# Patient Record
Sex: MALE | Race: WHITE | NOT HISPANIC OR LATINO | Employment: FULL TIME | URBAN - METROPOLITAN AREA
[De-identification: names, ages, dates, MRNs, and addresses within clinical notes are randomized per-mention and may not be internally consistent; named-entity substitution may affect disease eponyms.]

---

## 2017-01-23 ENCOUNTER — PATIENT MESSAGE (OUTPATIENT)
Dept: INTERNAL MEDICINE | Facility: CLINIC | Age: 57
End: 2017-01-23

## 2017-01-23 DIAGNOSIS — M25.50 ARTHRALGIA, UNSPECIFIED JOINT: Primary | ICD-10-CM

## 2017-01-27 ENCOUNTER — TELEPHONE (OUTPATIENT)
Dept: INTERNAL MEDICINE | Facility: CLINIC | Age: 57
End: 2017-01-27

## 2017-01-30 ENCOUNTER — PATIENT MESSAGE (OUTPATIENT)
Dept: INTERNAL MEDICINE | Facility: CLINIC | Age: 57
End: 2017-01-30

## 2017-02-17 ENCOUNTER — PATIENT MESSAGE (OUTPATIENT)
Dept: INTERNAL MEDICINE | Facility: CLINIC | Age: 57
End: 2017-02-17

## 2017-02-17 ENCOUNTER — TELEPHONE (OUTPATIENT)
Dept: INTERNAL MEDICINE | Facility: CLINIC | Age: 57
End: 2017-02-17

## 2017-02-21 ENCOUNTER — OFFICE VISIT (OUTPATIENT)
Dept: DERMATOLOGY | Facility: CLINIC | Age: 57
End: 2017-02-21
Payer: COMMERCIAL

## 2017-02-21 DIAGNOSIS — D22.9 MULTIPLE BENIGN NEVI: ICD-10-CM

## 2017-02-21 DIAGNOSIS — L57.3 POIKILODERMA OF CIVATTE: ICD-10-CM

## 2017-02-21 DIAGNOSIS — Z85.828 PERSONAL HISTORY OF OTHER MALIGNANT NEOPLASM OF SKIN: ICD-10-CM

## 2017-02-21 DIAGNOSIS — L84 CLAVUS: ICD-10-CM

## 2017-02-21 DIAGNOSIS — L82.1 SEBORRHEIC KERATOSIS: ICD-10-CM

## 2017-02-21 DIAGNOSIS — L57.0 AK (ACTINIC KERATOSIS): Primary | ICD-10-CM

## 2017-02-21 PROCEDURE — 99999 PR PBB SHADOW E&M-EST. PATIENT-LVL II: CPT | Mod: PBBFAC,,, | Performed by: DERMATOLOGY

## 2017-02-21 PROCEDURE — 99213 OFFICE O/P EST LOW 20 MIN: CPT | Mod: 25,S$GLB,, | Performed by: DERMATOLOGY

## 2017-02-21 PROCEDURE — 1160F RVW MEDS BY RX/DR IN RCRD: CPT | Mod: S$GLB,,, | Performed by: DERMATOLOGY

## 2017-02-21 PROCEDURE — 17000 DESTRUCT PREMALG LESION: CPT | Mod: S$GLB,,, | Performed by: DERMATOLOGY

## 2017-02-21 PROCEDURE — 17003 DESTRUCT PREMALG LES 2-14: CPT | Mod: S$GLB,,, | Performed by: DERMATOLOGY

## 2017-02-21 NOTE — PATIENT INSTRUCTIONS

## 2017-02-21 NOTE — PROGRESS NOTES
Subjective:       Patient ID:  Joseph Jesus is a 56 y.o. male who presents for   Chief Complaint   Patient presents with    Dry Skin     both post ears x 6 weeks, dry otc:cetaphil moisturizer    Skin Check     UBSE     HPI Comments: History of Present Illness: The patient presents for follow up of skin check.    The patient was last seen on: 11/14/16 for cryosurgery to actinic keratoses which have resolved.   This is a high risk patient here to check for the development of new lesions.    Other skin complaints: dry skin post ears x 6 weeks + otc moisturizer - helps      Dry Skin         Review of Systems   Skin: Positive for dry skin and activity-related sunscreen use. Negative for itching, rash, daily sunscreen use (but wears hat always now) and recent sunburn.        Wears a hat daily   Hematologic/Lymphatic: Does not bruise/bleed easily.        Objective:    Physical Exam   Constitutional: He appears well-developed and well-nourished. No distress.   Neurological: He is alert and oriented to person, place, and time. He is not disoriented.   Psychiatric: He has a normal mood and affect.   Skin:   Areas Examined (abnormalities noted in diagram):   Scalp / Hair Palpated and Inspected  Head / Face Inspection Performed  Neck Inspection Performed  Chest / Axilla Inspection Performed  Back Inspection Performed  RUE Inspected  LUE Inspection Performed                       Diagram Legend     Erythematous scaling macule/papule c/w actinic keratosis       Vascular papule c/w angioma      Pigmented verrucoid papule/plaque c/w seborrheic keratosis      Yellow umbilicated papule c/w sebaceous hyperplasia      Irregularly shaped tan macule c/w lentigo     1-2 mm smooth white papules consistent with Milia      Movable subcutaneous cyst with punctum c/w epidermal inclusion cyst      Subcutaneous movable cyst c/w pilar cyst      Firm pink to brown papule c/w dermatofibroma      Pedunculated fleshy papule(s) c/w skin tag(s)       Evenly pigmented macule c/w junctional nevus     Mildly variegated pigmented, slightly irregular-bordered macule c/w mildly atypical nevus      Flesh colored to evenly pigmented papule c/w intradermal nevus       Pink pearly papule/plaque c/w basal cell carcinoma      Erythematous hyperkeratotic cursted plaque c/w SCC      Surgical scar with no sign of skin cancer recurrence      Open and closed comedones      Inflammatory papules and pustules      Verrucoid papule consistent consistent with wart     Erythematous eczematous patches and plaques     Dystrophic onycholytic nail with subungual debris c/w onychomycosis     Umbilicated papule    Erythematous-base heme-crusted tan verrucoid plaque consistent with inflamed seborrheic keratosis     Erythematous Silvery Scaling Plaque c/w Psoriasis     See annotation      Assessment / Plan:        AK (actinic keratosis)  Cryosurgery Procedure Note    Verbal consent from the patient is obtained and the patient is aware of the precancerous quality and need for treatment of these lesions. Liquid nitrogen cryosurgery is applied to the 3 actinic keratoses, as detailed in the physical exam, to produce a freeze injury. The patient is aware that blisters may form and is instructed on wound care with gentle cleansing and use of vaseline ointment to keep moist until healed. The patient is supplied a handout on cryosurgery and is instructed to call if lesions do not completely resolve.      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 Discussed pathogenic factor of pressure. Patient advised to avoid/eliminate contributory footwear.    Use corn pads.      Personal history of other malignant neoplasm of skin  Area(s) of previous NMSC evaluated with no signs of recurrence.    Upper body skin examination performed today including at least 6 points as noted in  physical examination. No lesions suspicious for malignancy noted.             Return in about 6 months (around 8/21/2017).

## 2017-03-28 ENCOUNTER — OFFICE VISIT (OUTPATIENT)
Dept: RHEUMATOLOGY | Facility: CLINIC | Age: 57
End: 2017-03-28
Payer: COMMERCIAL

## 2017-03-28 VITALS
HEART RATE: 82 BPM | SYSTOLIC BLOOD PRESSURE: 135 MMHG | WEIGHT: 233.31 LBS | BODY MASS INDEX: 30.92 KG/M2 | HEIGHT: 73 IN | DIASTOLIC BLOOD PRESSURE: 93 MMHG

## 2017-03-28 DIAGNOSIS — M15.9 PRIMARY OSTEOARTHRITIS INVOLVING MULTIPLE JOINTS: Primary | ICD-10-CM

## 2017-03-28 DIAGNOSIS — I10 HYPERTENSION, ESSENTIAL: ICD-10-CM

## 2017-03-28 DIAGNOSIS — M77.9 TENDONITIS: ICD-10-CM

## 2017-03-28 DIAGNOSIS — K76.0 NAFLD (NONALCOHOLIC FATTY LIVER DISEASE): ICD-10-CM

## 2017-03-28 PROCEDURE — 1160F RVW MEDS BY RX/DR IN RCRD: CPT | Mod: S$GLB,,, | Performed by: INTERNAL MEDICINE

## 2017-03-28 PROCEDURE — 3075F SYST BP GE 130 - 139MM HG: CPT | Mod: S$GLB,,, | Performed by: INTERNAL MEDICINE

## 2017-03-28 PROCEDURE — 3080F DIAST BP >= 90 MM HG: CPT | Mod: S$GLB,,, | Performed by: INTERNAL MEDICINE

## 2017-03-28 PROCEDURE — 99204 OFFICE O/P NEW MOD 45 MIN: CPT | Mod: S$GLB,,, | Performed by: INTERNAL MEDICINE

## 2017-03-28 PROCEDURE — 99999 PR PBB SHADOW E&M-EST. PATIENT-LVL III: CPT | Mod: PBBFAC,,, | Performed by: INTERNAL MEDICINE

## 2017-03-28 RX ORDER — TRAMADOL HYDROCHLORIDE 50 MG/1
50 TABLET ORAL EVERY 6 HOURS PRN
Qty: 30 TABLET | Refills: 3 | Status: SHIPPED | OUTPATIENT
Start: 2017-03-28 | End: 2017-11-13

## 2017-03-28 NOTE — MR AVS SNAPSHOT
[facility]  [address]  [facility] [address]  Phone: [phone]  Fax: [phone]                  Joseph CARRANZA Edin   3/28/2017 3:00 PM   Office Visit    Description:  Male : 1960   Provider:  Terry Salinas MD   Department:  [facility]           Reason for Visit     Pain                To Do List           Future Appointments        Provider Department Dept Phone    2017 8:00 AM Terry Salinas MD [facility] [phone]      Goals (5 Years of Data)     None      Follow-Up and Disposition     Return in about 4 months (around 2017).       These Medications        Disp Refills Start End    tramadol (ULTRAM) 50 mg tablet 30 tablet 3 3/28/2017 2017    Take 1 tablet (50 mg total) by mouth every 6 (six) hours as needed for Pain. - Oral    Pharmacy: [facility]- [facility] - [place], [place] - [address] Ph #: [phone]         [facility] On Call     [facility] On Call Nurse Care Line -  Assistance  Registered nurses in the [facility] provide clinical advisement, health education, appointment booking, and other advisory services.  Call for this free service at [phone].             Medications           Message regarding Medications     Verify the changes and/or additions to your medication regime listed below are the same as discussed with your clinician today.  If any of these changes or additions are incorrect, please notify your healthcare provider.        START taking these NEW medications        Refills    tramadol (ULTRAM) 50 mg tablet 3    Sig: Take 1 tablet (50 mg total) by mouth every 6 (six) hours as needed for Pain.    Class: Print    Route: Oral      STOP taking these medications     ketoconazole (NIZORAL) 2 % shampoo Wash hair with medicated shampoo at least 2x/week - let sit on scalp at least 5 minutes prior to rinsing    BOOSTRIX TDAP 2.5-8-5 Lf-mcg-Lf/0.5mL Susp TO BE ADMINISTERED BY  "PHARMACIST    promethazine-dextromethorphan (PROMETHAZINE-DM) 6.25-15 mg/5 mL Syrp TAKE 1 TO 2 TEASPOONS BY MOUTH THREE TIMES A DAY AS NEEDED    saw palmetto 80 MG capsule Take 80 mg by mouth 2 (two) times daily.           Verify that the below list of medications is an accurate representation of the medications you are currently taking.  If none reported, the list may be blank. If incorrect, please contact your healthcare provider. Carry this list with you in case of emergency.           Current Medications     cetirizine (ZYRTEC) 10 MG tablet Take 10 mg by mouth once daily.    FLUVIRIN [id number] 45 mcg (15 mcg x 3)/0.5 mL Susp TO BE ADMINISTERED BY PHARMACIST    irbesartan (AVAPRO) 150 MG tablet TAKE 1 TABLET BY MOUTH EACH EVENING    meloxicam (MOBIC) 15 MG tablet TAKE 1 TABLET BY MOUTH once DAILY    tadalafil (CIALIS) 5 MG tablet Take 1 tablet (5 mg total) by mouth daily as needed for Erectile Dysfunction.    tramadol (ULTRAM) 50 mg tablet Take 1 tablet (50 mg total) by mouth every 6 (six) hours as needed for Pain.           Clinical Reference Information           Your Vitals Were     BP Pulse Height Weight BMI    135/93 (BP Location: Left arm, Patient Position: Sitting, BP Method: Automatic) 82 6' 1" (1.854 m) 105.8 kg (233 lb 4.8 oz) 30.78 kg/m2      Blood Pressure          Most Recent Value    BP  (!)  135/93      Allergies as of 3/28/2017     Erythromycin    Sumycin [Tetracycline]      Immunizations Administered on Date of Encounter - 3/28/2017     None      Language Assistance Services     ATTENTION: Language assistance services are available, free of charge. Please call [phone].      ATENCIÓN: Si habla español, tiene a morelos disposición servicios gratuitos de asistencia lingüística. Llame al [phone].     ARIN Ý: N?u b?n nói Ti?ng Vi?t, có các d?ch v? h? tr? ngôn ng? mi?n phí dành cho b?n. G?i s? [phone].         [facility] complies with applicable Federal civil rights laws and " does not discriminate on the basis of race, color, national origin, age, disability, or sex.

## 2017-03-29 NOTE — PROGRESS NOTES
History of present illness: 57-year-old gentleman with a history of hypertension and fatty liver disease.  He has multiple areas of pain.  It started one year ago.  There is no history of antecedent trauma.  The pain began initially in the wrist.  He has also had trouble with the elbows hips and toes.  He has chronic neck pain due to her previous injury.  The pain is been constant.  It is worse with activity.  It does not wake him up at night.  It is there in the morning.  He has 15 minutes of morning stiffness.  He has had no joint swelling.  He has paresthesias on the ulnar aspect of the right hand.  The pain in the hip radiates down the lateral hip to his knee.  He has had some paresthesias in his toes.  He was previously evaluated by orthopedics.  He was diagnosed as having tendinitis.    He has been using ice with some relief.  Topical medications had help.  He had taken Celebrex in the past for his neck but not recently.  He was placed on meloxicam which gave him some relief.  He was told to stop it because of possible effect on his blood pressure.  He has had no response to Tylenol.    He has had no unexplained fevers.  He denies any headaches.  He has no rash or conjunctivitis.  He has Tylenol ulcers but no palatal ulcers.  He has dryness of his eyes but not his mouth.  He has no Raynaud's phenomena, pleurisy, urethral discharge or ulcers, chronic or bloody diarrhea.  He has no family history connective tissue disease.    Systems review:  Gen.: Weight has been stable  GI: No abdominal pain or peptic ulcer disease  : No kidney or bladder problems    Physical examination:  Musculoskeletal: Cervical spine and shoulders are unremarkable.  He has pain on range of motion of the elbows bilaterally.  He is tender over the medial and lateral epicondyles bilaterally.  He has no tenderness in the joint line.  He has no swelling.  Wrist have good range of motion without pain on range of motion.  He has bony  hypertrophy of the first CMC.  He is tender over the right second and third MCP.  He has bony hypertrophy of the PIPs and DIPs.  He has decreased range of motion of the lumbar spine.  Straight leg raising is negative.  He is tender over the greater trochanter bilaterally, worse on the right than on the left.  Knees and ankles are unremarkable.  He has bilateral pes planus deformities.    Assessment: He has underlying osteoarthritis.  In addition he has multiple areas of tendinitis and bursitis.    Plans:  1.  Because of the elevated transaminases I would recommend avoiding anti-inflammatory agents  2.  He may use heat or ice to the area  3.  I would continue to use topical medications  4.  I placed him on tramadol as needed for pain  5.  Return to see me in 4 months

## 2017-04-18 ENCOUNTER — OFFICE VISIT (OUTPATIENT)
Dept: SURGERY | Facility: CLINIC | Age: 57
End: 2017-04-18
Payer: COMMERCIAL

## 2017-04-18 VITALS
DIASTOLIC BLOOD PRESSURE: 84 MMHG | WEIGHT: 231.94 LBS | SYSTOLIC BLOOD PRESSURE: 124 MMHG | HEART RATE: 67 BPM | BODY MASS INDEX: 30.6 KG/M2

## 2017-04-18 DIAGNOSIS — K57.32 DIVERTICULITIS OF LARGE INTESTINE WITHOUT PERFORATION OR ABSCESS WITHOUT BLEEDING: Primary | ICD-10-CM

## 2017-04-18 PROCEDURE — 3079F DIAST BP 80-89 MM HG: CPT | Mod: S$GLB,,, | Performed by: NURSE PRACTITIONER

## 2017-04-18 PROCEDURE — 1160F RVW MEDS BY RX/DR IN RCRD: CPT | Mod: S$GLB,,, | Performed by: NURSE PRACTITIONER

## 2017-04-18 PROCEDURE — 3074F SYST BP LT 130 MM HG: CPT | Mod: S$GLB,,, | Performed by: NURSE PRACTITIONER

## 2017-04-18 PROCEDURE — 99999 PR PBB SHADOW E&M-EST. PATIENT-LVL III: CPT | Mod: PBBFAC,,, | Performed by: NURSE PRACTITIONER

## 2017-04-18 PROCEDURE — 99213 OFFICE O/P EST LOW 20 MIN: CPT | Mod: S$GLB,,, | Performed by: NURSE PRACTITIONER

## 2017-04-18 RX ORDER — CIPROFLOXACIN 500 MG/1
500 TABLET ORAL 2 TIMES DAILY
Qty: 28 TABLET | Refills: 0 | Status: SHIPPED | OUTPATIENT
Start: 2017-04-18 | End: 2017-05-02

## 2017-04-18 RX ORDER — METRONIDAZOLE 500 MG/1
500 TABLET ORAL 3 TIMES DAILY
Qty: 42 TABLET | Refills: 0 | Status: SHIPPED | OUTPATIENT
Start: 2017-04-18 | End: 2017-05-02

## 2017-04-18 NOTE — PATIENT INSTRUCTIONS
Diverticulitis    Some people get pouches along the wall of the colon as they get older. The pouches, called diverticuli, usually cause no symptoms. If the pouches become blocked, you can get an infection. This infection is called diverticulitis. It causes pain in your lower abdomen and fever. If not treated, it can become a serious condition, causing an abscess to form inside the pouch. The abscess may block the intestinal tract even or rupture, spreading infection throughout the abdomen.  When treatment is started early, oral antibiotics alone may be enough to cure diverticulitis. This method is tried first. But, if you don't improve or if your condition gets worse while using oral antibiotics, you may need to be admitted to the hospital for IV antibiotics. Severe cases may require surgery.  Home care  The following guidelines will help you care for yourself at home:  · During the acute illness, rest and follow your healthcare provider's instructions about diet. Sometimes you will need to follow a clear liquid diet to rest your bowel. Once your symptoms are better, you may be told to follow a low-fiber diet for some time. Include foods like:  ¨ Flake cereal, mashed potatoes, pancakes, waffles, pasta, white bread, rice, applesauce, bananas, eggs, fish, poultry, tofu, and cooked soft vegetables  · Take antibiotics exactly as instructed. Don't miss any doses or stop taking the medication, even if you feel better.  · Monitor your temperature and tell your healthcare provider if you have rising temperatures.  Preventing future attacks  Once you have an episode of diverticulitis, you are at risk for having it again. After you have recovered from this episode, you may be able to lower your risk by eating a high-fiber diet (20 gm/day to 35 gm/day of fiber). This cleans out the colon pouches that already exist and may prevent new ones from forming. Foods high in fiber include fresh fruits and edible peelings, raw or  lightly cooked vegetables, whole grain cereals and breads, dried beans and peas, and bran.  Other steps that can help prevent future attacks include:  · Take your medicines, such as antibiotics, as your healthcare provider says.  · Drink 6 to 8 glasses of water every day, unless told otherwise.  · Use a heating pad or hot water bottle to help abdominal cramping or pain.  · Begin an exercise program. Ask your healthcare provider how to get started. You can benefit from simple activities such as walking or gardening.  · Treat diarrhea with a bland diet. Start with liquids only; then slowly add fiber over time.  · Watch for changes in your bowel movements (constipation to diarrhea). Avoid constipation by eating a high fiber diet and taking a stool softener if needed.  · Get plenty of rest and sleep.  Follow-up care  Follow up with your healthcare provider as advised or sooner if you are not getting better in the next 2 days.  When to seek medical advice  Call your healthcare provider right away if any of these occur:  · Fever of 100.4°F (38°C) or higher, or as directed by your healthcare provider  · Repeated vomiting or swelling of the abdomen  · Weakness, dizziness, light-headedness  · Pain in your abdomen that gets worse, severe, or spreads to your back  · Pain that moves to the right lower abdomen  · Rectal bleeding (stools that are red, black or maroon color)  · Unexpected vaginal bleeding  Date Last Reviewed: 9/1/2016  © 9545-6414 [employer]. [address] [place] [address]. All rights reserved. This information is not intended as a substitute for professional medical care. Always follow your healthcare professional's instructions.        Understanding Diverticulosis and Diverticulitis     Pouches or diverticula usually occur in the lower part of the colon called the sigmoid.     The colon (large intestine) is the last part of the digestive tract. It absorbs water from stool and changes it  from a liquid to a solid. In certain cases, small pouches called diverticula can form in the colon wall. This condition is called diverticulosis. The pouches can become infected. If this happens, it becomes a more serious problem called diverticulitis. These problems can be painful. But they can be managed.  Managing your condition  Diet changes or medicines may be prescribed.   If you have diverticulosis  Recommendations include:  · Diet changes are often enough to control symptoms. The main changes are adding fiber (roughage) and drinking more water. Fiber absorbs water as it travels through your colon. This helps your stool stay soft and move smoothly. Water helps this process.  · If needed, you may be told to take over-the-counter stool softeners.  · To help relieve pain, antispasmodic medicines may be prescribed.  · Watch for changes in your bowel movements. Tell the healthcare provider if you notice any changes.  · Begin an exercise program. Ask your healthcare provider how to get started.  · Get plenty of rest and sleep.   If you have diverticulitis  Treatment depends on how bad your symptoms are.  · For mild symptoms. You may be put on a liquid diet for a short time. Antibiotics are usually prescribed. If these two steps relieve your symptoms, you may then be prescribed a high-fiber diet. If you still have symptoms, your healthcare provider will discuss more treatment choices with you.  · For severe symptoms. You may need to be admitted to the hospital. There, you can be given IV antibiotics and fluids. You will also be put on a low-fiber or liquid diet. Although not common, surgery is needed in some people with severe symptoms.  [place] to colon health     Diverticulitis occurs when the pouches become infected or inflamed.     Help keep your colon healthy with a diet that includes plenty of high-fiber fruits, vegetables, and whole grains. Drink plenty of liquids like water and juice. Maintain a healthy  lifestyle including regular exercise, stress management, and adequate rest and sleep.   Date Last Reviewed: 7/1/2016  © 1780-4087 [employer]. [address] [place] [address], [place] [address]. All rights reserved. This information is not intended as a substitute for professional medical care. Always follow your healthcare professional's instructions.

## 2017-04-18 NOTE — PROGRESS NOTES
Patient ID:  Joseph Jesus is a 57 y.o. male     Chief Complaint:   Chief Complaint   Patient presents with    Diverticulitis        HPI:  Pt presents to cr[facility] with complaints of llq abd pain and intermittent diarrhea that began about 2 weeks ago but over the weekend pain increased, denies fever, no bright red blood per rectum, last bm was this am, no n/v  Seem 3/18/16 with CT. Proven diverticulitis, treated with cipro,flagyl, fu colonoscopy 5/19/16 diverticular dx in sigmoid colon  Has not had any abd pain since 2/16 except for present complaint    ROS:        Constitutional: No fever, chills, activity or appetite change.      HENT: No hearing loss, facial swelling, neck pain or stiffness.       Eyes: No discharge, itching and visual disturbance.      Respiratory: No apnea, cough, choking or shortness of breath.       Cardiovascular: No leg swelling or chest pain      Gastrointestinal: No abdominal distention or change in bowel habbits     Genitourinary: No dysuria, frequency or flank pain.      Musculoskeletal: No arthralgias or gait problem.      Neurological: No dizziness, seizures or weakness.      Hematological: No adenopathy.      Psychiatric/Behavioral: No hallucinations or behavioral problems.       PE:    APPEARANCE: Well nourished, well developed, in no acute distress.   CHEST: Lungs clear. Normal respiratory effort.  CARDIOVASCULAR: Normal rhythm. No edema.  ABDOMEN: Soft. tedner to palpation in LLQ.      Musculoskeletal: Symmetric, normal range of motion and strength.   Neurological: Alert and oriented to person, place, and time. Normal reflexes.   Skin: Skin is warm and dry.   Psychiatric: Normal mood and affect. Behavior is normal and appropriate.     Impression:    Encounter Diagnosis   Name Primary?    Diverticulitis of large intestine without perforation or abscess without bleeding Yes       PLAN:  Cbc, bmp today,   CT in am  Cipro, flagyl  Knows to call office for any increase in abd pain, fever or  n/v  Will call with CT results

## 2017-04-19 ENCOUNTER — HOSPITAL ENCOUNTER (OUTPATIENT)
Dept: RADIOLOGY | Facility: HOSPITAL | Age: 57
Discharge: HOME | End: 2017-04-19
Attending: NURSE PRACTITIONER
Payer: COMMERCIAL

## 2017-04-19 ENCOUNTER — TELEPHONE (OUTPATIENT)
Dept: SURGERY | Facility: HOSPITAL | Age: 57
End: 2017-04-19

## 2017-04-19 DIAGNOSIS — K57.32 DIVERTICULITIS OF LARGE INTESTINE WITHOUT PERFORATION OR ABSCESS WITHOUT BLEEDING: ICD-10-CM

## 2017-04-19 PROCEDURE — 25500020 PHARM REV CODE 255: Performed by: NURSE PRACTITIONER

## 2017-04-19 PROCEDURE — 74177 CT ABD & PELVIS W/CONTRAST: CPT | Mod: 26,,, | Performed by: RADIOLOGY

## 2017-04-19 PROCEDURE — 74177 CT ABD & PELVIS W/CONTRAST: CPT | Mod: TC

## 2017-04-19 RX ADMIN — IOHEXOL 30 ML: 350 INJECTION, SOLUTION INTRAVENOUS at 08:04

## 2017-04-19 RX ADMIN — IOHEXOL 100 ML: 350 INJECTION, SOLUTION INTRAVENOUS at 10:04

## 2017-05-02 ENCOUNTER — OFFICE VISIT (OUTPATIENT)
Dept: SURGERY | Facility: CLINIC | Age: 57
End: 2017-05-02
Payer: COMMERCIAL

## 2017-05-02 VITALS
HEART RATE: 72 BPM | BODY MASS INDEX: 30.82 KG/M2 | HEIGHT: 73 IN | SYSTOLIC BLOOD PRESSURE: 132 MMHG | DIASTOLIC BLOOD PRESSURE: 80 MMHG | WEIGHT: 232.56 LBS

## 2017-05-02 DIAGNOSIS — K57.32 DIVERTICULITIS OF LARGE INTESTINE WITHOUT PERFORATION OR ABSCESS WITHOUT BLEEDING: Primary | ICD-10-CM

## 2017-05-02 PROCEDURE — 99212 OFFICE O/P EST SF 10 MIN: CPT | Mod: S$GLB,,, | Performed by: NURSE PRACTITIONER

## 2017-05-02 PROCEDURE — 3075F SYST BP GE 130 - 139MM HG: CPT | Mod: S$GLB,,, | Performed by: NURSE PRACTITIONER

## 2017-05-02 PROCEDURE — 1160F RVW MEDS BY RX/DR IN RCRD: CPT | Mod: S$GLB,,, | Performed by: NURSE PRACTITIONER

## 2017-05-02 PROCEDURE — 99999 PR PBB SHADOW E&M-EST. PATIENT-LVL III: CPT | Mod: PBBFAC,,, | Performed by: NURSE PRACTITIONER

## 2017-05-02 PROCEDURE — 3079F DIAST BP 80-89 MM HG: CPT | Mod: S$GLB,,, | Performed by: NURSE PRACTITIONER

## 2017-05-02 NOTE — PROGRESS NOTES
Patient ID:  Joseph Jesus is a 57 y.o. male     Chief Complaint:   Chief Complaint   Patient presents with    Follow-up        HPI:HPI: Pt presents to crs as fu after having been diagnosed with diverticulits on 4/18,  CT 4/19/17:  Innumerable colonic diverticula are present.  There is an area of small surrounding inflammatory fat change about the distal descending colon consistent with diverticulitis.  There is no evidence of perforation or abscess.  Today his pain is totally resolved, has finished the antibiotics and has been eating a low fiber diet, no n/v, soft stools that he says are dark in color.     Seem 3/18/16 with CT. Proven diverticulitis, treated with cipro,flagyl, fu colonoscopy 5/19/16 diverticular dx in sigmoid colon         ROS:        Constitutional: No fever, chills, activity or appetite change.      HENT: No hearing loss, facial swelling, neck pain or stiffness.       Eyes: No discharge, itching and visual disturbance.      Respiratory: No apnea, cough, choking or shortness of breath.       Cardiovascular: No leg swelling or chest pain      Gastrointestinal: No abdominal distention or change in bowel habbits     Genitourinary: No dysuria, frequency or flank pain.      Musculoskeletal: No arthralgias or gait problem.      Neurological: No dizziness, seizures or weakness.      Hematological: No adenopathy.      Psychiatric/Behavioral: No hallucinations or behavioral problems.       PE:    APPEARANCE: Well nourished, well developed, in no acute distress.   CHEST: Lungs clear. Normal respiratory effort.  CARDIOVASCULAR: Normal rhythm. No edema.  ABDOMEN: Soft. No tenderness or masses.  Musculoskeletal: Symmetric, normal range of motion and strength.   Neurological: Alert and oriented to person, place, and time. Normal reflexes.   Skin: Skin is warm and dry.   Psychiatric: Normal mood and affect. Behavior is normal and appropriate.     Impression:    2 episodes of diverticulitis in a little over a  year    PLAN:  Slowly add fiber to diet  Call if stools remain dark in color in one month  Knows to call office for LLQ abd pain  Questions answered about surgical intervention

## 2017-05-12 RX ORDER — MELOXICAM 15 MG/1
TABLET ORAL
Qty: 30 TABLET | Refills: 1 | Status: SHIPPED | OUTPATIENT
Start: 2017-05-12 | End: 2017-09-12 | Stop reason: SDUPTHER

## 2017-05-22 ENCOUNTER — OFFICE VISIT (OUTPATIENT)
Dept: INTERNAL MEDICINE | Facility: CLINIC | Age: 57
End: 2017-05-22
Payer: COMMERCIAL

## 2017-05-22 ENCOUNTER — PATIENT MESSAGE (OUTPATIENT)
Dept: DERMATOLOGY | Facility: CLINIC | Age: 57
End: 2017-05-22

## 2017-05-22 ENCOUNTER — PATIENT MESSAGE (OUTPATIENT)
Dept: INTERNAL MEDICINE | Facility: CLINIC | Age: 57
End: 2017-05-22

## 2017-05-22 VITALS
BODY MASS INDEX: 30.57 KG/M2 | SYSTOLIC BLOOD PRESSURE: 143 MMHG | OXYGEN SATURATION: 97 % | DIASTOLIC BLOOD PRESSURE: 90 MMHG | TEMPERATURE: 98 F | HEIGHT: 73 IN | HEART RATE: 89 BPM | WEIGHT: 230.63 LBS

## 2017-05-22 DIAGNOSIS — B88.0 CHIGGER BITES: Primary | ICD-10-CM

## 2017-05-22 PROCEDURE — 3077F SYST BP >= 140 MM HG: CPT | Mod: S$GLB,,, | Performed by: NURSE PRACTITIONER

## 2017-05-22 PROCEDURE — 99999 PR PBB SHADOW E&M-EST. PATIENT-LVL IV: CPT | Mod: PBBFAC,,, | Performed by: NURSE PRACTITIONER

## 2017-05-22 PROCEDURE — 1160F RVW MEDS BY RX/DR IN RCRD: CPT | Mod: S$GLB,,, | Performed by: NURSE PRACTITIONER

## 2017-05-22 PROCEDURE — 99213 OFFICE O/P EST LOW 20 MIN: CPT | Mod: S$GLB,,, | Performed by: NURSE PRACTITIONER

## 2017-05-22 PROCEDURE — 3080F DIAST BP >= 90 MM HG: CPT | Mod: S$GLB,,, | Performed by: NURSE PRACTITIONER

## 2017-05-22 RX ORDER — HYDROXYZINE PAMOATE 50 MG/1
50 CAPSULE ORAL EVERY 8 HOURS PRN
Qty: 30 CAPSULE | Refills: 0 | Status: SHIPPED | OUTPATIENT
Start: 2017-05-22 | End: 2018-09-11

## 2017-05-22 RX ORDER — PREDNISONE 20 MG/1
TABLET ORAL
Qty: 18 TABLET | Refills: 0 | Status: SHIPPED | OUTPATIENT
Start: 2017-05-22 | End: 2017-06-01

## 2017-05-22 RX ORDER — TRIAMCINOLONE ACETONIDE 1 MG/G
CREAM TOPICAL 2 TIMES DAILY
Qty: 45 G | Refills: 0 | Status: SHIPPED | OUTPATIENT
Start: 2017-05-22 | End: 2018-10-25

## 2017-05-22 NOTE — TELEPHONE ENCOUNTER
[facility] appt has been scheduled for pt. Pt has been notified and verbalized understanding of appt details.

## 2017-05-23 ENCOUNTER — PATIENT MESSAGE (OUTPATIENT)
Dept: UROLOGY | Facility: CLINIC | Age: 57
End: 2017-05-23

## 2017-05-23 NOTE — PROGRESS NOTES
"Subjective:       Patient ID: Joseph Jesus is a 57 y.o. male.    Chief Complaint: Insect Bite    Mr Jesus went camping a week and a half ago.  When he returned he had "bites" on his body that were intensely pruritic.  The appearance of the lesions has been changing.  The start as red bumps, then form blisters, then burst, then a new blister forms again. Several people who camping with him has similar lesions, but only 1 or 2.  He has at least 20 on his arms, legs, back, buttocks, and neck.     Insect Bite   This is a new problem. The current episode started 1 to 4 weeks ago. The problem occurs constantly. The problem has been waxing and waning. Pertinent negatives include no abdominal pain, anorexia, arthralgias, change in bowel habit, chest pain, chills, congestion, coughing, fatigue, fever, headaches, joint swelling, myalgias, nausea, visual change, vomiting or weakness. Treatments tried: clamine lotion, [place] bath, Benadryl. The treatment provided mild relief.     Review of Systems   Constitutional: Negative for activity change, chills, fatigue, fever and unexpected weight change.   HENT: Negative for congestion, hearing loss, rhinorrhea and trouble swallowing.    Eyes: Negative for discharge and visual disturbance.   Respiratory: Negative for cough, chest tightness and wheezing.    Cardiovascular: Negative for chest pain and palpitations.   Gastrointestinal: Negative for abdominal pain, anorexia, blood in stool, change in bowel habit, constipation, diarrhea, nausea and vomiting.   Endocrine: Negative for polydipsia and polyuria.   Genitourinary: Negative for difficulty urinating, hematuria and urgency.   Musculoskeletal: Negative for arthralgias, joint swelling and myalgias.   Skin: Positive for wound.   Neurological: Negative for weakness and headaches.   Psychiatric/Behavioral: Negative for confusion and dysphoric mood.       Objective:      Physical Exam   Constitutional: He is oriented to person, place, and " time. He appears well-developed and well-nourished. No distress.   HENT:   Mouth/Throat: No oropharyngeal exudate.   Eyes: No scleral icterus.   Neck: Neck supple.   Cardiovascular: Normal rate, regular rhythm and normal heart sounds.    Pulmonary/Chest: Effort normal and breath sounds normal. No respiratory distress. He has no wheezes. He has no rales.   Musculoskeletal: Normal range of motion. He exhibits no edema.   Neurological: He is oriented to person, place, and time.   Skin: He is not diaphoretic.   Large bullae on legs, arms, right shoulder, buttocks and trunk   Psychiatric: He has a normal mood and affect. His behavior is normal.   Nursing note and vitals reviewed.      Assessment:       1. Chigger bites        Plan:   1. Chigger bites  - triamcinolone acetonide 0.1% (KENALOG) 0.1 % cream; Apply topically 2 (two) times daily.  Dispense: 45 g; Refill: 0  - predniSONE (DELTASONE) 20 MG tablet; Take 3 tablets daily for 3 days, then 2 tablets daily for 3 days, then 1 tablet daily for 3 days  Dispense: 18 tablet; Refill: 0  - hydrOXYzine pamoate (VISTARIL) 50 MG Cap; Take 1 capsule (50 mg total) by mouth every 8 (eight) hours as needed.  Dispense: 30 capsule; Refill: 0      Pt has been given instructions populated from [employer] database and has verbalized understanding of the after visit summary and information contained wherein.    Follow up with a primary care provider. May go to ER for acute shortness of breath, lightheadedness, fever, or any other emergent complaints or changes in condition.

## 2017-05-25 ENCOUNTER — OFFICE VISIT (OUTPATIENT)
Dept: UROLOGY | Facility: CLINIC | Age: 57
End: 2017-05-25
Payer: COMMERCIAL

## 2017-05-25 VITALS
HEIGHT: 73 IN | BODY MASS INDEX: 30.48 KG/M2 | WEIGHT: 230 LBS | DIASTOLIC BLOOD PRESSURE: 89 MMHG | SYSTOLIC BLOOD PRESSURE: 143 MMHG

## 2017-05-25 DIAGNOSIS — N50.819 ORCHALGIA: ICD-10-CM

## 2017-05-25 DIAGNOSIS — I10 ESSENTIAL HYPERTENSION: ICD-10-CM

## 2017-05-25 DIAGNOSIS — N40.1 BENIGN NON-NODULAR PROSTATIC HYPERPLASIA WITH LOWER URINARY TRACT SYMPTOMS: Primary | ICD-10-CM

## 2017-05-25 PROCEDURE — 51798 US URINE CAPACITY MEASURE: CPT | Mod: S$GLB,,, | Performed by: UROLOGY

## 2017-05-25 PROCEDURE — 99999 PR PBB SHADOW E&M-EST. PATIENT-LVL III: CPT | Mod: PBBFAC,,, | Performed by: UROLOGY

## 2017-05-25 PROCEDURE — 99214 OFFICE O/P EST MOD 30 MIN: CPT | Mod: S$GLB,,, | Performed by: UROLOGY

## 2017-05-25 NOTE — PROGRESS NOTES
Subjective:       Patient ID: Joseph Jesus is a 57 y.o. male.    Chief Complaint: Benign Prostatic Hypertrophy and Testicle Pain    HPI   Patient is a 57 y.o. male who is established to our clinic and here for evaluation of chronic prostatitis and bph.     Prostatitis is described with symptoms of right groin pain and chronic testicular discomfort.  He gets occasional groin pain, which doesn't bother him right now.  He has had some recurrence of his right testicular pain.  Described as a soreness.  Located in the rear of his testicle.  Radiates to the rectal area.     He is voiding well.  Drinks a lot of water.  He feels he empties his bladder reasonably well.  Nocturia infrequently.     Has been increasing exercise, working on the treadmill.     He is not taking any prostate medications.    Father had prostate cancer.      Lab Results   Component Value Date    PSA 0.41 06/27/2016    PSA 0.38 09/27/2012    PSA 0.28 01/27/2011    PSA 0.38 07/31/2009    PSA 0.3 04/05/2006    PSADIAG 0.37 09/08/2014         Review of Systems   Genitourinary: Positive for testicular pain.       Objective:      Physical Exam   Nursing note and vitals reviewed.  Constitutional: He is oriented to person, place, and time. Vital signs are normal. He appears well-developed and well-nourished. He is cooperative.   HENT:   Head: Normocephalic.   Neck: No tracheal deviation present.   Cardiovascular: Normal rate and intact distal pulses.    Pulmonary/Chest: Effort normal. No accessory muscle usage. No tachypnea. No respiratory distress.   Abdominal: Soft. Normal appearance. He exhibits no distension, no fluid wave, no ascites and no mass. There is no tenderness. There is no rebound and no CVA tenderness. No hernia. Hernia confirmed negative in the right inguinal area and confirmed negative in the left inguinal area.       Liver/spleen non-palpable.   Genitourinary: Prostate normal and penis normal. Rectal exam shows no external hemorrhoid, no  mass, no tenderness and anal tone normal. Prostate is not tender. Right testis shows tenderness (mild). Right testis shows no mass. Right testis is descended. Left testis shows no mass and no tenderness. Left testis is descended. Circumcised.   Genitourinary Comments: Scrotum showed no rashes or lesions.  Anus/perineum without lesion.  Epididymis showed no masses or tenderness. No meatal stenosis, meatus in normal location. No penile discharge/plaques. Prostate smooth, no nodules, 30 grams.  Seminal vesicles non-palpable.  Normal sphincter tone.        Musculoskeletal: Normal range of motion.   Lymphadenopathy: No inguinal adenopathy noted on the right or left side.        Right: No inguinal adenopathy present.        Left: No inguinal adenopathy present.   Neurological: He is alert and oriented to person, place, and time. He has normal strength.   Skin: No bruising and no rash noted.     Psychiatric: He has a normal mood and affect. His speech is normal and behavior is normal. Thought content normal.       Assessment:       1. Benign non-nodular prostatic hyperplasia with lower urinary tract symptoms    2. Orchalgia    3. Essential hypertension        Plan:       1. BPH:  -A post void residual bladder scan was performed immediately after voiding. The patient's PVR was noted to be 0mL.  -discussed flomax--he is not interested due to ejaculatory side effects  -mild symptoms.      2. Screening psa:  -psa reviewed and wnl from 6/2016.  Will be due for repeat in 6/2018.     3. Right inguinal/testicular pain:  -no recurrent hernia detected.   -likely related to prior hernia repair.  Also has epididymal cysts.  Pain is not severe.  Will monitor.  -discussed gabapentin as an option  -also offered referral to anesthesia/pain management for cord block versus nerve ablation potentially.  The patient is not interested in this at this time.      4. HTN:  --BP reviewed  -stable, continue meds and f/u with PCP

## 2017-05-30 ENCOUNTER — PATIENT MESSAGE (OUTPATIENT)
Dept: INTERNAL MEDICINE | Facility: CLINIC | Age: 57
End: 2017-05-30

## 2017-05-30 NOTE — TELEPHONE ENCOUNTER
Preferably, he would contact the provider who did the procedure to fill out any paperwork related to that.    Otherwise, he would need an appointment for any forms from me.    Thank you

## 2017-05-31 ENCOUNTER — PATIENT MESSAGE (OUTPATIENT)
Dept: DERMATOLOGY | Facility: CLINIC | Age: 57
End: 2017-05-31

## 2017-05-31 ENCOUNTER — PATIENT MESSAGE (OUTPATIENT)
Dept: SURGERY | Facility: CLINIC | Age: 57
End: 2017-05-31

## 2017-06-01 ENCOUNTER — OFFICE VISIT (OUTPATIENT)
Dept: INTERNAL MEDICINE | Facility: CLINIC | Age: 57
End: 2017-06-01
Payer: COMMERCIAL

## 2017-06-01 ENCOUNTER — PATIENT MESSAGE (OUTPATIENT)
Dept: INTERNAL MEDICINE | Facility: CLINIC | Age: 57
End: 2017-06-01

## 2017-06-01 ENCOUNTER — TELEPHONE (OUTPATIENT)
Dept: INTERNAL MEDICINE | Facility: CLINIC | Age: 57
End: 2017-06-01

## 2017-06-01 VITALS
BODY MASS INDEX: 30.13 KG/M2 | HEIGHT: 73 IN | DIASTOLIC BLOOD PRESSURE: 92 MMHG | HEART RATE: 68 BPM | WEIGHT: 227.31 LBS | SYSTOLIC BLOOD PRESSURE: 142 MMHG

## 2017-06-01 DIAGNOSIS — J01.00 ACUTE NON-RECURRENT MAXILLARY SINUSITIS: Primary | ICD-10-CM

## 2017-06-01 PROCEDURE — 99213 OFFICE O/P EST LOW 20 MIN: CPT | Mod: S$GLB,,, | Performed by: INTERNAL MEDICINE

## 2017-06-01 PROCEDURE — 99999 PR PBB SHADOW E&M-EST. PATIENT-LVL III: CPT | Mod: PBBFAC,,, | Performed by: INTERNAL MEDICINE

## 2017-06-01 RX ORDER — GUAIFENESIN 600 MG/1
1200 TABLET, EXTENDED RELEASE ORAL 2 TIMES DAILY
Refills: 0 | COMMUNITY
Start: 2017-06-01 | End: 2017-06-11

## 2017-06-01 RX ORDER — AMOXICILLIN AND CLAVULANATE POTASSIUM 875; 125 MG/1; MG/1
1 TABLET, FILM COATED ORAL EVERY 12 HOURS
Qty: 20 TABLET | Refills: 0 | Status: SHIPPED | OUTPATIENT
Start: 2017-06-01 | End: 2017-06-11

## 2017-06-01 NOTE — PROGRESS NOTES
"Clinic Note  6/1/2017      Subjective:       Patient ID:  Joseph is a 57 y.o. male being seen for an urgent care visit.    Chief Complaint: Headache    Sinusitis   This is a new problem. The current episode started in the past 7 days. There has been no fever. The pain is moderate. Associated symptoms include congestion.       Review of Systems   HENT: Positive for congestion.        Medication List with Changes/Refills   New Medications    AMOXICILLIN-CLAVULANATE 875-125MG (AUGMENTIN) 875-125 MG PER TABLET    Take 1 tablet by mouth every 12 (twelve) hours.    GUAIFENESIN (MUCINEX) 600 MG 12 HR TABLET    Take 2 tablets (1,200 mg total) by mouth 2 (two) times daily.   Current Medications    CETIRIZINE (ZYRTEC) 10 MG TABLET    Take 10 mg by mouth once daily.    HYDROXYZINE PAMOATE (VISTARIL) 50 MG CAP    Take 1 capsule (50 mg total) by mouth every 8 (eight) hours as needed.    IRBESARTAN (AVAPRO) 150 MG TABLET    TAKE 1 TABLET BY MOUTH EACH EVENING    MELOXICAM (MOBIC) 15 MG TABLET    TAKE 1 TABLET BY MOUTH once DAILY    TRAMADOL (ULTRAM) 50 MG TABLET    Take 1 tablet (50 mg total) by mouth every 6 (six) hours as needed for Pain.    TRIAMCINOLONE ACETONIDE 0.1% (KENALOG) 0.1 % CREAM    Apply topically 2 (two) times daily.   Discontinued Medications    PREDNISONE (DELTASONE) 20 MG TABLET    Take 3 tablets daily for 3 days, then 2 tablets daily for 3 days, then 1 tablet daily for 3 days       Patient Active Problem List   Diagnosis    Wart    Colon polyps    BPH with urinary obstruction    Hypertension, essential    Hyperlipidemia    NAFLD (nonalcoholic fatty liver disease)    Lateral epicondylitis of right elbow    Diverticulitis of large intestine without perforation or abscess without bleeding           Objective:      BP (!) 142/92   Pulse 68   Ht 6' 1" (1.854 m)   Wt 103.1 kg (227 lb 4.7 oz)   BMI 29.99 kg/m²   Estimated body mass index is 29.99 kg/m² as calculated from the following:    Height as of this " "encounter: 6' 1" (1.854 m).    Weight as of this encounter: 103.1 kg (227 lb 4.7 oz).  Physical Exam   Constitutional: He is well-developed, well-nourished, and in no distress.   HENT:   Right Ear: Tympanic membrane normal.   Left Ear: Tympanic membrane normal.   Nose: Right sinus exhibits maxillary sinus tenderness. Right sinus exhibits no frontal sinus tenderness. Left sinus exhibits no maxillary sinus tenderness and no frontal sinus tenderness.   Mouth/Throat: No oropharyngeal exudate, posterior oropharyngeal edema, posterior oropharyngeal erythema or tonsillar abscesses.   Cardiovascular: Normal rate and normal heart sounds.    Pulmonary/Chest: Breath sounds normal.   Lymphadenopathy:     He has no cervical adenopathy.         Assessment and Plan:         Problem List Items Addressed This Visit     None      Visit Diagnoses     Acute non-recurrent maxillary sinusitis    -  Primary - acute R side.  Rx for augmentin x 10d + guaifenesin OTC.           Follow Up:   Return if symptoms worsen or fail to improve.        Bart Villanueva      "

## 2017-09-14 ENCOUNTER — TELEPHONE (OUTPATIENT)
Dept: INTERNAL MEDICINE | Facility: CLINIC | Age: 57
End: 2017-09-14

## 2017-09-14 DIAGNOSIS — Z12.5 PROSTATE CANCER SCREENING: ICD-10-CM

## 2017-09-14 DIAGNOSIS — I10 HYPERTENSION, ESSENTIAL: ICD-10-CM

## 2017-09-14 DIAGNOSIS — Z00.00 ANNUAL PHYSICAL EXAM: Primary | ICD-10-CM

## 2017-09-14 RX ORDER — IRBESARTAN 150 MG/1
TABLET ORAL
Qty: 30 TABLET | Refills: 0 | Status: SHIPPED | OUTPATIENT
Start: 2017-09-14 | End: 2017-10-12 | Stop reason: SDUPTHER

## 2017-09-14 RX ORDER — MELOXICAM 15 MG/1
TABLET ORAL
Qty: 30 TABLET | Refills: 0 | Status: SHIPPED | OUTPATIENT
Start: 2017-09-14 | End: 2018-05-03 | Stop reason: SDUPTHER

## 2017-09-14 NOTE — TELEPHONE ENCOUNTER
Patient is due for a follow up appointment - please review lab order section and draw any before appointment if applicable. Thank you.'

## 2017-10-12 RX ORDER — IRBESARTAN 150 MG/1
TABLET ORAL
Qty: 30 TABLET | Refills: 11 | Status: SHIPPED | OUTPATIENT
Start: 2017-10-12 | End: 2018-02-05 | Stop reason: SDUPTHER

## 2017-10-13 ENCOUNTER — OFFICE VISIT (OUTPATIENT)
Dept: DERMATOLOGY | Facility: CLINIC | Age: 57
End: 2017-10-13
Payer: COMMERCIAL

## 2017-10-13 DIAGNOSIS — L57.0 AK (ACTINIC KERATOSIS): Primary | ICD-10-CM

## 2017-10-13 DIAGNOSIS — L57.3 POIKILODERMA OF CIVATTE: ICD-10-CM

## 2017-10-13 DIAGNOSIS — Z85.828 HISTORY OF NONMELANOMA SKIN CANCER: ICD-10-CM

## 2017-10-13 DIAGNOSIS — L82.1 SEBORRHEIC KERATOSIS: ICD-10-CM

## 2017-10-13 DIAGNOSIS — D23.9 BLUE NEVUS: ICD-10-CM

## 2017-10-13 PROCEDURE — 99213 OFFICE O/P EST LOW 20 MIN: CPT | Mod: 25,S$GLB,, | Performed by: DERMATOLOGY

## 2017-10-13 PROCEDURE — 17003 DESTRUCT PREMALG LES 2-14: CPT | Mod: S$GLB,,, | Performed by: DERMATOLOGY

## 2017-10-13 PROCEDURE — 99999 PR PBB SHADOW E&M-EST. PATIENT-LVL II: CPT | Mod: PBBFAC,,, | Performed by: DERMATOLOGY

## 2017-10-13 PROCEDURE — 17000 DESTRUCT PREMALG LESION: CPT | Mod: S$GLB,,, | Performed by: DERMATOLOGY

## 2017-10-13 NOTE — PATIENT INSTRUCTIONS

## 2017-10-13 NOTE — PROGRESS NOTES
Subjective:       Patient ID:  Joseph Jesus is a 57 y.o. male who presents for   Chief Complaint   Patient presents with    Skin Check     UBSE     History of Present Illness: The patient presents for follow up of skin check.    The patient was last seen on: 2/21/17 for cryosurgery to actinic keratoses which have resolved.     Other skin complaints: none  This is a high risk patient here to check for the development of new lesions.            Review of Systems   Skin: Positive for activity-related sunscreen use and wears hat (50%). Negative for itching, rash, dry skin, daily sunscreen use and recent sunburn.   Hematologic/Lymphatic: Does not bruise/bleed easily.        Objective:    Physical Exam   Constitutional: He appears well-developed and well-nourished. No distress.   Neurological: He is alert and oriented to person, place, and time. He is not disoriented.   Psychiatric: He has a normal mood and affect.   Skin:   Areas Examined (abnormalities noted in diagram):   Scalp / Hair Palpated and Inspected  Head / Face Inspection Performed  Neck Inspection Performed  Chest / Axilla Inspection Performed  Back Inspection Performed  RUE Inspected  LUE Inspection Performed                   Diagram Legend     Erythematous scaling macule/papule c/w actinic keratosis       Vascular papule c/w angioma      Pigmented verrucoid papule/plaque c/w seborrheic keratosis      Yellow umbilicated papule c/w sebaceous hyperplasia      Irregularly shaped tan macule c/w lentigo     1-2 mm smooth white papules consistent with Milia      Movable subcutaneous cyst with punctum c/w epidermal inclusion cyst      Subcutaneous movable cyst c/w pilar cyst      Firm pink to brown papule c/w dermatofibroma      Pedunculated fleshy papule(s) c/w skin tag(s)      Evenly pigmented macule c/w junctional nevus     Mildly variegated pigmented, slightly irregular-bordered macule c/w mildly atypical nevus      Flesh colored to evenly pigmented papule  c/w intradermal nevus       Pink pearly papule/plaque c/w basal cell carcinoma      Erythematous hyperkeratotic cursted plaque c/w SCC      Surgical scar with no sign of skin cancer recurrence      Open and closed comedones      Inflammatory papules and pustules      Verrucoid papule consistent consistent with wart     Erythematous eczematous patches and plaques     Dystrophic onycholytic nail with subungual debris c/w onychomycosis     Umbilicated papule    Erythematous-base heme-crusted tan verrucoid plaque consistent with inflamed seborrheic keratosis     Erythematous Silvery Scaling Plaque c/w Psoriasis     See annotation      Assessment / Plan:        AK (actinic keratosis)  Cryosurgery Procedure Note    Verbal consent from the patient is obtained and the patient is aware of the precancerous quality and need for treatment of these lesions. Liquid nitrogen cryosurgery is applied to the 8 actinic keratoses, as detailed in the physical exam, to produce a freeze injury. The patient is aware that blisters may form and is instructed on wound care with gentle cleansing and use of vaseline ointment to keep moist until healed. The patient is supplied a handout on cryosurgery and is instructed to call if lesions do not completely resolve.    Wear hat always!    Seborrheic keratosis   - stable and chronic    Poikiloderma of [place]  This is a common finding on necks, chests and sometimes backs and shoulders after chronic sun exposure. Aggressive sun protection is recommended.       Blue nevus   - stable and chronic      History of nonmelanoma skin cancer  Area(s) of previous NMSC evaluated with no signs of recurrence.    Upper body skin examination performed today including at least 6 points as noted in physical examination. No lesions suspicious for malignancy noted.               Return in about 6 months (around 4/13/2018).

## 2017-11-01 ENCOUNTER — LAB VISIT (OUTPATIENT)
Dept: LAB | Facility: HOSPITAL | Age: 57
End: 2017-11-01
Attending: FAMILY MEDICINE
Payer: COMMERCIAL

## 2017-11-01 DIAGNOSIS — Z12.5 PROSTATE CANCER SCREENING: ICD-10-CM

## 2017-11-01 DIAGNOSIS — I10 HYPERTENSION, ESSENTIAL: ICD-10-CM

## 2017-11-01 DIAGNOSIS — Z00.00 ANNUAL PHYSICAL EXAM: ICD-10-CM

## 2017-11-01 LAB
ANION GAP SERPL CALC-SCNC: 8 MMOL/L
BUN SERPL-MCNC: 19 MG/DL
CALCIUM SERPL-MCNC: 9.6 MG/DL
CHLORIDE SERPL-SCNC: 108 MMOL/L
CHOLEST SERPL-MCNC: 236 MG/DL
CHOLEST/HDLC SERPL: 4.3 {RATIO}
CO2 SERPL-SCNC: 25 MMOL/L
COMPLEXED PSA SERPL-MCNC: 0.42 NG/ML
CREAT SERPL-MCNC: 1.4 MG/DL
EST. GFR  (AFRICAN AMERICAN): >60 ML/MIN/1.73 M^2
EST. GFR  (NON AFRICAN AMERICAN): 55 ML/MIN/1.73 M^2
GLUCOSE SERPL-MCNC: 94 MG/DL
HDLC SERPL-MCNC: 55 MG/DL
HDLC SERPL: 23.3 %
LDLC SERPL CALC-MCNC: 155 MG/DL
NONHDLC SERPL-MCNC: 181 MG/DL
POTASSIUM SERPL-SCNC: 4.4 MMOL/L
SODIUM SERPL-SCNC: 141 MMOL/L
TRIGL SERPL-MCNC: 130 MG/DL

## 2017-11-01 PROCEDURE — 84153 ASSAY OF PSA TOTAL: CPT

## 2017-11-01 PROCEDURE — 36415 COLL VENOUS BLD VENIPUNCTURE: CPT

## 2017-11-01 PROCEDURE — 80048 BASIC METABOLIC PNL TOTAL CA: CPT

## 2017-11-01 PROCEDURE — 80061 LIPID PANEL: CPT

## 2017-11-03 ENCOUNTER — OFFICE VISIT (OUTPATIENT)
Dept: INTERNAL MEDICINE | Facility: CLINIC | Age: 57
End: 2017-11-03
Attending: FAMILY MEDICINE
Payer: COMMERCIAL

## 2017-11-03 VITALS
BODY MASS INDEX: 32.51 KG/M2 | WEIGHT: 240 LBS | SYSTOLIC BLOOD PRESSURE: 134 MMHG | DIASTOLIC BLOOD PRESSURE: 88 MMHG | HEIGHT: 72 IN

## 2017-11-03 DIAGNOSIS — I10 HYPERTENSION, ESSENTIAL: ICD-10-CM

## 2017-11-03 DIAGNOSIS — E78.5 HYPERLIPIDEMIA, UNSPECIFIED HYPERLIPIDEMIA TYPE: ICD-10-CM

## 2017-11-03 DIAGNOSIS — Z00.00 ANNUAL PHYSICAL EXAM: Primary | ICD-10-CM

## 2017-11-03 PROCEDURE — 99396 PREV VISIT EST AGE 40-64: CPT | Mod: S$GLB,,, | Performed by: FAMILY MEDICINE

## 2017-11-03 PROCEDURE — 99999 PR PBB SHADOW E&M-EST. PATIENT-LVL III: CPT | Mod: PBBFAC,,, | Performed by: FAMILY MEDICINE

## 2017-11-03 RX ORDER — ATORVASTATIN CALCIUM 10 MG/1
10 TABLET, FILM COATED ORAL DAILY
Qty: 90 TABLET | Refills: 3 | Status: SHIPPED | OUTPATIENT
Start: 2017-11-03 | End: 2018-08-08 | Stop reason: SDUPTHER

## 2017-11-03 RX ORDER — ASPIRIN 81 MG/1
81 TABLET ORAL DAILY
Qty: 360 TABLET | Refills: 3
Start: 2017-11-03 | End: 2018-12-17

## 2017-11-03 NOTE — PROGRESS NOTES
Subjective:       Patient ID: Joseph Jesus is a 57 y.o. male.    Chief Complaint: Annual Exam    Established patient for an annual wellness check/physical exam. No specific complaints, please see ROS.        Review of Systems   Constitutional: Positive for fatigue. Negative for chills and fever.   HENT: Negative for congestion and trouble swallowing.    Eyes: Negative for redness.   Respiratory: Negative for cough, chest tightness and shortness of breath.    Cardiovascular: Negative for chest pain, palpitations and leg swelling.   Gastrointestinal: Negative for abdominal pain and blood in stool.   Genitourinary: Negative for hematuria.   Musculoskeletal: Positive for arthralgias and myalgias. Negative for back pain, gait problem, joint swelling and neck pain.   Skin: Negative for color change and rash.   Neurological: Negative for tremors, speech difficulty, weakness, numbness and headaches.   Hematological: Negative for adenopathy. Does not bruise/bleed easily.   Psychiatric/Behavioral: Negative for behavioral problems, confusion and sleep disturbance. The patient is not nervous/anxious.        Objective:      Physical Exam   Constitutional: He is oriented to person, place, and time. He appears well-developed and well-nourished.   Eyes: No scleral icterus.   Neck: Normal range of motion. Neck supple. No JVD present. Carotid bruit is not present. No tracheal deviation present. No thyromegaly present.   Cardiovascular: Normal rate, regular rhythm, normal heart sounds and intact distal pulses.  Exam reveals no gallop and no friction rub.    No murmur heard.  Pulmonary/Chest: Effort normal and breath sounds normal. No respiratory distress. He has no wheezes. He has no rales.   Abdominal: Soft. Bowel sounds are normal. He exhibits no distension and no mass. There is no tenderness. There is no rebound and no guarding.   Musculoskeletal: He exhibits no edema.   Lymphadenopathy:     He has no cervical adenopathy.    Neurological: He is alert and oriented to person, place, and time. He displays no tremor. No cranial nerve deficit. Coordination and gait normal.   Skin: Skin is warm and dry. No rash noted. He is not diaphoretic. No erythema.   Psychiatric: He has a normal mood and affect. His behavior is normal. Judgment and thought content normal.   Nursing note and vitals reviewed.      Assessment:       1. Annual physical exam    2. Hypertension, essential    3. Hyperlipidemia, unspecified hyperlipidemia type        Plan:   Joseph was seen today for annual exam.    Diagnoses and all orders for this visit:    Annual physical exam    Hypertension, essential    Hyperlipidemia, unspecified hyperlipidemia type  -     Lipid panel; Future  -     ALT (SGPT); Future  -     AST (SGOT); Future    Other orders  -     atorvastatin (LIPITOR) 10 MG tablet; Take 1 tablet (10 mg total) by mouth once daily.  -     aspirin (ECOTRIN) 81 MG EC tablet; Take 1 tablet (81 mg total) by mouth once daily.      See meds, orders, follow up, routing and instructions sections of encounter.  Annual physical examination.  Established male patient.  I went over his   laboratory with him.  It did reveal a Cohort calculation of 11.9%.  He was   amenable to beginning statin medication and side effects were discussed.    See orders.  Side effects discussed.  Cautions discussed.  Follow up with   laboratory in three months.  Diet and exercise was recommended.      ELLA/RYDER  dd: 11/03/2017 18:29:45 (CDT)  td: 11/03/2017 22:37:29 (CDT)  Doc ID   #[id number]  Job ID #[id number]    CC:

## 2017-11-13 ENCOUNTER — PATIENT MESSAGE (OUTPATIENT)
Dept: INTERNAL MEDICINE | Facility: CLINIC | Age: 57
End: 2017-11-13

## 2017-11-13 ENCOUNTER — OFFICE VISIT (OUTPATIENT)
Dept: INTERNAL MEDICINE | Facility: CLINIC | Age: 57
End: 2017-11-13
Payer: COMMERCIAL

## 2017-11-13 VITALS
TEMPERATURE: 98 F | HEART RATE: 70 BPM | WEIGHT: 240.06 LBS | BODY MASS INDEX: 32.52 KG/M2 | OXYGEN SATURATION: 98 % | HEIGHT: 72 IN | SYSTOLIC BLOOD PRESSURE: 140 MMHG | DIASTOLIC BLOOD PRESSURE: 98 MMHG

## 2017-11-13 DIAGNOSIS — M62.830 SPASM OF BACK MUSCLES: Primary | ICD-10-CM

## 2017-11-13 DIAGNOSIS — I10 HYPERTENSION, ESSENTIAL: ICD-10-CM

## 2017-11-13 PROCEDURE — 99213 OFFICE O/P EST LOW 20 MIN: CPT | Mod: S$GLB,,, | Performed by: NURSE PRACTITIONER

## 2017-11-13 PROCEDURE — 99999 PR PBB SHADOW E&M-EST. PATIENT-LVL IV: CPT | Mod: PBBFAC,,, | Performed by: NURSE PRACTITIONER

## 2017-11-13 RX ORDER — CYCLOBENZAPRINE HCL 10 MG
10 TABLET ORAL 3 TIMES DAILY PRN
Qty: 30 TABLET | Refills: 0 | Status: SHIPPED | OUTPATIENT
Start: 2017-11-13 | End: 2017-11-23

## 2017-11-13 RX ORDER — HYDROCODONE BITARTRATE AND ACETAMINOPHEN 5; 325 MG/1; MG/1
1 TABLET ORAL EVERY 12 HOURS PRN
Qty: 4 TABLET | Refills: 0 | Status: SHIPPED | OUTPATIENT
Start: 2017-11-13 | End: 2017-11-15

## 2017-11-13 NOTE — PROGRESS NOTES
Subjective:       Patient ID: Joseph Jesus is a 57 y.o. male.    Chief Complaint: Back Pain    Mr. Jesus presents for severe back pain that began [place] when he was reaching to lift something that fell onto the floor of his truck..       Back Pain   This is a new problem. The current episode started in the past 7 days. The problem occurs constantly. The problem is unchanged. The pain is present in the lumbar spine and sacro-iliac. The quality of the pain is described as shooting, stabbing, aching and cramping. The pain does not radiate. The pain is severe. The symptoms are aggravated by position, twisting, standing and sitting. Stiffness is present all day. Pertinent negatives include no abdominal pain, bladder incontinence, bowel incontinence, chest pain, dysuria, fever, headaches, leg pain, numbness, paresis, paresthesias, pelvic pain, perianal numbness, tingling, weakness or weight loss.     Review of Systems   Constitutional: Negative for fever and weight loss.   HENT: Negative for facial swelling.    Eyes: Negative for visual disturbance.   Respiratory: Negative for shortness of breath.    Cardiovascular: Negative for chest pain.   Gastrointestinal: Negative for abdominal pain and bowel incontinence.   Genitourinary: Negative for bladder incontinence, dysuria and pelvic pain.   Musculoskeletal: Positive for back pain.   Skin: Negative for rash.   Neurological: Negative for tingling, weakness, numbness, headaches and paresthesias.   Psychiatric/Behavioral: Negative for confusion.       Objective:      Physical Exam   Constitutional: He is oriented to person, place, and time. He appears well-developed and well-nourished. He appears distressed.   HENT:   Head: Normocephalic and atraumatic.   Eyes: No scleral icterus.   Cardiovascular: Normal rate, regular rhythm and normal heart sounds.    Pulmonary/Chest: Effort normal and breath sounds normal. No respiratory distress. He has no wheezes.   Musculoskeletal:         Thoracic back: He exhibits decreased range of motion, tenderness, pain and spasm.        Lumbar back: He exhibits decreased range of motion, tenderness, pain and spasm.   Neurological: He is alert and oriented to person, place, and time.   Skin: Skin is warm and dry. He is not diaphoretic.   Psychiatric: He has a normal mood and affect. His behavior is normal.   Nursing note and vitals reviewed.      Assessment:       1. Spasm of back muscles    2. Hypertension, essential        Plan:   1. Spasm of back muscles  - cyclobenzaprine (FLEXERIL) 10 MG tablet; Take 1 tablet (10 mg total) by mouth 3 (three) times daily as needed for Muscle spasms.  Dispense: 30 tablet; Refill: 0  - hydrocodone-acetaminophen 5-325mg (NORCO) 5-325 mg per tablet; Take 1 tablet by mouth every 12 (twelve) hours as needed for Pain.  Dispense: 4 tablet; Refill: 0    2. Hypertension, essential  - Elevated today, likely 2/2 pain, monitor at home and follow up with PCP if it remains elevated once pain is resolved.       Pt has been given instructions populated from [employer] database and has verbalized understanding of the after visit summary and information contained wherein.    Follow up with a primary care provider. May go to ER for acute shortness of breath, lightheadedness, fever, or any other emergent complaints or changes in condition.

## 2017-11-13 NOTE — TELEPHONE ENCOUNTER
Spoke w/ pt. Dr. Olson didn't have any availability, scheduled pt an urgent care appt with Elizabeth Gallo.   ZEE Man

## 2017-11-13 NOTE — PATIENT INSTRUCTIONS
- Apply damp wash cloth to affected area, and then place heating pad over the wash cloth on LOW and sit for 15-20 minutes, three times daily.     - Add 2 cups of Epsom salt to bathtub of hot water and soak for 20 minutes nightly.     - Take Meloxicam daily for the next 7 days.

## 2018-01-20 ENCOUNTER — PATIENT MESSAGE (OUTPATIENT)
Dept: INTERNAL MEDICINE | Facility: CLINIC | Age: 58
End: 2018-01-20

## 2018-01-22 RX ORDER — SILDENAFIL CITRATE 20 MG/1
TABLET ORAL
Qty: 30 TABLET | Refills: 11 | Status: SHIPPED | OUTPATIENT
Start: 2018-01-22 | End: 2018-12-14

## 2018-02-03 ENCOUNTER — LAB VISIT (OUTPATIENT)
Dept: LAB | Facility: HOSPITAL | Age: 58
End: 2018-02-03
Attending: FAMILY MEDICINE
Payer: COMMERCIAL

## 2018-02-03 DIAGNOSIS — E78.5 HYPERLIPIDEMIA, UNSPECIFIED HYPERLIPIDEMIA TYPE: ICD-10-CM

## 2018-02-03 LAB
ALT SERPL W/O P-5'-P-CCNC: 73 U/L
AST SERPL-CCNC: 53 U/L
CHOLEST SERPL-MCNC: 177 MG/DL
CHOLEST/HDLC SERPL: 3 {RATIO}
HDLC SERPL-MCNC: 60 MG/DL
HDLC SERPL: 33.9 %
LDLC SERPL CALC-MCNC: 95.2 MG/DL
NONHDLC SERPL-MCNC: 117 MG/DL
TRIGL SERPL-MCNC: 109 MG/DL

## 2018-02-03 PROCEDURE — 80061 LIPID PANEL: CPT

## 2018-02-03 PROCEDURE — 84460 ALANINE AMINO (ALT) (SGPT): CPT

## 2018-02-03 PROCEDURE — 36415 COLL VENOUS BLD VENIPUNCTURE: CPT

## 2018-02-03 PROCEDURE — 84450 TRANSFERASE (AST) (SGOT): CPT

## 2018-02-05 ENCOUNTER — OFFICE VISIT (OUTPATIENT)
Dept: INTERNAL MEDICINE | Facility: CLINIC | Age: 58
End: 2018-02-05
Attending: FAMILY MEDICINE
Payer: COMMERCIAL

## 2018-02-05 VITALS — DIASTOLIC BLOOD PRESSURE: 85 MMHG | SYSTOLIC BLOOD PRESSURE: 138 MMHG

## 2018-02-05 DIAGNOSIS — I10 HYPERTENSION, ESSENTIAL: ICD-10-CM

## 2018-02-05 DIAGNOSIS — E78.5 HYPERLIPIDEMIA, UNSPECIFIED HYPERLIPIDEMIA TYPE: Primary | ICD-10-CM

## 2018-02-05 DIAGNOSIS — K76.0 NAFLD (NONALCOHOLIC FATTY LIVER DISEASE): ICD-10-CM

## 2018-02-05 PROCEDURE — 3008F BODY MASS INDEX DOCD: CPT | Mod: S$GLB,,, | Performed by: FAMILY MEDICINE

## 2018-02-05 PROCEDURE — 99999 PR PBB SHADOW E&M-EST. PATIENT-LVL II: CPT | Mod: PBBFAC,,, | Performed by: FAMILY MEDICINE

## 2018-02-05 PROCEDURE — 99213 OFFICE O/P EST LOW 20 MIN: CPT | Mod: S$GLB,,, | Performed by: FAMILY MEDICINE

## 2018-02-05 RX ORDER — IRBESARTAN 150 MG/1
300 TABLET ORAL DAILY
Qty: 90 TABLET | Refills: 1 | Status: SHIPPED | OUTPATIENT
Start: 2018-02-05 | End: 2018-02-06 | Stop reason: SDUPTHER

## 2018-02-05 NOTE — PROGRESS NOTES
Subjective:       Patient ID: Joseph Jesus is a 57 y.o. male.    Chief Complaint: No chief complaint on file.    HPI  Review of Systems   Constitutional: Negative for chills, fatigue and fever.   HENT: Negative for congestion and trouble swallowing.    Eyes: Negative for redness.   Respiratory: Negative for cough, chest tightness and shortness of breath.    Cardiovascular: Negative for chest pain, palpitations and leg swelling.   Gastrointestinal: Negative for abdominal pain and blood in stool.   Genitourinary: Negative for hematuria.   Musculoskeletal: Negative for arthralgias, back pain, gait problem, joint swelling, myalgias and neck pain.   Skin: Negative for color change and rash.   Neurological: Negative for tremors, speech difficulty, weakness, numbness and headaches.   Hematological: Negative for adenopathy. Does not bruise/bleed easily.   Psychiatric/Behavioral: Negative for behavioral problems, confusion and sleep disturbance. The patient is not nervous/anxious.        Objective:      Physical Exam   Constitutional: He is oriented to person, place, and time. He appears well-developed and well-nourished. No distress.   Neck: Neck supple.   Pulmonary/Chest: Effort normal.   Musculoskeletal: He exhibits no edema.        Right lower leg: He exhibits no edema.        Left lower leg: He exhibits no edema.   Neurological: He is alert and oriented to person, place, and time.   Skin: Skin is warm and dry. No rash noted.   Psychiatric: He has a normal mood and affect. His behavior is normal. Judgment and thought content normal.   Nursing note and vitals reviewed.      Assessment:       1. Hyperlipidemia, unspecified hyperlipidemia type    2. NAFLD (nonalcoholic fatty liver disease)    3. Hypertension, essential        Plan:   Diagnoses and all orders for this visit:    Hyperlipidemia, unspecified hyperlipidemia type    NAFLD (nonalcoholic fatty liver disease)  Comments:  has been followed in hepatology clinic - his  ast/alt have remanied stable    Hypertension, essential  Comments:  increase irbesartan to 300 mg    Other orders  -     irbesartan (AVAPRO) 150 MG tablet; Take 2 tablets (300 mg total) by mouth once daily.      See meds, orders, follow up, routing and instructions sections of encounter.  Mr. Jesus is in for followup today.  He is doing well without symptoms on his   Lipitor and his laboratory looks much better.  He has chronic elevation of his   liver function tests and we looked back over the last 4-5 years and institution   of the atorvastatin did not appreciably change his laboratory.  He has seen Dr. Presley and Becca Bee in the past.  They did recommend sooner   followup; however, the patient states he feels he is stable and does not need to   go to that at this time.    RECOMMENDATIONS:  1.  Continue atorvastatin.  2.  See additional orders and followup recommendations.      JILL  dd: 02/05/2018 14:37:38 (CST)  td: 02/06/2018 10:30:32 (CST)  Doc ID   #[id number]  Job ID #[id number]    CC:

## 2018-02-06 ENCOUNTER — PATIENT MESSAGE (OUTPATIENT)
Dept: INTERNAL MEDICINE | Facility: CLINIC | Age: 58
End: 2018-02-06

## 2018-02-06 RX ORDER — IRBESARTAN 150 MG/1
300 TABLET ORAL DAILY
Qty: 90 TABLET | Refills: 1 | Status: SHIPPED | OUTPATIENT
Start: 2018-02-06 | End: 2018-05-04

## 2018-03-01 ENCOUNTER — PATIENT MESSAGE (OUTPATIENT)
Dept: INTERNAL MEDICINE | Facility: CLINIC | Age: 58
End: 2018-03-01

## 2018-03-02 ENCOUNTER — TELEPHONE (OUTPATIENT)
Dept: INTERNAL MEDICINE | Facility: CLINIC | Age: 58
End: 2018-03-02

## 2018-03-02 NOTE — TELEPHONE ENCOUNTER
Spoke with pt, pt states he feels a lot better today. The cramping has stop. Pt is wondering if the cramping from yesterday could be from supplements he has been taking. Pt states he cut down on the supplements 2 days ago. Was taking prostate supplements and CO-Q 10. Pt states he diet recently changed, he cut down food intake 50 %. Pt states he woke up feeling 10 years younger. Advised pt if the cramping returns to either call the office for UC appointment or report to ED. Pt verbalized understanding.       FELIX

## 2018-03-07 ENCOUNTER — PATIENT MESSAGE (OUTPATIENT)
Dept: SURGERY | Facility: CLINIC | Age: 58
End: 2018-03-07

## 2018-03-07 RX ORDER — CIPROFLOXACIN 500 MG/1
500 TABLET ORAL 2 TIMES DAILY
Qty: 20 TABLET | Refills: 0 | Status: SHIPPED | OUTPATIENT
Start: 2018-03-07 | End: 2018-03-17

## 2018-03-07 RX ORDER — METRONIDAZOLE 500 MG/1
500 TABLET ORAL 3 TIMES DAILY
Qty: 30 TABLET | Refills: 0 | Status: SHIPPED | OUTPATIENT
Start: 2018-03-07 | End: 2018-03-17

## 2018-03-20 ENCOUNTER — PATIENT MESSAGE (OUTPATIENT)
Dept: INTERNAL MEDICINE | Facility: CLINIC | Age: 58
End: 2018-03-20

## 2018-04-20 ENCOUNTER — PATIENT MESSAGE (OUTPATIENT)
Dept: INTERNAL MEDICINE | Facility: CLINIC | Age: 58
End: 2018-04-20

## 2018-04-21 ENCOUNTER — PATIENT MESSAGE (OUTPATIENT)
Dept: INTERNAL MEDICINE | Facility: CLINIC | Age: 58
End: 2018-04-21

## 2018-05-04 RX ORDER — IRBESARTAN 300 MG/1
TABLET ORAL
Qty: 90 TABLET | Refills: 1 | Status: SHIPPED | OUTPATIENT
Start: 2018-05-04 | End: 2018-10-30 | Stop reason: SDUPTHER

## 2018-05-04 RX ORDER — MELOXICAM 15 MG/1
TABLET ORAL
Qty: 30 TABLET | Refills: 1 | Status: SHIPPED | OUTPATIENT
Start: 2018-05-04 | End: 2019-05-14 | Stop reason: SDUPTHER

## 2018-06-18 ENCOUNTER — OFFICE VISIT (OUTPATIENT)
Dept: UROLOGY | Facility: CLINIC | Age: 58
End: 2018-06-18
Payer: COMMERCIAL

## 2018-06-18 VITALS
WEIGHT: 233.69 LBS | HEART RATE: 65 BPM | BODY MASS INDEX: 31.69 KG/M2 | DIASTOLIC BLOOD PRESSURE: 92 MMHG | SYSTOLIC BLOOD PRESSURE: 140 MMHG

## 2018-06-18 DIAGNOSIS — I10 ESSENTIAL HYPERTENSION: ICD-10-CM

## 2018-06-18 DIAGNOSIS — N52.01 ERECTILE DYSFUNCTION DUE TO ARTERIAL INSUFFICIENCY: Primary | ICD-10-CM

## 2018-06-18 DIAGNOSIS — N40.1 BENIGN NON-NODULAR PROSTATIC HYPERPLASIA WITH LOWER URINARY TRACT SYMPTOMS: ICD-10-CM

## 2018-06-18 DIAGNOSIS — N50.819 ORCHALGIA: ICD-10-CM

## 2018-06-18 DIAGNOSIS — N52.9 ERECTILE DYSFUNCTION, UNSPECIFIED ERECTILE DYSFUNCTION TYPE: Primary | ICD-10-CM

## 2018-06-18 PROCEDURE — 3077F SYST BP >= 140 MM HG: CPT | Mod: CPTII,S$GLB,, | Performed by: UROLOGY

## 2018-06-18 PROCEDURE — 3008F BODY MASS INDEX DOCD: CPT | Mod: CPTII,S$GLB,, | Performed by: UROLOGY

## 2018-06-18 PROCEDURE — 99214 OFFICE O/P EST MOD 30 MIN: CPT | Mod: S$GLB,,, | Performed by: UROLOGY

## 2018-06-18 PROCEDURE — 3080F DIAST BP >= 90 MM HG: CPT | Mod: CPTII,S$GLB,, | Performed by: UROLOGY

## 2018-06-18 PROCEDURE — 99999 PR PBB SHADOW E&M-EST. PATIENT-LVL III: CPT | Mod: PBBFAC,,, | Performed by: UROLOGY

## 2018-06-18 RX ORDER — PAPAVERINE HYDROCHLORIDE 30 MG/ML
INJECTION INTRAMUSCULAR; INTRAVENOUS
Qty: 5 ML | Refills: 0 | Status: SHIPPED | OUTPATIENT
Start: 2018-06-18 | End: 2018-09-11

## 2018-06-18 NOTE — PROGRESS NOTES
Subjective:       Patient ID: Joseph Jesus is a 58 y.o. male.    Chief Complaint: Annual Exam    Benign Prostatic Hypertrophy     Testicle Pain   The patient's primary symptoms include testicular pain. His past medical history is significant for BPH.      Patient is a 58 y.o. male who is established to our clinic and here for evaluation of chronic prostatitis and bph.     Prostatitis is described with symptoms of right groin pain and chronic testicular discomfort.  He gets occasional groin pain. He has had some recurrence of his right testicular pain.      He is voiding well.  Drinks a lot of water.  He feels he empties his bladder reasonably well.  Nocturia infrequently.     Has been increasing exercise, working on the treadmill.     He is not taking any prostate medications.    Father had prostate cancer--diagnosed around age of 60-65.      Lab Results   Component Value Date    PSA 0.42 11/01/2017    PSA 0.41 06/27/2016    PSA 0.38 09/27/2012    PSA 0.28 01/27/2011    PSA 0.38 07/31/2009    PSA 0.3 04/05/2006    PSADIAG 0.37 09/08/2014         Review of Systems   Genitourinary: Positive for testicular pain.       Objective:      Physical Exam   Nursing note and vitals reviewed.  Constitutional: He is oriented to person, place, and time. Vital signs are normal. He appears well-developed and well-nourished. He is cooperative.   HENT:   Head: Normocephalic.   Neck: No tracheal deviation present.   Cardiovascular: Normal rate and intact distal pulses.    Pulmonary/Chest: Effort normal. No accessory muscle usage. No tachypnea. No respiratory distress.   Abdominal: Soft. Normal appearance. He exhibits no distension, no fluid wave, no ascites and no mass. There is no tenderness. There is no rebound and no CVA tenderness. No hernia. Hernia confirmed negative in the right inguinal area and confirmed negative in the left inguinal area.   Liver/spleen non-palpable.   Genitourinary: Testes normal and penis normal. Right  testis shows no mass and no tenderness. Right testis is descended. Left testis shows no mass and no tenderness. Left testis is descended. Circumcised.   Genitourinary Comments: Scrotum showed no rashes or lesions.  Anus/perineum without lesion.  Epididymis showed no masses or tenderness. No meatal stenosis, meatus in normal location. No penile discharge/plaques. todd deferred (psa very low, normal in the past--will defer until 2019 per screening guidelines).       Musculoskeletal: Normal range of motion.   Lymphadenopathy: No inguinal adenopathy noted on the right or left side.        Right: No inguinal adenopathy present.        Left: No inguinal adenopathy present.   Neurological: He is alert and oriented to person, place, and time. He has normal strength.   Skin: No bruising and no rash noted.     Psychiatric: He has a normal mood and affect. His speech is normal and behavior is normal. Thought content normal.       Assessment:       1. Erectile dysfunction due to arterial insufficiency    2. Benign non-nodular prostatic hyperplasia with lower urinary tract symptoms    3. Essential hypertension    4. Orchalgia        Plan:       1. BPH:  -symptoms improved without medications.   -will monitor.    2. Screening psa:  -psa reviewed from 11/2017 and was normal.  Recommend repeat in 11/2019.    3. Right inguinal/testicular pain:  -no recurrent hernia detected.   -likely related to prior hernia repair.  Will monitor.        4. HTN:  -BP reviewed today and elevated  -continue current medications  -encouraged f/u and discussion of BP with PCP.     5. ED:  -offered referral for PEP injection therapy.  -will check am testosterone.

## 2018-06-21 ENCOUNTER — LAB VISIT (OUTPATIENT)
Dept: LAB | Facility: HOSPITAL | Age: 58
End: 2018-06-21
Attending: UROLOGY
Payer: COMMERCIAL

## 2018-06-21 DIAGNOSIS — N52.01 ERECTILE DYSFUNCTION DUE TO ARTERIAL INSUFFICIENCY: ICD-10-CM

## 2018-06-21 PROCEDURE — 82040 ASSAY OF SERUM ALBUMIN: CPT

## 2018-06-21 PROCEDURE — 36415 COLL VENOUS BLD VENIPUNCTURE: CPT

## 2018-06-24 LAB
ALBUMIN SERPL-MCNC: 4.3 G/DL (ref 3.6–5.1)
SHBG SERPL-SCNC: 25 NMOL/L (ref 22–77)
TESTOST FREE SERPL-MCNC: 45.6 PG/ML (ref 46–224)
TESTOST SERPL-MCNC: 287 NG/DL (ref 250–1100)
TESTOSTERONE.FREE+WB SERPL-MCNC: 89.8 NG/DL (ref 110–575)

## 2018-06-28 DIAGNOSIS — R79.89 LOW TESTOSTERONE IN MALE: Primary | ICD-10-CM

## 2018-07-02 ENCOUNTER — OFFICE VISIT (OUTPATIENT)
Dept: UROLOGY | Facility: CLINIC | Age: 58
End: 2018-07-02
Payer: COMMERCIAL

## 2018-07-02 VITALS
HEART RATE: 64 BPM | WEIGHT: 231.5 LBS | HEIGHT: 73 IN | DIASTOLIC BLOOD PRESSURE: 98 MMHG | SYSTOLIC BLOOD PRESSURE: 143 MMHG | BODY MASS INDEX: 30.68 KG/M2

## 2018-07-02 DIAGNOSIS — E78.5 HYPERLIPIDEMIA, UNSPECIFIED HYPERLIPIDEMIA TYPE: ICD-10-CM

## 2018-07-02 DIAGNOSIS — N40.1 BPH WITH URINARY OBSTRUCTION: ICD-10-CM

## 2018-07-02 DIAGNOSIS — N52.01 ERECTILE DYSFUNCTION DUE TO ARTERIAL INSUFFICIENCY: ICD-10-CM

## 2018-07-02 DIAGNOSIS — E29.1 MALE HYPOGONADISM: Primary | ICD-10-CM

## 2018-07-02 DIAGNOSIS — I10 HYPERTENSION, ESSENTIAL: ICD-10-CM

## 2018-07-02 DIAGNOSIS — N13.8 BPH WITH URINARY OBSTRUCTION: ICD-10-CM

## 2018-07-02 PROCEDURE — 99244 OFF/OP CNSLTJ NEW/EST MOD 40: CPT | Mod: S$GLB,,, | Performed by: UROLOGY

## 2018-07-02 PROCEDURE — 99999 PR PBB SHADOW E&M-EST. PATIENT-LVL III: CPT | Mod: PBBFAC,,, | Performed by: UROLOGY

## 2018-07-02 RX ORDER — TADALAFIL 20 MG/1
20 TABLET ORAL DAILY PRN
Qty: 10 TABLET | Refills: 11 | Status: SHIPPED | OUTPATIENT
Start: 2018-07-02 | End: 2018-12-14

## 2018-07-02 NOTE — PROGRESS NOTES
CHIEF COMPLAINT:    Mr. Jesus is a 58 y.o. male presenting for a consultation at the request of Dr. Chappell. Patient presents with a low T.    PRESENTING ILLNESS:    Joseph Jesus is a 58 y.o. male who c/o ED.  This has been present for > 1 year.  He's tried and failed sildenafil.    A T was checked on him x 1 that was low.  He's here to discuss.    He's pleased with his LUTS.  No hematuria.  No dysuria.    He has a history of chronic pelvic pain as well.  Currently no pain.    REVIEW OF SYSTEMS:    Joseph Jesus denies headache, blurred vision, fever, nausea, vomiting, chills, abdominal pain, bleeding per rectum, cough, SOB, recent loss of consciousness, recent mental status changes, seizures, dizziness, or upper or lower extremity weakness.    ANDREA  1. 1  2. 1  3. 1  4. 1  5. 1      PATIENT HISTORY:    Past Medical History:   Diagnosis Date    Allergy     seasonal    Anal fistula hx    Arthritis     Basal cell carcinoma 4/2013    left superior forehead    Callus     Diverticulosis     Epididymal cyst     Hayfever     Hydrocele, right     Hyperlipidemia     Hypertension     Neck pain     hx of muscular inury from weight lifting---resolved now    Prostatitis     Dec. '12-treated with antibx.    Visual impairment        Past Surgical History:   Procedure Laterality Date    APPENDECTOMY  1962    COLONOSCOPY  12/2012    due for repeat 12/2015    COLONOSCOPY N/A 5/19/2016    Procedure: COLONOSCOPY;  Surgeon: James Webber MD;  Location: [facility] ([address] [place] [address]);  Service: Endoscopy;  Laterality: N/A;    EUA/Fistulotomy-'08      HERNIA REPAIR      [facility] with mesh    Hydrocelectomy  2013    MASS EXCISION  2004    BCC removal (twice)    right Spermatocelectomy  2013       Family History   Problem Relation Age of Onset    Skin cancer Mother     Hypertension Mother     Skin cancer Father     Cancer Father         prostate cancer    Hypertension Father     Hypertension Maternal Grandmother      Hypertension Maternal Grandfather     Hypertension Paternal Grandmother     Anesthesia problems Paternal Grandmother     Cancer Paternal Grandfather         prostate cancer    Hypertension Paternal Grandfather     Heart disease Paternal Grandfather     Diabetes Neg Hx     Colon polyps Neg Hx     Colon cancer Neg Hx     Melanoma Neg Hx     Psoriasis Neg Hx     Lupus Neg Hx     Eczema Neg Hx     Acne Neg Hx     Cirrhosis Neg Hx     Prostate cancer Neg Hx     Kidney disease Neg Hx        Social History     Social History    Marital status:      Spouse name: Traci    Number of children: 1    Years of education: N/A     Occupational History    [employer]     Social History Main Topics    Smoking status: Former Smoker     Packs/day: 1.00     Years: 10.00     Quit date: 12/13/1987    Smokeless tobacco: Never Used    Alcohol use 0.6 oz/week     1 Glasses of wine per week      Comment: socially    Drug use: No    Sexual activity: Yes     Partners: Female     Other Topics Concern    Not on file     Social History Narrative    No narrative on file       Allergies:  Erythromycin and Sumycin [tetracycline]    Medications:    Current Outpatient Prescriptions:     aspirin (ECOTRIN) 81 MG EC tablet, Take 1 tablet (81 mg total) by mouth once daily., Disp: 360 tablet, Rfl: 3    atorvastatin (LIPITOR) 10 MG tablet, Take 1 tablet (10 mg total) by mouth once daily., Disp: 90 tablet, Rfl: 3    cetirizine (ZYRTEC) 10 MG tablet, Take 10 mg by mouth once daily., Disp: , Rfl:     hydrOXYzine pamoate (VISTARIL) 50 MG Cap, Take 1 capsule (50 mg total) by mouth every 8 (eight) hours as needed., Disp: 30 capsule, Rfl: 0    irbesartan (AVAPRO) 300 MG tablet, TAKE 1 TABLET (300 mg) BY MOUTH DAILY, Disp: 90 tablet, Rfl: 1    meloxicam (MOBIC) 15 MG tablet, TAKE 1 TABLET BY MOUTH once DAILY, Disp: 30 tablet, Rfl: 1    papaverine 30 mg/mL injection, Add: Phentolamine 10 mg Add: PGE1 100  mcg  Sig: Give a TEST DOSE; 1st dose to be given under medical supervision., Disp: 5 mL, Rfl: 0    sildenafil, antihypertensive, (REVATIO) 20 mg Tab, May take 2-5 tablets at once, daily, for ED, Disp: 30 tablet, Rfl: 11    triamcinolone acetonide 0.1% (KENALOG) 0.1 % cream, Apply topically 2 (two) times daily., Disp: 45 g, Rfl: 0    PHYSICAL EXAMINATION:    The patient generally appears in good health, is appropriately interactive, and is in no apparent distress.     Eyes: anicteric sclerae, moist conjunctivae; no lid-lag; PERRLA     HENT: Atraumatic; oropharynx clear with moist mucous membranes and no mucosal ulcerations;normal hard and soft palate.  No evidence of lymphadenopathy.    Neck: Trachea midline.  No thyromegaly.    Musculoskeletal: No abnormal gait.    Skin: No lesions.    Mental: Cooperative with normal affect.  Is oriented to time, place, and person.    Neuro: Grossly intact.    Chest: Normal inspiratory effort.   No accessory muscles.  No audible wheezes.  Respirations symmetric on inspiration and expiration.    Heart: Regular rhythm.      Abdomen:  Soft, non-tender. No masses or organomegaly. Bladder is not palpable. No evidence of flank discomfort. No evidence of inguinal hernia.    Genitourinary: The penis is circumcised with no evidence of plaques or induration. The urethral meatus is normal. The testes, epididymides, and cord structures are normal in size and contour bilaterally. The scrotum is normal in size and contour.    Normal anal sphincter tone. No rectal mass.    The prostate is 30 g. Normal landmarks. Lateral sulci. Median furrow intact.  No nodularity or induration. Seminal vesicles are normal.    Extremities: No clubbing, cyanosis, or edema      LABS:      Lab Results   Component Value Date    PSA 0.42 11/01/2017    PSA 0.41 06/27/2016    PSA 0.38 09/27/2012    PSADIAG 0.37 09/08/2014       IMPRESSION:    Encounter Diagnoses   Name Primary?    Male hypogonadism Yes    BPH with  urinary obstruction     Erectile dysfunction due to arterial insufficiency     Hypertension, essential     Hyperlipidemia, unspecified hyperlipidemia type    HTN, controlled  Hyperlipidemia, controlled      PLAN:    1. Will recheck the T in the am with a prolactin.  2. Discussed options for his ED. Will try Cialis. Side effects discussed.  A new Rx was given.  Sent to [email or web address].  3. Will observe his LUTS as they don't bother him.  4. Will base his follow up off his T.    Copy to: Ta

## 2018-07-02 NOTE — LETTER
July 2, 2018      Nick Trejo MD  [address]  [place] [address]           [facility] - Urology 4th Floor  [address]  [place] [address]  Phone: [phone]          Patient: Joseph Jesus   MR Number: [id number]   YOB: 1960   Date of Visit: 7/2/2018       Dear Dr. Nick Trejo:    Thank you for referring Joseph Jesus to me for evaluation. Attached you will find relevant portions of my assessment and plan of care.    If you have questions, please do not hesitate to call me. I look forward to following Joseph Jesus along with you.    Sincerely,    Lenny Ewing MD    Enclosure  CC:  No Recipients    If you would like to receive this communication electronically, please contact [email or web address] or [phone] to request more information on [facility] Link access.    For providers and/or their staff who would like to refer a patient to [facility], please contact us through our one-stop-shop provider referral line, [facility], at [phone].    If you feel you have received this communication in error or would no longer like to receive these types of communications, please e-mail [email or web address]

## 2018-07-03 ENCOUNTER — LAB VISIT (OUTPATIENT)
Dept: LAB | Facility: HOSPITAL | Age: 58
End: 2018-07-03
Attending: UROLOGY
Payer: COMMERCIAL

## 2018-07-03 DIAGNOSIS — E29.1 MALE HYPOGONADISM: ICD-10-CM

## 2018-07-03 LAB
PROLACTIN SERPL IA-MCNC: 10.2 NG/ML
TESTOST SERPL-MCNC: 493 NG/DL

## 2018-07-03 PROCEDURE — 84403 ASSAY OF TOTAL TESTOSTERONE: CPT

## 2018-07-03 PROCEDURE — 36415 COLL VENOUS BLD VENIPUNCTURE: CPT | Mod: PO

## 2018-07-03 PROCEDURE — 84146 ASSAY OF PROLACTIN: CPT

## 2018-07-05 ENCOUNTER — TELEPHONE (OUTPATIENT)
Dept: UROLOGY | Facility: CLINIC | Age: 58
End: 2018-07-05

## 2018-07-05 ENCOUNTER — PATIENT MESSAGE (OUTPATIENT)
Dept: INTERNAL MEDICINE | Facility: CLINIC | Age: 58
End: 2018-07-05

## 2018-07-05 DIAGNOSIS — I10 HYPERTENSION, ESSENTIAL: Primary | ICD-10-CM

## 2018-07-05 NOTE — TELEPHONE ENCOUNTER
Detailed message left on pts voicemail. Message also sent to patient portal. Pt did review results in portal.

## 2018-07-06 ENCOUNTER — PATIENT MESSAGE (OUTPATIENT)
Dept: ADMINISTRATIVE | Facility: OTHER | Age: 58
End: 2018-07-06

## 2018-08-07 ENCOUNTER — OFFICE VISIT (OUTPATIENT)
Dept: OTOLARYNGOLOGY | Facility: CLINIC | Age: 58
End: 2018-08-07
Payer: COMMERCIAL

## 2018-08-07 ENCOUNTER — CLINICAL SUPPORT (OUTPATIENT)
Dept: AUDIOLOGY | Facility: CLINIC | Age: 58
End: 2018-08-07
Payer: COMMERCIAL

## 2018-08-07 VITALS
TEMPERATURE: 98 F | SYSTOLIC BLOOD PRESSURE: 132 MMHG | BODY MASS INDEX: 31.56 KG/M2 | HEART RATE: 73 BPM | DIASTOLIC BLOOD PRESSURE: 91 MMHG | HEIGHT: 73 IN | WEIGHT: 238.13 LBS

## 2018-08-07 DIAGNOSIS — Z86.69 HISTORY OF HEARING LOSS: ICD-10-CM

## 2018-08-07 DIAGNOSIS — H91.21 SUDDEN HEARING LOSS, RIGHT: ICD-10-CM

## 2018-08-07 DIAGNOSIS — H61.21 HEARING LOSS DUE TO CERUMEN IMPACTION, RIGHT: ICD-10-CM

## 2018-08-07 DIAGNOSIS — H93.8X1 BLOCKED EAR, RIGHT: Primary | ICD-10-CM

## 2018-08-07 DIAGNOSIS — H90.41 SENSORINEURAL HEARING LOSS (SNHL) OF RIGHT EAR WITH UNRESTRICTED HEARING OF LEFT EAR: Primary | ICD-10-CM

## 2018-08-07 DIAGNOSIS — H90.5 HIGH FREQUENCY SENSORINEURAL HEARING LOSS OF RIGHT EAR: ICD-10-CM

## 2018-08-07 PROCEDURE — 3075F SYST BP GE 130 - 139MM HG: CPT | Mod: CPTII,S$GLB,, | Performed by: OTOLARYNGOLOGY

## 2018-08-07 PROCEDURE — 99203 OFFICE O/P NEW LOW 30 MIN: CPT | Mod: 25,S$GLB,, | Performed by: OTOLARYNGOLOGY

## 2018-08-07 PROCEDURE — 92557 COMPREHENSIVE HEARING TEST: CPT | Mod: S$GLB,,, | Performed by: AUDIOLOGIST

## 2018-08-07 PROCEDURE — 99999 PR PBB SHADOW E&M-EST. PATIENT-LVL III: CPT | Mod: PBBFAC,,, | Performed by: OTOLARYNGOLOGY

## 2018-08-07 PROCEDURE — 99999 PR PBB SHADOW E&M-EST. PATIENT-LVL I: CPT | Mod: PBBFAC,,,

## 2018-08-07 PROCEDURE — 69210 REMOVE IMPACTED EAR WAX UNI: CPT | Mod: S$GLB,,, | Performed by: OTOLARYNGOLOGY

## 2018-08-07 PROCEDURE — 3080F DIAST BP >= 90 MM HG: CPT | Mod: CPTII,S$GLB,, | Performed by: OTOLARYNGOLOGY

## 2018-08-07 PROCEDURE — 3008F BODY MASS INDEX DOCD: CPT | Mod: CPTII,S$GLB,, | Performed by: OTOLARYNGOLOGY

## 2018-08-07 PROCEDURE — 92567 TYMPANOMETRY: CPT | Mod: S$GLB,,, | Performed by: AUDIOLOGIST

## 2018-08-07 NOTE — PROGRESS NOTES
Subjective:       Patient ID: Joseph Jesus is a 58 y.o. male.    Chief Complaint: No chief complaint on file.    HPI: Mr. Jesus is a 58-year-old  gentleman whose hand written reason for the visit today is right ear pain .  He indicates a sudden loss of hearing in his right ear on trying to inflate of floating toy for his grandson about 1 month ago.  He denied vertigo symptoms.  He had  previously evaluated and treated by my colleague Dr. Tashi Cassidy in late February 2015 for sudden right hearing loss of 2 weeks duration.  He experienced right ear tinnitus at that time. A large cerumen impaction was extracted from the right ear canal.  Audiometry documented the patient's right ear 2 K 50 decibel sensorineural hearing loss at that time. His hearing improved over time with conservative treatment including nasal spray use .   He indicates a family history of hearing loss. His father was hard of hearing.  His otologic symptoms have included ear ringing, ear pressure, ear pain and hearing loss.  He indicates treatment as a child by Dr. Pernell De León for a mycoticinfection of his ear many  years ago.  He works out in the gym frequently.  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  He was examined by his PCP Dr. Nick meol in February this year.  He was diagnosed with hyperlipidemia, hypertension and nonalcoholic fatty liver disease    PMH:  High blood pressure, arthritis, hayfever  PSH:  Multiple basal cell carcinoma surgeries; herniorrhaphy/hernia complication 2013; appendectomy  Allergies:  Erythromycin, sumycin  Family history:  Heart disease, high blood pressure, arthritis, hayfever, hearing loss, vertigo  Habits:  Patient denies tobacco or alcohol use; 2 to 3 caffeinated drinks per day  Occupation:  .  Review of Systems   Ears: Positive for ear pain, ear pressure, ringing in ear and family history of hearing loss.    Nose:  Positive for postnasal drip and  snoring.    Cardiovascular:  Positive for history of high blood pressure.   Gastrointestinal:  Positive for acid reflux.   Other:  Positive for prostate disease. Negative for rash.     Current Outpatient Prescriptions on File Prior to Visit   Medication Sig Dispense Refill    aspirin (ECOTRIN) 81 MG EC tablet Take 1 tablet (81 mg total) by mouth once daily. 360 tablet 3    atorvastatin (LIPITOR) 10 MG tablet Take 1 tablet (10 mg total) by mouth once daily. 90 tablet 3    cetirizine (ZYRTEC) 10 MG tablet Take 10 mg by mouth once daily.      hydrOXYzine pamoate (VISTARIL) 50 MG Cap Take 1 capsule (50 mg total) by mouth every 8 (eight) hours as needed. 30 capsule 0    irbesartan (AVAPRO) 300 MG tablet TAKE 1 TABLET (300 mg) BY MOUTH DAILY 90 tablet 1    meloxicam (MOBIC) 15 MG tablet TAKE 1 TABLET BY MOUTH once DAILY 30 tablet 1    papaverine 30 mg/mL injection Add: Phentolamine 10 mg  Add: PGE1 100 mcg    Sig: Give a TEST DOSE; 1st dose to be given under medical supervision. 5 mL 0    sildenafil, antihypertensive, (REVATIO) 20 mg Tab May take 2-5 tablets at once, daily, for ED 30 tablet 11    tadalafil (CIALIS) 20 MG Tab Take 1 tablet (20 mg total) by mouth daily as needed. Take 1 hour before intercourse 10 tablet 11    triamcinolone acetonide 0.1% (KENALOG) 0.1 % cream Apply topically 2 (two) times daily. 45 g 0     No current facility-administered medications on file prior to visit.            The patient completed an audiometric study performed by the [facility] audiology service after his right ear was cleaned carefully.  The study is duplicated below and the results are reviewed with him in detail and compared to a previous study  Objective:       Blood pressure 132/91 pulse 73 temperature 97.7° height 6 ft 1 in weight 238 lb  General:  Alert and oriented bald gentleman in no acute distress  Both ears were examined under the microscope in the micro procedure room  Procedure:  A large  occlusive cerumen impactions extracted from right ear canal with an angled pick suction and micro forceps.  Physical Exam   Constitutional: He is oriented to person, place, and time. He appears well-developed and well-nourished.   HENT:   Head: Normocephalic.   Right Ear: Hearing, tympanic membrane and ear canal normal. No drainage. No foreign bodies. No mastoid tenderness. Tympanic membrane is not perforated. No decreased hearing is noted.   Left Ear: Hearing, tympanic membrane and ear canal normal. No drainage. No foreign bodies. No mastoid tenderness. Tympanic membrane is not perforated. No decreased hearing is noted.   Ears:    Nose: No nose lacerations, nasal deformity, septal deviation or nasal septal hematoma. No epistaxis. Right sinus exhibits no maxillary sinus tenderness and no frontal sinus tenderness. Left sinus exhibits no maxillary sinus tenderness and no frontal sinus tenderness.   Mouth/Throat: Uvula is midline, oropharynx is clear and moist and mucous membranes are normal. He does not have dentures. No oral lesions. No trismus in the jaw. No uvula swelling or dental caries. No oropharyngeal exudate or tonsillar abscesses.   Neck: No thyromegaly present.   Pulmonary/Chest: Effort normal. No stridor.   Lymphadenopathy:     He has no cervical adenopathy.   Neurological: He is alert and oriented to person, place, and time.   Skin: No rash noted.   Psychiatric: His behavior is normal.       Assessment:       1. Blocked ear, right ; relieved with cerumen disimpaction procedure   2. Sudden hearing loss, right ; relieved with cerumen disimpaction procedure   3. Hearing loss due to cerumen impaction, right    4. High frequency sensorineural hearing loss of right ear    5. History of hearing loss , AD 4K       Plan:     Large occlusive cerumen impaction extracted from deep AD eac  Audiometry reviewed, compare to previous studies; note increased 4K AD SNHL  Monitor hearing yearly  Monitor otologic status; call  for any significant change in status  Avoid heavy weight lifting for now

## 2018-08-07 NOTE — PATIENT INSTRUCTIONS
Large occlusive cerumen impaction extracted from deep AD eac  Audiometry reviewed, compared to previous studies; note increased 4K AD SNHL  Monitor hearing yearly  Monitor otologic status; call for any significant change in status  Avoid heavy weight lifting for now

## 2018-08-07 NOTE — PROGRESS NOTES
Mr. Joseph Jesus was seen in the clinic today for an audiological evaluation.    Audiological testing revealed normal sloping to a moderate rising to mild sensorineural hearing loss of the right ear and normal hearing sensitivity of the left ear.  A speech reception threshold was obtained at 20 dBHL for the right ear and at  dBHL for the left ear.  Speech discrimination was 100%, bilaterally.      Tympanometry testing revealed Type A tympanograms, bilaterally.     Recommendations:  1. Otologic evaluation  2. Annual hearing evaluation  3. Hearing protection when in noise   4. Hearing aid consultation for the right ear, if Mr. Jesus feels as though his hearing loss is negatively impacting his quality of life

## 2018-08-08 RX ORDER — ATORVASTATIN CALCIUM 10 MG/1
TABLET, FILM COATED ORAL
Qty: 90 TABLET | Refills: 1 | Status: SHIPPED | OUTPATIENT
Start: 2018-08-08 | End: 2019-05-06 | Stop reason: SDUPTHER

## 2018-08-20 ENCOUNTER — PATIENT MESSAGE (OUTPATIENT)
Dept: DERMATOLOGY | Facility: CLINIC | Age: 58
End: 2018-08-20

## 2018-09-11 ENCOUNTER — OFFICE VISIT (OUTPATIENT)
Dept: DERMATOLOGY | Facility: CLINIC | Age: 58
End: 2018-09-11
Payer: COMMERCIAL

## 2018-09-11 DIAGNOSIS — D23.9 BLUE NEVUS: ICD-10-CM

## 2018-09-11 DIAGNOSIS — L82.1 SEBORRHEIC KERATOSIS: ICD-10-CM

## 2018-09-11 DIAGNOSIS — L57.3 POIKILODERMA OF CIVATTE: ICD-10-CM

## 2018-09-11 DIAGNOSIS — D48.5 NEOPLASM OF UNCERTAIN BEHAVIOR OF SKIN: Primary | ICD-10-CM

## 2018-09-11 DIAGNOSIS — Z85.828 HISTORY OF NONMELANOMA SKIN CANCER: ICD-10-CM

## 2018-09-11 DIAGNOSIS — L57.0 AK (ACTINIC KERATOSIS): ICD-10-CM

## 2018-09-11 DIAGNOSIS — D22.9 MULTIPLE BENIGN NEVI: ICD-10-CM

## 2018-09-11 PROCEDURE — 17003 DESTRUCT PREMALG LES 2-14: CPT | Mod: S$GLB,,, | Performed by: DERMATOLOGY

## 2018-09-11 PROCEDURE — 99213 OFFICE O/P EST LOW 20 MIN: CPT | Mod: 25,S$GLB,, | Performed by: DERMATOLOGY

## 2018-09-11 PROCEDURE — 88305 TISSUE EXAM BY PATHOLOGIST: CPT | Performed by: PATHOLOGY

## 2018-09-11 PROCEDURE — 99999 PR PBB SHADOW E&M-EST. PATIENT-LVL II: CPT | Mod: PBBFAC,,, | Performed by: DERMATOLOGY

## 2018-09-11 PROCEDURE — 17000 DESTRUCT PREMALG LESION: CPT | Mod: S$GLB,,, | Performed by: DERMATOLOGY

## 2018-09-11 PROCEDURE — 11100 PR BIOPSY OF SKIN LESION: CPT | Mod: 59,S$GLB,, | Performed by: DERMATOLOGY

## 2018-09-11 NOTE — PATIENT INSTRUCTIONS
Shave Biopsy Wound Care    Your doctor has performed a shave biopsy today.  A band aid and vaseline ointment has been placed over the site.  This should remain in place for 24 hours.  It is recommended that you keep the area dry for the first 24 hours.  After 24 hours, you may remove the band aid and wash the area with warm soap and water and apply Vaseline jelly.  Many patients prefer to use Neosporin or Bacitracin ointment.  This is acceptable; however, know that you can develop an allergy to this medication even if you have used it safely for years.  It is important to keep the area moist.  Letting it dry out and get air slows healing time, and will worsen the scar.  Band aid is optional after first 24 hours.  CRYOSURGERY      Your doctor has used a method called cryosurgery to treat your skin condition. Cryosurgery refers to the use of very cold substances to treat a variety of skin conditions such as warts, pre-skin cancers, molluscum contagiosum, sun spots, and several benign growths. The substance we use in cryosurgery is liquid nitrogen and is so cold (-195 degrees Celsius) that is burns when administered.     Following treatment in the office, the skin may immediately burn and become red. You may find the area around the lesion is affected as well. It is sometimes necessary to treat not only the lesion, but a small area of the surrounding normal skin to achieve a good response.     A blister, and even a blood filled blister, may form after treatment.   This is a normal response. If the blister is painful, it is acceptable to sterilize a needle and with rubbing alcohol and gently pop the blister. It is important that you gently wash the area with soap and warm water as the blister fluid may contain wart virus if a wart was treated. Do no remove the roof of the blister.     The area treated can take anywhere from 1-3 weeks to heal. Healing time depends on the kind skin lesion treated, the location, and how  aggressively the lesion was treated. It is recommended that the areas treated are covered with Vaseline or bacitracin ointment and a band-aid. If a band-aid is not practical, just ointment applied several times per day will do. Keeping these areas moist will speed the healing time.    Treatment with liquid nitrogen can leave a scar. In dark skin, it may be a light or dark scar, in light skin it may be a white or pink scar. These will generally fade with time, but may never go away completely.     If you have any concerns after your treatment, please feel free to call the office.       [address] [place] [address]/ [phone] FAX/ [email or web address]        If you notice increasing redness, tenderness, pain, or yellow drainage at the biopsy site, please notify your doctor.  These are signs of an infection.    If your biopsy site is bleeding, apply firm pressure for 15 minutes straight.  Repeat for another 15 minutes, if it is still bleeding.   If the surgical site continues to bleed, then please contact your doctor.      [address] [facility] [place] [address]/ [phone] FAX/ [email or web address]

## 2018-09-11 NOTE — PROGRESS NOTES
Subjective:       Patient ID:  Joseph Jesus is a 58 y.o. male who presents for   Chief Complaint   Patient presents with    Skin Check     UBSE    Lesion     scalp     History of Present Illness: The patient presents for follow up of skin check. This is a high risk patient here to check for the development of new lesions.      The patient was last seen on 10/13/17 for cryosurgery to actinic keratoses which have resolved.     Other skin complaints: see below  Patient complains of lesion(s)  Location: scalp  Duration: 4 months  Symptoms: painful to palpation  Relieving factors/Previous treatments: none          Lesion         Review of Systems   Skin: Positive for activity-related sunscreen use and wears hat (80%). Negative for itching, rash, dry skin, daily sunscreen use and recent sunburn.   Hematologic/Lymphatic: Does not bruise/bleed easily.        Objective:    Physical Exam   Constitutional: He appears well-developed and well-nourished. No distress.   Neurological: He is alert and oriented to person, place, and time. He is not disoriented.   Psychiatric: He has a normal mood and affect.   Skin:   Areas Examined (abnormalities noted in diagram):   Scalp / Hair Palpated and Inspected  Head / Face Inspection Performed  Neck Inspection Performed  Chest / Axilla Inspection Performed  Back Inspection Performed  RUE Inspected  LUE Inspection Performed                   Diagram Legend     Erythematous scaling macule/papule c/w actinic keratosis       Vascular papule c/w angioma      Pigmented verrucoid papule/plaque c/w seborrheic keratosis      Yellow umbilicated papule c/w sebaceous hyperplasia      Irregularly shaped tan macule c/w lentigo     1-2 mm smooth white papules consistent with Milia      Movable subcutaneous cyst with punctum c/w epidermal inclusion cyst      Subcutaneous movable cyst c/w pilar cyst      Firm pink to brown papule c/w dermatofibroma      Pedunculated fleshy papule(s) c/w skin tag(s)       Evenly pigmented macule c/w junctional nevus     Mildly variegated pigmented, slightly irregular-bordered macule c/w mildly atypical nevus      Flesh colored to evenly pigmented papule c/w intradermal nevus       Pink pearly papule/plaque c/w basal cell carcinoma      Erythematous hyperkeratotic cursted plaque c/w SCC      Surgical scar with no sign of skin cancer recurrence      Open and closed comedones      Inflammatory papules and pustules      Verrucoid papule consistent consistent with wart     Erythematous eczematous patches and plaques     Dystrophic onycholytic nail with subungual debris c/w onychomycosis     Umbilicated papule    Erythematous-base heme-crusted tan verrucoid plaque consistent with inflamed seborrheic keratosis     Erythematous Silvery Scaling Plaque c/w Psoriasis     See annotation          Assessment / Plan:      Pathology Orders:     Normal Orders This Visit    Tissue Specimen To Pathology, Dermatology     Questions:    Directional Terms:  Other(comment)    Clinical information:  r/o SCC vs other    Specific Site:  anterior scalp        AK (actinic keratosis)  Cryosurgery Procedure Note    Verbal consent from the patient is obtained including, but not limited to, risk of hypopigmentation/hyperpigmentation, scar, recurrence of lesion. The patient is aware of the precancerous quality and need for treatment of these lesions. Liquid nitrogen cryosurgery is applied to the 5 actinic keratoses, as detailed in the physical exam, to produce a freeze injury. The patient is aware that blisters may form and is instructed on wound care with gentle cleansing and use of vaseline ointment to keep moist until healed. The patient is supplied a handout on cryosurgery and is instructed to call if lesions do not completely resolve.    Wear hat always!    Neoplasm of uncertain behavior of skin  -     Tissue Specimen To Pathology, Dermatology    Shave biopsy procedure note:    Shave biopsy performed after  verbal consent including risk of infection, scar, recurrence, need for additional treatment of site. Area prepped with alcohol, anesthetized with approximately 1.0cc of 1% lidocaine with epinephrine. Lesional tissue shaved with razor blade. Hemostasis achieved with application of aluminum chloride followed by hyfrecation. No complications. Dressing applied. Wound care explained.    If biopsy positive for malignancy, refer to Dr. Campos for Mohs surgery consultation.      Seborrheic keratosis  These are benign inherited growths without a malignant potential. Reassurance given to patient. No treatment is necessary.     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 No lesions suspicious for malignancy noted.  Reassurance provided.  Instructed patient to observe lesion(s) for changes and follow up in clinic if changes are noted. Discussed ABCDE's of moles and brochure provided.    History of nonmelanoma skin cancer  Area(s) of previous NMSC evaluated with no signs of recurrence.    Upper body skin examination performed today including at least 6 points as noted in physical examination. Suspicious lesions noted.         Follow-up in about 6 months (around 3/11/2019).

## 2018-09-14 ENCOUNTER — PATIENT MESSAGE (OUTPATIENT)
Dept: DERMATOLOGY | Facility: CLINIC | Age: 58
End: 2018-09-14

## 2018-10-25 ENCOUNTER — IMMUNIZATION (OUTPATIENT)
Dept: PHARMACY | Facility: CLINIC | Age: 58
End: 2018-10-25
Payer: COMMERCIAL

## 2018-10-25 ENCOUNTER — INITIAL CONSULT (OUTPATIENT)
Dept: DERMATOLOGY | Facility: CLINIC | Age: 58
End: 2018-10-25
Payer: COMMERCIAL

## 2018-10-25 ENCOUNTER — PATIENT MESSAGE (OUTPATIENT)
Dept: ADMINISTRATIVE | Facility: OTHER | Age: 58
End: 2018-10-25

## 2018-10-25 VITALS
BODY MASS INDEX: 31.83 KG/M2 | DIASTOLIC BLOOD PRESSURE: 111 MMHG | HEIGHT: 72 IN | WEIGHT: 235 LBS | HEART RATE: 72 BPM | SYSTOLIC BLOOD PRESSURE: 155 MMHG

## 2018-10-25 DIAGNOSIS — C44.42 SQUAMOUS CELL CARCINOMA OF SCALP: Primary | ICD-10-CM

## 2018-10-25 PROCEDURE — 3077F SYST BP >= 140 MM HG: CPT | Mod: CPTII,S$GLB,, | Performed by: DERMATOLOGY

## 2018-10-25 PROCEDURE — 99999 PR PBB SHADOW E&M-EST. PATIENT-LVL III: CPT | Mod: PBBFAC,,, | Performed by: DERMATOLOGY

## 2018-10-25 PROCEDURE — 3008F BODY MASS INDEX DOCD: CPT | Mod: CPTII,S$GLB,, | Performed by: DERMATOLOGY

## 2018-10-25 PROCEDURE — 3080F DIAST BP >= 90 MM HG: CPT | Mod: CPTII,S$GLB,, | Performed by: DERMATOLOGY

## 2018-10-25 PROCEDURE — 99214 OFFICE O/P EST MOD 30 MIN: CPT | Mod: S$GLB,,, | Performed by: DERMATOLOGY

## 2018-10-25 NOTE — PROGRESS NOTES
ALLERGIES:  Erythromycin and Sumycin [tetracycline]    CHIEF COMPLAINT:  This 58 y.o. male comes for evaluation for Mohs' Micrographic Surgery, Fresh Tissue Technique, for treatment of a biopsy-proven squamous cell carcinoma on the anterior scalp. Consultation requested by Diane Melgar M.D..    HISTORY OF PRESENT ILLNESS:   Location: anterior scalp  Duration: 8 months or more  Quality: persistent  Context: status post biopsy by Diane Melgar M.D.; path = squamous cell carcinoma; pathology accession #[id number], [facility]   Prior Treatment: none    See also the handwritten notes/diagrams scanned to chart for additional details.    Defibrillator: No  Pacemaker: No  Artificial heart valves: No  Artificial joints: No    REVIEW OF SYSTEMS:   General: general health good  Skin: has previous history of skin cancer(s); prior Mohs surgery on the scalp  CV: has hypertension, no artificial valves, has no chest pain  Resp: has no shortness of breath  Endo: has no diabetes  Hem/Lymph: taking prescribed anticoagulants-ASA, has no easy bruising/bleeding  Allergy/Immuno: has allergies as noted above  GI: has no history of hepatitis  MS: as noted above     PAST MEDICAL HISTORY:  Past Medical History:   Diagnosis Date    Allergy     seasonal    Anal fistula hx    Arthritis     Basal cell carcinoma 4/2013    left superior forehead    Callus     Diverticulosis     Epididymal cyst     Hayfever     Hydrocele, right     Hyperlipidemia     Hypertension     Neck pain     hx of muscular inury from weight lifting---resolved now    Prostatitis     Dec. '12-treated with antibx.    Visual impairment        PAST SURGICAL HISTORY:  Past Surgical History:   Procedure Laterality Date    APPENDECTOMY  1962    COLONOSCOPY  12/2012    due for repeat 12/2015    COLONOSCOPY N/A 5/19/2016    Procedure: COLONOSCOPY;  Surgeon: James Webber MD;  Location: [facility] ([address] [place] [address]);  Service: Endoscopy;  Laterality: N/A;     COLONOSCOPY N/A 2016    Performed by James [facility]ber MD at [facility] (4TH FLR)    COLONOSCOPY N/A 2012    Performed by James [facility]ber MD at [facility] (4TH FLR)    EUA/Fistulotomy-'08      HERNIA REPAIR      [facility] with mesh    Hydrocelectomy      HYDROCELECTOMY Right 2013    Performed by Lenny Ewing MD at [facility] OR 2ND FLR    MASS EXCISION      BCC removal (twice)    REPAIR, HERNIA, INGUINAL, BILATERAL, LAPAROSCOPIC Bilateral 2013    Performed by Oleg Alejandro MD at [facility] OR 2ND FLR    right Spermatocelectomy          SOCIAL HISTORY:  Dependencies: smoking status as noted below  Social History     Tobacco Use    Smoking status: Former Smoker     Packs/day: 1.00     Years: 10.00     Pack years: 10.00     Last attempt to quit: 1987     Years since quittin.8    Smokeless tobacco: Never Used   Substance Use Topics    Alcohol use: Yes     Alcohol/week: 0.6 oz     Types: 1 Glasses of wine per week     Comment: socially    Drug use: No       PERTINENT MEDICATIONS:  See medications list.    Current Outpatient Medications:     aspirin (ECOTRIN) 81 MG EC tablet, Take 1 tablet (81 mg total) by mouth once daily., Disp: 360 tablet, Rfl: 3    atorvastatin (LIPITOR) 10 MG tablet, Take 1 tablet by mouth once daily., Disp: 90 tablet, Rfl: 1    cetirizine (ZYRTEC) 10 MG tablet, Take 10 mg by mouth once daily., Disp: , Rfl:     irbesartan (AVAPRO) 300 MG tablet, TAKE 1 TABLET (300 mg) BY MOUTH DAILY, Disp: 90 tablet, Rfl: 1    meloxicam (MOBIC) 15 MG tablet, TAKE 1 TABLET BY MOUTH once DAILY, Disp: 30 tablet, Rfl: 1    sildenafil, antihypertensive, (REVATIO) 20 mg Tab, May take 2-5 tablets at once, daily, for ED, Disp: 30 tablet, Rfl: 11    tadalafil (CIALIS) 20 MG Tab, Take 1 tablet (20 mg total) by mouth daily as needed. Take 1 hour before intercourse, Disp: 10 tablet, Rfl: 11    ALLERGIES:  Erythromycin and Sumycin [tetracycline]    EXAM:  See also the  handwritten notes/diagrams scanned to chart for additional details.  Constitutional  General appearance: well-developed, well-nourished, well-kempt older white male    Eyes  Inspection of conjunctivae and lids reveals no abnormalities; sclerae anicteric  Neurologic/Psychiatric  Alert,  normal orientation to time, place, person  Normal mood and affect with no evidence of depression, anxiety, agitation  Skin: see photo(s)  Head: background moderate solar damage to exposed areas of skin; in addition, inspection/palpation reveals an approximately 1.1 cm pink smooth flat scar on the anterior scalp vertex; site(s) confirmed by reference to the photograph(s) in the chart taken at the time of the biopsy/biopsies by the referring physician and he confirmed this as the site of the prior biopsy; posterior to this is an approximately 7 mm eschar, which he reports is due to trauma; lateral to the biopsy site there are scars consistent with sites of previous surgery, as noted above  Neck: examination reveals moderate chronic solar damage  Right upper extremity: examination reveals moderate chronic solar damage; no ecchymosis/ecchymoses   Left upper extremity: examination reveals moderate chronic solar damage; no ecchymosis/ecchymoses         Photo from prior surgery      ASSESSMENT: biopsy-proven squamous cell carcinoma of the scalp  chronic solar damage to areas as noted above  personal history of non-melanoma skin cancer  Long term current use of aspirin    PLAN:  The diagnosis and management options, and risks and benefits of the alternatives, including observation/non-treatment, radiation treatment, excision with vertical frozen section or paraffin-embedded section margin evaluation, and Mohs' Micrographic Surgery, Fresh Tissue Technique, were discussed at length with the patient. In particular, the discussion included, but was not limited to, the following:    One alternative at this point would be to defer further treatment  and observe the lesion. With small skin cancers of this kind, it is possible that a biopsy can be sufficient to definitively treat a small skin cancer of this kind. Alternatively, some skin cancers are slow growing and do not require immediate treatment. The potential advantage of this choice would be to avoid the need for possibly unnecessary additional surgery. Among the potential disadvantages of this would be the possibility of enlargement of the lesion, more extensive spread of the lesion or recurrence at a later date, which might necessitate a larger and more complex surgery.    Radiation treatment can be an effective treatment for this type of skin cancer. The usual course of treatment is every weekday for several weeks. Local irritation will result from treatment, although no systemic side effects are expected. The potential advantage of radiation treatment is that it avoids the need for surgery. Among the disadvantages of radiation treatment are the length of treatment, the local inflammatory response, the absence of pathologic confirmation of the removal of the skin cancer, a possible increased risk of additional skin cancer in the treated area in later years, and a somewhat increased risk of recurrence at a later date.     Excisional surgery can be an effective treatment for this type of skin cancer. This would involve excision of the lesion with margin evaluation by submitting the specimen to a pathologist for either immediate marginal assessment via frozen section processing, or delayed marginal assessment by fixed-tissue processing. The potential advantage of this technique is that it offers a way of treating the lesion with some degree of histologic confirmation of tumor removal. Among the disadvantages of this treatment are the possible need for re-excision if marginal involvement is identified, a somewhat greater likelihood of recurrence as compared to Mohs' surgery because of the less comprehensive  margin evaluation inherent in the technique, and the general potential risks of surgery, including allergic reactions to the anesthetic and other materials used, infection, injury to nerves in the area with consequent loss of sensation or muscle function, and scarring or distortion of surrounding structures.    Mohs' surgery is a very effective treatment for this type of skin cancer. The potential advantage of Mohs' surgery is that this technique offers the greatest possible certainty of knowing that the skin cancer has been completely removed, with the removal of the least amount of normal tissue. The potential disadvantages of Mohs' surgery include the duration of the surgery, the possible need for a separate surgery for reconstruction following tumor removal, and scarring as a result. In addition, general potential risks of surgery as noted above also apply to treatment via Mohs' surgery.    In light of the nature of this tumor and the location on the scalp in an area of increased risk of recurrence,  Mohs' micrographic surgery was thought to be the most appropriate management choice, and this diagnosis is appropriate for treatment by Mohs' micrographic surgery.     Sufficient time was available for questions, and all questions were answered to his satisfaction. He fully understands the aims, risks, alternatives, and possible complications, and has elected to proceed with the surgery, and verbally consented to do so. The procedure will be scheduled in the near future.    Routine pre-op instructions were given to him.    The patient was instructed to discontinue ASA prior to surgery.    --------------------------------------  Note: Some or all of this note may have been generated using voice recognition software. There may be voice recognition errors including grammatical and/or spelling errors found in the text. Attempts were made to correct these errors prior to signature.

## 2018-10-25 NOTE — LETTER
October 25, 2018      Diane Melgar MD  [address] [address]           [facility] - Dermatology Surgery  [address]  [facility] [address]  Phone: [phone]  Fax: [phone]          Patient: Joseph Jesus   MR Number: [id number]   YOB: 1960   Date of Visit: 10/25/2018       Dear Dr. Diane Melgar:    Thank you for referring Joseph Jesus to me for evaluation. Attached you will find relevant portions of my assessment and plan of care.    If you have questions, please do not hesitate to call me. I look forward to following Joseph Jesus along with you.    Sincerely,    Pacheco Carpio MD    Enclosure  CC:  No Recipients    If you would like to receive this communication electronically, please contact [email or web address] or [phone] to request more information on [employer] access.    For providers and/or their staff who would like to refer a patient to [facility], please contact us through our one-stop-shop provider referral line, [facility], at [phone].    If you feel you have received this communication in error or would no longer like to receive these types of communications, please e-mail [email or web address]

## 2018-10-25 NOTE — PROGRESS NOTES
ALLERGIES:  Erythromycin and Sumycin [tetracycline]    CHIEF COMPLAINT:  This 58 y.o. male comes for evaluation for Mohs' Micrographic Surgery, Fresh Tissue Technique, for treatment of a biopsy-proven squamous cell carcinoma on the anterior scalp. Consultation requested by Diane Melgar M.D..    HISTORY OF PRESENT ILLNESS:   Location: anterior scalp  Duration: 8 months or more  Quality: persistent  Context: status post biopsy by Diane Melgar M.D.; path = squamous cell carcinoma; pathology accession #[id number], [facility]   Prior Treatment: none    See also the handwritten notes/diagrams scanned to chart for additional details.    Defibrillator: No  Pacemaker: No  Artificial heart valves: No  Artificial joints: No    REVIEW OF SYSTEMS:   General: general health good  Skin: has previous history of skin cancer(s); prior Mohs surgery on the scalp  CV: has hypertension, no artificial valves, has no chest pain  Resp: has no shortness of breath  Endo: has no diabetes  Hem/Lymph: taking prescribed anticoagulants-ASA, has no easy bruising/bleeding  Allergy/Immuno: has allergies as noted above  GI: has no history of hepatitis  MS: as noted above     PAST MEDICAL HISTORY:  Past Medical History:   Diagnosis Date    Allergy     seasonal    Anal fistula hx    Arthritis     Basal cell carcinoma 4/2013    left superior forehead    Callus     Diverticulosis     Epididymal cyst     Hayfever     Hydrocele, right     Hyperlipidemia     Hypertension     Neck pain     hx of muscular inury from weight lifting---resolved now    Prostatitis     Dec. '12-treated with antibx.    Visual impairment        PAST SURGICAL HISTORY:  Past Surgical History:   Procedure Laterality Date    APPENDECTOMY  1962    COLONOSCOPY  12/2012    due for repeat 12/2015    COLONOSCOPY N/A 5/19/2016    Procedure: COLONOSCOPY;  Surgeon: James Webber MD;  Location: [facility] ([address] [place] [address]);  Service: Endoscopy;  Laterality: N/A;     COLONOSCOPY N/A 2016    Performed by James Webber MD at [facility] (4TH FLR)    COLONOSCOPY N/A 2012    Performed by James Webber MD at [facility] (4TH FLR)    EUA/Fistulotomy-'08      HERNIA REPAIR      [facility] with mesh    Hydrocelectomy      HYDROCELECTOMY Right 2013    Performed by Lenny Ewing MD at [facility] OR 2ND FLR    MASS EXCISION      BCC removal (twice)    REPAIR, HERNIA, INGUINAL, BILATERAL, LAPAROSCOPIC Bilateral 2013    Performed by lOeg Alejandro MD at [facility] OR 2ND FLR    right Spermatocelectomy          SOCIAL HISTORY:  Dependencies: smoking status as noted below  Social History     Tobacco Use    Smoking status: Former Smoker     Packs/day: 1.00     Years: 10.00     Pack years: 10.00     Last attempt to quit: 1987     Years since quittin.8    Smokeless tobacco: Never Used   Substance Use Topics    Alcohol use: Yes     Alcohol/week: 0.6 oz     Types: 1 Glasses of wine per week     Comment: socially    Drug use: No       PERTINENT MEDICATIONS:  See medications list.    Current Outpatient Medications:     aspirin (ECOTRIN) 81 MG EC tablet, Take 1 tablet (81 mg total) by mouth once daily., Disp: 360 tablet, Rfl: 3    atorvastatin (LIPITOR) 10 MG tablet, Take 1 tablet by mouth once daily., Disp: 90 tablet, Rfl: 1    cetirizine (ZYRTEC) 10 MG tablet, Take 10 mg by mouth once daily., Disp: , Rfl:     irbesartan (AVAPRO) 300 MG tablet, TAKE 1 TABLET (300 mg) BY MOUTH DAILY, Disp: 90 tablet, Rfl: 1    meloxicam (MOBIC) 15 MG tablet, TAKE 1 TABLET BY MOUTH once DAILY, Disp: 30 tablet, Rfl: 1    sildenafil, antihypertensive, (REVATIO) 20 mg Tab, May take 2-5 tablets at once, daily, for ED, Disp: 30 tablet, Rfl: 11    tadalafil (CIALIS) 20 MG Tab, Take 1 tablet (20 mg total) by mouth daily as needed. Take 1 hour before intercourse, Disp: 10 tablet, Rfl: 11    ALLERGIES:  Erythromycin and Sumycin [tetracycline]    EXAM:  See also the  handwritten notes/diagrams scanned to chart for additional details.  Constitutional  General appearance: well-developed, well-nourished, well-kempt older white male    Eyes  Inspection of conjunctivae and lids reveals no abnormalities; sclerae anicteric  Neurologic/Psychiatric  Alert,  normal orientation to time, place, person  Normal mood and affect with no evidence of depression, anxiety, agitation  Skin: see photo(s)  Head: background moderate solar damage to exposed areas of skin; in addition, inspection/palpation reveals an approximately 1.1 cm pink smooth flat scar on the anterior scalp vertex; site(s) confirmed by reference to the photograph(s) in the chart taken at the time of the biopsy/biopsies by the referring physician and he confirmed this as the site of the prior biopsy; posterior to this is an approximately 7 mm eschar, which he reports is due to trauma; lateral to the biopsy site there ***  Neck: examination reveals moderate chronic solar damage  Right upper extremity: examination reveals moderate chronic solar damage; no ecchymosis/ecchymoses   Left upper extremity: examination reveals moderate chronic solar damage; no ecchymosis/ecchymoses     ASSESSMENT: biopsy-proven squamous cell carcinoma of the scalp  chronic solar damage to areas as noted above  personal history of non-melanoma skin cancer  Long term current use of aspirin    PLAN:  The diagnosis and management options, and risks and benefits of the alternatives, including observation/non-treatment, radiation treatment, excision with vertical frozen section or paraffin-embedded section margin evaluation, and Mohs' Micrographic Surgery, Fresh Tissue Technique, were discussed at length with the patient. In particular, the discussion included, but was not limited to, the following:    One alternative at this point would be to defer further treatment and observe the lesion. With small skin cancers of this kind, it is possible that a biopsy can be  sufficient to definitively treat a small skin cancer of this kind. Alternatively, some skin cancers are slow growing and do not require immediate treatment. The potential advantage of this choice would be to avoid the need for possibly unnecessary additional surgery. Among the potential disadvantages of this would be the possibility of enlargement of the lesion, more extensive spread of the lesion or recurrence at a later date, which might necessitate a larger and more complex surgery.    Radiation treatment can be an effective treatment for this type of skin cancer. The usual course of treatment is every weekday for several weeks. Local irritation will result from treatment, although no systemic side effects are expected. The potential advantage of radiation treatment is that it avoids the need for surgery. Among the disadvantages of radiation treatment are the length of treatment, the local inflammatory response, the absence of pathologic confirmation of the removal of the skin cancer, a possible increased risk of additional skin cancer in the treated area in later years, and a somewhat increased risk of recurrence at a later date.     Excisional surgery can be an effective treatment for this type of skin cancer. This would involve excision of the lesion with margin evaluation by submitting the specimen to a pathologist for either immediate marginal assessment via frozen section processing, or delayed marginal assessment by fixed-tissue processing. The potential advantage of this technique is that it offers a way of treating the lesion with some degree of histologic confirmation of tumor removal. Among the disadvantages of this treatment are the possible need for re-excision if marginal involvement is identified, a somewhat greater likelihood of recurrence as compared to Mohs' surgery because of the less comprehensive margin evaluation inherent in the technique, and the general potential risks of surgery,  including allergic reactions to the anesthetic and other materials used, infection, injury to nerves in the area with consequent loss of sensation or muscle function, and scarring or distortion of surrounding structures.    Mohs' surgery is a very effective treatment for this type of skin cancer. The potential advantage of Mohs' surgery is that this technique offers the greatest possible certainty of knowing that the skin cancer has been completely removed, with the removal of the least amount of normal tissue. The potential disadvantages of Mohs' surgery include the duration of the surgery, the possible need for a separate surgery for reconstruction following tumor removal, and scarring as a result. In addition, general potential risks of surgery as noted above also apply to treatment via Mohs' surgery.    In light of the nature of this tumor and the location on the scalp in an area of increased risk of recurrence,  Mohs' micrographic surgery was thought to be the most appropriate management choice, and this diagnosis is appropriate for treatment by Mohs' micrographic surgery.     Sufficient time was available for questions, and all questions were answered to his satisfaction. He fully understands the aims, risks, alternatives, and possible complications, and has elected to proceed with the surgery, and verbally consented to do so. The procedure will be scheduled in the near future.    Routine pre-op instructions were given to him.    The patient was instructed to discontinue ASA prior to surgery.    --------------------------------------  Note: Some or all of this note may have been generated using voice recognition software. There may be voice recognition errors including grammatical and/or spelling errors found in the text. Attempts were made to correct these errors prior to signature.

## 2018-10-30 RX ORDER — IRBESARTAN 300 MG/1
TABLET ORAL
Qty: 90 TABLET | Refills: 1 | Status: SHIPPED | OUTPATIENT
Start: 2018-10-30 | End: 2019-07-31 | Stop reason: SDUPTHER

## 2018-11-01 ENCOUNTER — PATIENT MESSAGE (OUTPATIENT)
Dept: INTERNAL MEDICINE | Facility: CLINIC | Age: 58
End: 2018-11-01

## 2018-11-05 ENCOUNTER — PATIENT MESSAGE (OUTPATIENT)
Dept: INTERNAL MEDICINE | Facility: CLINIC | Age: 58
End: 2018-11-05

## 2018-11-07 ENCOUNTER — PATIENT MESSAGE (OUTPATIENT)
Dept: SURGERY | Facility: CLINIC | Age: 58
End: 2018-11-07

## 2018-11-07 RX ORDER — CIPROFLOXACIN 500 MG/1
500 TABLET ORAL EVERY 12 HOURS
Qty: 14 TABLET | Refills: 0 | Status: SHIPPED | OUTPATIENT
Start: 2018-11-07 | End: 2018-11-14

## 2018-11-07 RX ORDER — METRONIDAZOLE 500 MG/1
500 TABLET ORAL EVERY 8 HOURS
Qty: 21 TABLET | Refills: 0 | Status: SHIPPED | OUTPATIENT
Start: 2018-11-07 | End: 2018-11-14

## 2018-11-08 ENCOUNTER — PATIENT OUTREACH (OUTPATIENT)
Dept: OTHER | Facility: OTHER | Age: 58
End: 2018-11-08

## 2018-11-08 NOTE — LETTER
Mayra Concepcion PharmD  [address] [place] [address]     Dear Joseph Jesus,    Welcome to the [facility]!           My name is Mayra Concepcion PharmD and I am your dedicated Digital Medicine clinician.  As an expert in medication management, I will help ensure that the medications you are taking continue to provide you with the intended benefits.        I am Lora Ortiz and I will be your health  for the duration of the program.  My  job is to help you identify lifestyle changes to improve your blood pressure control.  We will talk about nutrition, exercise, and other ways that you may be able to adjust your current habits to better your health. Together, we will work to improve your overall health and encourage you to meet your goals for a healthier lifestyle.    What we expect from YOU:    You will need to take blood pressure readings multiple times a week and no less than one reading per week.   It is important that you take your measurements at different times during the day, when possible.     What you should expect from your Digital Medicine Care Team:   We will provide you with education about high blood pressure, including lifestyle changes that could help you to control your blood pressure.   We will review your weekly readings and provide you with monthly blood pressure progress reports after you have been in the program for more than 30 days.   We will send monthly progress reports on your blood pressure control to your physician so they can follow along with your progress as well.    You will be able to reach me by phone at [phone] or through your [facility] account by clicking my name under Care Team on the right side of the home screen.    I look forward to working with you to achieve your blood pressure goals!    Sincerely,    Mayra Concepcion PharmD  Your personal clinician    Please visit  [email or web address] to learn more about high blood pressure and what you can do lower your blood pressure.                                                                                           Joseph Jesus  [address] [place] [id number]

## 2018-11-08 NOTE — PROGRESS NOTES
Last 5 Patient Entered Readings                                      Current 30 Day Average: 126/88     Recent Readings 11/8/2018 11/7/2018 11/6/2018 11/5/2018 11/4/2018    SBP (mmHg) 118 127 121 132 143    DBP (mmHg) 81 85 86 88 93    Pulse 70 70 82 76 70        11/8-Initial enrollment attempt. LVM.

## 2018-11-08 NOTE — PROGRESS NOTES
Last 5 Patient Entered Readings                                      Current 30 Day Average: 126/88     Recent Readings 11/8/2018 11/7/2018 11/6/2018 11/5/2018 11/4/2018    SBP (mmHg) 118 127 121 132 143    DBP (mmHg) 81 85 86 88 93    Pulse 70 70 82 76 70          11/8-Initial enrollment completed. Confirmed proper BP technique, timing, and body positioning. Sent welcome letter.

## 2018-11-13 ENCOUNTER — OFFICE VISIT (OUTPATIENT)
Dept: SURGERY | Facility: CLINIC | Age: 58
End: 2018-11-13
Payer: COMMERCIAL

## 2018-11-13 VITALS
HEIGHT: 72 IN | DIASTOLIC BLOOD PRESSURE: 94 MMHG | BODY MASS INDEX: 31.71 KG/M2 | SYSTOLIC BLOOD PRESSURE: 131 MMHG | HEART RATE: 71 BPM | WEIGHT: 234.13 LBS

## 2018-11-13 DIAGNOSIS — R19.7 DIARRHEA, UNSPECIFIED TYPE: Primary | ICD-10-CM

## 2018-11-13 DIAGNOSIS — K57.30 DIVERTICULOSIS OF LARGE INTESTINE WITHOUT HEMORRHAGE: ICD-10-CM

## 2018-11-13 PROCEDURE — 3008F BODY MASS INDEX DOCD: CPT | Mod: CPTII,S$GLB,, | Performed by: NURSE PRACTITIONER

## 2018-11-13 PROCEDURE — 3080F DIAST BP >= 90 MM HG: CPT | Mod: CPTII,S$GLB,, | Performed by: NURSE PRACTITIONER

## 2018-11-13 PROCEDURE — 99999 PR PBB SHADOW E&M-EST. PATIENT-LVL III: CPT | Mod: PBBFAC,,, | Performed by: NURSE PRACTITIONER

## 2018-11-13 PROCEDURE — 3075F SYST BP GE 130 - 139MM HG: CPT | Mod: CPTII,S$GLB,, | Performed by: NURSE PRACTITIONER

## 2018-11-13 PROCEDURE — 99214 OFFICE O/P EST MOD 30 MIN: CPT | Mod: S$GLB,,, | Performed by: NURSE PRACTITIONER

## 2018-11-13 NOTE — PROGRESS NOTES
Subjective:       Patient ID: Joseph Jesus is a 58 y.o. male.    Chief Complaint: Diverticulitis    HPI   58 M who presents to clinic for diverticulitis follow up. He was seen by SHANNON Robles for diverticulitis which were treated with oral antibiotics. He called the clinic last week with recurrent pain - started on cipro and flagyl. Severe pain subsided, still feeling slight discomfort. His main concern is diarrhea, loose watery yellowish stools. 2-3x/day. Denies any fevers or chills. Eating mostly liquids    Colonoscopy: 005/2016 Scattered small and large-mouthed diverticula were found in the sigmoid colon.  CT 04/2017   1. Mild acute diverticulitis of the distal descending colon.  There is no evidence of perforation or abscess.  2. Stable hiatal hernia.  3.  Stable renal and hepatic cysts    Review of Systems   Constitutional: Negative for appetite change, chills, fatigue, fever and unexpected weight change.   Respiratory: Negative for shortness of breath.    Cardiovascular: Negative for chest pain.   Gastrointestinal: Positive for abdominal pain (mild) and diarrhea. Negative for abdominal distention, anal bleeding, blood in stool, constipation, nausea, rectal pain and vomiting.   Genitourinary: Negative for difficulty urinating, penile pain and testicular pain.       Objective:      Physical Exam   Constitutional: He is oriented to person, place, and time. He appears well-developed and well-nourished.   Eyes: Conjunctivae and EOM are normal.   Pulmonary/Chest: Effort normal. No respiratory distress.   Abdominal: Soft. He exhibits no distension. There is tenderness (slight RLQ tenderness).   Genitourinary: Rectal exam shows no mass and no tenderness.   Musculoskeletal: Normal range of motion.   Neurological: He is alert and oriented to person, place, and time.   Skin: Skin is warm and dry.   Psychiatric: He has a normal mood and affect. His behavior is normal.       Assessment:       1. Diarrhea, unspecified type     2. Diverticulosis of large intestine without hemorrhage        Plan:       Labs, CBC, CRP  Stools studies  Advance diet to soft/bland foods  RTC 2 weeks for reevaluation

## 2018-11-14 ENCOUNTER — LAB VISIT (OUTPATIENT)
Dept: LAB | Facility: HOSPITAL | Age: 58
End: 2018-11-14
Attending: NURSE PRACTITIONER
Payer: COMMERCIAL

## 2018-11-14 DIAGNOSIS — R19.7 DIARRHEA, UNSPECIFIED TYPE: ICD-10-CM

## 2018-11-14 DIAGNOSIS — K57.30 DIVERTICULOSIS OF LARGE INTESTINE WITHOUT HEMORRHAGE: ICD-10-CM

## 2018-11-14 LAB — WBC #/AREA STL HPF: NORMAL /[HPF]

## 2018-11-14 PROCEDURE — 87425 ROTAVIRUS AG IA: CPT

## 2018-11-14 PROCEDURE — 87328 CRYPTOSPORIDIUM AG IA: CPT

## 2018-11-14 PROCEDURE — 89055 LEUKOCYTE ASSESSMENT FECAL: CPT

## 2018-11-14 PROCEDURE — 87209 SMEAR COMPLEX STAIN: CPT

## 2018-11-14 PROCEDURE — 87338 HPYLORI STOOL AG IA: CPT

## 2018-11-15 LAB
O+P STL TRI STN: NORMAL
RV AG STL QL IA.RAPID: NEGATIVE

## 2018-11-16 LAB
CRYPTOSP AG STL QL IA: NEGATIVE
G LAMBLIA AG STL QL IA: NEGATIVE

## 2018-11-20 LAB — H PYLORI AG STL QL IA: NOT DETECTED

## 2018-11-26 ENCOUNTER — PATIENT OUTREACH (OUTPATIENT)
Dept: OTHER | Facility: OTHER | Age: 58
End: 2018-11-26

## 2018-11-26 NOTE — PROGRESS NOTES
Last 5 Patient Entered Readings                                      Current 30 Day Average: 127/87     Recent Readings 11/8/2018 11/7/2018 11/6/2018 11/5/2018 11/4/2018    SBP (mmHg) 118 127 121 132 143    DBP (mmHg) 81 85 86 88 93    Pulse 70 70 82 76 70          Pt not available to complete HC intro. Requested that I call back later this week.

## 2018-11-27 ENCOUNTER — OFFICE VISIT (OUTPATIENT)
Dept: SURGERY | Facility: CLINIC | Age: 58
End: 2018-11-27
Payer: COMMERCIAL

## 2018-11-27 VITALS
HEIGHT: 73 IN | HEART RATE: 65 BPM | SYSTOLIC BLOOD PRESSURE: 131 MMHG | DIASTOLIC BLOOD PRESSURE: 89 MMHG | WEIGHT: 234.13 LBS | BODY MASS INDEX: 31.03 KG/M2

## 2018-11-27 DIAGNOSIS — K57.32 DIVERTICULITIS OF LARGE INTESTINE WITHOUT PERFORATION OR ABSCESS WITHOUT BLEEDING: Primary | ICD-10-CM

## 2018-11-27 PROCEDURE — 3008F BODY MASS INDEX DOCD: CPT | Mod: CPTII,S$GLB,, | Performed by: NURSE PRACTITIONER

## 2018-11-27 PROCEDURE — 99999 PR PBB SHADOW E&M-EST. PATIENT-LVL III: CPT | Mod: PBBFAC,,, | Performed by: NURSE PRACTITIONER

## 2018-11-27 PROCEDURE — 99213 OFFICE O/P EST LOW 20 MIN: CPT | Mod: S$GLB,,, | Performed by: NURSE PRACTITIONER

## 2018-11-27 PROCEDURE — 3075F SYST BP GE 130 - 139MM HG: CPT | Mod: CPTII,S$GLB,, | Performed by: NURSE PRACTITIONER

## 2018-11-27 PROCEDURE — 3079F DIAST BP 80-89 MM HG: CPT | Mod: CPTII,S$GLB,, | Performed by: NURSE PRACTITIONER

## 2018-11-27 NOTE — PROGRESS NOTES
Subjective:       Patient ID: Joseph Jesus is a 58 y.o. male.    Chief Complaint: No chief complaint on file.    HPI   58 M who presents to clinic for diverticulitis follow up. He was seen by SHANNON Robles for diverticulitis which were treated with oral antibiotics. He called the clinic last week with recurrent pain - started on cipro and flagyl. Severe pain subsided, still feeling slight discomfort. His main concern is diarrhea, loose watery yellowish stools. 2-3x/day. Denies any fevers or chills. Eating mostly liquids    Colonoscopy: 05/2016 Scattered small and large-mouthed diverticula were found in the sigmoid colon.  CT 04/2017   1. Mild acute diverticulitis of the distal descending colon.  There is no evidence of perforation or abscess.  2. Stable hiatal hernia.  3.  Stable renal and hepatic cysts    Interval History 11/27:  Returns to clinic, diarrhea improved. Daily formed non bloody BM. Continues to have intermittent abdominal pain, left upper and lower quadrants. Pain not associated with eating or activity. Pain last 20 min to 2 hours, resolves spontaneously. Denies any fevers, chills, n/v. Advanced diet. No abdominal pain currently      Review of Systems   Constitutional: Negative for appetite change, chills, fatigue, fever and unexpected weight change.   Respiratory: Negative for shortness of breath.    Cardiovascular: Negative for chest pain.   Gastrointestinal: Positive for abdominal pain (none currently). Negative for abdominal distention, anal bleeding, blood in stool, constipation, diarrhea, nausea, rectal pain and vomiting.   Genitourinary: Negative for difficulty urinating, penile pain and testicular pain.       Objective:      Physical Exam   Constitutional: He is oriented to person, place, and time. He appears well-developed and well-nourished.   Eyes: Conjunctivae and EOM are normal.   Pulmonary/Chest: Effort normal. No respiratory distress.   Abdominal: Soft. He exhibits no distension. There  is no tenderness.   Genitourinary: Rectal exam shows no mass and no tenderness.   Musculoskeletal: Normal range of motion.   Neurological: He is alert and oriented to person, place, and time.   Skin: Skin is warm and dry.   Psychiatric: He has a normal mood and affect. His behavior is normal.       Assessment:       1. Diverticulitis of large intestine without perforation or abscess without bleeding        Plan:       Advance diet, start high fiber  Abdominal exam reassuring, no pain today and no pain with deep palpation  He will touch base with me in 2 weeks. If the pain continues may need repeat imaging  ER precautions discussed

## 2018-11-28 NOTE — PROGRESS NOTES
Last 5 Patient Entered Readings                                      Current 30 Day Average: 127/87     Recent Readings 11/8/2018 11/7/2018 11/6/2018 11/5/2018 11/4/2018    SBP (mmHg) 118 127 121 132 143    DBP (mmHg) 81 85 86 88 93    Pulse 70 70 82 76 70        Pt not available to complete HC intro. Requested that I call back after 12/1.

## 2018-12-02 ENCOUNTER — HOSPITAL ENCOUNTER (EMERGENCY)
Facility: HOSPITAL | Age: 58
Discharge: HOME | End: 2018-12-03
Attending: EMERGENCY MEDICINE
Payer: COMMERCIAL

## 2018-12-02 ENCOUNTER — PATIENT MESSAGE (OUTPATIENT)
Dept: SURGERY | Facility: CLINIC | Age: 58
End: 2018-12-02

## 2018-12-02 DIAGNOSIS — K57.92 DIVERTICULITIS: Primary | ICD-10-CM

## 2018-12-02 DIAGNOSIS — R55 VASO-VAGAL REACTION: ICD-10-CM

## 2018-12-02 LAB
BASOPHILS # BLD AUTO: 0.05 K/UL
BASOPHILS NFR BLD: 0.4 %
DIFFERENTIAL METHOD: ABNORMAL
EOSINOPHIL # BLD AUTO: 0.2 K/UL
EOSINOPHIL NFR BLD: 1.5 %
ERYTHROCYTE [DISTWIDTH] IN BLOOD BY AUTOMATED COUNT: 13.5 %
HCT VFR BLD AUTO: 41.6 %
HGB BLD-MCNC: 14.2 G/DL
IMM GRANULOCYTES # BLD AUTO: 0.04 K/UL
IMM GRANULOCYTES NFR BLD AUTO: 0.3 %
LYMPHOCYTES # BLD AUTO: 1.9 K/UL
LYMPHOCYTES NFR BLD: 16.2 %
MCH RBC QN AUTO: 31.3 PG
MCHC RBC AUTO-ENTMCNC: 34.1 G/DL
MCV RBC AUTO: 92 FL
MONOCYTES # BLD AUTO: 1 K/UL
MONOCYTES NFR BLD: 8.3 %
NEUTROPHILS # BLD AUTO: 8.4 K/UL
NEUTROPHILS NFR BLD: 73.3 %
NRBC BLD-RTO: 0 /100 WBC
PLATELET # BLD AUTO: 212 K/UL
PMV BLD AUTO: 11.1 FL
RBC # BLD AUTO: 4.53 M/UL
WBC # BLD AUTO: 11.45 K/UL

## 2018-12-02 PROCEDURE — 99285 EMERGENCY DEPT VISIT HI MDM: CPT | Mod: 25

## 2018-12-02 PROCEDURE — 96374 THER/PROPH/DIAG INJ IV PUSH: CPT

## 2018-12-02 PROCEDURE — 82962 GLUCOSE BLOOD TEST: CPT

## 2018-12-02 PROCEDURE — 80053 COMPREHEN METABOLIC PANEL: CPT

## 2018-12-02 PROCEDURE — 96372 THER/PROPH/DIAG INJ SC/IM: CPT | Mod: 59

## 2018-12-02 PROCEDURE — 84484 ASSAY OF TROPONIN QUANT: CPT

## 2018-12-02 PROCEDURE — 99285 EMERGENCY DEPT VISIT HI MDM: CPT | Mod: ,,, | Performed by: PHYSICIAN ASSISTANT

## 2018-12-02 PROCEDURE — 96375 TX/PRO/DX INJ NEW DRUG ADDON: CPT

## 2018-12-02 PROCEDURE — 83605 ASSAY OF LACTIC ACID: CPT

## 2018-12-02 PROCEDURE — 63600175 PHARM REV CODE 636 W HCPCS: Performed by: PHYSICIAN ASSISTANT

## 2018-12-02 PROCEDURE — 83880 ASSAY OF NATRIURETIC PEPTIDE: CPT

## 2018-12-02 PROCEDURE — 85025 COMPLETE CBC W/AUTO DIFF WBC: CPT

## 2018-12-02 PROCEDURE — 84145 PROCALCITONIN (PCT): CPT

## 2018-12-02 RX ORDER — DICYCLOMINE HYDROCHLORIDE 10 MG/ML
20 INJECTION INTRAMUSCULAR
Status: COMPLETED | OUTPATIENT
Start: 2018-12-02 | End: 2018-12-02

## 2018-12-02 RX ORDER — ONDANSETRON 2 MG/ML
8 INJECTION INTRAMUSCULAR; INTRAVENOUS
Status: COMPLETED | OUTPATIENT
Start: 2018-12-03 | End: 2018-12-02

## 2018-12-02 RX ADMIN — DICYCLOMINE HYDROCHLORIDE 20 MG: 20 INJECTION, SOLUTION INTRAMUSCULAR at 11:12

## 2018-12-02 RX ADMIN — ONDANSETRON 8 MG: 2 INJECTION INTRAMUSCULAR; INTRAVENOUS at 11:12

## 2018-12-03 ENCOUNTER — DOCUMENTATION ONLY (OUTPATIENT)
Dept: SURGERY | Facility: CLINIC | Age: 58
End: 2018-12-03

## 2018-12-03 VITALS
WEIGHT: 234 LBS | RESPIRATION RATE: 15 BRPM | DIASTOLIC BLOOD PRESSURE: 70 MMHG | OXYGEN SATURATION: 95 % | HEIGHT: 72 IN | SYSTOLIC BLOOD PRESSURE: 112 MMHG | BODY MASS INDEX: 31.69 KG/M2 | HEART RATE: 69 BPM | TEMPERATURE: 99 F

## 2018-12-03 LAB
ALBUMIN SERPL BCP-MCNC: 4.2 G/DL
ALP SERPL-CCNC: 65 U/L
ALT SERPL W/O P-5'-P-CCNC: 36 U/L
ANION GAP SERPL CALC-SCNC: 11 MMOL/L
AST SERPL-CCNC: 34 U/L
BILIRUB SERPL-MCNC: 0.6 MG/DL
BNP SERPL-MCNC: <10 PG/ML
BUN SERPL-MCNC: 19 MG/DL
CALCIUM SERPL-MCNC: 10.1 MG/DL
CHLORIDE SERPL-SCNC: 102 MMOL/L
CO2 SERPL-SCNC: 23 MMOL/L
CREAT SERPL-MCNC: 1.3 MG/DL
EST. GFR  (AFRICAN AMERICAN): >60 ML/MIN/1.73 M^2
EST. GFR  (NON AFRICAN AMERICAN): 59.3 ML/MIN/1.73 M^2
GLUCOSE SERPL-MCNC: 102 MG/DL
GLUCOSE SERPL-MCNC: 130 MG/DL (ref 70–110)
LACTATE SERPL-SCNC: 0.8 MMOL/L
POCT GLUCOSE: 130 MG/DL (ref 70–110)
POTASSIUM SERPL-SCNC: 4.2 MMOL/L
PROCALCITONIN SERPL IA-MCNC: 0.02 NG/ML
PROT SERPL-MCNC: 7.5 G/DL
SODIUM SERPL-SCNC: 136 MMOL/L
TROPONIN I SERPL DL<=0.01 NG/ML-MCNC: 0.01 NG/ML

## 2018-12-03 PROCEDURE — 63600175 PHARM REV CODE 636 W HCPCS: Performed by: PHYSICIAN ASSISTANT

## 2018-12-03 PROCEDURE — 25000003 PHARM REV CODE 250: Performed by: EMERGENCY MEDICINE

## 2018-12-03 PROCEDURE — 25500020 PHARM REV CODE 255: Performed by: EMERGENCY MEDICINE

## 2018-12-03 PROCEDURE — 63600175 PHARM REV CODE 636 W HCPCS: Performed by: EMERGENCY MEDICINE

## 2018-12-03 RX ORDER — AMOXICILLIN AND CLAVULANATE POTASSIUM 875; 125 MG/1; MG/1
1 TABLET, FILM COATED ORAL 2 TIMES DAILY
Qty: 14 TABLET | Refills: 0 | Status: SHIPPED | OUTPATIENT
Start: 2018-12-03 | End: 2018-12-14

## 2018-12-03 RX ORDER — HYOSCYAMINE SULFATE 0.12 MG/1
0.12 TABLET SUBLINGUAL
Status: COMPLETED | OUTPATIENT
Start: 2018-12-03 | End: 2018-12-03

## 2018-12-03 RX ORDER — KETOROLAC TROMETHAMINE 30 MG/ML
10 INJECTION, SOLUTION INTRAMUSCULAR; INTRAVENOUS
Status: COMPLETED | OUTPATIENT
Start: 2018-12-03 | End: 2018-12-03

## 2018-12-03 RX ORDER — AMOXICILLIN AND CLAVULANATE POTASSIUM 875; 125 MG/1; MG/1
1 TABLET, FILM COATED ORAL
Status: COMPLETED | OUTPATIENT
Start: 2018-12-03 | End: 2018-12-03

## 2018-12-03 RX ADMIN — AMOXICILLIN AND CLAVULANATE POTASSIUM 1 TABLET: 875; 125 TABLET, FILM COATED ORAL at 02:12

## 2018-12-03 RX ADMIN — KETOROLAC TROMETHAMINE 10 MG: 30 INJECTION, SOLUTION INTRAMUSCULAR at 12:12

## 2018-12-03 RX ADMIN — IOHEXOL 100 ML: 350 INJECTION, SOLUTION INTRAVENOUS at 12:12

## 2018-12-03 RX ADMIN — HYOSCYAMINE SULFATE 0.12 MG: 0.12 TABLET ORAL; SUBLINGUAL at 02:12

## 2018-12-03 NOTE — PROGRESS NOTES
Called patient due to message sent on Sunday 12/2. He went to ER, repeat CT revealed diverticulitis. Started on Augmentin.   Would like to follow up with a surgeon, he is now interested in possible surgical intervention. I have arranged this for him.

## 2018-12-03 NOTE — ED NOTES
While inserting IV, pt began to feel dizzy, nauseous, diaphoretic. Pt stated he felt like he was going to pass out. Pt adjusted in chair and nurse at bedside while pt has a syncopal episode and episode of emesis. Airway protected throughout. MD called into room to assess pt and pt moved to ED bed  .

## 2018-12-03 NOTE — ED TRIAGE NOTES
Pt reports he LLQ cramping radiating up throughout the abd. Pt reports he has hx of diverticulosis and was told to come in when he feels severe abd pain. Pt reports some nausea, denies vomiting or diarrhea.

## 2018-12-03 NOTE — ED NOTES
Two patient identifiers checked and confirmed.    APPEARANCE: Resting comfortably in no acute distress. Patient has clean hair, skin and nails. Clothing is appropriate and properly fastened.  NEURO: Awake, alert, appropriate for age, and cooperative with a calm affect; pupils equal and round. Oriented x4.  HEENT: Head symmetrical. Bilateral eyes without redness or drainage.  CARDIAC: Regular rate and rhythm. S1, S2 noted.  RESPIRATORY: Airway is open and patent. Respirations are spontaneous on room air, even and unlabored. Normal respiratory effort and rate noted.   GI/: Abdomen soft and non-distended. Patient is reported to void and stool appropriately for age. Pt reports LLQ abd cramping radiating up through out the abd. Denies any diarrhea or emesis. Pt reports some nausea.   NEUROVASCULAR: All extremities are warm and pink with +2 pulses and capillary refill less than 3 seconds. No peripheral edema noted.  MUSCULOSKELETAL: Moves all extremities well; no obvious deformities noted. Pt ambulated independently with steady gait.   SKIN: Warm, dry, and intact. Mucus membranes moist and pink.   PSYCH: Pt has calm affect, appropriate speech and thought process.

## 2018-12-03 NOTE — ED PROVIDER NOTES
"Encounter Date: 12/2/2018       History     Chief Complaint   Patient presents with    Abdominal Pain     pt has abdominal cramping x 6 hours ago. pt denies n/v/d. pt has hx of diverticultis.      58-year-old male with hypertension and history of repeat diverticulitis presents for worsening left lower quadrant abdominal pain x1 day.  Patient has had multiple episodes of diverticulitis this year, most recently approximately 6 weeks ago.  Treated with Cipro and Flagyl.  Patient states that he had recurrent diarrhea after this and lower abdominal cramping.  He states that this cramping has become much more severe today with associated decreased appetite and mild nausea.  Reports "irregular" small bowel movements without diarrhea.  Patient unsure if he has had bloody stool, he states his collarbone.  Reports 1 incidence of dark black stool.  Denies vomiting, fevers or urinary symptoms. Denies international travel, sick contacts or eating odd  foods.          Review of patient's allergies indicates:   Allergen Reactions    Erythromycin Rash    Sumycin [tetracycline] Rash    Tetracyclines Rash     Past Medical History:   Diagnosis Date    Allergy     seasonal    Anal fistula hx    Arthritis     Basal cell carcinoma 4/2013    left superior forehead    Callus     Diverticulosis     Epididymal cyst     Hayfever     Hydrocele, right     Hyperlipidemia     Hypertension     Neck pain     hx of muscular inury from weight lifting---resolved now    Prostatitis     Dec. '12-treated with antibx.    Visual impairment      Past Surgical History:   Procedure Laterality Date    APPENDECTOMY  1962    COLONOSCOPY  12/2012    due for repeat 12/2015    COLONOSCOPY N/A 5/19/2016    Procedure: COLONOSCOPY;  Surgeon: James Webber MD;  Location: [facility] ([address] [place] [address]);  Service: Endoscopy;  Laterality: N/A;    COLONOSCOPY N/A 5/19/2016    Performed by James Webber MD at [facility] ([address] [place] [address])    COLONOSCOPY N/A " 2012    Performed by [facility]ber MD at [facility] ENDO (4TH FLR)    EUA/Fistulotomy-'08      HERNIA REPAIR      [facility] with mesh    Hydrocelectomy      HYDROCELECTOMY Right 2013    Performed by Lenny Ewing MD at [facility] OR 2ND FLR    MASS EXCISION  2004    BCC removal (twice)    REPAIR, HERNIA, INGUINAL, BILATERAL, LAPAROSCOPIC Bilateral 2013    Performed by Oleg Alejandro MD at [facility] OR 2ND FLR    right Spermatocelectomy       Family History   Problem Relation Age of Onset    Skin cancer Mother     Hypertension Mother     Skin cancer Father     Cancer Father         prostate cancer    Hypertension Father     Hypertension Maternal Grandmother     Hypertension Maternal Grandfather     Hypertension Paternal Grandmother     Anesthesia problems Paternal Grandmother     Cancer Paternal Grandfather         prostate cancer    Hypertension Paternal Grandfather     Heart disease Paternal Grandfather     Diabetes Neg Hx     Colon polyps Neg Hx     Colon cancer Neg Hx     Melanoma Neg Hx     Psoriasis Neg Hx     Lupus Neg Hx     Eczema Neg Hx     Acne Neg Hx     Cirrhosis Neg Hx     Prostate cancer Neg Hx     Kidney disease Neg Hx      Social History     Tobacco Use    Smoking status: Former Smoker     Packs/day: 1.00     Years: 10.00     Pack years: 10.00     Last attempt to quit: 1987     Years since quittin.9    Smokeless tobacco: Never Used   Substance Use Topics    Alcohol use: Yes     Alcohol/week: 0.6 oz     Types: 1 Glasses of wine per week     Comment: socially    Drug use: No     Review of Systems   Constitutional: Positive for appetite change. Negative for chills, diaphoresis, fatigue and fever.   Respiratory: Negative for cough and shortness of breath.    Cardiovascular: Negative for chest pain and palpitations.   Gastrointestinal: Positive for abdominal pain, constipation, diarrhea and nausea. Negative for abdominal distention, anal  bleeding, blood in stool, rectal pain and vomiting.   Endocrine: Negative for polyuria.   Genitourinary: Negative for difficulty urinating, dysuria, flank pain, frequency and hematuria.   Musculoskeletal: Negative for back pain and myalgias.   Skin: Negative for color change and wound.   Allergic/Immunologic: Negative for immunocompromised state.   Neurological: Negative for light-headedness and headaches.       Physical Exam     Initial Vitals [12/02/18 2258]   BP Pulse Resp Temp SpO2   (!) 153/96 83 20 98.6 °F (37 °C) 99 %      MAP       --         Vitals:    12/02/18 2350   BP: (!) 146/87   Pulse: 85   Resp:    Temp:        Physical Exam    Nursing note and vitals reviewed.  Constitutional: He appears well-developed and well-nourished.  Non-toxic appearance. He does not appear ill. No distress.   Wife at bedside   HENT:   Head: Normocephalic and atraumatic.   Mouth/Throat: Oropharynx is clear and moist. No oropharyngeal exudate.   Eyes: EOM are normal. Pupils are equal, round, and reactive to light. No scleral icterus.   Cardiovascular: Normal rate, regular rhythm, normal heart sounds and intact distal pulses. Exam reveals no gallop and no friction rub.    No murmur heard.  Pulmonary/Chest: Effort normal and breath sounds normal. No stridor. No respiratory distress. He has no wheezes. He has no rhonchi. He has no rales. He exhibits no tenderness.   Abdominal: Soft. Bowel sounds are normal. He exhibits no shifting dullness, no distension, no pulsatile liver, no fluid wave, no abdominal bruit, no ascites, no pulsatile midline mass and no mass. There is no hepatosplenomegaly. There is tenderness in the left lower quadrant. There is no rigidity, no rebound, no guarding, no CVA tenderness, no tenderness at McBurney's point and negative Ruiz's sign.   Neurological: He is alert and oriented to person, place, and time.   Skin: Skin is warm and dry.   Psychiatric: He has a normal mood and affect.         ED Course    Procedures  Labs Reviewed   CBC W/ AUTO DIFFERENTIAL   COMPREHENSIVE METABOLIC PANEL   LACTIC ACID, PLASMA   PROCALCITONIN          Imaging Results    None          Medical Decision Making:   History:   Old Medical Records: I decided to obtain old medical records.  Clinical Tests:   Lab Tests: Ordered  Radiological Study: Ordered  Medical Tests: Ordered  Other:   I have discussed this case with another health care provider.       APC / Resident Notes:   58-year-old male presenting with worsening left lower quadrant abdominal cramping in the setting of recent diverticulitis.  Stable vitals, abdomen is soft with normoactive bowel sounds, tender to palpation in the left lower quadrant.  DDx includes diverticulitis, fistula, intra-abdominal abscess, gas cramps.  Will check labs, give Bentyl for cramping, do CT abdomen pelvis and reassess.    While patient was having his IV placed he started feel warm, became diaphoretic and brief syncopal episode.  Patient vomited thereafter.  He is reporting feeling lightheaded and weak, denies chest pain, palpitations or shortness of breath. Vitals rechecked and stable, EKG obtained. Suspect vasovagal syncope. Given syncopal episode, will transfer patient to main ED bed.  Plan for diverticulitis workup with cardiac monitoring and IV fluids s/p syncope.  I discussed this patient with my supervising physician and I am signing out to Pablo Allan MD.    12:03 AM  Debbi Jackman PA-C                   Clinical Impression:                  Debbi Jackman PA-C  12/03/18 0048

## 2018-12-03 NOTE — PROGRESS NOTES
AOC:    I have assumed care of this patient from LEE Jackman while pending w/u, reassessment, and dispo.    57 yo male with hx of diverticulitis presenting with acute LLQ pain. Patient sent over to B pod due to syncopal episode with diaphoresis during blood draw. No chest pain or sob. See previous note for full details.      Vitals:    12/02/18 2350 12/03/18 0016 12/03/18 0031 12/03/18 0046   BP: (!) 146/87 111/66 116/70 123/75   BP Location:       Patient Position:       Pulse: 85 60 (!) 55 (!) 59   Resp:   16    Temp:       TempSrc:       SpO2: 97% 95% 98% 100%   Weight:       Height:           Alert, oriented, non diaphoretic, NAD  RRR no mrg  CTAB  Abd soft, ND, TTP in LLQ with voluntary guarding    Mild leukocytosis. Accu check wnl. Procal wnl. Trop and BNP wnl. CT a/p pending. Pain controlled at this time.     Pablo Allan, PGY-3  1:54 AM      CT notable for diverticulitis with no abscess or perforation. No other abd pathology. Augmentin given. Tolerating PO. Non toxic appearing and pain improved. Will discharge with augmentin and f/u with colorectal surgeon.     Pablo Allan, PGY-3  2:52 AM

## 2018-12-05 NOTE — PROGRESS NOTES
Last 5 Patient Entered Readings                                      Current 30 Day Average: 125/85     Recent Readings 11/8/2018 11/7/2018 11/6/2018 11/5/2018 11/4/2018    SBP (mmHg) 118 127 121 132 143    DBP (mmHg) 81 85 86 88 93    Pulse 70 70 82 76 70          Pt not available to complete HC intro call. Requested that I call back tomorrow.

## 2018-12-06 NOTE — PROGRESS NOTES
Last 5 Patient Entered Readings                                      Current 30 Day Average: 122/84     Recent Readings 11/8/2018 11/7/2018 11/6/2018 11/5/2018 11/4/2018    SBP (mmHg) 118 127 121 132 143    DBP (mmHg) 81 85 86 88 93    Pulse 70 70 82 76 70        Pt states that he has not taken a reading since 11/8 because he has trouble remembering to take it when he gets home in the afternoon and he was told to wait until after he takes his BP meds to check his BP. Explained reason for that and pt states that in all the years that he has checked his BP he has never seen much of a difference in the readings. Instructed pt to try to wait 1 or more hours after taking BP meds, but if he is unable to take a reading within that time and this will cause him to check his BP <1 time per week, to take an AM reading and make a note of it.    Digital Medicine: Health  Introduction    Introduced Mr. Joseph Jesus to Digital Medicine. Discussed health  role and recommended lifestyle modifications.    Lifestyle Assessment:  Current Dietary Habits(i.e. low sodium, food labels, dining out): Pt reports having diverticulosis and is just now getting over a bout of diverticulitis. Pt states that with his other health issues, he would like to have until the middle of January before he starts trying to make any lifestyle adjustments for his BP.     Exercise: Pt states that he is a member of a gym, but has not been as active because he has not been feeling well.    Alcohol/Tobacco: Deferred.    Medication Adherence: has been compliant with the medicaiton regimen     Other goals: Deferred.    Reviewed AHA/AACE recommendations:  Limit sodium intake to <2000mg/day  Recommended CHO intake, 45-65% of daily caloric intake  Perform 150 minutes of physical activity per week    Reviewed the importance of self-monitoring, medication adherence, and that the health  can be used as a resource for lifestyle modifications to help reduce  or maintain a healthy lifestyle.  Reviewed that the Digital Medicine team is not available for emergencies and instructed the patient to call 911 or [facility] ([phone] or [phone]) if one arises.

## 2018-12-14 ENCOUNTER — OFFICE VISIT (OUTPATIENT)
Dept: SURGERY | Facility: CLINIC | Age: 58
End: 2018-12-14
Payer: COMMERCIAL

## 2018-12-14 VITALS
SYSTOLIC BLOOD PRESSURE: 140 MMHG | HEIGHT: 73 IN | HEART RATE: 67 BPM | WEIGHT: 233.69 LBS | BODY MASS INDEX: 30.97 KG/M2 | DIASTOLIC BLOOD PRESSURE: 88 MMHG

## 2018-12-14 DIAGNOSIS — K57.92 DIVERTICULITIS: Primary | ICD-10-CM

## 2018-12-14 PROCEDURE — 99999 PR PBB SHADOW E&M-EST. PATIENT-LVL III: CPT | Mod: PBBFAC,,, | Performed by: COLON & RECTAL SURGERY

## 2018-12-14 PROCEDURE — 99214 OFFICE O/P EST MOD 30 MIN: CPT | Mod: S$GLB,,, | Performed by: COLON & RECTAL SURGERY

## 2018-12-14 PROCEDURE — 3077F SYST BP >= 140 MM HG: CPT | Mod: CPTII,S$GLB,, | Performed by: COLON & RECTAL SURGERY

## 2018-12-14 PROCEDURE — 3008F BODY MASS INDEX DOCD: CPT | Mod: CPTII,S$GLB,, | Performed by: COLON & RECTAL SURGERY

## 2018-12-14 PROCEDURE — 3079F DIAST BP 80-89 MM HG: CPT | Mod: CPTII,S$GLB,, | Performed by: COLON & RECTAL SURGERY

## 2018-12-14 NOTE — PROGRESS NOTES
CRS Office Visit History and Physical    Referring Md:   No referring provider defined for this encounter.    SUBJECTIVE:     Chief Complaint: diverticulitis    History of Present Illness:  The patient is established patient to this practice.     Course is as follows:  Patient is a 58 y.o. male presents with diverticulitis (3rd or 4th episode).     He has been seen previously in 4/2017 and 11/2018 for diverticulitis.  Both episodes were treated with oral antibiotics with improvement.    Additional prior episode 3/2016, and one possible episode in 2013.  He has never required hospitalization.  He is otherwise healthy.  No functional limitations.  His past abdominal surgeries include a right lower quadrant incision for perforated appendicitis.  He has also had a laparoscopic inguinal hernia repair as well as varicocele repair.  He has regular bowel movements and has 1 bowel movement per day.  No episodes of fecal incontinence.     The most recent episode lasted 3-4 weeks, and resolved 2 weeks ago.    Colonoscopy:   05/2016 Scattered small and large-mouthed diverticula were found in the sigmoid colon.   - 2012: polyp x 3     No issues with incontinence    Review of patient's allergies indicates:   Allergen Reactions    Erythromycin Rash    Sumycin [tetracycline] Rash    Tetracyclines Rash       Past Medical History:   Diagnosis Date    Allergy     seasonal    Anal fistula hx    Arthritis     Basal cell carcinoma 4/2013    left superior forehead    Callus     Diverticulosis     Epididymal cyst     Hayfever     Hydrocele, right     Hyperlipidemia     Hypertension     Neck pain     hx of muscular inury from weight lifting---resolved now    Prostatitis     Dec. '12-treated with antibx.    Visual impairment      Past Surgical History:   Procedure Laterality Date    APPENDECTOMY  1962    COLONOSCOPY  12/2012    due for repeat 12/2015    COLONOSCOPY N/A 5/19/2016    Procedure: COLONOSCOPY;  Surgeon:  James Webber MD;  Location: [facility] ENDO (4TH FLR);  Service: Endoscopy;  Laterality: N/A;    COLONOSCOPY N/A 2016    Performed by James Webber MD at [facility] ENDO (4TH FLR)    COLONOSCOPY N/A 2012    Performed by James Webber MD at [facility] ENDO (4TH FLR)    EUA/Fistulotomy-'08      HERNIA REPAIR      [facility] with mesh    Hydrocelectomy      HYDROCELECTOMY Right 2013    Performed by Lenny Ewing MD at [facility] OR 2ND FLR    MASS EXCISION  2004    BCC removal (twice)    REPAIR, HERNIA, INGUINAL, BILATERAL, LAPAROSCOPIC Bilateral 2013    Performed by Oleg [facility]jandro MD at [facility] OR 2ND FLR    right Spermatocelectomy       Family History   Problem Relation Age of Onset    Skin cancer Mother     Hypertension Mother     Skin cancer Father     Cancer Father         prostate cancer    Hypertension Father     Hypertension Maternal Grandmother     Hypertension Maternal Grandfather     Hypertension Paternal Grandmother     Anesthesia problems Paternal Grandmother     Cancer Paternal Grandfather         prostate cancer    Hypertension Paternal Grandfather     Heart disease Paternal Grandfather     Diabetes Neg Hx     Colon polyps Neg Hx     Colon cancer Neg Hx     Melanoma Neg Hx     Psoriasis Neg Hx     Lupus Neg Hx     Eczema Neg Hx     Acne Neg Hx     Cirrhosis Neg Hx     Prostate cancer Neg Hx     Kidney disease Neg Hx      Social History     Tobacco Use    Smoking status: Former Smoker     Packs/day: 1.00     Years: 10.00     Pack years: 10.00     Last attempt to quit: 1987     Years since quittin.0    Smokeless tobacco: Never Used   Substance Use Topics    Alcohol use: Yes     Alcohol/week: 0.6 oz     Types: 1 Glasses of wine per week     Comment: socially    Drug use: No        Review of Systems:  Review of Systems   Constitutional: Negative for chills, diaphoresis, fever, malaise/fatigue and weight loss.   HENT: Negative for  "congestion.    Respiratory: Negative for shortness of breath.    Cardiovascular: Negative for chest pain and leg swelling.   Gastrointestinal: Negative for abdominal pain, blood in stool, constipation, nausea and vomiting.   Genitourinary: Negative for dysuria.   Musculoskeletal: Negative for back pain and myalgias.   Skin: Negative for rash.   Neurological: Negative for dizziness and weakness.   Endo/Heme/Allergies: Does not bruise/bleed easily.   Psychiatric/Behavioral: Negative for depression.       OBJECTIVE:     Vital Signs (Most Recent)  BP (!) 140/88 (BP Location: Right arm, Patient Position: Sitting, BP Method: Large (Automatic))   Pulse 67   Ht 6' 1" (1.854 m)   Wt 106 kg (233 lb 11 oz)   BMI 30.83 kg/m²     Physical Exam:  General: White male in no distress   Neuro: alert and oriented x 4.  Moves all extremities.     HEENT: no icterus.  Trachea midline  Respiratory: respirations are even and unlabored  Cardiac: regular rate  Abdomen: Soft, nondistended, nontender to palpation.  Transverse scar in right mid abdomen (open appy).  Umbilical and lower midline laparoscopic scars from inguinal hernia repair x 3   Extremities: Warm dry and intact  Skin: no rashes  Anorectal: Deferred    Labs: H/H = 14/42.  Alb = 4.2    Imaging:   CT abd/ pelvis:  12/2018: Findings suggesting acute uncomplicated diverticulitis at the descending colon.   4/2017: Mild acute diverticulitis of the distal descending colon  3/2016: Acute, uncomplicated diverticulitis of the descending and sigmoid colon junction.      ASSESSMENT/PLAN:     Diagnoses and all orders for this visit:    Diverticulitis      58-year-old gentleman with his 4th episode of uncomplicated diverticulitis who is now resolved following medical therapy.  We discussed recurrence of diverticulitis with one episode having a 25-30% recurrence rate, 2 episodes having 50-60% recurrence rate, and 3 episodes having 90% recurrence rate.  We discussed that the first episode " of diverticulitis is usually the most complicated and that subsequent episodes are similar to this.    We discussed the role of elective colectomy to reduce the rate to subsequent diverticulitis to 3-5%.  The colon is removed from the most proximal area of diverticulitis as seen on the CT scans to the top of the rectum.  The risks of surgery were covered, including bleeding, anastomotic leak (3-5%), infection, abscess, hernia formation, and the need for potential stoma if a leak did occur.  The typical post operative course was covered with a 3-5 day hospital admission.  Following colectomy, bowel habits are often more frequent but will often adapt over time.      Elective descending and sigmoid colon resection was offered to him today.  This could be performed laparoscopically with extraction through a Pfannenstiel.  Complications were discussed.  He would need likely ligation of the left colic artery as the majority of his descending colon is involved.  He would like to consider surgery further and will be in touch if he has worsening episodes are wishes to proceed with surgery.    ANN Escoto MD  Staff Surgeon  Colon & Rectal Surgery

## 2018-12-17 ENCOUNTER — PATIENT OUTREACH (OUTPATIENT)
Dept: OTHER | Facility: OTHER | Age: 58
End: 2018-12-17

## 2018-12-17 RX ORDER — EPINEPHRINE 0.22MG
200 AEROSOL WITH ADAPTER (ML) INHALATION DAILY
COMMUNITY

## 2018-12-17 NOTE — PROGRESS NOTES
HPI:  Completed pharmacist introduction with patient. Reviewed allergies and current list of medications on file. He endorses adherence to medication each morning with no complaints. Patient states that he previously tried valsartan and atenolol in the past and neither medication worked well for him. Attributes elevated blood pressure to checking prior to medications initially as well as change in diet and exercise. He hopes to increase physical activity after the holidays. He has been more conscious of sodium intake however, states that it is difficult because his daughter and her small children live with him. He would like to work on lifestyle changes prior to adjusting medications.     Questionnaires:  Depression: Not Indicated  Sleep Apnea: Indicated, denies previous diagnosis and is not interested in referral at this time.     Last 5 Patient Entered Readings                                      Current 30 Day Average: 136/91     Recent Readings 12/12/2018 12/10/2018 12/9/2018 12/8/2018 12/8/2018    SBP (mmHg) 128 134 128 147 157    DBP (mmHg) 85 95 80 85 102    Pulse 73 70 68 63 60        Patient denies s/s of hypotension (lightheadedness, dizziness, nausea, fatigue) associated with low readings. Instructed patient to inform me if this occurs, patient confirms understanding.    Patient denies s/s of hypertension (SOB, CP, severe headaches, changes in vision) associated with high readings. Instructed patient to go to the ED if BP >180/110 and accompanied by hypertensive s/s, patient confirms understanding.    Assessment:  Patient's current 30-day average is slightly above goal of <130/80 mmHg based on ACC/AHA HTN guidelines. Readings slightly improved after correcting technique as discussed with health .     Plan:  Continue current regimen.  Patient advised to reduce sodium intake as much as possible.   Patients health , Lora Ortiz, will be following up every 3-4 weeks.   I will continue  to monitor regularly and will follow-up in 4 weeks, sooner if blood pressure begins to trend upward or downward.     Current medication regimen:  Hypertension Medications             irbesartan (AVAPRO) 300 MG tablet TAKE 1 TABLET (300 mg) BY MOUTH DAILY        Patient has my contact information and knows to call with any concerns or clinical changes.

## 2019-01-06 NOTE — PROGRESS NOTES
Allergies:   Review of patient's allergies indicates:   Allergen Reactions    Erythromycin Rash    Sumycin [tetracycline] Rash    Tetracyclines Rash       Chief Complaint: here for Mohs' surgery to a squamous cell carcinoma on the scalp    HPI:   Location: anterior scalp  Quality: unchanged  Timing: I last saw him about 2 months ago  Context: prior biopsy showed squamous cell carcinoma     Review of Systems:  CV: has no pacemaker or defibrillator  Skin: has another persistent spot, posterior to the site of prior biopsy; he previously thought this was secondary to trauma, but it has failed to clear and is now a persistent ulceration    Medications: see list    Current Outpatient Medications:     atorvastatin (LIPITOR) 10 MG tablet, Take 1 tablet by mouth once daily., Disp: 90 tablet, Rfl: 1    cetirizine (ZYRTEC) 10 MG tablet, Take 10 mg by mouth once daily., Disp: , Rfl:     coenzyme Q10 100 mg capsule, Take 200 mg by mouth once daily., Disp: , Rfl:     irbesartan (AVAPRO) 300 MG tablet, TAKE 1 TABLET (300 mg) BY MOUTH DAILY, Disp: 90 tablet, Rfl: 1    meloxicam (MOBIC) 15 MG tablet, TAKE 1 TABLET BY MOUTH once DAILY, Disp: 30 tablet, Rfl: 1    Review of patient's allergies indicates:   Allergen Reactions    Erythromycin Rash    Sumycin [tetracycline] Rash    Tetracyclines Rash       Past Medical History:   Diagnosis Date    Allergy     seasonal    Anal fistula hx    Arthritis     Basal cell carcinoma 4/2013    left superior forehead    Callus     Diverticulosis     Epididymal cyst     Hayfever     Hydrocele, right     Hyperlipidemia     Hypertension     Neck pain     hx of muscular inury from weight lifting---resolved now    Prostatitis     Dec. '12-treated with antibx.    Visual impairment        Past Surgical History:   Procedure Laterality Date    APPENDECTOMY  1962    COLONOSCOPY  12/2012    due for repeat 12/2015    COLONOSCOPY N/A 5/19/2016    Performed by James Webber MD at  "[facility] ENDO (4TH FLR)    COLONOSCOPY N/A 2012    Performed by James Webber MD at [facility] (4TH FLR)    EUA/Fistulotomy-'08      HERNIA REPAIR      [facility] with mesh    Hydrocelectomy  2013    HYDROCELECTOMY Right 2013    Performed by Lenny Ewing MD at [facility] OR 2ND FLR    MASS EXCISION  2004    BCC removal (twice)    REPAIR, HERNIA, INGUINAL, BILATERAL, LAPAROSCOPIC Bilateral 2013    Performed by Oleg Alejandro MD at [facility] OR 2ND FLR    right Spermatocelectomy         Social History     Socioeconomic History    Marital status:      Spouse name: Traci    Number of children: 1    Years of education: Not on file    Highest education level: Not on file   Social Needs    Financial resource strain: Not on file    Food insecurity - worry: Not on file    Food insecurity - inability: Not on file    Transportation needs - medical: Not on file    Transportation needs - non-medical: Not on file   Occupational History    Occupation: Software Eng.     Employer: [employer]     Comment: [employer]   Tobacco Use    Smoking status: Former Smoker     Packs/day: 1.00     Years: 10.00     Pack years: 10.00     Last attempt to quit: 1987     Years since quittin.0    Smokeless tobacco: Never Used   Substance and Sexual Activity    Alcohol use: Yes     Alcohol/week: 0.6 oz     Types: 1 Glasses of wine per week     Comment: socially    Drug use: No    Sexual activity: Yes     Partners: Female   Other Topics Concern    Not on file   Social History Narrative    Not on file       Vitals:    19 0727 19 0745   BP: (!) 155/101 138/89   Pulse: 77 76   Weight: 105.2 kg (232 lb)    Height: 6' 1" (1.854 m)         EXAM:  Constitutional  - General appearance: well-developed, well-nourished, well-kempt older white male    Eyes  - Conjunctivae and lids - no abnormalities; sclerae anicteric  Neurologic/Psychiatric  - Orientation - Alert,  normal orientation to time, place, " person  - Mood and affect - Normal mood and affect with no evidence of depression, anxiety, agitation  Skin: see photo below  - Scalp: the site of previous biopsy shows an approximately 1.2 cm pink, flat scar with some slight induration  Posterior to the site of the previous biopsy there is an approximately 1.2 cm eroded plaque, as noted in the photo below        Assessment:  squamous cell carcinoma of the anterior scalp, pending treatment  Possible second squamous cell carcinoma vs basal cell carcinoma vs other, posterior to the above    Plan:  I discussed with the patient the diagnoses and management options, and risks, benefits and alternatives. This discussion included but was not limited to the following:    I reviewed with him the nature of the squamous cell carcinoma which was biopsied previously, and the clinical findings. I explained that this is not an issue which requires immediate/urgent treatment. I also discussed with him the new lesion and options. .    Under the circumstances, we will defer his Mohs surgery today and proceed with biopsy of the new lesion. See the procedure note below. If this is also confirmed to be a skin cancer, I anticipate treatment of both sites at the same time, which we will tentatively schedule for next week.    All questions were answered to his satisfaction.    PROCEDURE NOTE: SHAVE BIOPSY    The diagnosis and management options and risk, benefits and alternatives were discussed with the patient. All questions were answered. Verbal consent was obtained.    Site: mid scalp  Indication: clinically suspicious lesion; rule out malignancy  Prep: Alcohol  Anesthesia: 1% lidocaine with epi 1:100K, less than 2 mL  Shave biopsy performed  Hemostasis with electrodesiccation  Dressed with petrolatum and bandaid  Specimen placed in formalin to be submitted to pathology    Routine care instructions given  Followup: per path as noted  above  -----------------------------------------------------------  Note: Some or all of this note may have been generated using voice recognition software. There may be voice recognition errors including grammatical and/or spelling errors found in the text. Attempts were made to correct these errors prior to signature.

## 2019-01-07 ENCOUNTER — PROCEDURE VISIT (OUTPATIENT)
Dept: DERMATOLOGY | Facility: CLINIC | Age: 59
End: 2019-01-07
Payer: COMMERCIAL

## 2019-01-07 VITALS
HEIGHT: 73 IN | BODY MASS INDEX: 30.75 KG/M2 | SYSTOLIC BLOOD PRESSURE: 138 MMHG | DIASTOLIC BLOOD PRESSURE: 89 MMHG | WEIGHT: 232 LBS | HEART RATE: 76 BPM

## 2019-01-07 DIAGNOSIS — D48.5 NEOPLASM OF UNCERTAIN BEHAVIOR OF SKIN: Primary | ICD-10-CM

## 2019-01-07 PROCEDURE — 88305 TISSUE EXAM BY PATHOLOGIST: CPT | Mod: 26,,, | Performed by: PATHOLOGY

## 2019-01-07 PROCEDURE — 88305 TISSUE EXAM BY PATHOLOGIST: CPT | Performed by: PATHOLOGY

## 2019-01-07 PROCEDURE — 99499 UNLISTED E&M SERVICE: CPT | Mod: S$GLB,,, | Performed by: DERMATOLOGY

## 2019-01-07 PROCEDURE — 11102 TANGNTL BX SKIN SINGLE LES: CPT | Mod: S$GLB,,, | Performed by: DERMATOLOGY

## 2019-01-07 PROCEDURE — 88305 TISSUE SPECIMEN TO PATHOLOGY, DERMATOLOGY: ICD-10-PCS | Mod: 26,,, | Performed by: PATHOLOGY

## 2019-01-07 PROCEDURE — 11102 PR TANGENTIAL BIOPSY, SKIN, SINGLE LESION: ICD-10-PCS | Mod: S$GLB,,, | Performed by: DERMATOLOGY

## 2019-01-07 PROCEDURE — 99499 NO LOS: ICD-10-PCS | Mod: S$GLB,,, | Performed by: DERMATOLOGY

## 2019-01-14 NOTE — PROGRESS NOTES
Last 5 Patient Entered Readings                                      Current 30 Day Average: 131/85     Recent Readings 12/17/2018 12/12/2018 12/10/2018 12/9/2018 12/8/2018    SBP (mmHg) 131 128 134 128 147    DBP (mmHg) 85 85 95 80 85    Pulse 70 73 70 68 63        No readings since 12/17/19. Will defer to health  to contact patient for more frequent readings. I will continue monitoring and follow-up in 4 weeks.   Defer to HC for follow-up regarding lack of readings.

## 2019-01-16 ENCOUNTER — TELEPHONE (OUTPATIENT)
Dept: DERMATOLOGY | Facility: CLINIC | Age: 59
End: 2019-01-16

## 2019-01-17 ENCOUNTER — PATIENT OUTREACH (OUTPATIENT)
Dept: OTHER | Facility: OTHER | Age: 59
End: 2019-01-17

## 2019-01-17 NOTE — PROGRESS NOTES
Last 5 Patient Entered Readings                                      Current 30 Day Average:      Recent Readings 12/17/2018 12/12/2018 12/10/2018 12/9/2018 12/8/2018    SBP (mmHg) 131 128 134 128 147    DBP (mmHg) 85 85 95 80 85    Pulse 70 73 70 68 63          Sent [employer] message requesting more frequent monitoring. Will call to f/u in 1 week.

## 2019-01-24 NOTE — PROGRESS NOTES
Last 5 Patient Entered Readings                                      Current 30 Day Average:      Recent Readings 12/17/2018 12/12/2018 12/10/2018 12/9/2018 12/8/2018    SBP (mmHg) 131 128 134 128 147    DBP (mmHg) 85 85 95 80 85    Pulse 70 73 70 68 63          Pt not available to complete HC f/u. States that he will call back later today. WCB in 1 week, if no response from pt before then.

## 2019-01-31 NOTE — PROGRESS NOTES
Last 5 Patient Entered Readings                                      Current 30 Day Average:      Recent Readings 12/17/2018 12/12/2018 12/10/2018 12/9/2018 12/8/2018    SBP (mmHg) 131 128 134 128 147    DBP (mmHg) 85 85 95 80 85    Pulse 70 73 70 68 63        Called pt for f/u and to ask about lack of readings. Pt states that his daughter, who lives with him, just had a baby and things have been hectic. States that daughter will likely be moving out in 2-3 weeks and things should settle down after that. Will put pt on 3 week hiatus to allow time.

## 2019-02-24 NOTE — PROGRESS NOTES
Prior photo(s) of site(s) to confirm locations:       ALLERGIES:   Erythromycin; Sumycin [tetracycline]; and Tetracyclines      Current Outpatient Medications:     atorvastatin (LIPITOR) 10 MG tablet, Take 1 tablet by mouth once daily., Disp: 90 tablet, Rfl: 1    cetirizine (ZYRTEC) 10 MG tablet, Take 10 mg by mouth once daily., Disp: , Rfl:     coenzyme Q10 100 mg capsule, Take 200 mg by mouth once daily., Disp: , Rfl:     irbesartan (AVAPRO) 300 MG tablet, TAKE 1 TABLET (300 mg) BY MOUTH DAILY, Disp: 90 tablet, Rfl: 1    meloxicam (MOBIC) 15 MG tablet, TAKE 1 TABLET BY MOUTH once DAILY, Disp: 30 tablet, Rfl: 1  -------------------------------------------------------------  PROCEDURE: Mohs' Micrographic Surgery to two sites    FIRST SITE: mid scalp    INDICATION: infiltrative basal cell carcinoma in an area at increased risk of recurrence    CASE NUMBER: [id number]      ANESTHETIC: 7.5 mL 1% Lidocaine with Epinephrine 1:100,000    SURGICAL PREP: Ethanol and Hibiclens    SURGEON: Pacheco Carpio MD    ASSISTANTS: Varun Burleson CST     PREOPERATIVE DIAGNOSIS: basal cell carcinoma     POSTOPERATIVE DIAGNOSIS: basal cell carcinoma     PATHOLOGIC DIAGNOSIS: nodular/infiltrative basal cell carcinoma     STAGES OF MOHS' SURGERY PERFORMED: three    TUMOR-FREE PLANE ACHIEVED: yes    HEMOSTASIS: Hyfrecation     SPECIMENS: three (one in stage A, one in stage B, one in stage C)    INITIAL LESION SIZE: 1.5 x 1.7 cm    FINAL DEFECT SIZE: 2.0 x 2.3 cm    WOUND REPAIR/DISPOSITION: see below    ---------------------------------------    SECOND SITE: anterior scalp    INDICATION: squamous cell carcinoma in an area at increased risk of recurrence    CASE NUMBER: [id number]      ANESTHETIC: as above    SURGICAL PREP: as above    SURGEON: Pacheco Carpio MD    ASSISTANTS: as above    PREOPERATIVE DIAGNOSIS: squamous cell carcinoma     POSTOPERATIVE DIAGNOSIS: squamous cell carcinoma     PATHOLOGIC DIAGNOSIS: squamous cell  carcinoma     STAGES OF MOHS' SURGERY PERFORMED: one    TUMOR-FREE PLANE ACHIEVED: yes    HEMOSTASIS: Hyfrecation     SPECIMENS: two (one in stage A, one in stage B)    INITIAL LESION SIZE: 1.2 x 1.3 cm    FINAL DEFECT SIZE: 1.4 x 1.7 cm    WOUND REPAIR/DISPOSITION: see below    NARRATIVE:  The patient is a 58 y.o.male referred by Diane Melgar MD with a history of cancer on the anterior scalp and on the mid scalp which were biopsied - pathology accession #[id number],   [facility] (anterior site) and [id number], [facility] (mid scalp site). Findings revealed squamous cell carcinoma at the anterior scalp site and basal cell carcinoma, nodular and infiltrative, at the mid scalp site. Examination revealed pink, sclerotic plaques on the anterior and mid scalp at the sites of prior biopsies, which were confirmed by reference to the photograph(s) taken at the previous patient visit, as attached above. In light of the nature of these tumors and the locations on the scalp, Mohs' micrographic surgery was thought to be the most appropriate management choice, and these diagnoses are appropriate for treatment by Mohs' micrographic surgery.  I discussed it with the patient and he fully understands the aims, risks, alternatives, and possible complications, and elects to proceed.  There are no medical or surgical contraindications to the procedure.     A signed informed consent was obtained.    PROCEDURE:  The patient was placed in the semi-recumbent position on the operating table in the Mohs' Surgery Suite. The area in question was thoroughly prepped with ethanol and Hibiclens, with particular care to avoid application to the immediate periocular skin. A sterile surgical marker was used to outline the clinically apparent margins of the involved area, and a narrow margin of normal-appearing skin, at each site. Reference marks were made at the periphery of the outlined area at each site with the surgical marker.  "The proposed areas of excision were measured and photographed. Local anesthesia of 1% Lidocaine with 1:100,000 epinephrine was administered  The total volume of anesthetic used throughout this portion of the procedure was as documented above. The areas were prepared and draped in the standard manner. All of the grossly identifiable area of clinically abnormal tissue and an underlying/peripheral layer was taken at each site and processed by the Mohs' technique.  Hemostasis was obtained with the hyfrecator. Tissue was taken from any areas of residual marginal involvement (if present) at each site and processed by the Mohs' technique in as many stages as needed until a tumor-free plane was achieved at each site.    Colors of inks used in the reference nicks at epidermal margins (if present) and/or inking of non-epithelial edges, if applicable, is represented on the Mohs map as follows: solid lines represent red ink, dots represent blue ink, jagged lines represent black ink, curlicues represent green ink, "xxx" represents yellow ink.    FIRST SITE: mid scalp   The first Mohs' layer consisted of one section(s) with 3 slide(s) evaluated. Some residual tumor was noted at the margins of the first Mohs' layer. Histology of the specimen(s) showed changes consistent with infiltrative basal cell carcinoma, manifested as irregularly-shaped and angulated nests and strands of basaloid cells with hyperchromatic nuclei, peripheral palisading, and artifactual clefting around the nests/strands, and some lymphocytic inflammatory infiltrate in the surrounding tissues, at peripheral margin between the black and green nicks and at the deep margin of the specimen.  N.B. See also below under second Mohs layer.    The second Mohs' layer consisted of one section(s) with 3 slide(s) evaluated. Histology of the specimen(s) showed no residual basal cell carcinoma. However, on review of the slides from the first layer in conjunction with the " review of the slides of the second layer, small foci of infiltrative basal cell carcinoma, previously overlooked on examination of the first stage slides, were noted in a fibrous septum in the subcutaneous adipose tissue near the red-inked nick, as noted on the Mohs' map, necessitating a third layer to assure complete tumor removal and a clear margin in this area.     The third Mohs' layer consisted of one section(s) with 4 slide(s) evaluated. No residual tumor was noted at the margins of the third Mohs' layer. Histology of the specimen(s) showed chronic solar damage.    A total of three section(s) and 10 slide(s) were examined under the microscope via the Mohs technique.  A cancer free plane was reached after layer number three. Defect final size was as noted above.     SECOND SITE: anterior scalp  The first Mohs' layer consisted of one section(s) with 3 slide(s) evaluated. Histology of the specimen(s) showed an area of residual dermal scarring at the margin between the green and red nicks, as noted on the Mohs map, necessitating an additional layer to confirm a tumor free margin in this area.    The second Mohs' layer consisted of one section(s) with 2 slide(s) evaluated. No residual tumor was noted at the margins of the second Mohs' layer. Histology of the specimen(s) showed chronic solar damage.    A total of two section(s) and 5 slide(s) were examined under the microscope via the Mohs technique.  A cancer free plane was reached after layer number two. Defect final size was as noted above.      The wounds were covered with nonadherent dressings between stages, and the patient allowed to wait in the waiting area during these periods. The final defects were photographed at the completion of the Mohs' procedure.    See the separate procedure note which follows regarding repair of the defects following Mohs' surgery.    -----------------------------------------------    REPAIR FOLLOWING MOHS' MICROGRAPHIC  SURGERY    PREOPERATIVE DIAGNOSIS: defects following Mohs' surgery for a basal cell carcinoma and a squamous cell carcinoma on the scalp    POSTOPERATIVE DIAGNOSIS: same    PROCEDURE PERFORMED: Burow's triangle advancement flap    ANESTHETIC: 3.5 mL 1% Lidocaine with Epinephrine 1:100,000     SURGICAL PREP: Hibiclens    SURGEON: Pacheco Carpio MD     ASSISTANTS: as above    LOCATION: scalp      INDICATIONS:  Earlier in the day, the patient underwent Mohs' micrographic surgical excision of a basal cell carcinoma on the mid scalp and of a squamous cell carcinoma on the anterior scalp. Tumor free margins were achieved after layer number three at the mid scalp site and after layer number two at the anterior scalp site.  Later in the day, the management of the resulting wounds was addressed with the patient. I discussed the various wound management options with the patient and he fully understands the aims, risks, alternatives, and possible complications of the alternatives, and he elects to proceed with closure of the defects in the manner(s) noted below.  There are no medical or surgical contraindications to the procedure.    A signed informed consent was previously obtained.    PROCEDURE:  Repair via adjacent tissue transfer:  The patient was returned to the procedure room following completion of the Mohs' procedure and final slide review. Because of the size and location of the defect, primary closure could not be achieved without significant distortion of the surrounding structures and an unacceptable risk of wound dehiscence. After discussing options for management of the wound with the patient, a local Burow's triangle advancement flap was chosen to close the wound. The flap was outlined on the adjacent skin with a sterile surgical marker. Infiltration of local anesthetic to include the defect and the area of the flap was carried out. The flap was incised and undermined sufficiently to allow movement into the  defect. The surrounding skin was undermined to minimize the tension of closure. Areas of redundant skin were excised to permit closure without creation of standing cone deformities. Hemostasis was achieved by hyfrecation. The flap was sutured into place using:      multiple #3-0 buried interrupted Vicryl suture(s) and    multiple #3-0 simple interrupted Prolene suture(s) and    two #3-0 running locked Prolene suture(s) for final approximation of the wound margins.    Total area of the closure was 18 square centimeters.     The site was photographed following completion of the repair. Final dressing consisted of petrolatum, Telfa and tape.    Estimated blood loss for the total procedure was less than 10 mL.    Total operative time including tissue processing in the Mohs' laboratory and microscopic Mohs' frozen section slide review was 4 hour(s). Verbal and written wound care instructions were given to the patient, and he expressed understanding of these instructions. The patient tolerated the procedure well and left the operating room in good condition; he is to return in 14 days for suture removal.     Dr. Carpio's cell phone number was given to the patient with instructions to call prn with any problems.

## 2019-02-25 ENCOUNTER — PROCEDURE VISIT (OUTPATIENT)
Dept: DERMATOLOGY | Facility: CLINIC | Age: 59
End: 2019-02-25
Payer: COMMERCIAL

## 2019-02-25 VITALS — SYSTOLIC BLOOD PRESSURE: 132 MMHG | HEART RATE: 73 BPM | DIASTOLIC BLOOD PRESSURE: 89 MMHG

## 2019-02-25 DIAGNOSIS — C44.41 BASAL CELL CARCINOMA OF SCALP: Primary | ICD-10-CM

## 2019-02-25 DIAGNOSIS — C44.42 SQUAMOUS CELL CARCINOMA OF SCALP: ICD-10-CM

## 2019-02-25 PROCEDURE — 99499 NO LOS: ICD-10-PCS | Mod: S$GLB,,, | Performed by: DERMATOLOGY

## 2019-02-25 PROCEDURE — 14021 TIS TRNFR S/A/L 10.1-30 SQCM: CPT | Mod: 51,S$GLB,, | Performed by: DERMATOLOGY

## 2019-02-25 PROCEDURE — 17312: ICD-10-PCS | Mod: S$GLB,,, | Performed by: DERMATOLOGY

## 2019-02-25 PROCEDURE — 17312 MOHS ADDL STAGE: CPT | Mod: S$GLB,,, | Performed by: DERMATOLOGY

## 2019-02-25 PROCEDURE — 17311 MOHS 1 STAGE H/N/HF/G: CPT | Mod: S$GLB,,, | Performed by: DERMATOLOGY

## 2019-02-25 PROCEDURE — 17311: ICD-10-PCS | Mod: S$GLB,,, | Performed by: DERMATOLOGY

## 2019-02-25 PROCEDURE — 99499 UNLISTED E&M SERVICE: CPT | Mod: S$GLB,,, | Performed by: DERMATOLOGY

## 2019-02-25 PROCEDURE — 14021 PR ADJ TISS XFER SCALP,EXTREM 10.1-30 SQCM: ICD-10-PCS | Mod: 51,S$GLB,, | Performed by: DERMATOLOGY

## 2019-02-28 ENCOUNTER — PATIENT OUTREACH (OUTPATIENT)
Dept: OTHER | Facility: OTHER | Age: 59
End: 2019-02-28

## 2019-03-01 NOTE — PROGRESS NOTES
Last 5 Patient Entered Readings                                      Current 30 Day Average:      Recent Readings 12/17/2018 12/12/2018 12/10/2018 12/9/2018 12/8/2018    SBP (mmHg) 131 128 134 128 147    DBP (mmHg) 85 85 95 80 85    Pulse 70 73 70 68 63          Sent [employer] message requesting more readings.

## 2019-03-05 ENCOUNTER — PATIENT MESSAGE (OUTPATIENT)
Dept: INTERNAL MEDICINE | Facility: CLINIC | Age: 59
End: 2019-03-05

## 2019-03-06 RX ORDER — AMOXICILLIN AND CLAVULANATE POTASSIUM 875; 125 MG/1; MG/1
1 TABLET, FILM COATED ORAL 2 TIMES DAILY
Qty: 20 TABLET | Refills: 0 | Status: SHIPPED | OUTPATIENT
Start: 2019-03-06 | End: 2019-03-16

## 2019-03-08 ENCOUNTER — OFFICE VISIT (OUTPATIENT)
Dept: FAMILY MEDICINE | Facility: CLINIC | Age: 59
End: 2019-03-08
Payer: COMMERCIAL

## 2019-03-08 VITALS
RESPIRATION RATE: 18 BRPM | OXYGEN SATURATION: 97 % | WEIGHT: 232.06 LBS | DIASTOLIC BLOOD PRESSURE: 86 MMHG | TEMPERATURE: 99 F | HEIGHT: 73 IN | BODY MASS INDEX: 30.76 KG/M2 | HEART RATE: 83 BPM | SYSTOLIC BLOOD PRESSURE: 112 MMHG

## 2019-03-08 DIAGNOSIS — J20.9 ACUTE BRONCHITIS, UNSPECIFIED ORGANISM: Primary | ICD-10-CM

## 2019-03-08 PROCEDURE — 3079F PR MOST RECENT DIASTOLIC BLOOD PRESSURE 80-89 MM HG: ICD-10-PCS | Mod: CPTII,S$GLB,, | Performed by: NURSE PRACTITIONER

## 2019-03-08 PROCEDURE — 3074F SYST BP LT 130 MM HG: CPT | Mod: CPTII,S$GLB,, | Performed by: NURSE PRACTITIONER

## 2019-03-08 PROCEDURE — 99999 PR PBB SHADOW E&M-EST. PATIENT-LVL IV: ICD-10-PCS | Mod: PBBFAC,,, | Performed by: NURSE PRACTITIONER

## 2019-03-08 PROCEDURE — 3074F PR MOST RECENT SYSTOLIC BLOOD PRESSURE < 130 MM HG: ICD-10-PCS | Mod: CPTII,S$GLB,, | Performed by: NURSE PRACTITIONER

## 2019-03-08 PROCEDURE — 99213 PR OFFICE/OUTPT VISIT, EST, LEVL III, 20-29 MIN: ICD-10-PCS | Mod: S$GLB,,, | Performed by: NURSE PRACTITIONER

## 2019-03-08 PROCEDURE — 3008F PR BODY MASS INDEX (BMI) DOCUMENTED: ICD-10-PCS | Mod: CPTII,S$GLB,, | Performed by: NURSE PRACTITIONER

## 2019-03-08 PROCEDURE — 3079F DIAST BP 80-89 MM HG: CPT | Mod: CPTII,S$GLB,, | Performed by: NURSE PRACTITIONER

## 2019-03-08 PROCEDURE — 99213 OFFICE O/P EST LOW 20 MIN: CPT | Mod: S$GLB,,, | Performed by: NURSE PRACTITIONER

## 2019-03-08 PROCEDURE — 99999 PR PBB SHADOW E&M-EST. PATIENT-LVL IV: CPT | Mod: PBBFAC,,, | Performed by: NURSE PRACTITIONER

## 2019-03-08 PROCEDURE — 3008F BODY MASS INDEX DOCD: CPT | Mod: CPTII,S$GLB,, | Performed by: NURSE PRACTITIONER

## 2019-03-08 RX ORDER — PROMETHAZINE HYDROCHLORIDE AND CODEINE PHOSPHATE 6.25; 1 MG/5ML; MG/5ML
SOLUTION ORAL
Qty: 180 ML | Refills: 0 | Status: SHIPPED | OUTPATIENT
Start: 2019-03-08 | End: 2019-06-03 | Stop reason: ALTCHOICE

## 2019-03-08 RX ORDER — DEXTROMETHORPHAN HBR. AND GUAIFENESIN 10; 100 MG/5ML; MG/5ML
10 SOLUTION ORAL EVERY 4 HOURS PRN
Qty: 237 ML | Refills: 0 | Status: SHIPPED | OUTPATIENT
Start: 2019-03-08 | End: 2019-06-03 | Stop reason: ALTCHOICE

## 2019-03-08 NOTE — PATIENT INSTRUCTIONS
Bronchitis, Antibiotic Treatment (Adult)    Bronchitis is an infection of the air passages (bronchial tubes) in your lungs. It often occurs when you have a cold. This illness is contagious during the first few days and is spread through the air by coughing and sneezing, or by direct contact (touching the sick person and then touching your own eyes, nose, or mouth).  Symptoms of bronchitis include cough with mucus (phlegm) and low-grade fever. Bronchitis usually lasts 7 to 14 days. Mild cases can be treated with simple home remedies. More severe infection is treated with an antibiotic.  Home care  Follow these guidelines when caring for yourself at home:  · If your symptoms are severe, rest at home for the first 2 to 3 days. When you go back to your usual activities, don't let yourself get too tired.  · Do not smoke. Also avoid being exposed to secondhand smoke.  · You may use over-the-counter medicines to control fever or pain, unless another medicine was prescribed. (Note: If you have chronic liver or kidney disease or have ever had a stomach ulcer or gastrointestinal bleeding, talk with your healthcare provider before using these medicines. Also talk to your provider if you are taking medicine to prevent blood clots.) Aspirin should never be given to anyone younger than 18 years of age who is ill with a viral infection or fever. It may cause severe liver or brain damage.  · Your appetite may be poor, so a light diet is fine. Avoid dehydration by drinking 6 to 8 glasses of fluids per day (such as water, soft drinks, sports drinks, juices, tea, or soup). Extra fluids will help loosen secretions in the nose and lungs.  · Over-the-counter cough, cold, and sore-throat medicines will not shorten the length of the illness, but they may be helpful to reduce symptoms. (Note: Do not use decongestants if you have high blood pressure.)  · Finish all antibiotic medicine. Do this even if you are feeling better after only a  few days.  Follow-up care  Follow up with your healthcare provider, or as advised. If you had an X-ray or ECG (electrocardiogram), a specialist will review it. You will be notified of any new findings that may affect your care.  Note: If you are age 65 or older, or if you have a chronic lung disease or condition that affects your immune system, or you smoke, talk to your healthcare provider about having pneumococcal vaccinations and a yearly influenza vaccination (flu shot).  When to seek medical advice  Call your healthcare provider right away if any of these occur:  · Fever of 100.4°F (38°C) or higher  · Coughing up increased amounts of colored sputum  · Weakness, drowsiness, headache, facial pain, ear pain, or a stiff neck  Call 911, or get immediate medical care  Contact emergency services right away if any of these occur.  · Coughing up blood  · Worsening weakness, drowsiness, headache, or stiff neck  · Trouble breathing, wheezing, or pain with breathing  Date Last Reviewed: 9/13/2015  © 1626-5508 [employer]. [address] [place] [address], [place] [address]. All rights reserved. This information is not intended as a substitute for professional medical care. Always follow your healthcare professional's instructions.

## 2019-03-08 NOTE — PROGRESS NOTES
Subjective:       Patient ID: Joseph Jesus is a 59 y.o. male.    Chief Complaint: Cough; URI; Sore Throat; and Nausea    ###URGENT CARE VISIT - PATIENT OF DR. TIJERINA###          URI    This is a new problem. Episode onset: 2/26/2019 - ON DAY 11 OF SYMPTOMS. The problem has been gradually worsening. There has been no fever. Associated symptoms include congestion, coughing, headaches, nausea, rhinorrhea, sinus pain and a sore throat. Pertinent negatives include no abdominal pain, chest pain, diarrhea, dysuria, ear pain, neck pain, rash, sneezing or vomiting. Associated symptoms comments: Nasal discharge is light yellow but cough is worsening with thick green mucus out and worse at night with lying down - symptoms > 10 days and worsening.  No fever. Treatments tried: robitussin cf; expectorant  The treatment provided mild relief.       Current Outpatient Medications   Medication Sig Dispense Refill    atorvastatin (LIPITOR) 10 MG tablet Take 1 tablet by mouth once daily. 90 tablet 1    cetirizine (ZYRTEC) 10 MG tablet Take 10 mg by mouth once daily.      coenzyme Q10 100 mg capsule Take 200 mg by mouth once daily.      irbesartan (AVAPRO) 300 MG tablet TAKE 1 TABLET (300 mg) BY MOUTH DAILY 90 tablet 1    meloxicam (MOBIC) 15 MG tablet TAKE 1 TABLET BY MOUTH once DAILY 30 tablet 1    amoxicillin-clavulanate 875-125mg (AUGMENTIN) 875-125 mg per tablet Take 1 tablet by mouth 2 (two) times daily. for 10 days 20 tablet 0     No current facility-administered medications for this visit.        Past Medical History:   Diagnosis Date    Allergy     seasonal    Anal fistula hx    Arthritis     Basal cell carcinoma 4/2013    left superior forehead    Callus     Diverticulosis     Epididymal cyst     Hayfever     Hydrocele, right     Hyperlipidemia     Hypertension     Neck pain     hx of muscular inury from weight lifting---resolved now    Prostatitis     Dec. '12-treated with antibx.    Visual impairment         Past Surgical History:   Procedure Laterality Date    APPENDECTOMY  1962    COLONOSCOPY  12/2012    due for repeat 12/2015    COLONOSCOPY N/A 5/19/2016    Performed by James Webber MD at [facility] (4TH FLR)    COLONOSCOPY N/A 12/13/2012    Performed by James [facility]ber [facility] at [facility] (4TH FLR)    EUA/Fistulotomy-'08      HERNIA REPAIR      [facility] with mesh    Hydrocelectomy  2013    HYDROCELECTOMY Right 5/21/2013    Performed by Lenny Ewing MD at [facility] 2ND FLR    MASS EXCISION  2004    BCC removal (twice)    MOLE REMOVAL  02/25/2019    2 moles removed from scalp =- 1 was basal cell and 1 was squamous cell - dermatology - Dr. londono    REPAIR, HERNIA, INGUINAL, BILATERAL, LAPAROSCOPIC Bilateral 1/9/2013    Performed by Oleg [facility]jandro MD at [facility] 2[facility]    right Spermatocelectomy  2013       Family History   Problem Relation Age of Onset    Skin cancer Mother     Hypertension Mother     Skin cancer Father     Cancer Father         prostate cancer    Hypertension Father     Hypertension Maternal Grandmother     Hypertension Maternal Grandfather     Hypertension Paternal Grandmother     Anesthesia problems Paternal Grandmother     Cancer Paternal Grandfather         prostate cancer    Hypertension Paternal Grandfather     Heart disease Paternal Grandfather     Diabetes Neg Hx     Colon polyps Neg Hx     Colon cancer Neg Hx     Melanoma Neg Hx     Psoriasis Neg Hx     Lupus Neg Hx     Eczema Neg Hx     Acne Neg Hx     Cirrhosis Neg Hx     Prostate cancer Neg Hx     Kidney disease Neg Hx        Social History     Socioeconomic History    Marital status:      Spouse name: Traci    Number of children: 1    Years of education: None    Highest education level: None   Social Needs    Financial resource strain: None    Food insecurity - worry: None    Food insecurity - inability: None    Transportation needs - medical: None    Transportation  needs - non-medical: None   Occupational History    Occupation: [employer]     Employer: [employer]     Comment: [employer]   Tobacco Use    Smoking status: Former Smoker     Packs/day: 1.00     Years: 10.00     Pack years: 10.00     Last attempt to quit: 1987     Years since quittin.2    Smokeless tobacco: Never Used   Substance and Sexual Activity    Alcohol use: Yes     Alcohol/week: 0.6 oz     Types: 1 Glasses of wine per week     Comment: socially    Drug use: No    Sexual activity: Yes     Partners: Female   Other Topics Concern    None   Social History Narrative    None       Review of Systems   Constitutional: Positive for fatigue. Negative for activity change, appetite change, fever and unexpected weight change.   HENT: Positive for congestion, postnasal drip, rhinorrhea, sinus pressure, sinus pain and sore throat. Negative for ear pain, mouth sores, nosebleeds, sneezing, trouble swallowing and voice change.    Eyes: Negative.    Respiratory: Positive for cough. Negative for chest tightness and shortness of breath.    Cardiovascular: Negative for chest pain, palpitations and leg swelling.   Gastrointestinal: Positive for nausea. Negative for abdominal pain, blood in stool, constipation, diarrhea and vomiting.   Endocrine: Negative.    Genitourinary: Negative for difficulty urinating, dysuria, flank pain, hematuria and urgency.   Musculoskeletal: Negative for arthralgias, back pain, gait problem, joint swelling, myalgias and neck pain.   Skin: Negative for color change, rash and wound.   Allergic/Immunologic: Negative for immunocompromised state.   Neurological: Positive for headaches. Negative for dizziness, tremors, seizures, syncope and speech difficulty.   Hematological: Negative for adenopathy. Does not bruise/bleed easily.   Psychiatric/Behavioral: Negative for behavioral problems, dysphoric mood, sleep disturbance and suicidal ideas. The patient is not nervous/anxious.       "    Objective:     Vitals:    03/08/19 1538   BP: 112/86   BP Location: Right arm   Patient Position: Sitting   BP Method: Large (Manual)   Pulse: 83   Resp: 18   Temp: 98.7 °F (37.1 °C)   TempSrc: Oral   SpO2: 97%   Weight: 105.3 kg (232 lb 0.6 oz)   Height: 6' 1" (1.854 m)          Physical Exam   Constitutional: He is oriented to person, place, and time. He appears well-developed and well-nourished.   HENT:   Head: Normocephalic.   Right Ear: External ear normal.   Left Ear: External ear normal.   Nose: Mucosal edema and rhinorrhea present.   Mouth/Throat: Posterior oropharyngeal erythema present. No oropharyngeal exudate or posterior oropharyngeal edema.   Eyes: EOM are normal. Pupils are equal, round, and reactive to light. Right eye exhibits no discharge. Left eye exhibits no discharge. No scleral icterus.   Neck: Normal range of motion. Neck supple. No tracheal deviation present. No thyromegaly present.   Cardiovascular: Normal rate, regular rhythm and normal heart sounds.   No murmur heard.  Pulmonary/Chest: Effort normal and breath sounds normal. No tachypnea. No respiratory distress. He has no decreased breath sounds. He has no wheezes. He has no rhonchi. He has no rales.   + cough exacerbated with deep inspiration.   Abdominal: Soft. He exhibits no distension.   Musculoskeletal: Normal range of motion. He exhibits no edema.   Lymphadenopathy:     He has no cervical adenopathy.   Neurological: He is alert and oriented to person, place, and time. Coordination normal.   Skin: Skin is warm and dry. No rash noted.   Psychiatric: He has a normal mood and affect. His behavior is normal.         Assessment:         ICD-10-CM ICD-9-CM   1. Acute bronchitis, unspecified organism J20.9 466.0       Plan:       Acute bronchitis, unspecified organism  -  Symptoms over 10 days and worsening - will treat as secondary bacterial bronchitis.  Patient already has an Augmentin prescription that was prescribed for " diverticulitis flare that he did not  because the diarrhea symptoms resolves but advised to go ahead and fill medication and take for 10 days.  Take the Robitussin DM during the day and the codeine medication for at night.  See handout for other home care instructions.  -     dextromethorphan-guaifenesin (ADULT ROBITUSSIN PEAK COLD DM)  mg/5 mL Liqd; Take 10 mLs by mouth every 4 (four) hours as needed.  Dispense: 237 mL; Refill: 0  -     promethazine-codeine 6.25-10 mg/5 ml (PHENERGAN WITH CODEINE) 6.25-10 mg/5 mL syrup; Take 1 to 2 teaspoons at night as needed for cough/congestion  Dispense: 180 mL; Refill: 0      Follow-up if symptoms worsen or fail to improve.     Patient's Medications   New Prescriptions    DEXTROMETHORPHAN-GUAIFENESIN (ADULT ROBITUSSIN PEAK COLD DM)  MG/5 ML LIQD    Take 10 mLs by mouth every 4 (four) hours as needed.    PROMETHAZINE-CODEINE 6.25-10 MG/5 ML (PHENERGAN WITH CODEINE) 6.25-10 MG/5 ML SYRUP    Take 1 to 2 teaspoons at night as needed for cough/congestion   Previous Medications    AMOXICILLIN-CLAVULANATE 875-125MG (AUGMENTIN) 875-125 MG PER TABLET    Take 1 tablet by mouth 2 (two) times daily. for 10 days    ATORVASTATIN (LIPITOR) 10 MG TABLET    Take 1 tablet by mouth once daily.    CETIRIZINE (ZYRTEC) 10 MG TABLET    Take 10 mg by mouth once daily.    COENZYME Q10 100 MG CAPSULE    Take 200 mg by mouth once daily.    IRBESARTAN (AVAPRO) 300 MG TABLET    TAKE 1 TABLET (300 mg) BY MOUTH DAILY    MELOXICAM (MOBIC) 15 MG TABLET    TAKE 1 TABLET BY MOUTH once DAILY   Modified Medications    No medications on file   Discontinued Medications    No medications on file

## 2019-03-11 ENCOUNTER — OFFICE VISIT (OUTPATIENT)
Dept: DERMATOLOGY | Facility: CLINIC | Age: 59
End: 2019-03-11
Payer: COMMERCIAL

## 2019-03-11 DIAGNOSIS — Z48.02 VISIT FOR SUTURE REMOVAL: Primary | ICD-10-CM

## 2019-03-11 PROCEDURE — 99024 POSTOP FOLLOW-UP VISIT: CPT | Mod: S$GLB,,, | Performed by: DERMATOLOGY

## 2019-03-11 PROCEDURE — 99024 PR POST-OP FOLLOW-UP VISIT: ICD-10-PCS | Mod: S$GLB,,, | Performed by: DERMATOLOGY

## 2019-03-11 PROCEDURE — 99999 PR PBB SHADOW E&M-EST. PATIENT-LVL II: ICD-10-PCS | Mod: PBBFAC,,, | Performed by: DERMATOLOGY

## 2019-03-11 PROCEDURE — 99999 PR PBB SHADOW E&M-EST. PATIENT-LVL II: CPT | Mod: PBBFAC,,, | Performed by: DERMATOLOGY

## 2019-03-11 NOTE — PROGRESS NOTES
CC: 59 y.o.male patient is here for suture removal.     HPI: Patient is one week(s) s/p Mohs' micrographic surgery, fresh tissue technique of a Basal Cell Carcinoma on the scalp, with subsequent repair   Patient reports no problems.    EXAM:  Sutures intact.  Wound healing well.  Good approximation of skin edges.  No undue erythema to surrounding skin or signs or symptoms of infection.    IMPRESSION:  Healing well post Mohs' micrographic surgery and repair    PLAN:  Site cleaned with peroxide, sutures removed  Dressed with petrolatum   Reviewed further care and expected course  Followup to Dr. Melgar in 3-4 months; PRN to me

## 2019-03-14 NOTE — PROGRESS NOTES
"Last 5 Patient Entered Readings                                      Current 30 Day Average:      Recent Readings 12/17/2018 12/12/2018 12/10/2018 12/9/2018 12/8/2018    SBP (mmHg) 131 128 134 128 147    DBP (mmHg) 85 85 95 80 85    Pulse 70 73 70 68 63          Digital Medicine: Health  Follow Up    Lifestyle Modifications:    1.Dietary Modifications (Sodium intake <2,000mg/day, food labels, dining out): Pt states that he has started making some dietary changes by following the DASH diet. Praised pt for making this change and encouraged to continue.     2.Physical Activity: Pt states that he is still recovering from hand surgery, but that he plans to get back in the gym once his stiches are healed. Encouraged pt to follow through with this plan.     3.Medication Therapy: Patient has been compliant with the medication regimen.    4.Patient has the following medication side effects/concerns: Pt states that he will take a BP reading today and that he has started trying to "get healthy again".  (Frequency/Alleviating factors/Precipitating factors, etc.)     Follow up with Mr. Joseph Jesus completed. No further questions or concerns. Will continue to follow up to achieve health goals.  "

## 2019-04-17 ENCOUNTER — PATIENT OUTREACH (OUTPATIENT)
Dept: OTHER | Facility: OTHER | Age: 59
End: 2019-04-17

## 2019-04-17 NOTE — PROGRESS NOTES
Last 5 Patient Entered Readings                                      Current 30 Day Average: 125/85     Recent Readings 4/17/2019 4/16/2019 4/13/2019 4/12/2019 4/7/2019    SBP (mmHg) 120 113 114 130 148    DBP (mmHg) 83 76 79 91 97    Pulse 58 93 75 76 69          Digital Medicine: Health  Follow Up    Lifestyle Modifications:    1.Dietary Modifications (Sodium intake <2,000mg/day, food labels, dining out): Pt reports no change in diet. Encouraged to continue to aim for <2000 mg of sodium per day.     2.Physical Activity: Has been exercising regularly since last encounter and reports an 8# wt loss. Congratulated pt for this accomplishment and encouraged to continue with this routine.     3.Medication Therapy: Patient has been compliant with the medication regimen.    4.Patient has the following medication side effects/concerns: None.  (Frequency/Alleviating factors/Precipitating factors, etc.)     Follow up with Mr. Joseph Jesus completed. No further questions or concerns. Will continue to follow up to achieve health goals.

## 2019-05-01 ENCOUNTER — PATIENT MESSAGE (OUTPATIENT)
Dept: INTERNAL MEDICINE | Facility: CLINIC | Age: 59
End: 2019-05-01

## 2019-05-01 NOTE — TELEPHONE ENCOUNTER
spoke with pt and pt denied uc appt   Pt stated he will just take his chances he don't believe he has diverticulitis now he stated he believes it was just a scare   Pt stated he will call back to schedule an appt with MD

## 2019-05-06 ENCOUNTER — OFFICE VISIT (OUTPATIENT)
Dept: INTERNAL MEDICINE | Facility: CLINIC | Age: 59
End: 2019-05-06
Payer: COMMERCIAL

## 2019-05-06 ENCOUNTER — TELEPHONE (OUTPATIENT)
Dept: INTERNAL MEDICINE | Facility: CLINIC | Age: 59
End: 2019-05-06

## 2019-05-06 VITALS
OXYGEN SATURATION: 99 % | SYSTOLIC BLOOD PRESSURE: 98 MMHG | HEIGHT: 73 IN | WEIGHT: 226.63 LBS | BODY MASS INDEX: 30.04 KG/M2 | DIASTOLIC BLOOD PRESSURE: 66 MMHG | HEART RATE: 63 BPM

## 2019-05-06 DIAGNOSIS — R19.5 LOOSE STOOLS: ICD-10-CM

## 2019-05-06 DIAGNOSIS — Z12.5 PROSTATE CANCER SCREENING: ICD-10-CM

## 2019-05-06 DIAGNOSIS — J06.9 UPPER RESPIRATORY TRACT INFECTION, UNSPECIFIED TYPE: Primary | ICD-10-CM

## 2019-05-06 DIAGNOSIS — Z00.00 ANNUAL PHYSICAL EXAM: Primary | ICD-10-CM

## 2019-05-06 DIAGNOSIS — E78.5 HYPERLIPIDEMIA, UNSPECIFIED HYPERLIPIDEMIA TYPE: ICD-10-CM

## 2019-05-06 DIAGNOSIS — I10 HYPERTENSION, ESSENTIAL: ICD-10-CM

## 2019-05-06 PROCEDURE — 99213 PR OFFICE/OUTPT VISIT, EST, LEVL III, 20-29 MIN: ICD-10-PCS | Mod: S$GLB,,, | Performed by: INTERNAL MEDICINE

## 2019-05-06 PROCEDURE — 99999 PR PBB SHADOW E&M-EST. PATIENT-LVL III: CPT | Mod: PBBFAC,,, | Performed by: INTERNAL MEDICINE

## 2019-05-06 PROCEDURE — 3008F BODY MASS INDEX DOCD: CPT | Mod: CPTII,S$GLB,, | Performed by: INTERNAL MEDICINE

## 2019-05-06 PROCEDURE — 3074F SYST BP LT 130 MM HG: CPT | Mod: CPTII,S$GLB,, | Performed by: INTERNAL MEDICINE

## 2019-05-06 PROCEDURE — 99999 PR PBB SHADOW E&M-EST. PATIENT-LVL III: ICD-10-PCS | Mod: PBBFAC,,, | Performed by: INTERNAL MEDICINE

## 2019-05-06 PROCEDURE — 3078F DIAST BP <80 MM HG: CPT | Mod: CPTII,S$GLB,, | Performed by: INTERNAL MEDICINE

## 2019-05-06 PROCEDURE — 3008F PR BODY MASS INDEX (BMI) DOCUMENTED: ICD-10-PCS | Mod: CPTII,S$GLB,, | Performed by: INTERNAL MEDICINE

## 2019-05-06 PROCEDURE — 99213 OFFICE O/P EST LOW 20 MIN: CPT | Mod: S$GLB,,, | Performed by: INTERNAL MEDICINE

## 2019-05-06 PROCEDURE — 3078F PR MOST RECENT DIASTOLIC BLOOD PRESSURE < 80 MM HG: ICD-10-PCS | Mod: CPTII,S$GLB,, | Performed by: INTERNAL MEDICINE

## 2019-05-06 PROCEDURE — 3074F PR MOST RECENT SYSTOLIC BLOOD PRESSURE < 130 MM HG: ICD-10-PCS | Mod: CPTII,S$GLB,, | Performed by: INTERNAL MEDICINE

## 2019-05-06 RX ORDER — CODEINE PHOSPHATE AND GUAIFENESIN 10; 100 MG/5ML; MG/5ML
5 SOLUTION ORAL 3 TIMES DAILY PRN
Qty: 236 ML | Refills: 0 | Status: SHIPPED | OUTPATIENT
Start: 2019-05-06 | End: 2019-05-16

## 2019-05-06 RX ORDER — ATORVASTATIN CALCIUM 10 MG/1
TABLET, FILM COATED ORAL
Qty: 90 TABLET | Refills: 1 | Status: SHIPPED | OUTPATIENT
Start: 2019-05-06 | End: 2019-10-30 | Stop reason: SDUPTHER

## 2019-05-06 RX ORDER — BENZONATATE 200 MG/1
200 CAPSULE ORAL 2 TIMES DAILY PRN
Qty: 20 CAPSULE | Refills: 0 | Status: SHIPPED | OUTPATIENT
Start: 2019-05-06 | End: 2019-05-16

## 2019-05-06 NOTE — TELEPHONE ENCOUNTER
Please call patient and schedule patient for an appointment with me or Kaye today or tomorrow. Thank you.

## 2019-05-06 NOTE — Clinical Note
Abdon Pablo, he wanted me to let you know that he has been working on losing weight and has lost 10 pounds! Brayan

## 2019-05-06 NOTE — PROGRESS NOTES
"Subjective:       Patient ID: Joseph Jesus is a 59 y.o. male.    Chief Complaint: Nasal Congestion (chest congestion ) and Abdominal Pain    Here for urgent care -- chest cold symptoms and "ongoing" diverticular symptoms -- 1 week loose stools and mild cramping.    Chest symptoms -- worse at noc, taking mucinex and robitussin. Also taking codeine. Symptoms currently for 2 weeks. 3 grand kids sick as well, one recent inpt for RSV.    Review of Systems   Constitutional: Negative for activity change and fever.   HENT: Positive for congestion.    Respiratory: Positive for cough. Negative for chest tightness and shortness of breath.    Cardiovascular: Negative for chest pain, palpitations and leg swelling.   Gastrointestinal: Positive for diarrhea. Negative for anal bleeding, blood in stool, nausea and vomiting.        A few months of loose stools   Skin: Negative for rash.       Objective:      Physical Exam   Constitutional: He appears well-developed and well-nourished. No distress.   HENT:   Head: Normocephalic and atraumatic.   Mouth/Throat: No oropharyngeal exudate.   TMs clear, sinuses nontender, nasal mucosa w/o purulence.   Eyes: Pupils are equal, round, and reactive to light. EOM are normal. No scleral icterus.   Neck: Normal range of motion. Neck supple. No thyromegaly present.   Cardiovascular: Normal rate and regular rhythm.   Pulmonary/Chest: Effort normal and breath sounds normal. No respiratory distress. He has no wheezes. He has no rales.   Lymphadenopathy:     He has no cervical adenopathy.   Skin: He is not diaphoretic.   Psychiatric: He has a normal mood and affect. His behavior is normal.       Assessment:       1. Upper respiratory tract infection, unspecified type    2. Loose stools        Plan:       Joseph was seen today for nasal congestion and abdominal pain.    Diagnoses and all orders for this visit:    Upper respiratory tract infection, unspecified type  -     guaifenesin-codeine 100-10 mg/5 " ml (TUSSI-ORGANIDIN NR)  mg/5 mL syrup; Take 5 mLs by mouth 3 (three) times daily as needed for Cough.  -     benzonatate (TESSALON) 200 MG capsule; Take 1 capsule (200 mg total) by mouth 2 (two) times daily as needed.  Explained that if not better in 1-2 weeks, pt should rtc/call PCP          Loose stools  -     Stool culture; Future  -     Stool Exam-Ova,Cysts,Parasites; Future        Health Maintenance       Date Due Completion Date    Pneumococcal Vaccine (Highest Risk) (1 of 3 - PCV13) [birth year]/07/1977 ---    Shingles Vaccine (1 of 2) 03/07/2008 ---    Influenza Vaccine 08/01/2019 10/25/2018    Lipid Panel 02/03/2023 2/3/2018    Colonoscopy 05/19/2026 5/19/2016    TETANUS VACCINE 02/05/2028 2/5/2018 (Done)    Override on 2/5/2018: Done          No follow-ups on file.    Future Appointments   Date Time Provider Department Center   5/30/2019  7:00 AM SAME DAY LAB, [facility]   5/30/2019  2:30 PM Diane Melgar MD [facility] [address]   6/3/2019  3:40 PM Nick Trejo MD [facility] [address] [facility]

## 2019-05-11 ENCOUNTER — PATIENT MESSAGE (OUTPATIENT)
Dept: INTERNAL MEDICINE | Facility: CLINIC | Age: 59
End: 2019-05-11

## 2019-05-11 RX ORDER — MELOXICAM 15 MG/1
TABLET ORAL
Qty: 30 TABLET | Refills: 0 | OUTPATIENT
Start: 2019-05-11

## 2019-05-14 ENCOUNTER — PATIENT MESSAGE (OUTPATIENT)
Dept: INTERNAL MEDICINE | Facility: CLINIC | Age: 59
End: 2019-05-14

## 2019-05-19 RX ORDER — MELOXICAM 15 MG/1
15 TABLET ORAL DAILY
Qty: 30 TABLET | Refills: 0 | Status: SHIPPED | OUTPATIENT
Start: 2019-05-19 | End: 2019-06-17 | Stop reason: SDUPTHER

## 2019-05-30 ENCOUNTER — OFFICE VISIT (OUTPATIENT)
Dept: DERMATOLOGY | Facility: CLINIC | Age: 59
End: 2019-05-30
Payer: COMMERCIAL

## 2019-05-30 ENCOUNTER — LAB VISIT (OUTPATIENT)
Dept: LAB | Facility: HOSPITAL | Age: 59
End: 2019-05-30
Attending: FAMILY MEDICINE
Payer: COMMERCIAL

## 2019-05-30 DIAGNOSIS — D22.9 MULTIPLE BENIGN NEVI: ICD-10-CM

## 2019-05-30 DIAGNOSIS — L82.1 SEBORRHEIC KERATOSIS: ICD-10-CM

## 2019-05-30 DIAGNOSIS — E78.5 HYPERLIPIDEMIA, UNSPECIFIED HYPERLIPIDEMIA TYPE: ICD-10-CM

## 2019-05-30 DIAGNOSIS — Z12.5 PROSTATE CANCER SCREENING: ICD-10-CM

## 2019-05-30 DIAGNOSIS — L57.0 AK (ACTINIC KERATOSIS): Primary | ICD-10-CM

## 2019-05-30 DIAGNOSIS — I10 HYPERTENSION, ESSENTIAL: ICD-10-CM

## 2019-05-30 DIAGNOSIS — D18.00 ANGIOMA: ICD-10-CM

## 2019-05-30 DIAGNOSIS — Z85.828 HISTORY OF NONMELANOMA SKIN CANCER: ICD-10-CM

## 2019-05-30 DIAGNOSIS — D23.9 BLUE NEVUS: ICD-10-CM

## 2019-05-30 DIAGNOSIS — Z00.00 ANNUAL PHYSICAL EXAM: ICD-10-CM

## 2019-05-30 LAB
ALBUMIN SERPL BCP-MCNC: 4.1 G/DL (ref 3.5–5.2)
ALP SERPL-CCNC: 58 U/L (ref 55–135)
ALT SERPL W/O P-5'-P-CCNC: 89 U/L (ref 10–44)
ANION GAP SERPL CALC-SCNC: 7 MMOL/L (ref 8–16)
AST SERPL-CCNC: 44 U/L (ref 10–40)
BILIRUB SERPL-MCNC: 0.7 MG/DL (ref 0.1–1)
BUN SERPL-MCNC: 20 MG/DL (ref 8–23)
CALCIUM SERPL-MCNC: 9.8 MG/DL (ref 8.7–10.5)
CHLORIDE SERPL-SCNC: 109 MMOL/L (ref 95–110)
CHOLEST SERPL-MCNC: 146 MG/DL (ref 120–199)
CHOLEST/HDLC SERPL: 2.9 {RATIO} (ref 2–5)
CO2 SERPL-SCNC: 23 MMOL/L (ref 23–29)
COMPLEXED PSA SERPL-MCNC: 0.29 NG/ML (ref 0–4)
CREAT SERPL-MCNC: 1.2 MG/DL (ref 0.5–1.4)
EST. GFR  (AFRICAN AMERICAN): >60 ML/MIN/1.73 M^2
EST. GFR  (NON AFRICAN AMERICAN): >60 ML/MIN/1.73 M^2
GLUCOSE SERPL-MCNC: 79 MG/DL (ref 70–110)
HDLC SERPL-MCNC: 50 MG/DL (ref 40–75)
HDLC SERPL: 34.2 % (ref 20–50)
LDLC SERPL CALC-MCNC: 81 MG/DL (ref 63–159)
NONHDLC SERPL-MCNC: 96 MG/DL
POTASSIUM SERPL-SCNC: 3.9 MMOL/L (ref 3.5–5.1)
PROT SERPL-MCNC: 6.8 G/DL (ref 6–8.4)
SODIUM SERPL-SCNC: 139 MMOL/L (ref 136–145)
TRIGL SERPL-MCNC: 75 MG/DL (ref 30–150)

## 2019-05-30 PROCEDURE — 99213 OFFICE O/P EST LOW 20 MIN: CPT | Mod: 25,S$GLB,, | Performed by: DERMATOLOGY

## 2019-05-30 PROCEDURE — 17003 DESTRUCTION, PREMALIGNANT LESIONS; SECOND THROUGH 14 LESIONS: ICD-10-PCS | Mod: S$GLB,,, | Performed by: DERMATOLOGY

## 2019-05-30 PROCEDURE — 80053 COMPREHEN METABOLIC PANEL: CPT

## 2019-05-30 PROCEDURE — 36415 COLL VENOUS BLD VENIPUNCTURE: CPT

## 2019-05-30 PROCEDURE — 99213 PR OFFICE/OUTPT VISIT, EST, LEVL III, 20-29 MIN: ICD-10-PCS | Mod: 25,S$GLB,, | Performed by: DERMATOLOGY

## 2019-05-30 PROCEDURE — 84153 ASSAY OF PSA TOTAL: CPT

## 2019-05-30 PROCEDURE — 17000 DESTRUCT PREMALG LESION: CPT | Mod: S$GLB,,, | Performed by: DERMATOLOGY

## 2019-05-30 PROCEDURE — 99999 PR PBB SHADOW E&M-EST. PATIENT-LVL II: ICD-10-PCS | Mod: PBBFAC,,, | Performed by: DERMATOLOGY

## 2019-05-30 PROCEDURE — 99999 PR PBB SHADOW E&M-EST. PATIENT-LVL II: CPT | Mod: PBBFAC,,, | Performed by: DERMATOLOGY

## 2019-05-30 PROCEDURE — 80061 LIPID PANEL: CPT

## 2019-05-30 PROCEDURE — 17003 DESTRUCT PREMALG LES 2-14: CPT | Mod: S$GLB,,, | Performed by: DERMATOLOGY

## 2019-05-30 PROCEDURE — 17000 PR DESTRUCTION(LASER SURGERY,CRYOSURGERY,CHEMOSURGERY),PREMALIGNANT LESIONS,FIRST LESION: ICD-10-PCS | Mod: S$GLB,,, | Performed by: DERMATOLOGY

## 2019-05-30 NOTE — PROGRESS NOTES
Subjective:       Patient ID:  Joseph Jesus is a 59 y.o. male who presents for   Chief Complaint   Patient presents with    Skin Check     UBSE recheck scalp prev MOHS     History of Present Illness: The patient presents for follow up of skin check.    The patient was last seen on: 9/11/18 for shave bx scc ant scalp - excised by PWS  This is a high risk patient here to check for the development of new lesions.    Other skin complaints: lesion left thigh x months + darker. No prev treatment        Review of Systems   Skin: Positive for activity-related sunscreen use and wears hat (100%). Negative for itching, rash, dry skin, daily sunscreen use and recent sunburn.   Hematologic/Lymphatic: Does not bruise/bleed easily.        Objective:    Physical Exam   Constitutional: He appears well-developed and well-nourished. No distress.   Neurological: He is alert and oriented to person, place, and time. He is not disoriented.   Psychiatric: He has a normal mood and affect.   Skin:   Areas Examined (abnormalities noted in diagram):   Scalp / Hair Palpated and Inspected  Head / Face Inspection Performed  Neck Inspection Performed  Chest / Axilla Inspection Performed  Back Inspection Performed  RUE Inspected  LUE Inspection Performed                   Diagram Legend     Erythematous scaling macule/papule c/w actinic keratosis       Vascular papule c/w angioma      Pigmented verrucoid papule/plaque c/w seborrheic keratosis      Yellow umbilicated papule c/w sebaceous hyperplasia      Irregularly shaped tan macule c/w lentigo     1-2 mm smooth white papules consistent with Milia      Movable subcutaneous cyst with punctum c/w epidermal inclusion cyst      Subcutaneous movable cyst c/w pilar cyst      Firm pink to brown papule c/w dermatofibroma      Pedunculated fleshy papule(s) c/w skin tag(s)      Evenly pigmented macule c/w junctional nevus     Mildly variegated pigmented, slightly irregular-bordered macule c/w mildly  atypical nevus      Flesh colored to evenly pigmented papule c/w intradermal nevus       Pink pearly papule/plaque c/w basal cell carcinoma      Erythematous hyperkeratotic cursted plaque c/w SCC      Surgical scar with no sign of skin cancer recurrence      Open and closed comedones      Inflammatory papules and pustules      Verrucoid papule consistent consistent with wart     Erythematous eczematous patches and plaques     Dystrophic onycholytic nail with subungual debris c/w onychomycosis     Umbilicated papule    Erythematous-base heme-crusted tan verrucoid plaque consistent with inflamed seborrheic keratosis     Erythematous Silvery Scaling Plaque c/w Psoriasis     See annotation      Assessment / Plan:        AK (actinic keratosis)  Cryosurgery Procedure Note    Verbal consent from the patient is obtained including, but not limited to, risk of hypopigmentation/hyperpigmentation, scar, recurrence of lesion. The patient is aware of the precancerous quality and need for treatment of these lesions. Liquid nitrogen cryosurgery is applied to the 6 actinic keratoses, as detailed in the physical exam, to produce a freeze injury. The patient is aware that blisters may form and is instructed on wound care with gentle cleansing and use of vaseline ointment to keep moist until healed. The patient is supplied a handout on cryosurgery and is instructed to call if lesions do not completely resolve.    Cont wear hat always    Seborrheic keratosis  These are benign inherited growths without a malignant potential. Reassurance given to patient. No treatment is necessary.       Angioma - NP  This is a benign vascular lesion. Reassurance given. No treatment required.       Blue nevus   - stable and chronic      Multiple benign nevi   - stable and chronic    History of nonmelanoma skin cancer  Area(s) of previous NMSC evaluated with no signs of recurrence.    Upper body skin examination performed today including at least 6 points  as noted in physical examination. No lesions suspicious for malignancy noted.               Follow up in about 6 months (around 11/30/2019).

## 2019-06-03 ENCOUNTER — IMMUNIZATION (OUTPATIENT)
Dept: PHARMACY | Facility: CLINIC | Age: 59
End: 2019-06-03
Payer: COMMERCIAL

## 2019-06-03 ENCOUNTER — OFFICE VISIT (OUTPATIENT)
Dept: INTERNAL MEDICINE | Facility: CLINIC | Age: 59
End: 2019-06-03
Attending: FAMILY MEDICINE
Payer: COMMERCIAL

## 2019-06-03 VITALS
WEIGHT: 216 LBS | OXYGEN SATURATION: 99 % | BODY MASS INDEX: 28.63 KG/M2 | DIASTOLIC BLOOD PRESSURE: 84 MMHG | HEIGHT: 73 IN | TEMPERATURE: 98 F | SYSTOLIC BLOOD PRESSURE: 115 MMHG | HEART RATE: 62 BPM

## 2019-06-03 DIAGNOSIS — K76.0 NAFLD (NONALCOHOLIC FATTY LIVER DISEASE): ICD-10-CM

## 2019-06-03 DIAGNOSIS — K57.32 DIVERTICULITIS OF LARGE INTESTINE WITHOUT PERFORATION OR ABSCESS WITHOUT BLEEDING: ICD-10-CM

## 2019-06-03 DIAGNOSIS — E78.5 HYPERLIPIDEMIA, UNSPECIFIED HYPERLIPIDEMIA TYPE: ICD-10-CM

## 2019-06-03 DIAGNOSIS — Z00.00 ANNUAL PHYSICAL EXAM: Primary | ICD-10-CM

## 2019-06-03 DIAGNOSIS — N52.9 ERECTILE DYSFUNCTION, UNSPECIFIED ERECTILE DYSFUNCTION TYPE: ICD-10-CM

## 2019-06-03 DIAGNOSIS — I10 HYPERTENSION, ESSENTIAL: ICD-10-CM

## 2019-06-03 PROCEDURE — 3074F PR MOST RECENT SYSTOLIC BLOOD PRESSURE < 130 MM HG: ICD-10-PCS | Mod: CPTII,S$GLB,, | Performed by: FAMILY MEDICINE

## 2019-06-03 PROCEDURE — 3074F SYST BP LT 130 MM HG: CPT | Mod: CPTII,S$GLB,, | Performed by: FAMILY MEDICINE

## 2019-06-03 PROCEDURE — 3079F PR MOST RECENT DIASTOLIC BLOOD PRESSURE 80-89 MM HG: ICD-10-PCS | Mod: CPTII,S$GLB,, | Performed by: FAMILY MEDICINE

## 2019-06-03 PROCEDURE — 99396 PREV VISIT EST AGE 40-64: CPT | Mod: S$GLB,,, | Performed by: FAMILY MEDICINE

## 2019-06-03 PROCEDURE — 99999 PR PBB SHADOW E&M-EST. PATIENT-LVL IV: ICD-10-PCS | Mod: PBBFAC,,, | Performed by: FAMILY MEDICINE

## 2019-06-03 PROCEDURE — 3079F DIAST BP 80-89 MM HG: CPT | Mod: CPTII,S$GLB,, | Performed by: FAMILY MEDICINE

## 2019-06-03 PROCEDURE — 99396 PR PREVENTIVE VISIT,EST,40-64: ICD-10-PCS | Mod: S$GLB,,, | Performed by: FAMILY MEDICINE

## 2019-06-03 PROCEDURE — 99999 PR PBB SHADOW E&M-EST. PATIENT-LVL IV: CPT | Mod: PBBFAC,,, | Performed by: FAMILY MEDICINE

## 2019-06-03 NOTE — PROGRESS NOTES
Subjective:       Patient ID: Joseph Jesus is a 59 y.o. male.    Chief Complaint: Annual Exam    Established patient for an annual wellness check/physical exam and also chronic disease management. Specific complaints - see dictation and please see ROS.  P, S, Fm, Soc Hx's; Meds, allergies reviewed and reconciled.  Health maintenance file reviewed and addressed items due.    Review of Systems   Constitutional: Negative for chills, fatigue, fever and unexpected weight change.   HENT: Negative for congestion and trouble swallowing.    Eyes: Negative for redness and visual disturbance.   Respiratory: Negative for cough, chest tightness and shortness of breath.    Cardiovascular: Negative for chest pain, palpitations and leg swelling.   Gastrointestinal: Negative for abdominal pain and blood in stool.   Genitourinary: Negative for difficulty urinating and hematuria.   Musculoskeletal: Negative for arthralgias, back pain, gait problem, joint swelling, myalgias and neck pain.   Skin: Negative for color change and rash.   Neurological: Negative for tremors, speech difficulty, weakness, numbness and headaches.   Hematological: Negative for adenopathy. Does not bruise/bleed easily.   Psychiatric/Behavioral: Negative for behavioral problems, confusion and sleep disturbance. The patient is not nervous/anxious.        Objective:      Physical Exam   Constitutional: He appears well-developed and well-nourished.   HENT:   Head: Normocephalic.   Right Ear: Tympanic membrane, external ear and ear canal normal.   Left Ear: Tympanic membrane, external ear and ear canal normal.   Mouth/Throat: Oropharynx is clear and moist.   Eyes: Pupils are equal, round, and reactive to light.   Neck: Normal range of motion. Neck supple. Carotid bruit is not present. No thyromegaly present.   Cardiovascular: Normal rate, regular rhythm, normal heart sounds and intact distal pulses. Exam reveals no gallop and no friction rub.   No murmur  heard.  Pulmonary/Chest: Effort normal and breath sounds normal.   Abdominal: Soft. He exhibits no distension and no mass. There is no tenderness. There is no rebound and no guarding.   Musculoskeletal: Normal range of motion. He exhibits no edema or tenderness.   Lymphadenopathy:     He has no cervical adenopathy.   Neurological: He is alert. He has normal strength. He displays normal reflexes. No cranial nerve deficit or sensory deficit. Coordination and gait normal.   Skin: Skin is warm and dry.   Psychiatric: He has a normal mood and affect. His behavior is normal. Judgment and thought content normal.   Nursing note and vitals reviewed.      Assessment:       1. Annual physical exam    2. Diverticulitis of large intestine without perforation or abscess without bleeding    3. Hypertension, essential    4. Hyperlipidemia, unspecified hyperlipidemia type    5. NAFLD (nonalcoholic fatty liver disease)    6. Erectile dysfunction, unspecified erectile dysfunction type        Plan:     Medication List with Changes/Refills   Current Medications    ATORVASTATIN (LIPITOR) 10 MG TABLET    TAKE 1 TABLET BY MOUTH ONCE DAILY.    CETIRIZINE (ZYRTEC) 10 MG TABLET    Take 10 mg by mouth once daily.    COENZYME Q10 100 MG CAPSULE    Take 200 mg by mouth once daily.    IRBESARTAN (AVAPRO) 300 MG TABLET    TAKE 1 TABLET (300 mg) BY MOUTH DAILY    MELOXICAM (MOBIC) 15 MG TABLET    Take 1 tablet (15 mg total) by mouth once daily.   Discontinued Medications    DEXTROMETHORPHAN-GUAIFENESIN (ADULT ROBITUSSIN PEAK COLD DM)  MG/5 ML LIQD    Take 10 mLs by mouth every 4 (four) hours as needed.    PROMETHAZINE-CODEINE 6.25-10 MG/5 ML (PHENERGAN WITH CODEINE) 6.25-10 MG/5 ML SYRUP    Take 1 to 2 teaspoons at night as needed for cough/congestion     Joseph was seen today for annual exam.    Diagnoses and all orders for this visit:    Annual physical exam    Diverticulitis of large intestine without perforation or abscess without  bleeding    Hypertension, essential    Hyperlipidemia, unspecified hyperlipidemia type    NAFLD (nonalcoholic fatty liver disease)  -     Ambulatory Referral to Hepatology    Erectile dysfunction, unspecified erectile dysfunction type  -     Ambulatory referral to Urology      See meds, orders, follow up, routing and instructions sections of encounter.  A 59-year-old patient.  He is here for an annual physical examination and   followup.  He has a 20-pound intentional weight loss.  He states he is feeling   better with respect to his blood pressure, energy level, etc.  He does have ED   that has been refractory to oral medications.    The patient does take meloxicam.  He is on an ARB.  He uses meloxicam   approximately no greater than five times a month.  He is avoiding other   nonsteroidals at this time.    The patient had an episode of diarrhea following a trip approximately a month   ago.  He was triaged to Urgent Care.  His gastroenterologic symptoms have   resolved.  He has no further bleeding, abdominal pain, distention or diarrhea.    I did state that should they recur, would suggest a GI consult.    Labs reviewed.  See recommendations.  Goal IBW approximately 200.      ELLA/RYDER  dd: 06/03/2019 17:00:16 (CDT)  td: 06/04/2019 06:47:43 (CDT)  Doc ID   #[id number]  Job ID #[id number]    CC:

## 2019-06-03 NOTE — PATIENT INSTRUCTIONS
Shingrix vaccine today.    Information about cholesterol, high blood pressure and healthy diet and activity recommendations can be found at the following links on the Internet:    http://www.nhlbi.nih.gov/health/health-topics/topics/hbc  http://www.nhlbi.nih.gov/health/educational/lose_wt/index.htm  Http://www.nhlbi.nih.gov/files/docs/public/heart/hbp_low.pdf  http://www.heart.org/HEARTORG/  http://diabetes.org/  https://www.cdc.gov/  Https://healthfinder.gov/

## 2019-06-17 ENCOUNTER — PATIENT MESSAGE (OUTPATIENT)
Dept: DERMATOLOGY | Facility: CLINIC | Age: 59
End: 2019-06-17

## 2019-06-19 RX ORDER — MELOXICAM 15 MG/1
TABLET ORAL
Qty: 30 TABLET | Refills: 0 | Status: SHIPPED | OUTPATIENT
Start: 2019-06-19 | End: 2019-12-03 | Stop reason: SDUPTHER

## 2019-06-20 ENCOUNTER — OFFICE VISIT (OUTPATIENT)
Dept: UROLOGY | Facility: CLINIC | Age: 59
End: 2019-06-20
Attending: FAMILY MEDICINE
Payer: COMMERCIAL

## 2019-06-20 VITALS
SYSTOLIC BLOOD PRESSURE: 120 MMHG | DIASTOLIC BLOOD PRESSURE: 81 MMHG | HEART RATE: 50 BPM | WEIGHT: 218.25 LBS | HEIGHT: 73 IN | BODY MASS INDEX: 28.93 KG/M2

## 2019-06-20 DIAGNOSIS — E78.5 HYPERLIPIDEMIA, UNSPECIFIED HYPERLIPIDEMIA TYPE: ICD-10-CM

## 2019-06-20 DIAGNOSIS — I10 HYPERTENSION, ESSENTIAL: ICD-10-CM

## 2019-06-20 DIAGNOSIS — N13.8 BPH WITH URINARY OBSTRUCTION: ICD-10-CM

## 2019-06-20 DIAGNOSIS — N52.01 ERECTILE DYSFUNCTION DUE TO ARTERIAL INSUFFICIENCY: Primary | ICD-10-CM

## 2019-06-20 DIAGNOSIS — N40.1 BPH WITH URINARY OBSTRUCTION: ICD-10-CM

## 2019-06-20 PROCEDURE — 3079F PR MOST RECENT DIASTOLIC BLOOD PRESSURE 80-89 MM HG: ICD-10-PCS | Mod: CPTII,S$GLB,, | Performed by: UROLOGY

## 2019-06-20 PROCEDURE — 3008F PR BODY MASS INDEX (BMI) DOCUMENTED: ICD-10-PCS | Mod: CPTII,S$GLB,, | Performed by: UROLOGY

## 2019-06-20 PROCEDURE — 3079F DIAST BP 80-89 MM HG: CPT | Mod: CPTII,S$GLB,, | Performed by: UROLOGY

## 2019-06-20 PROCEDURE — 3074F SYST BP LT 130 MM HG: CPT | Mod: CPTII,S$GLB,, | Performed by: UROLOGY

## 2019-06-20 PROCEDURE — 3074F PR MOST RECENT SYSTOLIC BLOOD PRESSURE < 130 MM HG: ICD-10-PCS | Mod: CPTII,S$GLB,, | Performed by: UROLOGY

## 2019-06-20 PROCEDURE — 3008F BODY MASS INDEX DOCD: CPT | Mod: CPTII,S$GLB,, | Performed by: UROLOGY

## 2019-06-20 PROCEDURE — 99214 OFFICE O/P EST MOD 30 MIN: CPT | Mod: S$GLB,,, | Performed by: UROLOGY

## 2019-06-20 PROCEDURE — 99214 PR OFFICE/OUTPT VISIT, EST, LEVL IV, 30-39 MIN: ICD-10-PCS | Mod: S$GLB,,, | Performed by: UROLOGY

## 2019-06-20 PROCEDURE — 99999 PR PBB SHADOW E&M-EST. PATIENT-LVL III: CPT | Mod: PBBFAC,,, | Performed by: UROLOGY

## 2019-06-20 PROCEDURE — 99999 PR PBB SHADOW E&M-EST. PATIENT-LVL III: ICD-10-PCS | Mod: PBBFAC,,, | Performed by: UROLOGY

## 2019-06-20 RX ORDER — PAPAVERINE HYDROCHLORIDE 30 MG/ML
INJECTION INTRAMUSCULAR; INTRAVENOUS
Qty: 5 ML | Refills: 0 | Status: SHIPPED | OUTPATIENT
Start: 2019-06-20 | End: 2020-11-02

## 2019-06-20 NOTE — PATIENT INSTRUCTIONS
Penile Self-Injection Procedure  Self-injection is a good option for many men with erectile dysfunction (ED). A tiny needle is used to inject medication into the penis. This helps your penis get hard and stay that way long enough for sex. And sex and orgasm will feel as good as always. You may be nervous about doing self-injection at first. But with practice, it will get easier. Your healthcare provider will show you how to do self-injection the first time. The simple steps are outlined on this sheet.  Preparing for Injection  · Wash your hands well with soap and water.  · Prepare the medication (if needed).  · Sit or  a comfortable position in a warm, well-lit room. If you need to, sit or  front of a mirror.  · Find an injection site on one side of your penis, in a place with no visible veins. (Dont inject into the top, bottom, or head of the penis.)  · Clean the injection site with an alcohol swab. Grasp the head of your penis firmly with your thumb and forefinger (dont just pinch the skin). Stretch the penis so the skin on the shaft is taut.  Injecting the Medication  · Rest your penis against your inner thigh and pull it gently toward your knee. Dont twist or rotate it. This way youll be sure to inject the medication into the spot you chose and cleaned before.  · Hold the syringe between your thumb and fingers, like youre holding a pen. Rest your forearm on your thigh for support.  · Insert the needle at a 90-degree angle (perpendicular) to the shaft. Do this quickly to reduce discomfort. (The needle should go in easily. If it doesnt, stop right away.)  · Move your thumb to the plunger. Press down to inject the medication, counting to 5.  · Remove the needle and dispose of it safely.     The injection site can be in any part of the shaded area.     Insert the needle straight into the penis.    Gaining an Erection  · Apply pressure to the injection site for a few minutes. This prevents  swelling and bruising and helps spread the medication.   · Stand up. This may help your erection develop. Foreplay often helps, too.  · Your penis should become firm within 10-20 minutes. The erection will last long enough for sex, and maybe longer.  Call Your Doctor If You Have:   · An erection that lasts longer than 3-4  hours  · Bleeding or bruising  · Severe pain  · Scarring or curvature of the penis       © 5195-8406 [facility], [address] [place] [address], [place] [address]. All rights reserved. This information is not intended as a substitute for professional medical care. Always follow your healthcare professional's instructions.

## 2019-06-20 NOTE — PROGRESS NOTES
CHIEF COMPLAINT:    Mr. Jesus is a 59 y.o. male presenting with ED.    PRESENTING ILLNESS:    Joseph Jesus is a 59 y.o. male who c/o ED.  This has been present for > 1 year.  He's tried and failed viagra and cialis.  His T is normal.  He went to [employer].    He's pleased with his LUTS.  No hematuria.  No dysuria.    He has a history of chronic pelvic pain as well.  Currently no pain.    REVIEW OF SYSTEMS:    Joseph Jesus denies headache, blurred vision, fever, nausea, vomiting, chills, abdominal pain, bleeding per rectum, cough, SOB, recent loss of consciousness, recent mental status changes, seizures, dizziness, or upper or lower extremity weakness.    ANDREA  1. 1  2. 1  3. 1  4. 1  5. 1      PATIENT HISTORY:    Past Medical History:   Diagnosis Date    Allergy     seasonal    Anal fistula hx    Arthritis     Basal cell carcinoma 4/2013    left superior forehead    Callus     Diverticulosis     Epididymal cyst     Hayfever     Hydrocele, right     Hyperlipidemia     Hypertension     Neck pain     hx of muscular inury from weight lifting---resolved now    Prostatitis     Dec. '12-treated with antibx.    Squamous cell carcinoma 01/07/2019    anterior scalp    Visual impairment        Past Surgical History:   Procedure Laterality Date    APPENDECTOMY  1962    COLONOSCOPY  12/2012    due for repeat 12/2015    COLONOSCOPY N/A 5/19/2016    [facility] (4TH [facility])    COLONOSCOPY N/A 12/13/2012    Per[facility] at [facility] (4TH FLR)    EUA/Fistulotomy-'08      HERNIA REPAIR      [facility] with mesh    Hydrocelectomy  2013    HYDROCELECTOMY Right 5/21/2013    Performed by Lenny Ewing [facility] at [facility] OR 2ND FLR    MASS EXCISION  2004    BCC removal (twice)    MOLE REMOVAL  02/25/2019    2 moles removed from scalp =- 1 was basal cell and 1 was squamous cell - dermatology - Dr. londono    REPAIR, HERNIA, INGUINAL, BILATERAL,  LAPAROSCOPIC Bilateral 2013    Per[facility]leg [facility] at [facility] OR [facility]    right Spermatocelectomy         Family History   Problem Relation Age of Onset    Skin cancer Mother     Hypertension Mother     Skin cancer Father     Cancer Father         prostate cancer    Hypertension Father     Hypertension Maternal Grandmother     Hypertension Maternal Grandfather     Hypertension Paternal Grandmother     Anesthesia problems Paternal Grandmother     Cancer Paternal Grandfather         prostate cancer    Hypertension Paternal Grandfather     Heart disease Paternal Grandfather     Diabetes Neg Hx     Colon polyps Neg Hx     Colon cancer Neg Hx     Melanoma Neg Hx     Psoriasis Neg Hx     Lupus Neg Hx     Eczema Neg Hx     Acne Neg Hx     Cirrhosis Neg Hx     Prostate cancer Neg Hx     Kidney disease Neg Hx        Social History     Socioeconomic History    Marital status:      Spouse name: Traci    Number of children: 1    Years of education: Not on file    Highest education level: Not on file   Occupational History    Occupation: Software Eng.     Employer: [employer]     Comment: [employer]   Social Needs    Financial resource strain: Not on file    Food insecurity:     Worry: Not on file     Inability: Not on file    Transportation needs:     Medical: Not on file     Non-medical: Not on file   Tobacco Use    Smoking status: Former Smoker     Packs/day: 1.00     Years: 10.00     Pack years: 10.00     Last attempt to quit: 1987     Years since quittin.5    Smokeless tobacco: Never Used   Substance and Sexual Activity    Alcohol use: Yes     Alcohol/week: 0.6 oz     Types: 1 Glasses of wine per week     Comment: socially    Drug use: No    Sexual activity: Yes     Partners: Female   Lifestyle    Physical activity:     Days per week: Not on file     Minutes per session: Not on file    Stress: Not on file   Relationships    Social connections:      Talks on phone: Not on file     Gets together: Not on file     Attends [employer] service: Not on file     Active member of club or organization: Not on file     Attends meetings of clubs or organizations: Not on file     Relationship status: Not on file   Other Topics Concern    Not on file   Social History Narrative    Not on file       Allergies:  Codeine; Erythromycin; Sumycin [tetracycline]; and Tetracyclines    Medications:    Current Outpatient Medications:     atorvastatin (LIPITOR) 10 MG tablet, TAKE 1 TABLET BY MOUTH ONCE DAILY., Disp: 90 tablet, Rfl: 1    cetirizine (ZYRTEC) 10 MG tablet, Take 10 mg by mouth once daily., Disp: , Rfl:     coenzyme Q10 100 mg capsule, Take 200 mg by mouth once daily., Disp: , Rfl:     irbesartan (AVAPRO) 300 MG tablet, TAKE 1 TABLET (300 mg) BY MOUTH DAILY, Disp: 90 tablet, Rfl: 1    meloxicam (MOBIC) 15 MG tablet, TAKE 1 TABLET BY MOUTH EVERY DAY, Disp: 30 tablet, Rfl: 0    varicella-zoster gE-AS01B, PF, (SHINGRIX, PF,) 50 mcg/0.5 mL injection, Inject into the muscle., Disp: 0.5 mL, Rfl: 0    PHYSICAL EXAMINATION:    The patient generally appears in good health, is appropriately interactive, and is in no apparent distress.     Eyes: anicteric sclerae, moist conjunctivae; no lid-lag; PERRLA     HENT: Atraumatic; oropharynx clear with moist mucous membranes and no mucosal ulcerations;normal hard and soft palate.  No evidence of lymphadenopathy.    Neck: Trachea midline.  No thyromegaly.    Musculoskeletal: No abnormal gait.    Skin: No lesions.    Mental: Cooperative with normal affect.  Is oriented to time, place, and person.    Neuro: Grossly intact.    Chest: Normal inspiratory effort.   No accessory muscles.  No audible wheezes.  Respirations symmetric on inspiration and expiration.    Heart: Regular rhythm.      Abdomen:  Soft, non-tender. No masses or organomegaly. Bladder is not palpable. No evidence of flank discomfort. No evidence of inguinal  hernia.    Genitourinary: The penis is circumcised with no evidence of plaques or induration. The urethral meatus is normal. The testes, epididymides, and cord structures are normal in size and contour bilaterally. The scrotum is normal in size and contour.    Normal anal sphincter tone. No rectal mass.    The prostate is 30 g. Normal landmarks. Lateral sulci. Median furrow intact.  No nodularity or induration. Seminal vesicles are normal.    Extremities: No clubbing, cyanosis, or edema      LABS:      Lab Results   Component Value Date    PSA 0.29 05/30/2019    PSA 0.42 11/01/2017    PSA 0.41 06/27/2016    PSADIAG 0.37 09/08/2014       IMPRESSION:    Encounter Diagnoses   Name Primary?    Erectile dysfunction due to arterial insufficiency Yes    BPH with urinary obstruction     Hyperlipidemia, unspecified hyperlipidemia type     Hypertension, essential    HTN, controlled  Hyperlipidemia, controlled      PLAN:    1. Discussed options for his ED. He'd like [facility].  He has BCBS, so an IPP isn't covered  2. Will observe his LUTS as they don't bother him.  3. Will set up for teaching.  4. RTC 1 year.    Copy to:

## 2019-06-20 NOTE — LETTER
June 20, 2019      Nick Trejo MD  [address]  [place] [address]           [facility] 4th Floor  [address]  [place] [address]  Phone: [phone]          Patient: Joseph Jesus   MR Number: [id number]   YOB: 1960   Date of Visit: 6/20/2019       Dear Dr. Nick Trejo:    Thank you for referring Joseph Jesus to me for evaluation. Attached you will find relevant portions of my assessment and plan of care.    If you have questions, please do not hesitate to call me. I look forward to following Joseph Jesus along with you.    Sincerely,    Lenny Ewing MD    Enclosure  CC:  No Recipients    If you would like to receive this communication electronically, please contact [email or web address] or [phone] to request more information on [facility] access.    For providers and/or their staff who would like to refer a patient to [facility], please contact us through our one-stop-shop provider referral line, [facility], at [phone].    If you feel you have received this communication in error or would no longer like to receive these types of communications, please e-mail [email or web address]

## 2019-06-26 ENCOUNTER — PATIENT OUTREACH (OUTPATIENT)
Dept: OTHER | Facility: OTHER | Age: 59
End: 2019-06-26

## 2019-06-26 NOTE — PROGRESS NOTES
Last 5 Patient Entered Readings                                      Current 30 Day Average: 126/87     Recent Readings 6/14/2019 6/11/2019 5/28/2019 5/27/2019 5/19/2019    SBP (mmHg) 121 121 132 130 121    DBP (mmHg) 87 81 91 87 77    Pulse 68 70 71 78 65            Digital Medicine: Health  Follow Up    Left voicemail to follow up with Mr. Joseph Jesus.  Current BP average 126/87 mmHg is not at goal, <130/80 mmHg.  WCB in 3 weeks.

## 2019-06-26 NOTE — LETTER
September 25, 2019     Joseph Jesus  [address] [place] [id number]       Dear Joseph,    Thank you for enrolling in [facility]s Digital Medicine Program. To participate, we ask that you submit information at least once weekly through your [facility] account and maintain regular contact with your Care Team. We have not received any data or heard from you in some time.     The Digital Medicine Care Team has attempted to reach you on multiple occasions to determine if you would like to continue participating in the program. While we encourage you to continue participating fully, we understand that circumstances may change.     To continue participating in the program, please contact me at [phone]. If we do not hear back, you will be un-enrolled, and your physician will be notified of your decision.    If you have submitted data and believe you are receiving this letter in error, please call the Digital Medicine Patient Support Line at [phone] for troubleshooting.      We look forward to hearing from you soon.    Sincerely,     Lora Ortiz  [facility]

## 2019-07-10 ENCOUNTER — PATIENT MESSAGE (OUTPATIENT)
Dept: UROLOGY | Facility: CLINIC | Age: 59
End: 2019-07-10

## 2019-07-17 NOTE — PROGRESS NOTES
Last 5 Patient Entered Readings                                      Current 30 Day Average:      Recent Readings 6/14/2019 6/11/2019 5/28/2019 5/27/2019 5/19/2019    SBP (mmHg) 121 121 132 130 121    DBP (mmHg) 87 81 91 87 77    Pulse 68 70 71 78 65            Digital Medicine: Health  Follow Up    Left voicemail to follow up with Mr. Joseph Jesus.  Current BP average Not Enough Data/Not Enough Data mmHg unable to determine if at goal, <130/80 mmHg.  WCB in 3 weeks.

## 2019-07-26 NOTE — PROGRESS NOTES
CHIEF COMPLAINT:    Mr. Jesus is a 59 y.o. male presenting for ED, and [facility]CI education.    PRESENTING ILLNESS:    Joseph Jesus is a 59 y.o. male new patient to me (records of past medical, family and social history personally reviewed by me), w/ h/o HLD, HTN, CPP, BPH w/o LUTS.    Pt last seen in clinic 6/20/19 w/ Dr. Ewing for ED.    Today pt returns to clinic for ED, and [facility] education. He has his Trimix (papaverine 30mg/mL, phentolamine 1mg/mL, PGE1 10mcg/mL) w/ him. He reports ED x1 yr. Attempted and failed PDE5Is (cialis and viagra). Reports strong libido, and has AM/nocturnal/spontaneous erections once/wk.    ANDREA  1. 1  2. 1  3. 1  4. 1  5. 1    REVIEW OF SYSTEMS:    Joseph Jesus denies headache, blurred vision, fever, nausea, vomiting, chills, abdominal pain, pelvic pain, flank pain, bleeding per rectum, cough, SOB, recent loss of consciousness, recent mental status changes, seizures, dizziness, or upper or lower extremity weakness.    PATIENT HISTORY:    Past Medical History:   Diagnosis Date    Allergy     seasonal    Anal fistula hx    Arthritis     Basal cell carcinoma 4/2013    left superior forehead    Callus     Diverticulosis     Epididymal cyst     Hayfever     Hydrocele, right     Hyperlipidemia     Hypertension     Neck pain     hx of muscular inury from weight lifting---resolved now    Prostatitis     Dec. '12-treated with antibx.    Squamous cell carcinoma 01/07/2019    anterior scalp    Visual impairment        Past Surgical History:   Procedure Laterality Date    APPENDECTOMY  1962    COLONOSCOPY  12/2012    due for repeat 12/2015    COLONOSCOPY N/A 5/19/2016    Performed by [facility] at [facility] (4TH FLR)    COLONOSCOPY N/A 12/13/2012    Per[facility] by [facility] at [facility] (4TH FLR)    EUA/Fistulotomy-'08      HERNIA REPAIR      [facility] with mesh    Hydrocelectomy  2013    HYDROCELECTOMY Right 5/21/2013    Performed by Lenny Ewing MD at [facility] OR  2ND FLR    MASS EXCISION      BCC removal (twice)    MOLE REMOVAL  2019    2 moles removed from scalp =- 1 was basal cell and 1 was squamous cell - dermatology - Dr. londono    REPAIR, HERNIA, INGUINAL, BILATERAL, LAPAROSCOPIC Bilateral 2013    [facility] 2ND FLR    right Spermatocelectomy         Family History   Problem Relation Age of Onset    Skin cancer Mother     Hypertension Mother     Skin cancer Father     Cancer Father         prostate cancer    Hypertension Father     Hypertension Maternal Grandmother     Hypertension Maternal Grandfather     Hypertension Paternal Grandmother     Anesthesia problems Paternal Grandmother     Cancer Paternal Grandfather         prostate cancer    Hypertension Paternal Grandfather     Heart disease Paternal Grandfather     Diabetes Neg Hx     Colon polyps Neg Hx     Colon cancer Neg Hx     Melanoma Neg Hx     Psoriasis Neg Hx     Lupus Neg Hx     Eczema Neg Hx     Acne Neg Hx     Cirrhosis Neg Hx     Prostate cancer Neg Hx     Kidney disease Neg Hx        Social History     Socioeconomic History    Marital status:      Spouse name: Traci    Number of children: 1    Years of education: Not on file    Highest education level: Not on file   Occupational History    Occupation: Software Eng.     Employer: [employer]     Comment: [employer]   Social Needs    Financial resource strain: Not on file    Food insecurity:     Worry: Not on file     Inability: Not on file    Transportation needs:     Medical: Not on file     Non-medical: Not on file   Tobacco Use    Smoking status: Former Smoker     Packs/day: 1.00     Years: 10.00     Pack years: 10.00     Last attempt to quit: 1987     Years since quittin.6    Smokeless tobacco: Never Used   Substance and Sexual Activity    Alcohol use: Yes     Alcohol/week: 0.6 oz     Types: 1 Glasses of wine per week     Comment: socially     Drug use: No    Sexual activity: Yes     Partners: Female   Lifestyle    Physical activity:     Days per week: Not on file     Minutes per session: Not on file    Stress: Not on file   Relationships    Social connections:     Talks on phone: Not on file     Gets together: Not on file     Attends Mosque service: Not on file     Active member of club or organization: Not on file     Attends meetings of clubs or organizations: Not on file     Relationship status: Not on file   Other Topics Concern    Not on file   Social History Narrative    Not on file       Allergies:  Codeine; Erythromycin; Sumycin [tetracycline]; and Tetracyclines    Medications:    Current Outpatient Medications:     atorvastatin (LIPITOR) 10 MG tablet, TAKE 1 TABLET BY MOUTH ONCE DAILY., Disp: 90 tablet, Rfl: 1    cetirizine (ZYRTEC) 10 MG tablet, Take 10 mg by mouth once daily., Disp: , Rfl:     coenzyme Q10 100 mg capsule, Take 200 mg by mouth once daily., Disp: , Rfl:     irbesartan (AVAPRO) 300 MG tablet, TAKE 1 TABLET (300 mg) BY MOUTH DAILY, Disp: 90 tablet, Rfl: 1    meloxicam (MOBIC) 15 MG tablet, TAKE 1 TABLET BY MOUTH EVERY DAY, Disp: 30 tablet, Rfl: 0    papaverine 30 mg/mL injection, Add Phentolamine 1 mg/cc Add PGE1 10 mcg/cc  SIG: Bring to office for test dose in physician office, Disp: 5 mL, Rfl: 0    varicella-zoster gE-AS01B, PF, (SHINGRIX, PF,) 50 mcg/0.5 mL injection, Inject into the muscle., Disp: 0.5 mL, Rfl: 0    PHYSICAL EXAMINATION:    The patient generally appears in good health, is appropriately interactive, and is in no apparent distress.     Eyes: anicteric sclerae, moist conjunctivae; no lid-lag; PERRLA     HENT: Atraumatic; oropharynx clear with moist mucous membranes and no mucosal ulcerations;normal hard and soft palate. No evidence of lymphadenopathy.    Neck: Trachea midline.  No thyromegaly.    Musculoskeletal: No abnormal gait.    Skin: No lesions.    Mental: Cooperative with normal affect.  Is  oriented to time, place, and person.    Neuro: Grossly intact.    Chest: Normal inspiratory effort.   No accessory muscles.  No audible wheezes.  Respirations symmetric on inspiration and expiration.    Heart: Regular rhythm.      Abdomen:  Soft, non-tender. No masses or organomegaly. Bladder is not palpable. No evidence of flank discomfort. No evidence of inguinal hernia.    Penis is circumcised. No penile discharge. Meatus w/o stenosis or lesions. Phallus unremarkable. No scrotal enlargement/edema/swelling/lesions. Epididymis unremarkable bilaterally. Testicles normal w/o nodules or tenderness. No inguinal hernias or lymphadenopathy.    Extremities: No clubbing, cyanosis, or edema.    LABS:    Lab Results   Component Value Date    CREATININE 1.2 05/30/2019    CREATININE 1.3 12/02/2018    CREATININE 1.4 11/01/2017       Lab Results   Component Value Date    PSA 0.29 05/30/2019    PSA 0.42 11/01/2017    PSA 0.41 06/27/2016    PSA 0.38 09/27/2012    PSA 0.28 01/27/2011    PSA 0.38 07/31/2009    PSA 0.3 04/05/2006    PSADIAG 0.37 09/08/2014       Hemoglobin A1C   Date Value Ref Range Status   09/27/2012 6.1 4.0 - 6.2 % Final   11/09/2010 5.9 4.0 - 6.2 % Final   07/31/2009 6.0 4.0 - 6.2 % Final       Lab Results   Component Value Date    TOTALTESTOST 493 07/03/2018        IMPRESSION:    Erectile dysfunction due to arterial insufficiency    BPH with urinary obstruction      PLAN:    I spent 50 minutes with the patient of which more than half was spent in direct consultation with the patient in regards to our treatment and plan.      Discussed and reviewed intracavernosal injections using Trimix (PGE1/papaverine/phentolamine). Discussed and reviewed proper use and potential adverse effects (bleeding, pain, formation of scar tissue/nodules, development of penile curvature). Discussed Trimix is a compound medication and is only mixed at certain pharmacies known as a compound pharmacies. The medication has to be stored in  the refrigerator. Improper storage decreases the effectiveness of the medication. Discussed and reviewed consent for intracavernosal injections - verbal consent obtained. Discussed proper technique of drawing up Trimix (patient return demonstrated). Discussed the importance of drawing up the proper amount, cleaning injection site w/ alcohol, avoiding air bubbles, and checking the needle prior to injection. Discussed, and pt return demonstrated where not to inject including the dorsal and ventral aspects and glans, and avoiding the veins. Witnessed patient drawing up Trimix; 10 units of Trimix was injected into the R corpora. Patient tolerated procedure well. After 15-20 minutes patient had an approximately 30% erection. Discussed may increase/decrease 5 units/24 hrs to desired rigidity (%) initially (within first week), once reached appropriate dose/desired erectile rigidity (enough for intercourse/masturbation), use Trimix 3-4 days/week. Discussed and reviewed the importance of avoiding overdosing due to risk of priapism. Do not exceed maximum of 50 units. Discussed and advised to report to local emergency dept for erections lasting for more than 4 hours.     Education sheets provided.    Follow up in about 2 months (around 9/29/2019) for ED.    Pt expressed understanding and agree w/ plan.

## 2019-07-29 ENCOUNTER — OFFICE VISIT (OUTPATIENT)
Dept: UROLOGY | Facility: CLINIC | Age: 59
End: 2019-07-29
Payer: COMMERCIAL

## 2019-07-29 VITALS
DIASTOLIC BLOOD PRESSURE: 72 MMHG | HEART RATE: 59 BPM | SYSTOLIC BLOOD PRESSURE: 112 MMHG | BODY MASS INDEX: 27.92 KG/M2 | WEIGHT: 211.63 LBS

## 2019-07-29 DIAGNOSIS — N13.8 BPH WITH URINARY OBSTRUCTION: ICD-10-CM

## 2019-07-29 DIAGNOSIS — N40.1 BPH WITH URINARY OBSTRUCTION: ICD-10-CM

## 2019-07-29 DIAGNOSIS — N52.01 ERECTILE DYSFUNCTION DUE TO ARTERIAL INSUFFICIENCY: Primary | ICD-10-CM

## 2019-07-29 PROCEDURE — 3008F BODY MASS INDEX DOCD: CPT | Mod: CPTII,S$GLB,, | Performed by: NURSE PRACTITIONER

## 2019-07-29 PROCEDURE — 3078F DIAST BP <80 MM HG: CPT | Mod: CPTII,S$GLB,, | Performed by: NURSE PRACTITIONER

## 2019-07-29 PROCEDURE — 99215 PR OFFICE/OUTPT VISIT, EST, LEVL V, 40-54 MIN: ICD-10-PCS | Mod: S$GLB,,, | Performed by: NURSE PRACTITIONER

## 2019-07-29 PROCEDURE — 99999 PR PBB SHADOW E&M-EST. PATIENT-LVL III: ICD-10-PCS | Mod: PBBFAC,,, | Performed by: NURSE PRACTITIONER

## 2019-07-29 PROCEDURE — 99215 OFFICE O/P EST HI 40 MIN: CPT | Mod: S$GLB,,, | Performed by: NURSE PRACTITIONER

## 2019-07-29 PROCEDURE — 3078F PR MOST RECENT DIASTOLIC BLOOD PRESSURE < 80 MM HG: ICD-10-PCS | Mod: CPTII,S$GLB,, | Performed by: NURSE PRACTITIONER

## 2019-07-29 PROCEDURE — 3008F PR BODY MASS INDEX (BMI) DOCUMENTED: ICD-10-PCS | Mod: CPTII,S$GLB,, | Performed by: NURSE PRACTITIONER

## 2019-07-29 PROCEDURE — 3074F SYST BP LT 130 MM HG: CPT | Mod: CPTII,S$GLB,, | Performed by: NURSE PRACTITIONER

## 2019-07-29 PROCEDURE — 99999 PR PBB SHADOW E&M-EST. PATIENT-LVL III: CPT | Mod: PBBFAC,,, | Performed by: NURSE PRACTITIONER

## 2019-07-29 PROCEDURE — 3074F PR MOST RECENT SYSTOLIC BLOOD PRESSURE < 130 MM HG: ICD-10-PCS | Mod: CPTII,S$GLB,, | Performed by: NURSE PRACTITIONER

## 2019-07-29 NOTE — PATIENT INSTRUCTIONS
Penile Self-Injection Procedure  Self-injection is a good option if you have erectile dysfunction (ED). You insert a tiny needle into your penis and inject a medicine. This helps your penis get hard and stay that way long enough for sex. And sex and orgasm will feel as good as always. You may be nervous about doing self-injection at first. But with practice, it will get easier. Your healthcare provider will show you how to do self-injection the first time.  Talk to your doctor about any medicines you take and any medical problems you have.      Preparing for injection  · Wash your hands well with soap and water.  · Prepare the medicine (if needed).  · Sit or  a comfortable position in a warm, well-lit room. If you need to, sit or  front of a mirror.  · Find an injection site on one side of your penis, in a place with no visible veins. (Dont inject into the top, bottom, or head of the penis.)  · Clean the injection site with an alcohol swab. Grasp the head of your penis firmly with your thumb and forefinger (dont just pinch the skin). Stretch the penis so the skin on the shaft is taut.  Injecting the medicine  · Rest your penis against your inner thigh and pull it gently toward your knee. Dont twist or rotate it. This way youll be sure to inject the medicine into the spot you chose and cleaned before.  · Hold the syringe between your thumb and fingers, like youre holding a pen. Rest your forearm on your thigh for support.  · Insert the needle at a 90° angle (perpendicular) to the shaft. Do this quickly to reduce discomfort. (The needle should go in easily. If it doesnt, stop right away.)  · Move your thumb to the plunger. Press down to inject the medicine, counting to 5.  · Remove the needle and dispose of it safely.  Gaining an erection  · Apply pressure to the injection site for a few minutes. This prevents swelling and bruising and helps spread the medicine.   · Stand up. This may help your  erection develop. Foreplay often helps, too.  · Your penis should become firm within 10 to 20 minutes. The erection will last long enough for sex, and maybe longer.  When to seek medical care  An erection that lasts longer than 3 to 4  hours  · Bleeding or bruising  · Severe pain  · Scarring or curvature of the penis   Date Last Reviewed: 1/7/2017  © 8739-8270 [employer]. [address] [place] [address], [place] [address]. All rights reserved. This information is not intended as a substitute for professional medical care. Always follow your healthcare professional's instructions.        Penile Self-Injection: Notes and Precautions     Man and healthcare provider sitting across from one another, talking.   Penile self-injection is a simple technique that may improve your sex life. Some men even find that self-injection leads to an increase in natural erections. If you have questions or concerns about self-injection or erectile dysfunction (ED), talk with your healthcare provider. The information on this sheet will help you get the best results.  Notes about penile self-injection  · You may feel a mild burning during injection. This is OK. But if you feel pressure or severe pain, stop the injection. There may be a problem with the injection site.  · Only inject the medicine on the side of your penis. It may not work if injected elsewhere.  · To prevent scarring, inject in a different spot each time.  · Dont use this treatment if you have a bleeding disorder or any risk of infection.  · Get medical help right away if your erection lasts longer than 3 to 4 hours.  Work with your healthcare provider  Ask how often you can safely repeat injections, as well as any other questions you have. You and your healthcare provider will talk about follow-up exams and how to get supplies. If the medicine doesnt work or stops working over time, tell your healthcare provider.     When to call your healthcare provider  Call your  healthcare provider right away if any of these occur:  · An erection that lasts longer than 3 to 4  hours  · Bleeding or bruising  · Severe pain  · Scarring or curvature of the penis   Date Last Reviewed: 1/1/2017 © 2000-2017 [employer]. [address] [place] [address]. All rights reserved. This information is not intended as a substitute for professional medical care. Always follow your healthcare professional's instructions.        Understanding Erectile Dysfunction    Erectile dysfunction (ED) is a problem getting an erection firm enough or keeping it long enough for intercourse. The problem can happen to any man at any age. But health problems that can lead to ED become more common as a man ages. Up to half of men over age 40 experience ED at some point.  Causes of ED  ED can have many causes. Most are physical. Some are emotional issues. Often, a combination of causes is involved. Causes of ED may include:  · Medical conditions such as diabetes or depression  · Smoking tobacco or marijuana  · Drinking too much alcohol  · Side effects of medications  · Injury to nerves or blood vessels  · Emotional issues such as stress or relationship problems  ED can be treated  Prescription medications for ED are available. They help many men who try them. Depending upon the cause of the ED, though, medications may not be enough. In these cases, other treatment options are available. These include erectile aids and surgery. Your health care provider can tell you more about the treatment that is right for you. And new treatments for ED are being studied. No matter what the treatment you decide on, stay in touch with your doctor. If your symptoms persist, he or she may be able to adjust your current treatment or try something new.  Date Last Reviewed: 1/1/2017 © 2000-2017 [employer]. [address] [place] [address]. All rights reserved. This information is not intended as a  substitute for professional medical care. Always follow your healthcare professional's instructions.

## 2019-07-31 RX ORDER — IRBESARTAN 300 MG/1
TABLET ORAL
Qty: 90 TABLET | Refills: 0 | Status: SHIPPED | OUTPATIENT
Start: 2019-07-31 | End: 2019-11-10 | Stop reason: SDUPTHER

## 2019-08-07 NOTE — PROGRESS NOTES
Last 5 Patient Entered Readings                                      Current 30 Day Average:      Recent Readings 6/14/2019 6/11/2019 5/28/2019 5/27/2019 5/19/2019    SBP (mmHg) 121 121 132 130 121    DBP (mmHg) 87 81 91 87 77    Pulse 68 70 71 78 65        Sent Jusp message requesting more readings.

## 2019-08-12 ENCOUNTER — PATIENT MESSAGE (OUTPATIENT)
Dept: INTERNAL MEDICINE | Facility: CLINIC | Age: 59
End: 2019-08-12

## 2019-08-12 ENCOUNTER — OFFICE VISIT (OUTPATIENT)
Dept: FAMILY MEDICINE | Facility: CLINIC | Age: 59
End: 2019-08-12
Attending: FAMILY MEDICINE
Payer: COMMERCIAL

## 2019-08-12 VITALS
HEIGHT: 73 IN | WEIGHT: 207.69 LBS | HEART RATE: 79 BPM | OXYGEN SATURATION: 97 % | DIASTOLIC BLOOD PRESSURE: 82 MMHG | BODY MASS INDEX: 27.53 KG/M2 | SYSTOLIC BLOOD PRESSURE: 128 MMHG

## 2019-08-12 DIAGNOSIS — J01.01 ACUTE RECURRENT MAXILLARY SINUSITIS: Primary | ICD-10-CM

## 2019-08-12 PROCEDURE — 99214 PR OFFICE/OUTPT VISIT, EST, LEVL IV, 30-39 MIN: ICD-10-PCS | Mod: S$GLB,,, | Performed by: FAMILY MEDICINE

## 2019-08-12 PROCEDURE — 99214 OFFICE O/P EST MOD 30 MIN: CPT | Mod: S$GLB,,, | Performed by: FAMILY MEDICINE

## 2019-08-12 PROCEDURE — 99999 PR PBB SHADOW E&M-EST. PATIENT-LVL III: ICD-10-PCS | Mod: PBBFAC,,, | Performed by: FAMILY MEDICINE

## 2019-08-12 PROCEDURE — 3074F SYST BP LT 130 MM HG: CPT | Mod: CPTII,S$GLB,, | Performed by: FAMILY MEDICINE

## 2019-08-12 PROCEDURE — 3008F PR BODY MASS INDEX (BMI) DOCUMENTED: ICD-10-PCS | Mod: CPTII,S$GLB,, | Performed by: FAMILY MEDICINE

## 2019-08-12 PROCEDURE — 99999 PR PBB SHADOW E&M-EST. PATIENT-LVL III: CPT | Mod: PBBFAC,,, | Performed by: FAMILY MEDICINE

## 2019-08-12 PROCEDURE — 3008F BODY MASS INDEX DOCD: CPT | Mod: CPTII,S$GLB,, | Performed by: FAMILY MEDICINE

## 2019-08-12 PROCEDURE — 3079F DIAST BP 80-89 MM HG: CPT | Mod: CPTII,S$GLB,, | Performed by: FAMILY MEDICINE

## 2019-08-12 PROCEDURE — 3074F PR MOST RECENT SYSTOLIC BLOOD PRESSURE < 130 MM HG: ICD-10-PCS | Mod: CPTII,S$GLB,, | Performed by: FAMILY MEDICINE

## 2019-08-12 PROCEDURE — 3079F PR MOST RECENT DIASTOLIC BLOOD PRESSURE 80-89 MM HG: ICD-10-PCS | Mod: CPTII,S$GLB,, | Performed by: FAMILY MEDICINE

## 2019-08-12 RX ORDER — FLUTICASONE PROPIONATE 50 MCG
1 SPRAY, SUSPENSION (ML) NASAL DAILY
Qty: 16 G | Refills: 2 | Status: SHIPPED | OUTPATIENT
Start: 2019-08-12 | End: 2021-04-07 | Stop reason: SDUPTHER

## 2019-08-12 RX ORDER — METHYLPREDNISOLONE 4 MG/1
TABLET ORAL
Qty: 1 PACKAGE | Refills: 0 | Status: SHIPPED | OUTPATIENT
Start: 2019-08-12 | End: 2019-09-02

## 2019-08-12 RX ORDER — AMOXICILLIN AND CLAVULANATE POTASSIUM 875; 125 MG/1; MG/1
1 TABLET, FILM COATED ORAL 2 TIMES DAILY
Qty: 20 TABLET | Refills: 0 | Status: SHIPPED | OUTPATIENT
Start: 2019-08-12 | End: 2019-08-22

## 2019-08-12 RX ORDER — PROMETHAZINE HYDROCHLORIDE AND DEXTROMETHORPHAN HYDROBROMIDE 6.25; 15 MG/5ML; MG/5ML
5-10 SYRUP ORAL 3 TIMES DAILY PRN
Qty: 118 ML | Refills: 0 | Status: SHIPPED | OUTPATIENT
Start: 2019-08-12 | End: 2019-08-22

## 2019-08-12 NOTE — PROGRESS NOTES
Subjective:       Patient ID: Joseph Jesus is a 59 y.o. male.    Chief Complaint: Sinus Problem (x1 week) and Chest Congestion    59 yr old pleasant white male with HTN, HLD, NAFLD, presents today for urgent care c/o sinus congestion, nasal voice, fever x 2-3 weeks and worse since past 1 week. He also has cough dry and sore throat. Tried all OTC meds ano none help. He has sick contacts in family. Non smoker. No recent travel. Details as follows -      History as below - reviewed     Sinusitis   This is a recurrent problem. The current episode started 1 to 4 weeks ago. The problem has been gradually worsening since onset. The maximum temperature recorded prior to his arrival was 100.4 - 100.9 F. The fever has been present for 1 to 2 days. His pain is at a severity of 8/10. The pain is severe. Associated symptoms include congestion, coughing, headaches, sinus pressure and a sore throat. Pertinent negatives include no diaphoresis or ear pain. Past treatments include lying down, oral decongestants, saline sprays and spray decongestants. The treatment provided no relief.     Review of Systems   Constitutional: Negative.  Negative for activity change, diaphoresis and unexpected weight change.   HENT: Positive for congestion, sinus pressure and sore throat. Negative for ear pain, mouth sores, rhinorrhea and voice change.    Eyes: Negative.  Negative for pain, discharge and visual disturbance.   Respiratory: Positive for cough. Negative for apnea and wheezing.    Cardiovascular: Negative.  Negative for chest pain and palpitations.   Gastrointestinal: Negative.  Negative for abdominal distention, anal bleeding, diarrhea and vomiting.   Endocrine: Negative.  Negative for cold intolerance and polyuria.   Genitourinary: Negative.  Negative for decreased urine volume, difficulty urinating, discharge, frequency and scrotal swelling.   Musculoskeletal: Negative.  Negative for back pain, myalgias and neck stiffness.   Skin:  Negative.  Negative for color change and rash.   Allergic/Immunologic: Negative.  Negative for environmental allergies and immunocompromised state.   Neurological: Positive for headaches. Negative for dizziness, speech difficulty, weakness and light-headedness.   Hematological: Negative.    Psychiatric/Behavioral: Negative.  Negative for agitation, dysphoric mood and suicidal ideas. The patient is not nervous/anxious.        PMH/PSH/FH/SH/MED/ALLERGY reviewed    Objective:       Vitals:    08/12/19 1115   BP: 128/82   Pulse: 79       Physical Exam   Constitutional: He appears well-developed and well-nourished. No distress.   HENT:   Head: Normocephalic and atraumatic.   Right Ear: External ear normal.   Left Ear: External ear normal.   Nose: Mucosal edema and rhinorrhea present. Right sinus exhibits maxillary sinus tenderness. Right sinus exhibits no frontal sinus tenderness. Left sinus exhibits maxillary sinus tenderness. Left sinus exhibits no frontal sinus tenderness.   Mouth/Throat: Oropharynx is clear and moist.   Boggy and enlarged turbinates B/L   Eyes: Pupils are equal, round, and reactive to light. EOM are normal.   Neck: Normal range of motion. Neck supple.   Cardiovascular: Normal rate, regular rhythm, normal heart sounds and intact distal pulses. Exam reveals no gallop and no friction rub.   No murmur heard.  Pulmonary/Chest: Effort normal and breath sounds normal. No respiratory distress. He has no wheezes. He has no rales.   Musculoskeletal: Normal range of motion.   Neurological: He is alert.   Skin: Skin is warm and dry. He is not diaphoretic.   Psychiatric: He has a normal mood and affect.       Assessment:       1. Acute recurrent maxillary sinusitis        Plan:           Joseph was seen today for sinus problem and chest congestion.    Diagnoses and all orders for this visit:    Acute recurrent maxillary sinusitis  -     fluticasone propionate (FLONASE) 50 mcg/actuation nasal spray; 1 spray (50 mcg  total) by Each Nostril route once daily.  -     methylPREDNISolone (MEDROL DOSEPACK) 4 mg tablet; use as directed  -     amoxicillin-clavulanate 875-125mg (AUGMENTIN) 875-125 mg per tablet; Take 1 tablet by mouth 2 (two) times daily. for 10 days      Acute sinusitis bacterial  -Advised saline irrigation, warm humidifier, steam inhalation, vicks vaporub, claritin D for congestion  -medrol dose pack  -augmentin x 10 days  -flonase  -claritin D x 1-2 days    Spent adequate time in obtaining history and explaining differentials    40 minutes spent during this visit of which greater than 50% devoted to face-face counseling and coordination of care regarding diagnosis and management plan    Follow up if symptoms worsen or fail to improve.

## 2019-08-12 NOTE — PATIENT INSTRUCTIONS
Sinusitis (Antibiotic Treatment)    The sinuses are air-filled spaces within the bones of the face. They connect to the inside of the nose. Sinusitis is an inflammation of the tissue lining the sinus cavity. Sinus inflammation can occur during a cold. It can also be due to allergies to pollens and other particles in the air. Sinusitis can cause symptoms of sinus congestion and fullness. A sinus infection causes fever, headache and facial pain. There is often green or yellow drainage from the nose or into the back of the throat (post-nasal drip). You have been given antibiotics to treat this condition.  Home care:  · Take the full course of antibiotics as instructed. Do not stop taking them, even if you feel better.  · Drink plenty of water, hot tea, and other liquids. This may help thin mucus. It also may promote sinus drainage.  · Heat may help soothe painful areas of the face. Use a towel soaked in hot water. Or,  the shower and direct the hot spray onto your face. Using a vaporizer along with a menthol rub at night may also help.   · An expectorant containing guaifenesin may help thin the mucus and promote drainage from the sinuses.  · Over-the-counter decongestants may be used unless a similar medicine was prescribed. Nasal sprays work the fastest. Use one that contains phenylephrine or oxymetazoline. First blow the nose gently. Then use the spray. Do not use these medicines more often than directed on the label or symptoms may get worse. You may also use tablets containing pseudoephedrine. Avoid products that combine ingredients, because side effects may be increased. Read labels. You can also ask the pharmacist for help. (NOTE: Persons with high blood pressure should not use decongestants. They can raise blood pressure.)  · Over-the-counter antihistamines may help if allergies contributed to your sinusitis.    · Do not use nasal rinses or irrigation during an acute sinus infection, unless told to by  your health care provider. Rinsing may spread the infection to other sinuses.  · Use acetaminophen or ibuprofen to control pain, unless another pain medicine was prescribed. (If you have chronic liver or kidney disease or ever had a stomach ulcer, talk with your doctor before using these medicines. Aspirin should never be used in anyone under 18 years of age who is ill with a fever. It may cause severe liver damage.)  · Don't smoke. This can worsen symptoms.  Follow-up care  Follow up with your healthcare provider or our staff if you are not improving within the next week.  When to seek medical advice  Call your healthcare provider if any of these occur:  · Facial pain or headache becoming more severe  · Stiff neck  · Unusual drowsiness or confusion  · Swelling of the forehead or eyelids  · Vision problems, including blurred or double vision  · Fever of 100.4ºF (38ºC) or higher, or as directed by your healthcare provider  · Seizure  · Breathing problems  · Symptoms not resolving within 10 days  Date Last Reviewed: 4/13/2015  © 9887-4659 [employer]. [address] [place] [address], [place] [address]. All rights reserved. This information is not intended as a substitute for professional medical care. Always follow your healthcare professional's instructions.

## 2019-08-15 ENCOUNTER — TELEPHONE (OUTPATIENT)
Dept: HEPATOLOGY | Facility: CLINIC | Age: 59
End: 2019-08-15

## 2019-08-16 ENCOUNTER — PATIENT MESSAGE (OUTPATIENT)
Dept: UROLOGY | Facility: CLINIC | Age: 59
End: 2019-08-16

## 2019-08-21 NOTE — PROGRESS NOTES
Last 5 Patient Entered Readings                                      Current 30 Day Average: 151/95     Recent Readings 8/11/2019 6/14/2019 6/11/2019 5/28/2019 5/27/2019    SBP (mmHg) 151 121 121 132 130    DBP (mmHg) 95 87 81 91 87    Pulse 64 68 70 71 78            [facility]: [facility]  Follow Up    Left voicemail to follow up with Mr. Joseph Jesus.  Current BP average 151/95 mmHg is not at goal, <130/80 mmHg.  Sent CallmyName message. WCB in 2 weeks.

## 2019-09-04 ENCOUNTER — TELEPHONE (OUTPATIENT)
Dept: INTERNAL MEDICINE | Facility: CLINIC | Age: 59
End: 2019-09-04

## 2019-09-04 NOTE — TELEPHONE ENCOUNTER
----- Message from Lora Ortiz sent at 9/4/2019  1:14 PM CDT -----  Regarding: [facility] to [facility]  [facility]. Trejo,    Your patient Mr. Joseph Jesus  was enrolled in the [facility] Digital Medicine program on 11/8/18. Unfortunately, we have been unable to maintain contact with him to continue monitoring, despite our best efforts.     If you still believe this patient is a good candidate for digital medicine, please reach out to him to encourage participation. If going forward we are unable to communicate with him, we will unfortunately have to discharge him from the program.    Please let me know if you have any questions.    Sincerely,  Lora Ortiz  [phone]

## 2019-09-04 NOTE — PROGRESS NOTES
Last 5 Patient Entered Readings                                      Current 30 Day Average: 151/95     Recent Readings 8/11/2019 6/14/2019 6/11/2019 5/28/2019 5/27/2019    SBP (mmHg) 151 121 121 132 130    DBP (mmHg) 95 87 81 91 87    Pulse 64 68 70 71 78            Digital Medicine: Health  Follow Up    Left voicemail to follow up with Mr. Joseph Jesus.  Current BP average 151/95 mmHg is not at goal, <130/80 mmHg.  4th failed attempt. No readings in ~ 1 month. Sent message to enrolling provider to encourage participation. WCB in 1 week.

## 2019-10-09 NOTE — PROGRESS NOTES
Digital Medicine: Health  Follow-Up  Pt reported some visual disturbances in the AM. Encouraged pt to discuss this with his PCP.        Follow Up  Follow-up reason(s): reading review and routine education      Readings are missing.   patient reminder needed.  Routine Education Topics: eating patterns and physical activity  Pt states that he has been very busy with work and family, which he attributes to a lack of BP readings. However, he has been able to maintain regular activity and healthy eating habits.           Diet:   Patient reports eating or drinking the following: fruit, water and fresh vegetablesHe has the following dietary restrictions: none    Barriers to a Healthy Diet: no barriers to healthy eating    Pt continues to work on following a low sodium diet and feels he is doing well with this.    Physical Activity:   When asked if exercising, patient responded: yes    He exercises for 30 minutes per day 5 day(s) a week.  His level of intensity when exercising is moderate.    Patient participates in the following activities: walking    He identified the following barriers to physical activity: no barriers to being active      SDOH    INTERVENTION(S)  encouragement/support    PLAN  additional monitoring needed    Asked pt to check BP 2-3 times per week and recommended that he set an alarm to help him remember with his busy schedule.       There are no preventive care reminders to display for this patient.    Last 5 Patient Entered Readings                                      Current 30 Day Average:      Recent Readings 8/11/2019 6/14/2019 6/11/2019 5/28/2019 5/27/2019    SBP (mmHg) 151 121 121 132 130    DBP (mmHg) 95 87 81 91 87    Pulse 64 68 70 71 78

## 2019-10-16 ENCOUNTER — PATIENT OUTREACH (OUTPATIENT)
Dept: ADMINISTRATIVE | Facility: OTHER | Age: 59
End: 2019-10-16

## 2019-10-30 RX ORDER — ATORVASTATIN CALCIUM 10 MG/1
TABLET, FILM COATED ORAL
Qty: 90 TABLET | Refills: 1 | Status: SHIPPED | OUTPATIENT
Start: 2019-10-30 | End: 2020-05-21 | Stop reason: SDUPTHER

## 2019-11-07 ENCOUNTER — PATIENT MESSAGE (OUTPATIENT)
Dept: INTERNAL MEDICINE | Facility: CLINIC | Age: 59
End: 2019-11-07

## 2019-11-08 RX ORDER — MELOXICAM 15 MG/1
15 TABLET ORAL DAILY
Qty: 30 TABLET | Refills: 0 | Status: CANCELLED | OUTPATIENT
Start: 2019-11-08

## 2019-11-10 RX ORDER — IRBESARTAN 300 MG/1
300 TABLET ORAL DAILY
Qty: 90 TABLET | Refills: 3 | Status: SHIPPED | OUTPATIENT
Start: 2019-11-10 | End: 2020-05-21 | Stop reason: SDUPTHER

## 2019-11-14 ENCOUNTER — PATIENT OUTREACH (OUTPATIENT)
Dept: ADMINISTRATIVE | Facility: OTHER | Age: 59
End: 2019-11-14

## 2019-11-21 ENCOUNTER — PATIENT OUTREACH (OUTPATIENT)
Dept: OTHER | Facility: OTHER | Age: 59
End: 2019-11-21

## 2019-12-03 ENCOUNTER — PATIENT MESSAGE (OUTPATIENT)
Dept: INTERNAL MEDICINE | Facility: CLINIC | Age: 59
End: 2019-12-03

## 2019-12-03 RX ORDER — MELOXICAM 15 MG/1
15 TABLET ORAL DAILY
Qty: 30 TABLET | Refills: 0 | Status: SHIPPED | OUTPATIENT
Start: 2019-12-03 | End: 2020-07-16 | Stop reason: ALTCHOICE

## 2019-12-23 ENCOUNTER — PATIENT OUTREACH (OUTPATIENT)
Dept: OTHER | Facility: OTHER | Age: 59
End: 2019-12-23

## 2019-12-23 NOTE — PROGRESS NOTES
Digital Medicine: Clinician Follow-Up    Called patient for digital medicine follow-up. He says that he is doing well overall but, he has not been checking his blood pressure regularly. Patient has been caring for his wife and daughter and says that he knows that he needs to get back on track. Currently taking irbesartan 300 mg daily and denies missed doses.     The history is provided by the patient. No  was used.     Follow Up  Follow-up reason(s): reading review      Readings are missing.   patient reminder needed.          Assessment:  Unable to assess hypertension control due to lack of data.       Plan:  Continue current regimen.  Encouraged more frequent readings.   Patients health , Lora Ortiz, will follow-up as scheduled.    I will continue to monitor regularly and will follow-up in 6-8 weeks, sooner if blood pressure begins to trend upward or downward.     Patient has my contact information and knows to call with any concerns or clinical changes.            There are no preventive care reminders to display for this patient.    Last 5 Patient Entered Readings                                      Current 30 Day Average:      Recent Readings 11/12/2019 11/12/2019 11/9/2019 11/7/2019 11/6/2019    SBP (mmHg) 135 148 121 130 142    DBP (mmHg) 97 93 93 86 92    Pulse 73 62 78 64 62             Hypertension Medications             irbesartan (AVAPRO) 300 MG tablet Take 1 tablet (300 mg total) by mouth once daily.

## 2019-12-26 ENCOUNTER — PATIENT MESSAGE (OUTPATIENT)
Dept: INTERNAL MEDICINE | Facility: CLINIC | Age: 59
End: 2019-12-26

## 2019-12-26 ENCOUNTER — PATIENT MESSAGE (OUTPATIENT)
Dept: FAMILY MEDICINE | Facility: CLINIC | Age: 59
End: 2019-12-26

## 2019-12-27 RX ORDER — ONDANSETRON 4 MG/1
4-8 TABLET, FILM COATED ORAL EVERY 8 HOURS PRN
Qty: 24 TABLET | Refills: 0 | Status: SHIPPED | OUTPATIENT
Start: 2019-12-27 | End: 2021-05-16 | Stop reason: ALTCHOICE

## 2019-12-27 NOTE — TELEPHONE ENCOUNTER
Patient of Dr. Trejo with nausea and vomiting - requesting nausea medication - sent message to me - will forward to his PCP Neil and his staff so someone can contact patient to address

## 2020-01-19 ENCOUNTER — PATIENT MESSAGE (OUTPATIENT)
Dept: DIABETES | Facility: CLINIC | Age: 60
End: 2020-01-19

## 2020-01-20 ENCOUNTER — TELEPHONE (OUTPATIENT)
Dept: INTERNAL MEDICINE | Facility: CLINIC | Age: 60
End: 2020-01-20

## 2020-01-20 NOTE — PROGRESS NOTES
"Pt sent the following [facility] message: "Hello     Please cancel me from this program as I have too many concurrent activities.     Thank you     Joseph Jesus"    Replied to pt message, resolved digital medicine episode and notified care team.  "

## 2020-02-01 ENCOUNTER — PATIENT OUTREACH (OUTPATIENT)
Dept: ADMINISTRATIVE | Facility: OTHER | Age: 60
End: 2020-02-01

## 2020-02-03 ENCOUNTER — OFFICE VISIT (OUTPATIENT)
Dept: HEPATOLOGY | Facility: CLINIC | Age: 60
End: 2020-02-03
Attending: FAMILY MEDICINE
Payer: COMMERCIAL

## 2020-02-03 ENCOUNTER — PROCEDURE VISIT (OUTPATIENT)
Dept: HEPATOLOGY | Facility: CLINIC | Age: 60
End: 2020-02-03
Payer: COMMERCIAL

## 2020-02-03 VITALS
OXYGEN SATURATION: 95 % | HEART RATE: 79 BPM | WEIGHT: 221.13 LBS | DIASTOLIC BLOOD PRESSURE: 83 MMHG | RESPIRATION RATE: 18 BRPM | SYSTOLIC BLOOD PRESSURE: 124 MMHG | HEIGHT: 73 IN | BODY MASS INDEX: 29.31 KG/M2

## 2020-02-03 DIAGNOSIS — K76.0 NAFLD (NONALCOHOLIC FATTY LIVER DISEASE): Primary | ICD-10-CM

## 2020-02-03 DIAGNOSIS — K76.0 NAFLD (NONALCOHOLIC FATTY LIVER DISEASE): ICD-10-CM

## 2020-02-03 PROCEDURE — 3008F BODY MASS INDEX DOCD: CPT | Mod: CPTII,S$GLB,, | Performed by: INTERNAL MEDICINE

## 2020-02-03 PROCEDURE — 99999 PR PBB SHADOW E&M-EST. PATIENT-LVL IV: CPT | Mod: PBBFAC,,, | Performed by: INTERNAL MEDICINE

## 2020-02-03 PROCEDURE — 91200 LIVER ELASTOGRAPHY: CPT | Mod: S$GLB,,, | Performed by: INTERNAL MEDICINE

## 2020-02-03 PROCEDURE — 3074F SYST BP LT 130 MM HG: CPT | Mod: CPTII,S$GLB,, | Performed by: INTERNAL MEDICINE

## 2020-02-03 PROCEDURE — 99999 PR PBB SHADOW E&M-EST. PATIENT-LVL IV: ICD-10-PCS | Mod: PBBFAC,,, | Performed by: INTERNAL MEDICINE

## 2020-02-03 PROCEDURE — 3079F DIAST BP 80-89 MM HG: CPT | Mod: CPTII,S$GLB,, | Performed by: INTERNAL MEDICINE

## 2020-02-03 PROCEDURE — 99204 OFFICE O/P NEW MOD 45 MIN: CPT | Mod: S$GLB,,, | Performed by: INTERNAL MEDICINE

## 2020-02-03 PROCEDURE — 3074F PR MOST RECENT SYSTOLIC BLOOD PRESSURE < 130 MM HG: ICD-10-PCS | Mod: CPTII,S$GLB,, | Performed by: INTERNAL MEDICINE

## 2020-02-03 PROCEDURE — 3079F PR MOST RECENT DIASTOLIC BLOOD PRESSURE 80-89 MM HG: ICD-10-PCS | Mod: CPTII,S$GLB,, | Performed by: INTERNAL MEDICINE

## 2020-02-03 PROCEDURE — 3008F PR BODY MASS INDEX (BMI) DOCUMENTED: ICD-10-PCS | Mod: CPTII,S$GLB,, | Performed by: INTERNAL MEDICINE

## 2020-02-03 PROCEDURE — 91200 FIBROSCAN (VIBRATION CONTROLLED TRANSIENT ELASTOGRAPHY): ICD-10-PCS | Mod: S$GLB,,, | Performed by: INTERNAL MEDICINE

## 2020-02-03 PROCEDURE — 99204 PR OFFICE/OUTPT VISIT, NEW, LEVL IV, 45-59 MIN: ICD-10-PCS | Mod: S$GLB,,, | Performed by: INTERNAL MEDICINE

## 2020-02-03 NOTE — LETTER
February 3, 2020      Nick Trejo MD  [address]  [place] [address]           [facility] - Hepatology  [address]  [place] [address]  Phone: [phone]  Fax: [phone]          Patient: Joseph Jesus   MR Number: [id number]   YOB: 1960   Date of Visit: 2/3/2020       Dear Dr. Nick Trejo:    Thank you for referring Joseph Jesus to me for evaluation. Attached you will find relevant portions of my assessment and plan of care.    If you have questions, please do not hesitate to call me. I look forward to following Joseph Jesus along with you.    Sincerely,    Haroldo Presley MD    Enclosure  CC:  No Recipients    If you would like to receive this communication electronically, please contact [email or web address] or [phone] to request more information on [facility] access.    For providers and/or their staff who would like to refer a patient to [facility], please contact us through our one-stop-shop provider referral line, [facility] [place], at [phone].    If you feel you have received this communication in error or would no longer like to receive these types of communications, please e-mail [email or web address]

## 2020-02-03 NOTE — PROGRESS NOTES
Subjective:       Patient ID: Joseph Jesus is a 59 y.o. male.    Chief Complaint: Fatty Liver    HPI  I saw this 59 y.o. man in the hepatology clinic for his fluctuating LFTs. I last saw him in Dec 2016. He has a degree of NAFLD with no fibrosis detected by non-invasive testing.    He has previously been seen by Dr Encinas in 2004 and Dr Valdes in 2006.  Dr. Encinas had suspected NAFLD as etiology of elevated liver enzymes. He had a serological workup that was negative for Wild's, alpha-1 antitrypsin deficiency, hemochromatosis, autoimmune etiology, and viral hepatitis in 2004. TOMMY was (+) at 1:160 then.     He does have risk factors for NAFLD with being overweight, HTN, and HLD. CT in 2004 and 2005 showed some liver cysts. U/s in 2012 only commented on a hernia.      Fluctuating weight    Mildly abnormal LFTs  TOMMY 1:160  IgG normal    F0 in 2015  Elastography in 4/2015- F0-1    Last liver US: 11/2016  Normal liver  Abdo CT: Dec 2018  Live cysts only    PMH:  Prostatitis  Golfer's and tennis elbow  BCC  Hypertension    No DM    SH:  Still exercises 1-3 x weekly  Alcohol- rarely      FH:  hypertension    Review of Systems   Constitutional: Negative for activity change, appetite change, chills, fatigue, fever and unexpected weight change.   HENT: Negative for hearing loss.    Eyes: Negative for discharge and visual disturbance.   Respiratory: Negative for cough, chest tightness, shortness of breath and wheezing.    Cardiovascular: Negative for chest pain, palpitations and leg swelling.   Gastrointestinal: Negative for abdominal distention, abdominal pain, constipation, diarrhea and nausea.   Genitourinary: Negative for dysuria and frequency.   Musculoskeletal: Negative for arthralgias and back pain.   Skin: Negative for pallor and rash.   Neurological: Negative for dizziness, tremors, speech difficulty and headaches.   Hematological: Negative for adenopathy.   Psychiatric/Behavioral: Negative for  agitation and confusion.            Lab Results   Component Value Date    ALT 89 (H) 05/30/2019    AST 44 (H) 05/30/2019    GGT 18 03/12/2015    ALKPHOS 58 05/30/2019    BILITOT 0.7 05/30/2019     Past Medical History:   Diagnosis Date    Allergy     seasonal    Anal fistula hx    Arthritis     Basal cell carcinoma 4/2013    left superior forehead    Callus     Diverticulosis     Epididymal cyst     Hayfever     Hydrocele, right     Hyperlipidemia     Hypertension     Neck pain     hx of muscular inury from weight lifting---resolved now    Prostatitis     Dec. '12-treated with antibx.    Squamous cell carcinoma 01/07/2019    anterior scalp    Visual impairment      Past Surgical History:   Procedure Laterality Date    APPENDECTOMY  1962    COLONOSCOPY  12/2012    due for repeat 12/2015    COLONOSCOPY N/A 5/19/2016    Procedure: COLONOSCOPY;  Surgeon: James Webber MD;  [facility] ([facility]  Service: Endoscopy;  Laterality: N/A;    EUA/Fistulotomy-'08      HERNIA REPAIR      [facility] with mesh    Hydrocelectomy  2013    MASS EXCISION  2004    BCC removal (twice)    MOLE REMOVAL  02/25/2019    2 moles removed from scalp =- 1 was basal cell and 1 was squamous cell - dermatology - Dr. londono    right Spermatocelectomy  2013     Current Outpatient Medications   Medication Sig    atorvastatin (LIPITOR) 10 MG tablet TAKE 1 TABLET BY MOUTH ONCE DAILY.    cetirizine (ZYRTEC) 10 MG tablet Take 10 mg by mouth once daily.    coenzyme Q10 100 mg capsule Take 200 mg by mouth once daily.    fluticasone propionate (FLONASE) 50 mcg/actuation nasal spray 1 spray (50 mcg total) by Each Nostril route once daily. (Patient taking differently: 1 spray by Each Nostril route as needed. )    irbesartan (AVAPRO) 300 MG tablet Take 1 tablet (300 mg total) by mouth once daily.    meloxicam (MOBIC) 15 MG tablet Take 1 tablet (15 mg total) by mouth once daily. (Patient taking differently: Take 15 mg by  mouth as needed. )    ondansetron (ZOFRAN) 4 MG tablet Take 1-2 tablets (4-8 mg total) by mouth every 8 (eight) hours as needed for Nausea.    varicella-zoster gE-AS01B, PF, (SHINGRIX, PF,) 50 mcg/0.5 mL injection Inject into the muscle.    papaverine 30 mg/mL injection Add Phentolamine 1 mg/cc  Add PGE1 10 mcg/cc    SIG:  Bring to office for test dose in physician office (Patient not taking: Reported on 2/3/2020)    pneumoc 13-krysten conj-dip cr,PF, (PREVNAR 13, PF,) 0.5 mL Syrg Inject into the muscle.     No current facility-administered medications for this visit.        Objective:      Physical Exam   Constitutional: He is oriented to person, place, and time. He appears well-nourished.   HENT:   Head: Normocephalic.   Eyes: Pupils are equal, round, and reactive to light.   Neck: No thyromegaly present.   Cardiovascular: Normal rate, regular rhythm and normal heart sounds.   Pulmonary/Chest: Effort normal and breath sounds normal. He has no wheezes.   Abdominal: Soft. He exhibits no distension and no mass. There is no tenderness.   Lymphadenopathy:     He has no cervical adenopathy.   Neurological: He is alert and oriented to person, place, and time.   Skin: Skin is warm. No rash noted. No erythema.   Psychiatric: He has a normal mood and affect. His behavior is normal.       Assessment:       1. NAFLD (nonalcoholic fatty liver disease)        Plan:   Likely NAFLD and reassuring fibrosis assessment in 2016.  Fibroscan today once again shows no fibrosis but does reveal significant steatosis.      - discussed weight loss but he likely has an excellent prognosis given his lack of fibrosis.  - CMP + abdo US   - if these tests are unremarkable, he will not need any further follow up in hepatology    I encouraged him to lose weight and mentioned to him that NAFLD is frequently associated with cardiovascular disease.

## 2020-02-04 ENCOUNTER — IMMUNIZATION (OUTPATIENT)
Dept: PHARMACY | Facility: CLINIC | Age: 60
End: 2020-02-04
Payer: COMMERCIAL

## 2020-02-06 ENCOUNTER — HOSPITAL ENCOUNTER (OUTPATIENT)
Dept: RADIOLOGY | Facility: HOSPITAL | Age: 60
Discharge: HOME | End: 2020-02-06
Attending: INTERNAL MEDICINE
Payer: COMMERCIAL

## 2020-02-06 DIAGNOSIS — K76.0 NAFLD (NONALCOHOLIC FATTY LIVER DISEASE): ICD-10-CM

## 2020-02-06 PROCEDURE — 76700 US EXAM ABDOM COMPLETE: CPT | Mod: TC

## 2020-02-06 PROCEDURE — 76700 US EXAM ABDOM COMPLETE: CPT | Mod: 26,,, | Performed by: RADIOLOGY

## 2020-02-06 PROCEDURE — 76700 US ABDOMEN COMPLETE: ICD-10-PCS | Mod: 26,,, | Performed by: RADIOLOGY

## 2020-02-11 ENCOUNTER — OFFICE VISIT (OUTPATIENT)
Dept: FAMILY MEDICINE | Facility: CLINIC | Age: 60
End: 2020-02-11
Payer: COMMERCIAL

## 2020-02-11 VITALS
HEIGHT: 73 IN | TEMPERATURE: 99 F | OXYGEN SATURATION: 98 % | WEIGHT: 224.75 LBS | SYSTOLIC BLOOD PRESSURE: 128 MMHG | BODY MASS INDEX: 29.79 KG/M2 | HEART RATE: 76 BPM | DIASTOLIC BLOOD PRESSURE: 78 MMHG

## 2020-02-11 DIAGNOSIS — R05.9 COUGH: ICD-10-CM

## 2020-02-11 DIAGNOSIS — J01.91 ACUTE RECURRENT SINUSITIS, UNSPECIFIED LOCATION: Primary | ICD-10-CM

## 2020-02-11 PROCEDURE — 3074F PR MOST RECENT SYSTOLIC BLOOD PRESSURE < 130 MM HG: ICD-10-PCS | Mod: CPTII,S$GLB,, | Performed by: NURSE PRACTITIONER

## 2020-02-11 PROCEDURE — 99999 PR PBB SHADOW E&M-EST. PATIENT-LVL V: ICD-10-PCS | Mod: PBBFAC,,, | Performed by: NURSE PRACTITIONER

## 2020-02-11 PROCEDURE — 3008F PR BODY MASS INDEX (BMI) DOCUMENTED: ICD-10-PCS | Mod: CPTII,S$GLB,, | Performed by: NURSE PRACTITIONER

## 2020-02-11 PROCEDURE — 3074F SYST BP LT 130 MM HG: CPT | Mod: CPTII,S$GLB,, | Performed by: NURSE PRACTITIONER

## 2020-02-11 PROCEDURE — 99213 OFFICE O/P EST LOW 20 MIN: CPT | Mod: S$GLB,,, | Performed by: NURSE PRACTITIONER

## 2020-02-11 PROCEDURE — 3078F DIAST BP <80 MM HG: CPT | Mod: CPTII,S$GLB,, | Performed by: NURSE PRACTITIONER

## 2020-02-11 PROCEDURE — 99999 PR PBB SHADOW E&M-EST. PATIENT-LVL V: CPT | Mod: PBBFAC,,, | Performed by: NURSE PRACTITIONER

## 2020-02-11 PROCEDURE — 3078F PR MOST RECENT DIASTOLIC BLOOD PRESSURE < 80 MM HG: ICD-10-PCS | Mod: CPTII,S$GLB,, | Performed by: NURSE PRACTITIONER

## 2020-02-11 PROCEDURE — 3008F BODY MASS INDEX DOCD: CPT | Mod: CPTII,S$GLB,, | Performed by: NURSE PRACTITIONER

## 2020-02-11 PROCEDURE — 99213 PR OFFICE/OUTPT VISIT, EST, LEVL III, 20-29 MIN: ICD-10-PCS | Mod: S$GLB,,, | Performed by: NURSE PRACTITIONER

## 2020-02-11 RX ORDER — AMOXICILLIN AND CLAVULANATE POTASSIUM 875; 125 MG/1; MG/1
1 TABLET, FILM COATED ORAL 2 TIMES DAILY
Qty: 20 TABLET | Refills: 0 | Status: SHIPPED | OUTPATIENT
Start: 2020-02-11 | End: 2020-02-21

## 2020-02-11 RX ORDER — CODEINE PHOSPHATE AND GUAIFENESIN 10; 100 MG/5ML; MG/5ML
5 SOLUTION ORAL EVERY 6 HOURS PRN
Qty: 140 ML | Refills: 0 | Status: SHIPPED | OUTPATIENT
Start: 2020-02-11 | End: 2020-03-10 | Stop reason: SDUPTHER

## 2020-02-11 RX ORDER — METHYLPREDNISOLONE 4 MG/1
TABLET ORAL
Qty: 1 PACKAGE | Refills: 0 | Status: SHIPPED | OUTPATIENT
Start: 2020-02-11 | End: 2020-03-03

## 2020-02-11 NOTE — PROGRESS NOTES
Subjective:       Patient ID: Joseph Jesus is a 59 y.o. male.    Chief Complaint: Chest Congestion    60 y/o male with hypertension and hyperlipidemia presents to clinic for urgent care visit with c/o sinus problems and cough.  Sinus Problem   This is a new problem. The current episode started more than 1 month ago. The problem has been waxing and waning since onset. There has been no fever. Associated symptoms include chills, congestion, coughing, headaches and sinus pressure. Pertinent negatives include no ear pain, shortness of breath or sore throat. Treatments tried: cetirizine, Nasonex. The treatment provided no relief.       Current Outpatient Medications   Medication Sig Dispense Refill    atorvastatin (LIPITOR) 10 MG tablet TAKE 1 TABLET BY MOUTH ONCE DAILY. 90 tablet 1    cetirizine (ZYRTEC) 10 MG tablet Take 10 mg by mouth once daily.      coenzyme Q10 100 mg capsule Take 200 mg by mouth once daily.      fluticasone propionate (FLONASE) 50 mcg/actuation nasal spray 1 spray (50 mcg total) by Each Nostril route once daily. (Patient taking differently: 1 spray by Each Nostril route as needed. ) 16 g 2    irbesartan (AVAPRO) 300 MG tablet Take 1 tablet (300 mg total) by mouth once daily. 90 tablet 3    meloxicam (MOBIC) 15 MG tablet Take 1 tablet (15 mg total) by mouth once daily. (Patient taking differently: Take 15 mg by mouth as needed. ) 30 tablet 0    pneumoc 13-krysten conj-dip cr,PF, (PREVNAR 13, PF,) 0.5 mL Syrg Inject into the muscle. 0.5 mL 0    varicella-zoster gE-AS01B, PF, (SHINGRIX, PF,) 50 mcg/0.5 mL injection Inject into the muscle. 0.5 mL 0    amoxicillin-clavulanate 875-125mg (AUGMENTIN) 875-125 mg per tablet Take 1 tablet by mouth 2 (two) times daily. for 10 days 20 tablet 0    guaifenesin-codeine 100-10 mg/5 ml (TUSSI-ORGANIDIN NR)  mg/5 mL syrup Take 5 mLs by mouth every 6 (six) hours as needed for Cough. 140 mL 0    methylPREDNISolone (MEDROL DOSEPACK) 4 mg tablet use as  directed 1 Package 0    ondansetron (ZOFRAN) 4 MG tablet Take 1-2 tablets (4-8 mg total) by mouth every 8 (eight) hours as needed for Nausea. (Patient not taking: Reported on 2/11/2020) 24 tablet 0    papaverine 30 mg/mL injection Add Phentolamine 1 mg/cc  Add PGE1 10 mcg/cc    SIG:  Bring to office for test dose in physician office (Patient not taking: Reported on 2/3/2020) 5 mL 0     No current facility-administered medications for this visit.        Past Medical History:   Diagnosis Date    Allergy     seasonal    Anal fistula hx    Arthritis     Basal cell carcinoma 4/2013    left superior forehead    Callus     Diverticulosis     Epididymal cyst     Hayfever     Hydrocele, right     Hyperlipidemia     Hypertension     Neck pain     hx of muscular inury from weight lifting---resolved now    Prostatitis     Dec. '12-treated with antibx.    Squamous cell carcinoma 01/07/2019    anterior scalp    Visual impairment        Past Surgical History:   Procedure Laterality Date    APPENDECTOMY  1962    COLONOSCOPY  12/2012    due for repeat 12/2015    COLONOSCOPY N/A 5/19/2016    Procedure: COLONOSCOPY;  Surgeon: James Webber MD;  Location: [facility] ([address] [place] [address]);  Service: Endoscopy;  Laterality: N/A;    EUA/Fistulotomy-'08      [facility]PAIR      [facility] mesh    Hydrocelectomy  2013    MASS EXCISION  2004    BCC removal (twice)    MOLE REMOVAL  02/25/2019    2 moles removed from scalp =- 1 was basal cell and 1 was squamous cell - dermatology - Dr. londono    right Spermatocelectomy  2013       Family History   Problem Relation Age of Onset    Skin cancer Mother     Hypertension Mother     Skin cancer Father     Cancer Father         prostate cancer    Hypertension Father     Hypertension Maternal Grandmother     Hypertension Maternal Grandfather     Hypertension Paternal Grandmother     Anesthesia problems Paternal Grandmother     Cancer Paternal Grandfather         prostate  cancer    Hypertension Paternal Grandfather     Heart disease Paternal Grandfather     Diabetes Neg Hx     Colon polyps Neg Hx     Colon cancer Neg Hx     Melanoma Neg Hx     Psoriasis Neg Hx     Lupus Neg Hx     Eczema Neg Hx     Acne Neg Hx     Cirrhosis Neg Hx     Prostate cancer Neg Hx     Kidney disease Neg Hx        Social History     Socioeconomic History    Marital status:      Spouse name: Traci    Number of children: 1    Years of education: Not on file    Highest education level: Not on file   Occupational History    Occupation: Software Eng.     Employer: [employer]     Comment: [employer]   Social Needs    Financial resource strain: Not on file    Food insecurity:     Worry: Not on file     Inability: Not on file    Transportation needs:     Medical: Not on file     Non-medical: Not on file   Tobacco Use    Smoking status: Former Smoker     Packs/day: 1.00     Years: 10.00     Pack years: 10.00     Last attempt to quit: 1987     Years since quittin.1    Smokeless tobacco: Never Used   Substance and Sexual Activity    Alcohol use: Yes     Alcohol/week: 1.0 standard drinks     Types: 1 Glasses of wine per week     Comment: socially    Drug use: No    Sexual activity: Yes     Partners: Female   Lifestyle    Physical activity:     Days per week: Not on file     Minutes per session: Not on file    Stress: Not on file   Relationships    Social connections:     Talks on phone: Not on file     Gets together: Not on file     Attends Buddhist service: Not on file     Active member of club or organization: Not on file     Attends meetings of clubs or organizations: Not on file     Relationship status: Not on file   Other Topics Concern    Not on file   Social History Narrative    Not on file       Review of Systems   Constitutional: Positive for chills and fatigue. Negative for fever and unexpected weight change.   HENT: Positive for congestion, postnasal drip,  "rhinorrhea and sinus pressure. Negative for ear pain, sore throat and trouble swallowing.    Eyes: Negative for visual disturbance.   Respiratory: Positive for cough. Negative for shortness of breath.    Cardiovascular: Negative for chest pain.   Musculoskeletal: Negative for myalgias.   Allergic/Immunologic: Negative for environmental allergies and immunocompromised state.   Neurological: Positive for headaches.   Hematological: Negative for adenopathy. Does not bruise/bleed easily.   Psychiatric/Behavioral: Negative for dysphoric mood.         Objective:     Vitals:    02/11/20 1636   BP: 128/78   Pulse: 76   Temp: 98.6 °F (37 °C)   TempSrc: Oral   SpO2: 98%   Weight: 101.9 kg (224 lb 12.1 oz)   Height: 6' 1" (1.854 m)          Physical Exam   Constitutional: He is oriented to person, place, and time. He appears well-developed and well-nourished. He appears ill. No distress.   HENT:   Head: Normocephalic.   Right Ear: Ear canal normal. A middle ear effusion is present.   Left Ear: Ear canal normal. A middle ear effusion is present.   Nose: Mucosal edema and rhinorrhea (clear nasal discharge) present. Right sinus exhibits frontal sinus tenderness. Left sinus exhibits frontal sinus tenderness.   Mouth/Throat: Mucous membranes are normal. Posterior oropharyngeal erythema (+PND) present. No oropharyngeal exudate. No tonsillar exudate.   Eyes: Pupils are equal, round, and reactive to light. Lids are normal. Right conjunctiva is injected. Left conjunctiva is injected.   Neck: Normal range of motion. Neck supple.   Cardiovascular: Normal rate and regular rhythm.   No murmur heard.  Pulmonary/Chest: Effort normal and breath sounds normal.   Musculoskeletal: Normal range of motion.   Lymphadenopathy:        Head (right side): No preauricular, no posterior auricular and no occipital adenopathy present.        Head (left side): No preauricular, no posterior auricular and no occipital adenopathy present.     He has no " cervical adenopathy.   Neurological: He is alert and oriented to person, place, and time.   Skin: Skin is warm and dry.   Psychiatric: He has a normal mood and affect. His behavior is normal.         Assessment:         ICD-10-CM ICD-9-CM   1. Acute recurrent sinusitis, unspecified location J01.91 461.9   2. Cough R05 786.2       Plan:       Acute recurrent sinusitis, unspecified location  -    Continue cetirizine and Nasonex as prescribed  -     amoxicillin-clavulanate 875-125mg (AUGMENTIN) 875-125 mg per tablet; Take 1 tablet by mouth 2 (two) times daily. for 10 days  Dispense: 20 tablet; Refill: 0  -     methylPREDNISolone (MEDROL DOSEPACK) 4 mg tablet; use as directed  Dispense: 1 Package; Refill: 0    Cough  -     guaifenesin-codeine 100-10 mg/5 ml (TUSSI-ORGANIDIN NR)  mg/5 mL syrup; Take 5 mLs by mouth every 6 (six) hours as needed for Cough.  Dispense: 140 mL; Refill: 0      Follow up in about 2 weeks (around 2/25/2020), or if symptoms worsen or fail to improve.     Patient's Medications   New Prescriptions    AMOXICILLIN-CLAVULANATE 875-125MG (AUGMENTIN) 875-125 MG PER TABLET    Take 1 tablet by mouth 2 (two) times daily. for 10 days    GUAIFENESIN-CODEINE 100-10 MG/5 ML (TUSSI-ORGANIDIN NR)  MG/5 ML SYRUP    Take 5 mLs by mouth every 6 (six) hours as needed for Cough.    METHYLPREDNISOLONE (MEDROL DOSEPACK) 4 MG TABLET    use as directed   Previous Medications    ATORVASTATIN (LIPITOR) 10 MG TABLET    TAKE 1 TABLET BY MOUTH ONCE DAILY.    CETIRIZINE (ZYRTEC) 10 MG TABLET    Take 10 mg by mouth once daily.    COENZYME Q10 100 MG CAPSULE    Take 200 mg by mouth once daily.    FLUTICASONE PROPIONATE (FLONASE) 50 MCG/ACTUATION NASAL SPRAY    1 spray (50 mcg total) by Each Nostril route once daily.    IRBESARTAN (AVAPRO) 300 MG TABLET    Take 1 tablet (300 mg total) by mouth once daily.    MELOXICAM (MOBIC) 15 MG TABLET    Take 1 tablet (15 mg total) by mouth once daily.    ONDANSETRON (ZOFRAN) 4 MG  TABLET    Take 1-2 tablets (4-8 mg total) by mouth every 8 (eight) hours as needed for Nausea.    PAPAVERINE 30 MG/ML INJECTION    Add Phentolamine 1 mg/cc  Add PGE1 10 mcg/cc    SIG:  Bring to office for test dose in physician office    PNEUMOC 13-DARLENE CONJ-DIP CR,PF, (PREVNAR 13, PF,) 0.5 ML SYRG    Inject into the muscle.    VARICELLA-ZOSTER GE-AS01B, PF, (SHINGRIX, PF,) 50 MCG/0.5 ML INJECTION    Inject into the muscle.   Modified Medications    No medications on file   Discontinued Medications    No medications on file

## 2020-02-11 NOTE — PATIENT INSTRUCTIONS

## 2020-03-10 ENCOUNTER — OFFICE VISIT (OUTPATIENT)
Dept: FAMILY MEDICINE | Facility: CLINIC | Age: 60
End: 2020-03-10
Payer: COMMERCIAL

## 2020-03-10 VITALS
TEMPERATURE: 99 F | WEIGHT: 222.69 LBS | SYSTOLIC BLOOD PRESSURE: 110 MMHG | HEART RATE: 66 BPM | DIASTOLIC BLOOD PRESSURE: 86 MMHG | BODY MASS INDEX: 29.51 KG/M2 | HEIGHT: 73 IN | OXYGEN SATURATION: 97 %

## 2020-03-10 DIAGNOSIS — J06.9 VIRAL URI WITH COUGH: ICD-10-CM

## 2020-03-10 PROCEDURE — 3008F BODY MASS INDEX DOCD: CPT | Mod: CPTII,S$GLB,, | Performed by: NURSE PRACTITIONER

## 2020-03-10 PROCEDURE — 99999 PR PBB SHADOW E&M-EST. PATIENT-LVL IV: CPT | Mod: PBBFAC,,, | Performed by: NURSE PRACTITIONER

## 2020-03-10 PROCEDURE — 3079F PR MOST RECENT DIASTOLIC BLOOD PRESSURE 80-89 MM HG: ICD-10-PCS | Mod: CPTII,S$GLB,, | Performed by: NURSE PRACTITIONER

## 2020-03-10 PROCEDURE — 99213 PR OFFICE/OUTPT VISIT, EST, LEVL III, 20-29 MIN: ICD-10-PCS | Mod: S$GLB,,, | Performed by: NURSE PRACTITIONER

## 2020-03-10 PROCEDURE — 3074F PR MOST RECENT SYSTOLIC BLOOD PRESSURE < 130 MM HG: ICD-10-PCS | Mod: CPTII,S$GLB,, | Performed by: NURSE PRACTITIONER

## 2020-03-10 PROCEDURE — 99213 OFFICE O/P EST LOW 20 MIN: CPT | Mod: S$GLB,,, | Performed by: NURSE PRACTITIONER

## 2020-03-10 PROCEDURE — 3074F SYST BP LT 130 MM HG: CPT | Mod: CPTII,S$GLB,, | Performed by: NURSE PRACTITIONER

## 2020-03-10 PROCEDURE — 3008F PR BODY MASS INDEX (BMI) DOCUMENTED: ICD-10-PCS | Mod: CPTII,S$GLB,, | Performed by: NURSE PRACTITIONER

## 2020-03-10 PROCEDURE — 3079F DIAST BP 80-89 MM HG: CPT | Mod: CPTII,S$GLB,, | Performed by: NURSE PRACTITIONER

## 2020-03-10 PROCEDURE — 99999 PR PBB SHADOW E&M-EST. PATIENT-LVL IV: ICD-10-PCS | Mod: PBBFAC,,, | Performed by: NURSE PRACTITIONER

## 2020-03-10 RX ORDER — CODEINE PHOSPHATE AND GUAIFENESIN 10; 100 MG/5ML; MG/5ML
5 SOLUTION ORAL EVERY 6 HOURS PRN
Qty: 140 ML | Refills: 0 | Status: SHIPPED | OUTPATIENT
Start: 2020-03-10 | End: 2020-07-16 | Stop reason: ALTCHOICE

## 2020-03-10 NOTE — PATIENT INSTRUCTIONS
Viral Upper Respiratory Illness (Adult)  You have a viral upper respiratory illness (URI), which is another term for the common cold. This illness is contagious during the first few days. It is spread through the air by coughing and sneezing. It may also be spread by direct contact (touching the sick person and then touching your own eyes, nose, or mouth). Frequent handwashing will decrease risk of spread. Most viral illnesses go away within 7 to 10 days with rest and simple home remedies. Sometimes the illness may last for several weeks. Antibiotics will not kill a virus, and they are generally not prescribed for this condition.    Home care  · If symptoms are severe, rest at home for the first 2 to 3 days. When you resume activity, don't let yourself get too tired.  · Avoid being exposed to cigarette smoke (yours or others).  · You may use acetaminophen or ibuprofen to control pain and fever, unless another medicine was prescribed. (Note: If you have chronic liver or kidney disease, have ever had a stomach ulcer or gastrointestinal bleeding, or are taking blood-thinning medicines, talk with your healthcare provider before using these medicines.) Aspirin should never be given to anyone under 18 years of age who is ill with a viral infection or fever. It may cause severe liver or brain damage.  · Your appetite may be poor, so a light diet is fine. Avoid dehydration by drinking 6 to 8 glasses of fluids per day (water, soft drinks, juices, tea, or soup). Extra fluids will help loosen secretions in the nose and lungs.  · Over-the-counter cold medicines will not shorten the length of time youre sick, but they may be helpful for the following symptoms: cough, sore throat, and nasal and sinus congestion. (Note: Do not use decongestants if you have high blood pressure.)  Follow-up care  Follow up with your healthcare provider, or as advised.  When to seek medical advice  Call your healthcare provider right away if any  of these occur:  · Cough with lots of colored sputum (mucus)  · Severe headache; face, neck, or ear pain  · Difficulty swallowing due to throat pain  · Fever of 100.4°F (38°C)  Call 911, or get immediate medical care  Call emergency services right away if any of these occur:  · Chest pain, shortness of breath, wheezing, or difficulty breathing  · Coughing up blood  · Inability to swallow due to throat pain  Date Last Reviewed: 9/13/2015  © 3922-8988 [employer]. [address] [place] [address]. All rights reserved. This information is not intended as a substitute for professional medical care. Always follow your healthcare professional's instructions.

## 2020-03-10 NOTE — PROGRESS NOTES
Subjective:       Patient ID: Joseph Jesus is a 60 y.o. male.    Chief Complaint: Cold (started back sunday)    61 y/o male with hypertension and hyperlipidemia presents to clinic with c/o cough.    URI    This is a new problem. The current episode started in the past 7 days (2 days ago). The problem has been unchanged. There has been no fever. Associated symptoms include coughing, rhinorrhea and a sore throat. Pertinent negatives include no abdominal pain, chest pain, congestion, ear pain, headaches or sinus pain. He has tried antihistamine and decongestant for the symptoms. The treatment provided mild relief.       Current Outpatient Medications   Medication Sig Dispense Refill    atorvastatin (LIPITOR) 10 MG tablet TAKE 1 TABLET BY MOUTH ONCE DAILY. 90 tablet 1    cetirizine (ZYRTEC) 10 MG tablet Take 10 mg by mouth once daily.      coenzyme Q10 100 mg capsule Take 200 mg by mouth once daily.      irbesartan (AVAPRO) 300 MG tablet Take 1 tablet (300 mg total) by mouth once daily. 90 tablet 3    fluticasone propionate (FLONASE) 50 mcg/actuation nasal spray 1 spray (50 mcg total) by Each Nostril route once daily. (Patient not taking: Reported on 3/10/2020) 16 g 2    guaifenesin-codeine 100-10 mg/5 ml (TUSSI-ORGANIDIN NR)  mg/5 mL syrup Take 5 mLs by mouth every 6 (six) hours as needed for Cough. 140 mL 0    meloxicam (MOBIC) 15 MG tablet Take 1 tablet (15 mg total) by mouth once daily. (Patient not taking: Reported on 3/10/2020) 30 tablet 0    ondansetron (ZOFRAN) 4 MG tablet Take 1-2 tablets (4-8 mg total) by mouth every 8 (eight) hours as needed for Nausea. (Patient not taking: Reported on 2/11/2020) 24 tablet 0    papaverine 30 mg/mL injection Add Phentolamine 1 mg/cc  Add PGE1 10 mcg/cc    SIG:  Bring to office for test dose in physician office (Patient not taking: Reported on 2/3/2020) 5 mL 0     No current facility-administered medications for this visit.        Past Medical History:    Diagnosis Date    Allergy     seasonal    Anal fistula hx    Arthritis     Basal cell carcinoma 4/2013    left superior forehead    Callus     Diverticulosis     Epididymal cyst     Hayfever     Hydrocele, right     Hyperlipidemia     Hypertension     Neck pain     hx of muscular inury from weight lifting---resolved now    Prostatitis     Dec. '12-treated with antibx.    Squamous cell carcinoma 01/07/2019    anterior scalp    Visual impairment        Past Surgical History:   Procedure Laterality Date    APPENDECTOMY  1962    COLONOSCOPY  12/2012    due for repeat 12/2015    COLONOSCOPY N/A 5/19/2016    Procedure: COLONOSCOPY;  Surgeon: James Webber MD;  Location: [facility] ENDO (4TH FLR);  Service: Endoscopy;  Laterality: N/A;    EUA/Fistulotomy-'08      HERNIA REPAIR      [facility] with mesh    Hydrocelectomy  2013    MASS EXCISION  2004    BCC removal (twice)    MOLE REMOVAL  02/25/2019    2 moles removed from scalp =- 1 was basal cell and 1 was squamous cell - dermatology - Dr. londono    right Spermatocelectomy  2013       Family History   Problem Relation Age of Onset    Skin cancer Mother     Hypertension Mother     Skin cancer Father     Cancer Father         prostate cancer    Hypertension Father     Hypertension Maternal Grandmother     Hypertension Maternal Grandfather     Hypertension Paternal Grandmother     Anesthesia problems Paternal Grandmother     Cancer Paternal Grandfather         prostate cancer    Hypertension Paternal Grandfather     Heart disease Paternal Grandfather     Diabetes Neg Hx     Colon polyps Neg Hx     Colon cancer Neg Hx     Melanoma Neg Hx     Psoriasis Neg Hx     Lupus Neg Hx     Eczema Neg Hx     Acne Neg Hx     Cirrhosis Neg Hx     Prostate cancer Neg Hx     Kidney disease Neg Hx        Social History     Socioeconomic History    Marital status:      Spouse name: Traci    Number of children: 1    Years of education: Not  on file    Highest education level: Not on file   Occupational History    Occupation: Software Eng.     Employer: [employer]     Comment: [employer]   Social Needs    Financial resource strain: Not on file    Food insecurity:     Worry: Not on file     Inability: Not on file    Transportation needs:     Medical: Not on file     Non-medical: Not on file   Tobacco Use    Smoking status: Former Smoker     Packs/day: 1.00     Years: 10.00     Pack years: 10.00     Last attempt to quit: 1987     Years since quittin.2    Smokeless tobacco: Never Used   Substance and Sexual Activity    Alcohol use: Yes     Alcohol/week: 1.0 standard drinks     Types: 1 Glasses of wine per week     Comment: socially    Drug use: No    Sexual activity: Yes     Partners: Female   Lifestyle    Physical activity:     Days per week: Not on file     Minutes per session: Not on file    Stress: Not on file   Relationships    Social connections:     Talks on phone: Not on file     Gets together: Not on file     Attends Taoist service: Not on file     Active member of club or organization: Not on file     Attends meetings of clubs or organizations: Not on file     Relationship status: Not on file   Other Topics Concern    Not on file   Social History Narrative    Not on file       Review of Systems   Constitutional: Negative for chills, fatigue, fever and unexpected weight change.   HENT: Positive for postnasal drip, rhinorrhea and sore throat. Negative for congestion, ear pain, sinus pressure, sinus pain and trouble swallowing.    Eyes: Negative for visual disturbance.   Respiratory: Positive for cough. Negative for shortness of breath.    Cardiovascular: Negative for chest pain.   Gastrointestinal: Negative for abdominal pain.   Musculoskeletal: Negative for myalgias.   Allergic/Immunologic: Negative for environmental allergies and immunocompromised state.   Neurological: Negative for headaches.   Hematological: Negative  "for adenopathy. Does not bruise/bleed easily.   Psychiatric/Behavioral: Negative for dysphoric mood.         Objective:     Vitals:    03/10/20 0712   BP: 110/86   BP Location: Right arm   Patient Position: Sitting   BP Method: Large (Manual)   Pulse: 66   Temp: 98.5 °F (36.9 °C)   TempSrc: Oral   SpO2: 97%   Weight: 101 kg (222 lb 10.6 oz)   Height: 6' 1" (1.854 m)          Physical Exam   Constitutional: He is oriented to person, place, and time. He appears well-developed and well-nourished. No distress.   HENT:   Head: Normocephalic.   Right Ear: Tympanic membrane and ear canal normal. No middle ear effusion.   Left Ear: Tympanic membrane and ear canal normal.  No middle ear effusion.   Nose: Mucosal edema and rhinorrhea (clear nasal discharge) present.   Mouth/Throat: Uvula is midline and mucous membranes are normal. Posterior oropharyngeal erythema (+PND) present. No oropharyngeal exudate. No tonsillar exudate.   Eyes: Pupils are equal, round, and reactive to light. Conjunctivae and lids are normal.   Neck: Normal range of motion. Neck supple.   Cardiovascular: Normal rate and regular rhythm.   No murmur heard.  Pulmonary/Chest: Effort normal and breath sounds normal.   Musculoskeletal: Normal range of motion.   Lymphadenopathy:        Head (right side): No preauricular, no posterior auricular and no occipital adenopathy present.        Head (left side): No preauricular, no posterior auricular and no occipital adenopathy present.     He has no cervical adenopathy.   Neurological: He is alert and oriented to person, place, and time.   Skin: Skin is warm and dry.   Psychiatric: He has a normal mood and affect. His behavior is normal.         Assessment:         ICD-10-CM ICD-9-CM   1. Viral URI with cough J06.9 465.9    B97.89        Plan:       Viral URI with cough  -     guaifenesin-codeine 100-10 mg/5 ml (TUSSI-ORGANIDIN NR)  mg/5 mL syrup; Take 5 mLs by mouth every 6 (six) hours as needed for Cough.  " Dispense: 140 mL; Refill: 0      Follow up in about 2 weeks (around 3/24/2020), or if symptoms worsen or fail to improve.     Patient's Medications   New Prescriptions    No medications on file   Previous Medications    ATORVASTATIN (LIPITOR) 10 MG TABLET    TAKE 1 TABLET BY MOUTH ONCE DAILY.    CETIRIZINE (ZYRTEC) 10 MG TABLET    Take 10 mg by mouth once daily.    COENZYME Q10 100 MG CAPSULE    Take 200 mg by mouth once daily.    FLUTICASONE PROPIONATE (FLONASE) 50 MCG/ACTUATION NASAL SPRAY    1 spray (50 mcg total) by Each Nostril route once daily.    IRBESARTAN (AVAPRO) 300 MG TABLET    Take 1 tablet (300 mg total) by mouth once daily.    MELOXICAM (MOBIC) 15 MG TABLET    Take 1 tablet (15 mg total) by mouth once daily.    ONDANSETRON (ZOFRAN) 4 MG TABLET    Take 1-2 tablets (4-8 mg total) by mouth every 8 (eight) hours as needed for Nausea.    PAPAVERINE 30 MG/ML INJECTION    Add Phentolamine 1 mg/cc  Add PGE1 10 mcg/cc    SIG:  Bring to office for test dose in physician office   Modified Medications    Modified Medication Previous Medication    GUAIFENESIN-CODEINE 100-10 MG/5 ML (TUSSI-ORGANIDIN NR)  MG/5 ML SYRUP guaifenesin-codeine 100-10 mg/5 ml (TUSSI-ORGANIDIN NR)  mg/5 mL syrup       Take 5 mLs by mouth every 6 (six) hours as needed for Cough.    Take 5 mLs by mouth every 6 (six) hours as needed for Cough.   Discontinued Medications    PNEUMOC 13-DARLENE CONJ-DIP CR,PF, (PREVNAR 13, PF,) 0.5 ML SYRG    Inject into the muscle.    VARICELLA-ZOSTER GE-AS01B, PF, (SHINGRIX, PF,) 50 MCG/0.5 ML INJECTION    Inject into the muscle.

## 2020-03-12 ENCOUNTER — PATIENT MESSAGE (OUTPATIENT)
Dept: DERMATOLOGY | Facility: CLINIC | Age: 60
End: 2020-03-12

## 2020-03-12 ENCOUNTER — OFFICE VISIT (OUTPATIENT)
Dept: DERMATOLOGY | Facility: CLINIC | Age: 60
End: 2020-03-12
Payer: COMMERCIAL

## 2020-03-12 DIAGNOSIS — L57.0 AK (ACTINIC KERATOSIS): ICD-10-CM

## 2020-03-12 DIAGNOSIS — D48.5 NEOPLASM OF UNCERTAIN BEHAVIOR OF SKIN: Primary | ICD-10-CM

## 2020-03-12 DIAGNOSIS — Z85.828 HISTORY OF NONMELANOMA SKIN CANCER: ICD-10-CM

## 2020-03-12 DIAGNOSIS — D23.9 BLUE NEVUS: ICD-10-CM

## 2020-03-12 PROCEDURE — 99999 PR PBB SHADOW E&M-EST. PATIENT-LVL II: CPT | Mod: PBBFAC,,, | Performed by: DERMATOLOGY

## 2020-03-12 PROCEDURE — 99213 OFFICE O/P EST LOW 20 MIN: CPT | Mod: 25,S$GLB,, | Performed by: DERMATOLOGY

## 2020-03-12 PROCEDURE — 88305 TISSUE EXAM BY PATHOLOGIST: CPT | Mod: 26,,, | Performed by: PATHOLOGY

## 2020-03-12 PROCEDURE — 11102 PR TANGENTIAL BIOPSY, SKIN, SINGLE LESION: ICD-10-PCS | Mod: S$GLB,,, | Performed by: DERMATOLOGY

## 2020-03-12 PROCEDURE — 11102 TANGNTL BX SKIN SINGLE LES: CPT | Mod: S$GLB,,, | Performed by: DERMATOLOGY

## 2020-03-12 PROCEDURE — 99213 PR OFFICE/OUTPT VISIT, EST, LEVL III, 20-29 MIN: ICD-10-PCS | Mod: 25,S$GLB,, | Performed by: DERMATOLOGY

## 2020-03-12 PROCEDURE — 88305 TISSUE EXAM BY PATHOLOGIST: ICD-10-PCS | Mod: 26,,, | Performed by: PATHOLOGY

## 2020-03-12 PROCEDURE — 88305 TISSUE EXAM BY PATHOLOGIST: CPT | Performed by: PATHOLOGY

## 2020-03-12 PROCEDURE — 99999 PR PBB SHADOW E&M-EST. PATIENT-LVL II: ICD-10-PCS | Mod: PBBFAC,,, | Performed by: DERMATOLOGY

## 2020-03-12 NOTE — PATIENT INSTRUCTIONS
" Shave Biopsy Wound Care    Your doctor has performed a shave biopsy today.  A band aid and vaseline ointment has been placed over the site.  This should remain in place for 24 hours.  It is recommended that you keep the area dry for the first 24 hours.  After 24 hours, you may remove the band aid and wash the area with warm soap and water and apply Vaseline jelly.  Many patients prefer to use Neosporin or Bacitracin ointment.  This is acceptable; however, know that you can develop an allergy to this medication even if you have used it safely for years.  It is important to keep the area moist.  Letting it dry out and get air slows healing time, and will worsen the scar.  Band aid is optional after first 24 hours.      If you notice increasing redness, tenderness, pain, or yellow drainage at the biopsy site, please notify your doctor.  These are signs of an infection.    If your biopsy site is bleeding, apply firm pressure for 15 minutes straight.  Repeat for another 15 minutes, if it is still bleeding.   If the surgical site continues to bleed, then please contact your doctor.      For [facility] users:   You will receive a [facility] notification after the pathologist has finished reviewing your biopsy specimen. Pathology results, however, will not be released online so you will see a "no content" message. Once your dermatologist reviews and clinically correlates your biopsy results, you will either receive a letter in the mail with the results of a phone call from your doctor's office if further explanation or treatment is warranted.       [address] [place] [address]/ [phone] FAX/ [email or web address]      Field Treatment for Actinic Keratoses (precancerous lesions)    5-Fluoruracil + Dovonex (calcipotriene) - This is a topical chemotherapy for your skin. This cream should never be applied without discussing with you dermatologist.    This treatment gets your immune system involved " in fighting precancers (actinic keratoses), even those we can't yet see.     HOW TO APPLY: Apply the combination cream to the affected area 2x/day x 5 days. Apply a fingertip length amount to the entire area. Wash your hands carefully after applying the creams and wipe glasses, BIPAP/CPAP machines, or anything else that comes in frequent contact with the creams.    WHAT TO EXPECT: Your skin will likely become red, crusted, sore, and tender during the treatment usually starting around day 3 and continuing for about 1 week after last application.     HOW TO HEAL FAST: To speed healing, wash with a gentle wash and apply Vaseline jelly especially to crusted open areas.    WE CAN HELP: Please contact us for any questions or problem shooting the application of these creams: [phone] or [email or web address]    GREAT NEWS: Newer studies suggest that the combination of these creams not only decrease the sun damage spots and precancers (actinic keratoses) but also decrease your risk of getting skin cancer the year following application.     IT WILL BE WORTH IT!!!

## 2020-03-12 NOTE — PROGRESS NOTES
Subjective:       Patient ID:  Joseph Jesus is a 60 y.o. male who presents for   Chief Complaint   Patient presents with    Skin Check     UBSE     History of Present Illness: The patient presents for follow up of skin check.    The patient was last seen on: 5/30/19 for cryosurgery to actinic keratoses which have resolved.   This is a high risk patient here to check for the development of new lesions.    Other skin complaints:     Patient complains of lesion(s)  Location: scalp  Duration: 2 years  Symptoms: tender  Relieving factors/Previous treatments: none          Review of Systems   Skin: Positive for activity-related sunscreen use and wears hat (100%). Negative for itching, rash, dry skin, daily sunscreen use and recent sunburn.   Hematologic/Lymphatic: Does not bruise/bleed easily.        Objective:    Physical Exam   Constitutional: He appears well-developed and well-nourished. No distress.   Neurological: He is alert and oriented to person, place, and time. He is not disoriented.   Psychiatric: He has a normal mood and affect.   Skin:   Areas Examined (abnormalities noted in diagram):   Scalp / Hair Palpated and Inspected  Head / Face Inspection Performed  Neck Inspection Performed  Chest / Axilla Inspection Performed  Back Inspection Performed  RUE Inspected  LUE Inspection Performed                   Diagram Legend     Erythematous scaling macule/papule c/w actinic keratosis       Vascular papule c/w angioma      Pigmented verrucoid papule/plaque c/w seborrheic keratosis      Yellow umbilicated papule c/w sebaceous hyperplasia      Irregularly shaped tan macule c/w lentigo     1-2 mm smooth white papules consistent with Milia      Movable subcutaneous cyst with punctum c/w epidermal inclusion cyst      Subcutaneous movable cyst c/w pilar cyst      Firm pink to brown papule c/w dermatofibroma      Pedunculated fleshy papule(s) c/w skin tag(s)      Evenly pigmented macule c/w junctional nevus     Mildly  variegated pigmented, slightly irregular-bordered macule c/w mildly atypical nevus      Flesh colored to evenly pigmented papule c/w intradermal nevus       Pink pearly papule/plaque c/w basal cell carcinoma      Erythematous hyperkeratotic cursted plaque c/w SCC      Surgical scar with no sign of skin cancer recurrence      Open and closed comedones      Inflammatory papules and pustules      Verrucoid papule consistent consistent with wart     Erythematous eczematous patches and plaques     Dystrophic onycholytic nail with subungual debris c/w onychomycosis     Umbilicated papule    Erythematous-base heme-crusted tan verrucoid plaque consistent with inflamed seborrheic keratosis     Erythematous Silvery Scaling Plaque c/w Psoriasis     See annotation          Assessment / Plan:      Pathology Orders:     Normal Orders This Visit    Specimen to Pathology, Dermatology     Questions:    Procedure Type:  Dermatology and skin neoplasms    Number of Specimens:  1    ------------------------:  -------------------------    Spec 1 Procedure:  Biopsy    Spec 1 Clinical Impression:  r/o isk vs scc    Spec 1 Source:  left post scalp        Neoplasm of uncertain behavior of skin  -     Specimen to Pathology, Dermatology  Shave biopsy procedure note:    Shave biopsy performed after verbal consent including risk of infection, scar, recurrence, need for additional treatment of site. Area prepped with alcohol, anesthetized with approximately 1.0cc of 1% lidocaine with epinephrine. Lesional tissue shaved with razor blade. Hemostasis achieved with application of aluminum chloride followed by hyfrecation. No complications. Dressing applied. Wound care explained.    If biopsy positive for malignancy, refer to Dr. Campos for Mohs surgery consultation.      AK (actinic keratosis)  [facility] Rx to [facility] for efudex/dovonex bid to AA scalp x 5 - 7 days  Cont wear hat always    Blue nevus   - stable and chronic      History of  nonmelanoma skin cancer  Area(s) of previous NMSC evaluated with no signs of recurrence.    Upper body skin examination performed today including at least 6 points as noted in physical examination. Suspicious lesions noted.               Follow up in about 3 months (around 6/12/2020).

## 2020-03-17 LAB
FINAL PATHOLOGIC DIAGNOSIS: NORMAL
GROSS: NORMAL
MICROSCOPIC EXAM: NORMAL

## 2020-03-18 ENCOUNTER — PATIENT MESSAGE (OUTPATIENT)
Dept: INTERNAL MEDICINE | Facility: CLINIC | Age: 60
End: 2020-03-18

## 2020-03-18 NOTE — TELEPHONE ENCOUNTER
Spoke with pt and  Pt said he has not run any fever. Advised pt to check his temp at night and then let us know via portal. Pt verbalized understanding

## 2020-03-18 NOTE — TELEPHONE ENCOUNTER
Patient sent message about cough and trouble breathing.  I recommend he come into the office to be assessed.      Kaye Lopes, NP

## 2020-03-19 ENCOUNTER — PATIENT MESSAGE (OUTPATIENT)
Dept: INTERNAL MEDICINE | Facility: CLINIC | Age: 60
End: 2020-03-19

## 2020-03-20 ENCOUNTER — HOSPITAL ENCOUNTER (OUTPATIENT)
Dept: RADIOLOGY | Facility: HOSPITAL | Age: 60
Discharge: HOME | End: 2020-03-20
Attending: FAMILY MEDICINE
Payer: COMMERCIAL

## 2020-03-20 ENCOUNTER — OFFICE VISIT (OUTPATIENT)
Dept: INTERNAL MEDICINE | Facility: CLINIC | Age: 60
End: 2020-03-20
Attending: FAMILY MEDICINE
Payer: COMMERCIAL

## 2020-03-20 VITALS
HEART RATE: 82 BPM | DIASTOLIC BLOOD PRESSURE: 80 MMHG | OXYGEN SATURATION: 96 % | BODY MASS INDEX: 29.69 KG/M2 | SYSTOLIC BLOOD PRESSURE: 104 MMHG | HEIGHT: 73 IN | WEIGHT: 224 LBS | TEMPERATURE: 99 F

## 2020-03-20 DIAGNOSIS — R05.9 COUGH: ICD-10-CM

## 2020-03-20 DIAGNOSIS — E78.5 HYPERLIPIDEMIA, UNSPECIFIED HYPERLIPIDEMIA TYPE: ICD-10-CM

## 2020-03-20 DIAGNOSIS — I10 HYPERTENSION, ESSENTIAL: ICD-10-CM

## 2020-03-20 DIAGNOSIS — R05.9 COUGH: Primary | ICD-10-CM

## 2020-03-20 PROCEDURE — 3008F PR BODY MASS INDEX (BMI) DOCUMENTED: ICD-10-PCS | Mod: CPTII,S$GLB,, | Performed by: FAMILY MEDICINE

## 2020-03-20 PROCEDURE — 3079F DIAST BP 80-89 MM HG: CPT | Mod: CPTII,S$GLB,, | Performed by: FAMILY MEDICINE

## 2020-03-20 PROCEDURE — 99999 PR PBB SHADOW E&M-EST. PATIENT-LVL IV: ICD-10-PCS | Mod: PBBFAC,,, | Performed by: FAMILY MEDICINE

## 2020-03-20 PROCEDURE — 3074F SYST BP LT 130 MM HG: CPT | Mod: CPTII,S$GLB,, | Performed by: FAMILY MEDICINE

## 2020-03-20 PROCEDURE — 3008F BODY MASS INDEX DOCD: CPT | Mod: CPTII,S$GLB,, | Performed by: FAMILY MEDICINE

## 2020-03-20 PROCEDURE — 3074F PR MOST RECENT SYSTOLIC BLOOD PRESSURE < 130 MM HG: ICD-10-PCS | Mod: CPTII,S$GLB,, | Performed by: FAMILY MEDICINE

## 2020-03-20 PROCEDURE — 99999 PR PBB SHADOW E&M-EST. PATIENT-LVL IV: CPT | Mod: PBBFAC,,, | Performed by: FAMILY MEDICINE

## 2020-03-20 PROCEDURE — 99214 PR OFFICE/OUTPT VISIT, EST, LEVL IV, 30-39 MIN: ICD-10-PCS | Mod: S$GLB,,, | Performed by: FAMILY MEDICINE

## 2020-03-20 PROCEDURE — 71046 XR CHEST PA AND LATERAL: ICD-10-PCS | Mod: 26,,, | Performed by: RADIOLOGY

## 2020-03-20 PROCEDURE — 3079F PR MOST RECENT DIASTOLIC BLOOD PRESSURE 80-89 MM HG: ICD-10-PCS | Mod: CPTII,S$GLB,, | Performed by: FAMILY MEDICINE

## 2020-03-20 PROCEDURE — 71046 X-RAY EXAM CHEST 2 VIEWS: CPT | Mod: 26,,, | Performed by: RADIOLOGY

## 2020-03-20 PROCEDURE — 99214 OFFICE O/P EST MOD 30 MIN: CPT | Mod: S$GLB,,, | Performed by: FAMILY MEDICINE

## 2020-03-20 PROCEDURE — 71046 X-RAY EXAM CHEST 2 VIEWS: CPT | Mod: TC

## 2020-03-20 RX ORDER — ALBUTEROL SULFATE 90 UG/1
2 AEROSOL, METERED RESPIRATORY (INHALATION) EVERY 6 HOURS PRN
Qty: 18 G | Refills: 5 | Status: SHIPPED | OUTPATIENT
Start: 2020-03-20 | End: 2021-05-18 | Stop reason: SDUPTHER

## 2020-03-20 NOTE — PROGRESS NOTES
Subjective:       Patient ID: Joseph Jesus is a 60 y.o. male.    Chief Complaint: Flu Or COVID like Symptoms    Cough   This is a chronic problem. The current episode started more than 1 month ago. The problem has been waxing and waning. The problem occurs every few hours. The cough is productive of sputum. Pertinent negatives include no chest pain, chills, eye redness, fever, headaches, hemoptysis, myalgias, nasal congestion, rash, sore throat or shortness of breath. He has tried oral steroids for the symptoms. The treatment provided no relief.     Review of Systems   Constitutional: Negative for chills, fatigue, fever and unexpected weight change.   HENT: Negative for congestion, sore throat and trouble swallowing.    Eyes: Negative for redness and visual disturbance.   Respiratory: Positive for cough. Negative for hemoptysis, chest tightness and shortness of breath.    Cardiovascular: Negative for chest pain, palpitations and leg swelling.   Gastrointestinal: Positive for nausea. Negative for abdominal pain and blood in stool.   Genitourinary: Negative for difficulty urinating and hematuria.   Musculoskeletal: Negative for arthralgias, back pain, gait problem, joint swelling, myalgias and neck pain.   Skin: Negative for color change and rash.   Neurological: Negative for tremors, speech difficulty, weakness, numbness and headaches.   Hematological: Negative for adenopathy. Does not bruise/bleed easily.   Psychiatric/Behavioral: Negative for behavioral problems, confusion and sleep disturbance. The patient is not nervous/anxious.        Objective:      Physical Exam   Constitutional: He is oriented to person, place, and time. He appears well-developed and well-nourished. No distress.   HENT:   Head: Normocephalic.   Right Ear: Tympanic membrane, external ear and ear canal normal.   Left Ear: Tympanic membrane, external ear and ear canal normal.   Nose: Nose normal.   Mouth/Throat: Uvula is midline, oropharynx is  clear and moist and mucous membranes are normal. No oropharyngeal exudate.   Eyes: Conjunctivae are normal. Right eye exhibits no discharge. Left eye exhibits no discharge. No scleral icterus.   Neck: Normal range of motion. Neck supple. No thyromegaly present.   Cardiovascular: Normal rate, regular rhythm, normal heart sounds and intact distal pulses. Exam reveals no gallop and no friction rub.   No murmur heard.  Pulmonary/Chest: Effort normal. No respiratory distress. He has no wheezes. He has no rales. He exhibits no tenderness.   Abdominal: Soft. There is no tenderness.   Musculoskeletal: He exhibits no edema.   Lymphadenopathy:     He has no cervical adenopathy.   Neurological: He is alert and oriented to person, place, and time.   Skin: Skin is warm and dry. No rash noted. He is not diaphoretic.   Nursing note and vitals reviewed.      Assessment:       1. Cough    2. Hypertension, essential    3. Hyperlipidemia, unspecified hyperlipidemia type        Plan:     Medication List with Changes/Refills   New Medications    ALBUTEROL (PROVENTIL/VENTOLIN HFA) 90 MCG/ACTUATION INHALER    Inhale 2 puffs into the lungs every 6 (six) hours as needed for Wheezing (cough).   Current Medications    ATORVASTATIN (LIPITOR) 10 MG TABLET    TAKE 1 TABLET BY MOUTH ONCE DAILY.    CETIRIZINE (ZYRTEC) 10 MG TABLET    Take 10 mg by mouth once daily.    COENZYME Q10 100 MG CAPSULE    Take 200 mg by mouth once daily.    FLUTICASONE PROPIONATE (FLONASE) 50 MCG/ACTUATION NASAL SPRAY    1 spray (50 mcg total) by Each Nostril route once daily.    GUAIFENESIN-CODEINE 100-10 MG/5 ML (TUSSI-ORGANIDIN NR)  MG/5 ML SYRUP    Take 5 mLs by mouth every 6 (six) hours as needed for Cough.    IRBESARTAN (AVAPRO) 300 MG TABLET    Take 1 tablet (300 mg total) by mouth once daily.    MELOXICAM (MOBIC) 15 MG TABLET    Take 1 tablet (15 mg total) by mouth once daily.    ONDANSETRON (ZOFRAN) 4 MG TABLET    Take 1-2 tablets (4-8 mg total) by mouth  every 8 (eight) hours as needed for Nausea.    PAPAVERINE 30 MG/ML INJECTION    Add Phentolamine 1 mg/cc  Add PGE1 10 mcg/cc    SIG:  Bring to office for test dose in physician office     Joseph was seen today for flu or covid like symptoms.    Diagnoses and all orders for this visit:    Cough  -     X-Ray Chest PA And Lateral; Future  -     albuterol (PROVENTIL/VENTOLIN HFA) 90 mcg/actuation inhaler; Inhale 2 puffs into the lungs every 6 (six) hours as needed for Wheezing (cough).    Hypertension, essential    Hyperlipidemia, unspecified hyperlipidemia type      See meds, orders, follow up, routing and instructions sections of encounter and AVS. Discussed with patient and provided on AVS.

## 2020-03-20 NOTE — PATIENT INSTRUCTIONS
"See standing or future lab orders and please draw CMP, Lipids and PSA - in 3 months, thank you.    Information about cholesterol, high blood pressure and healthy diet and activity recommendations can be found at the following links on the Internet:    http://www.nhlbi.nih.gov/health/health-topics/topics/hbc  http://www.nhlbi.nih.gov/health/educational/lose_wt/index.htm  Http://www.nhlbi.nih.gov/files/docs/public/heart/hbp_low.pdf  http://www.heart.org/HEARTORG/  http://diabetes.org/  https://www.cdc.gov/  Https://healthfinder.gov/  https://health.gov/dietaryguidelines/2015/guidelines/  https://health.gov/paguidelines/second-edition/pdf/Physical_Activity_Guidelines_2nd_edition.pdf      Using an Inhaler  Your healthcare provider may prescribe medicine that you breathe in using a metered-dose inhaler (MDI). An inhaler sends a measured amount of medicine in a fine mist.  Step 1:  · Shake the inhaler and remove the cap.  · Take a deep breath and let it out.  Step 2:  · Close your lips around the end of the inhaler mouthpiece. Or if you were told to use the "open-mouth" method, hold the inhaler 1 to 2 inches from your mouth.  Step 3:  · Breathe in slowly and deeply as you press down on the inhaler to release the medicine.  · Inhale fully.  Step 4:  · Hold your breath for a count of 10, or as long as you can comfortably.  · Then breathe out slowly through your mouth.  · Repeat these steps for each puff of medicine prescribed.             Important  · If the inhaler is being used for the first time or has not been used for a while, prime it as directed by the product maker. You can find important information about the medicine in the package insert. This is the paper that comes with the medicine.  · If you use more than one inhaler, make sure you know which one to use first.  · Your healthcare provider or pharmacist can show you how to use your inhaler the right way. Even if you think you are using it the right way, it is " still a good idea to check.   Date Last Reviewed: 10/1/2016  © 1076-4402 [employer]. [address] [place] [address]. All rights reserved. This information is not intended as a substitute for professional medical care. Always follow your healthcare professional's instructions.        Step-by-Step:  Priming Your Inhaler    Date Last Reviewed: 10/1/2016  © 3453-7120 [employer]. [address] [place] [address]. All rights reserved. This information is not intended as a substitute for professional medical care. Always follow your healthcare professional's instructions.

## 2020-03-23 ENCOUNTER — PATIENT MESSAGE (OUTPATIENT)
Dept: INTERNAL MEDICINE | Facility: CLINIC | Age: 60
End: 2020-03-23

## 2020-03-24 ENCOUNTER — TELEPHONE (OUTPATIENT)
Dept: DERMATOLOGY | Facility: CLINIC | Age: 60
End: 2020-03-24

## 2020-03-24 NOTE — TELEPHONE ENCOUNTER
Did phone consult and told patient would call him back at the end of this week or next week. Anabella

## 2020-03-24 NOTE — TELEPHONE ENCOUNTER
----- Message from Pacheco Carpio MD sent at 3/24/2020  2:26 PM CDT -----  I reviewed the phone consult and the photo in the chart.   This was a squamous cell carcinoma in situ; it is superficial, slow growing, not serious, not an urgent problem.   We can schedule him for surgery in 6-8 weeks.   Thanks.

## 2020-03-24 NOTE — TELEPHONE ENCOUNTER
----- Message from Madina Zhou MA sent at 3/18/2020 12:09 PM CDT -----  Pt prefers to have Dr. Carpio treat him. He is expecting your call. Thanks!  ----- Message -----  From: Diane Melgar MD  Sent: 3/17/2020   1:00 PM CDT  To: Beth Ybarra S Staff    Skin, left posterior scalp, shave biopsy:   -SQUAMOUS CELL CARCINOMA IN-SITU (VERRUCOUS), EXTENDING TO THE DEEP AND   PERIPHERAL BIOPSY EDGES     Recommend patient to Dr. Campos for Mohs surgery consultation. Picture in chart.

## 2020-04-05 ENCOUNTER — PATIENT MESSAGE (OUTPATIENT)
Dept: DERMATOLOGY | Facility: CLINIC | Age: 60
End: 2020-04-05

## 2020-04-06 ENCOUNTER — TELEPHONE (OUTPATIENT)
Dept: DERMATOLOGY | Facility: CLINIC | Age: 60
End: 2020-04-06

## 2020-04-06 ENCOUNTER — PATIENT OUTREACH (OUTPATIENT)
Dept: ADMINISTRATIVE | Facility: OTHER | Age: 60
End: 2020-04-06

## 2020-04-07 ENCOUNTER — OFFICE VISIT (OUTPATIENT)
Dept: DERMATOLOGY | Facility: CLINIC | Age: 60
End: 2020-04-07
Payer: COMMERCIAL

## 2020-04-07 DIAGNOSIS — D04.4 SQUAMOUS CELL CARCINOMA IN SITU (SCCIS) OF SCALP: Primary | ICD-10-CM

## 2020-04-07 PROCEDURE — 99213 PR OFFICE/OUTPT VISIT, EST, LEVL III, 20-29 MIN: ICD-10-PCS | Mod: 95,,, | Performed by: DERMATOLOGY

## 2020-04-07 PROCEDURE — 99213 OFFICE O/P EST LOW 20 MIN: CPT | Mod: 95,,, | Performed by: DERMATOLOGY

## 2020-04-07 NOTE — PROGRESS NOTES
The patient location is: [facility]  The chief complaint leading to consultation is: skin cancer on scalp  Visit type: Virtual visit with synchronous audio and video  Total time spent with patient: 15 minutes  Each patient to whom he or she provides medical services by telemedicine is:  (1) informed of the relationship between the physician and patient and the respective role of any other health care provider with respect to management of the patient; and (2) notified that he or she may decline to receive medical services by telemedicine and may withdraw from such care at any time.    Notes:     ALLERGIES:  Erythromycin; Sumycin [tetracycline]; and Tetracyclines    CHIEF COMPLAINT:  This 60 y.o. male is seen via telemedicine for evaluation for Mohs' Micrographic Surgery, Fresh Tissue Technique, for treatment of a biopsy-proven squamous cell carcinoma insitu on the left posterior scalp. Consultation requested by Diane Melgar M.D..    HISTORY OF PRESENT ILLNESS:   Location: left posterior scalp  Duration: 1 year  or more  Quality: persistent  Context: status post biopsy by Diane Melgar M.D.; path = squamous cell carcinoma insitu; pathology accession #[id number]  Pathology [facility]    Prior Treatment: none    See also the handwritten notes/diagrams scanned to chart for additional details.    Defibrillator: No  Pacemaker: No  Artificial heart valves: No  Artificial joints: No    REVIEW OF SYSTEMS:   General: general health good  Skin: previous skin cancer(s) Yes   If yes, details: Bcc and Scc and had MohsRelevant other:    Cardiovascular:   High Blood Pressure: Yes   Chest Pain:  No   Defibrillator: as above  Pacemaker: as above  Artificial heart valves: as above  Prior Endocarditis: No   Prior Heart Attack/MI: No     If yes, when:   Prior Cardiac Bypass or Stents:  No   If yes, when:   Mitral Valve Prolapse: No   Relevant other:  No   Respiratory:   Shortness of breath:  No   Relevant other:  No    Endocrine:   Diabetes:  No   Relevant other:  No   Hem/Lymph:   Taking Prescribed Blood Thinners:  No   Easy Bleeding:  No   Relevant other:  No   Allergy/Immuno: as noted above  Relevant other:  No   GI:   Prior Hepatitis:  No     If yes, details:   Relevant other:  Yes  Fatty liver  Musculoskeletal:   Artificial joints: as above  Relevant other:  No   Neurologic:   Prior Stroke:  No     If yes, details:   Relevant other:  No   Relevant other info:  No      PAST MEDICAL HISTORY:  Past Medical History:   Diagnosis Date    Allergy     seasonal    Anal fistula hx    Arthritis     Basal cell carcinoma 2013    left superior forehead    Callus     Diverticulosis     Epididymal cyst     Hayfever     Hydrocele, right     Hyperlipidemia     Hypertension     Neck pain     hx of muscular inury from weight lifting---resolved now    Prostatitis     Dec. '12-treated with antibx.    Squamous cell carcinoma 2019    anterior scalp    Visual impairment        PAST SURGICAL HISTORY:  Past Surgical History:   Procedure Laterality Date    APPENDECTOMY      COLONOSCOPY  2012    due for repeat 2015    COLONOSCOPY N/A 2016    Procedure: COLONOSCOPY;  Surgeon: James Webber MD;  Location: [facility] ([address] [place] [address]);  Service: Endoscopy;  Laterality: N/A;    EUA/Fistulotomy-      HERNIA REPAIR      [facility] with mesh    Hydrocelectomy      MASS EXCISION  2004    BCC removal (twice)    MOLE REMOVAL  2019    2 moles removed from scalp =- 1 was basal cell and 1 was squamous cell - dermatology - Dr. londono    right Spermatocelectomy          SOCIAL HISTORY:  Dependencies: smoking status as noted below  Social History     Tobacco Use    Smoking status: Former Smoker     Packs/day: 1.00     Years: 10.00     Pack years: 10.00     Last attempt to quit: 1987     Years since quittin.3    Smokeless tobacco: Never Used   Substance Use Topics    Alcohol use: Yes      Alcohol/week: 1.0 standard drinks     Types: 1 Glasses of wine per week     Comment: socially    Drug use: No       PERTINENT MEDICATIONS:  See medications list.    Current Outpatient Medications:     albuterol (PROVENTIL/VENTOLIN HFA) 90 mcg/actuation inhaler, Inhale 2 puffs into the lungs every 6 (six) hours as needed for Wheezing (cough)., Disp: 18 g, Rfl: 5    atorvastatin (LIPITOR) 10 MG tablet, TAKE 1 TABLET BY MOUTH ONCE DAILY., Disp: 90 tablet, Rfl: 1    cetirizine (ZYRTEC) 10 MG tablet, Take 10 mg by mouth once daily., Disp: , Rfl:     coenzyme Q10 100 mg capsule, Take 200 mg by mouth once daily., Disp: , Rfl:     fluticasone propionate (FLONASE) 50 mcg/actuation nasal spray, 1 spray (50 mcg total) by Each Nostril route once daily., Disp: 16 g, Rfl: 2    guaifenesin-codeine 100-10 mg/5 ml (TUSSI-ORGANIDIN NR)  mg/5 mL syrup, Take 5 mLs by mouth every 6 (six) hours as needed for Cough., Disp: 140 mL, Rfl: 0    irbesartan (AVAPRO) 300 MG tablet, Take 1 tablet (300 mg total) by mouth once daily., Disp: 90 tablet, Rfl: 3    meloxicam (MOBIC) 15 MG tablet, Take 1 tablet (15 mg total) by mouth once daily., Disp: 30 tablet, Rfl: 0    ondansetron (ZOFRAN) 4 MG tablet, Take 1-2 tablets (4-8 mg total) by mouth every 8 (eight) hours as needed for Nausea., Disp: 24 tablet, Rfl: 0    papaverine 30 mg/mL injection, Add Phentolamine 1 mg/cc Add PGE1 10 mcg/cc  SIG: Bring to office for test dose in physician office (Patient not taking: Reported on 3/20/2020), Disp: 5 mL, Rfl: 0    ALLERGIES:  Erythromycin; Sumycin [tetracycline]; and Tetracyclines    EXAM via telemedicine:  See also the handwritten notes/diagrams scanned to chart for additional details.  Constitutional  General appearance: well-developed, well-nourished, well-kempt older white male    Eyes  Inspection of conjunctivae and lids reveals no abnormalities; sclerae anicteric  Neurologic/Psychiatric  Alert,  normal orientation to time, place,  person  Normal mood and affect with no evidence of depression, anxiety, agitation  Skin: see photo(s)  Head: background moderate solar damage to exposed areas of skin  On his left posterior scalp there is an approximately 1 cm pink biopsy site; site(s) confirmed by reference to the photograph(s) attached below taken at the time of the biopsy/biopsies by the referring physician and he confirmed this as the site of the prior biopsy  Neck: examination reveals moderate chronic solar damage  Photo(s) from biopsy visit:      ASSESSMENT: 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 In particular, the discussion included, but was not limited to, the following:    One alternative at this point would be to defer further treatment and observe the lesion. With small skin cancers of this kind, it is possible that a biopsy can be sufficient to definitively treat a small skin cancer of this kind. Alternatively, some skin cancers are slow growing and do not require immediate treatment. The potential advantage of this choice would be to avoid the need for possibly unnecessary additional surgery. Among the potential disadvantages of this would be the possibility of enlargement of the lesion, more extensive spread of the lesion or recurrence at a later date, which might necessitate a larger and more complex surgery.    Radiation treatment can be an effective treatment for this type of skin cancer. The usual course of treatment is every weekday for several weeks. Local irritation will result from  treatment, although no systemic side effects are expected. The potential advantage of radiation treatment is that it avoids the need for surgery. Among the disadvantages of radiation treatment are the length of treatment, the local inflammatory response, the absence of pathologic confirmation of the removal of the skin cancer, a possible increased risk of additional skin cancer in the treated area in later years, and a somewhat increased risk of recurrence at a later date.     Excisional surgery can be an effective treatment for this type of skin cancer. This would involve excision of the lesion with margin evaluation by submitting the specimen to a pathologist for either immediate marginal assessment via frozen section processing, or delayed marginal assessment by fixed-tissue processing. The potential advantage of this technique is that it offers a way of treating the lesion with some degree of histologic confirmation of tumor removal. Among the disadvantages of this treatment are the possible need for re-excision if marginal involvement is identified, a somewhat greater likelihood of recurrence as compared to Mohs' surgery because of the less comprehensive margin evaluation inherent in the technique, and the general potential risks of surgery, including allergic reactions to the anesthetic and other materials used, infection, injury to nerves in the area with consequent loss of sensation or muscle function, and scarring or distortion of surrounding structures.    Mohs' surgery is a very effective treatment for this type of skin cancer. The potential advantage of Mohs' surgery is that this technique offers the greatest possible certainty of knowing that the skin cancer has been completely removed, with the removal of the least amount of normal tissue. The potential disadvantages of Mohs' surgery include the duration of the surgery, the possible need for a separate surgery for reconstruction following tumor  removal, and scarring as a result. In addition, general potential risks of surgery as noted above also apply to treatment via Mohs' surgery.    In light of the nature of this tumor and the location on the scalp in an area of increased risk of recurrence,  Mohs' micrographic surgery was thought to be the most appropriate management choice, and this diagnosis is appropriate for treatment by Mohs' micrographic surgery.     We also discussed options for management of the wound following completion of tumor removal via the Mohs' technique. This discussion include a review of the nature of, and risks and benefits of the alternatives, including healing via secondary intention, primary linear closure, closure via adjacent tissue transfer/skin flap(s), and closure by use of a skin graft.     Sufficient time was available for questions, and all questions were answered to his satisfaction. He fully understands the aims, risks, alternatives, and possible complications, and has elected to proceed with the surgery, and verbally consented to do so. The procedure will be scheduled in the near future.    Routine pre-op instructions were given to him.    --------------------------------------  Note: Some or all of this note may have been generated using voice recognition software. There may be voice recognition errors including grammatical and/or spelling errors found in the text. Attempts were made to correct these errors prior to signature.

## 2020-04-10 ENCOUNTER — HOSPITAL ENCOUNTER (EMERGENCY)
Facility: HOSPITAL | Age: 60
Discharge: HOME | End: 2020-04-10
Attending: EMERGENCY MEDICINE
Payer: COMMERCIAL

## 2020-04-10 VITALS
SYSTOLIC BLOOD PRESSURE: 153 MMHG | BODY MASS INDEX: 28.23 KG/M2 | OXYGEN SATURATION: 99 % | WEIGHT: 220 LBS | RESPIRATION RATE: 18 BRPM | DIASTOLIC BLOOD PRESSURE: 96 MMHG | HEART RATE: 63 BPM | TEMPERATURE: 98 F | HEIGHT: 74 IN

## 2020-04-10 DIAGNOSIS — R07.9 CHEST PAIN: ICD-10-CM

## 2020-04-10 DIAGNOSIS — J40 BRONCHITIS: Primary | ICD-10-CM

## 2020-04-10 LAB
ALBUMIN SERPL BCP-MCNC: 4.2 G/DL (ref 3.5–5.2)
ALP SERPL-CCNC: 65 U/L (ref 55–135)
ALT SERPL W/O P-5'-P-CCNC: 51 U/L (ref 10–44)
ANION GAP SERPL CALC-SCNC: 9 MMOL/L (ref 8–16)
AST SERPL-CCNC: 42 U/L (ref 10–40)
BASOPHILS # BLD AUTO: 0.03 K/UL (ref 0–0.2)
BASOPHILS NFR BLD: 0.4 % (ref 0–1.9)
BILIRUB SERPL-MCNC: 0.4 MG/DL (ref 0.1–1)
BNP SERPL-MCNC: 18 PG/ML (ref 0–99)
BUN SERPL-MCNC: 20 MG/DL (ref 8–23)
CALCIUM SERPL-MCNC: 9.6 MG/DL (ref 8.7–10.5)
CHLORIDE SERPL-SCNC: 108 MMOL/L (ref 95–110)
CO2 SERPL-SCNC: 23 MMOL/L (ref 23–29)
CREAT SERPL-MCNC: 1.3 MG/DL (ref 0.5–1.4)
DIFFERENTIAL METHOD: ABNORMAL
EOSINOPHIL # BLD AUTO: 0.3 K/UL (ref 0–0.5)
EOSINOPHIL NFR BLD: 4.5 % (ref 0–8)
ERYTHROCYTE [DISTWIDTH] IN BLOOD BY AUTOMATED COUNT: 13.9 % (ref 11.5–14.5)
EST. GFR  (AFRICAN AMERICAN): >60 ML/MIN/1.73 M^2
EST. GFR  (NON AFRICAN AMERICAN): 58 ML/MIN/1.73 M^2
GLUCOSE SERPL-MCNC: 83 MG/DL (ref 70–110)
HCT VFR BLD AUTO: 43.4 % (ref 40–54)
HGB BLD-MCNC: 13.8 G/DL (ref 14–18)
IMM GRANULOCYTES # BLD AUTO: 0.01 K/UL (ref 0–0.04)
IMM GRANULOCYTES NFR BLD AUTO: 0.1 % (ref 0–0.5)
INR PPP: 0.9 (ref 0.8–1.2)
LACTATE SERPL-SCNC: 1 MMOL/L (ref 0.5–2.2)
LYMPHOCYTES # BLD AUTO: 2.9 K/UL (ref 1–4.8)
LYMPHOCYTES NFR BLD: 43.1 % (ref 18–48)
MCH RBC QN AUTO: 29.9 PG (ref 27–31)
MCHC RBC AUTO-ENTMCNC: 31.8 G/DL (ref 32–36)
MCV RBC AUTO: 94 FL (ref 82–98)
MONOCYTES # BLD AUTO: 0.6 K/UL (ref 0.3–1)
MONOCYTES NFR BLD: 9 % (ref 4–15)
NEUTROPHILS # BLD AUTO: 2.9 K/UL (ref 1.8–7.7)
NEUTROPHILS NFR BLD: 42.9 % (ref 38–73)
NRBC BLD-RTO: 0 /100 WBC
PLATELET # BLD AUTO: 174 K/UL (ref 150–350)
PMV BLD AUTO: 10.8 FL (ref 9.2–12.9)
POTASSIUM SERPL-SCNC: 4.1 MMOL/L (ref 3.5–5.1)
PROT SERPL-MCNC: 7.3 G/DL (ref 6–8.4)
PROTHROMBIN TIME: 9.8 SEC (ref 9–12.5)
RBC # BLD AUTO: 4.61 M/UL (ref 4.6–6.2)
SODIUM SERPL-SCNC: 140 MMOL/L (ref 136–145)
TROPONIN I SERPL DL<=0.01 NG/ML-MCNC: <0.006 NG/ML (ref 0–0.03)
WBC # BLD AUTO: 6.7 K/UL (ref 3.9–12.7)

## 2020-04-10 PROCEDURE — 93005 ELECTROCARDIOGRAM TRACING: CPT

## 2020-04-10 PROCEDURE — 85025 COMPLETE CBC W/AUTO DIFF WBC: CPT

## 2020-04-10 PROCEDURE — 93010 ELECTROCARDIOGRAM REPORT: CPT | Mod: ,,, | Performed by: INTERNAL MEDICINE

## 2020-04-10 PROCEDURE — 83605 ASSAY OF LACTIC ACID: CPT

## 2020-04-10 PROCEDURE — 83880 ASSAY OF NATRIURETIC PEPTIDE: CPT

## 2020-04-10 PROCEDURE — 25500020 PHARM REV CODE 255: Performed by: EMERGENCY MEDICINE

## 2020-04-10 PROCEDURE — 80053 COMPREHEN METABOLIC PANEL: CPT

## 2020-04-10 PROCEDURE — 93010 EKG 12-LEAD: ICD-10-PCS | Mod: ,,, | Performed by: INTERNAL MEDICINE

## 2020-04-10 PROCEDURE — 84484 ASSAY OF TROPONIN QUANT: CPT

## 2020-04-10 PROCEDURE — 99285 EMERGENCY DEPT VISIT HI MDM: CPT | Mod: 25

## 2020-04-10 PROCEDURE — 85610 PROTHROMBIN TIME: CPT

## 2020-04-10 RX ORDER — LEVOFLOXACIN 500 MG/1
500 TABLET, FILM COATED ORAL DAILY
Qty: 7 TABLET | Refills: 0 | Status: SHIPPED | OUTPATIENT
Start: 2020-04-10 | End: 2020-04-17

## 2020-04-10 RX ORDER — BENZONATATE 100 MG/1
100 CAPSULE ORAL 3 TIMES DAILY PRN
Qty: 20 CAPSULE | Refills: 0 | Status: SHIPPED | OUTPATIENT
Start: 2020-04-10 | End: 2020-04-21 | Stop reason: ALTCHOICE

## 2020-04-10 RX ADMIN — IOHEXOL 100 ML: 350 INJECTION, SOLUTION INTRAVENOUS at 05:04

## 2020-04-10 NOTE — ED NOTES
"Pt presents to ED c/o SOB that woke pt this morning. Pt reports has been having similar episodes since Darron, but tonight is the first time that the pt's BP was elevated with SOB. Pt states has been seen for this same problem. PT reports SOB, congestion. Was seen by NP x2, then MD. Pt reports being d/c with ventolin inhaler, but is has not been helping with symptoms. Pt reports was taking mucinex which was helping symptoms, "but it started to make me feel wild". Pt denies having fevers. PT reports chest pain, which is present with breathing in.   "

## 2020-04-10 NOTE — ED PROVIDER NOTES
Encounter Date: 4/10/2020    SCRIBE #1 NOTE: I, Janet Khan, am scribing for, and in the presence of,  Dr. Gamble. I have scribed the entire note.       History     Chief Complaint   Patient presents with    Shortness of Breath     Pt. c/o shortness of breath that woke him up this am. Pt. c/o tenderness to the mid sternal area with no radiation. Denies N/V.     The patient is a 60 y.o. male who presents to the ED with complaint of shortness of breath. He reports onset of symptoms was just prior to arrival. The symptoms woke the patient from sleep. He woke feeling like he was gasping for breath. He also notes having chest wall discomfort that is worse with touch. He denies any associated leg swelling, palpitations, nausea, vomiting, fever or chills. The patient admits this is his 3rd time experiencing similar symptoms over the last few months. He has not had any recent ECHO or stress test.      The history is provided by the patient.     Review of patient's allergies indicates:   Allergen Reactions    Erythromycin Rash    Sumycin [tetracycline] Rash    Tetracyclines Rash     Past Medical History:   Diagnosis Date    Allergy     seasonal    Anal fistula hx    Arthritis     Basal cell carcinoma 4/2013    left superior forehead    Callus     Diverticulosis     Epididymal cyst     Hayfever     Hydrocele, right     Hyperlipidemia     Hypertension     Neck pain     hx of muscular inury from weight lifting---resolved now    Prostatitis     Dec. '12-treated with antibx.    Squamous cell carcinoma 01/07/2019    anterior scalp    Visual impairment      Past Surgical History:   Procedure Laterality Date    APPENDECTOMY  1962    COLONOSCOPY  12/2012    due for repeat 12/2015    COLONOSCOPY N/A 5/19/2016    Procedure: COLONOSCOPY;  Surgeon: James Webber MD;  Location: [facility] ([address] [place] [address]);  Service: Endoscopy;  Laterality: N/A;    EUA/Fistulotomy-'08      HERNIA REPAIR      [facility] with mesh     Hydrocelectomy  2013    MASS EXCISION  2004    BCC removal (twice)    MOLE REMOVAL  2019    2 moles removed from scalp =- 1 was basal cell and 1 was squamous cell - dermatology - Dr. londono    right Spermatocelectomy  2013     Family History   Problem Relation Age of Onset    Skin cancer Mother     Hypertension Mother     Skin cancer Father     Cancer Father         prostate cancer    Hypertension Father     Hypertension Maternal Grandmother     Hypertension Maternal Grandfather     Hypertension Paternal Grandmother     Anesthesia problems Paternal Grandmother     Cancer Paternal Grandfather         prostate cancer    Hypertension Paternal Grandfather     Heart disease Paternal Grandfather     Diabetes Neg Hx     Colon polyps Neg Hx     Colon cancer Neg Hx     Melanoma Neg Hx     Psoriasis Neg Hx     Lupus Neg Hx     Eczema Neg Hx     Acne Neg Hx     Cirrhosis Neg Hx     Prostate cancer Neg Hx     Kidney disease Neg Hx      Social History     Tobacco Use    Smoking status: Former Smoker     Packs/day: 1.00     Years: 10.00     Pack years: 10.00     Last attempt to quit: 1987     Years since quittin.3    Smokeless tobacco: Never Used   Substance Use Topics    Alcohol use: Yes     Alcohol/week: 1.0 standard drinks     Types: 1 Glasses of wine per week     Comment: socially    Drug use: No     Review of Systems   Constitutional: Negative for chills and fever.   HENT: Negative for congestion, ear pain, rhinorrhea and sore throat.    Respiratory: Positive for shortness of breath. Negative for cough and wheezing.    Cardiovascular: Negative for chest pain and palpitations.   Gastrointestinal: Negative for abdominal pain, diarrhea, nausea and vomiting.   Genitourinary: Negative for dysuria and hematuria.   Musculoskeletal: Negative for back pain, myalgias and neck pain.   Skin: Negative for rash.   Neurological: Negative for dizziness, weakness, light-headedness and  headaches.   Psychiatric/Behavioral: Negative for confusion.       Physical Exam     Initial Vitals [04/10/20 0354]   BP Pulse Resp Temp SpO2   (!) 185/112 70 18 97.6 °F (36.4 °C) 99 %      MAP       --         Physical Exam    Nursing note and vitals reviewed.  Constitutional: He appears well-developed and well-nourished. He is not diaphoretic. No distress.   HENT:   Head: Normocephalic and atraumatic.   Mouth/Throat: Oropharynx is clear and moist.   Eyes: Conjunctivae and EOM are normal.   Neck: Normal range of motion. Neck supple.   Cardiovascular: Normal rate, regular rhythm and normal heart sounds. Exam reveals no gallop and no friction rub.    No murmur heard.  Pulmonary/Chest: Breath sounds normal. He has no wheezes. He has no rhonchi. He has no rales.   Abdominal: Soft. There is no tenderness. There is no rebound and no guarding.   Musculoskeletal: Normal range of motion. He exhibits no edema or tenderness.   Lymphadenopathy:     He has no cervical adenopathy.   Neurological: He is alert and oriented to person, place, and time. He has normal strength.   Skin: Skin is warm and dry. No rash noted.         ED Course   Procedures  Labs Reviewed   CBC W/ AUTO DIFFERENTIAL - Abnormal; Notable for the following components:       Result Value    Hemoglobin 13.8 (*)     Mean Corpuscular Hemoglobin Conc 31.8 (*)     All other components within normal limits   COMPREHENSIVE METABOLIC PANEL - Abnormal; Notable for the following components:    AST 42 (*)     ALT 51 (*)     eGFR if non  58 (*)     All other components within normal limits   TROPONIN I   PROTIME-INR   LACTIC ACID, PLASMA   B-TYPE NATRIURETIC PEPTIDE   B-TYPE NATRIURETIC PEPTIDE          Imaging Results          CTA Chest Non-Coronary - PE Study (Final result)  Result time 04/10/20 06:20:22    Final result by Merle Alegria MD (04/10/20 06:20:22)                 Impression:      1. No evidence of pulmonary thromboembolism or other acute  intrathoracic abnormality.  2. Mild dependent bibasilar atelectasis and scattered calcified pulmonary granulomata.  3. Small hiatal hernia.  4. Multiple hepatic hypodensities the larger of which likely reflect cysts and appears similar to most recent comparison CT of the abdomen and pelvis.      Electronically signed by: Merle Alegria MD  Date:    04/10/2020  Time:    06:20             Narrative:    EXAMINATION:  CTA CHEST NON CORONARY    CLINICAL HISTORY:  Chest pain, normal ekg;    TECHNIQUE:  Low dose axial images, sagittal and coronal reformations were obtained from the thoracic inlet to the lung bases following the IV administration of 100 mL of Omnipaque 350.  Contrast timing was optimized to evaluate the pulmonary arteries.  MIP images were performed.    COMPARISON:  Correlation is made to CT abdomen and pelvis 12/03/2018    FINDINGS:  The thoracic aorta maintains normal caliber, contour, and course without significant atherosclerotic calcification within its course.  There is no evidence of aneurysmal dilation or dissection. The heart is not enlarged and there is no significant pericardial fluid present.  There is scattered calcific atherosclerosis of the coronary vessels.  There is no axillary or mediastinal adenopathy.  Hilar contours are within normal limits.    The trachea is midline and proximal airways are patent.  The lungs demonstrate no evidence of focal consolidation, pleural fluid or pneumothorax.  There is mild dependent bibasilar atelectasis.  There are scattered calcified pulmonary granulomata.    The pulmonary arteries demonstrate no filling defect to suggest pulmonary thromboembolism through the segmental levels..    Visualized structures of the upper abdomen demonstrate a small hiatal hernia.  There are multiple hepatic hypodensities similar to prior examinations the larger of which likely reflect cysts.  The visualized osseous structures demonstrate degenerative change without acute  abnormality.                               X-Ray Chest AP Portable (Final result)  Result time 04/10/20 05:04:46    Final result by Melre Alegria MD (04/10/20 05:04:46)                 Impression:      No acute intrathoracic abnormality identified on this single radiographic view of the chest.      Electronically signed by: Merle Alegria MD  Date:    04/10/2020  Time:    05:04             Narrative:    EXAMINATION:  XR CHEST AP PORTABLE    CLINICAL HISTORY:  Chest pain, unspecified    TECHNIQUE:  Single frontal view of the chest was performed.    COMPARISON:  03/20/2020    FINDINGS:  Cardiac monitoring leads overlie the chest.  The cardiomediastinal silhouette is within normal limits.  The visualized airway is unremarkable.  The lungs appear symmetrically aerated without evidence of lobar consolidation, significant volume of pleural fluid or pneumothorax.  Small calcified pulmonary granulomata are once again identified.  The visualized osseous structures are intact.                                 Medical Decision Making:   Clinical Tests:   Lab Tests: Ordered and Reviewed  Radiological Study: Ordered and Reviewed  Medical Tests: Ordered and Reviewed  ED Management:  6:40 AM  Patient doing well, non-toxic in appearance. CP is atypical for ACS because of its chronic, non-exertional nature. No evidence of PE or aortic pathology on CTA imaging. BP stable. Will discharge to home and recommend close follow-up with PCP. Patient voices understanding and agrees with plan.                    ED Course as of Apr 10 0640   Fri Apr 10, 2020   0412 BP(!): 185/112 [LD]   0412 Temp: 97.6 °F (36.4 °C) [LD]   0412 Pulse: 70 [LD]   0412 Resp: 18 [LD]   0412 SpO2: 99 % [LD]   0459 EKG with NSR, rate of 70 bpm.  +LAD, normal intervals, normal conduction. No STEMI    [LD]      ED Course User Index  [LD] Mehnaz Gamble MD                Clinical Impression:     1. Bronchitis    2. Chest pain                I, Dr. Nate Dowell,  personally performed the services described in this documentation. All medical record entries made by the scribe were at my direction and in my presence.  I have reviewed the chart and agree that the record reflects my personal performance and is accurate and complete               Nate Dowell MD  04/10/20 0648

## 2020-04-21 ENCOUNTER — OFFICE VISIT (OUTPATIENT)
Dept: INTERNAL MEDICINE | Facility: CLINIC | Age: 60
End: 2020-04-21
Attending: FAMILY MEDICINE
Payer: COMMERCIAL

## 2020-04-21 ENCOUNTER — PATIENT MESSAGE (OUTPATIENT)
Dept: INTERNAL MEDICINE | Facility: CLINIC | Age: 60
End: 2020-04-21

## 2020-04-21 ENCOUNTER — PATIENT MESSAGE (OUTPATIENT)
Dept: FAMILY MEDICINE | Facility: CLINIC | Age: 60
End: 2020-04-21

## 2020-04-21 DIAGNOSIS — I10 HYPERTENSION, ESSENTIAL: ICD-10-CM

## 2020-04-21 DIAGNOSIS — E78.5 HYPERLIPIDEMIA, UNSPECIFIED HYPERLIPIDEMIA TYPE: ICD-10-CM

## 2020-04-21 DIAGNOSIS — K57.32 DIVERTICULITIS OF LARGE INTESTINE WITHOUT PERFORATION OR ABSCESS WITHOUT BLEEDING: Primary | ICD-10-CM

## 2020-04-21 DIAGNOSIS — J40 BRONCHITIS: ICD-10-CM

## 2020-04-21 PROCEDURE — 99213 PR OFFICE/OUTPT VISIT, EST, LEVL III, 20-29 MIN: ICD-10-PCS | Mod: 95,,, | Performed by: FAMILY MEDICINE

## 2020-04-21 PROCEDURE — 99213 OFFICE O/P EST LOW 20 MIN: CPT | Mod: 95,,, | Performed by: FAMILY MEDICINE

## 2020-04-21 RX ORDER — AMOXICILLIN AND CLAVULANATE POTASSIUM 875; 125 MG/1; MG/1
1 TABLET, FILM COATED ORAL 2 TIMES DAILY
Qty: 14 TABLET | Refills: 0 | Status: SHIPPED | OUTPATIENT
Start: 2020-04-21 | End: 2020-04-28

## 2020-04-21 NOTE — PROGRESS NOTES
Subjective:       Patient ID: Joseph Jesus is a 60 y.o. male.    Chief Complaint: No chief complaint on file.    The patient location is:  Home  The chief complaint leading to consultation is:  Possible diverticulitis  Visit type: audiovisual  Total time spent with patient:  15 min  Each patient to whom he or she provides medical services by telemedicine is:  (1) informed of the relationship between the physician and patient and the respective role of any other health care provider with respect to management of the patient; and (2) notified that he or she may decline to receive medical services by telemedicine and may withdraw from such care at any time.    Notes:  Patient reports left lower quadrant abdominal pain for a few days.  He has had previous episodes of diverticulitis in thinks that is what it is.  He had bronchitis in was seen at emergency room on April 10th.  Chest x-ray and CTA were unremarkable.  No COVID testing done.  Placed on Levaquin, though I can not find this in the chart, completed course and states he feels much better.  No recurrent fever, shortness of breath, cough.  When asked about diarrhea he may have had a loose stool but no persistent episodes or bleeding.  No fever, no chills.  No definite dysuria or hematuria.    Review of Systems   Constitutional: Negative for appetite change, chills, diaphoresis, fatigue and fever.   HENT: Negative for congestion, postnasal drip, rhinorrhea, sore throat and trouble swallowing.    Eyes: Negative for visual disturbance.   Respiratory: Negative for cough, choking, chest tightness, shortness of breath and wheezing.    Cardiovascular: Negative for chest pain and leg swelling.   Gastrointestinal: Positive for abdominal pain. Negative for abdominal distention, diarrhea, nausea and vomiting.   Genitourinary: Negative for difficulty urinating.   Musculoskeletal: Negative for arthralgias and myalgias.   Skin: Negative for rash.   Neurological: Negative for  weakness, light-headedness and headaches.       Objective:      Physical Exam    Assessment:       1. Diverticulitis of large intestine without perforation or abscess without bleeding    2. Hypertension, essential    3. Hyperlipidemia, unspecified hyperlipidemia type    4. Bronchitis        Plan:     Medication List with Changes/Refills   New Medications    AMOXICILLIN-CLAVULANATE 875-125MG (AUGMENTIN) 875-125 MG PER TABLET    Take 1 tablet by mouth 2 (two) times daily. for 7 days   Current Medications    ALBUTEROL (PROVENTIL/VENTOLIN HFA) 90 MCG/ACTUATION INHALER    Inhale 2 puffs into the lungs every 6 (six) hours as needed for Wheezing (cough).    ATORVASTATIN (LIPITOR) 10 MG TABLET    TAKE 1 TABLET BY MOUTH ONCE DAILY.    CETIRIZINE (ZYRTEC) 10 MG TABLET    Take 10 mg by mouth once daily.    COENZYME Q10 100 MG CAPSULE    Take 200 mg by mouth once daily.    FLUTICASONE PROPIONATE (FLONASE) 50 MCG/ACTUATION NASAL SPRAY    1 spray (50 mcg total) by Each Nostril route once daily.    GUAIFENESIN-CODEINE 100-10 MG/5 ML (TUSSI-ORGANIDIN NR)  MG/5 ML SYRUP    Take 5 mLs by mouth every 6 (six) hours as needed for Cough.    IRBESARTAN (AVAPRO) 300 MG TABLET    Take 1 tablet (300 mg total) by mouth once daily.    MELOXICAM (MOBIC) 15 MG TABLET    Take 1 tablet (15 mg total) by mouth once daily.    ONDANSETRON (ZOFRAN) 4 MG TABLET    Take 1-2 tablets (4-8 mg total) by mouth every 8 (eight) hours as needed for Nausea.    PAPAVERINE 30 MG/ML INJECTION    Add Phentolamine 1 mg/cc  Add PGE1 10 mcg/cc    SIG:  Bring to office for test dose in physician office   Discontinued Medications    BENZONATATE (TESSALON) 100 MG CAPSULE    Take 1 capsule (100 mg total) by mouth 3 (three) times daily as needed.     Diagnoses and all orders for this visit:    Diverticulitis of large intestine without perforation or abscess without bleeding    Hypertension, essential    Hyperlipidemia, unspecified hyperlipidemia  type    Bronchitis    Other orders  -     amoxicillin-clavulanate 875-125mg (AUGMENTIN) 875-125 mg per tablet; Take 1 tablet by mouth 2 (two) times daily. for 7 days      See meds, orders, follow up, routing and instructions sections of encounter and AVS. Discussed with patient and provided on AVS.    Chart reviewed.  Patient saw Dr. Block in 2018.  Patient has had recurrent diverticulitis in the past episodes included 2013, 2016, 2017, 2018.  Due to the COVID crisis currently this is a telephone/video visit.  Patient symptoms are not progressing it does not sound like he has an acute abdomen at this time.  He was on antibiotics recently.  I discussed the potential of C difficile infection with him but it does not sound like he is having ongoing diarrhea at this time.  Additionally, he has responded to Augmentin in the past.  Will go ahead and try that empirically but I cautioned him that if he gets worse should had to emergency room for further evaluation.  Notify me in approximately 2 days of his progress.  May need to re-consult with colorectal surgery.  Also discuss the possibility of antibiotic related complications.

## 2020-04-27 ENCOUNTER — PATIENT MESSAGE (OUTPATIENT)
Dept: INTERNAL MEDICINE | Facility: CLINIC | Age: 60
End: 2020-04-27

## 2020-04-27 DIAGNOSIS — R10.9 ABDOMINAL PAIN, UNSPECIFIED ABDOMINAL LOCATION: ICD-10-CM

## 2020-04-27 DIAGNOSIS — K57.32 DIVERTICULITIS OF LARGE INTESTINE WITHOUT PERFORATION OR ABSCESS WITHOUT BLEEDING: Primary | ICD-10-CM

## 2020-04-27 DIAGNOSIS — R10.84 GENERALIZED ABDOMINAL PAIN: ICD-10-CM

## 2020-05-01 ENCOUNTER — TELEPHONE (OUTPATIENT)
Dept: INTERNAL MEDICINE | Facility: CLINIC | Age: 60
End: 2020-05-01

## 2020-05-01 NOTE — TELEPHONE ENCOUNTER
Please schedule patient a STAT CT of abdomen to rule out diverticulitis, also, please triage him to urgent care today. Thank you.

## 2020-05-02 ENCOUNTER — PATIENT MESSAGE (OUTPATIENT)
Dept: INTERNAL MEDICINE | Facility: CLINIC | Age: 60
End: 2020-05-02

## 2020-05-04 NOTE — TELEPHONE ENCOUNTER
Please call patient and schedule stat CT scan of abdomen ordered over the weekend for today.  Thank you

## 2020-05-07 ENCOUNTER — HOSPITAL ENCOUNTER (OUTPATIENT)
Dept: RADIOLOGY | Facility: HOSPITAL | Age: 60
Discharge: HOME | End: 2020-05-07
Attending: FAMILY MEDICINE
Payer: COMMERCIAL

## 2020-05-07 DIAGNOSIS — R10.84 GENERALIZED ABDOMINAL PAIN: ICD-10-CM

## 2020-05-07 DIAGNOSIS — K57.32 DIVERTICULITIS OF LARGE INTESTINE WITHOUT PERFORATION OR ABSCESS WITHOUT BLEEDING: ICD-10-CM

## 2020-05-07 DIAGNOSIS — R10.9 ABDOMINAL PAIN, UNSPECIFIED ABDOMINAL LOCATION: ICD-10-CM

## 2020-05-07 PROCEDURE — 74177 CT ABD & PELVIS W/CONTRAST: CPT | Mod: 26,,, | Performed by: RADIOLOGY

## 2020-05-07 PROCEDURE — 74177 CT ABD & PELVIS W/CONTRAST: CPT | Mod: TC

## 2020-05-07 PROCEDURE — 74177 CT ABDOMEN PELVIS WITH CONTRAST: ICD-10-PCS | Mod: 26,,, | Performed by: RADIOLOGY

## 2020-05-07 PROCEDURE — 25500020 PHARM REV CODE 255: Performed by: FAMILY MEDICINE

## 2020-05-07 RX ADMIN — IOHEXOL 1000 ML: 12 SOLUTION ORAL at 09:05

## 2020-05-07 RX ADMIN — IOHEXOL 100 ML: 350 INJECTION, SOLUTION INTRAVENOUS at 10:05

## 2020-05-08 ENCOUNTER — PATIENT MESSAGE (OUTPATIENT)
Dept: INTERNAL MEDICINE | Facility: CLINIC | Age: 60
End: 2020-05-08

## 2020-05-09 ENCOUNTER — PATIENT MESSAGE (OUTPATIENT)
Dept: INTERNAL MEDICINE | Facility: CLINIC | Age: 60
End: 2020-05-09

## 2020-05-09 ENCOUNTER — OFFICE VISIT (OUTPATIENT)
Dept: INTERNAL MEDICINE | Facility: CLINIC | Age: 60
End: 2020-05-09
Payer: COMMERCIAL

## 2020-05-09 DIAGNOSIS — R19.7 DIARRHEA, UNSPECIFIED TYPE: Primary | ICD-10-CM

## 2020-05-09 DIAGNOSIS — K57.32 DIVERTICULITIS OF LARGE INTESTINE WITHOUT PERFORATION OR ABSCESS WITHOUT BLEEDING: ICD-10-CM

## 2020-05-09 PROCEDURE — 99214 PR OFFICE/OUTPT VISIT, EST, LEVL IV, 30-39 MIN: ICD-10-PCS | Mod: 95,,, | Performed by: INTERNAL MEDICINE

## 2020-05-09 PROCEDURE — 99214 OFFICE O/P EST MOD 30 MIN: CPT | Mod: 95,,, | Performed by: INTERNAL MEDICINE

## 2020-05-09 RX ORDER — METRONIDAZOLE 500 MG/1
500 TABLET ORAL EVERY 8 HOURS
Qty: 30 TABLET | Refills: 0 | Status: SHIPPED | OUTPATIENT
Start: 2020-05-09 | End: 2020-07-16 | Stop reason: ALTCHOICE

## 2020-05-09 NOTE — PROGRESS NOTES
1 Subjective:       Patient ID: Joseph Jesus is a 60 y.o. male.    Chief Complaint: No chief complaint on file.  Dictation #1  MRN:[id number]  CSN:[id number]  Dict   Abdominal Pain   This is a recurrent problem. The current episode started more than 1 year ago. The onset quality is gradual. The problem occurs intermittently. The most recent episode lasted 4 days. The problem has been waxing and waning. The pain is located in the periumbilical region and suprapubic region. The pain is at a severity of 3/10. The pain is moderate. The quality of the pain is cramping, dull and a sensation of fullness. The abdominal pain does not radiate. Associated symptoms include belching, constipation and diarrhea. Pertinent negatives include no anorexia, arthralgias, dysuria, fever, flatus, frequency, headaches, hematochezia, hematuria, melena, myalgias, nausea, vomiting or weight loss. The pain is aggravated by eating. The pain is relieved by being still, belching, bowel movements and certain positions. He has tried nothing for the symptoms. The treatment provided no relief. Prior diagnostic workup includes CT scan. His past medical history is significant for abdominal surgery and GERD. There is no history of colon cancer, Crohn's disease, gallstones, irritable bowel syndrome, pancreatitis, PUD or ulcerative colitis. Patient's medical history does not include kidney stones and UTI.     Review of Systems   Constitutional: Negative for activity change, appetite change, fatigue, fever, unexpected weight change and weight loss.   HENT: Negative for dental problem, hearing loss, mouth sores, postnasal drip and sinus pressure.    Eyes: Negative for discharge and visual disturbance.   Respiratory: Negative for cough and shortness of breath.    Cardiovascular: Negative for chest pain and palpitations.   Gastrointestinal: Positive for abdominal distention, abdominal pain, constipation and diarrhea. Negative for anorexia, blood in stool, flatus,  hematochezia, melena, nausea and vomiting.   Genitourinary: Negative for dysuria, frequency, hematuria and testicular pain.   Musculoskeletal: Negative for arthralgias, back pain, joint swelling, myalgias and neck pain.   Skin: Negative for rash.   Neurological: Negative for weakness and headaches.   Psychiatric/Behavioral: Negative for agitation and sleep disturbance.     The patient location is: home  The chief complaint leading to consultation is: diarrhea and abdominal pain  Visit type: audiovisual  Total time spent with patient: 20 min  Each patient to whom he or she provides medical services by telemedicine is:  (1) informed of the relationship between the physician and patient and the respective role of any other health care provider with respect to management of the patient; and (2) notified that he or she may decline to receive medical services by telemedicine and may withdraw from such care at any time.    Notes:  60-year-old white male patient who has had abdominal problems on and off for the last 6 weeks.  He has had several bouts of diverticulitis over the last 6 years treated with various antibiotics.  Most recent treatment was Augmentin which he said he finished approximately a week ago.  Patient has had alternating constipation and diarrhea.  For part of the course of his Augmentin he was on probiotic which she said caused a lot of bloating and he discontinued it.  Patient has now had diarrhea since last evening and has had a total of about 3-4 stools which were loose to watery.  There is no blood or mucus in the stool.  The abdominal pain is intermittent generally in lower abdomen.  He has no urinary difficulty currently.    Patient has had a history of prostatitis in the past but is not have any symptoms of that.  Patient also was seen in the emergency room for what was diagnosed as bronchitis on 4/10/2020 and at that time had a CTA of the chest done which was negative.  Patient was treated with a  course of antibiotics at that time as well.    Patient is home alone with his wife who is healthy.  He has had problems with his health on off over the last few months of the above type.  Out of anxiety he was tested for COVID and a screening clinic 2 weeks ago and was negative.  A patient is following the COVID epidemic guidelines and is out of the house very infrequently.    Physical exam:  Not done since this is a virtual visit    Impression:  1.  Current abdominal symptoms may be from bacterial overgrowth from his recent course of antibiotics.  2.  Prior bouts of diverticulitis.  He had a recent CT scan the abdomen done which showed no evidence of diverticulitis.  3.  I had a discussion with the patient about the possible additional diagnosis of IBS, and the patient was of the same mind that he may be having problems with that at this point.  I did not put him on a medication for that.    Plan:  1.  I have told to get back on probiotic twice a day for the course of his some antibiotic 2.  I placed him on a course of metronidazole 500 t.i.d. for 10 days, for the possibility of him having C difficile colitis from his recent course of antibiotics.  3.  I have told him if he is not getting better I will be working tomorrow and if from he wants to pursue this further he can call his PCP next week.     Objective:          Assessment:       1. Diarrhea, unspecified type    2. Diverticulitis of large intestine without perforation or abscess without bleeding        Plan:

## 2020-05-21 ENCOUNTER — PATIENT MESSAGE (OUTPATIENT)
Dept: INTERNAL MEDICINE | Facility: CLINIC | Age: 60
End: 2020-05-21

## 2020-05-21 RX ORDER — ATORVASTATIN CALCIUM 10 MG/1
TABLET, FILM COATED ORAL
Qty: 90 TABLET | Refills: 0 | OUTPATIENT
Start: 2020-05-21

## 2020-05-21 RX ORDER — ATORVASTATIN CALCIUM 10 MG/1
10 TABLET, FILM COATED ORAL DAILY
Qty: 90 TABLET | Refills: 1 | Status: CANCELLED | OUTPATIENT
Start: 2020-05-21

## 2020-05-21 RX ORDER — IRBESARTAN 300 MG/1
300 TABLET ORAL DAILY
Qty: 90 TABLET | Refills: 0 | Status: SHIPPED | OUTPATIENT
Start: 2020-05-21 | End: 2020-07-16 | Stop reason: SDUPTHER

## 2020-05-22 NOTE — PROGRESS NOTES
Quick DC. Duplicate Request   Refill Authorization Note    is requesting a refill authorization.    Brief assessment and rationale for refill: QUICK DC: duplicate request               Medication reconciliation completed: No                          Comments:   Pended Medication(s)   Requested Prescriptions     Pending Prescriptions Disp Refills    atorvastatin (LIPITOR) 10 MG tablet [Pharmacy Med Name: ATORVASTATIN CALCIUM 10MG TABS] 90 tablet 0     Sig: TAKE ONE TABLET BY MOUTH EVERY DAY        Duplicate Pended Encounter(s)/ Last Prescribed Details:     Disp Refills Start End    atorvastatin (LIPITOR) 10 MG tablet 90 tablet 1 5/21/2020     Sig - Route: Take 1 tablet (10 mg total) by mouth once daily. - Oral    Sent to pharmacy as: atorvastatin (LIPITOR) 10 MG tablet    E-Prescribing Status: Receipt confirmed by pharmacy (5/21/2020  3:45 PM CDT)    Ordering Encounter Report     Associated Reports   View Encounter                Note composed:10:36 PM 05/21/2020

## 2020-07-16 ENCOUNTER — OFFICE VISIT (OUTPATIENT)
Dept: INTERNAL MEDICINE | Facility: CLINIC | Age: 60
End: 2020-07-16
Attending: FAMILY MEDICINE
Payer: COMMERCIAL

## 2020-07-16 VITALS
WEIGHT: 230.63 LBS | SYSTOLIC BLOOD PRESSURE: 120 MMHG | BODY MASS INDEX: 30.57 KG/M2 | HEART RATE: 78 BPM | DIASTOLIC BLOOD PRESSURE: 76 MMHG | OXYGEN SATURATION: 97 % | HEIGHT: 73 IN

## 2020-07-16 DIAGNOSIS — Z00.00 ANNUAL PHYSICAL EXAM: Primary | ICD-10-CM

## 2020-07-16 DIAGNOSIS — N40.1 BPH WITH URINARY OBSTRUCTION: ICD-10-CM

## 2020-07-16 DIAGNOSIS — K76.0 NAFLD (NONALCOHOLIC FATTY LIVER DISEASE): ICD-10-CM

## 2020-07-16 DIAGNOSIS — R09.89 DECREASED DORSALIS PEDIS PULSE: ICD-10-CM

## 2020-07-16 DIAGNOSIS — N13.8 BPH WITH URINARY OBSTRUCTION: ICD-10-CM

## 2020-07-16 DIAGNOSIS — I10 HYPERTENSION, ESSENTIAL: ICD-10-CM

## 2020-07-16 DIAGNOSIS — Z12.5 PROSTATE CANCER SCREENING: ICD-10-CM

## 2020-07-16 DIAGNOSIS — E78.5 HYPERLIPIDEMIA, UNSPECIFIED HYPERLIPIDEMIA TYPE: ICD-10-CM

## 2020-07-16 PROCEDURE — 99396 PR PREVENTIVE VISIT,EST,40-64: ICD-10-PCS | Mod: S$GLB,,, | Performed by: FAMILY MEDICINE

## 2020-07-16 PROCEDURE — 99999 PR PBB SHADOW E&M-EST. PATIENT-LVL IV: ICD-10-PCS | Mod: PBBFAC,,, | Performed by: FAMILY MEDICINE

## 2020-07-16 PROCEDURE — 3074F SYST BP LT 130 MM HG: CPT | Mod: CPTII,S$GLB,, | Performed by: FAMILY MEDICINE

## 2020-07-16 PROCEDURE — 3078F DIAST BP <80 MM HG: CPT | Mod: CPTII,S$GLB,, | Performed by: FAMILY MEDICINE

## 2020-07-16 PROCEDURE — 3008F BODY MASS INDEX DOCD: CPT | Mod: CPTII,S$GLB,, | Performed by: FAMILY MEDICINE

## 2020-07-16 PROCEDURE — 99999 PR PBB SHADOW E&M-EST. PATIENT-LVL IV: CPT | Mod: PBBFAC,,, | Performed by: FAMILY MEDICINE

## 2020-07-16 PROCEDURE — 3078F PR MOST RECENT DIASTOLIC BLOOD PRESSURE < 80 MM HG: ICD-10-PCS | Mod: CPTII,S$GLB,, | Performed by: FAMILY MEDICINE

## 2020-07-16 PROCEDURE — 99396 PREV VISIT EST AGE 40-64: CPT | Mod: S$GLB,,, | Performed by: FAMILY MEDICINE

## 2020-07-16 PROCEDURE — 3074F PR MOST RECENT SYSTOLIC BLOOD PRESSURE < 130 MM HG: ICD-10-PCS | Mod: CPTII,S$GLB,, | Performed by: FAMILY MEDICINE

## 2020-07-16 PROCEDURE — 3008F PR BODY MASS INDEX (BMI) DOCUMENTED: ICD-10-PCS | Mod: CPTII,S$GLB,, | Performed by: FAMILY MEDICINE

## 2020-07-16 RX ORDER — ATORVASTATIN CALCIUM 10 MG/1
10 TABLET, FILM COATED ORAL DAILY
Qty: 90 TABLET | Refills: 3 | Status: SHIPPED | OUTPATIENT
Start: 2020-07-16 | End: 2021-08-19

## 2020-07-16 RX ORDER — IRBESARTAN 300 MG/1
300 TABLET ORAL DAILY
Qty: 90 TABLET | Refills: 3 | Status: SHIPPED | OUTPATIENT
Start: 2020-07-16 | End: 2020-12-27 | Stop reason: SDUPTHER

## 2020-07-16 NOTE — PROGRESS NOTES
Subjective:       Patient ID: Joseph Jesus is a 60 y.o. male.    Chief Complaint: Annual Exam    Established patient for an annual wellness check/physical exam and also chronic disease management. Specific complaints - see dictation and please see ROS.  P, S, Fm, Soc Hx's; Meds, allergies reviewed and reconciled.  Health maintenance file reviewed and addressed items due.    Illness in March/April has improved.  No symptoms x2 months.  States daughter is moving to the [place] and they would like to follow.  Was in the emergency room recently.  States they had difficulty finding the pulses in his feet, he does have some numbness which is a present for about 10 years but no symptoms to suggest ischemic vascular disease or claudication.    Review of Systems   Constitutional: Negative for chills, fatigue, fever and unexpected weight change.   HENT: Negative for congestion and trouble swallowing.    Eyes: Negative for redness and visual disturbance.   Respiratory: Negative for cough, chest tightness and shortness of breath.    Cardiovascular: Negative for chest pain, palpitations and leg swelling.   Gastrointestinal: Negative for abdominal pain and blood in stool.   Genitourinary: Negative for difficulty urinating and hematuria.        ED   Musculoskeletal: Negative for arthralgias, back pain, gait problem, joint swelling, myalgias and neck pain.   Skin: Negative for color change and rash.   Neurological: Positive for numbness. Negative for tremors, speech difficulty, weakness and headaches.        Toes   Hematological: Negative for adenopathy. Does not bruise/bleed easily.   Psychiatric/Behavioral: Negative for behavioral problems, confusion and sleep disturbance. The patient is not nervous/anxious.        Objective:      Physical Exam  Vitals signs and nursing note reviewed.   Constitutional:       Appearance: He is well-developed. He is not diaphoretic.   Eyes:      General: No scleral icterus.  Neck:       Musculoskeletal: Normal range of motion and neck supple.      Thyroid: No thyromegaly.      Vascular: No carotid bruit or JVD.      Trachea: No tracheal deviation.   Cardiovascular:      Rate and Rhythm: Normal rate and regular rhythm.      Heart sounds: Normal heart sounds. No murmur. No friction rub. No gallop.    Pulmonary:      Effort: Pulmonary effort is normal. No respiratory distress.      Breath sounds: Normal breath sounds. No wheezing or rales.   Abdominal:      General: There is no distension.      Palpations: Abdomen is soft. There is no mass.      Tenderness: There is no abdominal tenderness. There is no guarding or rebound.   Lymphadenopathy:      Cervical: No cervical adenopathy.   Skin:     General: Skin is warm and dry.      Findings: No erythema or rash.   Neurological:      Mental Status: He is alert and oriented to person, place, and time.      Cranial Nerves: No cranial nerve deficit.      Motor: No tremor.      Coordination: Coordination normal.      Gait: Gait normal.   Psychiatric:         Behavior: Behavior normal.         Thought Content: Thought content normal.         Judgment: Judgment normal.         Assessment:       1. Annual physical exam    2. Hypertension, essential    3. Hyperlipidemia, unspecified hyperlipidemia type    4. Prostate cancer screening    5. NAFLD (nonalcoholic fatty liver disease)    6. BPH with urinary obstruction    7. Decreased dorsalis pedis pulse        Plan:     Medication List with Changes/Refills   Current Medications    ALBUTEROL (PROVENTIL/VENTOLIN HFA) 90 MCG/ACTUATION INHALER    Inhale 2 puffs into the lungs every 6 (six) hours as needed for Wheezing (cough).    CETIRIZINE (ZYRTEC) 10 MG TABLET    Take 10 mg by mouth once daily.    COENZYME Q10 100 MG CAPSULE    Take 200 mg by mouth once daily.    FLUTICASONE PROPIONATE (FLONASE) 50 MCG/ACTUATION NASAL SPRAY    1 spray (50 mcg total) by Each Nostril route once daily.    ONDANSETRON (ZOFRAN) 4 MG TABLET     Take 1-2 tablets (4-8 mg total) by mouth every 8 (eight) hours as needed for Nausea.    PAPAVERINE 30 MG/ML INJECTION    Add Phentolamine 1 mg/cc  Add PGE1 10 mcg/cc    SIG:  Bring to office for test dose in physician office   Changed and/or Refilled Medications    Modified Medication Previous Medication    ATORVASTATIN (LIPITOR) 10 MG TABLET atorvastatin (LIPITOR) 10 MG tablet       Take 1 tablet (10 mg total) by mouth once daily.    Take 1 tablet (10 mg total) by mouth once daily.    IRBESARTAN (AVAPRO) 300 MG TABLET irbesartan (AVAPRO) 300 MG tablet       Take 1 tablet (300 mg total) by mouth once daily.    Take 1 tablet (300 mg total) by mouth once daily.   Discontinued Medications    GUAIFENESIN-CODEINE 100-10 MG/5 ML (TUSSI-ORGANIDIN NR)  MG/5 ML SYRUP    Take 5 mLs by mouth every 6 (six) hours as needed for Cough.    MELOXICAM (MOBIC) 15 MG TABLET    Take 1 tablet (15 mg total) by mouth once daily.    METRONIDAZOLE (FLAGYL) 500 MG TABLET    Take 1 tablet (500 mg total) by mouth every 8 (eight) hours.     Joseph was seen today for annual exam.    Diagnoses and all orders for this visit:    Annual physical exam  -     Comprehensive metabolic panel; Future  -     Lipid Panel; Future  -     PSA, Screening; Future    Hypertension, essential  -     Comprehensive metabolic panel; Future  -     irbesartan (AVAPRO) 300 MG tablet; Take 1 tablet (300 mg total) by mouth once daily.    Hyperlipidemia, unspecified hyperlipidemia type  -     Lipid Panel; Future  -     atorvastatin (LIPITOR) 10 MG tablet; Take 1 tablet (10 mg total) by mouth once daily.    Prostate cancer screening  -     PSA, Screening; Future    NAFLD (nonalcoholic fatty liver disease)  Comments:  recent follow up w/ Dr. Presley    BPH with urinary obstruction    Decreased dorsalis pedis pulse  -     CV Segmental Pressures Low Ext W Stress; Future      See meds, orders, follow up, routing and instructions sections of encounter and AVS. Discussed  with patient and provided on AVS.

## 2020-07-16 NOTE — PATIENT INSTRUCTIONS
Schedule lab orders for soon.     Information about cholesterol, high blood pressure and healthy diet and activity recommendations can be found at the following links on the Internet:    http://www.nhlbi.nih.gov/health/health-topics/topics/hbc  http://www.nhlbi.nih.gov/health/educational/lose_wt/index.htm  Http://www.nhlbi.nih.gov/files/docs/public/heart/hbp_low.pdf  http://www.heart.org/HEARTORG/  http://diabetes.org/  https://www.cdc.gov/  Https://healthfinder.gov/  https://health.gov/dietaryguidelines/2015/guidelines/  https://health.gov/paguidelines/second-edition/pdf/Physical_Activity_Guidelines_2nd_edition.pdf

## 2020-07-29 ENCOUNTER — LAB VISIT (OUTPATIENT)
Dept: LAB | Facility: HOSPITAL | Age: 60
End: 2020-07-29
Attending: FAMILY MEDICINE
Payer: COMMERCIAL

## 2020-07-29 DIAGNOSIS — I10 HYPERTENSION, ESSENTIAL: ICD-10-CM

## 2020-07-29 DIAGNOSIS — E78.5 HYPERLIPIDEMIA, UNSPECIFIED HYPERLIPIDEMIA TYPE: ICD-10-CM

## 2020-07-29 DIAGNOSIS — Z12.5 PROSTATE CANCER SCREENING: ICD-10-CM

## 2020-07-29 DIAGNOSIS — Z00.00 ANNUAL PHYSICAL EXAM: ICD-10-CM

## 2020-07-29 LAB
ALBUMIN SERPL BCP-MCNC: 4.2 G/DL (ref 3.5–5.2)
ALP SERPL-CCNC: 59 U/L (ref 55–135)
ALT SERPL W/O P-5'-P-CCNC: 38 U/L (ref 10–44)
ANION GAP SERPL CALC-SCNC: 7 MMOL/L (ref 8–16)
AST SERPL-CCNC: 35 U/L (ref 10–40)
BILIRUB SERPL-MCNC: 0.5 MG/DL (ref 0.1–1)
BUN SERPL-MCNC: 18 MG/DL (ref 8–23)
CALCIUM SERPL-MCNC: 9.7 MG/DL (ref 8.7–10.5)
CHLORIDE SERPL-SCNC: 107 MMOL/L (ref 95–110)
CHOLEST SERPL-MCNC: 178 MG/DL (ref 120–199)
CHOLEST/HDLC SERPL: 2.9 {RATIO} (ref 2–5)
CO2 SERPL-SCNC: 26 MMOL/L (ref 23–29)
COMPLEXED PSA SERPL-MCNC: 0.22 NG/ML (ref 0–4)
CREAT SERPL-MCNC: 1.4 MG/DL (ref 0.5–1.4)
EST. GFR  (AFRICAN AMERICAN): >60 ML/MIN/1.73 M^2
EST. GFR  (NON AFRICAN AMERICAN): 53.5 ML/MIN/1.73 M^2
GLUCOSE SERPL-MCNC: 87 MG/DL (ref 70–110)
HDLC SERPL-MCNC: 61 MG/DL (ref 40–75)
HDLC SERPL: 34.3 % (ref 20–50)
LDLC SERPL CALC-MCNC: 89.6 MG/DL (ref 63–159)
NONHDLC SERPL-MCNC: 117 MG/DL
POTASSIUM SERPL-SCNC: 4.5 MMOL/L (ref 3.5–5.1)
PROT SERPL-MCNC: 7.3 G/DL (ref 6–8.4)
SODIUM SERPL-SCNC: 140 MMOL/L (ref 136–145)
TRIGL SERPL-MCNC: 137 MG/DL (ref 30–150)

## 2020-07-29 PROCEDURE — 36415 COLL VENOUS BLD VENIPUNCTURE: CPT

## 2020-07-29 PROCEDURE — 80061 LIPID PANEL: CPT

## 2020-07-29 PROCEDURE — 84153 ASSAY OF PSA TOTAL: CPT

## 2020-07-29 PROCEDURE — 80053 COMPREHEN METABOLIC PANEL: CPT

## 2020-09-19 ENCOUNTER — PATIENT MESSAGE (OUTPATIENT)
Dept: INTERNAL MEDICINE | Facility: CLINIC | Age: 60
End: 2020-09-19

## 2020-09-19 RX ORDER — CYCLOBENZAPRINE HCL 10 MG
10 TABLET ORAL 3 TIMES DAILY PRN
Qty: 45 TABLET | Refills: 0 | Status: SHIPPED | OUTPATIENT
Start: 2020-09-19 | End: 2021-05-16 | Stop reason: ALTCHOICE

## 2020-09-21 ENCOUNTER — PATIENT MESSAGE (OUTPATIENT)
Dept: INTERNAL MEDICINE | Facility: CLINIC | Age: 60
End: 2020-09-21

## 2020-10-01 ENCOUNTER — PATIENT MESSAGE (OUTPATIENT)
Dept: INTERNAL MEDICINE | Facility: CLINIC | Age: 60
End: 2020-10-01

## 2020-10-01 ENCOUNTER — TELEPHONE (OUTPATIENT)
Dept: INTERNAL MEDICINE | Facility: CLINIC | Age: 60
End: 2020-10-01

## 2020-10-01 NOTE — TELEPHONE ENCOUNTER
Dr Neil Nguyen's gyno, Dr Ruiz, recently performed a COVID test on Wendy, who is suffering with Bronchitis.     Test was negative, but Dr Ruiz believes that Covid tests are often not reliable on pregnant mothers- something about the placenta, way above my pay grade.     He seemed to suggest that those individuals in close contact should be tested.  [facility] urgent [facility] said they would contact you.     Do you want to order me a same-day test at [facility]?     Same for Tr[facility] at [facility]?     Thank you     Joseph Jesus

## 2020-10-25 ENCOUNTER — PATIENT MESSAGE (OUTPATIENT)
Dept: DERMATOLOGY | Facility: CLINIC | Age: 60
End: 2020-10-25

## 2020-10-26 ENCOUNTER — TELEPHONE (OUTPATIENT)
Dept: DERMATOLOGY | Facility: CLINIC | Age: 60
End: 2020-10-26

## 2020-10-26 NOTE — TELEPHONE ENCOUNTER
----- Message from Karla Brenner, CST sent at 10/26/2020  6:06 AM CDT -----  Call patient this morning abut scheduleing his mohs surgery

## 2020-10-26 NOTE — TELEPHONE ENCOUNTER
Returned patient call after speaking with Dr. Kat.  wants to see him next month, but the patient said that e would come in December. I scheduled him for 12-3-20

## 2020-11-01 ENCOUNTER — PATIENT OUTREACH (OUTPATIENT)
Dept: ADMINISTRATIVE | Facility: OTHER | Age: 60
End: 2020-11-01

## 2020-11-01 NOTE — PROGRESS NOTES
Requested updates within Care Everywhere.  Patient's chart was reviewed for overdue [facility] topics.  Immunizations reconciled.

## 2020-11-02 ENCOUNTER — OFFICE VISIT (OUTPATIENT)
Dept: DERMATOLOGY | Facility: CLINIC | Age: 60
End: 2020-11-02
Payer: COMMERCIAL

## 2020-11-02 DIAGNOSIS — L84 CLAVUS: ICD-10-CM

## 2020-11-02 DIAGNOSIS — L57.0 AK (ACTINIC KERATOSIS): Primary | ICD-10-CM

## 2020-11-02 PROCEDURE — 99213 PR OFFICE/OUTPT VISIT, EST, LEVL III, 20-29 MIN: ICD-10-PCS | Mod: S$GLB,,, | Performed by: DERMATOLOGY

## 2020-11-02 PROCEDURE — 99999 PR PBB SHADOW E&M-EST. PATIENT-LVL III: CPT | Mod: PBBFAC,,, | Performed by: DERMATOLOGY

## 2020-11-02 PROCEDURE — 99213 OFFICE O/P EST LOW 20 MIN: CPT | Mod: S$GLB,,, | Performed by: DERMATOLOGY

## 2020-11-02 PROCEDURE — 99999 PR PBB SHADOW E&M-EST. PATIENT-LVL III: ICD-10-PCS | Mod: PBBFAC,,, | Performed by: DERMATOLOGY

## 2020-11-02 RX ORDER — LEVOFLOXACIN 500 MG/1
TABLET, FILM COATED ORAL
COMMUNITY
Start: 2020-09-08 | End: 2021-04-07

## 2020-11-02 RX ORDER — ATORVASTATIN CALCIUM 10 MG/1
TABLET, FILM COATED ORAL
COMMUNITY
Start: 2020-09-06 | End: 2021-04-07 | Stop reason: SDUPTHER

## 2020-11-02 RX ORDER — LEVOFLOXACIN 500 MG/1
1 TABLET, FILM COATED ORAL
COMMUNITY
Start: 2020-09-07 | End: 2020-12-01 | Stop reason: SDUPTHER

## 2020-11-02 RX ORDER — METRONIDAZOLE 500 MG/1
1 TABLET ORAL
COMMUNITY
Start: 2020-09-07 | End: 2021-04-07 | Stop reason: ALTCHOICE

## 2020-11-02 RX ORDER — IRBESARTAN 150 MG/1
TABLET ORAL
COMMUNITY
Start: 2020-09-06 | End: 2021-04-07

## 2020-11-02 NOTE — ASSESSMENT & PLAN NOTE
Today's Plan:      Needs to completely peel to assess need for 2nd treatment    Cont wear hat always

## 2020-11-02 NOTE — PROGRESS NOTES
Subjective:       Patient ID:  Joseph Jesus is a 60 y.o. male who presents for   Chief Complaint   Patient presents with    Skin Check     f/u scalp      History of Present Illness: The patient presents for follow up of skin check.    The patient was last seen on: 3/12/20 for treatment of actinic keratoses with efudex/dovonex bid to scalp x 1 week which have resolved and bx of scc left post scalp - examined by PWS and excision deferred.  This is a high risk patient here to check for the development of new lesions.      Other skin complaints: none        Review of Systems   Skin: Positive for activity-related sunscreen use and wears hat (100%). Negative for itching, rash, dry skin, daily sunscreen use and recent sunburn.   Hematologic/Lymphatic: Does not bruise/bleed easily.        Objective:    Physical Exam   Constitutional: He appears well-developed and well-nourished. No distress.   Neurological: He is alert and oriented to person, place, and time. He is not disoriented.   Psychiatric: He has a normal mood and affect.   Skin:   Areas Examined (abnormalities noted in diagram):   Scalp / Hair Palpated and Inspected  Head / Face Inspection Performed                  Diagram Legend     Erythematous scaling macule/papule c/w actinic keratosis       Vascular papule c/w angioma      Pigmented verrucoid papule/plaque c/w seborrheic keratosis      Yellow umbilicated papule c/w sebaceous hyperplasia      Irregularly shaped tan macule c/w lentigo     1-2 mm smooth white papules consistent with Milia      Movable subcutaneous cyst with punctum c/w epidermal inclusion cyst      Subcutaneous movable cyst c/w pilar cyst      Firm pink to brown papule c/w dermatofibroma      Pedunculated fleshy papule(s) c/w skin tag(s)      Evenly pigmented macule c/w junctional nevus     Mildly variegated pigmented, slightly irregular-bordered macule c/w mildly atypical nevus      Flesh colored to evenly pigmented papule c/w intradermal  nevus       Pink pearly papule/plaque c/w basal cell carcinoma      Erythematous hyperkeratotic cursted plaque c/w SCC      Surgical scar with no sign of skin cancer recurrence      Open and closed comedones      Inflammatory papules and pustules      Verrucoid papule consistent consistent with wart     Erythematous eczematous patches and plaques     Dystrophic onycholytic nail with subungual debris c/w onychomycosis     Umbilicated papule    Erythematous-base heme-crusted tan verrucoid plaque consistent with inflamed seborrheic keratosis     Erythematous Silvery Scaling Plaque c/w Psoriasis     See annotation      Assessment / Plan:        AK (actinic keratosis)    Clavus - bilateral feet  Discussed pathogenic factor of pressure. Patient advised to avoid/eliminate contributory footwear.    Use corn pads.  Pared x 2 lesions      AK (actinic keratosis)  Today's Plan:      Needs to completely peel to assess need for 2nd treatment    Cont wear hat always      No follow-ups on file.

## 2020-11-30 ENCOUNTER — PATIENT MESSAGE (OUTPATIENT)
Dept: INTERNAL MEDICINE | Facility: CLINIC | Age: 60
End: 2020-11-30

## 2020-11-30 NOTE — TELEPHONE ENCOUNTER
Dr Trejo     My chest cold has returned... now going on 17 days.     Can you please prescribe me an extra strength cough syrup with strong cough suppressent and expectorant properties at [facility] in [place]?     Thank you  Joseph Elise

## 2020-12-01 RX ORDER — PROMETHAZINE HYDROCHLORIDE AND DEXTROMETHORPHAN HYDROBROMIDE 6.25; 15 MG/5ML; MG/5ML
5 SYRUP ORAL 3 TIMES DAILY PRN
Qty: 118 ML | Refills: 0 | Status: SHIPPED | OUTPATIENT
Start: 2020-12-01 | End: 2020-12-11

## 2020-12-01 RX ORDER — PROMETHAZINE HYDROCHLORIDE AND DEXTROMETHORPHAN HYDROBROMIDE 6.25; 15 MG/5ML; MG/5ML
5 SYRUP ORAL 3 TIMES DAILY PRN
Qty: 118 ML | Refills: 0 | Status: SHIPPED | OUTPATIENT
Start: 2020-12-01 | End: 2020-12-01 | Stop reason: SDUPTHER

## 2020-12-02 ENCOUNTER — PATIENT OUTREACH (OUTPATIENT)
Dept: ADMINISTRATIVE | Facility: OTHER | Age: 60
End: 2020-12-02

## 2020-12-03 ENCOUNTER — TELEPHONE (OUTPATIENT)
Dept: DERMATOLOGY | Facility: CLINIC | Age: 60
End: 2020-12-03

## 2020-12-03 NOTE — TELEPHONE ENCOUNTER
----- Message from Rafita Lambert sent at 12/3/2020  9:14 AM CST -----  Type:  Patient Returning Call    Who Called:  Patient  Who Left Message for Patient:  Anabella  Does the patient know what this is regarding?:  Appointment   Best Call Back Number: [phone]  Additional Information:

## 2020-12-03 NOTE — PROGRESS NOTES
Health Maintenance Due   Topic Date Due    HIV Screening  03/07/1975    Pneumococcal Vaccine (Medium Risk) (1 of 1 - PPSV23) [birth year]07/1977    Influenza Vaccine (1) 08/01/2020     Updates were requested from care everywhere.  Chart was reviewed for overdue Proactive [facility]ters ([facility]) topics (CRS, Breast Cancer Screening, Eye exam)  Health Maintenance has been updated.  [facility] immunization registry triggered.  Immunizations were reconciled.

## 2020-12-10 ENCOUNTER — IMMUNIZATION (OUTPATIENT)
Dept: PHARMACY | Facility: CLINIC | Age: 60
End: 2020-12-10

## 2020-12-10 ENCOUNTER — OFFICE VISIT (OUTPATIENT)
Dept: DERMATOLOGY | Facility: CLINIC | Age: 60
End: 2020-12-10
Payer: COMMERCIAL

## 2020-12-10 ENCOUNTER — IMMUNIZATION (OUTPATIENT)
Dept: PHARMACY | Facility: CLINIC | Age: 60
End: 2020-12-10
Payer: COMMERCIAL

## 2020-12-10 DIAGNOSIS — D04.4 SQUAMOUS CELL CARCINOMA IN SITU (SCCIS) OF SCALP: Primary | ICD-10-CM

## 2020-12-10 PROCEDURE — 1126F AMNT PAIN NOTED NONE PRSNT: CPT | Mod: S$GLB,,, | Performed by: DERMATOLOGY

## 2020-12-10 PROCEDURE — 99213 OFFICE O/P EST LOW 20 MIN: CPT | Mod: S$GLB,,, | Performed by: DERMATOLOGY

## 2020-12-10 PROCEDURE — 1126F PR PAIN SEVERITY QUANTIFIED, NO PAIN PRESENT: ICD-10-PCS | Mod: S$GLB,,, | Performed by: DERMATOLOGY

## 2020-12-10 PROCEDURE — 99999 PR PBB SHADOW E&M-EST. PATIENT-LVL III: ICD-10-PCS | Mod: PBBFAC,,, | Performed by: DERMATOLOGY

## 2020-12-10 PROCEDURE — 99213 PR OFFICE/OUTPT VISIT, EST, LEVL III, 20-29 MIN: ICD-10-PCS | Mod: S$GLB,,, | Performed by: DERMATOLOGY

## 2020-12-10 PROCEDURE — 99999 PR PBB SHADOW E&M-EST. PATIENT-LVL III: CPT | Mod: PBBFAC,,, | Performed by: DERMATOLOGY

## 2020-12-10 NOTE — PROGRESS NOTES
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  See the previous notes; at his previous visit in April via telemedecine, there was no clearcut evidence of residual tumor, and after discussing options, we decided to defer treatment and observe  he returns today for followup/re-evaluation    REVIEW OF SYSTEMS:   Reviewed ROS of 4/07; see below    Defibrillator: No  Pacemaker: No  Artificial heart valves: No  Artificial joints: No    REVIEW OF SYSTEMS:   General: general health good  Skin: previous skin cancer(s) Yes   If yes, details: Bcc and Scc and had MohsRelevant other:    Cardiovascular:   High Blood Pressure: Yes   Chest Pain:  No   Defibrillator: as above  Pacemaker: as above  Artificial heart valves: as above  Prior Endocarditis: No   Prior Heart Attack/MI: No     If yes, when:   Prior Cardiac Bypass or Stents:  No   If yes, when:   Mitral Valve Prolapse: No   Relevant other:  No   Respiratory:   Shortness of breath:  No   Relevant other:  No   Endocrine:   Diabetes:  No   Relevant other:  No   Hem/Lymph:   Taking Prescribed Blood Thinners:  No   Easy Bleeding:  No   Relevant other:  No   Allergy/Immuno: as noted above  Relevant other:  No   GI:   Prior Hepatitis:  No     If yes, details:   Relevant other:  Yes  Fatty liver  Musculoskeletal:   Artificial joints: as above  Relevant other:  No   Neurologic:   Prior Stroke:  No     If yes, details:   Relevant other:  No   Relevant other info:  No        Current Outpatient Medications:     albuterol (PROVENTIL/VENTOLIN HFA) 90 mcg/actuation inhaler, Inhale 2 puffs into the lungs every 6 (six) hours as needed for Wheezing (cough)., Disp: 18 g, Rfl: 5    atorvastatin (LIPITOR) 10 MG tablet,  Take 1 tablet (10 mg total) by mouth once daily., Disp: 90 tablet, Rfl: 3    atorvastatin (LIPITOR) 10 MG tablet, Lipitor, Disp: , Rfl:     cetirizine (ZYRTEC) 10 MG tablet, Take 10 mg by mouth once daily., Disp: , Rfl:     coenzyme Q10 100 mg capsule, Take 200 mg by mouth once daily., Disp: , Rfl:     cyclobenzaprine (FLEXERIL) 10 MG tablet, Take 1 tablet (10 mg total) by mouth 3 (three) times daily as needed for Muscle spasms., Disp: 45 tablet, Rfl: 0    fluticasone propionate (FLONASE) 50 mcg/actuation nasal spray, 1 spray (50 mcg total) by Each Nostril route once daily., Disp: 16 g, Rfl: 2    irbesartan (AVAPRO) 150 MG tablet, Avapro, Disp: , Rfl:     irbesartan (AVAPRO) 300 MG tablet, Take 1 tablet (300 mg total) by mouth once daily., Disp: 90 tablet, Rfl: 3    levoFLOXacin (LEVAQUIN) 500 MG tablet, TK 1 T PO QD FOR 10 DAYS, Disp: , Rfl:     metroNIDAZOLE (FLAGYL) 500 MG tablet, 1 tablet., Disp: , Rfl:     ondansetron (ZOFRAN) 4 MG tablet, Take 1-2 tablets (4-8 mg total) by mouth every 8 (eight) hours as needed for Nausea., Disp: 24 tablet, Rfl: 0    promethazine-dextromethorphan (PROMETHAZINE-DM) 6.25-15 mg/5 mL Syrp, Take 5 mLs by mouth 3 (three) times daily as needed., Disp: 118 mL, Rfl: 0       EXAM:  Constitutional  General appearance: well-developed, well-nourished, well-kempt older white male    Eyes  Inspection of conjunctivae and lids reveals no abnormalities; sclerae anicteric  Neurologic/Psychiatric  Alert,  normal orientation to time, place, person  Normal mood and affect with no evidence of depression, anxiety, agitation  Skin: see photo(s)  Head: background moderate solar damage to exposed areas of skin  site(s) confirmed by reference to the photograph(s) attached below taken at the time of the biopsy/biopsies by the referring physician   inspection/palpation reveals a pink scar with no evidence of residual/recurrent squamous cell carcinoma in situ on exam today  Neck: examination  reveals moderate chronic solar damage  Right upper extremity: examination reveals moderate chronic solar damage  Left upper extremity: examination reveals moderate chronic solar damage  Photo(s) today          Photo(s) from prior biopsy      ASSESSMENT:   Status post biopsy of squamous cell carcinoma on the scalp by Dr. Melgar, now clinically clear 9 months post biops  personal history of non-melanoma skin cancer    PLAN:  The diagnosis/diagnoses and management options, and risks and benefits of the alternatives, including observation/non-treatment, radiation treatment, excision with vertical frozen section or paraffin-embedded section margin evaluation, and Mohs' Micrographic Surgery, Fresh Tissue Technique, were discussed at length with the patient. In particular, the discussion included, but was not limited to, the following:    One alternative at this point would be to defer further treatment and observe the lesion(s). With small skin cancers of this kind, it is possible that a biopsy can be sufficient to definitively treat a small skin cancer of this kind. Alternatively, some skin cancers are slow growing and do not require immediate treatment. The potential advantage of this choice would be to avoid the need for possibly unnecessary additional surgery. Among the potential disadvantages of this would be the possibility of enlargement of the lesion, more extensive spread of the lesion or recurrence at a later date, which might necessitate a larger and more complex surgery.    Radiation treatment can be an effective treatment for this type of skin cancer. The usual course of treatment is every weekday for several weeks. Local irritation will result from treatment, although no systemic side effects are expected. The potential advantage of radiation treatment is that it avoids the need for surgery. Among the disadvantages of radiation treatment are the length of treatment, the local inflammatory response, the  absence of pathologic confirmation of the removal of the skin cancer, a possible increased risk of additional skin cancer in the treated area in later years, and a somewhat increased risk of recurrence at a later date.     Excisional surgery can be an effective treatment for this type of skin cancer. This would involve excision of the lesion with margin evaluation by submitting the specimen to a pathologist for either immediate marginal assessment via frozen section processing, or delayed marginal assessment by fixed-tissue processing. The potential advantage of this technique is that it offers a way of treating the lesion with some degree of histologic confirmation of tumor removal. Among the disadvantages of this treatment are the possible need for re-excision if marginal involvement is identified, a somewhat greater likelihood of recurrence as compared to Mohs' surgery because of the less comprehensive margin evaluation inherent in the technique, and the general potential risks of surgery, including allergic reactions to the anesthetic and other materials used, infection, injury to nerves in the area with consequent loss of sensation or muscle function, and scarring or distortion of surrounding structures.    Mohs' surgery is a very effective treatment for this type of skin cancer. The potential advantage of Mohs' surgery is that this technique offers the greatest possible certainty of knowing that the skin cancer has been completely removed, with the removal of the least amount of normal tissue. The potential disadvantages of Mohs' surgery include the duration of the surgery, the possible need for a separate surgery for reconstruction following tumor removal, and scarring as a result. In addition, general potential risks of surgery as noted above also apply to treatment via Mohs' surgery.    Sufficient time was available for questions, and all questions were answered to his satisfaction.     After discussing the  clinical findings and the treatment alternatives, and the risks and benefits of the alternatives at length and in detail, and given the absence of any evidence of residual tumor to the biopsy site(s) at present, he and I have decided to defer surgical treatment and to observe the site(s) for the present.     Discussion of the above issues constituted greater than 50% of the face-to-face encounter, the duration of which was  minutes.    He has an upcoming appointment with Dr. Melgar in the next several weeks.    I will send a message regarding his status to Dr. Melgar.  --------------------------------------  Note: Some or all of this note may have been generated using voice recognition software. There may be voice recognition errors including grammatical and/or spelling errors found in the text. Attempts were made to correct these errors prior to signature.       ============================================================  PREVIOUS NOTE(S):  Note of 4/07    The patient location is: [facility]  The chief complaint leading to consultation is: skin cancer on scalp  Visit type: Virtual visit with synchronous audio and video  Total time spent with patient: 15 minutes  Each patient to whom he or she provides medical services by telemedicine is:  (1) informed of the relationship between the physician and patient and the respective role of any other health care provider with respect to management of the patient; and (2) notified that he or she may decline to receive medical services by telemedicine and may withdraw from such care at any time.    Notes:     ALLERGIES:  Erythromycin; Sumycin [tetracycline]; and Tetracyclines    CHIEF COMPLAINT:  This 60 y.o. male is seen via telemedicine for evaluation for Mohs' Micrographic Surgery, Fresh Tissue Technique, for treatment of a biopsy-proven squamous cell carcinoma insitu on the left posterior scalp. Consultation requested by Diane Melgar M.D..    HISTORY OF  PRESENT ILLNESS:   Location: left posterior scalp  Duration: 1 year  or more  Quality: persistent  Context: status post biopsy by Diane Melgar M.D.; path = squamous cell carcinoma insitu; pathology accession #[id number]  Pathology [facility]    Prior Treatment: none    See also the handwritten notes/diagrams scanned to chart for additional details.    Defibrillator: No  Pacemaker: No  Artificial heart valves: No  Artificial joints: No    REVIEW OF SYSTEMS:   General: general health good  Skin: previous skin cancer(s) Yes   If yes, details: Bcc and Scc and had MohsRelevant other:    Cardiovascular:   High Blood Pressure: Yes   Chest Pain:  No   Defibrillator: as above  Pacemaker: as above  Artificial heart valves: as above  Prior Endocarditis: No   Prior Heart Attack/MI: No     If yes, when:   Prior Cardiac Bypass or Stents:  No   If yes, when:   Mitral Valve Prolapse: No   Relevant other:  No   Respiratory:   Shortness of breath:  No   Relevant other:  No   Endocrine:   Diabetes:  No   Relevant other:  No   Hem/Lymph:   Taking Prescribed Blood Thinners:  No   Easy Bleeding:  No   Relevant other:  No   Allergy/Immuno: as noted above  Relevant other:  No   GI:   Prior Hepatitis:  No     If yes, details:   Relevant other:  Yes  Fatty liver  Musculoskeletal:   Artificial joints: as above  Relevant other:  No   Neurologic:   Prior Stroke:  No     If yes, details:   Relevant other:  No   Relevant other info:  No      PAST MEDICAL HISTORY:  Past Medical History:   Diagnosis Date    Allergy     seasonal    Anal fistula hx    Arthritis     Basal cell carcinoma 4/2013    left superior forehead    Callus     Diverticulosis     Epididymal cyst     Hayfever     Hydrocele, right     Hyperlipidemia     Hypertension     Neck pain     hx of muscular inury from weight lifting---resolved now    Prostatitis     Dec. '12-treated with antibx.    Squamous cell carcinoma 01/07/2019    anterior scalp    Visual impairment         PAST SURGICAL HISTORY:  Past Surgical History:   Procedure Laterality Date    APPENDECTOMY      COLONOSCOPY  2012    due for repeat 2015    COLONOSCOPY N/A 2016    Procedure: COLONOSCOPY;  Surgeon: James Webber MD;  Location: [facility] ([address] [place] [address]);  Service: Endoscopy;  Laterality: N/A;    EUA/Fistulotomy-'08      HERNIA REPAIR      [facility] with mesh    Hydrocelectomy      MASS EXCISION      BCC removal (twice)    MOLE REMOVAL  2019    2 moles removed from scalp =- 1 was basal cell and 1 was squamous cell - dermatology - Dr. londono    right Spermatocelectomy          SOCIAL HISTORY:  Dependencies: smoking status as noted below  Social History     Tobacco Use    Smoking status: Former Smoker     Packs/day: 1.00     Years: 10.00     Pack years: 10.00     Last attempt to quit: 1987     Years since quittin.3    Smokeless tobacco: Never Used   Substance Use Topics    Alcohol use: Yes     Alcohol/week: 1.0 standard drinks     Types: 1 Glasses of wine per week     Comment: socially    Drug use: No       PERTINENT MEDICATIONS:  See medications list.    Current Outpatient Medications:     albuterol (PROVENTIL/VENTOLIN HFA) 90 mcg/actuation inhaler, Inhale 2 puffs into the lungs every 6 (six) hours as needed for Wheezing (cough)., Disp: 18 g, Rfl: 5    atorvastatin (LIPITOR) 10 MG tablet, TAKE 1 TABLET BY MOUTH ONCE DAILY., Disp: 90 tablet, Rfl: 1    cetirizine (ZYRTEC) 10 MG tablet, Take 10 mg by mouth once daily., Disp: , Rfl:     coenzyme Q10 100 mg capsule, Take 200 mg by mouth once daily., Disp: , Rfl:     fluticasone propionate (FLONASE) 50 mcg/actuation nasal spray, 1 spray (50 mcg total) by Each Nostril route once daily., Disp: 16 g, Rfl: 2    guaifenesin-codeine 100-10 mg/5 ml (TUSSI-ORGANIDIN NR)  mg/5 mL syrup, Take 5 mLs by mouth every 6 (six) hours as needed for Cough., Disp: 140 mL, Rfl: 0    irbesartan (AVAPRO) 300 MG tablet, Take 1  tablet (300 mg total) by mouth once daily., Disp: 90 tablet, Rfl: 3    meloxicam (MOBIC) 15 MG tablet, Take 1 tablet (15 mg total) by mouth once daily., Disp: 30 tablet, Rfl: 0    ondansetron (ZOFRAN) 4 MG tablet, Take 1-2 tablets (4-8 mg total) by mouth every 8 (eight) hours as needed for Nausea., Disp: 24 tablet, Rfl: 0    papaverine 30 mg/mL injection, Add Phentolamine 1 mg/cc Add PGE1 10 mcg/cc  SIG: Bring to office for test dose in physician office (Patient not taking: Reported on 3/20/2020), Disp: 5 mL, Rfl: 0    ALLERGIES:  Erythromycin; Sumycin [tetracycline]; and Tetracyclines    EXAM via telemedicine:  See also the handwritten notes/diagrams scanned to chart for additional details.  Constitutional  General appearance: well-developed, well-nourished, well-kempt older white male    Eyes  Inspection of conjunctivae and lids reveals no abnormalities; sclerae anicteric  Neurologic/Psychiatric  Alert,  normal orientation to time, place, person  Normal mood and affect with no evidence of depression, anxiety, agitation  Skin: see photo(s)  Head: background moderate solar damage to exposed areas of skin  On his left posterior scalp there is an approximately 1 cm pink biopsy site; site(s) confirmed by reference to the photograph(s) attached below taken at the time of the biopsy/biopsies by the referring physician and he confirmed this as the site of the prior biopsy  Neck: examination reveals moderate chronic solar damage  Photo(s) from biopsy visit:      ASSESSMENT: biopsy-proven squamous cell carcinoma in situ of the left scalp  chronic solar damage to areas as noted above  personal history of non-melanoma skin cancer    PLAN:  The diagnosis and management options, and risks and benefits of the alternatives, including observation/non-treatment, radiation treatment, excision with vertical frozen section or paraffin-embedded section margin evaluation, and Mohs' Micrographic Surgery, Fresh Tissue Technique, were  discussed at length with the patient. In particular, the discussion included, but was not limited to, the following:    One alternative at this point would be to defer further treatment and observe the lesion. With small skin cancers of this kind, it is possible that a biopsy can be sufficient to definitively treat a small skin cancer of this kind. Alternatively, some skin cancers are slow growing and do not require immediate treatment. The potential advantage of this choice would be to avoid the need for possibly unnecessary additional surgery. Among the potential disadvantages of this would be the possibility of enlargement of the lesion, more extensive spread of the lesion or recurrence at a later date, which might necessitate a larger and more complex surgery.    Radiation treatment can be an effective treatment for this type of skin cancer. The usual course of treatment is every weekday for several weeks. Local irritation will result from treatment, although no systemic side effects are expected. The potential advantage of radiation treatment is that it avoids the need for surgery. Among the disadvantages of radiation treatment are the length of treatment, the local inflammatory response, the absence of pathologic confirmation of the removal of the skin cancer, a possible increased risk of additional skin cancer in the treated area in later years, and a somewhat increased risk of recurrence at a later date.     Excisional surgery can be an effective treatment for this type of skin cancer. This would involve excision of the lesion with margin evaluation by submitting the specimen to a pathologist for either immediate marginal assessment via frozen section processing, or delayed marginal assessment by fixed-tissue processing. The potential advantage of this technique is that it offers a way of treating the lesion with some degree of histologic confirmation of tumor removal. Among the disadvantages of this  treatment are the possible need for re-excision if marginal involvement is identified, a somewhat greater likelihood of recurrence as compared to Mohs' surgery because of the less comprehensive margin evaluation inherent in the technique, and the general potential risks of surgery, including allergic reactions to the anesthetic and other materials used, infection, injury to nerves in the area with consequent loss of sensation or muscle function, and scarring or distortion of surrounding structures.    Mohs' surgery is a very effective treatment for this type of skin cancer. The potential advantage of Mohs' surgery is that this technique offers the greatest possible certainty of knowing that the skin cancer has been completely removed, with the removal of the least amount of normal tissue. The potential disadvantages of Mohs' surgery include the duration of the surgery, the possible need for a separate surgery for reconstruction following tumor removal, and scarring as a result. In addition, general potential risks of surgery as noted above also apply to treatment via Mohs' surgery.    In light of the nature of this tumor and the location on the scalp in an area of increased risk of recurrence,  Mohs' micrographic surgery was thought to be the most appropriate management choice, and this diagnosis is appropriate for treatment by Mohs' micrographic surgery.     We also discussed options for management of the wound following completion of tumor removal via the Mohs' technique. This discussion include a review of the nature of, and risks and benefits of the alternatives, including healing via secondary intention, primary linear closure, closure via adjacent tissue transfer/skin flap(s), and closure by use of a skin graft.     Sufficient time was available for questions, and all questions were answered to his satisfaction. He fully understands the aims, risks, alternatives, and possible complications, and has elected to  proceed with the surgery, and verbally consented to do so. The procedure will be scheduled in the near future.    Routine pre-op instructions were given to him.    --------------------------------------  Note: Some or all of this note may have been generated using voice recognition software. There may be voice recognition errors including grammatical and/or spelling errors found in the text. Attempts were made to correct these errors prior to signature.

## 2020-12-18 ENCOUNTER — OFFICE VISIT (OUTPATIENT)
Dept: DERMATOLOGY | Facility: CLINIC | Age: 60
End: 2020-12-18
Payer: COMMERCIAL

## 2020-12-18 DIAGNOSIS — L57.0 AK (ACTINIC KERATOSIS): ICD-10-CM

## 2020-12-18 PROCEDURE — 99499 NO LOS: ICD-10-PCS | Mod: S$GLB,,, | Performed by: DERMATOLOGY

## 2020-12-18 PROCEDURE — 99999 PR PBB SHADOW E&M-EST. PATIENT-LVL III: ICD-10-PCS | Mod: PBBFAC,,, | Performed by: DERMATOLOGY

## 2020-12-18 PROCEDURE — 17003 DESTRUCT PREMALG LES 2-14: CPT | Mod: S$GLB,,, | Performed by: DERMATOLOGY

## 2020-12-18 PROCEDURE — 99999 PR PBB SHADOW E&M-EST. PATIENT-LVL III: CPT | Mod: PBBFAC,,, | Performed by: DERMATOLOGY

## 2020-12-18 PROCEDURE — 17000 PR DESTRUCTION(LASER SURGERY,CRYOSURGERY,CHEMOSURGERY),PREMALIGNANT LESIONS,FIRST LESION: ICD-10-PCS | Mod: S$GLB,,, | Performed by: DERMATOLOGY

## 2020-12-18 PROCEDURE — 17000 DESTRUCT PREMALG LESION: CPT | Mod: S$GLB,,, | Performed by: DERMATOLOGY

## 2020-12-18 PROCEDURE — 1126F PR PAIN SEVERITY QUANTIFIED, NO PAIN PRESENT: ICD-10-PCS | Mod: S$GLB,,, | Performed by: DERMATOLOGY

## 2020-12-18 PROCEDURE — 17003 DESTRUCTION, PREMALIGNANT LESIONS; SECOND THROUGH 14 LESIONS: ICD-10-PCS | Mod: S$GLB,,, | Performed by: DERMATOLOGY

## 2020-12-18 PROCEDURE — 99499 UNLISTED E&M SERVICE: CPT | Mod: S$GLB,,, | Performed by: DERMATOLOGY

## 2020-12-18 PROCEDURE — 1126F AMNT PAIN NOTED NONE PRSNT: CPT | Mod: S$GLB,,, | Performed by: DERMATOLOGY

## 2020-12-18 NOTE — PATIENT INSTRUCTIONS

## 2020-12-18 NOTE — ASSESSMENT & PLAN NOTE
Today's Plan:      Cryosurgery Procedure Note    Verbal consent from the patient is obtained including, but not limited to, risk of hypopigmentation/hyperpigmentation, scar, recurrence of lesion. The patient is aware of the precancerous quality and need for treatment of these lesions. Liquid nitrogen cryosurgery is applied to the 3 actinic keratoses, as detailed in the physical exam, to produce a freeze injury. The patient is aware that blisters may form and is instructed on wound care with gentle cleansing and use of vaseline ointment to keep moist until healed. The patient is supplied a handout on cryosurgery and is instructed to call if lesions do not completely resolve.

## 2020-12-18 NOTE — PROGRESS NOTES
Subjective:       Patient ID:  Joseph Jesus is a 60 y.o. male who presents for   Chief Complaint   Patient presents with    Skin Check     follow up efudex/dovonex    Lesion     right scalp     HPI    Review of Systems     Objective:    Physical Exam   Skin:   Areas Examined (abnormalities noted in diagram):   Scalp / Hair Palpated and Inspected              Diagram Legend     Erythematous scaling macule/papule c/w actinic keratosis       Vascular papule c/w angioma      Pigmented verrucoid papule/plaque c/w seborrheic keratosis      Yellow umbilicated papule c/w sebaceous hyperplasia      Irregularly shaped tan macule c/w lentigo     1-2 mm smooth white papules consistent with Milia      Movable subcutaneous cyst with punctum c/w epidermal inclusion cyst      Subcutaneous movable cyst c/w pilar cyst      Firm pink to brown papule c/w dermatofibroma      Pedunculated fleshy papule(s) c/w skin tag(s)      Evenly pigmented macule c/w junctional nevus     Mildly variegated pigmented, slightly irregular-bordered macule c/w mildly atypical nevus      Flesh colored to evenly pigmented papule c/w intradermal nevus       Pink pearly papule/plaque c/w basal cell carcinoma      Erythematous hyperkeratotic cursted plaque c/w SCC      Surgical scar with no sign of skin cancer recurrence      Open and closed comedones      Inflammatory papules and pustules      Verrucoid papule consistent consistent with wart     Erythematous eczematous patches and plaques     Dystrophic onycholytic nail with subungual debris c/w onychomycosis     Umbilicated papule    Erythematous-base heme-crusted tan verrucoid plaque consistent with inflamed seborrheic keratosis     Erythematous Silvery Scaling Plaque c/w Psoriasis     See annotation      Assessment / Plan:        AK (actinic keratosis)      AK (actinic keratosis)  Today's Plan:      Cryosurgery Procedure Note    Verbal consent from the patient is obtained including, but not limited to,  risk of hypopigmentation/hyperpigmentation, scar, recurrence of lesion. The patient is aware of the precancerous quality and need for treatment of these lesions. Liquid nitrogen cryosurgery is applied to the 3 actinic keratoses, as detailed in the physical exam, to produce a freeze injury. The patient is aware that blisters may form and is instructed on wound care with gentle cleansing and use of vaseline ointment to keep moist until healed. The patient is supplied a handout on cryosurgery and is instructed to call if lesions do not completely resolve.    cont wearing hat always    Follow up in about 6 months (around 6/18/2021).

## 2020-12-18 NOTE — PROGRESS NOTES
Subjective:       Patient ID:  Joseph Jesus is a 60 y.o. male who presents for   Chief Complaint   Patient presents with    Skin Check     follow up efudex/dovonex    Lesion     right scalp     History of Present Illness: The patient presents for follow up of skin check.    The patient was last seen on: 11/02/2020 for treatment of actinic keratoses with efudex/dovonex bid to scalp x 1 week which have resolved and bx of scc left post scalp - examined by PWS and excision deferred.      This is a high risk patient here to check for the development of new lesions.      Other skin complaints: lesion to right scalp    Patient with new complaint of lesion(s)  Location: right scalp  Duration: 3-4 months  Symptoms: scales  Relieving factors/Previous treatments: none        Review of Systems   Skin: Positive for activity-related sunscreen use and wears hat (100%). Negative for itching, rash, dry skin, daily sunscreen use and recent sunburn.   Hematologic/Lymphatic: Does not bruise/bleed easily.        Objective:    Physical Exam       Diagram Legend     Erythematous scaling macule/papule c/w actinic keratosis       Vascular papule c/w angioma      Pigmented verrucoid papule/plaque c/w seborrheic keratosis      Yellow umbilicated papule c/w sebaceous hyperplasia      Irregularly shaped tan macule c/w lentigo     1-2 mm smooth white papules consistent with Milia      Movable subcutaneous cyst with punctum c/w epidermal inclusion cyst      Subcutaneous movable cyst c/w pilar cyst      Firm pink to brown papule c/w dermatofibroma      Pedunculated fleshy papule(s) c/w skin tag(s)      Evenly pigmented macule c/w junctional nevus     Mildly variegated pigmented, slightly irregular-bordered macule c/w mildly atypical nevus      Flesh colored to evenly pigmented papule c/w intradermal nevus       Pink pearly papule/plaque c/w basal cell carcinoma      Erythematous hyperkeratotic cursted plaque c/w SCC      Surgical scar with no  sign of skin cancer recurrence      Open and closed comedones      Inflammatory papules and pustules      Verrucoid papule consistent consistent with wart     Erythematous eczematous patches and plaques     Dystrophic onycholytic nail with subungual debris c/w onychomycosis     Umbilicated papule    Erythematous-base heme-crusted tan verrucoid plaque consistent with inflamed seborrheic keratosis     Erythematous Silvery Scaling Plaque c/w Psoriasis     See annotation      Assessment / Plan:        There are no diagnoses linked to this encounter.         No follow-ups on file.

## 2020-12-26 ENCOUNTER — PATIENT MESSAGE (OUTPATIENT)
Dept: INTERNAL MEDICINE | Facility: CLINIC | Age: 60
End: 2020-12-26

## 2020-12-26 DIAGNOSIS — I10 HYPERTENSION, ESSENTIAL: ICD-10-CM

## 2020-12-27 RX ORDER — IRBESARTAN 300 MG/1
300 TABLET ORAL DAILY
Qty: 90 TABLET | Refills: 3 | Status: SHIPPED | OUTPATIENT
Start: 2020-12-27 | End: 2021-11-11

## 2021-01-06 ENCOUNTER — PATIENT MESSAGE (OUTPATIENT)
Dept: INTERNAL MEDICINE | Facility: CLINIC | Age: 61
End: 2021-01-06

## 2021-04-07 ENCOUNTER — OFFICE VISIT (OUTPATIENT)
Dept: FAMILY MEDICINE | Facility: CLINIC | Age: 61
End: 2021-04-07
Payer: COMMERCIAL

## 2021-04-07 DIAGNOSIS — J40 BRONCHITIS WITH WHEEZING: Primary | ICD-10-CM

## 2021-04-07 PROCEDURE — 99213 OFFICE O/P EST LOW 20 MIN: CPT | Mod: 95,,, | Performed by: NURSE PRACTITIONER

## 2021-04-07 PROCEDURE — 99213 PR OFFICE/OUTPT VISIT, EST, LEVL III, 20-29 MIN: ICD-10-PCS | Mod: 95,,, | Performed by: NURSE PRACTITIONER

## 2021-04-07 RX ORDER — AMOXICILLIN AND CLAVULANATE POTASSIUM 875; 125 MG/1; MG/1
1 TABLET, FILM COATED ORAL 2 TIMES DAILY
Qty: 20 TABLET | Refills: 0 | Status: SHIPPED | OUTPATIENT
Start: 2021-04-07 | End: 2021-04-17

## 2021-04-07 RX ORDER — PROMETHAZINE HYDROCHLORIDE AND CODEINE PHOSPHATE 6.25; 1 MG/5ML; MG/5ML
5 SOLUTION ORAL EVERY 4 HOURS PRN
Qty: 180 ML | Refills: 0 | Status: SHIPPED | OUTPATIENT
Start: 2021-04-07 | End: 2021-05-18 | Stop reason: SDUPTHER

## 2021-04-07 RX ORDER — FLUTICASONE PROPIONATE 50 MCG
2 SPRAY, SUSPENSION (ML) NASAL DAILY
Qty: 16 G | Refills: 5 | Status: SHIPPED | OUTPATIENT
Start: 2021-04-07 | End: 2022-05-19

## 2021-05-10 ENCOUNTER — PATIENT MESSAGE (OUTPATIENT)
Dept: INTERNAL MEDICINE | Facility: CLINIC | Age: 61
End: 2021-05-10

## 2021-05-10 DIAGNOSIS — J40 BRONCHITIS: Primary | ICD-10-CM

## 2021-05-10 DIAGNOSIS — R05.9 COUGH: ICD-10-CM

## 2021-05-13 ENCOUNTER — PATIENT MESSAGE (OUTPATIENT)
Dept: INTERNAL MEDICINE | Facility: CLINIC | Age: 61
End: 2021-05-13

## 2021-05-13 DIAGNOSIS — R05.9 COUGH: ICD-10-CM

## 2021-05-13 DIAGNOSIS — J40 BRONCHITIS WITH WHEEZING: ICD-10-CM

## 2021-05-18 ENCOUNTER — PATIENT MESSAGE (OUTPATIENT)
Dept: INTERNAL MEDICINE | Facility: CLINIC | Age: 61
End: 2021-05-18

## 2021-05-18 RX ORDER — ALBUTEROL SULFATE 90 UG/1
2 AEROSOL, METERED RESPIRATORY (INHALATION) EVERY 6 HOURS PRN
Qty: 18 G | Refills: 5 | Status: SHIPPED | OUTPATIENT
Start: 2021-05-18 | End: 2022-01-28 | Stop reason: SDUPTHER

## 2021-05-18 RX ORDER — PROMETHAZINE HYDROCHLORIDE AND CODEINE PHOSPHATE 6.25; 1 MG/5ML; MG/5ML
5 SOLUTION ORAL EVERY 6 HOURS PRN
Qty: 120 ML | Refills: 0 | Status: SHIPPED | OUTPATIENT
Start: 2021-05-18 | End: 2021-08-23 | Stop reason: SDUPTHER

## 2021-05-25 ENCOUNTER — PATIENT OUTREACH (OUTPATIENT)
Dept: ADMINISTRATIVE | Facility: OTHER | Age: 61
End: 2021-05-25

## 2021-05-26 ENCOUNTER — OFFICE VISIT (OUTPATIENT)
Dept: PULMONOLOGY | Facility: CLINIC | Age: 61
End: 2021-05-26
Attending: FAMILY MEDICINE
Payer: COMMERCIAL

## 2021-05-26 ENCOUNTER — HOSPITAL ENCOUNTER (OUTPATIENT)
Dept: PULMONOLOGY | Facility: CLINIC | Age: 61
Discharge: HOME | End: 2021-05-26
Payer: COMMERCIAL

## 2021-05-26 VITALS
SYSTOLIC BLOOD PRESSURE: 131 MMHG | HEART RATE: 66 BPM | BODY MASS INDEX: 31.09 KG/M2 | HEIGHT: 73 IN | OXYGEN SATURATION: 98 % | DIASTOLIC BLOOD PRESSURE: 87 MMHG | WEIGHT: 234.56 LBS

## 2021-05-26 DIAGNOSIS — K44.9 HIATAL HERNIA WITH GASTROESOPHAGEAL REFLUX DISEASE WITHOUT ESOPHAGITIS: ICD-10-CM

## 2021-05-26 DIAGNOSIS — J40 BRONCHITIS: ICD-10-CM

## 2021-05-26 DIAGNOSIS — K21.9 HIATAL HERNIA WITH GASTROESOPHAGEAL REFLUX DISEASE WITHOUT ESOPHAGITIS: ICD-10-CM

## 2021-05-26 DIAGNOSIS — R05.9 COUGH: ICD-10-CM

## 2021-05-26 DIAGNOSIS — R05.8 UPPER AIRWAY COUGH SYNDROME: ICD-10-CM

## 2021-05-26 DIAGNOSIS — J40 BRONCHITIS: Primary | ICD-10-CM

## 2021-05-26 LAB
DLCO ADJ PRE: 27.1 ML/(MIN*MMHG)
DLCO SINGLE BREATH LLN: 24.02
DLCO SINGLE BREATH PRE REF: 87.6 %
DLCO SINGLE BREATH REF: 30.95
DLCOC SBVA LLN: 2.93
DLCOC SBVA PRE REF: 86 %
DLCOC SBVA REF: 4.02
DLCOC SINGLE BREATH LLN: 24.02
DLCOC SINGLE BREATH PRE REF: 87.6 %
DLCOC SINGLE BREATH REF: 30.95
DLCOCSBVAULN: 5.1
DLCOCSINGLEBREATHULN: 37.88
DLCOSINGLEBREATHULN: 37.88
DLCOVA LLN: 2.93
DLCOVA PRE REF: 86 %
DLCOVA PRE: 3.46 ML/(MIN*MMHG*L)
DLCOVA REF: 4.02
DLCOVAULN: 5.1
DLVAADJ PRE: 3.46 ML/(MIN*MMHG*L)
FEF 25 75 LLN: 1.42
FEF 25 75 PRE REF: 102.2 %
FEF 25 75 REF: 3
FET100 CHG: -8.5 %
FEV05 LLN: 1.89
FEV05 REF: 3.03
FEV1 CHG: 8.4 %
FEV1 FVC LLN: 64
FEV1 FVC PRE REF: 99.1 %
FEV1 FVC REF: 77
FEV1 LLN: 2.82
FEV1 PRE REF: 97.2 %
FEV1 REF: 3.79
FEV1 VOL CHG: 0.31
FVC CHG: 5.8 %
FVC LLN: 3.76
FVC PRE REF: 97.6 %
FVC REF: 4.97
FVC VOL CHG: 0.28
IVC PRE: 4.78 L
IVC SINGLE BREATH LLN: 3.76
IVC SINGLE BREATH PRE REF: 96.2 %
IVC SINGLE BREATH REF: 4.97
IVCSINGLEBREATHULN: 6.2
PEF LLN: 7.14
PEF PRE REF: 89.1 %
PEF REF: 9.64
PHYSICIAN COMMENT: NORMAL
POST FEF 25 75: 3.6 L/S
POST FET 100: 6.3 SEC
POST FEV1 FVC: 77.73 %
POST FEV1: 3.99 L
POST FEV5: 3.04 L
POST FVC: 5.13 L
POST PEF: 9.43 L/S
PRE DLCO: 27.1 ML/(MIN*MMHG)
PRE FEF 25 75: 3.06 L/S
PRE FET 100: 6.89 SEC
PRE FEV05 REF: 92.8 %
PRE FEV1 FVC: 75.84 %
PRE FEV1: 3.68 L
PRE FEV5: 2.81 L
PRE FVC: 4.85 L
PRE PEF: 8.59 L/S
VA PRE: 7.85 L
VA SINGLE BREATH LLN: 7.55
VA SINGLE BREATH PRE REF: 104 %
VA SINGLE BREATH REF: 7.55
VASINGLEBREATHULN: 7.55

## 2021-05-26 PROCEDURE — 99999 PR PBB SHADOW E&M-EST. PATIENT-LVL III: CPT | Mod: PBBFAC,,, | Performed by: INTERNAL MEDICINE

## 2021-05-26 PROCEDURE — 99999 PR PBB SHADOW E&M-EST. PATIENT-LVL III: ICD-10-PCS | Mod: PBBFAC,,, | Performed by: INTERNAL MEDICINE

## 2021-05-26 PROCEDURE — 99203 PR OFFICE/OUTPT VISIT, NEW, LEVL III, 30-44 MIN: ICD-10-PCS | Mod: 25,S$GLB,, | Performed by: INTERNAL MEDICINE

## 2021-05-26 PROCEDURE — 94729 DIFFUSING CAPACITY: CPT | Mod: S$GLB,,, | Performed by: INTERNAL MEDICINE

## 2021-05-26 PROCEDURE — 1126F PR PAIN SEVERITY QUANTIFIED, NO PAIN PRESENT: ICD-10-PCS | Mod: S$GLB,,, | Performed by: INTERNAL MEDICINE

## 2021-05-26 PROCEDURE — 3008F PR BODY MASS INDEX (BMI) DOCUMENTED: ICD-10-PCS | Mod: CPTII,S$GLB,, | Performed by: INTERNAL MEDICINE

## 2021-05-26 PROCEDURE — 99203 OFFICE O/P NEW LOW 30 MIN: CPT | Mod: 25,S$GLB,, | Performed by: INTERNAL MEDICINE

## 2021-05-26 PROCEDURE — 94060 EVALUATION OF WHEEZING: CPT | Mod: S$GLB,,, | Performed by: INTERNAL MEDICINE

## 2021-05-26 PROCEDURE — 94060 PR EVAL OF BRONCHOSPASM: ICD-10-PCS | Mod: S$GLB,,, | Performed by: INTERNAL MEDICINE

## 2021-05-26 PROCEDURE — 3008F BODY MASS INDEX DOCD: CPT | Mod: CPTII,S$GLB,, | Performed by: INTERNAL MEDICINE

## 2021-05-26 PROCEDURE — 1126F AMNT PAIN NOTED NONE PRSNT: CPT | Mod: S$GLB,,, | Performed by: INTERNAL MEDICINE

## 2021-05-26 PROCEDURE — 94729 PR C02/MEMBANE DIFFUSE CAPACITY: ICD-10-PCS | Mod: S$GLB,,, | Performed by: INTERNAL MEDICINE

## 2021-06-07 ENCOUNTER — PATIENT MESSAGE (OUTPATIENT)
Dept: INTERNAL MEDICINE | Facility: CLINIC | Age: 61
End: 2021-06-07

## 2021-06-07 DIAGNOSIS — K21.9 HIATAL HERNIA WITH GASTROESOPHAGEAL REFLUX DISEASE WITHOUT ESOPHAGITIS: ICD-10-CM

## 2021-06-07 DIAGNOSIS — K44.9 HIATAL HERNIA WITH GASTROESOPHAGEAL REFLUX DISEASE WITHOUT ESOPHAGITIS: ICD-10-CM

## 2021-06-07 DIAGNOSIS — K63.5 POLYP OF COLON, UNSPECIFIED PART OF COLON, UNSPECIFIED TYPE: ICD-10-CM

## 2021-06-07 DIAGNOSIS — K21.9 GASTROESOPHAGEAL REFLUX DISEASE, UNSPECIFIED WHETHER ESOPHAGITIS PRESENT: ICD-10-CM

## 2021-06-07 DIAGNOSIS — Z12.11 COLON CANCER SCREENING: Primary | ICD-10-CM

## 2021-07-07 ENCOUNTER — PATIENT OUTREACH (OUTPATIENT)
Dept: ADMINISTRATIVE | Facility: OTHER | Age: 61
End: 2021-07-07

## 2021-07-07 NOTE — PATIENT INSTRUCTIONS
CRYOSURGERY      Your doctor has used a method called cryosurgery to treat your skin condition. Cryosurgery refers to the use of very cold substances to treat a variety of skin conditions such as warts, pre-skin cancers, molluscum contagiosum, sun spots, and several benign growths. The substance we use in cryosurgery is liquid nitrogen and is so cold (-195 degrees Celsius) that is burns when administered.     Following treatment in the office, the skin may immediately burn and become red. You may find the area around the lesion is affected as well. It is sometimes necessary to treat not only the lesion, but a small area of the surrounding normal skin to achieve a good response.     A blister, and even a blood filled blister, may form after treatment.   This is a normal response. If the blister is painful, it is acceptable to sterilize a needle and with rubbing alcohol and gently pop the blister. It is important that you gently wash the area with soap and warm water as the blister fluid may contain wart virus if a wart was treated. Do no remove the roof of the blister.     The area treated can take anywhere from 1-3 weeks to heal. Healing time depends on the kind skin lesion treated, the location, and how aggressively the lesion was treated. It is recommended that the areas treated are covered with Vaseline or bacitracin ointment and a band-aid. If a band-aid is not practical, just ointment applied several times per day will do. Keeping these areas moist will speed the healing time.    Treatment with liquid nitrogen can leave a scar. In dark skin, it may be a light or dark scar, in light skin it may be a white or pink scar. These will generally fade with time, but may never go away completely.     If you have any concerns after your treatment, please feel free to call the office.       [address] [place] [address]/ [phone] FAX/ [email or web address]        Discussed good skin care and a  brochure was reviewed and provided.  Recommend once daily bath using Cetaphil gentle skin cleanser mixed with mineral oil applied directly to wet skin.   Recommend rock salt baths daily and discussed protocol -- add 2 cups of rock salt to tub of hot water then add cold water to make temperature of tub water lukewarm. Soak for 10 - 15 minutes.  Apply cerave cream or vaseline jelly after bathing and with every diaper change     Trim nails daily.  Vacuum carpets daily.            n/a

## 2021-07-08 ENCOUNTER — OFFICE VISIT (OUTPATIENT)
Dept: GASTROENTEROLOGY | Facility: CLINIC | Age: 61
End: 2021-07-08
Payer: COMMERCIAL

## 2021-07-08 VITALS — BODY MASS INDEX: 31.18 KG/M2 | HEIGHT: 73 IN | WEIGHT: 235.25 LBS

## 2021-07-08 DIAGNOSIS — K44.9 HIATAL HERNIA: ICD-10-CM

## 2021-07-08 DIAGNOSIS — R49.0 HOARSENESS OF VOICE: ICD-10-CM

## 2021-07-08 DIAGNOSIS — R12 HEARTBURN: ICD-10-CM

## 2021-07-08 DIAGNOSIS — Z12.11 COLON CANCER SCREENING: ICD-10-CM

## 2021-07-08 DIAGNOSIS — R09.A2 GLOBUS SENSATION: ICD-10-CM

## 2021-07-08 DIAGNOSIS — Z86.010 HISTORY OF COLON POLYPS: Primary | ICD-10-CM

## 2021-07-08 PROCEDURE — 3008F BODY MASS INDEX DOCD: CPT | Mod: CPTII,S$GLB,, | Performed by: NURSE PRACTITIONER

## 2021-07-08 PROCEDURE — 99203 PR OFFICE/OUTPT VISIT, NEW, LEVL III, 30-44 MIN: ICD-10-PCS | Mod: S$GLB,,, | Performed by: NURSE PRACTITIONER

## 2021-07-08 PROCEDURE — 1126F PR PAIN SEVERITY QUANTIFIED, NO PAIN PRESENT: ICD-10-PCS | Mod: S$GLB,,, | Performed by: NURSE PRACTITIONER

## 2021-07-08 PROCEDURE — 3008F PR BODY MASS INDEX (BMI) DOCUMENTED: ICD-10-PCS | Mod: CPTII,S$GLB,, | Performed by: NURSE PRACTITIONER

## 2021-07-08 PROCEDURE — 99203 OFFICE O/P NEW LOW 30 MIN: CPT | Mod: S$GLB,,, | Performed by: NURSE PRACTITIONER

## 2021-07-08 PROCEDURE — 99999 PR PBB SHADOW E&M-EST. PATIENT-LVL IV: ICD-10-PCS | Mod: PBBFAC,,, | Performed by: NURSE PRACTITIONER

## 2021-07-08 PROCEDURE — 99999 PR PBB SHADOW E&M-EST. PATIENT-LVL IV: CPT | Mod: PBBFAC,,, | Performed by: NURSE PRACTITIONER

## 2021-07-08 PROCEDURE — 1126F AMNT PAIN NOTED NONE PRSNT: CPT | Mod: S$GLB,,, | Performed by: NURSE PRACTITIONER

## 2021-07-26 ENCOUNTER — PATIENT MESSAGE (OUTPATIENT)
Dept: INTERNAL MEDICINE | Facility: CLINIC | Age: 61
End: 2021-07-26

## 2021-07-26 DIAGNOSIS — Z20.822 ENCOUNTER FOR LABORATORY TESTING FOR COVID-19 VIRUS: ICD-10-CM

## 2021-07-28 ENCOUNTER — PATIENT MESSAGE (OUTPATIENT)
Dept: INTERNAL MEDICINE | Facility: CLINIC | Age: 61
End: 2021-07-28

## 2021-08-06 ENCOUNTER — CLINICAL SUPPORT (OUTPATIENT)
Dept: FAMILY MEDICINE | Facility: CLINIC | Age: 61
End: 2021-08-06
Payer: COMMERCIAL

## 2021-08-06 ENCOUNTER — TELEPHONE (OUTPATIENT)
Dept: GASTROENTEROLOGY | Facility: CLINIC | Age: 61
End: 2021-08-06

## 2021-08-06 DIAGNOSIS — Z01.818 PRE-OP TESTING: ICD-10-CM

## 2021-08-06 PROCEDURE — U0003 INFECTIOUS AGENT DETECTION BY NUCLEIC ACID (DNA OR RNA); SEVERE ACUTE RESPIRATORY SYNDROME CORONAVIRUS 2 (SARS-COV-2) (CORONAVIRUS DISEASE [COVID-19]), AMPLIFIED PROBE TECHNIQUE, MAKING USE OF HIGH THROUGHPUT TECHNOLOGIES AS DESCRIBED BY CMS-2020-01-R: HCPCS | Performed by: INTERNAL MEDICINE

## 2021-08-06 PROCEDURE — U0005 INFEC AGEN DETEC AMPLI PROBE: HCPCS | Performed by: INTERNAL MEDICINE

## 2021-08-07 LAB
SARS-COV-2 RNA RESP QL NAA+PROBE: NOT DETECTED
SARS-COV-2- CYCLE NUMBER: -1

## 2021-08-09 ENCOUNTER — HOSPITAL ENCOUNTER (OUTPATIENT)
Facility: HOSPITAL | Age: 61
Discharge: HOME | End: 2021-08-09
Attending: INTERNAL MEDICINE | Admitting: INTERNAL MEDICINE
Payer: COMMERCIAL

## 2021-08-09 ENCOUNTER — ANESTHESIA EVENT (OUTPATIENT)
Dept: ENDOSCOPY | Facility: HOSPITAL | Age: 61
End: 2021-08-09
Payer: COMMERCIAL

## 2021-08-09 ENCOUNTER — ANESTHESIA (OUTPATIENT)
Dept: ENDOSCOPY | Facility: HOSPITAL | Age: 61
End: 2021-08-09
Payer: COMMERCIAL

## 2021-08-09 DIAGNOSIS — K21.9 GASTROESOPHAGEAL REFLUX DISEASE, UNSPECIFIED WHETHER ESOPHAGITIS PRESENT: ICD-10-CM

## 2021-08-09 PROCEDURE — 43239 EGD BIOPSY SINGLE/MULTIPLE: CPT | Mod: PO | Performed by: INTERNAL MEDICINE

## 2021-08-09 PROCEDURE — 25000003 PHARM REV CODE 250: Mod: PO | Performed by: NURSE ANESTHETIST, CERTIFIED REGISTERED

## 2021-08-09 PROCEDURE — 27201012 HC FORCEPS, HOT/COLD, DISP: Mod: PO | Performed by: INTERNAL MEDICINE

## 2021-08-09 PROCEDURE — 88305 TISSUE EXAM BY PATHOLOGIST: ICD-10-PCS | Mod: 26,,, | Performed by: PATHOLOGY

## 2021-08-09 PROCEDURE — 43239 EGD BIOPSY SINGLE/MULTIPLE: CPT | Mod: 51,,, | Performed by: INTERNAL MEDICINE

## 2021-08-09 PROCEDURE — G0105 COLORECTAL SCRN; HI RISK IND: HCPCS | Mod: ,,, | Performed by: INTERNAL MEDICINE

## 2021-08-09 PROCEDURE — 43239 PR EGD, FLEX, W/BIOPSY, SGL/MULTI: ICD-10-PCS | Mod: 51,,, | Performed by: INTERNAL MEDICINE

## 2021-08-09 PROCEDURE — D9220A PRA ANESTHESIA: Mod: 33,CRNA,, | Performed by: NURSE ANESTHETIST, CERTIFIED REGISTERED

## 2021-08-09 PROCEDURE — D9220A PRA ANESTHESIA: ICD-10-PCS | Mod: 33,ANES,, | Performed by: ANESTHESIOLOGY

## 2021-08-09 PROCEDURE — 37000008 HC ANESTHESIA 1ST 15 MINUTES: Mod: PO | Performed by: INTERNAL MEDICINE

## 2021-08-09 PROCEDURE — 63600175 PHARM REV CODE 636 W HCPCS: Mod: PO | Performed by: INTERNAL MEDICINE

## 2021-08-09 PROCEDURE — G0105 COLORECTAL SCRN; HI RISK IND: ICD-10-PCS | Mod: ,,, | Performed by: INTERNAL MEDICINE

## 2021-08-09 PROCEDURE — 88342 CHG IMMUNOCYTOCHEMISTRY: ICD-10-PCS | Mod: 26,,, | Performed by: PATHOLOGY

## 2021-08-09 PROCEDURE — 88305 TISSUE EXAM BY PATHOLOGIST: CPT | Performed by: PATHOLOGY

## 2021-08-09 PROCEDURE — 37000009 HC ANESTHESIA EA ADD 15 MINS: Mod: PO | Performed by: INTERNAL MEDICINE

## 2021-08-09 PROCEDURE — D9220A PRA ANESTHESIA: ICD-10-PCS | Mod: 33,CRNA,, | Performed by: NURSE ANESTHETIST, CERTIFIED REGISTERED

## 2021-08-09 PROCEDURE — 88342 IMHCHEM/IMCYTCHM 1ST ANTB: CPT | Performed by: PATHOLOGY

## 2021-08-09 PROCEDURE — 88305 TISSUE EXAM BY PATHOLOGIST: CPT | Mod: 26,,, | Performed by: PATHOLOGY

## 2021-08-09 PROCEDURE — 88342 IMHCHEM/IMCYTCHM 1ST ANTB: CPT | Mod: 26,,, | Performed by: PATHOLOGY

## 2021-08-09 PROCEDURE — D9220A PRA ANESTHESIA: Mod: 33,ANES,, | Performed by: ANESTHESIOLOGY

## 2021-08-09 PROCEDURE — 63600175 PHARM REV CODE 636 W HCPCS: Mod: PO | Performed by: NURSE ANESTHETIST, CERTIFIED REGISTERED

## 2021-08-09 PROCEDURE — G0105 COLORECTAL SCRN; HI RISK IND: HCPCS | Mod: PO | Performed by: INTERNAL MEDICINE

## 2021-08-09 RX ORDER — PROPOFOL 10 MG/ML
VIAL (ML) INTRAVENOUS CONTINUOUS PRN
Status: DISCONTINUED | OUTPATIENT
Start: 2021-08-09 | End: 2021-08-09

## 2021-08-09 RX ORDER — LIDOCAINE HCL/PF 100 MG/5ML
SYRINGE (ML) INTRAVENOUS
Status: DISCONTINUED | OUTPATIENT
Start: 2021-08-09 | End: 2021-08-09

## 2021-08-09 RX ORDER — PROPOFOL 10 MG/ML
VIAL (ML) INTRAVENOUS
Status: DISCONTINUED | OUTPATIENT
Start: 2021-08-09 | End: 2021-08-09

## 2021-08-09 RX ORDER — FENTANYL CITRATE 50 UG/ML
INJECTION, SOLUTION INTRAMUSCULAR; INTRAVENOUS
Status: DISCONTINUED | OUTPATIENT
Start: 2021-08-09 | End: 2021-08-09

## 2021-08-09 RX ORDER — SODIUM CHLORIDE, SODIUM LACTATE, POTASSIUM CHLORIDE, CALCIUM CHLORIDE 600; 310; 30; 20 MG/100ML; MG/100ML; MG/100ML; MG/100ML
INJECTION, SOLUTION INTRAVENOUS CONTINUOUS
Status: DISCONTINUED | OUTPATIENT
Start: 2021-08-09 | End: 2021-08-09 | Stop reason: HOSPADM

## 2021-08-09 RX ORDER — PANTOPRAZOLE SODIUM 40 MG/1
40 TABLET, DELAYED RELEASE ORAL
Qty: 30 TABLET | Refills: 11 | Status: SHIPPED | OUTPATIENT
Start: 2021-08-09 | End: 2022-03-16 | Stop reason: ALTCHOICE

## 2021-08-09 RX ORDER — SODIUM CHLORIDE 0.9 % (FLUSH) 0.9 %
10 SYRINGE (ML) INJECTION
Status: DISCONTINUED | OUTPATIENT
Start: 2021-08-09 | End: 2021-08-09 | Stop reason: HOSPADM

## 2021-08-09 RX ADMIN — LIDOCAINE HYDROCHLORIDE 100 MG: 20 INJECTION, SOLUTION INTRAVENOUS at 02:08

## 2021-08-09 RX ADMIN — GLYCOPYRROLATE 0.2 MG: 0.2 INJECTION, SOLUTION INTRAMUSCULAR; INTRAVITREAL at 02:08

## 2021-08-09 RX ADMIN — FENTANYL CITRATE 100 MCG: 50 INJECTION, SOLUTION INTRAMUSCULAR; INTRAVENOUS at 02:08

## 2021-08-09 RX ADMIN — SODIUM CHLORIDE, SODIUM LACTATE, POTASSIUM CHLORIDE, AND CALCIUM CHLORIDE: .6; .31; .03; .02 INJECTION, SOLUTION INTRAVENOUS at 01:08

## 2021-08-09 RX ADMIN — PROPOFOL 150 MCG/KG/MIN: 10 INJECTION, EMULSION INTRAVENOUS at 02:08

## 2021-08-09 RX ADMIN — PROPOFOL 50 MG: 10 INJECTION, EMULSION INTRAVENOUS at 02:08

## 2021-08-10 VITALS
SYSTOLIC BLOOD PRESSURE: 114 MMHG | BODY MASS INDEX: 29.16 KG/M2 | TEMPERATURE: 98 F | OXYGEN SATURATION: 100 % | WEIGHT: 220 LBS | RESPIRATION RATE: 16 BRPM | HEIGHT: 73 IN | DIASTOLIC BLOOD PRESSURE: 79 MMHG | HEART RATE: 59 BPM

## 2021-08-12 ENCOUNTER — PATIENT MESSAGE (OUTPATIENT)
Dept: INTERNAL MEDICINE | Facility: CLINIC | Age: 61
End: 2021-08-12

## 2021-08-12 ENCOUNTER — PATIENT MESSAGE (OUTPATIENT)
Dept: GASTROENTEROLOGY | Facility: CLINIC | Age: 61
End: 2021-08-12

## 2021-08-13 LAB
FINAL PATHOLOGIC DIAGNOSIS: NORMAL
GROSS: NORMAL
Lab: NORMAL

## 2021-08-23 ENCOUNTER — PATIENT MESSAGE (OUTPATIENT)
Dept: INTERNAL MEDICINE | Facility: CLINIC | Age: 61
End: 2021-08-23

## 2021-08-23 DIAGNOSIS — J40 BRONCHITIS WITH WHEEZING: ICD-10-CM

## 2021-08-23 RX ORDER — PROMETHAZINE HYDROCHLORIDE AND CODEINE PHOSPHATE 6.25; 1 MG/5ML; MG/5ML
5 SOLUTION ORAL EVERY 6 HOURS PRN
Qty: 120 ML | Refills: 0 | Status: SHIPPED | OUTPATIENT
Start: 2021-08-23 | End: 2022-01-28 | Stop reason: ALTCHOICE

## 2021-10-04 ENCOUNTER — OFFICE VISIT (OUTPATIENT)
Dept: DERMATOLOGY | Facility: CLINIC | Age: 61
End: 2021-10-04
Payer: COMMERCIAL

## 2021-10-04 DIAGNOSIS — L84 CLAVUS: ICD-10-CM

## 2021-10-04 DIAGNOSIS — B07.9 VERRUCA VULGARIS: ICD-10-CM

## 2021-10-04 DIAGNOSIS — Z85.828 HISTORY OF NONMELANOMA SKIN CANCER: ICD-10-CM

## 2021-10-04 DIAGNOSIS — L82.1 SK (SEBORRHEIC KERATOSIS): ICD-10-CM

## 2021-10-04 DIAGNOSIS — L57.0 AK (ACTINIC KERATOSIS): Primary | ICD-10-CM

## 2021-10-04 PROCEDURE — 1159F PR MEDICATION LIST DOCUMENTED IN MEDICAL RECORD: ICD-10-PCS | Mod: CPTII,S$GLB,, | Performed by: DERMATOLOGY

## 2021-10-04 PROCEDURE — 1160F PR REVIEW ALL MEDS BY PRESCRIBER/CLIN PHARMACIST DOCUMENTED: ICD-10-PCS | Mod: CPTII,S$GLB,, | Performed by: DERMATOLOGY

## 2021-10-04 PROCEDURE — 99999 PR PBB SHADOW E&M-EST. PATIENT-LVL III: CPT | Mod: PBBFAC,,, | Performed by: DERMATOLOGY

## 2021-10-04 PROCEDURE — 99213 PR OFFICE/OUTPT VISIT, EST, LEVL III, 20-29 MIN: ICD-10-PCS | Mod: 25,S$GLB,, | Performed by: DERMATOLOGY

## 2021-10-04 PROCEDURE — 17003 DESTRUCT PREMALG LES 2-14: CPT | Mod: S$GLB,,, | Performed by: DERMATOLOGY

## 2021-10-04 PROCEDURE — 1159F MED LIST DOCD IN RCRD: CPT | Mod: CPTII,S$GLB,, | Performed by: DERMATOLOGY

## 2021-10-04 PROCEDURE — 17000 DESTRUCT PREMALG LESION: CPT | Mod: 59,S$GLB,, | Performed by: DERMATOLOGY

## 2021-10-04 PROCEDURE — 99999 PR PBB SHADOW E&M-EST. PATIENT-LVL III: ICD-10-PCS | Mod: PBBFAC,,, | Performed by: DERMATOLOGY

## 2021-10-04 PROCEDURE — 4010F PR ACE/ARB THEARPY RXD/TAKEN: ICD-10-PCS | Mod: CPTII,S$GLB,, | Performed by: DERMATOLOGY

## 2021-10-04 PROCEDURE — 4010F ACE/ARB THERAPY RXD/TAKEN: CPT | Mod: CPTII,S$GLB,, | Performed by: DERMATOLOGY

## 2021-10-04 PROCEDURE — 11056 PR TRIM BENIGN HYPERKERATOTIC SKIN LESION,2-4: ICD-10-PCS | Mod: S$GLB,,, | Performed by: DERMATOLOGY

## 2021-10-04 PROCEDURE — 1160F RVW MEDS BY RX/DR IN RCRD: CPT | Mod: CPTII,S$GLB,, | Performed by: DERMATOLOGY

## 2021-10-04 PROCEDURE — 11056 PARNG/CUTG B9 HYPRKR LES 2-4: CPT | Mod: S$GLB,,, | Performed by: DERMATOLOGY

## 2021-10-04 PROCEDURE — 17000 PR DESTRUCTION(LASER SURGERY,CRYOSURGERY,CHEMOSURGERY),PREMALIGNANT LESIONS,FIRST LESION: ICD-10-PCS | Mod: 59,S$GLB,, | Performed by: DERMATOLOGY

## 2021-10-04 PROCEDURE — 17003 DESTRUCTION, PREMALIGNANT LESIONS; SECOND THROUGH 14 LESIONS: ICD-10-PCS | Mod: S$GLB,,, | Performed by: DERMATOLOGY

## 2021-10-04 PROCEDURE — 99213 OFFICE O/P EST LOW 20 MIN: CPT | Mod: 25,S$GLB,, | Performed by: DERMATOLOGY

## 2021-10-12 ENCOUNTER — LAB VISIT (OUTPATIENT)
Dept: LAB | Facility: HOSPITAL | Age: 61
End: 2021-10-12
Attending: FAMILY MEDICINE
Payer: COMMERCIAL

## 2021-10-12 DIAGNOSIS — E78.5 HYPERLIPIDEMIA, UNSPECIFIED HYPERLIPIDEMIA TYPE: ICD-10-CM

## 2021-10-12 DIAGNOSIS — I10 HYPERTENSION, ESSENTIAL: ICD-10-CM

## 2021-10-12 LAB
ALBUMIN SERPL BCP-MCNC: 4.2 G/DL (ref 3.5–5.2)
ALP SERPL-CCNC: 60 U/L (ref 55–135)
ALT SERPL W/O P-5'-P-CCNC: 53 U/L (ref 10–44)
ANION GAP SERPL CALC-SCNC: 9 MMOL/L (ref 8–16)
AST SERPL-CCNC: 47 U/L (ref 10–40)
BILIRUB SERPL-MCNC: 0.5 MG/DL (ref 0.1–1)
BUN SERPL-MCNC: 20 MG/DL (ref 8–23)
CALCIUM SERPL-MCNC: 9.5 MG/DL (ref 8.7–10.5)
CHLORIDE SERPL-SCNC: 108 MMOL/L (ref 95–110)
CHOLEST SERPL-MCNC: 147 MG/DL (ref 120–199)
CHOLEST/HDLC SERPL: 2.5 {RATIO} (ref 2–5)
CO2 SERPL-SCNC: 21 MMOL/L (ref 23–29)
CREAT SERPL-MCNC: 1.2 MG/DL (ref 0.5–1.4)
EST. GFR  (AFRICAN AMERICAN): >60 ML/MIN/1.73 M^2
EST. GFR  (NON AFRICAN AMERICAN): >60 ML/MIN/1.73 M^2
GLUCOSE SERPL-MCNC: 82 MG/DL (ref 70–110)
HDLC SERPL-MCNC: 60 MG/DL (ref 40–75)
HDLC SERPL: 40.8 % (ref 20–50)
LDLC SERPL CALC-MCNC: 72.4 MG/DL (ref 63–159)
NONHDLC SERPL-MCNC: 87 MG/DL
POTASSIUM SERPL-SCNC: 4.3 MMOL/L (ref 3.5–5.1)
PROT SERPL-MCNC: 7.1 G/DL (ref 6–8.4)
SODIUM SERPL-SCNC: 138 MMOL/L (ref 136–145)
TRIGL SERPL-MCNC: 73 MG/DL (ref 30–150)

## 2021-10-12 PROCEDURE — 36415 COLL VENOUS BLD VENIPUNCTURE: CPT | Mod: PO | Performed by: FAMILY MEDICINE

## 2021-10-12 PROCEDURE — 80061 LIPID PANEL: CPT | Performed by: FAMILY MEDICINE

## 2021-10-12 PROCEDURE — 80053 COMPREHEN METABOLIC PANEL: CPT | Performed by: FAMILY MEDICINE

## 2021-10-20 ENCOUNTER — PATIENT OUTREACH (OUTPATIENT)
Dept: ADMINISTRATIVE | Facility: HOSPITAL | Age: 61
End: 2021-10-20

## 2021-10-20 ENCOUNTER — TELEPHONE (OUTPATIENT)
Dept: INTERNAL MEDICINE | Facility: CLINIC | Age: 61
End: 2021-10-20

## 2021-11-01 ENCOUNTER — PATIENT OUTREACH (OUTPATIENT)
Dept: ADMINISTRATIVE | Facility: HOSPITAL | Age: 61
End: 2021-11-01
Payer: COMMERCIAL

## 2021-12-01 ENCOUNTER — TELEPHONE (OUTPATIENT)
Dept: INTERNAL MEDICINE | Facility: CLINIC | Age: 61
End: 2021-12-01
Payer: COMMERCIAL

## 2022-01-19 ENCOUNTER — PATIENT MESSAGE (OUTPATIENT)
Dept: INTERNAL MEDICINE | Facility: CLINIC | Age: 62
End: 2022-01-19
Payer: COMMERCIAL

## 2022-01-19 DIAGNOSIS — J40 BRONCHITIS WITH WHEEZING: ICD-10-CM

## 2022-01-20 ENCOUNTER — PATIENT MESSAGE (OUTPATIENT)
Dept: INTERNAL MEDICINE | Facility: CLINIC | Age: 62
End: 2022-01-20
Payer: COMMERCIAL

## 2022-01-20 RX ORDER — PROMETHAZINE HYDROCHLORIDE AND CODEINE PHOSPHATE 6.25; 1 MG/5ML; MG/5ML
5 SOLUTION ORAL EVERY 6 HOURS PRN
Qty: 120 ML | Refills: 0 | Status: CANCELLED | OUTPATIENT
Start: 2022-01-20

## 2022-01-20 RX ORDER — PROMETHAZINE HYDROCHLORIDE AND DEXTROMETHORPHAN HYDROBROMIDE 6.25; 15 MG/5ML; MG/5ML
5 SYRUP ORAL 3 TIMES DAILY PRN
Qty: 118 ML | Refills: 0 | Status: SHIPPED | OUTPATIENT
Start: 2022-01-20 | End: 2022-01-30

## 2022-01-21 ENCOUNTER — PATIENT MESSAGE (OUTPATIENT)
Dept: INTERNAL MEDICINE | Facility: CLINIC | Age: 62
End: 2022-01-21
Payer: COMMERCIAL

## 2022-01-21 NOTE — TELEPHONE ENCOUNTER
Unfortunately, we cannot refill this medication without seeing patient due to state prescribing regulations for opioids.  I will substitute Phenergan with DM in the meantime.

## 2022-01-28 ENCOUNTER — LAB VISIT (OUTPATIENT)
Dept: LAB | Facility: HOSPITAL | Age: 62
End: 2022-01-28
Attending: FAMILY MEDICINE
Payer: COMMERCIAL

## 2022-01-28 ENCOUNTER — OFFICE VISIT (OUTPATIENT)
Dept: INTERNAL MEDICINE | Facility: CLINIC | Age: 62
End: 2022-01-28
Attending: FAMILY MEDICINE
Payer: COMMERCIAL

## 2022-01-28 VITALS — HEART RATE: 70 BPM | DIASTOLIC BLOOD PRESSURE: 88 MMHG | SYSTOLIC BLOOD PRESSURE: 126 MMHG

## 2022-01-28 DIAGNOSIS — I10 HYPERTENSION, ESSENTIAL: ICD-10-CM

## 2022-01-28 DIAGNOSIS — I25.10 CORONARY ARTERY CALCIFICATION: ICD-10-CM

## 2022-01-28 DIAGNOSIS — R05.9 COUGH: ICD-10-CM

## 2022-01-28 DIAGNOSIS — Z12.5 PROSTATE CANCER SCREENING: ICD-10-CM

## 2022-01-28 DIAGNOSIS — E78.5 HYPERLIPIDEMIA, UNSPECIFIED HYPERLIPIDEMIA TYPE: ICD-10-CM

## 2022-01-28 DIAGNOSIS — I25.84 CORONARY ARTERY CALCIFICATION: ICD-10-CM

## 2022-01-28 DIAGNOSIS — Z00.00 ANNUAL PHYSICAL EXAM: Primary | ICD-10-CM

## 2022-01-28 DIAGNOSIS — K76.0 NAFLD (NONALCOHOLIC FATTY LIVER DISEASE): ICD-10-CM

## 2022-01-28 DIAGNOSIS — I70.0 AORTIC ATHEROSCLEROSIS: ICD-10-CM

## 2022-01-28 DIAGNOSIS — K21.9 GASTROESOPHAGEAL REFLUX DISEASE, UNSPECIFIED WHETHER ESOPHAGITIS PRESENT: ICD-10-CM

## 2022-01-28 LAB — COMPLEXED PSA SERPL-MCNC: 0.3 NG/ML (ref 0–4)

## 2022-01-28 PROCEDURE — 99396 PREV VISIT EST AGE 40-64: CPT | Mod: S$GLB,,, | Performed by: FAMILY MEDICINE

## 2022-01-28 PROCEDURE — 84153 ASSAY OF PSA TOTAL: CPT | Performed by: FAMILY MEDICINE

## 2022-01-28 PROCEDURE — 3079F DIAST BP 80-89 MM HG: CPT | Mod: CPTII,S$GLB,, | Performed by: FAMILY MEDICINE

## 2022-01-28 PROCEDURE — 1159F PR MEDICATION LIST DOCUMENTED IN MEDICAL RECORD: ICD-10-PCS | Mod: CPTII,S$GLB,, | Performed by: FAMILY MEDICINE

## 2022-01-28 PROCEDURE — 3079F PR MOST RECENT DIASTOLIC BLOOD PRESSURE 80-89 MM HG: ICD-10-PCS | Mod: CPTII,S$GLB,, | Performed by: FAMILY MEDICINE

## 2022-01-28 PROCEDURE — 36415 COLL VENOUS BLD VENIPUNCTURE: CPT | Performed by: FAMILY MEDICINE

## 2022-01-28 PROCEDURE — 1160F RVW MEDS BY RX/DR IN RCRD: CPT | Mod: CPTII,S$GLB,, | Performed by: FAMILY MEDICINE

## 2022-01-28 PROCEDURE — 99999 PR PBB SHADOW E&M-EST. PATIENT-LVL II: CPT | Mod: PBBFAC,,, | Performed by: FAMILY MEDICINE

## 2022-01-28 PROCEDURE — 99396 PR PREVENTIVE VISIT,EST,40-64: ICD-10-PCS | Mod: S$GLB,,, | Performed by: FAMILY MEDICINE

## 2022-01-28 PROCEDURE — 3074F PR MOST RECENT SYSTOLIC BLOOD PRESSURE < 130 MM HG: ICD-10-PCS | Mod: CPTII,S$GLB,, | Performed by: FAMILY MEDICINE

## 2022-01-28 PROCEDURE — 3074F SYST BP LT 130 MM HG: CPT | Mod: CPTII,S$GLB,, | Performed by: FAMILY MEDICINE

## 2022-01-28 PROCEDURE — 99999 PR PBB SHADOW E&M-EST. PATIENT-LVL II: ICD-10-PCS | Mod: PBBFAC,,, | Performed by: FAMILY MEDICINE

## 2022-01-28 PROCEDURE — 4010F ACE/ARB THERAPY RXD/TAKEN: CPT | Mod: CPTII,S$GLB,, | Performed by: FAMILY MEDICINE

## 2022-01-28 PROCEDURE — 4010F PR ACE/ARB THEARPY RXD/TAKEN: ICD-10-PCS | Mod: CPTII,S$GLB,, | Performed by: FAMILY MEDICINE

## 2022-01-28 PROCEDURE — 1160F PR REVIEW ALL MEDS BY PRESCRIBER/CLIN PHARMACIST DOCUMENTED: ICD-10-PCS | Mod: CPTII,S$GLB,, | Performed by: FAMILY MEDICINE

## 2022-01-28 PROCEDURE — 1159F MED LIST DOCD IN RCRD: CPT | Mod: CPTII,S$GLB,, | Performed by: FAMILY MEDICINE

## 2022-01-28 RX ORDER — ALBUTEROL SULFATE 90 UG/1
2 AEROSOL, METERED RESPIRATORY (INHALATION) EVERY 6 HOURS PRN
Qty: 18 G | Refills: 5 | Status: SHIPPED | OUTPATIENT
Start: 2022-01-28 | End: 2022-02-06 | Stop reason: SDUPTHER

## 2022-01-28 RX ORDER — IRBESARTAN 300 MG/1
300 TABLET ORAL DAILY
Qty: 90 TABLET | Refills: 3 | Status: SHIPPED | OUTPATIENT
Start: 2022-01-28 | End: 2022-02-07 | Stop reason: SINTOL

## 2022-01-28 RX ORDER — ATORVASTATIN CALCIUM 10 MG/1
10 TABLET, FILM COATED ORAL DAILY
Qty: 90 TABLET | Refills: 3 | Status: SHIPPED | OUTPATIENT
Start: 2022-01-28 | End: 2022-11-26

## 2022-01-28 NOTE — PROGRESS NOTES
"Subjective:       Patient ID: Joseph Jesus is a 61 y.o. male.    Chief Complaint: No chief complaint on file.    Established patient for an annual wellness check/physical exam and also chronic disease management. Specific complaints - see dictation and please see ROS.  P, S, Fm, Soc Hx's; Meds, allergies reviewed and reconciled.  Health maintenance file reviewed and addressed items due. Recent applicable lab, imaging and cardiovascular results reviewed.  Problem list items reviewed and modified or added entries (in the overview section) may not be transcribed into this encounter note due to note writer format.    History of chronic cough that began in 12/2019.  H/o bronchitis and when he was a child was diagnosed with pleurisy.  Works as a .  Exercises regularly, around 12 miles on a treadmill.  Denies shortness of breath.  At times, feels like he is 'not oxygenating" which has required ED visits. The episodes that cause things is a cough that is unrelenting.  Occasional wheezing.  No significant congestion.  Does not improve with albuterol.  These episodes typically occur when supine at night.  Uses zyrtec every morning, flonase nightly.  Vicks under nose occassionally.  No significant heart burn or indigestion. Saw Dr. Peña last year.       Phenergan DM helping - called in [facility].    Cough  This is a chronic problem. The current episode started more than 1 year ago. The problem has been waxing and waning (worse over past month). The problem occurs every few hours. The cough is productive of sputum. Pertinent negatives include no chest pain, chills, fever, headaches, heartburn, hemoptysis, myalgias, postnasal drip, rash, rhinorrhea, sore throat, shortness of breath or wheezing. Exacerbated by: eating. His past medical history is significant for bronchitis and environmental allergies.     Review of Systems   Constitutional: Negative for appetite change, chills, diaphoresis, fatigue and fever. "   HENT: Positive for congestion. Negative for postnasal drip, rhinorrhea, sore throat and trouble swallowing.    Eyes: Negative for visual disturbance.   Respiratory: Positive for cough. Negative for hemoptysis, choking, chest tightness, shortness of breath and wheezing.    Cardiovascular: Negative for chest pain and leg swelling.   Gastrointestinal: Negative for abdominal distention, abdominal pain, diarrhea, heartburn, nausea and vomiting.   Genitourinary: Negative for difficulty urinating and hematuria.        Ed   Musculoskeletal: Negative for arthralgias and myalgias.   Skin: Negative for rash.   Allergic/Immunologic: Positive for environmental allergies.   Neurological: Negative for weakness, light-headedness and headaches.   Psychiatric/Behavioral: Negative for confusion and dysphoric mood.       Objective:      Physical Exam  Vitals and nursing note reviewed.   Constitutional:       Appearance: He is well-developed and well-nourished. He is not diaphoretic.   Eyes:      General: No scleral icterus.  Neck:      Thyroid: No thyromegaly.      Vascular: No carotid bruit or JVD.      Trachea: No tracheal deviation.   Cardiovascular:      Rate and Rhythm: Normal rate and regular rhythm.      Pulses: Intact distal pulses.      Heart sounds: Normal heart sounds. No murmur heard.  No friction rub. No gallop.    Pulmonary:      Effort: Pulmonary effort is normal. No respiratory distress.      Breath sounds: Normal breath sounds. No wheezing or rales.   Abdominal:      General: There is no distension.      Palpations: Abdomen is soft. There is no mass.      Tenderness: There is no abdominal tenderness. There is no guarding or rebound.   Musculoskeletal:         General: No edema.      Cervical back: Normal range of motion and neck supple.   Lymphadenopathy:      Cervical: No cervical adenopathy.   Skin:     General: Skin is warm and dry.      Findings: No erythema or rash.   Neurological:      Mental Status: He is  alert and oriented to person, place, and time.      Cranial Nerves: No cranial nerve deficit.      Motor: No tremor.      Coordination: Coordination normal.      Gait: Gait normal.   Psychiatric:         Mood and Affect: Mood and affect normal.         Behavior: Behavior normal.         Thought Content: Thought content normal.         Judgment: Judgment normal.         Assessment:       1. Annual physical exam    2. Cough    3. Hypertension, essential    4. Hyperlipidemia, unspecified hyperlipidemia type    5. NAFLD (nonalcoholic fatty liver disease)    6. Gastroesophageal reflux disease, unspecified whether esophagitis present    7. Aortic atherosclerosis    8. Coronary artery calcification    9. Prostate cancer screening        Plan:     Medication List with Changes/Refills   Current Medications    CETIRIZINE (ZYRTEC) 10 MG TABLET    Take 10 mg by mouth once daily.    COENZYME Q10 100 MG CAPSULE    Take 200 mg by mouth once daily.    FLUTICASONE PROPIONATE (FLONASE) 50 MCG/ACTUATION NASAL SPRAY    2 sprays (100 mcg total) by Each Nostril route once daily.    PANTOPRAZOLE (PROTONIX) 40 MG TABLET    Take 1 tablet (40 mg total) by mouth before breakfast.    PROMETHAZINE-DEXTROMETHORPHAN (PROMETHAZINE-DM) 6.25-15 MG/5 ML SYRP    Take 5 mLs by mouth 3 (three) times daily as needed (cough).   Changed and/or Refilled Medications    Modified Medication Previous Medication    ALBUTEROL (PROVENTIL/VENTOLIN HFA) 90 MCG/ACTUATION INHALER albuterol (PROVENTIL/VENTOLIN HFA) 90 mcg/actuation inhaler       Inhale 2 puffs into the lungs every 6 (six) hours as needed for Wheezing (cough).    Inhale 2 puffs into the lungs every 6 (six) hours as needed for Wheezing (cough).    ATORVASTATIN (LIPITOR) 10 MG TABLET atorvastatin (LIPITOR) 10 MG tablet       Take 1 tablet (10 mg total) by mouth once daily.    TAKE 1 TABLET BY MOUTH EVERY DAY    IRBESARTAN (AVAPRO) 300 MG TABLET irbesartan (AVAPRO) 300 MG tablet       Take 1 tablet (300 mg  total) by mouth once daily.    TAKE 1 TABLET BY MOUTH EVERY DAY   Discontinued Medications    PROMETHAZINE-CODEINE 6.25-10 MG/5 ML (PHENERGAN WITH CODEINE) 6.25-10 MG/5 ML SYRUP    Take 5 mLs by mouth every 6 (six) hours as needed for Cough.     Diagnoses and all orders for this visit:    Annual physical exam    Cough  -     albuterol (PROVENTIL/VENTOLIN HFA) 90 mcg/actuation inhaler; Inhale 2 puffs into the lungs every 6 (six) hours as needed for Wheezing (cough).    Hypertension, essential  -     irbesartan (AVAPRO) 300 MG tablet; Take 1 tablet (300 mg total) by mouth once daily.    Hyperlipidemia, unspecified hyperlipidemia type  Comments:  LDL close to 70 - will hold on current lipitor dose  Orders:  -     atorvastatin (LIPITOR) 10 MG tablet; Take 1 tablet (10 mg total) by mouth once daily.    NAFLD (nonalcoholic fatty liver disease)    Gastroesophageal reflux disease, unspecified whether esophagitis present    Aortic atherosclerosis  Comments:  CT abd 5/7/2020    Coronary artery calcification  Comments:  CTA 4/10/2020 - scattered calcific atherosclerosis of the coronary vessels    Prostate cancer screening  -     PSA, Screening; Future      See meds, orders, follow up, routing and instructions sections of encounter and AVS. Discussed with patient and provided on AVS.    Lab Results   Component Value Date     10/12/2021    K 4.3 10/12/2021     10/12/2021    BUN 20 10/12/2021    CREATININE 1.2 10/12/2021    GLU 82 10/12/2021    HGBA1C 6.1 09/27/2012    AST 47 (H) 10/12/2021    ALT 53 (H) 10/12/2021    ALBUMIN 4.2 10/12/2021    ALBUMIN 4.3 06/21/2018    PROT 7.1 10/12/2021    BILITOT 0.5 10/12/2021    CHOL 147 10/12/2021    HDL 60 10/12/2021    LDLCALC 72.4 10/12/2021    TRIG 73 10/12/2021    WBC 6.70 04/10/2020    HGB 13.8 (L) 04/10/2020    HCT 43.4 04/10/2020     04/10/2020    PSA 0.22 07/29/2020    PSADIAG 0.37 09/08/2014    TSH 2.072 05/01/2015    BNP 18 04/10/2020         This cough episode  is 1 months and improving -     Hold irbesartan for 2 weeks and report to me - if cough goes away we may change class of meds. Watch BP for large elevations.

## 2022-01-28 NOTE — PATIENT INSTRUCTIONS
See standing or future lab orders and please draw PSA - today, thank you.    Hold irbesartan for 2 weeks and report to me - if cough goes away we may change class of meds. Watch BP for large elevations.      Information about cholesterol, high blood pressure and healthy diet and activity recommendations can be found at the following links on the Internet:    http://www.nhlbi.nih.gov/health/health-topics/topics/hbc  http://www.nhlbi.nih.gov/health/educational/lose_wt/index.htm  Http://www.nhlbi.nih.gov/files/docs/public/heart/hbp_low.pdf  http://www.heart.org/HEARTORG/  http://diabetes.org/  https://www.cdc.gov/  Https://healthfinder.gov/  https://health.gov/dietaryguidelines/2015/guidelines/  https://health.gov/paguidelines/second-edition/pdf/Physical_Activity_Guidelines_2nd_edition.pdf

## 2022-02-02 ENCOUNTER — TELEPHONE (OUTPATIENT)
Dept: INTERNAL MEDICINE | Facility: CLINIC | Age: 62
End: 2022-02-02
Payer: COMMERCIAL

## 2022-02-02 DIAGNOSIS — R05.9 COUGH: ICD-10-CM

## 2022-02-02 NOTE — TELEPHONE ENCOUNTER
Pt insurance has denied prior auth on Albuterol sulfate hfa 90mcg. Can pt be switched to another rx?

## 2022-02-06 RX ORDER — ALBUTEROL SULFATE 90 UG/1
2 AEROSOL, METERED RESPIRATORY (INHALATION) EVERY 6 HOURS PRN
Qty: 18 G | Refills: 5 | Status: SHIPPED | OUTPATIENT
Start: 2022-02-06 | End: 2023-09-06

## 2022-02-06 NOTE — TELEPHONE ENCOUNTER
Due to issues with insurance approving medication, or for recall/supply issues, I reordered patient's medication (s) from our [facility] pharmacy.    They do a nice job with prior authorizations and also with medication supply issues. Please ask patient to call [facility] to confirm the status of medication. I don't know the numbr.    Please advise patient that we filled script here. Thank you

## 2022-02-07 ENCOUNTER — PATIENT MESSAGE (OUTPATIENT)
Dept: INTERNAL MEDICINE | Facility: CLINIC | Age: 62
End: 2022-02-07
Payer: COMMERCIAL

## 2022-02-07 RX ORDER — AMLODIPINE BESYLATE 2.5 MG/1
2.5 TABLET ORAL DAILY
Qty: 30 TABLET | Refills: 1 | Status: SHIPPED | OUTPATIENT
Start: 2022-02-07 | End: 2022-02-11

## 2022-02-07 NOTE — TELEPHONE ENCOUNTER
Pt states that his Rx was denied. He can pay 46.00 or have PCP switch him to another Rx. Pt would prefer to be switched to another Rx that is cheaper. Please advise.

## 2022-02-11 ENCOUNTER — TELEPHONE (OUTPATIENT)
Dept: INTERNAL MEDICINE | Facility: CLINIC | Age: 62
End: 2022-02-11
Payer: COMMERCIAL

## 2022-02-11 RX ORDER — AMLODIPINE BESYLATE 5 MG/1
5 TABLET ORAL DAILY
Qty: 30 TABLET | Refills: 1 | Status: SHIPPED | OUTPATIENT
Start: 2022-02-11 | End: 2022-02-17 | Stop reason: SDUPTHER

## 2022-02-11 NOTE — TELEPHONE ENCOUNTER
----- Message from Dhara Juarez sent at 2/11/2022 10:47 AM CST -----  Contact: Joseph arias [phone]  Patient would like to get medical advice.  Symptoms (please be specific):    How long have you had these symptoms:   Would you like a call back, or a response through your [facility] portal?: call back  Pharmacy name and phone # :    Comments:  Pt is requesting a call back from the nurse because his bp is rather high 182/107 and is feeling nauseated and has a headache and needs advice

## 2022-02-11 NOTE — TELEPHONE ENCOUNTER
Please call patient and schedule patient for an appointment with me or JENNIFER Paz, sometime next week for blood pressure check. Thank you.

## 2022-02-11 NOTE — TELEPHONE ENCOUNTER
Pt started on Norvasc on Monday.pt states that his bp is still elevated, however he did receive a message from pcp with direction.

## 2022-02-17 ENCOUNTER — OFFICE VISIT (OUTPATIENT)
Dept: INTERNAL MEDICINE | Facility: CLINIC | Age: 62
End: 2022-02-17
Payer: COMMERCIAL

## 2022-02-17 VITALS
HEART RATE: 76 BPM | WEIGHT: 233.44 LBS | HEIGHT: 73 IN | DIASTOLIC BLOOD PRESSURE: 90 MMHG | OXYGEN SATURATION: 97 % | BODY MASS INDEX: 30.94 KG/M2 | SYSTOLIC BLOOD PRESSURE: 144 MMHG

## 2022-02-17 DIAGNOSIS — R09.89 CHEST CONGESTION: ICD-10-CM

## 2022-02-17 DIAGNOSIS — Z91.09 ENVIRONMENTAL ALLERGIES: ICD-10-CM

## 2022-02-17 DIAGNOSIS — E78.5 HYPERLIPIDEMIA, UNSPECIFIED HYPERLIPIDEMIA TYPE: ICD-10-CM

## 2022-02-17 DIAGNOSIS — I10 HYPERTENSION, ESSENTIAL: Primary | ICD-10-CM

## 2022-02-17 PROCEDURE — 1159F PR MEDICATION LIST DOCUMENTED IN MEDICAL RECORD: ICD-10-PCS | Mod: CPTII,S$GLB,, | Performed by: NURSE PRACTITIONER

## 2022-02-17 PROCEDURE — 4010F PR ACE/ARB THEARPY RXD/TAKEN: ICD-10-PCS | Mod: CPTII,S$GLB,, | Performed by: NURSE PRACTITIONER

## 2022-02-17 PROCEDURE — 4010F ACE/ARB THERAPY RXD/TAKEN: CPT | Mod: CPTII,S$GLB,, | Performed by: NURSE PRACTITIONER

## 2022-02-17 PROCEDURE — 3077F SYST BP >= 140 MM HG: CPT | Mod: CPTII,S$GLB,, | Performed by: NURSE PRACTITIONER

## 2022-02-17 PROCEDURE — 1159F MED LIST DOCD IN RCRD: CPT | Mod: CPTII,S$GLB,, | Performed by: NURSE PRACTITIONER

## 2022-02-17 PROCEDURE — 3077F PR MOST RECENT SYSTOLIC BLOOD PRESSURE >= 140 MM HG: ICD-10-PCS | Mod: CPTII,S$GLB,, | Performed by: NURSE PRACTITIONER

## 2022-02-17 PROCEDURE — 3008F PR BODY MASS INDEX (BMI) DOCUMENTED: ICD-10-PCS | Mod: CPTII,S$GLB,, | Performed by: NURSE PRACTITIONER

## 2022-02-17 PROCEDURE — 99999 PR PBB SHADOW E&M-EST. PATIENT-LVL IV: CPT | Mod: PBBFAC,,, | Performed by: NURSE PRACTITIONER

## 2022-02-17 PROCEDURE — 3080F PR MOST RECENT DIASTOLIC BLOOD PRESSURE >= 90 MM HG: ICD-10-PCS | Mod: CPTII,S$GLB,, | Performed by: NURSE PRACTITIONER

## 2022-02-17 PROCEDURE — 3080F DIAST BP >= 90 MM HG: CPT | Mod: CPTII,S$GLB,, | Performed by: NURSE PRACTITIONER

## 2022-02-17 PROCEDURE — 1160F RVW MEDS BY RX/DR IN RCRD: CPT | Mod: CPTII,S$GLB,, | Performed by: NURSE PRACTITIONER

## 2022-02-17 PROCEDURE — 3008F BODY MASS INDEX DOCD: CPT | Mod: CPTII,S$GLB,, | Performed by: NURSE PRACTITIONER

## 2022-02-17 PROCEDURE — 1160F PR REVIEW ALL MEDS BY PRESCRIBER/CLIN PHARMACIST DOCUMENTED: ICD-10-PCS | Mod: CPTII,S$GLB,, | Performed by: NURSE PRACTITIONER

## 2022-02-17 PROCEDURE — 99214 PR OFFICE/OUTPT VISIT, EST, LEVL IV, 30-39 MIN: ICD-10-PCS | Mod: S$GLB,,, | Performed by: NURSE PRACTITIONER

## 2022-02-17 PROCEDURE — 99214 OFFICE O/P EST MOD 30 MIN: CPT | Mod: S$GLB,,, | Performed by: NURSE PRACTITIONER

## 2022-02-17 PROCEDURE — 99999 PR PBB SHADOW E&M-EST. PATIENT-LVL IV: ICD-10-PCS | Mod: PBBFAC,,, | Performed by: NURSE PRACTITIONER

## 2022-02-17 RX ORDER — AMLODIPINE BESYLATE 10 MG/1
10 TABLET ORAL DAILY
Qty: 30 TABLET | Refills: 11 | Status: SHIPPED | OUTPATIENT
Start: 2022-02-17 | End: 2022-03-02 | Stop reason: SDUPTHER

## 2022-02-17 RX ORDER — PROMETHAZINE HYDROCHLORIDE AND DEXTROMETHORPHAN HYDROBROMIDE 6.25; 15 MG/5ML; MG/5ML
5 SYRUP ORAL NIGHTLY PRN
Qty: 240 ML | Refills: 0 | Status: SHIPPED | OUTPATIENT
Start: 2022-02-17 | End: 2022-05-20 | Stop reason: SDUPTHER

## 2022-02-17 NOTE — PROGRESS NOTES
Subjective:      Patient ID: Joseph Jesus is a 61 y.o. male.    Chief Complaint: Hypertension    Mr. Jesus is a 61 y.o. male patient of Nick Olson MD who comes in today for a blood pressure check.  Dr. Olson recently took him off of irbesartan bc of cough.  Noticed his blood pressures really elevating once off of the irbesartan.  Systolic in the 170s-180s over 100s.  Was put on amlodipine 2.5 mg tablets.  has been in contact with pcp about high blood pressures and his amlodipine dose was increased.  He is now taking 10 mg of amlodipine daily for the last few days.  No dizziness or chest pain.  Feeling a little better now that he has increased the dose.  No swelling in legs. Going to gym twice weekly.  Does not take nsaids.  Had cut back on caffeine.  Does add a little salt to food occasionally.      Reports chronic persistent allergic rhinitis and chest congestion.  Says cough greatly improved when he stopped the irbesartan, but still has occasional cough and chest congestion.  Takes flonase and zyrtec and robitussin cough syrup regularly.  Uses promethazine dm also as needed.  Would like to see an allergist for allergy testing.  He says he has had a full work up of symptoms and seen GI and a lung doctor        Review of Systems   Constitutional: Negative for chills, diaphoresis and fever.   HENT: Positive for sinus pressure and sneezing. Negative for congestion, ear pain and hearing loss.         Has some chronic sinus congestion   Eyes: Negative for redness and visual disturbance.   Respiratory: Negative for cough, chest tightness, shortness of breath and wheezing.         Chest congestion   Cardiovascular: Negative for chest pain and leg swelling.   Gastrointestinal: Negative for abdominal distention, abdominal pain, constipation and diarrhea.   Genitourinary: Negative for difficulty urinating and dysuria.   Musculoskeletal: Negative for gait problem and joint swelling.   Skin: Negative for rash.    Neurological: Negative for dizziness, light-headedness and headaches.   Psychiatric/Behavioral: Negative for confusion. The patient is not nervous/anxious.        Review of patient's allergies indicates:   Allergen Reactions    Catechu herb     Irbesartan Other (See Comments)     Possibly caused cough    Erythromycin Rash    Sumycin [tetracycline] Rash    Tetracyclines Rash         Current Outpatient Medications:     albuterol (PROVENTIL/VENTOLIN HFA) 90 mcg/actuation inhaler, Inhale 2 puffs into the lungs every 6 (six) hours as needed for Wheezing (cough)., Disp: 18 g, Rfl: 5    atorvastatin (LIPITOR) 10 MG tablet, Take 1 tablet (10 mg total) by mouth once daily., Disp: 90 tablet, Rfl: 3    cetirizine (ZYRTEC) 10 MG tablet, Take 10 mg by mouth once daily., Disp: , Rfl:     coenzyme Q10 100 mg capsule, Take 200 mg by mouth once daily., Disp: , Rfl:     fluticasone propionate (FLONASE) 50 mcg/actuation nasal spray, 2 sprays (100 mcg total) by Each Nostril route once daily., Disp: 16 g, Rfl: 5    amLODIPine (NORVASC) 10 MG tablet, Take 1 tablet (10 mg total) by mouth once daily., Disp: 30 tablet, Rfl: 11    pantoprazole (PROTONIX) 40 MG tablet, Take 1 tablet (40 mg total) by mouth before breakfast., Disp: 30 tablet, Rfl: 11    promethazine-dextromethorphan (PROMETHAZINE-DM) 6.25-15 mg/5 mL Syrp, Take 5 mLs by mouth nightly as needed (for cough and congestion)., Disp: 240 mL, Rfl: 0    Patient Active Problem List    Diagnosis Date Noted    Aortic atherosclerosis 01/28/2022    Coronary artery calcification 01/28/2022    Gastroesophageal reflux disease 08/09/2021    Hiatal hernia with gastroesophageal reflux disease without esophagitis 05/26/2021    Upper airway cough syndrome     AK (actinic keratosis) 11/02/2020    Erectile dysfunction due to arterial insufficiency 07/02/2018    Diverticulitis of large intestine without perforation or abscess without bleeding 04/18/2017    Hypertension,  essential 08/17/2016    Hyperlipidemia 08/17/2016    NAFLD (nonalcoholic fatty liver disease) 08/17/2016    Lateral epicondylitis of right elbow 08/17/2016    BPH with urinary obstruction 09/08/2014    Colon polyps 09/27/2012       Past Medical History:   Diagnosis Date    Allergy     seasonal    Anal fistula hx    Arthritis     Basal cell carcinoma 4/2013    left superior forehead    Callus     Diverticulosis     Epididymal cyst     GERD (gastroesophageal reflux disease)     Hayfever     Hydrocele, right     Hyperlipidemia     Hypertension     Neck pain     hx of muscular inury from weight lifting---resolved now    Prostatitis     Dec. '12-treated with antibx.    Squamous cell carcinoma 01/07/2019    anterior scalp    Squamous cell carcinoma of skin 03/2020    Left post scalp (SCC insitu-saucerization)    Visual impairment        Past Surgical History:   Procedure Laterality Date    APPENDECTOMY  1962    COLONOSCOPY  12/2012    due for repeat 12/2015    COLONOSCOPY N/A 5/19/2016    Procedure: COLONOSCOPY;  Surgeon: James Webber MD;  Location: [address]);  Service: Endoscopy;  Laterality: N/A; diverticulosis, repeat in 5-10 years for surveillance    COLONOSCOPY N/A 8/9/2021    Procedure: COLONOSCOPY;  Surgeon: Fahad Bautista Jr., MD;  Location: [facility];  Service: Endoscopy;  Laterality: N/A;    ESOPHAGOGASTRODUODENOSCOPY N/A 8/9/2021    Procedure: EGD (ESOPHAGOGASTRODUODENOSCOPY);  Surgeon: Fahad Bautista Jr., MD;  Location: [facility];  Service: Endoscopy;  Laterality: N/A;    EUA/Fistulotomy-'08      [facility]ERNIA REPAIR      [facility] with mesh    Hydrocelectomy  2013    MASS EXCISION  2004    BCC removal (twice)    MOLE REMOVAL  02/25/2019    2 moles removed from scalp =- 1 was basal cell and 1 was squamous cell - dermatology - Dr. londono    right Spermatocelectomy  2013    UPPER GASTROINTESTINAL ENDOSCOPY  prior to 2010    hiatal hernia, reflux esophagitis  "        Objective:     Vitals:    02/17/22 1306   BP: (!) 144/90   Pulse: 76   SpO2: 97%   Weight: 105.9 kg (233 lb 7.5 oz)   Height: 6' 1" (1.854 m)   PainSc: 0-No pain       Body mass index is 30.8 kg/m².    Physical Exam  Constitutional:       General: He is not in acute distress.     Appearance: He is well-developed. He is not diaphoretic.   HENT:      Head: Normocephalic and atraumatic.   Eyes:      General: No scleral icterus.     Conjunctiva/sclera: Conjunctivae normal.   Cardiovascular:      Rate and Rhythm: Normal rate and regular rhythm.      Pulses: Normal pulses.      Heart sounds: Normal heart sounds.   Pulmonary:      Effort: Pulmonary effort is normal. No respiratory distress.      Breath sounds: Normal breath sounds.   Abdominal:      General: There is no distension.   Musculoskeletal:         General: Normal range of motion.      Cervical back: Normal range of motion and neck supple.   Skin:     General: Skin is warm and dry.   Neurological:      Mental Status: He is alert and oriented to person, place, and time.   Psychiatric:         Behavior: Behavior normal.       Assessment:     1. Hypertension, essential    2. Hyperlipidemia, unspecified hyperlipidemia type    3. Chest congestion    4. Environmental allergies      Plan:     Joseph was seen today for hypertension.    Diagnoses and all orders for this visit:    Hypertension, essential  Continue amlodipine 10 mg daily.  Cont to check bp.  Follow up in 3 months or sooner if problems.  Home bp cuff appears to be pretty accurate based on check today  -     amLODIPine (NORVASC) 10 MG tablet; Take 1 tablet (10 mg total) by mouth once daily.    Hyperlipidemia, unspecified hyperlipidemia type  Stable on current medications    Chest congestion  Use only as needed  -     promethazine-dextromethorphan (PROMETHAZINE-DM) 6.25-15 mg/5 mL Syrp; Take 5 mLs by mouth nightly as needed (for cough and congestion).    Environmental allergies  -     Ambulatory " referral/consult to Allergy; Future    Patient Instructions   Continue with amlodipine 10 mg daily.  A new prescription was sent to your pharmacy    Check your blood pressure a few times per week    Promethazine DM sent to pharmacy to use as needed for chest congestion    Schedule allergy consult at check out desk    Schedule 3 month follow up at check out desk.  Let us know sooner if any problems    General healthy lifestyle modifications to reduce your risk of high blood pressure, stroke, and heart attack include:    Maintain a healthy weight: normal BMI (body mass index) is between 18.5 and 24.9    Follow a heart-healthy or DASH diet   Recommended: fruits, vegetables, and whole grains, foods rich in  fiber and protein, fat-free or low-fat dairy, fish, poultry, beans, nuts,  and vegetable oils   Not recommended: foods high in saturated fats (fatty meats,  full-fat dairy, and tropical oils), trans fats  Limit sugar-sweetened beverages and sweets   No more than 2300 milligrams of sodium per day    Get active: Aim for 150 minutes per week of moderate intensity aerobic exercise (30 minutes x 5 days/week) plus muscle strengthening exercises twice weekly (weight-lifting, resistance bands)    Limit alcohol intake: limit to 1 drink/day for women and 2 drinks/day for men    Tobacco cessation: ask your healthcare provider about smoking cessation programs/resources    Routinely follow with healthcare providers and keep age-appropriate screenings and vaccines up to date

## 2022-02-17 NOTE — PATIENT INSTRUCTIONS
Continue with amlodipine 10 mg daily.  A new prescription was sent to your pharmacy    Check your blood pressure a few times per week    Promethazine DM sent to pharmacy to use as needed for chest congestion    Schedule allergy consult at check out desk    Schedule 3 month follow up at check out desk.  Let us know sooner if any problems    General healthy lifestyle modifications to reduce your risk of high blood pressure, stroke, and heart attack include:    Maintain a healthy weight: normal BMI (body mass index) is between 18.5 and 24.9    Follow a heart-healthy or DASH diet   Recommended: fruits, vegetables, and whole grains, foods rich in  fiber and protein, fat-free or low-fat dairy, fish, poultry, beans, nuts,  and vegetable oils   Not recommended: foods high in saturated fats (fatty meats,  full-fat dairy, and tropical oils), trans fats  Limit sugar-sweetened beverages and sweets   No more than 2300 milligrams of sodium per day    Get active: Aim for 150 minutes per week of moderate intensity aerobic exercise (30 minutes x 5 days/week) plus muscle strengthening exercises twice weekly (weight-lifting, resistance bands)    Limit alcohol intake: limit to 1 drink/day for women and 2 drinks/day for men    Tobacco cessation: ask your healthcare provider about smoking cessation programs/resources    Routinely follow with healthcare providers and keep age-appropriate screenings and vaccines up to date

## 2022-02-18 NOTE — PROGRESS NOTES
I have reviewed the chart and APRN's history and physical, assessment and plan. I have personally discussed the case with APRN and agree with the findings, assessment and plan.

## 2022-02-21 ENCOUNTER — PATIENT MESSAGE (OUTPATIENT)
Dept: INTERNAL MEDICINE | Facility: CLINIC | Age: 62
End: 2022-02-21
Payer: COMMERCIAL

## 2022-02-22 ENCOUNTER — TELEPHONE (OUTPATIENT)
Dept: ALLERGY | Facility: CLINIC | Age: 62
End: 2022-02-22
Payer: COMMERCIAL

## 2022-02-22 NOTE — TELEPHONE ENCOUNTER
----- Message from Sade Fink sent at 2/22/2022  5:10 PM CST -----  Regarding: Scheduling  Please contact patient to schedule an appointment.  Thanks!!!!!

## 2022-02-23 ENCOUNTER — TELEPHONE (OUTPATIENT)
Dept: INTERNAL MEDICINE | Facility: CLINIC | Age: 62
End: 2022-02-23
Payer: COMMERCIAL

## 2022-02-23 NOTE — TELEPHONE ENCOUNTER
Spoke to pt he stated his ankles are swollen I offered pt appt with Dr. Trejo pt stated he will hold off on the appt and he will elevate and ice his ankles for the next couple of days and see what happens I advised pt to give me a call [phone] if he have any questions, concerns or decide to schedule appt

## 2022-02-23 NOTE — TELEPHONE ENCOUNTER
----- Message from Adalgisa Kennedy sent at 2/22/2022  5:33 PM CST -----  Type:  Patient Returning Call    Who Called: pt   Who Left Message for Patient: pt   Does the patient know what this is regarding?: pt had a missed call   Would the patient rather a call back or a response via [facility]?  call  Best Call Back Number:[phone]  Additional Information: call

## 2022-02-25 ENCOUNTER — PATIENT MESSAGE (OUTPATIENT)
Dept: INTERNAL MEDICINE | Facility: CLINIC | Age: 62
End: 2022-02-25
Payer: COMMERCIAL

## 2022-02-25 RX ORDER — HYDROCHLOROTHIAZIDE 12.5 MG/1
12.5 TABLET ORAL DAILY
Qty: 90 TABLET | Refills: 1 | Status: SHIPPED | OUTPATIENT
Start: 2022-02-25 | End: 2022-03-16 | Stop reason: SDUPTHER

## 2022-02-26 ENCOUNTER — TELEPHONE (OUTPATIENT)
Dept: INTERNAL MEDICINE | Facility: CLINIC | Age: 62
End: 2022-02-26
Payer: COMMERCIAL

## 2022-02-26 NOTE — TELEPHONE ENCOUNTER
Please call patient and let him know that I sent a portal message about his BP, leg swelling and medicines.    I need to know what his pressure is doing - may add a fluid pill, thank you

## 2022-02-26 NOTE — TELEPHONE ENCOUNTER
Called and spoke to pt. Pt states BPs run 145-150 and  on the bottom. Pt out of town but will follow up with a BP check when back in town.Let him know that he can  his medication from [facility]. Pt verbalized understanding.

## 2022-03-01 DIAGNOSIS — I10 HYPERTENSION, ESSENTIAL: ICD-10-CM

## 2022-03-01 NOTE — TELEPHONE ENCOUNTER
No new care gaps identified.  Powered by [employer]. Reference number: [id number].   3/01/2022 11:35:32 AM CST

## 2022-03-02 RX ORDER — AMLODIPINE BESYLATE 10 MG/1
10 TABLET ORAL DAILY
Qty: 90 TABLET | Refills: 0 | Status: SHIPPED | OUTPATIENT
Start: 2022-03-02 | End: 2022-03-07 | Stop reason: SINTOL

## 2022-03-02 RX ORDER — AMLODIPINE BESYLATE 10 MG/1
10 TABLET ORAL DAILY
Qty: 90 TABLET | Refills: 0 | OUTPATIENT
Start: 2022-03-02

## 2022-03-07 ENCOUNTER — PATIENT MESSAGE (OUTPATIENT)
Dept: INTERNAL MEDICINE | Facility: CLINIC | Age: 62
End: 2022-03-07
Payer: COMMERCIAL

## 2022-03-07 RX ORDER — NIFEDIPINE 30 MG/1
30 TABLET, FILM COATED, EXTENDED RELEASE ORAL DAILY
Qty: 30 TABLET | Refills: 1 | Status: SHIPPED | OUTPATIENT
Start: 2022-03-07 | End: 2022-03-16 | Stop reason: SINTOL

## 2022-03-10 NOTE — TELEPHONE ENCOUNTER
No new care gaps identified.  Powered by [employer]. Reference number: [id number].   3/10/2022 11:33:31 AM CST

## 2022-03-10 NOTE — TELEPHONE ENCOUNTER

## 2022-03-11 RX ORDER — AMLODIPINE BESYLATE 5 MG/1
TABLET ORAL
Qty: 30 TABLET | Refills: 1 | OUTPATIENT
Start: 2022-03-11

## 2022-03-16 ENCOUNTER — OFFICE VISIT (OUTPATIENT)
Dept: INTERNAL MEDICINE | Facility: CLINIC | Age: 62
End: 2022-03-16
Attending: FAMILY MEDICINE
Payer: COMMERCIAL

## 2022-03-16 ENCOUNTER — LAB VISIT (OUTPATIENT)
Dept: LAB | Facility: HOSPITAL | Age: 62
End: 2022-03-16
Attending: FAMILY MEDICINE
Payer: COMMERCIAL

## 2022-03-16 ENCOUNTER — PATIENT MESSAGE (OUTPATIENT)
Dept: INTERNAL MEDICINE | Facility: CLINIC | Age: 62
End: 2022-03-16

## 2022-03-16 ENCOUNTER — PATIENT MESSAGE (OUTPATIENT)
Dept: ADMINISTRATIVE | Facility: OTHER | Age: 62
End: 2022-03-16
Payer: COMMERCIAL

## 2022-03-16 VITALS
WEIGHT: 231.38 LBS | DIASTOLIC BLOOD PRESSURE: 82 MMHG | HEART RATE: 72 BPM | BODY MASS INDEX: 30.66 KG/M2 | OXYGEN SATURATION: 97 % | SYSTOLIC BLOOD PRESSURE: 138 MMHG | HEIGHT: 73 IN

## 2022-03-16 DIAGNOSIS — I10 HYPERTENSION, ESSENTIAL: ICD-10-CM

## 2022-03-16 DIAGNOSIS — K76.0 NAFLD (NONALCOHOLIC FATTY LIVER DISEASE): ICD-10-CM

## 2022-03-16 DIAGNOSIS — R60.9 EDEMA, UNSPECIFIED TYPE: ICD-10-CM

## 2022-03-16 DIAGNOSIS — M79.604 PAIN IN BOTH LOWER EXTREMITIES: ICD-10-CM

## 2022-03-16 DIAGNOSIS — M79.605 PAIN IN BOTH LOWER EXTREMITIES: ICD-10-CM

## 2022-03-16 DIAGNOSIS — I10 HYPERTENSION, ESSENTIAL: Primary | ICD-10-CM

## 2022-03-16 LAB
ALBUMIN SERPL BCP-MCNC: 4.4 G/DL (ref 3.5–5.2)
ALP SERPL-CCNC: 53 U/L (ref 55–135)
ALT SERPL W/O P-5'-P-CCNC: 73 U/L (ref 10–44)
ANION GAP SERPL CALC-SCNC: 10 MMOL/L (ref 8–16)
AST SERPL-CCNC: 52 U/L (ref 10–40)
BILIRUB SERPL-MCNC: 0.6 MG/DL (ref 0.1–1)
BNP SERPL-MCNC: 16 PG/ML (ref 0–99)
BUN SERPL-MCNC: 18 MG/DL (ref 8–23)
CALCIUM SERPL-MCNC: 10.3 MG/DL (ref 8.7–10.5)
CHLORIDE SERPL-SCNC: 102 MMOL/L (ref 95–110)
CO2 SERPL-SCNC: 28 MMOL/L (ref 23–29)
CREAT SERPL-MCNC: 1.3 MG/DL (ref 0.5–1.4)
EST. GFR  (AFRICAN AMERICAN): >60 ML/MIN/1.73 M^2
EST. GFR  (NON AFRICAN AMERICAN): 57.7 ML/MIN/1.73 M^2
GLUCOSE SERPL-MCNC: 79 MG/DL (ref 70–110)
POTASSIUM SERPL-SCNC: 4 MMOL/L (ref 3.5–5.1)
PROT SERPL-MCNC: 7.6 G/DL (ref 6–8.4)
SODIUM SERPL-SCNC: 140 MMOL/L (ref 136–145)
TSH SERPL DL<=0.005 MIU/L-ACNC: 2.27 UIU/ML (ref 0.4–4)

## 2022-03-16 PROCEDURE — 99214 PR OFFICE/OUTPT VISIT, EST, LEVL IV, 30-39 MIN: ICD-10-PCS | Mod: S$GLB,,, | Performed by: FAMILY MEDICINE

## 2022-03-16 PROCEDURE — 83880 ASSAY OF NATRIURETIC PEPTIDE: CPT | Performed by: FAMILY MEDICINE

## 2022-03-16 PROCEDURE — 1160F RVW MEDS BY RX/DR IN RCRD: CPT | Mod: CPTII,S$GLB,, | Performed by: FAMILY MEDICINE

## 2022-03-16 PROCEDURE — 3079F DIAST BP 80-89 MM HG: CPT | Mod: CPTII,S$GLB,, | Performed by: FAMILY MEDICINE

## 2022-03-16 PROCEDURE — 99999 PR PBB SHADOW E&M-EST. PATIENT-LVL V: ICD-10-PCS | Mod: PBBFAC,,, | Performed by: FAMILY MEDICINE

## 2022-03-16 PROCEDURE — 3008F PR BODY MASS INDEX (BMI) DOCUMENTED: ICD-10-PCS | Mod: CPTII,S$GLB,, | Performed by: FAMILY MEDICINE

## 2022-03-16 PROCEDURE — 99214 OFFICE O/P EST MOD 30 MIN: CPT | Mod: S$GLB,,, | Performed by: FAMILY MEDICINE

## 2022-03-16 PROCEDURE — 3075F PR MOST RECENT SYSTOLIC BLOOD PRESS GE 130-139MM HG: ICD-10-PCS | Mod: CPTII,S$GLB,, | Performed by: FAMILY MEDICINE

## 2022-03-16 PROCEDURE — 99999 PR PBB SHADOW E&M-EST. PATIENT-LVL V: CPT | Mod: PBBFAC,,, | Performed by: FAMILY MEDICINE

## 2022-03-16 PROCEDURE — 84443 ASSAY THYROID STIM HORMONE: CPT | Performed by: FAMILY MEDICINE

## 2022-03-16 PROCEDURE — 3075F SYST BP GE 130 - 139MM HG: CPT | Mod: CPTII,S$GLB,, | Performed by: FAMILY MEDICINE

## 2022-03-16 PROCEDURE — 80053 COMPREHEN METABOLIC PANEL: CPT | Performed by: FAMILY MEDICINE

## 2022-03-16 PROCEDURE — 36415 COLL VENOUS BLD VENIPUNCTURE: CPT | Performed by: FAMILY MEDICINE

## 2022-03-16 PROCEDURE — 3079F PR MOST RECENT DIASTOLIC BLOOD PRESSURE 80-89 MM HG: ICD-10-PCS | Mod: CPTII,S$GLB,, | Performed by: FAMILY MEDICINE

## 2022-03-16 PROCEDURE — 4010F ACE/ARB THERAPY RXD/TAKEN: CPT | Mod: CPTII,S$GLB,, | Performed by: FAMILY MEDICINE

## 2022-03-16 PROCEDURE — 3008F BODY MASS INDEX DOCD: CPT | Mod: CPTII,S$GLB,, | Performed by: FAMILY MEDICINE

## 2022-03-16 PROCEDURE — 1159F MED LIST DOCD IN RCRD: CPT | Mod: CPTII,S$GLB,, | Performed by: FAMILY MEDICINE

## 2022-03-16 PROCEDURE — 1159F PR MEDICATION LIST DOCUMENTED IN MEDICAL RECORD: ICD-10-PCS | Mod: CPTII,S$GLB,, | Performed by: FAMILY MEDICINE

## 2022-03-16 PROCEDURE — 1160F PR REVIEW ALL MEDS BY PRESCRIBER/CLIN PHARMACIST DOCUMENTED: ICD-10-PCS | Mod: CPTII,S$GLB,, | Performed by: FAMILY MEDICINE

## 2022-03-16 PROCEDURE — 4010F PR ACE/ARB THEARPY RXD/TAKEN: ICD-10-PCS | Mod: CPTII,S$GLB,, | Performed by: FAMILY MEDICINE

## 2022-03-16 RX ORDER — HYDROCHLOROTHIAZIDE 25 MG/1
25 TABLET ORAL DAILY
Qty: 90 TABLET | Refills: 0 | Status: SHIPPED | OUTPATIENT
Start: 2022-03-16 | End: 2022-06-25

## 2022-03-16 NOTE — PROGRESS NOTES
Subjective:       Patient ID: Joseph Jesus is a 62 y.o. male.    Chief Complaint: Follow-up and Foot Pain    Established patient follows up for management of chronic medical illnesses with complaints today. Please see dictation and ROS for interval problems, specific complaints and disease management discussion.    P, S, Fm, Soc Hx's; Meds, allergies reviewed and reconciled.  Health maintenance file reviewed and addressed items due. Recent applicable lab, imaging and cardiovascular results reviewed.  Problem list items reviewed and modified or added entries (in the overview section) may not be transcribed into this encounter note due to note writer format.    Follow-up is for blood pressure, but reporting bilateral foot pain and swelling.  Swelling was attributed to amlodipine and therefore switched to nifedipine.  Resolved somewhat but not completely.  He had also been placed on hydrochlorothiazide 12.5 mg. His blood pressure was under good control but had a chronic cough that was possibly attributed to irbesartan, which was discontinued.  Blood pressure levels increased and therefore placed on amlodipine with above side effects.  Given possible cough with ARB, we would avoid ACE as well.  We discussed other options then calcium channel blockers.  He took a beta-blocker in the past with some sort of depression.  We will label these as medication intolerance is in the allergy section of his chart.    Foot pain seems to start upon standing.  Walking does not seem to aggravate so much.  Some possible dysesthesia in the feet is noted.  No thigh pain and no calf pain reported.  His typical swelling is from mid shin distally.  We had ordered DOREEN test not performed.    Review of Systems   Constitutional: Negative for chills, fatigue, fever and unexpected weight change.   HENT: Negative for congestion and trouble swallowing.    Eyes: Negative for redness and visual disturbance.   Respiratory: Negative for cough, chest  tightness and shortness of breath.    Cardiovascular: Positive for leg swelling. Negative for chest pain and palpitations.   Gastrointestinal: Negative for abdominal pain and blood in stool.   Genitourinary: Negative for difficulty urinating and hematuria.   Musculoskeletal: Negative for arthralgias, back pain, gait problem, joint swelling, myalgias and neck pain.   Skin: Negative for color change and rash.   Neurological: Negative for tremors, speech difficulty, weakness, numbness and headaches.   Hematological: Negative for adenopathy. Does not bruise/bleed easily.   Psychiatric/Behavioral: Negative for behavioral problems, confusion and sleep disturbance. The patient is not nervous/anxious.        Objective:      Physical Exam  Vitals and nursing note reviewed.   Constitutional:       Appearance: He is well-developed. He is not diaphoretic.   HENT:      Head: Normocephalic and atraumatic.   Eyes:      General: No scleral icterus.     Conjunctiva/sclera: Conjunctivae normal.   Neck:      Vascular: No carotid bruit.   Cardiovascular:      Rate and Rhythm: Normal rate and regular rhythm.      Heart sounds: Normal heart sounds. No murmur heard.    No friction rub. No gallop.   Pulmonary:      Effort: Pulmonary effort is normal. No respiratory distress.      Breath sounds: Normal breath sounds. No wheezing or rales.   Abdominal:      General: There is no distension.      Tenderness: There is no abdominal tenderness. There is no guarding or rebound.   Musculoskeletal:         General: No deformity.      Cervical back: Normal range of motion and neck supple.      Right lower leg: Edema present.      Left lower leg: Edema present.      Comments: Mild, symmetric, ankle   Skin:     General: Skin is warm and dry.      Findings: No erythema or rash.   Neurological:      Mental Status: He is alert and oriented to person, place, and time.      Cranial Nerves: No cranial nerve deficit.      Motor: No tremor.      Coordination:  Coordination normal.      Gait: Gait normal.   Psychiatric:         Behavior: Behavior normal.         Thought Content: Thought content normal.         Judgment: Judgment normal.         Assessment:       1. Hypertension, essential    2. Edema, unspecified type    3. Pain in both lower extremities    4. NAFLD (nonalcoholic fatty liver disease)        Plan:     Medication List with Changes/Refills   Current Medications    ALBUTEROL (PROVENTIL/VENTOLIN HFA) 90 MCG/ACTUATION INHALER    Inhale 2 puffs into the lungs every 6 (six) hours as needed for Wheezing (cough).    ATORVASTATIN (LIPITOR) 10 MG TABLET    Take 1 tablet (10 mg total) by mouth once daily.    CETIRIZINE (ZYRTEC) 10 MG TABLET    Take 10 mg by mouth once daily.    COENZYME Q10 100 MG CAPSULE    Take 200 mg by mouth once daily.    FLUTICASONE PROPIONATE (FLONASE) 50 MCG/ACTUATION NASAL SPRAY    2 sprays (100 mcg total) by Each Nostril route once daily.    PROMETHAZINE-DEXTROMETHORPHAN (PROMETHAZINE-DM) 6.25-15 MG/5 ML SYRP    Take 5 mLs by mouth nightly as needed (for cough and congestion).   Changed and/or Refilled Medications    Modified Medication Previous Medication    HYDROCHLOROTHIAZIDE (HYDRODIURIL) 25 MG TABLET hydroCHLOROthiazide (HYDRODIURIL) 12.5 MG Tab       Take 1 tablet (25 mg total) by mouth once daily.    Take 1 tablet (12.5 mg total) by mouth once daily.   Discontinued Medications    NIFEDIPINE (ADALAT CC) 30 MG TBSR    Take 1 tablet (30 mg total) by mouth once daily.    PANTOPRAZOLE (PROTONIX) 40 MG TABLET    Take 1 tablet (40 mg total) by mouth before breakfast.     Joseph was seen today for follow-up and foot pain.    Diagnoses and all orders for this visit:    Hypertension, essential  -     Ambulatory referral/consult to Cardiology; Future  -     hydroCHLOROthiazide (HYDRODIURIL) 25 MG tablet; Take 1 tablet (25 mg total) by mouth once daily.  -     BNP; Future  -     Comprehensive Metabolic Panel; Future  -     TSH; Future  -      Hypertension Digital Medicine ([facility]) Enrollment Order  -     Hypertension Digital Medicine ([facility]): Assign Onboarding Questionnaires  -     Urinalysis; Future  -     Urinalysis Microscopic; Future    Edema, unspecified type  -     CV Ultrasound doppler venous legs bilat; Future  -     Segmental Pressure Lower Extremity  -     BNP; Future    Pain in both lower extremities  -     CV Ultrasound doppler venous legs bilat; Future  -     Segmental Pressure Lower Extremity    NAFLD (nonalcoholic fatty liver disease)  -     Ambulatory referral/consult to Hepatology; Future      See meds, orders, follow up, routing and instructions sections of encounter and AVS. Discussed with patient and provided on AVS.    Discussed diet and exercise and links provided on AVS for detailed information.    Begin the above workup.  Consider neurogenic claudication if no other further explanations are available.  Suggested a Cardiology consult due to difficulty with antihypertensive medication.  Currently he is controlled, will attempt to use single dose hydrochlorothiazide but increase that to 25 mg for the time being.        Lab Results   Component Value Date     10/12/2021    K 4.3 10/12/2021     10/12/2021    BUN 20 10/12/2021    CREATININE 1.2 10/12/2021    GLU 82 10/12/2021    HGBA1C 6.1 09/27/2012    AST 47 (H) 10/12/2021    ALT 53 (H) 10/12/2021    ALBUMIN 4.2 10/12/2021    ALBUMIN 4.3 06/21/2018    PROT 7.1 10/12/2021    BILITOT 0.5 10/12/2021    CHOL 147 10/12/2021    HDL 60 10/12/2021    LDLCALC 72.4 10/12/2021    TRIG 73 10/12/2021    WBC 6.70 04/10/2020    HGB 13.8 (L) 04/10/2020    HCT 43.4 04/10/2020     04/10/2020    PSA 0.30 01/28/2022    PSADIAG 0.37 09/08/2014    TSH 2.072 05/01/2015    BNP 18 04/10/2020

## 2022-03-17 ENCOUNTER — TELEPHONE (OUTPATIENT)
Dept: INTERNAL MEDICINE | Facility: CLINIC | Age: 62
End: 2022-03-17
Payer: COMMERCIAL

## 2022-03-17 DIAGNOSIS — R31.9 HEMATURIA, UNSPECIFIED TYPE: ICD-10-CM

## 2022-03-17 DIAGNOSIS — K76.0 NAFLD (NONALCOHOLIC FATTY LIVER DISEASE): Primary | ICD-10-CM

## 2022-03-17 NOTE — TELEPHONE ENCOUNTER
Spoke to pt informed him that he need to schedule the CV ultrasound appt please call scheduling line pt verbalized understanding

## 2022-03-17 NOTE — TELEPHONE ENCOUNTER
Please call patient and explain that the tests show mild elevation of the liver tests.    I would like to refer patient to the hepatology department for further evaluation and treatment.    Please see referral orders and please call patient to schedule.     Thank you.

## 2022-03-17 NOTE — TELEPHONE ENCOUNTER
Please call patient and explain that the tests show a few red blood cells in the urine.    I would like to refer patient to the urology department for further evaluation and treatment.    Please see referral orders and please call patient to schedule.     Thank you.

## 2022-03-18 ENCOUNTER — TELEPHONE (OUTPATIENT)
Dept: TRANSPLANT | Facility: CLINIC | Age: 62
End: 2022-03-18
Payer: COMMERCIAL

## 2022-03-18 ENCOUNTER — TELEPHONE (OUTPATIENT)
Dept: HEPATOLOGY | Facility: CLINIC | Age: 62
End: 2022-03-18
Payer: COMMERCIAL

## 2022-03-18 DIAGNOSIS — K76.0 NAFLD (NONALCOHOLIC FATTY LIVER DISEASE): Primary | ICD-10-CM

## 2022-03-18 NOTE — TELEPHONE ENCOUNTER
----- Message from Audi Early sent at 3/18/2022  1:39 PM CDT -----  Regarding: appt  Contact: Pt  Pt calling to schedule appt    Call # [phone]

## 2022-03-18 NOTE — TELEPHONE ENCOUNTER
Call returned to the patient. Patient ready to schedule Follow up to the [facility].  Tues 3/29/22 US Abd Limited at 8 am in [place] with Labs to Follow after completion of US.    Thurs 4/21/22 at 9 am Fibroscan with 3 hr Fasting.  Followed by Office Visit at 9:30 am with Dr Presley. Verbalized understanding.  Appt reminders placed in the mail.

## 2022-03-21 ENCOUNTER — PATIENT OUTREACH (OUTPATIENT)
Dept: ADMINISTRATIVE | Facility: OTHER | Age: 62
End: 2022-03-21
Payer: COMMERCIAL

## 2022-03-22 ENCOUNTER — HOSPITAL ENCOUNTER (OUTPATIENT)
Dept: CARDIOLOGY | Facility: HOSPITAL | Age: 62
Discharge: HOME | End: 2022-03-22
Attending: FAMILY MEDICINE
Payer: COMMERCIAL

## 2022-03-22 VITALS
DIASTOLIC BLOOD PRESSURE: 82 MMHG | HEIGHT: 73 IN | SYSTOLIC BLOOD PRESSURE: 138 MMHG | BODY MASS INDEX: 30.62 KG/M2 | WEIGHT: 231 LBS

## 2022-03-22 DIAGNOSIS — R60.9 EDEMA, UNSPECIFIED TYPE: ICD-10-CM

## 2022-03-22 DIAGNOSIS — M79.604 PAIN IN BOTH LOWER EXTREMITIES: ICD-10-CM

## 2022-03-22 DIAGNOSIS — M79.605 PAIN IN BOTH LOWER EXTREMITIES: ICD-10-CM

## 2022-03-22 PROCEDURE — 93970 CV US DOPPLER VENOUS LEGS BILATERAL (CUPID ONLY): ICD-10-PCS | Mod: 26,,, | Performed by: INTERNAL MEDICINE

## 2022-03-22 PROCEDURE — 93970 EXTREMITY STUDY: CPT | Mod: 50,PO

## 2022-03-22 PROCEDURE — 93970 EXTREMITY STUDY: CPT | Mod: 26,,, | Performed by: INTERNAL MEDICINE

## 2022-03-22 NOTE — PROGRESS NOTES
Health Maintenance Due   Topic Date Due    HIV Screening  Never done    COVID-19 Vaccine (3 - Booster for Pfizer series) 08/25/2021    Influenza Vaccine (1) 09/01/2021     Updates were requested from care everywhere.  Chart was reviewed for overdue Proactive [facility] Encounters ([facility]) topics (CRS, Breast Cancer Screening, Eye exam)  Health Maintenance has been updated.  [facility] immunization registry triggered.  Immunizations were reconciled.

## 2022-03-24 ENCOUNTER — PATIENT MESSAGE (OUTPATIENT)
Dept: INTERNAL MEDICINE | Facility: CLINIC | Age: 62
End: 2022-03-24
Payer: COMMERCIAL

## 2022-03-24 ENCOUNTER — OFFICE VISIT (OUTPATIENT)
Dept: UROLOGY | Facility: CLINIC | Age: 62
End: 2022-03-24
Attending: FAMILY MEDICINE
Payer: COMMERCIAL

## 2022-03-24 VITALS
HEART RATE: 64 BPM | BODY MASS INDEX: 30.09 KG/M2 | HEIGHT: 73 IN | SYSTOLIC BLOOD PRESSURE: 139 MMHG | DIASTOLIC BLOOD PRESSURE: 94 MMHG | WEIGHT: 227 LBS

## 2022-03-24 DIAGNOSIS — R31.29 HEMATURIA, MICROSCOPIC: Primary | ICD-10-CM

## 2022-03-24 DIAGNOSIS — N40.1 BPH WITH URINARY OBSTRUCTION: ICD-10-CM

## 2022-03-24 DIAGNOSIS — N13.8 BPH WITH URINARY OBSTRUCTION: ICD-10-CM

## 2022-03-24 DIAGNOSIS — N52.01 ERECTILE DYSFUNCTION DUE TO ARTERIAL INSUFFICIENCY: ICD-10-CM

## 2022-03-24 PROCEDURE — 1159F MED LIST DOCD IN RCRD: CPT | Mod: CPTII,S$GLB,, | Performed by: UROLOGY

## 2022-03-24 PROCEDURE — 3080F DIAST BP >= 90 MM HG: CPT | Mod: CPTII,S$GLB,, | Performed by: UROLOGY

## 2022-03-24 PROCEDURE — 1160F PR REVIEW ALL MEDS BY PRESCRIBER/CLIN PHARMACIST DOCUMENTED: ICD-10-PCS | Mod: CPTII,S$GLB,, | Performed by: UROLOGY

## 2022-03-24 PROCEDURE — 3075F PR MOST RECENT SYSTOLIC BLOOD PRESS GE 130-139MM HG: ICD-10-PCS | Mod: CPTII,S$GLB,, | Performed by: UROLOGY

## 2022-03-24 PROCEDURE — 99214 OFFICE O/P EST MOD 30 MIN: CPT | Mod: S$GLB,,, | Performed by: UROLOGY

## 2022-03-24 PROCEDURE — 99214 PR OFFICE/OUTPT VISIT, EST, LEVL IV, 30-39 MIN: ICD-10-PCS | Mod: S$GLB,,, | Performed by: UROLOGY

## 2022-03-24 PROCEDURE — 1159F PR MEDICATION LIST DOCUMENTED IN MEDICAL RECORD: ICD-10-PCS | Mod: CPTII,S$GLB,, | Performed by: UROLOGY

## 2022-03-24 PROCEDURE — 99999 PR PBB SHADOW E&M-EST. PATIENT-LVL IV: ICD-10-PCS | Mod: PBBFAC,,, | Performed by: UROLOGY

## 2022-03-24 PROCEDURE — 3080F PR MOST RECENT DIASTOLIC BLOOD PRESSURE >= 90 MM HG: ICD-10-PCS | Mod: CPTII,S$GLB,, | Performed by: UROLOGY

## 2022-03-24 PROCEDURE — 3008F BODY MASS INDEX DOCD: CPT | Mod: CPTII,S$GLB,, | Performed by: UROLOGY

## 2022-03-24 PROCEDURE — 1160F RVW MEDS BY RX/DR IN RCRD: CPT | Mod: CPTII,S$GLB,, | Performed by: UROLOGY

## 2022-03-24 PROCEDURE — 3008F PR BODY MASS INDEX (BMI) DOCUMENTED: ICD-10-PCS | Mod: CPTII,S$GLB,, | Performed by: UROLOGY

## 2022-03-24 PROCEDURE — 4010F ACE/ARB THERAPY RXD/TAKEN: CPT | Mod: CPTII,S$GLB,, | Performed by: UROLOGY

## 2022-03-24 PROCEDURE — 99999 PR PBB SHADOW E&M-EST. PATIENT-LVL IV: CPT | Mod: PBBFAC,,, | Performed by: UROLOGY

## 2022-03-24 PROCEDURE — 4010F PR ACE/ARB THEARPY RXD/TAKEN: ICD-10-PCS | Mod: CPTII,S$GLB,, | Performed by: UROLOGY

## 2022-03-24 PROCEDURE — 3075F SYST BP GE 130 - 139MM HG: CPT | Mod: CPTII,S$GLB,, | Performed by: UROLOGY

## 2022-03-24 RX ORDER — PAPAVERINE HYDROCHLORIDE 30 MG/ML
INJECTION INTRAMUSCULAR; INTRAVENOUS
Qty: 10 ML | Refills: 11 | Status: SHIPPED | OUTPATIENT
Start: 2022-03-24 | End: 2022-08-17 | Stop reason: CLARIF

## 2022-03-24 RX ORDER — LIDOCAINE HYDROCHLORIDE 20 MG/ML
JELLY TOPICAL ONCE
Status: CANCELLED | OUTPATIENT
Start: 2022-03-24 | End: 2022-03-24

## 2022-03-24 NOTE — H&P (VIEW-ONLY)
CHIEF COMPLAINT:    Mr. Jesus is a 62 y.o. male presenting with ED.    PRESENTING ILLNESS:    Joseph Jesus is a 62 y.o. male who c/o ED.  This has been present for > 1 year.  He's tried and failed viagra and cialis.  His T is normal.  He went to [employer].  He has tried and failed 25 units of ICI.    He has LUTS.  + nocturia x 5, + feeling of incomplete emptying, + urgency.     He presents today c/o microhematuria. No gross hematuria.  Is a former smoker.    REVIEW OF SYSTEMS:    Joseph Jesus denies headache, blurred vision, fever, nausea, vomiting, chills, abdominal pain, bleeding per rectum, cough, SOB, recent loss of consciousness, recent mental status changes, seizures, dizziness, or upper or lower extremity weakness.    ANDREA  1. 1  2. 1  3. 1  4. 1  5. 1      PATIENT HISTORY:    Past Medical History:   Diagnosis Date    Allergy     seasonal    Anal fistula hx    Arthritis     Basal cell carcinoma 4/2013    left superior forehead    Callus     Diverticulosis     Epididymal cyst     GERD (gastroesophageal reflux disease)     Hayfever     Hydrocele, right     Hyperlipidemia     Hypertension     Neck pain     hx of muscular inury from weight lifting---resolved now    Prostatitis     Dec. '12-treated with antibx.    Squamous cell carcinoma 01/07/2019    anterior scalp    Squamous cell carcinoma of skin 03/2020    Left post scalp (SCC insitu-saucerization)    Visual impairment        Past Surgical History:   Procedure Laterality Date    APPENDECTOMY  1962    COLONOSCOPY  12/2012    due for repeat 12/2015    COLONOSCOPY N/A 5/19/2016    Procedure: COLONOSCOPY;  Surgeon: James Webber MD;  Location: [facility] ([address] [place] [address]);  Service: Endoscopy;  Laterality: N/A; diverticulosis, repeat in 5-10 years for surveillance    COLONOSCOPY N/A 8/9/2021    Procedure: COLONOSCOPY;  Surgeon: Fahad Bautista Jr., MD;  Location: [facility];  Service: Endoscopy;  Laterality: N/A;     ESOPHAGOGASTRODUODENOSCOPY N/A 2021    Procedure: EGD (ESOPHAGOGASTRODUODENOSCOPY);  Surgeon: Fahad Bautista Jr., MD;  Location: [facility];  Service: Endoscopy;  Laterality: N/A;    EUA/Fistulotomy-'08      HERNIA REPAIR      [facility] with mesh    Hydrocelectomy      MASS EXCISION  2004    BCC removal (twice)    MOLE REMOVAL  2019    2 moles removed from scalp =- 1 was basal cell and 1 was squamous cell - dermatology - Dr. londono    right Spermatocelectomy      UPPER GASTROINTESTINAL ENDOSCOPY  prior to     hiatal hernia, reflux esophagitis       Family History   Problem Relation Age of Onset    Skin cancer Mother     Hypertension Mother     Skin cancer Father     Cancer Father         prostate cancer    Hypertension Father     Hypertension Maternal Grandmother     Hypertension Maternal Grandfather     Hypertension Paternal Grandmother     Anesthesia problems Paternal Grandmother     Cancer Paternal Grandfather         prostate cancer    Hypertension Paternal Grandfather     Heart disease Paternal Grandfather     Diabetes Neg Hx     Colon polyps Neg Hx     Colon cancer Neg Hx     Melanoma Neg Hx     Psoriasis Neg Hx     Lupus Neg Hx     Eczema Neg Hx     Acne Neg Hx     Cirrhosis Neg Hx     Prostate cancer Neg Hx     Kidney disease Neg Hx     Crohn's disease Neg Hx     Ulcerative colitis Neg Hx     Stomach cancer Neg Hx     Esophageal cancer Neg Hx        Social History     Socioeconomic History    Marital status:      Spouse name: Traci    Number of children: 1   Occupational History    Occupation: Software Eng.     Employer: [employer]     Comment: [employer]   Tobacco Use    Smoking status: Former Smoker     Packs/day: 1.00     Years: 10.00     Pack years: 10.00     Quit date: 1987     Years since quittin.3    Smokeless tobacco: Never Used   Substance and Sexual Activity    Alcohol use: Not Currently     Alcohol/week: 0.0 - 1.0 standard  drinks     Comment: socially- not currently    Drug use: No    Sexual activity: Yes     Partners: Female       Allergies:  Amlodipine, Atenolol, Catechu herb, Irbesartan, Erythromycin, Sumycin [tetracycline], and Tetracyclines    Medications:    Current Outpatient Medications:     albuterol (PROVENTIL/VENTOLIN HFA) 90 mcg/actuation inhaler, Inhale 2 puffs into the lungs every 6 (six) hours as needed for Wheezing (cough)., Disp: 18 g, Rfl: 5    atorvastatin (LIPITOR) 10 MG tablet, Take 1 tablet (10 mg total) by mouth once daily., Disp: 90 tablet, Rfl: 3    cetirizine (ZYRTEC) 10 MG tablet, Take 10 mg by mouth once daily., Disp: , Rfl:     coenzyme Q10 100 mg capsule, Take 200 mg by mouth once daily., Disp: , Rfl:     fluticasone propionate (FLONASE) 50 mcg/actuation nasal spray, 2 sprays (100 mcg total) by Each Nostril route once daily., Disp: 16 g, Rfl: 5    hydroCHLOROthiazide (HYDRODIURIL) 25 MG tablet, Take 1 tablet (25 mg total) by mouth once daily., Disp: 90 tablet, Rfl: 0    promethazine-dextromethorphan (PROMETHAZINE-DM) 6.25-15 mg/5 mL Syrp, Take 5 mLs by mouth nightly as needed (for cough and congestion)., Disp: 240 mL, Rfl: 0    PHYSICAL EXAMINATION:    The patient generally appears in good health, is appropriately interactive, and is in no apparent distress.     Eyes: anicteric sclerae, moist conjunctivae; no lid-lag; PERRLA     HENT: Atraumatic; oropharynx clear with moist mucous membranes and no mucosal ulcerations;normal hard and soft palate.  No evidence of lymphadenopathy.    Neck: Trachea midline.  No thyromegaly.    Musculoskeletal: No abnormal gait.    Skin: No lesions.    Mental: Cooperative with normal affect.  Is oriented to time, place, and person.    Neuro: Grossly intact.    Chest: Normal inspiratory effort.   No accessory muscles.  No audible wheezes.  Respirations symmetric on inspiration and expiration.    Heart: Regular rhythm.      Abdomen:  Soft, non-tender. No masses or  organomegaly. Bladder is not palpable. No evidence of flank discomfort. No evidence of inguinal hernia.    Genitourinary: The penis is circumcised with no evidence of plaques or induration. The urethral meatus is normal. The testes, epididymides, and cord structures are normal in size and contour bilaterally. The scrotum is normal in size and contour.    Normal anal sphincter tone. No rectal mass.    The prostate is 30 g. Normal landmarks. Lateral sulci. Median furrow intact.  No nodularity or induration. Seminal vesicles are normal.    Extremities: No clubbing, cyanosis, or edema      LABS:      Lab Results   Component Value Date    PSA 0.30 01/28/2022    PSA 0.22 07/29/2020    PSA 0.29 05/30/2019    PSADIAG 0.37 09/08/2014       IMPRESSION:    Encounter Diagnoses   Name Primary?    Hematuria, microscopic    HTN, stable  Hyperlipidemia, stable      PLAN:    1. Discussed options for his ED (affected by above comorbidities).  He'd like to continue with ICI.  Can titrate up 5 units at a time.  Refilled.  2. Will observe his LUTS as they don't bother him.  3. Will workup his hematuria with a CT urogram and cysto.    Copy to:

## 2022-03-24 NOTE — PROGRESS NOTES
CHIEF COMPLAINT:    Mr. Jesus is a 62 y.o. male presenting with ED.    PRESENTING ILLNESS:    Joseph Jesus is a 62 y.o. male who c/o ED.  This has been present for > 1 year.  He's tried and failed viagra and cialis.  His T is normal.  He went to [employer].  He has tried and failed 25 units of ICI.    He has LUTS.  + nocturia x 5, + feeling of incomplete emptying, + urgency.     He presents today c/o microhematuria. No gross hematuria.  Is a former smoker.    REVIEW OF SYSTEMS:    Joseph Jesus denies headache, blurred vision, fever, nausea, vomiting, chills, abdominal pain, bleeding per rectum, cough, SOB, recent loss of consciousness, recent mental status changes, seizures, dizziness, or upper or lower extremity weakness.    ANDREA  1. 1  2. 1  3. 1  4. 1  5. 1      PATIENT HISTORY:    Past Medical History:   Diagnosis Date    Allergy     seasonal    Anal fistula hx    Arthritis     Basal cell carcinoma 4/2013    left superior forehead    Callus     Diverticulosis     Epididymal cyst     GERD (gastroesophageal reflux disease)     Hayfever     Hydrocele, right     Hyperlipidemia     Hypertension     Neck pain     hx of muscular inury from weight lifting---resolved now    Prostatitis     Dec. '12-treated with antibx.    Squamous cell carcinoma 01/07/2019    anterior scalp    Squamous cell carcinoma of skin 03/2020    Left post scalp (SCC insitu-saucerization)    Visual impairment        Past Surgical History:   Procedure Laterality Date    APPENDECTOMY  1962    COLONOSCOPY  12/2012    due for repeat 12/2015    COLONOSCOPY N/A 5/19/2016    Procedure: COLONOSCOPY;  Surgeon: James Webber MD;  Location: [facility] ([address] [place] [address]);  Service: Endoscopy;  Laterality: N/A; diverticulosis, repeat in 5-10 years for surveillance    COLONOSCOPY N/A 8/9/2021    Procedure: COLONOSCOPY;  Surgeon: Fahad Bautista Jr., MD;  Location: [facility];  Service: Endoscopy;  Laterality: N/A;     ESOPHAGOGASTRODUODENOSCOPY N/A 2021    Procedure: EGD (ESOPHAGOGASTRODUODENOSCOPY);  Surgeon: Fahad Bautista Jr., MD;  [facility];  Service: Endoscopy;  Laterality: N/A;    EUA/Fistulotomy-'08      HERNIA REPAIR      [facility] with mesh    Hydrocelectomy      MASS EXCISION  2004    BCC removal (twice)    MOLE REMOVAL  2019    2 moles removed from scalp =- 1 was basal cell and 1 was squamous cell - dermatology - Dr. londono    right Spermatocelectomy      UPPER GASTROINTESTINAL ENDOSCOPY  prior to     hiatal hernia, reflux esophagitis       Family History   Problem Relation Age of Onset    Skin cancer Mother     Hypertension Mother     Skin cancer Father     Cancer Father         prostate cancer    Hypertension Father     Hypertension Maternal Grandmother     Hypertension Maternal Grandfather     Hypertension Paternal Grandmother     Anesthesia problems Paternal Grandmother     Cancer Paternal Grandfather         prostate cancer    Hypertension Paternal Grandfather     Heart disease Paternal Grandfather     Diabetes Neg Hx     Colon polyps Neg Hx     Colon cancer Neg Hx     Melanoma Neg Hx     Psoriasis Neg Hx     Lupus Neg Hx     Eczema Neg Hx     Acne Neg Hx     Cirrhosis Neg Hx     Prostate cancer Neg Hx     Kidney disease Neg Hx     Crohn's disease Neg Hx     Ulcerative colitis Neg Hx     Stomach cancer Neg Hx     Esophageal cancer Neg Hx        Social History     Socioeconomic History    Marital status:      Spouse name: Traci    Number of children: 1   Occupational History    Occupation: Software Eng.     Employer: [employer]     Comment: [employer]   Tobacco Use    Smoking status: Former Smoker     Packs/day: 1.00     Years: 10.00     Pack years: 10.00     Quit date: 1987     Years since quittin.3    Smokeless tobacco: Never Used   Substance and Sexual Activity    Alcohol use: Not Currently     Alcohol/week: 0.0 - 1.0 standard  drinks     Comment: socially- not currently    Drug use: No    Sexual activity: Yes     Partners: Female       Allergies:  Amlodipine, Atenolol, Catechu herb, Irbesartan, Erythromycin, Sumycin [tetracycline], and Tetracyclines    Medications:    Current Outpatient Medications:     albuterol (PROVENTIL/VENTOLIN HFA) 90 mcg/actuation inhaler, Inhale 2 puffs into the lungs every 6 (six) hours as needed for Wheezing (cough)., Disp: 18 g, Rfl: 5    atorvastatin (LIPITOR) 10 MG tablet, Take 1 tablet (10 mg total) by mouth once daily., Disp: 90 tablet, Rfl: 3    cetirizine (ZYRTEC) 10 MG tablet, Take 10 mg by mouth once daily., Disp: , Rfl:     coenzyme Q10 100 mg capsule, Take 200 mg by mouth once daily., Disp: , Rfl:     fluticasone propionate (FLONASE) 50 mcg/actuation nasal spray, 2 sprays (100 mcg total) by Each Nostril route once daily., Disp: 16 g, Rfl: 5    hydroCHLOROthiazide (HYDRODIURIL) 25 MG tablet, Take 1 tablet (25 mg total) by mouth once daily., Disp: 90 tablet, Rfl: 0    promethazine-dextromethorphan (PROMETHAZINE-DM) 6.25-15 mg/5 mL Syrp, Take 5 mLs by mouth nightly as needed (for cough and congestion)., Disp: 240 mL, Rfl: 0    PHYSICAL EXAMINATION:    The patient generally appears in good health, is appropriately interactive, and is in no apparent distress.     Eyes: anicteric sclerae, moist conjunctivae; no lid-lag; PERRLA     HENT: Atraumatic; oropharynx clear with moist mucous membranes and no mucosal ulcerations;normal hard and soft palate.  No evidence of lymphadenopathy.    Neck: Trachea midline.  No thyromegaly.    Musculoskeletal: No abnormal gait.    Skin: No lesions.    Mental: Cooperative with normal affect.  Is oriented to time, place, and person.    Neuro: Grossly intact.    Chest: Normal inspiratory effort.   No accessory muscles.  No audible wheezes.  Respirations symmetric on inspiration and expiration.    Heart: Regular rhythm.      Abdomen:  Soft, non-tender. No masses or  organomegaly. Bladder is not palpable. No evidence of flank discomfort. No evidence of inguinal hernia.    Genitourinary: The penis is circumcised with no evidence of plaques or induration. The urethral meatus is normal. The testes, epididymides, and cord structures are normal in size and contour bilaterally. The scrotum is normal in size and contour.    Normal anal sphincter tone. No rectal mass.    The prostate is 30 g. Normal landmarks. Lateral sulci. Median furrow intact.  No nodularity or induration. Seminal vesicles are normal.    Extremities: No clubbing, cyanosis, or edema      LABS:      Lab Results   Component Value Date    PSA 0.30 01/28/2022    PSA 0.22 07/29/2020    PSA 0.29 05/30/2019    PSADIAG 0.37 09/08/2014       IMPRESSION:    Encounter Diagnoses   Name Primary?    Hematuria, microscopic    HTN, stable  Hyperlipidemia, stable      PLAN:    1. Discussed options for his ED (affected by above comorbidities).  He'd like to continue with ICI.  Can titrate up 5 units at a time.  Refilled.  2. Will observe his LUTS as they don't bother him.  3. Will workup his hematuria with a CT urogram and cysto.    Copy to:

## 2022-03-25 DIAGNOSIS — Z76.89 ESTABLISHING CARE WITH NEW DOCTOR, ENCOUNTER FOR: Primary | ICD-10-CM

## 2022-03-28 ENCOUNTER — OFFICE VISIT (OUTPATIENT)
Dept: CARDIOLOGY | Facility: CLINIC | Age: 62
End: 2022-03-28
Payer: COMMERCIAL

## 2022-03-28 ENCOUNTER — HOSPITAL ENCOUNTER (OUTPATIENT)
Dept: CARDIOLOGY | Facility: HOSPITAL | Age: 62
Discharge: HOME | End: 2022-03-28
Payer: COMMERCIAL

## 2022-03-28 VITALS
DIASTOLIC BLOOD PRESSURE: 74 MMHG | SYSTOLIC BLOOD PRESSURE: 120 MMHG | OXYGEN SATURATION: 98 % | BODY MASS INDEX: 30.7 KG/M2 | HEIGHT: 73 IN | WEIGHT: 231.63 LBS | HEART RATE: 72 BPM

## 2022-03-28 DIAGNOSIS — R60.0 EDEMA LEG: ICD-10-CM

## 2022-03-28 DIAGNOSIS — Z76.89 ESTABLISHING CARE WITH NEW DOCTOR, ENCOUNTER FOR: ICD-10-CM

## 2022-03-28 DIAGNOSIS — I10 HYPERTENSION, ESSENTIAL: ICD-10-CM

## 2022-03-28 DIAGNOSIS — E78.5 HYPERLIPIDEMIA, UNSPECIFIED HYPERLIPIDEMIA TYPE: Primary | ICD-10-CM

## 2022-03-28 PROCEDURE — 1160F PR REVIEW ALL MEDS BY PRESCRIBER/CLIN PHARMACIST DOCUMENTED: ICD-10-PCS | Mod: CPTII,S$GLB,, | Performed by: INTERNAL MEDICINE

## 2022-03-28 PROCEDURE — 3008F PR BODY MASS INDEX (BMI) DOCUMENTED: ICD-10-PCS | Mod: CPTII,S$GLB,, | Performed by: INTERNAL MEDICINE

## 2022-03-28 PROCEDURE — 1160F RVW MEDS BY RX/DR IN RCRD: CPT | Mod: CPTII,S$GLB,, | Performed by: INTERNAL MEDICINE

## 2022-03-28 PROCEDURE — 99999 PR PBB SHADOW E&M-EST. PATIENT-LVL III: ICD-10-PCS | Mod: PBBFAC,,, | Performed by: INTERNAL MEDICINE

## 2022-03-28 PROCEDURE — 3074F PR MOST RECENT SYSTOLIC BLOOD PRESSURE < 130 MM HG: ICD-10-PCS | Mod: CPTII,S$GLB,, | Performed by: INTERNAL MEDICINE

## 2022-03-28 PROCEDURE — 99204 OFFICE O/P NEW MOD 45 MIN: CPT | Mod: S$GLB,,, | Performed by: INTERNAL MEDICINE

## 2022-03-28 PROCEDURE — 93010 ELECTROCARDIOGRAM REPORT: CPT | Mod: ,,, | Performed by: STUDENT IN AN ORGANIZED HEALTH CARE EDUCATION/TRAINING PROGRAM

## 2022-03-28 PROCEDURE — 3078F PR MOST RECENT DIASTOLIC BLOOD PRESSURE < 80 MM HG: ICD-10-PCS | Mod: CPTII,S$GLB,, | Performed by: INTERNAL MEDICINE

## 2022-03-28 PROCEDURE — 3078F DIAST BP <80 MM HG: CPT | Mod: CPTII,S$GLB,, | Performed by: INTERNAL MEDICINE

## 2022-03-28 PROCEDURE — 93010 EKG 12-LEAD: ICD-10-PCS | Mod: ,,, | Performed by: STUDENT IN AN ORGANIZED HEALTH CARE EDUCATION/TRAINING PROGRAM

## 2022-03-28 PROCEDURE — 93005 ELECTROCARDIOGRAM TRACING: CPT | Mod: PO

## 2022-03-28 PROCEDURE — 1159F PR MEDICATION LIST DOCUMENTED IN MEDICAL RECORD: ICD-10-PCS | Mod: CPTII,S$GLB,, | Performed by: INTERNAL MEDICINE

## 2022-03-28 PROCEDURE — 99204 PR OFFICE/OUTPT VISIT, NEW, LEVL IV, 45-59 MIN: ICD-10-PCS | Mod: S$GLB,,, | Performed by: INTERNAL MEDICINE

## 2022-03-28 PROCEDURE — 4010F ACE/ARB THERAPY RXD/TAKEN: CPT | Mod: CPTII,S$GLB,, | Performed by: INTERNAL MEDICINE

## 2022-03-28 PROCEDURE — 4010F PR ACE/ARB THEARPY RXD/TAKEN: ICD-10-PCS | Mod: CPTII,S$GLB,, | Performed by: INTERNAL MEDICINE

## 2022-03-28 PROCEDURE — 1159F MED LIST DOCD IN RCRD: CPT | Mod: CPTII,S$GLB,, | Performed by: INTERNAL MEDICINE

## 2022-03-28 PROCEDURE — 99999 PR PBB SHADOW E&M-EST. PATIENT-LVL III: CPT | Mod: PBBFAC,,, | Performed by: INTERNAL MEDICINE

## 2022-03-28 PROCEDURE — 3008F BODY MASS INDEX DOCD: CPT | Mod: CPTII,S$GLB,, | Performed by: INTERNAL MEDICINE

## 2022-03-28 PROCEDURE — 3074F SYST BP LT 130 MM HG: CPT | Mod: CPTII,S$GLB,, | Performed by: INTERNAL MEDICINE

## 2022-03-28 NOTE — PROGRESS NOTES
Subjective:    Patient ID:  Joseph Jesus is a 62 y.o. male who presents for evaluation of No chief complaint on file.      HPI62 yo WM with no clinical heart disease but calcification of imaging and had numbness in his feet for years and had started amlodipine with swelling of ankles as a complication. No CP or SOB. Mainly complains of issues of numbness in feet.    Review of Systems   Constitutional: Negative for decreased appetite, fever, malaise/fatigue, weight gain and weight loss.   HENT: Negative for hearing loss and nosebleeds.    Eyes: Negative for visual disturbance.   Cardiovascular: Positive for leg swelling. Negative for chest pain, claudication, cyanosis, dyspnea on exertion, irregular heartbeat, near-syncope, orthopnea, palpitations, paroxysmal nocturnal dyspnea and syncope.   Respiratory: Negative for cough, hemoptysis, shortness of breath, sleep disturbances due to breathing, snoring and wheezing.    Endocrine: Negative for cold intolerance, heat intolerance, polydipsia and polyuria.   Hematologic/Lymphatic: Negative for adenopathy and bleeding problem. Does not bruise/bleed easily.   Skin: Negative for color change, itching, poor wound healing, rash and suspicious lesions.   Musculoskeletal: Negative for arthritis, back pain, falls, joint pain, joint swelling, muscle cramps, muscle weakness and myalgias.   Gastrointestinal: Negative for bloating, abdominal pain, change in bowel habit, constipation, flatus, heartburn, hematemesis, hematochezia, hemorrhoids, jaundice, melena, nausea and vomiting.   Genitourinary: Negative for bladder incontinence, decreased libido, frequency, hematuria, hesitancy and urgency.   Neurological: Positive for numbness. Negative for brief paralysis, difficulty with concentration, excessive daytime sleepiness, dizziness, focal weakness, headaches, light-headedness, loss of balance, vertigo and weakness.   Psychiatric/Behavioral: Negative for altered mental status, depression  "and memory loss. The patient does not have insomnia and is not nervous/anxious.    Allergic/Immunologic: Negative for environmental allergies, hives and persistent infections.        Objective:    Physical Exam  Constitutional:       General: He is not in acute distress.     Appearance: He is well-developed. He is not diaphoretic.      Comments: /74 (BP Location: Right arm, Patient Position: Sitting)   Pulse 72   Ht 6' 1" (1.854 m)   Wt 105.1 kg (231 lb 9.6 oz)   SpO2 98%   BMI 30.56 kg/m²      HENT:      Head: Normocephalic and atraumatic.   Eyes:      General: Lids are normal. No scleral icterus.        Right eye: No discharge.      Conjunctiva/sclera: Conjunctivae normal.      Pupils: Pupils are equal, round, and reactive to light.   Neck:      Thyroid: No thyromegaly.      Vascular: No JVD.      Trachea: No tracheal deviation.   Cardiovascular:      Rate and Rhythm: Normal rate and regular rhythm.      Pulses: Intact distal pulses.           Carotid pulses are 2+ on the right side and 2+ on the left side.       Radial pulses are 2+ on the right side and 2+ on the left side.        Femoral pulses are 2+ on the right side and 2+ on the left side.       Popliteal pulses are 2+ on the right side and 2+ on the left side.        Dorsalis pedis pulses are 2+ on the right side and 2+ on the left side.        Posterior tibial pulses are 2+ on the right side and 2+ on the left side.      Heart sounds: Normal heart sounds, S1 normal and S2 normal. No murmur heard.    No friction rub. No gallop.   Pulmonary:      Effort: Pulmonary effort is normal. No respiratory distress.      Breath sounds: Normal breath sounds. No wheezing or rales.   Chest:      Chest wall: No tenderness.   Abdominal:      General: Bowel sounds are normal. There is no distension.      Palpations: Abdomen is soft. There is no hepatomegaly or mass.      Tenderness: There is no abdominal tenderness. There is no guarding or rebound. "   Musculoskeletal:         General: No tenderness. Normal range of motion.      Cervical back: Normal range of motion and neck supple.      Right lower leg: Edema present.      Left lower leg: Edema present.   Lymphadenopathy:      Cervical: No cervical adenopathy.   Skin:     General: Skin is warm and dry.      Coloration: Skin is not pale.      Findings: No erythema or rash.   Neurological:      Mental Status: He is alert and oriented to person, place, and time.      Cranial Nerves: No cranial nerve deficit.      Coordination: Coordination normal.      Deep Tendon Reflexes: Reflexes are normal and symmetric.   Psychiatric:         Speech: Speech normal.         Behavior: Behavior normal.         Thought Content: Thought content normal.         Judgment: Judgment normal.           Assessment:       1. Hyperlipidemia, unspecified hyperlipidemia type    2. Hypertension, essential    3. Edema leg         Plan:     His BP and lipids are controlled    Had normal BNP      Orders Placed This Encounter   Procedures    CV Ultrasound doppler arterial legs bilat    Echo     Follow up in about 6 weeks (around 5/9/2022).

## 2022-03-29 ENCOUNTER — HOSPITAL ENCOUNTER (OUTPATIENT)
Dept: RADIOLOGY | Facility: HOSPITAL | Age: 62
Discharge: HOME | End: 2022-03-29
Attending: INTERNAL MEDICINE
Payer: COMMERCIAL

## 2022-03-29 ENCOUNTER — PATIENT MESSAGE (OUTPATIENT)
Dept: UROLOGY | Facility: CLINIC | Age: 62
End: 2022-03-29
Payer: COMMERCIAL

## 2022-03-29 DIAGNOSIS — K76.0 NAFLD (NONALCOHOLIC FATTY LIVER DISEASE): ICD-10-CM

## 2022-03-29 PROCEDURE — 76705 ECHO EXAM OF ABDOMEN: CPT | Mod: TC,PO

## 2022-03-29 PROCEDURE — 76705 ECHO EXAM OF ABDOMEN: CPT | Mod: 26,,, | Performed by: RADIOLOGY

## 2022-03-29 PROCEDURE — 76705 US ABDOMEN LIMITED_LIVER: ICD-10-PCS | Mod: 26,,, | Performed by: RADIOLOGY

## 2022-03-29 NOTE — TELEPHONE ENCOUNTER
No new care gaps identified.  Powered by [employer]. Reference number: [id number].   3/29/2022 12:34:36 PM CDT

## 2022-03-30 ENCOUNTER — OFFICE VISIT (OUTPATIENT)
Dept: PODIATRY | Facility: CLINIC | Age: 62
End: 2022-03-30
Payer: COMMERCIAL

## 2022-03-30 DIAGNOSIS — B07.0 PLANTAR WART OF RIGHT FOOT: ICD-10-CM

## 2022-03-30 DIAGNOSIS — L60.0 INGROWN NAIL: ICD-10-CM

## 2022-03-30 DIAGNOSIS — M20.5X1 HALLUX LIMITUS OF RIGHT FOOT: Primary | ICD-10-CM

## 2022-03-30 DIAGNOSIS — D36.10 NEUROMA: ICD-10-CM

## 2022-03-30 PROCEDURE — 1159F PR MEDICATION LIST DOCUMENTED IN MEDICAL RECORD: ICD-10-PCS | Mod: CPTII,S$GLB,, | Performed by: STUDENT IN AN ORGANIZED HEALTH CARE EDUCATION/TRAINING PROGRAM

## 2022-03-30 PROCEDURE — 99999 PR PBB SHADOW E&M-EST. PATIENT-LVL III: CPT | Mod: PBBFAC,,, | Performed by: STUDENT IN AN ORGANIZED HEALTH CARE EDUCATION/TRAINING PROGRAM

## 2022-03-30 PROCEDURE — 64455 NEUROMA INJECTION FOOT: ICD-10-PCS | Mod: LT,S$GLB,, | Performed by: STUDENT IN AN ORGANIZED HEALTH CARE EDUCATION/TRAINING PROGRAM

## 2022-03-30 PROCEDURE — 99999 PR PBB SHADOW E&M-EST. PATIENT-LVL III: ICD-10-PCS | Mod: PBBFAC,,, | Performed by: STUDENT IN AN ORGANIZED HEALTH CARE EDUCATION/TRAINING PROGRAM

## 2022-03-30 PROCEDURE — 99204 PR OFFICE/OUTPT VISIT, NEW, LEVL IV, 45-59 MIN: ICD-10-PCS | Mod: 25,S$GLB,, | Performed by: STUDENT IN AN ORGANIZED HEALTH CARE EDUCATION/TRAINING PROGRAM

## 2022-03-30 PROCEDURE — 4010F ACE/ARB THERAPY RXD/TAKEN: CPT | Mod: CPTII,S$GLB,, | Performed by: STUDENT IN AN ORGANIZED HEALTH CARE EDUCATION/TRAINING PROGRAM

## 2022-03-30 PROCEDURE — 1159F MED LIST DOCD IN RCRD: CPT | Mod: CPTII,S$GLB,, | Performed by: STUDENT IN AN ORGANIZED HEALTH CARE EDUCATION/TRAINING PROGRAM

## 2022-03-30 PROCEDURE — 1160F RVW MEDS BY RX/DR IN RCRD: CPT | Mod: CPTII,S$GLB,, | Performed by: STUDENT IN AN ORGANIZED HEALTH CARE EDUCATION/TRAINING PROGRAM

## 2022-03-30 PROCEDURE — 1160F PR REVIEW ALL MEDS BY PRESCRIBER/CLIN PHARMACIST DOCUMENTED: ICD-10-PCS | Mod: CPTII,S$GLB,, | Performed by: STUDENT IN AN ORGANIZED HEALTH CARE EDUCATION/TRAINING PROGRAM

## 2022-03-30 PROCEDURE — 4010F PR ACE/ARB THEARPY RXD/TAKEN: ICD-10-PCS | Mod: CPTII,S$GLB,, | Performed by: STUDENT IN AN ORGANIZED HEALTH CARE EDUCATION/TRAINING PROGRAM

## 2022-03-30 PROCEDURE — 64455 NJX AA&/STRD PLTR COM DG NRV: CPT | Mod: LT,S$GLB,, | Performed by: STUDENT IN AN ORGANIZED HEALTH CARE EDUCATION/TRAINING PROGRAM

## 2022-03-30 PROCEDURE — 99204 OFFICE O/P NEW MOD 45 MIN: CPT | Mod: 25,S$GLB,, | Performed by: STUDENT IN AN ORGANIZED HEALTH CARE EDUCATION/TRAINING PROGRAM

## 2022-03-30 RX ORDER — NIFEDIPINE 30 MG/1
TABLET, FILM COATED, EXTENDED RELEASE ORAL
Qty: 30 TABLET | Refills: 1 | Status: SHIPPED | OUTPATIENT
Start: 2022-03-30 | End: 2022-04-25

## 2022-03-30 RX ORDER — TRIAMCINOLONE ACETONIDE 40 MG/ML
60 INJECTION, SUSPENSION INTRA-ARTICULAR; INTRAMUSCULAR
Status: COMPLETED | OUTPATIENT
Start: 2022-03-30 | End: 2022-03-30

## 2022-03-30 RX ADMIN — TRIAMCINOLONE ACETONIDE 60 MG: 40 INJECTION, SUSPENSION INTRA-ARTICULAR; INTRAMUSCULAR at 01:03

## 2022-03-30 NOTE — TELEPHONE ENCOUNTER
Refill Routing Note   Medication(s) are not appropriate for processing by [facility] for the following reason(s):      - Medication is a new start (<3 months)  - Allergy/Contraindication (medication intolerance to a drug in class)    [facility] action(s):  Defer          --->Care Gap information included in message below if applicable.   Medication reconciliation completed: No   Automatic Epic Generated Protocol Data:        Requested Prescriptions   Pending Prescriptions Disp Refills    NIFEdipine (ADALAT CC) 30 MG TbSR [Pharmacy Med Name: NIFEDIPINE ER 30 MG TABLET] 30 tablet 1     Sig: TAKE 1 TABLET BY MOUTH EVERY DAY       Cardiovascular:  Calcium Channel Blockers Failed - 3/29/2022 12:34 PM        Failed - Matches previous order       Previous Authorizing Provider: Nick Trejo MD (NIFEdipine (ADALAT CC) 30 MG TbSR)  Previous Pharmacy: [facility] [id number] [place] [facility] [place]  [address]            Passed - Patient is at least 18 years old        Passed - Last BP in normal range within 360 days     BP Readings from Last 3 Encounters:   03/28/22 120/74   03/24/22 (!) 139/94   03/22/22 138/82               Passed - Valid encounter within last 15 months     Recent Visits  Date Type Provider Dept   03/16/22 Office Visit Nick Trejo MD [facility] Medicine   01/28/22 Office Visit Nick Trejo MD [facility]   07/16/20 Office Visit Nick Trejo MD [facility]   04/21/20 Office Visit Nick Trejo MD [facility]   Showing recent visits within past 720 days and meeting all other requirements  Future Appointments  No visits were found meeting these conditions.  Showing future appointments within next 150 days and meeting all other requirements      Future Appointments              In 2 days PROCEDURE, UROLOGY ROOM 1 [facility] - Urology 2nd Fl, [place] Lilly    In 6 days [facility] CT1 LIMIT 500 LBS [place] [facility] [place], [place]    In 1 week  SMITH, ECHO SCHEDULE [facility] - Cardiology, [place] Card    In 1 week SMITH, ECHO SCHEDULE [facility] - Cardiology, [facility] Card    In 3 weeks [facility] HEPATOLOGY, [facility] - 1st Fl, [place] Lilly    In 3 weeks Haroldo Presley MD Hepatology Clinic - 1st Fl, [place] Lilly    In 1 month Nam Hartman, DPM [place] - Podiatry, [place]    In 1 month MD [facility] Carrasco [facility] Primary Care Bldg, Elliott Lilly PCW    In 1 month MD Elliott Horowitz - Dermatology 11th Fl, [place] Lilly    In 3 months MD [place] [facility] - Allergy, [place]                Passed - No ED/Hospital visits since last PCP visit     Last PCP Visit: 3/16/2022 Last Admission: 8/9/2021 Last ED Visit: 4/10/2020                Appointments  past 12m or future 3m with PCP    Date Provider   Last Visit   3/16/2022 Nick Trejo MD   Next Visit   5/17/2022 Nick Trejo MD   ED visits in past 90 days: 0        Note composed:3:05 PM 03/30/2022

## 2022-03-30 NOTE — PROGRESS NOTES
Subjective:      Patient ID: Joseph Jesus is a 62 y.o. male.    Chief Complaint: Foot Problem (Bilateral)    Patient presents today with several problems to both feet  1) Numbing pain to the left toes that's worsened over the last 10 years. Worse when wearing shoes. He reports that it feels like he's walking on grains of rice.  2) He also has right great toe pain with walking and standing.  3) He's also got years of issues with ingrowing nails on both feet. They're hurting him now and when he wears shoes.  4) He's also got a painful spot on the bottom of the right foot that is worse when walking.     Review of Systems   Skin: Positive for dry skin and nail changes.   Musculoskeletal:        Great toe pain b/l.   Painful spot on the R foot   Neurological: Positive for numbness, paresthesias and sensory change.   All other systems reviewed and are negative.          Objective:      Physical Exam  Cardiovascular:      Pulses:           Dorsalis pedis pulses are 2+ on the right side and 2+ on the left side.        Posterior tibial pulses are 2+ on the right side and 2+ on the left side.      Comments: Capillary refill time is brisk < 3 seconds.  No edema to the lower extremities.  Hair growth and distribution is normal.  No significant signs of atrophy appreciated.     Musculoskeletal:      Comments: Minor HAV deformity b/l with hallux limitus b/l. About 25 degrees of DF. R 1st MTPJ has pain and crepitus with ROM.     Pain to the 2nd, 3rd and 4th interspaces of the left foot that is worse with metatarsal compression    Minor + tinels overlying the PT and SP of the L leg.    Skin:     Comments: Ingrowing b/l borders of b/l great toes.    Small plantar wart R foot with loss of skin lines. + pain with axial and side to side pressure.                Assessment:       Encounter Diagnoses   Name Primary?    Hallux limitus of right foot Yes    Neuroma     Plantar wart of right foot     Ingrown nail          Plan:        Joseph was seen today for foot problem.    Diagnoses and all orders for this visit:    Hallux limitus of right foot    Neuroma    Plantar wart of right foot    Ingrown nail      I counseled the patient on his conditions, their implications and medical management.    >45 minutes was spent on face to face counseling regarding all of the patients complaints today.    1) Recommend phenol matrixectomy for both great toes. Patient will think about it.    2) CF foot plate for hallux limitus. May need surgery if no improvement.    3) Injection given for neuroma. Wear wider shoes.    4) Patient will wait for cantharidin to come back into stock before starting treatment for this.    F/u 6 weeks to evaluate the left foot after injections    Neuroma Injection Foot    Date/Time: 3/30/2022 8:20 AM  Performed by: Nam Hartman DPM  Authorized by: Nam Hartman DPM     Consent Done?:  Yes (Verbal)  Indications:  Pain  Location Left Foot:  Third Webspace  Needle size:  25 G  Approach:  Dorsal  Patient tolerance:  Patient tolerated the procedure well with no immediate complications  Neuroma Injection Foot    Date/Time: 3/30/2022 8:20 AM  Performed by: Nam Hartman DPM  Authorized by: Nam Hartman DPM     Location Left Foot:  [address]  Needle size:  25 G  Approach:  Dorsal  Patient tolerance:  Patient tolerated the procedure well with no immediate complications

## 2022-04-01 ENCOUNTER — TELEPHONE (OUTPATIENT)
Dept: UROLOGY | Facility: CLINIC | Age: 62
End: 2022-04-01
Payer: COMMERCIAL

## 2022-04-01 ENCOUNTER — PROCEDURE VISIT (OUTPATIENT)
Dept: UROLOGY | Facility: CLINIC | Age: 62
End: 2022-04-01
Payer: COMMERCIAL

## 2022-04-01 VITALS
WEIGHT: 230 LBS | HEIGHT: 73 IN | RESPIRATION RATE: 16 BRPM | DIASTOLIC BLOOD PRESSURE: 94 MMHG | HEART RATE: 72 BPM | SYSTOLIC BLOOD PRESSURE: 141 MMHG | TEMPERATURE: 98 F | BODY MASS INDEX: 30.48 KG/M2

## 2022-04-01 DIAGNOSIS — R31.29 HEMATURIA, MICROSCOPIC: ICD-10-CM

## 2022-04-01 PROCEDURE — 88112 CYTOPATH CELL ENHANCE TECH: CPT | Performed by: PATHOLOGY

## 2022-04-01 PROCEDURE — 88112 CYTOPATH CELL ENHANCE TECH: CPT | Mod: 26,,, | Performed by: PATHOLOGY

## 2022-04-01 PROCEDURE — 52000 CYSTOURETHROSCOPY: CPT | Mod: S$GLB,,, | Performed by: UROLOGY

## 2022-04-01 PROCEDURE — 88112 PR  CYTOPATH, CELL ENHANCE TECH: ICD-10-PCS | Mod: 26,,, | Performed by: PATHOLOGY

## 2022-04-01 PROCEDURE — 52000 PR CYSTOURETHROSCOPY: ICD-10-PCS | Mod: S$GLB,,, | Performed by: UROLOGY

## 2022-04-01 RX ORDER — LIDOCAINE HYDROCHLORIDE 20 MG/ML
JELLY TOPICAL ONCE
Status: COMPLETED | OUTPATIENT
Start: 2022-04-01 | End: 2022-04-01

## 2022-04-01 RX ADMIN — LIDOCAINE HYDROCHLORIDE: 20 JELLY TOPICAL at 08:04

## 2022-04-01 NOTE — TELEPHONE ENCOUNTER
Tried to contact pt for scheduling (10:46am) no answer. Left v/m with available date (4-19) along with my contact number asking pt to return the call.

## 2022-04-01 NOTE — PROCEDURES
Procedures   Date: 04/01/2022     Pre procedure diagnosis:  Encounter Diagnosis   Name Primary?    Hematuria, microscopic         Post procedure diagnosis:same    Surgeon: Kaylin    Assistant: None    Procedure performed: cystoscopy    Blood loss: None    Specimen: None    Procedure in detail: Consent was obtained.  Using standard sterile technique, the flexible cystoscope was assembled and passed into the patient's bladder.  Cystoscopic evaluation of the bladder revealed erythema and a small bladder lesion on the R lateral wall.  The estimated prostatic length was 3 cm.

## 2022-04-01 NOTE — PATIENT INSTRUCTIONS
What to Expect After a Cystoscopy  For the next 24-48 hours, you may feel a mild burning when you urinate. This burning is normal and expected. Drink plenty of water to dilute the urine to help relieve the burning sensation. You may also see a small amount of blood in your urine after the procedure.    Unless you are already taking antibiotics, you may be given an antibiotic after the test to prevent infection.    Signs and Symptoms to Report  Call the [facility] at [phone] if you develop any of the following:  Fever of 101 degrees or higher  Chills or persistent bleeding  Inability to urinate

## 2022-04-05 ENCOUNTER — HOSPITAL ENCOUNTER (OUTPATIENT)
Dept: RADIOLOGY | Facility: HOSPITAL | Age: 62
Discharge: HOME | End: 2022-04-05
Attending: UROLOGY
Payer: COMMERCIAL

## 2022-04-05 DIAGNOSIS — R31.29 HEMATURIA, MICROSCOPIC: ICD-10-CM

## 2022-04-05 PROCEDURE — 25500020 PHARM REV CODE 255: Mod: PO | Performed by: UROLOGY

## 2022-04-05 PROCEDURE — 74178 CT ABD&PLV WO CNTR FLWD CNTR: CPT | Mod: TC,PO

## 2022-04-05 PROCEDURE — 74178 CT UROGRAM ABD PELVIS W WO: ICD-10-PCS | Mod: 26,,, | Performed by: RADIOLOGY

## 2022-04-05 PROCEDURE — 74178 CT ABD&PLV WO CNTR FLWD CNTR: CPT | Mod: 26,,, | Performed by: RADIOLOGY

## 2022-04-05 RX ADMIN — IOHEXOL 125 ML: 350 INJECTION, SOLUTION INTRAVENOUS at 01:04

## 2022-04-07 ENCOUNTER — PATIENT MESSAGE (OUTPATIENT)
Dept: UROLOGY | Facility: CLINIC | Age: 62
End: 2022-04-07
Payer: COMMERCIAL

## 2022-04-07 ENCOUNTER — TELEPHONE (OUTPATIENT)
Dept: UROLOGY | Facility: CLINIC | Age: 62
End: 2022-04-07
Payer: COMMERCIAL

## 2022-04-07 DIAGNOSIS — N32.9 LESION OF BLADDER: Primary | ICD-10-CM

## 2022-04-07 LAB
FINAL PATHOLOGIC DIAGNOSIS: ABNORMAL
Lab: ABNORMAL

## 2022-04-11 ENCOUNTER — TELEPHONE (OUTPATIENT)
Dept: UROLOGY | Facility: CLINIC | Age: 62
End: 2022-04-11
Payer: COMMERCIAL

## 2022-04-12 ENCOUNTER — HOSPITAL ENCOUNTER (OUTPATIENT)
Dept: CARDIOLOGY | Facility: HOSPITAL | Age: 62
Discharge: HOME | End: 2022-04-12
Attending: INTERNAL MEDICINE
Payer: COMMERCIAL

## 2022-04-12 VITALS
WEIGHT: 230 LBS | BODY MASS INDEX: 30.48 KG/M2 | DIASTOLIC BLOOD PRESSURE: 94 MMHG | HEIGHT: 73 IN | HEIGHT: 73 IN | WEIGHT: 230 LBS | DIASTOLIC BLOOD PRESSURE: 94 MMHG | HEART RATE: 64 BPM | SYSTOLIC BLOOD PRESSURE: 141 MMHG | BODY MASS INDEX: 30.48 KG/M2 | SYSTOLIC BLOOD PRESSURE: 141 MMHG

## 2022-04-12 DIAGNOSIS — I10 HYPERTENSION, ESSENTIAL: ICD-10-CM

## 2022-04-12 DIAGNOSIS — R60.0 EDEMA LEG: ICD-10-CM

## 2022-04-12 LAB
AORTIC ROOT ANNULUS: 3.88 CM
AV INDEX (PROSTH): 0.61
AV MEAN GRADIENT: 8 MMHG
AV PEAK GRADIENT: 13 MMHG
AV VALVE AREA: 2.72 CM2
AV VELOCITY RATIO: 0.61
BSA FOR ECHO PROCEDURE: 2.32 M2
CV ECHO LV RWT: 0.64 CM
DOP CALC AO PEAK VEL: 1.82 M/S
DOP CALC AO VTI: 35.5 CM
DOP CALC LVOT AREA: 4.5 CM2
DOP CALC LVOT DIAMETER: 2.39 CM
DOP CALC LVOT PEAK VEL: 1.11 M/S
DOP CALC LVOT STROKE VOLUME: 96.41 CM3
DOP CALC RVOT PEAK VEL: 0.9 M/S
DOP CALC RVOT VTI: 19.4 CM
DOP CALCLVOT PEAK VEL VTI: 21.5 CM
E WAVE DECELERATION TIME: 291.22 MSEC
E/A RATIO: 0.83
E/E' RATIO: 5.52 M/S
ECHO EF ESTIMATED: 67 %
ECHO LV POSTERIOR WALL: 1.48 CM (ref 0.6–1.1)
EJECTION FRACTION: 60 %
FRACTIONAL SHORTENING: 37 % (ref 28–44)
INTERVENTRICULAR SEPTUM: 1.33 CM (ref 0.6–1.1)
IVRT: 99.9 MSEC
LA MAJOR: 5.61 CM
LA MINOR: 5.2 CM
LA WIDTH: 5.2 CM
LEFT ANT TIBIAL SYS PSV: 70 CM/S
LEFT ATRIUM SIZE: 3.43 CM
LEFT ATRIUM VOLUME INDEX MOD: 17.3 ML/M2
LEFT ATRIUM VOLUME INDEX: 35.9 ML/M2
LEFT ATRIUM VOLUME MOD: 39.38 CM3
LEFT ATRIUM VOLUME: 81.83 CM3
LEFT CFA PSV: 95 CM/S
LEFT INTERNAL DIMENSION IN SYSTOLE: 2.91 CM (ref 2.1–4)
LEFT PERONEAL SYS PSV: 58 CM/S
LEFT POPLITEAL PSV: 61 CM/S
LEFT POST TIBIAL SYS PSV: 74 CM/S
LEFT PROFUNDA SYS PSV: 64 CM/S
LEFT SUPER FEMORAL DIST SYS PSV: 60 CM/S
LEFT SUPER FEMORAL MID SYS PSV: 70 CM/S
LEFT SUPER FEMORAL OSTIAL SYS PSV: 105 CM/S
LEFT SUPER FEMORAL PROX SYS PSV: 82 CM/S
LEFT TIB/PER TRUNK SYS PSV: 57 CM/S
LEFT VENTRICLE DIASTOLIC VOLUME INDEX: 43.75 ML/M2
LEFT VENTRICLE DIASTOLIC VOLUME: 99.74 ML
LEFT VENTRICLE MASS INDEX: 115 G/M2
LEFT VENTRICLE SYSTOLIC VOLUME INDEX: 14.3 ML/M2
LEFT VENTRICLE SYSTOLIC VOLUME: 32.51 ML
LEFT VENTRICULAR INTERNAL DIMENSION IN DIASTOLE: 4.65 CM (ref 3.5–6)
LEFT VENTRICULAR MASS: 262.36 G
LV LATERAL E/E' RATIO: 4.83 M/S
LV SEPTAL E/E' RATIO: 6.44 M/S
LVOT MG: 3.11 MMHG
LVOT MV: 0.83 CM/S
MV PEAK A VEL: 0.7 M/S
MV PEAK E VEL: 0.58 M/S
MV STENOSIS PRESSURE HALF TIME: 84.45 MS
MV VALVE AREA P 1/2 METHOD: 2.6 CM2
OHS CV LEFT LOWER EXTREMITY ABI (NO CALC): 1
OHS CV RIGHT ABI LOWER EXTREMITY (NO CALC): 1.1
PISA TR MAX VEL: 2.42 M/S
PULM VEIN S/D RATIO: 1.3
PV MEAN GRADIENT: 2.2 MMHG
PV PEAK D VEL: 0.5 M/S
PV PEAK S VEL: 0.65 M/S
PV PEAK VELOCITY: 1.2 CM/S
RA MAJOR: 4.33 CM
RA PRESSURE: 3 MMHG
RA WIDTH: 2.64 CM
RIGHT ANT TIBIAL SYS PSV: 69 CM/S
RIGHT CFA PSV: 106 CM/S
RIGHT PERONEAL SYS PSV: 44 CM/S
RIGHT POPLITEAL PSV: 57 CM/S
RIGHT POST TIBIAL SYS PSV: 95 CM/S
RIGHT PROFUNDA SYS PSV: 74 CM/S
RIGHT SUPER FEMORAL DIST SYS PSV: 66 CM/S
RIGHT SUPER FEMORAL MID SYS PSV: 63 CM/S
RIGHT SUPER FEMORAL OSTIAL SYS PSV: 80 CM/S
RIGHT SUPER FEMORAL PROX SYS PSV: 83 CM/S
RIGHT TIB/PER TRUNK SYS PSV: 80 CM/S
RIGHT VENTRICULAR END-DIASTOLIC DIMENSION: 2.08 CM
SINUS: 3.47 CM
STJ: 3.47 CM
TDI LATERAL: 0.12 M/S
TDI SEPTAL: 0.09 M/S
TDI: 0.11 M/S
TR MAX PG: 23 MMHG
TRICUSPID ANNULAR PLANE SYSTOLIC EXCURSION: 2.3 CM
TV REST PULMONARY ARTERY PRESSURE: 26 MMHG

## 2022-04-12 PROCEDURE — 93925 LOWER EXTREMITY STUDY: CPT | Mod: 26,,, | Performed by: INTERNAL MEDICINE

## 2022-04-12 PROCEDURE — 93306 TTE W/DOPPLER COMPLETE: CPT | Mod: 26,,, | Performed by: INTERNAL MEDICINE

## 2022-04-12 PROCEDURE — 93306 TTE W/DOPPLER COMPLETE: CPT | Mod: PO

## 2022-04-12 PROCEDURE — 93925 CV US DOPPLER ARTERIAL LEGS BILATERAL (CUPID ONLY): ICD-10-PCS | Mod: 26,,, | Performed by: INTERNAL MEDICINE

## 2022-04-12 PROCEDURE — 93925 LOWER EXTREMITY STUDY: CPT | Mod: PO

## 2022-04-12 PROCEDURE — 93306 ECHO (CUPID ONLY): ICD-10-PCS | Mod: 26,,, | Performed by: INTERNAL MEDICINE

## 2022-04-13 ENCOUNTER — TELEPHONE (OUTPATIENT)
Dept: CARDIOLOGY | Facility: CLINIC | Age: 62
End: 2022-04-13
Payer: COMMERCIAL

## 2022-04-13 NOTE — TELEPHONE ENCOUNTER
"Spoke to pt over the phone, inform him of test results "Let patient know all test were normal",per Dr redd. PT VU   "

## 2022-04-18 ENCOUNTER — PATIENT MESSAGE (OUTPATIENT)
Dept: UROLOGY | Facility: CLINIC | Age: 62
End: 2022-04-18
Payer: COMMERCIAL

## 2022-04-18 ENCOUNTER — TELEPHONE (OUTPATIENT)
Dept: UROLOGY | Facility: CLINIC | Age: 62
End: 2022-04-18
Payer: COMMERCIAL

## 2022-04-18 NOTE — PRE-PROCEDURE INSTRUCTIONS
PreOp Instructions given:   - Verbal medication information (what to hold and what to take)   - NPO guidelines   - Arrival place directions given; time to be given the day before procedure by the   Surgeon's Office  [address] [facility] [place]  - Bathing with antibacterial soap   - Don't wear any jewelry or bring any valuables AM of surgery   - No makeup or moisturizer to face   - No perfume/cologne, powder, lotions or aftershave   Pt. verbalized understanding.   Pt denies any h/o Anesthesia/Sedation complications or side effects.

## 2022-04-18 NOTE — TELEPHONE ENCOUNTER
Called pt to confirm arrival time of 815am for procedure on 4-19. Gave pt NPO instructions and gave pt opportunity to ask questions. Pt verbalized understanding.     Pt was informed that only 1 person would be allowed to accompany them the morning of surgery.  Pt verbalized understanding.    Left v/m with arrival time along with my contact number ([phone]). Also advised pt that I would be sending him a selwyn message as well with the arrival time.

## 2022-04-19 ENCOUNTER — ANESTHESIA EVENT (OUTPATIENT)
Dept: SURGERY | Facility: HOSPITAL | Age: 62
End: 2022-04-19
Payer: COMMERCIAL

## 2022-04-19 ENCOUNTER — HOSPITAL ENCOUNTER (OUTPATIENT)
Facility: HOSPITAL | Age: 62
Discharge: HOME | End: 2022-04-19
Attending: UROLOGY | Admitting: UROLOGY
Payer: COMMERCIAL

## 2022-04-19 ENCOUNTER — PATIENT MESSAGE (OUTPATIENT)
Dept: SURGERY | Facility: HOSPITAL | Age: 62
End: 2022-04-19

## 2022-04-19 ENCOUNTER — ANESTHESIA (OUTPATIENT)
Dept: SURGERY | Facility: HOSPITAL | Age: 62
End: 2022-04-19
Payer: COMMERCIAL

## 2022-04-19 VITALS
DIASTOLIC BLOOD PRESSURE: 88 MMHG | TEMPERATURE: 98 F | HEART RATE: 63 BPM | WEIGHT: 227 LBS | SYSTOLIC BLOOD PRESSURE: 149 MMHG | OXYGEN SATURATION: 91 % | BODY MASS INDEX: 30.09 KG/M2 | HEIGHT: 73 IN | RESPIRATION RATE: 19 BRPM

## 2022-04-19 DIAGNOSIS — R31.29 MICROSCOPIC HEMATURIA: ICD-10-CM

## 2022-04-19 DIAGNOSIS — R31.0 GROSS HEMATURIA: Primary | ICD-10-CM

## 2022-04-19 PROBLEM — N32.9 LESION OF BLADDER: Status: ACTIVE | Noted: 2022-04-19

## 2022-04-19 PROCEDURE — 88305 TISSUE EXAM BY PATHOLOGIST: CPT | Performed by: PATHOLOGY

## 2022-04-19 PROCEDURE — 63600175 PHARM REV CODE 636 W HCPCS: Performed by: STUDENT IN AN ORGANIZED HEALTH CARE EDUCATION/TRAINING PROGRAM

## 2022-04-19 PROCEDURE — 36000707: Performed by: UROLOGY

## 2022-04-19 PROCEDURE — 25000003 PHARM REV CODE 250: Performed by: UROLOGY

## 2022-04-19 PROCEDURE — 71000044 HC DOSC ROUTINE RECOVERY FIRST HOUR: Performed by: UROLOGY

## 2022-04-19 PROCEDURE — 71000016 HC POSTOP RECOV ADDL HR: Performed by: UROLOGY

## 2022-04-19 PROCEDURE — 52204 CYSTOSCOPY W/BIOPSY(S): CPT | Mod: ,,, | Performed by: UROLOGY

## 2022-04-19 PROCEDURE — 37000009 HC ANESTHESIA EA ADD 15 MINS: Performed by: UROLOGY

## 2022-04-19 PROCEDURE — 37000008 HC ANESTHESIA 1ST 15 MINUTES: Performed by: UROLOGY

## 2022-04-19 PROCEDURE — D9220A PRA ANESTHESIA: Mod: CRNA,,, | Performed by: NURSE ANESTHETIST, CERTIFIED REGISTERED

## 2022-04-19 PROCEDURE — 63600175 PHARM REV CODE 636 W HCPCS: Performed by: NURSE ANESTHETIST, CERTIFIED REGISTERED

## 2022-04-19 PROCEDURE — D9220A PRA ANESTHESIA: Mod: ANES,,, | Performed by: ANESTHESIOLOGY

## 2022-04-19 PROCEDURE — 88305 TISSUE EXAM BY PATHOLOGIST: CPT | Mod: 26,,, | Performed by: PATHOLOGY

## 2022-04-19 PROCEDURE — D9220A PRA ANESTHESIA: ICD-10-PCS | Mod: CRNA,,, | Performed by: NURSE ANESTHETIST, CERTIFIED REGISTERED

## 2022-04-19 PROCEDURE — 71000015 HC POSTOP RECOV 1ST HR: Performed by: UROLOGY

## 2022-04-19 PROCEDURE — D9220A PRA ANESTHESIA: ICD-10-PCS | Mod: ANES,,, | Performed by: ANESTHESIOLOGY

## 2022-04-19 PROCEDURE — 52204 PR CYSTOURETHROSCOPY,BIOPSY: ICD-10-PCS | Mod: ,,, | Performed by: UROLOGY

## 2022-04-19 PROCEDURE — 36000706: Performed by: UROLOGY

## 2022-04-19 PROCEDURE — 88305 TISSUE EXAM BY PATHOLOGIST: ICD-10-PCS | Mod: 26,,, | Performed by: PATHOLOGY

## 2022-04-19 PROCEDURE — 25000003 PHARM REV CODE 250: Performed by: NURSE ANESTHETIST, CERTIFIED REGISTERED

## 2022-04-19 RX ORDER — PROPOFOL 10 MG/ML
VIAL (ML) INTRAVENOUS
Status: DISCONTINUED | OUTPATIENT
Start: 2022-04-19 | End: 2022-04-19

## 2022-04-19 RX ORDER — CEFAZOLIN SODIUM/WATER 2 G/20 ML
2 SYRINGE (ML) INTRAVENOUS
Status: COMPLETED | OUTPATIENT
Start: 2022-04-19 | End: 2022-04-19

## 2022-04-19 RX ORDER — FENTANYL CITRATE 50 UG/ML
INJECTION, SOLUTION INTRAMUSCULAR; INTRAVENOUS
Status: DISCONTINUED | OUTPATIENT
Start: 2022-04-19 | End: 2022-04-19

## 2022-04-19 RX ORDER — SODIUM CHLORIDE 0.9 % (FLUSH) 0.9 %
3 SYRINGE (ML) INJECTION
Status: DISCONTINUED | OUTPATIENT
Start: 2022-04-19 | End: 2022-04-19 | Stop reason: HOSPADM

## 2022-04-19 RX ORDER — MIDAZOLAM HYDROCHLORIDE 1 MG/ML
INJECTION, SOLUTION INTRAMUSCULAR; INTRAVENOUS
Status: DISCONTINUED | OUTPATIENT
Start: 2022-04-19 | End: 2022-04-19

## 2022-04-19 RX ORDER — FENTANYL CITRATE 50 UG/ML
25 INJECTION, SOLUTION INTRAMUSCULAR; INTRAVENOUS EVERY 5 MIN PRN
Status: DISCONTINUED | OUTPATIENT
Start: 2022-04-19 | End: 2022-04-19 | Stop reason: HOSPADM

## 2022-04-19 RX ORDER — DEXMEDETOMIDINE HYDROCHLORIDE 100 UG/ML
INJECTION, SOLUTION INTRAVENOUS
Status: DISCONTINUED | OUTPATIENT
Start: 2022-04-19 | End: 2022-04-19

## 2022-04-19 RX ORDER — LIDOCAINE HYDROCHLORIDE 20 MG/ML
JELLY TOPICAL
Status: DISCONTINUED | OUTPATIENT
Start: 2022-04-19 | End: 2022-04-19 | Stop reason: HOSPADM

## 2022-04-19 RX ORDER — HALOPERIDOL 5 MG/ML
0.5 INJECTION INTRAMUSCULAR EVERY 10 MIN PRN
Status: DISCONTINUED | OUTPATIENT
Start: 2022-04-19 | End: 2022-04-19 | Stop reason: HOSPADM

## 2022-04-19 RX ORDER — PROPOFOL 10 MG/ML
VIAL (ML) INTRAVENOUS CONTINUOUS PRN
Status: DISCONTINUED | OUTPATIENT
Start: 2022-04-19 | End: 2022-04-19

## 2022-04-19 RX ORDER — PHENAZOPYRIDINE HYDROCHLORIDE 100 MG/1
100 TABLET, FILM COATED ORAL 3 TIMES DAILY PRN
Qty: 30 TABLET | Refills: 1 | Status: SHIPPED | OUTPATIENT
Start: 2022-04-19 | End: 2022-04-29

## 2022-04-19 RX ORDER — LIDOCAINE HYDROCHLORIDE 20 MG/ML
INJECTION, SOLUTION EPIDURAL; INFILTRATION; INTRACAUDAL; PERINEURAL
Status: DISCONTINUED | OUTPATIENT
Start: 2022-04-19 | End: 2022-04-19

## 2022-04-19 RX ADMIN — DEXMEDETOMIDINE HYDROCHLORIDE 8 MCG: 100 INJECTION, SOLUTION INTRAVENOUS at 09:04

## 2022-04-19 RX ADMIN — PROPOFOL 175 MCG/KG/MIN: 10 INJECTION, EMULSION INTRAVENOUS at 09:04

## 2022-04-19 RX ADMIN — Medication 2 G: at 09:04

## 2022-04-19 RX ADMIN — SODIUM CHLORIDE: 0.9 INJECTION, SOLUTION INTRAVENOUS at 09:04

## 2022-04-19 RX ADMIN — Medication 50 MG: at 09:04

## 2022-04-19 RX ADMIN — FENTANYL CITRATE 25 MCG: 50 INJECTION INTRAMUSCULAR; INTRAVENOUS at 09:04

## 2022-04-19 RX ADMIN — LIDOCAINE HYDROCHLORIDE 75 MG: 20 INJECTION, SOLUTION EPIDURAL; INFILTRATION; INTRACAUDAL at 09:04

## 2022-04-19 RX ADMIN — FENTANYL CITRATE 50 MCG: 50 INJECTION INTRAMUSCULAR; INTRAVENOUS at 09:04

## 2022-04-19 RX ADMIN — MIDAZOLAM 2 MG: 1 INJECTION INTRAMUSCULAR; INTRAVENOUS at 09:04

## 2022-04-19 NOTE — ANESTHESIA PREPROCEDURE EVALUATION
04/19/2022  Joseph Jesus is a 62 y.o., male.      Pre-op Assessment    I have reviewed the Patient Summary Reports.     I have reviewed the Nursing Notes. I have reviewed the NPO Status.   I have reviewed the Medications.     Review of Systems  Anesthesia Hx:  No problems with previous Anesthesia  History of prior surgery of interest to airway management or planning: Denies Family Hx of Anesthesia complications.   Denies Personal Hx of Anesthesia complications.   Hematology/Oncology:  Hematology Normal   Oncology Normal     EENT/Dental:EENT/Dental Normal   Cardiovascular:   Exercise tolerance: good Hypertension CAD asymptomatic  hyperlipidemia    Pulmonary:  Pulmonary Normal    Renal/:  Renal/ Normal     Hepatic/GI:   GERD Liver Disease,    Musculoskeletal:  Musculoskeletal Normal    Neurological:  Neurology Normal    Endocrine:  Endocrine Normal  Obesity / BMI > 30  Dermatological:  Skin Normal    Psych:  Psychiatric Normal           Physical Exam  General: Well nourished, Cooperative, Alert and Oriented    Airway:  Mallampati: II   Mouth Opening: Normal  TM Distance: Normal  Tongue: Normal  Neck ROM: Normal ROM    Dental:  Intact        Anesthesia Plan  Type of Anesthesia, risks & benefits discussed:    Anesthesia Type: Gen Supraglottic Airway, Gen Natural Airway  Intra-op Monitoring Plan: Standard ASA Monitors  Post Op Pain Control Plan: multimodal analgesia and IV/PO Opioids PRN  Induction:  IV  Airway Plan: Direct, Post-Induction  Informed Consent: Informed consent signed with the Patient and all parties understand the risks and agree with anesthesia plan.  All questions answered.   ASA Score: 2  Day of Surgery Review of History & Physical: H&P Update referred to the surgeon/provider.    Ready For Surgery From Anesthesia Perspective.     .

## 2022-04-19 NOTE — PATIENT INSTRUCTIONS
Post Cystoscopy and Transurethral resection of Bladder Tumor Instructions  Do not strain to have a bowel movement  No strenuous exercise x 7 days    You can expect:  To see blood in your urine.    Go to the ER if:  You are having severe abdominal pain  Inability to void if you do not have a catheter    Call the doctor if:  Temperature is greater than 101F  Persistent vomiting and inability to keep food down

## 2022-04-19 NOTE — TRANSFER OF CARE
"Anesthesia Transfer of Care Note    Patient: Joseph Jesus    Procedure(s) Performed: Procedure(s):  BIOPSY, BLADDER  CYSTOSCOPY  FULGURATION, BLADDER    Patient location: PACU    Anesthesia Type: general    Transport from OR: Transported from OR on 6-10 L/min O2 by face mask with adequate spontaneous ventilation    Post pain: adequate analgesia    Post assessment: tolerated procedure well and no apparent anesthetic complications    Post vital signs: stable    Level of consciousness: sedated and responds to stimulation    Nausea/Vomiting: no nausea/vomiting    Complications: none    Transfer of care protocol was followed      Last vitals:   Visit Vitals  /69   Pulse 62   Temp 36.6 °C (97.9 °F) (Temporal)   Resp 16   Ht 6' 1" (1.854 m)   Wt 103 kg (227 lb)   SpO2 (!) 94%   BMI 29.95 kg/m²     "

## 2022-04-19 NOTE — INTERVAL H&P NOTE
The patient has been examined and the H&P has been reviewed:    I concur with the findings and no changes have occurred since H&P was written.    Surgery risks, benefits and alternative options discussed and understood by patient/family.  Urine dipstick - negative for all components.         Active Hospital Problems    Diagnosis  POA    *Lesion of bladder [N32.9]  Yes    Microscopic hematuria [R31.29]  Yes    Aortic atherosclerosis [I70.0]  Yes     CT abd 5/7/2020        Coronary artery calcification [I25.10, I25.84]  Yes     CTA 4/10/2020 - scattered calcific atherosclerosis of the coronary vessels        Gastroesophageal reflux disease [K21.9]  Yes     EGD 8/9/2021        Hiatal hernia with gastroesophageal reflux disease without esophagitis [K44.9, K21.9]  Yes     Likely etiology of upper airway symptoms.         Upper airway cough syndrome [R05.8]  Yes     Feel likely related to hiatal hernia with reflux and allergic rhinitis.    CT without parenchymal lung disease.  PFTs are extremely normal, neck fullness and soreness is not related to pulmonary disease.         AK (actinic keratosis) [L57.0]  Yes     10/2020 efudex/dovonex bid x 1 week scalp        Erectile dysfunction due to arterial insufficiency [N52.01]  Yes    Diverticulitis of large intestine without perforation or abscess without bleeding [K57.32]  Yes    Hypertension, essential [I10]  Yes    Hyperlipidemia [E78.5]  Yes     discussed cohort risk 7.8% - does not want meds at this time        NAFLD (nonalcoholic fatty liver disease) [K76.0]  Yes    Lateral epicondylitis of right elbow [M77.11]  Yes    BPH with urinary obstruction [N40.1, N13.8]  Yes    Colon polyps [K63.5]  Yes      Resolved Hospital Problems   No resolved problems to display.

## 2022-04-19 NOTE — DISCHARGE SUMMARY
[facility]  Discharge Note  Short Stay    Admit Date: 4/19/2022    Discharge Date and Time: 04/19/2022 10:06 AM      Attending Physician: Lenny Ewing MD     Discharge Provider: Sabino Méndez MD    Diagnoses:  Active Hospital Problems    Diagnosis  POA    *Lesion of bladder [N32.9]  Yes    Microscopic hematuria [R31.29]  Yes    Aortic atherosclerosis [I70.0]  Yes     CT abd 5/7/2020      Coronary artery calcification [I25.10, I25.84]  Yes     CTA 4/10/2020 - scattered calcific atherosclerosis of the coronary vessels      Gastroesophageal reflux disease [K21.9]  Yes     EGD 8/9/2021      Hiatal hernia with gastroesophageal reflux disease without esophagitis [K44.9, K21.9]  Yes     Likely etiology of upper airway symptoms.       Upper airway cough syndrome [R05.8]  Yes     Feel likely related to hiatal hernia with reflux and allergic rhinitis.    CT without parenchymal lung disease.  PFTs are extremely normal, neck fullness and soreness is not related to pulmonary disease.       AK (actinic keratosis) [L57.0]  Yes     10/2020 efudex/dovonex bid x 1 week scalp      Erectile dysfunction due to arterial insufficiency [N52.01]  Yes    Diverticulitis of large intestine without perforation or abscess without bleeding [K57.32]  Yes    Hypertension, essential [I10]  Yes    Hyperlipidemia [E78.5]  Yes     discussed cohort risk 7.8% - does not want meds at this time      NAFLD (nonalcoholic fatty liver disease) [K76.0]  Yes    Lateral epicondylitis of right elbow [M77.11]  Yes    BPH with urinary obstruction [N40.1, N13.8]  Yes    Colon polyps [K63.5]  Yes      Resolved Hospital Problems   No resolved problems to display.       Discharged Condition: stable    Hospital Course: Patient was admitted for cystoscopy with bladder biopsy/fulguration and tolerated the procedure well with no complications. He was discharged home in good condition on the same day.       Final Diagnoses: Same as principal  problem.    Disposition: Home or Self Care    Follow up/Patient Instructions:    Medications:  Reconciled Home Medications:   Current Discharge Medication List      START taking these medications    Details   phenazopyridine (PYRIDIUM) 100 MG tablet Take 1 tablet (100 mg total) by mouth 3 (three) times daily as needed for Pain.  Qty: 30 tablet, Refills: 1         CONTINUE these medications which have NOT CHANGED    Details   albuterol (PROVENTIL/VENTOLIN HFA) 90 mcg/actuation inhaler Inhale 2 puffs into the lungs every 6 (six) hours as needed for Wheezing (cough).  Qty: 18 g, Refills: 5    Associated Diagnoses: Cough      atorvastatin (LIPITOR) 10 MG tablet Take 1 tablet (10 mg total) by mouth once daily.  Qty: 90 tablet, Refills: 3    Comments: DX Code Needed  .  Associated Diagnoses: Hyperlipidemia, unspecified hyperlipidemia type      cetirizine (ZYRTEC) 10 MG tablet Take 10 mg by mouth once daily.      coenzyme Q10 100 mg capsule Take 200 mg by mouth once daily.      fluticasone propionate (FLONASE) 50 mcg/actuation nasal spray 2 sprays (100 mcg total) by Each Nostril route once daily.  Qty: 16 g, Refills: 5    Associated Diagnoses: Bronchitis with wheezing      hydroCHLOROthiazide (HYDRODIURIL) 25 MG tablet Take 1 tablet (25 mg total) by mouth once daily.  Qty: 90 tablet, Refills: 0    Comments: .  Associated Diagnoses: Hypertension, essential      NIFEdipine (ADALAT CC) 30 MG TbSR TAKE 1 TABLET BY MOUTH EVERY DAY  Qty: 30 tablet, Refills: 1      papaverine 30 mg/mL injection Add Phentolamine 1 mg/cc   Add PGE1 10 mcg/cc   SIG: Use as directed  Qty: 10 mL, Refills: 11      promethazine-dextromethorphan (PROMETHAZINE-DM) 6.25-15 mg/5 mL Syrp Take 5 mLs by mouth nightly as needed (for cough and congestion).  Qty: 240 mL, Refills: 0    Associated Diagnoses: Chest congestion           Discharge Procedure Orders   Diet Adult Regular     Activity as tolerated      Follow-up Information     Lenny Ewing MD Follow  up on 5/9/2022.    Specialty: Urology  Why: f/u bladder biopsy  Contact information:  Avril Mcmillan dony  [facility] [id number] [phone]

## 2022-04-19 NOTE — PLAN OF CARE
Discharge instructions reviewed with wife. Appropriate questions asked verbalized understanding. Discharge packet given to wife

## 2022-04-19 NOTE — OP NOTE
[facility]  Operative Note    Date: 04/19/2022    Pre-Op Diagnosis:   1. Bladder lesion  Patient Active Problem List    Diagnosis Date Noted    Microscopic hematuria 04/19/2022    Lesion of bladder 04/19/2022    Edema leg 03/28/2022    Aortic atherosclerosis 01/28/2022    Coronary artery calcification 01/28/2022    Gastroesophageal reflux disease 08/09/2021    Hiatal hernia with gastroesophageal reflux disease without esophagitis 05/26/2021    Upper airway cough syndrome     AK (actinic keratosis) 11/02/2020    Erectile dysfunction due to arterial insufficiency 07/02/2018    Diverticulitis of large intestine without perforation or abscess without bleeding 04/18/2017    Hypertension, essential 08/17/2016    Hyperlipidemia 08/17/2016    NAFLD (nonalcoholic fatty liver disease) 08/17/2016    Lateral epicondylitis of right elbow 08/17/2016    BPH with urinary obstruction 09/08/2014    Colon polyps 09/27/2012      Post-Op Diagnosis: same    Procedure(s) Performed:   1.  Cystoscopy with bladder biopsy and fulguration    Specimen(s): Right lateral wall bladder lesion    Staff Surgeon: Lenny Ewing MD    Assistant Surgeon: Sabino Méndez MD; Marc Almendarez MD    Anesthesia: General mask inhalational anesthesia    Indications: Joseph Jesus is a 62 y.o. male with history of microscopic hematuria and a positive cytology. He presents today for cystoscopy with bladder biopsy and fulguration.    Findings:  - No bladder mass or tumor  - Patchy erythema along right lateral wall not involving UO. Biopsied x4 and biopsy bases fulgurated    Estimated Blood Loss: min    Drains: None    Procedure in Detail:  The risks, benefits and alternatives of the procedure were explained, and all questions were answered in the leslie-operative area. The patient was transferred to the cystoscopy suite and placed in the supine position. SCDs were applied and working. Anesthesia was administered. Once sedated, the  patient was placed in the dorsal lithotomy position and prepped and draped in the usual sterile fashion. Time out was performed, leslie-procedural antibiotics were confirmed.    A rigid cystoscope in a 22 Fr sheath was introduced into the bladder per urethra. This passed easily. The entire urethra was visualized which showed no masses or strictures. The right and left ureteral orifices were identified in the normal anatomic position. Cystoscopy was performed which revealed a small erythematous lesion located along the right lateral wall proximal to and not involving the right UO. There were no trabeculations, no bladder stones and no bladder diverticula.    The lesion was then biopsied with cold cup biopsy forceps (x4) and passed off as a specimen. The bugbee electrocautery was introduced through the scope and the bleeding areas were fulgurated. Hemostasis was achieved. The bladder was drained and refilled. Hemostasis was confirmed.    The bladder was drained, and the patient was removed from lithotomy.     The patient tolerated the procedure well and was transferred to recovery in stable condition.    Disposition:  The patient will follow up with Dr. Ewing in 2 weeks for pathology review. The patient was given prescriptions for pyridium.     Sabino Méndez MD

## 2022-04-19 NOTE — ANESTHESIA POSTPROCEDURE EVALUATION
Anesthesia Post Evaluation    Patient: Joseph Jesus    Procedure(s) Performed: Procedure(s):  BIOPSY, BLADDER  CYSTOSCOPY  FULGURATION, BLADDER    Final Anesthesia Type: general      Patient location during evaluation: PACU  Patient participation: Yes- Able to Participate  Level of consciousness: awake and alert and oriented  Post-procedure vital signs: reviewed and stable  Pain management: adequate  Airway patency: patent    PONV status at discharge: No PONV  Anesthetic complications: no      Cardiovascular status: blood pressure returned to baseline  Respiratory status: unassisted, room air and spontaneous ventilation  Hydration status: euvolemic  Follow-up not needed.          Vitals Value Taken Time   /70 04/19/22 1031   Temp 36.6 °C (97.9 °F) 04/19/22 1005   Pulse 66 04/19/22 1041   Resp 23 04/19/22 1041   SpO2 95 % 04/19/22 1041   Vitals shown include unvalidated device data.      No case tracking events are documented in the log.      Pain/Neymar Score: Neymar Score: 9 (4/19/2022 10:30 AM)

## 2022-04-20 ENCOUNTER — PATIENT MESSAGE (OUTPATIENT)
Dept: INTERNAL MEDICINE | Facility: CLINIC | Age: 62
End: 2022-04-20
Payer: COMMERCIAL

## 2022-04-21 ENCOUNTER — OFFICE VISIT (OUTPATIENT)
Dept: HEPATOLOGY | Facility: CLINIC | Age: 62
End: 2022-04-21
Payer: COMMERCIAL

## 2022-04-21 ENCOUNTER — PATIENT OUTREACH (OUTPATIENT)
Dept: ADMINISTRATIVE | Facility: OTHER | Age: 62
End: 2022-04-21
Payer: COMMERCIAL

## 2022-04-21 ENCOUNTER — PROCEDURE VISIT (OUTPATIENT)
Dept: HEPATOLOGY | Facility: CLINIC | Age: 62
End: 2022-04-21
Payer: COMMERCIAL

## 2022-04-21 VITALS
WEIGHT: 231.5 LBS | OXYGEN SATURATION: 96 % | BODY MASS INDEX: 30.68 KG/M2 | RESPIRATION RATE: 18 BRPM | HEIGHT: 73 IN | TEMPERATURE: 97 F | DIASTOLIC BLOOD PRESSURE: 87 MMHG | HEART RATE: 72 BPM | SYSTOLIC BLOOD PRESSURE: 152 MMHG

## 2022-04-21 DIAGNOSIS — K76.0 NAFLD (NONALCOHOLIC FATTY LIVER DISEASE): ICD-10-CM

## 2022-04-21 PROCEDURE — 91200 FIBROSCAN (VIBRATION CONTROLLED TRANSIENT ELASTOGRAPHY): ICD-10-PCS | Mod: S$GLB,,, | Performed by: INTERNAL MEDICINE

## 2022-04-21 PROCEDURE — 4010F ACE/ARB THERAPY RXD/TAKEN: CPT | Mod: CPTII,S$GLB,, | Performed by: INTERNAL MEDICINE

## 2022-04-21 PROCEDURE — 3008F PR BODY MASS INDEX (BMI) DOCUMENTED: ICD-10-PCS | Mod: CPTII,S$GLB,, | Performed by: INTERNAL MEDICINE

## 2022-04-21 PROCEDURE — 99214 PR OFFICE/OUTPT VISIT, EST, LEVL IV, 30-39 MIN: ICD-10-PCS | Mod: S$GLB,,, | Performed by: INTERNAL MEDICINE

## 2022-04-21 PROCEDURE — 4010F PR ACE/ARB THEARPY RXD/TAKEN: ICD-10-PCS | Mod: CPTII,S$GLB,, | Performed by: INTERNAL MEDICINE

## 2022-04-21 PROCEDURE — 3008F BODY MASS INDEX DOCD: CPT | Mod: CPTII,S$GLB,, | Performed by: INTERNAL MEDICINE

## 2022-04-21 PROCEDURE — 99999 PR PBB SHADOW E&M-EST. PATIENT-LVL IV: ICD-10-PCS | Mod: PBBFAC,,, | Performed by: INTERNAL MEDICINE

## 2022-04-21 PROCEDURE — 3079F DIAST BP 80-89 MM HG: CPT | Mod: CPTII,S$GLB,, | Performed by: INTERNAL MEDICINE

## 2022-04-21 PROCEDURE — 1159F MED LIST DOCD IN RCRD: CPT | Mod: CPTII,S$GLB,, | Performed by: INTERNAL MEDICINE

## 2022-04-21 PROCEDURE — 3077F PR MOST RECENT SYSTOLIC BLOOD PRESSURE >= 140 MM HG: ICD-10-PCS | Mod: CPTII,S$GLB,, | Performed by: INTERNAL MEDICINE

## 2022-04-21 PROCEDURE — 3079F PR MOST RECENT DIASTOLIC BLOOD PRESSURE 80-89 MM HG: ICD-10-PCS | Mod: CPTII,S$GLB,, | Performed by: INTERNAL MEDICINE

## 2022-04-21 PROCEDURE — 99999 PR PBB SHADOW E&M-EST. PATIENT-LVL IV: CPT | Mod: PBBFAC,,, | Performed by: INTERNAL MEDICINE

## 2022-04-21 PROCEDURE — 99214 OFFICE O/P EST MOD 30 MIN: CPT | Mod: S$GLB,,, | Performed by: INTERNAL MEDICINE

## 2022-04-21 PROCEDURE — 1159F PR MEDICATION LIST DOCUMENTED IN MEDICAL RECORD: ICD-10-PCS | Mod: CPTII,S$GLB,, | Performed by: INTERNAL MEDICINE

## 2022-04-21 PROCEDURE — 3077F SYST BP >= 140 MM HG: CPT | Mod: CPTII,S$GLB,, | Performed by: INTERNAL MEDICINE

## 2022-04-21 PROCEDURE — 91200 LIVER ELASTOGRAPHY: CPT | Mod: S$GLB,,, | Performed by: INTERNAL MEDICINE

## 2022-04-21 NOTE — PROGRESS NOTES
Subjective:       Patient ID: Joseph Jesus is a 62 y.o. male.    Chief Complaint: No chief complaint on file.    HPI  I saw this 62 y.o. man in the hepatology clinic for his fluctuating LFTs. I last saw him in Dec 2016. He has a degree of NAFLD with no fibrosis detected by non-invasive testing.  I last saw him in Feb 2020.    He had previously been seen by Dr Encinas in 2004 and Dr Valdes in 2006.  Dr. Encinas had suspected NAFLD as etiology of elevated liver enzymes. He had a serological workup that was negative for Wild's, alpha-1 antitrypsin deficiency, hemochromatosis, autoimmune etiology, and viral hepatitis in 2004. TOMMY was (+) at 1:160 then.     He does have risk factors for NAFLD with being overweight, HTN, and HLD. CT in 2004 and 2005 showed some liver cysts.     Fluctuating weight  Body mass index is 30.54 kg/m².    Mildly abnormal LFTs  TOMMY 1:160  IgG normal    F0 in 2015  Elastography in 4/2015- F0-1    FIB 4 today= 1.98 (indeterminate)  Fibroscan today- F0/S0    Liver US: 3/29/22  1. Fatty infiltration of the liver.  2. Multiple hepatic cysts.  3. No acute findings.    PMH:  Prostatitis  Golfer's and tennis elbow  BCC  Hypertension    No DM    SH:  Still exercises 1-3 x weekly  Alcohol- rarely      FH:  hypertension    Review of Systems   Constitutional: Negative for activity change, appetite change, chills, fatigue, fever and unexpected weight change.   HENT: Negative for hearing loss.    Eyes: Negative for discharge and visual disturbance.   Respiratory: Negative for cough, chest tightness, shortness of breath and wheezing.    Cardiovascular: Negative for chest pain, palpitations and leg swelling.   Gastrointestinal: Negative for abdominal distention, abdominal pain, constipation, diarrhea and nausea.   Genitourinary: Negative for dysuria and frequency.   Musculoskeletal: Negative for arthralgias and back pain.   Skin: Negative for pallor and rash.   Neurological: Negative for  dizziness, tremors, speech difficulty and headaches.   Hematological: Negative for adenopathy.   Psychiatric/Behavioral: Negative for agitation and confusion.            Lab Results   Component Value Date    ALT 50 (H) 03/29/2022    AST 46 (H) 03/29/2022    GGT 18 03/12/2015    ALKPHOS 52 (L) 03/29/2022    BILITOT 0.5 03/29/2022     Past Medical History:   Diagnosis Date    Allergy     seasonal    Anal fistula hx    Arthritis     Basal cell carcinoma 4/2013    left superior forehead    Callus     Diverticulosis     Epididymal cyst     GERD (gastroesophageal reflux disease)     Hayfever     Hydrocele, right     Hyperlipidemia     Hypertension     Neck pain     hx of muscular inury from weight lifting---resolved now    Prostatitis     Dec. '12-treated with antibx.    Squamous cell carcinoma 01/07/2019    anterior scalp    Squamous cell carcinoma of skin 03/2020    Left post scalp (SCC insitu-saucerization)    Visual impairment      Past Surgical History:   Procedure Laterality Date    APPENDECTOMY  1962    BIOPSY OF BLADDER  4/19/2022    Procedure: BIOPSY, BLADDER;  Surgeon: Lenny Ewing MD;  Location: [facility] OR [address] [place] [address];  Service: Urology;;    BLADDER FULGURATION  4/19/2022    Procedure: FULGURATION, BLADDER;  Surgeon: Lenny Ewing MD;  Location: [facility] OR [address] [place] [address];  Service: Urology;;    COLONOSCOPY  12/2012    due for repeat 12/2015    COLONOSCOPY N/A 5/19/2016    Procedure: COLONOSCOPY;  Surgeon: James Webber MD;  [facility]: [facility] ([facility]);  Service: Endoscopy;  Laterality: N/A; diverticulosis, repeat in 5-10 years for surveillance    COLONOSCOPY N/A 8/9/2021    Procedure: COLONOSCOPY;  Surgeon: Fahad Bautista Jr., MD;  Location: [facility];  Service: Endoscopy;  Laterality: N/A;    CYSTOSCOPY  4/19/2022    Procedure: CYSTOSCOPY;  Surgeon: Lenny Ewing MD;  Location: [address];  Service: Urology;;    ESOPHAGOGASTRODUODENOSCOPY N/A 8/9/2021    Procedure: EGD  (ESOPHAGOGASTRODUODENOSCOPY);  Surgeon: Fahad Bautista Jr., MD;  Location: [facility];  Service: Endoscopy;  Laterality: N/A;    EUA/Fistulotomy-'08      HERNIA REPAIR      [facility] with mesh    Hydrocelectomy  2013    MASS EXCISION  2004    BCC removal (twice)    MOLE REMOVAL  02/25/2019    2 moles removed from scalp =- 1 was basal cell and 1 was squamous cell - dermatology - Dr. londono    right Spermatocelectomy  2013    UPPER GASTROINTESTINAL ENDOSCOPY  prior to 2010    hiatal hernia, reflux esophagitis     Current Outpatient Medications   Medication Sig    albuterol (PROVENTIL/VENTOLIN HFA) 90 mcg/actuation inhaler Inhale 2 puffs into the lungs every 6 (six) hours as needed for Wheezing (cough).    atorvastatin (LIPITOR) 10 MG tablet Take 1 tablet (10 mg total) by mouth once daily.    cetirizine (ZYRTEC) 10 MG tablet Take 10 mg by mouth once daily.    coenzyme Q10 100 mg capsule Take 200 mg by mouth once daily.    fluticasone propionate (FLONASE) 50 mcg/actuation nasal spray 2 sprays (100 mcg total) by Each Nostril route once daily.    hydroCHLOROthiazide (HYDRODIURIL) 25 MG tablet Take 1 tablet (25 mg total) by mouth once daily.    NIFEdipine (ADALAT CC) 30 MG TbSR TAKE 1 TABLET BY MOUTH EVERY DAY    papaverine 30 mg/mL injection Add Phentolamine 1 mg/cc   Add PGE1 10 mcg/cc   SIG: Use as directed    phenazopyridine (PYRIDIUM) 100 MG tablet Take 1 tablet (100 mg total) by mouth 3 (three) times daily as needed for Pain.    promethazine-dextromethorphan (PROMETHAZINE-DM) 6.25-15 mg/5 mL Syrp Take 5 mLs by mouth nightly as needed (for cough and congestion).     No current facility-administered medications for this visit.       Objective:      Physical Exam  HENT:      Head: Normocephalic.   Eyes:      Pupils: Pupils are equal, round, and reactive to light.   Neck:      Thyroid: No thyromegaly.   Cardiovascular:      Rate and Rhythm: Normal rate and regular rhythm.      Heart sounds: Normal heart  sounds.   Pulmonary:      Effort: Pulmonary effort is normal.      Breath sounds: Normal breath sounds. No wheezing.   Abdominal:      General: There is no distension.      Palpations: Abdomen is soft. There is no mass.      Tenderness: There is no abdominal tenderness.   Lymphadenopathy:      Cervical: No cervical adenopathy.   Skin:     General: Skin is warm.      Findings: No erythema or rash.   Neurological:      Mental Status: He is alert and oriented to person, place, and time.   Psychiatric:         Behavior: Behavior normal.         Assessment:       1. NAFLD (nonalcoholic fatty liver disease)        Plan:   Likely NAFLD and reassuring fibrosis assessment over the last few years.    Fibroscan today once again shows no fibrosis.    - discussed weight loss but he likely has an excellent prognosis given his lack of fibrosis.  I encouraged him to lose weight and mentioned to him that NAFLD is frequently associated with cardiovascular disease.    Clinic in 1 year.

## 2022-04-21 NOTE — PROCEDURES
FibroScan (Vibration Controlled Transient Elastography)    Date/Time: 4/21/2022 9:00 AM  Performed by: Haroldo Presley MD  Authorized by: Haroldo Presley MD     Diagnosis:  NAFLD    Probe:  M    Universal Protocol: Patient's identity, procedure and site were verified, confirmatory pause was performed.  Discussed procedure including risks and potential complications.  Questions answered.  Patient verbalizes understanding and wishes to proceed with VCTE.     Procedure: After providing explanations of the procedure, patient was placed in the supine position with right arm in maximum abduction to allow optimal exposure of right lateral abdomen.  Patient was briefly assessed, Testing was performed in the mid-axillary location, 50Hz Shear Wave pulses were applied and the resulting Shear Wave and Propagation Speed detected with a 3.5 MHz ultrasonic signal, using the FibroScan probe, Skin to liver capsule distance and liver parenchyma were accessed during the entire examination with the FibroScan probe, Patient was instructed to breathe normally and to abstain from sudden movements during the procedure, allowing for random measurements of liver stiffness. At least 10 Shear Waves were produced, Individual measurements of each Shear Wave were calculated.  Patient tolerated the procedure well with no complications.  Meets discharge criteria as was dismissed.  Rates pain 0 out of 10.  Patient will follow up with ordering provider to review results.      Findings  Median liver stiffness score:  4.8  CAP Reading: dB/m:  217    IQR/med %:  4  Interpretation  Fibrosis interpretation is based on medial liver stiffness - Kilopascal (kPa).    Fibrosis Stage:  F 0-1  Steatosis interpretation is based on controlled attenuation parameter - (dB/m).    Steatosis Grade:  <S1

## 2022-04-21 NOTE — PROGRESS NOTES
Health Maintenance Due   Topic Date Due    HIV Screening  Never done    Aspirin/Antiplatelet Therapy  Never done    COVID-19 Vaccine (3 - Booster for Pfizer series) 08/25/2021    Influenza Vaccine (1) 09/01/2021     Updates were requested from care everywhere.  Chart was reviewed for overdue Proactive [facility] Encounters ([facility]) topics (CRS, Breast Cancer Screening, Eye exam)  Health Maintenance has been updated.  LINKS immunization registry triggered.  Immunizations were reconciled.

## 2022-04-22 NOTE — TELEPHONE ENCOUNTER
Called and spoke to the pt. He informed that he did not go to an UC. He had an appt with Dr. Law and his nurse advised him to take some tylenol. I informed the pt to give us a call if he was not feeling better or gets any worse. Pt expressed understanding.

## 2022-04-24 NOTE — TELEPHONE ENCOUNTER
No new care gaps identified.  Powered by [employer]. Reference number: [id number].   4/24/2022 9:33:44 AM CDT

## 2022-04-25 ENCOUNTER — OFFICE VISIT (OUTPATIENT)
Dept: FAMILY MEDICINE | Facility: CLINIC | Age: 62
End: 2022-04-25
Payer: COMMERCIAL

## 2022-04-25 DIAGNOSIS — R59.9 SWOLLEN LYMPH NODES: Primary | ICD-10-CM

## 2022-04-25 PROCEDURE — 99213 PR OFFICE/OUTPT VISIT, EST, LEVL III, 20-29 MIN: ICD-10-PCS | Mod: 95,,, | Performed by: NURSE PRACTITIONER

## 2022-04-25 PROCEDURE — 1160F RVW MEDS BY RX/DR IN RCRD: CPT | Mod: CPTII,95,, | Performed by: NURSE PRACTITIONER

## 2022-04-25 PROCEDURE — 1160F PR REVIEW ALL MEDS BY PRESCRIBER/CLIN PHARMACIST DOCUMENTED: ICD-10-PCS | Mod: CPTII,95,, | Performed by: NURSE PRACTITIONER

## 2022-04-25 PROCEDURE — 99213 OFFICE O/P EST LOW 20 MIN: CPT | Mod: 95,,, | Performed by: NURSE PRACTITIONER

## 2022-04-25 PROCEDURE — 1159F MED LIST DOCD IN RCRD: CPT | Mod: CPTII,95,, | Performed by: NURSE PRACTITIONER

## 2022-04-25 PROCEDURE — 1159F PR MEDICATION LIST DOCUMENTED IN MEDICAL RECORD: ICD-10-PCS | Mod: CPTII,95,, | Performed by: NURSE PRACTITIONER

## 2022-04-25 PROCEDURE — 4010F ACE/ARB THERAPY RXD/TAKEN: CPT | Mod: CPTII,95,, | Performed by: NURSE PRACTITIONER

## 2022-04-25 PROCEDURE — 4010F PR ACE/ARB THEARPY RXD/TAKEN: ICD-10-PCS | Mod: CPTII,95,, | Performed by: NURSE PRACTITIONER

## 2022-04-25 RX ORDER — NIFEDIPINE 30 MG/1
TABLET, FILM COATED, EXTENDED RELEASE ORAL
Qty: 30 TABLET | Refills: 0 | Status: SHIPPED | OUTPATIENT
Start: 2022-04-25 | End: 2022-05-25

## 2022-04-25 NOTE — PROGRESS NOTES
Subjective:       Patient ID: Joseph Jesus is a 62 y.o. male.    Chief Complaint: Adenopathy (States started with lymph node swelling to neck bilaterally after a urology procedure.  Seen Hepatology last week and was told it was just lymph node swelling. Has gotten better but not resolved.)    HPI    The patient location is: [place]  The chief complaint leading to consultation is: swollen lymph nodes to bilateral anterior neck    Visit type: audiovisual    Face to Face time with patient: 15  15 minutes of total time spent on the encounter, which includes face to face time and non-face to face time preparing to see the patient (eg, review of tests), Obtaining and/or reviewing separately obtained history, Documenting clinical information in the electronic or other health record, Independently interpreting results (not separately reported) and communicating results to the patient/family/caregiver, or Care coordination (not separately reported).     Each patient to whom he or she provides medical services by telemedicine is:  (1) informed of the relationship between the physician and patient and the respective role of any other health care provider with respect to management of the patient; and (2) notified that he or she may decline to receive medical services by telemedicine and may withdraw from such care at any time.    Notes:     Patient is a 62 year old white male with complaint of swollen lymph nodes to anterior neck.  He states he a cystoscopy of bladder with biopsy on 4/19/2022 and that the swelling to lymph nodes of neck started shortly after that.  He states the swelling of lymph nodes has gone down but is .  He went to see Hepatology on 4/21/2022 for NAFLD and discussed the swollen lymph nodes with them - states the resident evaluated and told him that his lymph nodes were just inflamed but did not note any sign of infection.  States he has been taking Tylenol for it and the tenderness improved  but not resolved. Where he points to in the telemedicine video - it is around the parotid glands so I am unable to tell by video if it is parotid swelling or lymph nodes and unable to visualized any swelling to the area over video.    Review of Systems   Constitutional: Negative for activity change, chills, diaphoresis, fever and unexpected weight change.   HENT: Negative for hearing loss, rhinorrhea and trouble swallowing.    Eyes: Negative for discharge and visual disturbance.   Respiratory: Positive for wheezing. Negative for chest tightness.    Cardiovascular: Negative for chest pain and palpitations.   Gastrointestinal: Negative for blood in stool, constipation, diarrhea and vomiting.   Endocrine: Negative for polydipsia and polyuria.   Genitourinary: Positive for difficulty urinating and urgency. Negative for hematuria.        Followed by Urology   Musculoskeletal: Negative for arthralgias, joint swelling and neck pain.   Neurological: Negative for weakness and headaches.   Psychiatric/Behavioral: Negative for confusion and dysphoric mood.         Objective:     There were no vitals filed for this visit.       Physical Exam  Constitutional:       General: He is not in acute distress.     Appearance: He is well-developed. He is not diaphoretic.   HENT:      Head: Normocephalic and atraumatic.        Comments: The areas marked above are where patient points to pain and reports swelling to area - I can not visualize swelling over the video.  No ear pain, no sore throat.  Eyes:      General: No scleral icterus.        Right eye: No discharge.         Left eye: No discharge.      Conjunctiva/sclera: Conjunctivae normal.      Pupils: Pupils are equal, round, and reactive to light.   Pulmonary:      Effort: Pulmonary effort is normal. No respiratory distress.   Neurological:      Mental Status: He is alert and oriented to person, place, and time.   Psychiatric:         Behavior: Behavior normal.         Thought  Content: Thought content normal.         Judgment: Judgment normal.           Assessment:         ICD-10-CM ICD-9-CM   1. Swollen lymph nodes  R59.9 785.6       Plan:       Swollen lymph nodes  Explained to patient that over video I am unable to differentiate if the area he states is swollen is lymph nodes or parotid glands based to the area in question.  He states he was evaluated by Hepatology resident that stated lymph nodes were inflamed.  He has no ear pain, sore throat or dental pain reported.  NO fever reported.  Recommend patient take NSAID Ibuprofen 600 mg TID with meals and monitor - if not improving/resolved within next 48 hours - see his PCP or someone for an in-office visit for hands-on assessment.      Follow up if symptoms worsen or fail to improve, for See PCP within 48 hours if not improved/resolved.     Patient's Medications   New Prescriptions    No medications on file   Previous Medications    ALBUTEROL (PROVENTIL/VENTOLIN HFA) 90 MCG/ACTUATION INHALER    Inhale 2 puffs into the lungs every 6 (six) hours as needed for Wheezing (cough).    ATORVASTATIN (LIPITOR) 10 MG TABLET    Take 1 tablet (10 mg total) by mouth once daily.    CETIRIZINE (ZYRTEC) 10 MG TABLET    Take 10 mg by mouth once daily.    COENZYME Q10 100 MG CAPSULE    Take 200 mg by mouth once daily.    FLUTICASONE PROPIONATE (FLONASE) 50 MCG/ACTUATION NASAL SPRAY    2 sprays (100 mcg total) by Each Nostril route once daily.    HYDROCHLOROTHIAZIDE (HYDRODIURIL) 25 MG TABLET    Take 1 tablet (25 mg total) by mouth once daily.    NIFEDIPINE (ADALAT CC) 30 MG TBSR    TAKE 1 TABLET BY MOUTH EVERY DAY    PAPAVERINE 30 MG/ML INJECTION    Add Phentolamine 1 mg/cc   Add PGE1 10 mcg/cc   SIG: Use as directed    PHENAZOPYRIDINE (PYRIDIUM) 100 MG TABLET    Take 1 tablet (100 mg total) by mouth 3 (three) times daily as needed for Pain.    PROMETHAZINE-DEXTROMETHORPHAN (PROMETHAZINE-DM) 6.25-15 MG/5 ML SYRP    Take 5 mLs by mouth nightly as needed  (for cough and congestion).   Modified Medications    No medications on file   Discontinued Medications    No medications on file       Past Medical History:   Diagnosis Date    Allergy     seasonal    Anal fistula hx    Arthritis     Basal cell carcinoma 4/2013    left superior forehead    Callus     Diverticulosis     Epididymal cyst     GERD (gastroesophageal reflux disease)     Hayfever     Hydrocele, right     Hyperlipidemia     Hypertension     Neck pain     hx of muscular inury from weight lifting---resolved now    Prostatitis     Dec. '12-treated with antibx.    Squamous cell carcinoma 01/07/2019    anterior scalp    Squamous cell carcinoma of skin 03/2020    Left post scalp (SCC insitu-saucerization)    Visual impairment        Past Surgical History:   Procedure Laterality Date    APPENDECTOMY  1962    BIOPSY OF BLADDER  4/19/2022    Procedure: BIOPSY, BLADDER;  Surgeon: Lenny Ewing MD;  [facility];  Service: Urology;;    BLADDER FULGURATION  4/19/2022    Procedure: FULGURATION, BLADDER;  Surgeon: Lenny Ewing MD;  [facility];  Service: Urology;;    COLONOSCOPY  12/2012    due for repeat 12/2015    COLONOSCOPY N/A 5/19/2016    Procedure: COLONOSCOPY;  Surgeon: James Webber MD;  Location: [facility] ([facility]);  Service: Endoscopy;  Laterality: N/A; diverticulosis, repeat in 5-10 years for surveillance    COLONOSCOPY N/A 8/9/2021    Procedure: COLONOSCOPY;  Surgeon: Fahad Bautista Jr., MD;  Location: [facility];  Service: Endoscopy;  Laterality: N/A;    CYSTOSCOPY  4/19/2022    Procedure: CYSTOSCOPY;  Surgeon: Lenny Ewing MD;  [facility];  Service: Urology;;    ESOPHAGOGASTRODUODENOSCOPY N/A 8/9/2021    Procedure: EGD (ESOPHAGOGASTRODUODENOSCOPY);  Surgeon: Fahad Bautista Jr., MD;  Location: [facility];  Service: Endoscopy;  Laterality: N/A;    EUA/Fistulotomy-'08      HERNIA REPAIR      [facility] with mesh     Hydrocelectomy  2013    MASS EXCISION  2004    BCC removal (twice)    MOLE REMOVAL  2019    2 moles removed from scalp =- 1 was basal cell and 1 was squamous cell - dermatology - Dr. londono    right Spermatocelectomy  2013    UPPER GASTROINTESTINAL ENDOSCOPY  prior to     hiatal hernia, reflux esophagitis       Family History   Problem Relation Age of Onset    Skin cancer Mother     Hypertension Mother     Skin cancer Father     Cancer Father         prostate cancer    Hypertension Father     Hypertension Maternal Grandmother     Hypertension Maternal Grandfather     Hypertension Paternal Grandmother     Anesthesia problems Paternal Grandmother     Cancer Paternal Grandfather         prostate cancer    Hypertension Paternal Grandfather     Heart disease Paternal Grandfather     Diabetes Neg Hx     Colon polyps Neg Hx     Colon cancer Neg Hx     Melanoma Neg Hx     Psoriasis Neg Hx     Lupus Neg Hx     Eczema Neg Hx     Acne Neg Hx     Cirrhosis Neg Hx     Prostate cancer Neg Hx     Kidney disease Neg Hx     Crohn's disease Neg Hx     Ulcerative colitis Neg Hx     Stomach cancer Neg Hx     Esophageal cancer Neg Hx        Social History     Socioeconomic History    Marital status:      Spouse name: Traci    Number of children: 1   Occupational History    Occupation: Software Eng.     Employer: [employer]     Comment: [employer]   Tobacco Use    Smoking status: Former Smoker     Packs/day: 1.00     Years: 10.00     Pack years: 10.00     Quit date: 1987     Years since quittin.3    Smokeless tobacco: Never Used   Substance and Sexual Activity    Alcohol use: Not Currently     Alcohol/week: 0.0 - 1.0 standard drinks     Comment: socially- not currently    Drug use: No    Sexual activity: Yes     Partners: Female

## 2022-04-25 NOTE — TELEPHONE ENCOUNTER
Refill Routing Note   Medication(s) are not appropriate for processing by [facility] for the following reason(s):      - Required vitals are abnormal    [facility] action(s):  Defer       Medication Therapy Plan: Last PCP Visit: 3/16/2022 Last Admission: 4/19/2022  Medication reconciliation completed: No     Appointments  past 12m or future 3m with PCP    Date Provider   Last Visit   3/16/2022 Nick Trejo MD   Next Visit   5/17/2022 Nick Trejo MD   ED visits in past 90 days: 0        Note composed:3:17 PM 04/25/2022

## 2022-04-26 RX ORDER — NIFEDIPINE 30 MG/1
TABLET, FILM COATED, EXTENDED RELEASE ORAL
Qty: 90 TABLET | Refills: 1 | OUTPATIENT
Start: 2022-04-26

## 2022-04-26 NOTE — TELEPHONE ENCOUNTER
No new care gaps identified.  Powered by [employer]. Reference number: [id number].   4/26/2022 2:19:17 PM CDT

## 2022-04-27 ENCOUNTER — OFFICE VISIT (OUTPATIENT)
Dept: FAMILY MEDICINE | Facility: CLINIC | Age: 62
End: 2022-04-27
Payer: COMMERCIAL

## 2022-04-27 ENCOUNTER — LAB VISIT (OUTPATIENT)
Dept: LAB | Facility: HOSPITAL | Age: 62
End: 2022-04-27
Attending: NURSE PRACTITIONER
Payer: COMMERCIAL

## 2022-04-27 VITALS
HEIGHT: 73 IN | HEART RATE: 74 BPM | SYSTOLIC BLOOD PRESSURE: 158 MMHG | BODY MASS INDEX: 30.62 KG/M2 | DIASTOLIC BLOOD PRESSURE: 98 MMHG | TEMPERATURE: 98 F | WEIGHT: 231.06 LBS | OXYGEN SATURATION: 98 %

## 2022-04-27 DIAGNOSIS — I10 PRIMARY HYPERTENSION: ICD-10-CM

## 2022-04-27 DIAGNOSIS — R59.1 HEAD AND NECK LYMPHADENOPATHY: ICD-10-CM

## 2022-04-27 DIAGNOSIS — R59.1 HEAD AND NECK LYMPHADENOPATHY: Primary | ICD-10-CM

## 2022-04-27 LAB
ALBUMIN SERPL BCP-MCNC: 4.5 G/DL (ref 3.5–5.2)
ALP SERPL-CCNC: 65 U/L (ref 55–135)
ALT SERPL W/O P-5'-P-CCNC: 52 U/L (ref 10–44)
ANION GAP SERPL CALC-SCNC: 8 MMOL/L (ref 8–16)
AST SERPL-CCNC: 49 U/L (ref 10–40)
BASOPHILS # BLD AUTO: 0.05 K/UL (ref 0–0.2)
BASOPHILS NFR BLD: 0.7 % (ref 0–1.9)
BILIRUB SERPL-MCNC: 0.4 MG/DL (ref 0.1–1)
BUN SERPL-MCNC: 25 MG/DL (ref 8–23)
CALCIUM SERPL-MCNC: 10.7 MG/DL (ref 8.7–10.5)
CHLORIDE SERPL-SCNC: 97 MMOL/L (ref 95–110)
CO2 SERPL-SCNC: 31 MMOL/L (ref 23–29)
CREAT SERPL-MCNC: 1.5 MG/DL (ref 0.5–1.4)
DIFFERENTIAL METHOD: ABNORMAL
EOSINOPHIL # BLD AUTO: 0.2 K/UL (ref 0–0.5)
EOSINOPHIL NFR BLD: 2.8 % (ref 0–8)
ERYTHROCYTE [DISTWIDTH] IN BLOOD BY AUTOMATED COUNT: 13.7 % (ref 11.5–14.5)
EST. GFR  (AFRICAN AMERICAN): 56.1 ML/MIN/1.73 M^2
EST. GFR  (NON AFRICAN AMERICAN): 48.5 ML/MIN/1.73 M^2
GLUCOSE SERPL-MCNC: 89 MG/DL (ref 70–110)
HCT VFR BLD AUTO: 41.1 % (ref 40–54)
HGB BLD-MCNC: 13.6 G/DL (ref 14–18)
IMM GRANULOCYTES # BLD AUTO: 0.09 K/UL (ref 0–0.04)
IMM GRANULOCYTES NFR BLD AUTO: 1.2 % (ref 0–0.5)
LYMPHOCYTES # BLD AUTO: 2 K/UL (ref 1–4.8)
LYMPHOCYTES NFR BLD: 26.2 % (ref 18–48)
MCH RBC QN AUTO: 30.1 PG (ref 27–31)
MCHC RBC AUTO-ENTMCNC: 33.1 G/DL (ref 32–36)
MCV RBC AUTO: 91 FL (ref 82–98)
MONOCYTES # BLD AUTO: 0.7 K/UL (ref 0.3–1)
MONOCYTES NFR BLD: 9.1 % (ref 4–15)
NEUTROPHILS # BLD AUTO: 4.5 K/UL (ref 1.8–7.7)
NEUTROPHILS NFR BLD: 60 % (ref 38–73)
NRBC BLD-RTO: 0 /100 WBC
PLATELET # BLD AUTO: 242 K/UL (ref 150–450)
PMV BLD AUTO: 10.1 FL (ref 9.2–12.9)
POTASSIUM SERPL-SCNC: 4.7 MMOL/L (ref 3.5–5.1)
PROT SERPL-MCNC: 7.9 G/DL (ref 6–8.4)
RBC # BLD AUTO: 4.52 M/UL (ref 4.6–6.2)
SODIUM SERPL-SCNC: 136 MMOL/L (ref 136–145)
WBC # BLD AUTO: 7.55 K/UL (ref 3.9–12.7)

## 2022-04-27 PROCEDURE — 99214 PR OFFICE/OUTPT VISIT, EST, LEVL IV, 30-39 MIN: ICD-10-PCS | Mod: S$GLB,,, | Performed by: NURSE PRACTITIONER

## 2022-04-27 PROCEDURE — 3080F DIAST BP >= 90 MM HG: CPT | Mod: CPTII,S$GLB,, | Performed by: NURSE PRACTITIONER

## 2022-04-27 PROCEDURE — 80053 COMPREHEN METABOLIC PANEL: CPT | Performed by: NURSE PRACTITIONER

## 2022-04-27 PROCEDURE — 4010F ACE/ARB THERAPY RXD/TAKEN: CPT | Mod: CPTII,S$GLB,, | Performed by: NURSE PRACTITIONER

## 2022-04-27 PROCEDURE — 85025 COMPLETE CBC W/AUTO DIFF WBC: CPT | Performed by: NURSE PRACTITIONER

## 2022-04-27 PROCEDURE — 1160F RVW MEDS BY RX/DR IN RCRD: CPT | Mod: CPTII,S$GLB,, | Performed by: NURSE PRACTITIONER

## 2022-04-27 PROCEDURE — 3008F PR BODY MASS INDEX (BMI) DOCUMENTED: ICD-10-PCS | Mod: CPTII,S$GLB,, | Performed by: NURSE PRACTITIONER

## 2022-04-27 PROCEDURE — 1159F PR MEDICATION LIST DOCUMENTED IN MEDICAL RECORD: ICD-10-PCS | Mod: CPTII,S$GLB,, | Performed by: NURSE PRACTITIONER

## 2022-04-27 PROCEDURE — 86735 MUMPS ANTIBODY: CPT | Performed by: NURSE PRACTITIONER

## 2022-04-27 PROCEDURE — 3077F SYST BP >= 140 MM HG: CPT | Mod: CPTII,S$GLB,, | Performed by: NURSE PRACTITIONER

## 2022-04-27 PROCEDURE — 1160F PR REVIEW ALL MEDS BY PRESCRIBER/CLIN PHARMACIST DOCUMENTED: ICD-10-PCS | Mod: CPTII,S$GLB,, | Performed by: NURSE PRACTITIONER

## 2022-04-27 PROCEDURE — 99214 OFFICE O/P EST MOD 30 MIN: CPT | Mod: S$GLB,,, | Performed by: NURSE PRACTITIONER

## 2022-04-27 PROCEDURE — 3077F PR MOST RECENT SYSTOLIC BLOOD PRESSURE >= 140 MM HG: ICD-10-PCS | Mod: CPTII,S$GLB,, | Performed by: NURSE PRACTITIONER

## 2022-04-27 PROCEDURE — 3008F BODY MASS INDEX DOCD: CPT | Mod: CPTII,S$GLB,, | Performed by: NURSE PRACTITIONER

## 2022-04-27 PROCEDURE — 99999 PR PBB SHADOW E&M-EST. PATIENT-LVL V: CPT | Mod: PBBFAC,,, | Performed by: NURSE PRACTITIONER

## 2022-04-27 PROCEDURE — 3080F PR MOST RECENT DIASTOLIC BLOOD PRESSURE >= 90 MM HG: ICD-10-PCS | Mod: CPTII,S$GLB,, | Performed by: NURSE PRACTITIONER

## 2022-04-27 PROCEDURE — 4010F PR ACE/ARB THEARPY RXD/TAKEN: ICD-10-PCS | Mod: CPTII,S$GLB,, | Performed by: NURSE PRACTITIONER

## 2022-04-27 PROCEDURE — 1159F MED LIST DOCD IN RCRD: CPT | Mod: CPTII,S$GLB,, | Performed by: NURSE PRACTITIONER

## 2022-04-27 PROCEDURE — 99999 PR PBB SHADOW E&M-EST. PATIENT-LVL V: ICD-10-PCS | Mod: PBBFAC,,, | Performed by: NURSE PRACTITIONER

## 2022-04-27 RX ORDER — AMOXICILLIN AND CLAVULANATE POTASSIUM 875; 125 MG/1; MG/1
1 TABLET, FILM COATED ORAL EVERY 12 HOURS
Qty: 20 TABLET | Refills: 0 | Status: SHIPPED | OUTPATIENT
Start: 2022-04-27 | End: 2022-04-27

## 2022-04-27 RX ORDER — AMOXICILLIN AND CLAVULANATE POTASSIUM 875; 125 MG/1; MG/1
1 TABLET, FILM COATED ORAL EVERY 12 HOURS
Qty: 20 TABLET | Refills: 0 | Status: SHIPPED | OUTPATIENT
Start: 2022-04-27 | End: 2022-05-07

## 2022-04-27 NOTE — PROGRESS NOTES
Subjective:       Patient ID: Joseph Jesus is a 62 y.o. male.    Chief Complaint: Adenopathy    Swollen glands and neck pain after urology procedure (last Monday). No sore throat. No significant sinus drip or drainage. No headache. No fever or chills since 24 hours after the procedure. Has improved with ibuprofen, but says it has not improved significantly.   Says BP has been elevated and labile, meds have recently been changed.     Past Medical History:  No date: Allergy      Comment:  seasonal  hx: Anal fistula  No date: Arthritis  4/2013: Basal cell carcinoma      Comment:  left superior forehead  No date: Callus  No date: Diverticulosis  No date: Epididymal cyst  No date: GERD (gastroesophageal reflux disease)  No date: Hayfever  No date: Hydrocele, right  No date: Hyperlipidemia  No date: Hypertension  No date: Neck pain      Comment:  hx of muscular inury from weight lifting---resolved now  No date: Prostatitis      Comment:  Dec. '12-treated with antibx.  01/07/2019: Squamous cell carcinoma      Comment:  anterior scalp  03/2020: Squamous cell carcinoma of skin      Comment:  Left post scalp (SCC insitu-saucerization)  No date: Visual impairment    Past Surgical History:  1962: APPENDECTOMY  4/19/2022: BIOPSY OF BLADDER      Comment:  Procedure: BIOPSY, BLADDER;  Surgeon: Lenny Ewing MD;  [facility];  Service: Urology;;  4/19/2022: BLADDER FULGURATION      Comment:  Procedure: FULGURATION, BLADDER;  Surgeon: Lenny Ewing MD;  Location: [facility] OR 1ST FLR;  Service:                Urology;;  12/2012: COLONOSCOPY      Comment:  due for repeat 12/2015 5/19/2016: COLONOSCOPY; N/A      Comment:  Procedure: COLONOSCOPY;  Surgeon: James Webber MD;  Location: [facility] (4TH FLR);  Service: Endoscopy;                Laterality: N/A; diverticulosis, repeat in 5-10 years for               surveillance  8/9/2021: COLONOSCOPY; N/A      Comment:   Procedure: COLONOSCOPY;  Surgeon: Fahad Bautista Jr., MD;  Location: [facility] ENDO;  Service: Endoscopy;                 Laterality: N/A;  4/19/2022: CYSTOSCOPY      Comment:  Procedure: CYSTOSCOPY;  Surgeon: Lenny Ewing MD;                 Location: [facility] OR [address] [place] [address];  Service: Urology;;  8/9/2021: ESOPHAGOGASTRODUODENOSCOPY; N/A      Comment:  Procedure: EGD (ESOPHAGOGASTRODUODENOSCOPY);  Surgeon:                Fahad Bautista Jr., MD;  Location: [facility] ENDO;  Service:               Endoscopy;  Laterality: N/A;  No date: EUA/Fistulotomy-'08  No date: HERNIA REPAIR      Comment:  RI with mesh  2013: Hydrocelectomy  2004: MASS EXCISION      Comment:  BCC removal (twice)  02/25/2019: MOLE REMOVAL      Comment:  2 moles removed from scalp =- 1 was basal cell and 1 was               squamous cell - dermatology - Dr. londono  2013: right Spermatocelectomy  prior to 2010: UPPER GASTROINTESTINAL ENDOSCOPY      Comment:  hiatal hernia, reflux esophagitis    Review of patient's family history indicates:  Problem: Skin cancer      Relation: Mother          Age of Onset: (Not Specified)  Problem: Hypertension      Relation: Mother          Age of Onset: (Not Specified)  Problem: Skin cancer      Relation: Father          Age of Onset: (Not Specified)  Problem: Cancer      Relation: Father          Age of Onset: (Not Specified)          Comment: prostate cancer  Problem: Hypertension      Relation: Father          Age of Onset: (Not Specified)  Problem: Hypertension      Relation: Maternal Grandmother          Age of Onset: (Not Specified)  Problem: Hypertension      Relation: Maternal Grandfather          Age of Onset: (Not Specified)  Problem: Hypertension      Relation: Paternal Grandmother          Age of Onset: (Not Specified)  Problem: Anesthesia problems      Relation: Paternal Grandmother          Age of Onset: (Not Specified)  Problem: Cancer      Relation: Paternal Grandfather          Age of  Onset: (Not Specified)          Comment: prostate cancer  Problem: Hypertension      Relation: Paternal Grandfather          Age of Onset: (Not Specified)  Problem: Heart disease      Relation: Paternal Grandfather          Age of Onset: (Not Specified)  Problem: Diabetes      Relation: Neg Hx          Age of Onset: (Not Specified)  Problem: Colon polyps      Relation: Neg Hx          Age of Onset: (Not Specified)  Problem: Colon cancer      Relation: Neg Hx          Age of Onset: (Not Specified)  Problem: Melanoma      Relation: Neg Hx          Age of Onset: (Not Specified)  Problem: Psoriasis      Relation: Neg Hx          Age of Onset: (Not Specified)  Problem: Lupus      Relation: Neg Hx          Age of Onset: (Not Specified)  Problem: Eczema      Relation: Neg Hx          Age of Onset: (Not Specified)  Problem: Acne      Relation: Neg Hx          Age of Onset: (Not Specified)  Problem: Cirrhosis      Relation: Neg Hx          Age of Onset: (Not Specified)  Problem: Prostate cancer      Relation: Neg Hx          Age of Onset: (Not Specified)  Problem: Kidney disease      Relation: Neg Hx          Age of Onset: (Not Specified)  Problem: Crohn's disease      Relation: Neg Hx          Age of Onset: (Not Specified)  Problem: Ulcerative colitis      Relation: Neg Hx          Age of Onset: (Not Specified)  Problem: Stomach cancer      Relation: Neg Hx          Age of Onset: (Not Specified)  Problem: Esophageal cancer      Relation: Neg Hx          Age of Onset: (Not Specified)      Social History    Socioeconomic History      Marital status:       Spouse name: Traci      Number of children: 1    Occupational History      Occupation: Software Eng.        Employer: [employer]        Comment: [employer]    Tobacco Use      Smoking status: Former Smoker        Packs/day: 1.00        Years: 10.00        Pack years: 10        Quit date: 1987        Years since quittin.4      Smokeless tobacco: Never Used     Substance and Sexual Activity      Alcohol use: Not Currently        Alcohol/week: 0.0 - 1.0 standard drinks        Comment: socially- not currently      Drug use: No      Sexual activity: Yes        Partners: Female      Current Outpatient Medications:  albuterol (PROVENTIL/VENTOLIN HFA) 90 mcg/actuation inhaler, Inhale 2 puffs into the lungs every 6 (six) hours as needed for Wheezing (cough)., Disp: 18 g, Rfl: 5  atorvastatin (LIPITOR) 10 MG tablet, Take 1 tablet (10 mg total) by mouth once daily., Disp: 90 tablet, Rfl: 3  cetirizine (ZYRTEC) 10 MG tablet, Take 10 mg by mouth once daily., Disp: , Rfl:   coenzyme Q10 100 mg capsule, Take 200 mg by mouth once daily., Disp: , Rfl:    fluticasone propionate (FLONASE) 50 mcg/actuation nasal spray, 2 sprays (100 mcg total) by Each Nostril route once daily., Disp: 16 g, Rfl: 5  hydroCHLOROthiazide (HYDRODIURIL) 25 MG tablet, Take 1 tablet (25 mg total) by mouth once daily., Disp: 90 tablet, Rfl: 0  NIFEdipine (ADALAT CC) 30 MG TbSR, TAKE 1 TABLET BY MOUTH EVERY DAY, Disp: 30 tablet, Rfl: 0  papaverine 30 mg/mL injection, Add Phentolamine 1 mg/cc Add PGE1 10 mcg/cc SIG: Use as directed, Disp: 10 mL, Rfl: 11  promethazine-dextromethorphan (PROMETHAZINE-DM) 6.25-15 mg/5 mL Syrp, Take 5 mLs by mouth nightly as needed (for cough and congestion)., Disp: 240 mL, Rfl: 0  amoxicillin-clavulanate 875-125mg (AUGMENTIN) 875-125 mg per tablet, Take 1 tablet by mouth every 12 (twelve) hours for 10 days, Disp: 20 tablet, Rfl: 0    No current facility-administered medications for this visit.      Review of patient's allergies indicates:   -- Amlodipine     --  edema   -- Atenolol     --  Depression - circa 2000   -- Catechu herb    -- Irbesartan -- Other (See Comments)    --  Possibly caused cough   -- Erythromycin -- Rash   -- Sumycin (tetracycline) -- Rash   -- Tetracyclines -- Rash    Review of Systems   Constitutional: Negative for chills, diaphoresis and fever.   HENT: Negative.   Negative for sore throat and trouble swallowing.    Respiratory: Negative.  Negative for cough, choking and shortness of breath.    Cardiovascular: Negative.  Negative for chest pain and leg swelling.   Gastrointestinal: Negative.  Negative for abdominal pain, nausea and vomiting.   Genitourinary: Negative.    Neurological: Negative for dizziness, light-headedness and headaches.   Hematological: Positive for adenopathy.         Objective:      Physical Exam  Constitutional:       Appearance: Normal appearance.   HENT:      Head: Normocephalic and atraumatic.      Right Ear: Tympanic membrane normal.      Left Ear: Tympanic membrane normal.      Nose: No rhinorrhea.      Mouth/Throat:      Pharynx: No posterior oropharyngeal erythema.   Eyes:      Pupils: Pupils are equal, round, and reactive to light.   Cardiovascular:      Rate and Rhythm: Normal rate and regular rhythm.      Heart sounds: No murmur heard.  Pulmonary:      Effort: Pulmonary effort is normal. No respiratory distress.      Breath sounds: Normal breath sounds.   Abdominal:      General: Bowel sounds are normal.      Tenderness: There is no abdominal tenderness.   Musculoskeletal:      Cervical back: Tenderness present.   Lymphadenopathy:      Cervical: Cervical adenopathy present.   Neurological:      General: No focal deficit present.      Mental Status: He is alert and oriented to person, place, and time.   Psychiatric:         Mood and Affect: Mood normal.         Behavior: Behavior normal.         Assessment:       Problem List Items Addressed This Visit    None     Visit Diagnoses     Head and neck lymphadenopathy    -  Primary    Relevant Medications    amoxicillin-clavulanate 875-125mg (AUGMENTIN) 875-125 mg per tablet    Other Relevant Orders    CBC Auto Differential (Completed)    US Soft Tissue Head Neck Thyroid (Completed)    Comprehensive Metabolic Panel (Completed)    MUMPS VIRUS ANTIBODIES, IGG AND IGM    Primary hypertension               Plan:       1. Head and neck lymphadenopathy  See ENT if symptoms are not resolving.   - CBC Auto Differential; Future  - US Soft Tissue Head Neck Thyroid; Future  - Comprehensive Metabolic Panel; Future  - MUMPS VIRUS ANTIBODIES, IGG AND IGM; Future  - amoxicillin-clavulanate 875-125mg (AUGMENTIN) 875-125 mg per tablet; Take 1 tablet by mouth every 12 (twelve) hours for 10 days  Dispense: 20 tablet; Refill: 0     2. Primary hypertension  BP elevated today, advised he needs recheck in 1 week.

## 2022-04-28 ENCOUNTER — TELEPHONE (OUTPATIENT)
Dept: FAMILY MEDICINE | Facility: CLINIC | Age: 62
End: 2022-04-28
Payer: COMMERCIAL

## 2022-04-28 NOTE — TELEPHONE ENCOUNTER
----- Message from Cornelia Hill NP sent at 4/28/2022  8:41 AM CDT -----  Creatinine is elevated. Mild elevation of liver enzymes stable with previous, mild calcium elevation. CBC- wbc is normal, mild anemia stable with previous.  He needs to follow up with his pcp within 1-2 weeks to discuss recheck of creatinine and calcium and ongoing monitoring of anemia and liver enzyme elevation.

## 2022-05-02 ENCOUNTER — HOSPITAL ENCOUNTER (OUTPATIENT)
Dept: RADIOLOGY | Facility: HOSPITAL | Age: 62
Discharge: HOME | End: 2022-05-02
Attending: NURSE PRACTITIONER
Payer: COMMERCIAL

## 2022-05-02 DIAGNOSIS — R59.1 HEAD AND NECK LYMPHADENOPATHY: ICD-10-CM

## 2022-05-02 PROCEDURE — 76536 US EXAM OF HEAD AND NECK: CPT | Mod: 26,,, | Performed by: RADIOLOGY

## 2022-05-02 PROCEDURE — 76536 US SOFT TISSUE HEAD NECK THYROID: ICD-10-PCS | Mod: 26,,, | Performed by: RADIOLOGY

## 2022-05-02 PROCEDURE — 76536 US EXAM OF HEAD AND NECK: CPT | Mod: TC,PO

## 2022-05-03 ENCOUNTER — PATIENT MESSAGE (OUTPATIENT)
Dept: UROLOGY | Facility: CLINIC | Age: 62
End: 2022-05-03
Payer: COMMERCIAL

## 2022-05-03 LAB
FINAL PATHOLOGIC DIAGNOSIS: NORMAL
Lab: NORMAL

## 2022-05-04 ENCOUNTER — TELEPHONE (OUTPATIENT)
Dept: FAMILY MEDICINE | Facility: CLINIC | Age: 62
End: 2022-05-04
Payer: COMMERCIAL

## 2022-05-04 NOTE — TELEPHONE ENCOUNTER
----- Message from Cornelia Hill NP sent at 5/3/2022  3:41 PM CDT -----  Normal lymph nodes noted. Sub centimeter thyroid nodule, does not meet criteria for need to biopsy or to do further evaluation. See ent if throat symptoms do not resolve with treatment.

## 2022-05-05 LAB
MUV IGG SER IA-ACNC: 3.1
MUV IGG SER QL IA: POSITIVE
MUV IGM SER IA-ACNC: 0.4 (ref 0–0.79)
MUV IGM SER QL IA: NEGATIVE

## 2022-05-09 ENCOUNTER — PATIENT MESSAGE (OUTPATIENT)
Dept: SMOKING CESSATION | Facility: CLINIC | Age: 62
End: 2022-05-09
Payer: COMMERCIAL

## 2022-05-11 ENCOUNTER — OFFICE VISIT (OUTPATIENT)
Dept: UROLOGY | Facility: CLINIC | Age: 62
End: 2022-05-11
Payer: COMMERCIAL

## 2022-05-11 ENCOUNTER — OFFICE VISIT (OUTPATIENT)
Dept: PODIATRY | Facility: CLINIC | Age: 62
End: 2022-05-11
Payer: COMMERCIAL

## 2022-05-11 ENCOUNTER — TELEPHONE (OUTPATIENT)
Dept: UROLOGY | Facility: CLINIC | Age: 62
End: 2022-05-11

## 2022-05-11 VITALS
HEIGHT: 73 IN | WEIGHT: 224.88 LBS | HEART RATE: 68 BPM | DIASTOLIC BLOOD PRESSURE: 101 MMHG | BODY MASS INDEX: 29.8 KG/M2 | SYSTOLIC BLOOD PRESSURE: 142 MMHG

## 2022-05-11 VITALS — HEIGHT: 73 IN | WEIGHT: 231.06 LBS | BODY MASS INDEX: 30.62 KG/M2

## 2022-05-11 DIAGNOSIS — L60.0 INGROWN NAIL: Primary | ICD-10-CM

## 2022-05-11 DIAGNOSIS — C67.2 MALIGNANT NEOPLASM OF LATERAL WALL OF URINARY BLADDER: Primary | ICD-10-CM

## 2022-05-11 DIAGNOSIS — D36.10 NEUROMA: ICD-10-CM

## 2022-05-11 DIAGNOSIS — C67.9 MALIGNANT NEOPLASM OF URINARY BLADDER, UNSPECIFIED SITE: Primary | ICD-10-CM

## 2022-05-11 DIAGNOSIS — M20.5X1 HALLUX LIMITUS OF RIGHT FOOT: ICD-10-CM

## 2022-05-11 PROBLEM — R31.29 MICROSCOPIC HEMATURIA: Status: RESOLVED | Noted: 2022-04-19 | Resolved: 2022-05-11

## 2022-05-11 PROCEDURE — 3077F SYST BP >= 140 MM HG: CPT | Mod: CPTII,S$GLB,, | Performed by: UROLOGY

## 2022-05-11 PROCEDURE — 4010F ACE/ARB THERAPY RXD/TAKEN: CPT | Mod: CPTII,S$GLB,, | Performed by: UROLOGY

## 2022-05-11 PROCEDURE — 99999 PR PBB SHADOW E&M-EST. PATIENT-LVL III: CPT | Mod: PBBFAC,,, | Performed by: STUDENT IN AN ORGANIZED HEALTH CARE EDUCATION/TRAINING PROGRAM

## 2022-05-11 PROCEDURE — 4010F PR ACE/ARB THEARPY RXD/TAKEN: ICD-10-PCS | Mod: CPTII,S$GLB,, | Performed by: UROLOGY

## 2022-05-11 PROCEDURE — 3008F BODY MASS INDEX DOCD: CPT | Mod: CPTII,S$GLB,, | Performed by: STUDENT IN AN ORGANIZED HEALTH CARE EDUCATION/TRAINING PROGRAM

## 2022-05-11 PROCEDURE — 1160F PR REVIEW ALL MEDS BY PRESCRIBER/CLIN PHARMACIST DOCUMENTED: ICD-10-PCS | Mod: CPTII,S$GLB,, | Performed by: STUDENT IN AN ORGANIZED HEALTH CARE EDUCATION/TRAINING PROGRAM

## 2022-05-11 PROCEDURE — 99999 PR PBB SHADOW E&M-EST. PATIENT-LVL III: ICD-10-PCS | Mod: PBBFAC,,, | Performed by: STUDENT IN AN ORGANIZED HEALTH CARE EDUCATION/TRAINING PROGRAM

## 2022-05-11 PROCEDURE — 1159F MED LIST DOCD IN RCRD: CPT | Mod: CPTII,S$GLB,, | Performed by: STUDENT IN AN ORGANIZED HEALTH CARE EDUCATION/TRAINING PROGRAM

## 2022-05-11 PROCEDURE — 1160F RVW MEDS BY RX/DR IN RCRD: CPT | Mod: CPTII,S$GLB,, | Performed by: UROLOGY

## 2022-05-11 PROCEDURE — 3080F DIAST BP >= 90 MM HG: CPT | Mod: CPTII,S$GLB,, | Performed by: UROLOGY

## 2022-05-11 PROCEDURE — 99215 OFFICE O/P EST HI 40 MIN: CPT | Mod: S$GLB,,, | Performed by: UROLOGY

## 2022-05-11 PROCEDURE — 99213 OFFICE O/P EST LOW 20 MIN: CPT | Mod: 25,S$GLB,, | Performed by: STUDENT IN AN ORGANIZED HEALTH CARE EDUCATION/TRAINING PROGRAM

## 2022-05-11 PROCEDURE — 11750 EXCISION NAIL&NAIL MATRIX: CPT | Mod: T5,S$GLB,, | Performed by: STUDENT IN AN ORGANIZED HEALTH CARE EDUCATION/TRAINING PROGRAM

## 2022-05-11 PROCEDURE — 4010F ACE/ARB THERAPY RXD/TAKEN: CPT | Mod: CPTII,S$GLB,, | Performed by: STUDENT IN AN ORGANIZED HEALTH CARE EDUCATION/TRAINING PROGRAM

## 2022-05-11 PROCEDURE — 3080F PR MOST RECENT DIASTOLIC BLOOD PRESSURE >= 90 MM HG: ICD-10-PCS | Mod: CPTII,S$GLB,, | Performed by: UROLOGY

## 2022-05-11 PROCEDURE — 99999 PR PBB SHADOW E&M-EST. PATIENT-LVL IV: ICD-10-PCS | Mod: PBBFAC,,, | Performed by: UROLOGY

## 2022-05-11 PROCEDURE — 1160F RVW MEDS BY RX/DR IN RCRD: CPT | Mod: CPTII,S$GLB,, | Performed by: STUDENT IN AN ORGANIZED HEALTH CARE EDUCATION/TRAINING PROGRAM

## 2022-05-11 PROCEDURE — 99215 PR OFFICE/OUTPT VISIT, EST, LEVL V, 40-54 MIN: ICD-10-PCS | Mod: S$GLB,,, | Performed by: UROLOGY

## 2022-05-11 PROCEDURE — 4010F PR ACE/ARB THEARPY RXD/TAKEN: ICD-10-PCS | Mod: CPTII,S$GLB,, | Performed by: STUDENT IN AN ORGANIZED HEALTH CARE EDUCATION/TRAINING PROGRAM

## 2022-05-11 PROCEDURE — 3077F PR MOST RECENT SYSTOLIC BLOOD PRESSURE >= 140 MM HG: ICD-10-PCS | Mod: CPTII,S$GLB,, | Performed by: UROLOGY

## 2022-05-11 PROCEDURE — 99213 PR OFFICE/OUTPT VISIT, EST, LEVL III, 20-29 MIN: ICD-10-PCS | Mod: 25,S$GLB,, | Performed by: STUDENT IN AN ORGANIZED HEALTH CARE EDUCATION/TRAINING PROGRAM

## 2022-05-11 PROCEDURE — 99999 PR PBB SHADOW E&M-EST. PATIENT-LVL IV: CPT | Mod: PBBFAC,,, | Performed by: UROLOGY

## 2022-05-11 PROCEDURE — 11750 NAIL REMOVAL: ICD-10-PCS | Mod: T5,S$GLB,, | Performed by: STUDENT IN AN ORGANIZED HEALTH CARE EDUCATION/TRAINING PROGRAM

## 2022-05-11 PROCEDURE — 3008F PR BODY MASS INDEX (BMI) DOCUMENTED: ICD-10-PCS | Mod: CPTII,S$GLB,, | Performed by: STUDENT IN AN ORGANIZED HEALTH CARE EDUCATION/TRAINING PROGRAM

## 2022-05-11 PROCEDURE — 1159F MED LIST DOCD IN RCRD: CPT | Mod: CPTII,S$GLB,, | Performed by: UROLOGY

## 2022-05-11 PROCEDURE — 3008F BODY MASS INDEX DOCD: CPT | Mod: CPTII,S$GLB,, | Performed by: UROLOGY

## 2022-05-11 PROCEDURE — 1160F PR REVIEW ALL MEDS BY PRESCRIBER/CLIN PHARMACIST DOCUMENTED: ICD-10-PCS | Mod: CPTII,S$GLB,, | Performed by: UROLOGY

## 2022-05-11 PROCEDURE — 1159F PR MEDICATION LIST DOCUMENTED IN MEDICAL RECORD: ICD-10-PCS | Mod: CPTII,S$GLB,, | Performed by: UROLOGY

## 2022-05-11 PROCEDURE — 1159F PR MEDICATION LIST DOCUMENTED IN MEDICAL RECORD: ICD-10-PCS | Mod: CPTII,S$GLB,, | Performed by: STUDENT IN AN ORGANIZED HEALTH CARE EDUCATION/TRAINING PROGRAM

## 2022-05-11 PROCEDURE — 3008F PR BODY MASS INDEX (BMI) DOCUMENTED: ICD-10-PCS | Mod: CPTII,S$GLB,, | Performed by: UROLOGY

## 2022-05-11 NOTE — PROGRESS NOTES
CHIEF COMPLAINT:    Mr. Jesus is a 62 y.o. male presenting with ED.    PRESENTING ILLNESS:    Joseph Jesus is a 62 y.o. male who c/o ED.  This has been present for > 1 year.  He's tried and failed viagra and cialis.  His T is normal.  He went to [employer].  He has tried and failed 25 units of ICI.    He has LUTS.  + nocturia x 5, + feeling of incomplete emptying, + urgency.     He had microhematuria and was found to have an erythematous lesion with a positive cytology.  On 4/19/22 he underwent bladder biopsy showing CIS.    REVIEW OF SYSTEMS:    Joseph Jesus denies headache, blurred vision, fever, nausea, vomiting, chills, abdominal pain, bleeding per rectum, cough, SOB, recent loss of consciousness, recent mental status changes, seizures, dizziness, or upper or lower extremity weakness.    ANDREA  1. 1  2. 1  3. 1  4. 1  5. 1      PATIENT HISTORY:    Past Medical History:   Diagnosis Date    Allergy     seasonal    Anal fistula hx    Arthritis     Basal cell carcinoma 4/2013    left superior forehead    Callus     Diverticulosis     Epididymal cyst     GERD (gastroesophageal reflux disease)     Hayfever     Hydrocele, right     Hyperlipidemia     Hypertension     Neck pain     hx of muscular inury from weight lifting---resolved now    Prostatitis     Dec. '12-treated with antibx.    Squamous cell carcinoma 01/07/2019    anterior scalp    Squamous cell carcinoma of skin 03/2020    Left post scalp (SCC insitu-saucerization)    Visual impairment        Past Surgical History:   Procedure Laterality Date    APPENDECTOMY  1962    BIOPSY OF BLADDER  4/19/2022    Procedure: BIOPSY, BLADDER;  Surgeon: Lenny Ewing MD;  Location: [facility] OR [address] [place] [address];  Service: Urology;;    BLADDER FULGURATION  4/19/2022    Procedure: FULGURATION, BLADDER;  Surgeon: Lenny Ewing MD;  Location: [facility] OR [address] [place] [address];  Service: Urology;;    COLONOSCOPY  12/2012    due for repeat 12/2015    COLONOSCOPY N/A  5/19/2016    Procedure: COLONOSCOPY;  Surgeon: James Webber MD;  Location: [facility] (4TH FLR);  Service: Endoscopy;  Laterality: N/A; diverticulosis, repeat in 5-10 years for surveillance    COLONOSCOPY N/A 8/9/2021    Procedure: COLONOSCOPY;  Surgeon: Fahad Bautista Jr., MD;  Location: [facility];  Service: Endoscopy;  Laterality: N/A;    CYSTOSCOPY  4/19/2022    Procedure: CYSTOSCOPY;  Surgeon: Lenny Ewing MD;  Location: [facility] OR 1ST FLR;  Service: Urology;;    ESOPHAGOGASTRODUODENOSCOPY N/A 8/9/2021    Procedure: EGD (ESOPHAGOGASTRODUODENOSCOPY);  Surgeon: Fahad Bautista Jr., MD;  Location: [facility];  Service: Endoscopy;  Laterality: N/A;    EUA/Fistulotomy-'08      HERNIA REPAIR      RIH with mesh    Hydrocelectomy  2013    MASS EXCISION  2004    BCC removal (twice)    MOLE REMOVAL  02/25/2019    2 moles removed from scalp =- 1 was basal cell and 1 was squamous cell - dermatology - Dr. londono    right Spermatocelectomy  2013    UPPER GASTROINTESTINAL ENDOSCOPY  prior to 2010    hiatal hernia, reflux esophagitis       Family History   Problem Relation Age of Onset    Skin cancer Mother     Hypertension Mother     Skin cancer Father     Cancer Father         prostate cancer    Hypertension Father     Hypertension Maternal Grandmother     Hypertension Maternal Grandfather     Hypertension Paternal Grandmother     Anesthesia problems Paternal Grandmother     Cancer Paternal Grandfather         prostate cancer    Hypertension Paternal Grandfather     Heart disease Paternal Grandfather     Diabetes Neg Hx     Colon polyps Neg Hx     Colon cancer Neg Hx     Melanoma Neg Hx     Psoriasis Neg Hx     Lupus Neg Hx     Eczema Neg Hx     Acne Neg Hx     Cirrhosis Neg Hx     Prostate cancer Neg Hx     Kidney disease Neg Hx     Crohn's disease Neg Hx     Ulcerative colitis Neg Hx     Stomach cancer Neg Hx     Esophageal cancer Neg Hx        Social History      Socioeconomic History    Marital status:      Spouse name: Traci    Number of children: 1   Occupational History    Occupation: [employer].     Employer: [employer]     Comment: [employer]   Tobacco Use    Smoking status: Former Smoker     Packs/day: 1.00     Years: 10.00     Pack years: 10.00     Quit date: 1987     Years since quittin.4    Smokeless tobacco: Never Used   Substance and Sexual Activity    Alcohol use: Not Currently     Alcohol/week: 0.0 - 1.0 standard drinks     Comment: socially- not currently    Drug use: No    Sexual activity: Yes     Partners: Female       Allergies:  Amlodipine, Atenolol, Catechu herb, Irbesartan, Erythromycin, Sumycin [tetracycline], and Tetracyclines    Medications:    Current Outpatient Medications:     albuterol (PROVENTIL/VENTOLIN HFA) 90 mcg/actuation inhaler, Inhale 2 puffs into the lungs every 6 (six) hours as needed for Wheezing (cough)., Disp: 18 g, Rfl: 5    atorvastatin (LIPITOR) 10 MG tablet, Take 1 tablet (10 mg total) by mouth once daily., Disp: 90 tablet, Rfl: 3    cetirizine (ZYRTEC) 10 MG tablet, Take 10 mg by mouth once daily., Disp: , Rfl:     coenzyme Q10 100 mg capsule, Take 200 mg by mouth once daily., Disp: , Rfl:     fluticasone propionate (FLONASE) 50 mcg/actuation nasal spray, 2 sprays (100 mcg total) by Each Nostril route once daily., Disp: 16 g, Rfl: 5    hydroCHLOROthiazide (HYDRODIURIL) 25 MG tablet, Take 1 tablet (25 mg total) by mouth once daily., Disp: 90 tablet, Rfl: 0    NIFEdipine (ADALAT CC) 30 MG TbSR, TAKE 1 TABLET BY MOUTH EVERY DAY, Disp: 30 tablet, Rfl: 0    papaverine 30 mg/mL injection, Add Phentolamine 1 mg/cc  Add PGE1 10 mcg/cc  SIG: Use as directed, Disp: 10 mL, Rfl: 11    promethazine-dextromethorphan (PROMETHAZINE-DM) 6.25-15 mg/5 mL Syrp, Take 5 mLs by mouth nightly as needed (for cough and congestion)., Disp: 240 mL, Rfl: 0    PHYSICAL EXAMINATION:    The patient generally appears in good  health, is appropriately interactive, and is in no apparent distress.     Eyes: anicteric sclerae, moist conjunctivae; no lid-lag; PERRLA     HENT: Atraumatic; oropharynx clear with moist mucous membranes and no mucosal ulcerations;normal hard and soft palate.  No evidence of lymphadenopathy.    Neck: Trachea midline.  No thyromegaly.    Musculoskeletal: No abnormal gait.    Skin: No lesions.    Mental: Cooperative with normal affect.  Is oriented to time, place, and person.    Neuro: Grossly intact.    Chest: Normal inspiratory effort.   No accessory muscles.  No audible wheezes.  Respirations symmetric on inspiration and expiration.    Heart: Regular rhythm.      Abdomen:  Soft, non-tender. No masses or organomegaly. Bladder is not palpable. No evidence of flank discomfort. No evidence of inguinal hernia.    Genitourinary: The penis is circumcised with no evidence of plaques or induration. The urethral meatus is normal. The testes, epididymides, and cord structures are normal in size and contour bilaterally. The scrotum is normal in size and contour.    Normal anal sphincter tone. No rectal mass.    The prostate is 30 g. Normal landmarks. Lateral sulci. Median furrow intact.  No nodularity or induration. Seminal vesicles are normal.    Extremities: No clubbing, cyanosis, or edema      LABS:    UA dipped negative today  Lab Results   Component Value Date    PSA 0.30 01/28/2022    PSA 0.22 07/29/2020    PSA 0.29 05/30/2019    PSADIAG 0.37 09/08/2014       IMPRESSION:    Encounter Diagnoses   Name Primary?    Bladder tumor Yes   HTN, stable  Hyperlipidemia, stable      PLAN:    1. Discussed options for his ED (affected by above comorbidities).  He'd like to continue with ICI.  Can titrate up 5 units at a time.  Refilled.  2. Will observe his LUTS as they don't bother him.  3. Went over his pathology demonstrating cancer.  Recommend TURBT of his lesion.  4. The risks and benefits of resection of a bladder tumor were  discussed with the patient in detail.  The risks include but are not limited to burning with urination, bleeding, infection, pain, incomplete removal of bladder tumor, no guarantee of cancer cure, need for further procedures, injury to the kidney, ureter, bladder, bladder perforation and need for open surgery.  The patient was informed that they may require a ureteral stent and that stents can cause irritative voiding symptoms.  They also understand that ureteral stents are temporary and must be removed or exchanged in a timely fashion or they can calcify and make more stones and become difficult to remove. The patient understands they may also need a catheter post op.  Post operative intravesical chemotherapy may also be used. The risks of this medication were discussed with the patient to include a contact dermatitis, irritative voiding symptoms, hematuria, fatigue or a low grade fever.  Patient understands these risks and has agreed to proceed with surgery.        Copy to:

## 2022-05-11 NOTE — PROGRESS NOTES
Subjective:      Patient ID: Joseph Jesus is a 62 y.o. male.    Chief Complaint: Ingrown Toenail (Right great toe)    Patient presents today with several problems to both feet  1) Numbing pain to the left toes that's worsened over the last 10 years. Worse when wearing shoes. He reports that it feels like he's walking on grains of rice.  2) He also has right great toe pain with walking and standing.  3) He's also got years of issues with ingrowing nails on both feet. They're hurting him now and when he wears shoes.  4) He's also got a painful spot on the bottom of the right foot that is worse when walking.     5/11/22: follow up for nail procedure right great toe nail and for follow up of the left foot. Left foot is doing much better after the injections were given.     Review of Systems   Skin: Positive for dry skin and nail changes.   Musculoskeletal:        Great toe pain b/l.   Painful spot on the R foot   Neurological: Positive for numbness, paresthesias and sensory change.   All other systems reviewed and are negative.          Objective:      Physical Exam  Cardiovascular:      Pulses:           Dorsalis pedis pulses are 2+ on the right side and 2+ on the left side.        Posterior tibial pulses are 2+ on the right side and 2+ on the left side.      Comments: Capillary refill time is brisk < 3 seconds.  No edema to the lower extremities.  Hair growth and distribution is normal.  No significant signs of atrophy appreciated.     Musculoskeletal:      Comments: Minor HAV deformity b/l with hallux limitus b/l. About 25 degrees of DF. R 1st MTPJ has pain and crepitus with ROM.     Pain to the 2nd, 3rd and 4th interspaces of the left foot that is worse with metatarsal compression    Minor + tinels overlying the PT and SP of the L leg.    Skin:     Comments: Ingrowing b/l borders of b/l great toes.    Small plantar wart R foot with loss of skin lines. + pain with axial and side to side pressure.                 Assessment:       No diagnosis found.      Plan:       There are no diagnoses linked to this encounter.  I counseled the patient on his conditions, their implications and medical management.    1) Performed PNA to the medial border of the R great toe.    2) CF foot plate: Patient was unable to wear it due to how tight it was in his shoes. He tried wearing them without overlying cushion. I recommend that he try purchasing larger shoes to accommodate.    3) Patient will wait for cantharidin to come back into stock before starting treatment for this.    Because the phenol caused a chemical burn, your skill will look red and irritated and drain a fair amount. Washing the area daily will prevent the drainage from forming a painful scab. If there is any increased redness extending up the foot or extreme pain, please seek immediate medical attention.    Leave the dressing on for 24 hours. After 24 hours, remove dressing and soak the big toe with water and dial soap. Dry the toe or toes thoroughly and wipe clean the surgical site. Apply a small amount of triple antibiotic and a bandaid.       F/u 2-3 weeks if needed.    Nam Hartman DPM        Nail Removal    Date/Time: 5/11/2022 8:20 AM  Performed by: Nam Hartman DPM  Authorized by: Nam Hartman DPM     Consent Done?:  Yes (Written)  Location:     Location:  Right foot    Location detail:  Right big toe  Anesthesia:     Anesthesia:  Digital block    Local anesthetic:  Lidocaine 1% without epinephrine and bupivacaine 0.5% without epinephrine    Anesthetic total (ml):  5  Procedure Details:     Preparation:  Skin prepped with ChloraPrep    Amount removed:  Partial    Side:  Medial    Wedge excision of skin of nail fold: No      Nail bed sutured?: No      Nail matrix removed:  Partial    Removal method:  Phenol and alcohol    Removed nail replaced and anchored: No      Dressing applied:  4x4 and antibiotic ointment    Patient tolerance:  Patient tolerated the  procedure well with no immediate complications

## 2022-05-17 ENCOUNTER — OFFICE VISIT (OUTPATIENT)
Dept: INTERNAL MEDICINE | Facility: CLINIC | Age: 62
End: 2022-05-17
Attending: FAMILY MEDICINE
Payer: COMMERCIAL

## 2022-05-17 VITALS
HEART RATE: 66 BPM | SYSTOLIC BLOOD PRESSURE: 138 MMHG | DIASTOLIC BLOOD PRESSURE: 86 MMHG | WEIGHT: 227.31 LBS | BODY MASS INDEX: 30.13 KG/M2 | HEIGHT: 73 IN | OXYGEN SATURATION: 98 %

## 2022-05-17 DIAGNOSIS — C67.2 MALIGNANT NEOPLASM OF LATERAL WALL OF URINARY BLADDER: Primary | ICD-10-CM

## 2022-05-17 DIAGNOSIS — N18.31 STAGE 3A CHRONIC KIDNEY DISEASE: ICD-10-CM

## 2022-05-17 DIAGNOSIS — I10 HYPERTENSION, ESSENTIAL: ICD-10-CM

## 2022-05-17 DIAGNOSIS — E78.5 HYPERLIPIDEMIA, UNSPECIFIED HYPERLIPIDEMIA TYPE: ICD-10-CM

## 2022-05-17 DIAGNOSIS — K76.0 NAFLD (NONALCOHOLIC FATTY LIVER DISEASE): ICD-10-CM

## 2022-05-17 DIAGNOSIS — R20.0 NUMBNESS OF TOES: ICD-10-CM

## 2022-05-17 DIAGNOSIS — F43.22 ADJUSTMENT DISORDER WITH ANXIOUS MOOD: ICD-10-CM

## 2022-05-17 PROBLEM — N32.9 LESION OF BLADDER: Status: RESOLVED | Noted: 2022-04-19 | Resolved: 2022-05-17

## 2022-05-17 PROCEDURE — 3008F PR BODY MASS INDEX (BMI) DOCUMENTED: ICD-10-PCS | Mod: CPTII,S$GLB,, | Performed by: FAMILY MEDICINE

## 2022-05-17 PROCEDURE — 1159F PR MEDICATION LIST DOCUMENTED IN MEDICAL RECORD: ICD-10-PCS | Mod: CPTII,S$GLB,, | Performed by: FAMILY MEDICINE

## 2022-05-17 PROCEDURE — 99214 PR OFFICE/OUTPT VISIT, EST, LEVL IV, 30-39 MIN: ICD-10-PCS | Mod: S$GLB,,, | Performed by: FAMILY MEDICINE

## 2022-05-17 PROCEDURE — 99999 PR PBB SHADOW E&M-EST. PATIENT-LVL IV: CPT | Mod: PBBFAC,,, | Performed by: FAMILY MEDICINE

## 2022-05-17 PROCEDURE — 3008F BODY MASS INDEX DOCD: CPT | Mod: CPTII,S$GLB,, | Performed by: FAMILY MEDICINE

## 2022-05-17 PROCEDURE — 1159F MED LIST DOCD IN RCRD: CPT | Mod: CPTII,S$GLB,, | Performed by: FAMILY MEDICINE

## 2022-05-17 PROCEDURE — 3075F PR MOST RECENT SYSTOLIC BLOOD PRESS GE 130-139MM HG: ICD-10-PCS | Mod: CPTII,S$GLB,, | Performed by: FAMILY MEDICINE

## 2022-05-17 PROCEDURE — 99214 OFFICE O/P EST MOD 30 MIN: CPT | Mod: S$GLB,,, | Performed by: FAMILY MEDICINE

## 2022-05-17 PROCEDURE — 4010F PR ACE/ARB THEARPY RXD/TAKEN: ICD-10-PCS | Mod: CPTII,S$GLB,, | Performed by: FAMILY MEDICINE

## 2022-05-17 PROCEDURE — 3079F PR MOST RECENT DIASTOLIC BLOOD PRESSURE 80-89 MM HG: ICD-10-PCS | Mod: CPTII,S$GLB,, | Performed by: FAMILY MEDICINE

## 2022-05-17 PROCEDURE — 3079F DIAST BP 80-89 MM HG: CPT | Mod: CPTII,S$GLB,, | Performed by: FAMILY MEDICINE

## 2022-05-17 PROCEDURE — 99999 PR PBB SHADOW E&M-EST. PATIENT-LVL IV: ICD-10-PCS | Mod: PBBFAC,,, | Performed by: FAMILY MEDICINE

## 2022-05-17 PROCEDURE — 3075F SYST BP GE 130 - 139MM HG: CPT | Mod: CPTII,S$GLB,, | Performed by: FAMILY MEDICINE

## 2022-05-17 PROCEDURE — 1160F PR REVIEW ALL MEDS BY PRESCRIBER/CLIN PHARMACIST DOCUMENTED: ICD-10-PCS | Mod: CPTII,S$GLB,, | Performed by: FAMILY MEDICINE

## 2022-05-17 PROCEDURE — 1160F RVW MEDS BY RX/DR IN RCRD: CPT | Mod: CPTII,S$GLB,, | Performed by: FAMILY MEDICINE

## 2022-05-17 PROCEDURE — 4010F ACE/ARB THERAPY RXD/TAKEN: CPT | Mod: CPTII,S$GLB,, | Performed by: FAMILY MEDICINE

## 2022-05-17 NOTE — Clinical Note
Saw patient today - he has concerns for pain/anxiety during upcoming procedure(s). Can you consider doing under conscious sedation?? Thank you. Samy mejia

## 2022-05-17 NOTE — PROGRESS NOTES
Subjective:       Patient ID: Joseph Jesus is a 62 y.o. male.    Chief Complaint: Follow-up    Established patient follows up for management of chronic medical illnesses with complaints today. Please see dictation and ROS for interval problems, specific complaints and disease management discussion.    P, S, Fm, Soc Hx's; Meds, allergies reviewed and reconciled.  Health maintenance file reviewed and addressed items due. Recent applicable lab, imaging and cardiovascular results reviewed.  Problem list items reviewed and modified or added entries (in the overview section) may not be transcribed into this encounter note due to note writer format.    concerns for anxiety and pain with upcoming procedures for recently identified bvladdconner ca. Says last cyst painful to him.    States had swollen glands after last procedure. Went to  and given abx. Sx improved.    Chart reviewed - cardiology, urology, podiatry, hepatology.    Numb toes, chronic. No weakness, recently saw podiatry.    recent nml echo and DOREEN's.      Review of Systems   Constitutional: Positive for fatigue. Negative for appetite change, chills, diaphoresis and fever.   HENT: Negative for congestion, postnasal drip, rhinorrhea, sore throat and trouble swallowing.    Eyes: Negative for visual disturbance.   Respiratory: Negative for cough, choking, chest tightness, shortness of breath and wheezing.    Cardiovascular: Negative for chest pain and leg swelling.   Gastrointestinal: Negative for abdominal distention, abdominal pain, diarrhea, nausea and vomiting.   Genitourinary: Positive for difficulty urinating and dysuria. Negative for hematuria.   Musculoskeletal: Negative for arthralgias and myalgias.   Skin: Negative for rash.   Neurological: Positive for numbness. Negative for weakness, light-headedness and headaches.   Psychiatric/Behavioral: Positive for dysphoric mood. Negative for confusion. The patient is nervous/anxious.        Objective:      Physical  Exam  Vitals and nursing note reviewed.   Constitutional:       General: He is not in acute distress.     Appearance: He is well-developed.   HENT:      Mouth/Throat:      Pharynx: Oropharynx is clear.      Tonsils: No tonsillar exudate.   Pulmonary:      Effort: Pulmonary effort is normal.   Musculoskeletal:      Cervical back: Neck supple.      Right lower leg: No edema.      Left lower leg: No edema.   Lymphadenopathy:      Cervical: No cervical adenopathy.   Skin:     General: Skin is warm and dry.      Findings: No rash.   Neurological:      Mental Status: He is alert and oriented to person, place, and time.   Psychiatric:         Behavior: Behavior normal.         Thought Content: Thought content normal.         Judgment: Judgment normal.         Assessment:       1. Malignant neoplasm of lateral wall of urinary bladder    2. Adjustment disorder with anxious mood    3. Hypertension, essential    4. Hyperlipidemia, unspecified hyperlipidemia type    5. NAFLD (nonalcoholic fatty liver disease)    6. Numbness of toes    7. Stage 3a chronic kidney disease        Plan:     Medication List with Changes/Refills   Current Medications    ALBUTEROL (PROVENTIL/VENTOLIN HFA) 90 MCG/ACTUATION INHALER    Inhale 2 puffs into the lungs every 6 (six) hours as needed for Wheezing (cough).    ATORVASTATIN (LIPITOR) 10 MG TABLET    Take 1 tablet (10 mg total) by mouth once daily.    CETIRIZINE (ZYRTEC) 10 MG TABLET    Take 10 mg by mouth once daily.    COENZYME Q10 100 MG CAPSULE    Take 200 mg by mouth once daily.    FLUTICASONE PROPIONATE (FLONASE) 50 MCG/ACTUATION NASAL SPRAY    2 sprays (100 mcg total) by Each Nostril route once daily.    HYDROCHLOROTHIAZIDE (HYDRODIURIL) 25 MG TABLET    Take 1 tablet (25 mg total) by mouth once daily.    NIFEDIPINE (ADALAT CC) 30 MG TBSR    TAKE 1 TABLET BY MOUTH EVERY DAY    PAPAVERINE 30 MG/ML INJECTION    Add Phentolamine 1 mg/cc   Add PGE1 10 mcg/cc   SIG: Use as directed     PROMETHAZINE-DEXTROMETHORPHAN (PROMETHAZINE-DM) 6.25-15 MG/5 ML SYRP    Take 5 mLs by mouth nightly as needed (for cough and congestion).     Joseph was seen today for follow-up.    Diagnoses and all orders for this visit:    Malignant neoplasm of lateral wall of urinary bladder    Adjustment disorder with anxious mood    Hypertension, essential    Hyperlipidemia, unspecified hyperlipidemia type    NAFLD (nonalcoholic fatty liver disease)    Numbness of toes  -     Ambulatory referral/consult to Neurology; Future    Stage 3a chronic kidney disease  -     Ambulatory referral/consult to Nephrology; Future      See meds, orders, follow up, routing and instructions sections of encounter and AVS. Discussed with patient and provided on AVS.    Lab Results   Component Value Date     04/27/2022    K 4.7 04/27/2022    CL 97 04/27/2022    BUN 25 (H) 04/27/2022    CREATININE 1.5 (H) 04/27/2022    GLU 89 04/27/2022    HGBA1C 6.1 09/27/2012    AST 49 (H) 04/27/2022    ALT 52 (H) 04/27/2022    ALBUMIN 4.5 04/27/2022    ALBUMIN 4.3 06/21/2018    PROT 7.9 04/27/2022    BILITOT 0.4 04/27/2022    CHOL 147 10/12/2021    HDL 60 10/12/2021    LDLCALC 72.4 10/12/2021    TRIG 73 10/12/2021    WBC 7.55 04/27/2022    HGB 13.6 (L) 04/27/2022    HCT 41.1 04/27/2022     04/27/2022    PSA 0.30 01/28/2022    PSADIAG 0.37 09/08/2014    TSH 2.265 03/16/2022    BNP 16 03/16/2022       Today's appointment was >25 minutes including chart review (such as review of consult notes, op notes, ER notes, labs, imaging, path, cultures, etc.), medication reconciliation and adjustment, and in some cases >50% time spent in counseling.

## 2022-05-24 ENCOUNTER — OFFICE VISIT (OUTPATIENT)
Dept: DERMATOLOGY | Facility: CLINIC | Age: 62
End: 2022-05-24
Payer: COMMERCIAL

## 2022-05-24 DIAGNOSIS — D48.5 NEOPLASM OF UNCERTAIN BEHAVIOR OF SKIN: Primary | ICD-10-CM

## 2022-05-24 PROCEDURE — 4010F ACE/ARB THERAPY RXD/TAKEN: CPT | Mod: CPTII,S$GLB,, | Performed by: DERMATOLOGY

## 2022-05-24 PROCEDURE — 4010F PR ACE/ARB THEARPY RXD/TAKEN: ICD-10-PCS | Mod: CPTII,S$GLB,, | Performed by: DERMATOLOGY

## 2022-05-24 PROCEDURE — 1159F PR MEDICATION LIST DOCUMENTED IN MEDICAL RECORD: ICD-10-PCS | Mod: CPTII,S$GLB,, | Performed by: DERMATOLOGY

## 2022-05-24 PROCEDURE — 1159F MED LIST DOCD IN RCRD: CPT | Mod: CPTII,S$GLB,, | Performed by: DERMATOLOGY

## 2022-05-24 PROCEDURE — 17110 PR DESTRUCTION BENIGN LESIONS UP TO 14: ICD-10-PCS | Mod: S$GLB,,, | Performed by: DERMATOLOGY

## 2022-05-24 PROCEDURE — 1160F RVW MEDS BY RX/DR IN RCRD: CPT | Mod: CPTII,S$GLB,, | Performed by: DERMATOLOGY

## 2022-05-24 PROCEDURE — 99999 PR PBB SHADOW E&M-EST. PATIENT-LVL III: CPT | Mod: PBBFAC,,, | Performed by: DERMATOLOGY

## 2022-05-24 PROCEDURE — 99499 UNLISTED E&M SERVICE: CPT | Mod: S$GLB,,, | Performed by: DERMATOLOGY

## 2022-05-24 PROCEDURE — 99499 NO LOS: ICD-10-PCS | Mod: S$GLB,,, | Performed by: DERMATOLOGY

## 2022-05-24 PROCEDURE — 17110 DESTRUCTION B9 LES UP TO 14: CPT | Mod: S$GLB,,, | Performed by: DERMATOLOGY

## 2022-05-24 PROCEDURE — 99999 PR PBB SHADOW E&M-EST. PATIENT-LVL III: ICD-10-PCS | Mod: PBBFAC,,, | Performed by: DERMATOLOGY

## 2022-05-24 PROCEDURE — 1160F PR REVIEW ALL MEDS BY PRESCRIBER/CLIN PHARMACIST DOCUMENTED: ICD-10-PCS | Mod: CPTII,S$GLB,, | Performed by: DERMATOLOGY

## 2022-05-24 NOTE — PATIENT INSTRUCTIONS

## 2022-05-24 NOTE — PROGRESS NOTES
Subjective:       Patient ID:  Joseph Jesus is a 62 y.o. male who presents for   Chief Complaint   Patient presents with    Skin Check     UBSE    Warts     Right foot     History of Present Illness: The patient presents for follow up of skin check.    The patient was last seen on: 10/4/2021 for treatment of actinic keratoses is resolved and UBSE    Other skin complaints: wart to right foot    Patient with new complaint of lesion(s)  Location: right foot  Duration: 6mo  Symptoms: painful (prior to treatment with walking) and irritated  Relieving factors/Previous treatments: otc wart pads              Review of Systems   Skin: Positive for activity-related sunscreen use and wears hat (always). Negative for daily sunscreen use and recent sunburn.   Hematologic/Lymphatic: Bruises/bleeds easily.        Objective:    Physical Exam   Skin:   Areas Examined (abnormalities noted in diagram):   RLE Inspected             Diagram Legend     Erythematous scaling macule/papule c/w actinic keratosis       Vascular papule c/w angioma      Pigmented verrucoid papule/plaque c/w seborrheic keratosis      Yellow umbilicated papule c/w sebaceous hyperplasia      Irregularly shaped tan macule c/w lentigo     1-2 mm smooth white papules consistent with Milia      Movable subcutaneous cyst with punctum c/w epidermal inclusion cyst      Subcutaneous movable cyst c/w pilar cyst      Firm pink to brown papule c/w dermatofibroma      Pedunculated fleshy papule(s) c/w skin tag(s)      Evenly pigmented macule c/w junctional nevus     Mildly variegated pigmented, slightly irregular-bordered macule c/w mildly atypical nevus      Flesh colored to evenly pigmented papule c/w intradermal nevus       Pink pearly papule/plaque c/w basal cell carcinoma      Erythematous hyperkeratotic cursted plaque c/w SCC      Surgical scar with no sign of skin cancer recurrence      Open and closed comedones      Inflammatory papules and pustules      Verrucoid  papule consistent consistent with wart     Erythematous eczematous patches and plaques     Dystrophic onycholytic nail with subungual debris c/w onychomycosis     Umbilicated papule    Erythematous-base heme-crusted tan verrucoid plaque consistent with inflamed seborrheic keratosis     Erythematous Silvery Scaling Plaque c/w Psoriasis     See annotation      Assessment / Plan:        Neoplasm of uncertain behavior of skin - verruca plantaris vs clavus  Cryosurgery procedure note:    Verbal consent from the patient is obtained including, but not limited to, risk of hypopigmentation/hyperpigmentation, scar, recurrence of lesion. Liquid nitrogen cryosurgery is applied to 1 verruca/corn with prior paring, as detailed in the physical exam, to produce a freeze injury. 3 consecutive freeze thaw cycles are applied to each verruca. The patient is aware that blisters (possibly blood blisters) may form.    Use corn pads           Follow up in about 6 weeks (around 7/5/2022).

## 2022-05-25 RX ORDER — NIFEDIPINE 30 MG/1
TABLET, FILM COATED, EXTENDED RELEASE ORAL
Qty: 30 TABLET | Refills: 0 | Status: SHIPPED | OUTPATIENT
Start: 2022-05-25 | End: 2022-05-30 | Stop reason: SDUPTHER

## 2022-05-25 NOTE — TELEPHONE ENCOUNTER
No new care gaps identified.  [facility] Embedded Care Gaps. Reference number: [id number]. 5/25/2022   8:35:25 AM CDT

## 2022-05-25 NOTE — TELEPHONE ENCOUNTER
Refill Routing Note   Medication(s) are not appropriate for processing by [facility] for the following reason(s):      - Medication is a new start (<3 months)    [facility] action(s):  Defer          Medication reconciliation completed: No     Appointments  past 12m or future 3m with PCP    Date Provider   Last Visit   5/17/2022 Nick Trejo MD   Next Visit   Visit date not found Nick Trejo MD   ED visits in past 90 days: 0        Note composed:11:28 AM 05/25/2022

## 2022-05-30 NOTE — TELEPHONE ENCOUNTER
No new care gaps identified.  [facility] Embedded Care Gaps. Reference number: [id number]. 5/30/2022   6:48:24 PM CDT

## 2022-05-31 RX ORDER — NIFEDIPINE 30 MG/1
30 TABLET, FILM COATED, EXTENDED RELEASE ORAL DAILY
Qty: 90 TABLET | Refills: 3 | Status: SHIPPED | OUTPATIENT
Start: 2022-05-31 | End: 2023-04-13 | Stop reason: SDUPTHER

## 2022-06-14 ENCOUNTER — PATIENT MESSAGE (OUTPATIENT)
Dept: UROLOGY | Facility: CLINIC | Age: 62
End: 2022-06-14
Payer: COMMERCIAL

## 2022-06-15 DIAGNOSIS — I10 HYPERTENSION, ESSENTIAL: ICD-10-CM

## 2022-06-15 NOTE — TELEPHONE ENCOUNTER
No new care gaps identified.  [facility] Embedded Care Gaps. Reference number: [id number]. 6/15/2022   12:33:53 AM JAIDENT

## 2022-06-15 NOTE — TELEPHONE ENCOUNTER
Refill Routing Note   Medication(s) are not appropriate for processing by [facility] for the following reason(s):      - Required laboratory values are abnormal    [facility] action(s):  Route          Medication reconciliation completed: No     Appointments  past 12m or future 3m with PCP    Date Provider   Last Visit   5/17/2022 Nick Trejo MD   Next Visit   Visit date not found Nick Trejo MD   ED visits in past 90 days: 0        Note composed:1:57 PM 06/15/2022

## 2022-06-17 ENCOUNTER — TELEPHONE (OUTPATIENT)
Dept: UROLOGY | Facility: CLINIC | Age: 62
End: 2022-06-17
Payer: COMMERCIAL

## 2022-06-17 ENCOUNTER — PATIENT MESSAGE (OUTPATIENT)
Dept: UROLOGY | Facility: CLINIC | Age: 62
End: 2022-06-17
Payer: COMMERCIAL

## 2022-06-17 NOTE — TELEPHONE ENCOUNTER
Called pt to confirm arrival time of 645am for procedure on 6-21. Gave pt NPO instructions and gave pt opportunity to ask questions. Pt verbalized understanding.     Pt was informed that only 1 person would be allowed to accompany them the morning of surgery.  Pt verbalized understanding.    Left v/m(9:06am) with arrival time and also advised pt that I would be sending his arrival time to his [facility]wyn as well.  Also left v/m with my contact number.

## 2022-06-20 ENCOUNTER — ANESTHESIA EVENT (OUTPATIENT)
Dept: SURGERY | Facility: HOSPITAL | Age: 62
End: 2022-06-20
Payer: COMMERCIAL

## 2022-06-20 NOTE — PRE-PROCEDURE INSTRUCTIONS
PreOp Instructions given:   - Verbal medication information (what to hold and what to take)   - NPO guidelines   - Arrival place directions given; time to be given the day before procedure by the   Surgeon's Office 0645  - Bathing with antibacterial soap   - Don't wear any jewelry or bring any valuables AM of surgery   - No makeup or moisturizer to face   - No perfume/cologne, powder, lotions or aftershave   Pt. verbalized understanding.   Pt reports PONV w/last procedure 4/19/22   Pt denies taking Asa, Motrin, Aleve, MVI, Vit E for the past 14 days

## 2022-06-20 NOTE — ANESTHESIA PREPROCEDURE EVALUATION
06/20/2022  Joseph Jesus is a 62 y.o., male.    Pre-operative evaluation for Procedure(s) (LRB):  TURBT (TRANSURETHRAL RESECTION OF BLADDER TUMOR) (N/A)    Patient Active Problem List   Diagnosis    Colon polyps    BPH with urinary obstruction    Hypertension, essential    Hyperlipidemia    NAFLD (nonalcoholic fatty liver disease)    Lateral epicondylitis of right elbow    Diverticulitis of large intestine without perforation or abscess without bleeding    Erectile dysfunction due to arterial insufficiency    AK (actinic keratosis)    Upper airway cough syndrome    Hiatal hernia with gastroesophageal reflux disease without esophagitis    Gastroesophageal reflux disease    Aortic atherosclerosis    Coronary artery calcification    Edema leg    Malignant neoplasm of lateral wall of urinary bladder    Adjustment disorder with anxious mood    Numbness of toes    Stage 3a chronic kidney disease            Peripheral IV - Single Lumen 04/19/22 0906 20 G Anterior;Right Forearm (Active)   Number of days: 62       Medications Prior to Admission   Medication Sig Dispense Refill Last Dose    coenzyme Q10 100 mg capsule Take 200 mg by mouth once daily.   Past Week at Unknown time    hydroCHLOROthiazide (HYDRODIURIL) 25 MG tablet Take 1 tablet (25 mg total) by mouth once daily. 90 tablet 0 6/20/2022 at Unknown time    NIFEdipine (ADALAT CC) 30 MG TbSR Take 1 tablet (30 mg total) by mouth once daily. 90 tablet 3 6/20/2022 at Unknown time    albuterol (PROVENTIL/VENTOLIN HFA) 90 mcg/actuation inhaler Inhale 2 puffs into the lungs every 6 (six) hours as needed for Wheezing (cough). 18 g 5     atorvastatin (LIPITOR) 10 MG tablet Take 1 tablet (10 mg total) by mouth once daily. 90 tablet 3     cetirizine (ZYRTEC) 10 MG tablet Take 10 mg by mouth once daily.       fluticasone propionate (FLONASE) 50  mcg/actuation nasal spray 2 sprays (100 mcg total) by Each Nostril route daily as needed for Rhinitis. 16 mL 5     papaverine 30 mg/mL injection Add Phentolamine 1 mg/cc   Add PGE1 10 mcg/cc   SIG: Use as directed 10 mL 11     promethazine-dextromethorphan (PROMETHAZINE-DM) 6.25-15 mg/5 mL Syrp TAKE 5 MLS BY MOUTH NIGHTLY AS NEEDED (FOR COUGH AND CONGESTION). 240 mL 0     promethazine-dextromethorphan (PROMETHAZINE-DM) 6.25-15 mg/5 mL Syrp Take 5 mLs by mouth nightly as needed (for cough and congestion). 240 mL 0        Review of patient's allergies indicates:   Allergen Reactions    Amlodipine      edema    Atenolol      Depression - circa 2000    Catechu herb     Irbesartan Other (See Comments)     Possibly caused cough    Erythromycin Rash    Sumycin [tetracycline] Rash    Tetracyclines Rash       Past Medical History:   Diagnosis Date    Allergy     seasonal    Anal fistula hx    Arthritis     Basal cell carcinoma 4/2013    left superior forehead    Callus     Diverticulosis     Epididymal cyst     GERD (gastroesophageal reflux disease)     Hayfever     Hydrocele, right     Hyperlipidemia     Hypertension     Neck pain     hx of muscular inury from weight lifting---resolved now    Prostatitis     Dec. '12-treated with antibx.    Squamous cell carcinoma 01/07/2019    anterior scalp    Squamous cell carcinoma of skin 03/2020    Left post scalp (SCC insitu-saucerization)    Visual impairment      Past Surgical History:   Procedure Laterality Date    APPENDECTOMY  1962    BIOPSY OF BLADDER  4/19/2022    Procedure: BIOPSY, BLADDER;  Surgeon: Lenny Ewing MD;  Location: [facility] OR [address] [place] [address];  Service: Urology;;    BLADDER FULGURATION  4/19/2022    Procedure: FULGURATION, BLADDER;  Surgeon: Lenny Ewing MD;  Location: [facility] OR [address] [place] [address];  Service: Urology;;    COLONOSCOPY  12/2012    due for repeat 12/2015    COLONOSCOPY N/A 5/19/2016    Procedure: COLONOSCOPY;  Surgeon: James PHILLIPS  MD Akbar;  Location: [facility] (4TH FLR);  Service: Endoscopy;  Laterality: N/A; diverticulosis, repeat in 5-10 years for surveillance    COLONOSCOPY N/A 2021    Procedure: COLONOSCOPY;  Surgeon: Fahad Bautista Jr., MD;  Location: [facility];  Service: Endoscopy;  Laterality: N/A;    CYSTOSCOPY  2022    Procedure: CYSTOSCOPY;  Surgeon: Lenny Ewing MD;  Location: [facility] OR 1ST FLR;  Service: Urology;;    ESOPHAGOGASTRODUODENOSCOPY N/A 2021    Procedure: EGD (ESOPHAGOGASTRODUODENOSCOPY);  Surgeon: Fahad Bautista Jr., MD;  Location: [facility];  Service: Endoscopy;  Laterality: N/A;    EUA/Fistulotomy-'08      HERNIA REPAIR      RI with mesh    Hydrocelectomy      MASS EXCISION      BCC removal (twice)    MOLE REMOVAL  2019    2 moles removed from scalp =- 1 was basal cell and 1 was squamous cell - dermatology - Dr. londono    right Spermatocelectomy      UPPER GASTROINTESTINAL ENDOSCOPY  prior to     hiatal hernia, reflux esophagitis     Tobacco Use    Smoking status: Former Smoker     Packs/day: 1.00     Years: 10.00     Pack years: 10.00     Quit date: 1987     Years since quittin.5    Smokeless tobacco: Never Used   Substance and Sexual Activity    Alcohol use: Not Currently     Alcohol/week: 0.0 - 1.0 standard drinks     Comment: socially- not currently    Drug use: No    Sexual activity: Yes     Partners: Female       Objective:     Vital Signs (Most Recent):    Vital Signs (24h Range):           There is no height or weight on file to calculate BMI.        Significant Labs:  All pertinent labs from the last 24 hours have been reviewed.    CBC: No results for input(s): WBC, RBC, HGB, HCT, PLT, MCV, MCH, MCHC in the last 72 hours.    CMP: No results for input(s): NA, K, CL, CO2, BUN, CREATININE, GLU, MG, PHOS, CALCIUM, ALBUMIN, PROT, ALKPHOS, ALT, AST, BILITOT in the last 72 hours.    INR  No results for input(s): PT, INR, PROTIME, APTT in the  last 72 hours.      Pre-op Assessment    I have reviewed the Patient Summary Reports.     I have reviewed the Nursing Notes. I have reviewed the NPO Status.   I have reviewed the Medications.     Review of Systems  Anesthesia Hx:  Denies Family Hx of Anesthesia complications.  Personal Hx of Anesthesia complications, Post-Operative Nausea/Vomiting       Physical Exam  General: Well nourished    Airway:  Mallampati: II   Mouth Opening: Normal  TM Distance: Normal  Tongue: Normal  Neck ROM: Normal ROM    Dental:Any loose and/or missing teeth verified with patient   Chest/Lungs:  Clear to auscultation    Heart:  Rate: Normal  Rhythm: Regular Rhythm  Sounds: Normal    Abdomen:  Normal        Anesthesia Plan  Type of Anesthesia, risks & benefits discussed:    Anesthesia Type: Gen ETT, Gen Supraglottic Airway, Gen Natural Airway  Intra-op Monitoring Plan: Standard ASA Monitors  Post Op Pain Control Plan: multimodal analgesia and IV/PO Opioids PRN  Induction:  IV  Informed Consent: Informed consent signed with the Patient and all parties understand the risks and agree with anesthesia plan.  All questions answered.   ASA Score: 3    Ready For Surgery From Anesthesia Perspective.     .  Pt reports PONV w/last procedure on 4/19/22 - states it was the 1st/only time and began just after D/C.

## 2022-06-21 ENCOUNTER — HOSPITAL ENCOUNTER (OUTPATIENT)
Facility: HOSPITAL | Age: 62
Discharge: HOME | End: 2022-06-21
Attending: UROLOGY | Admitting: UROLOGY
Payer: COMMERCIAL

## 2022-06-21 ENCOUNTER — PATIENT MESSAGE (OUTPATIENT)
Dept: SURGERY | Facility: HOSPITAL | Age: 62
End: 2022-06-21
Payer: COMMERCIAL

## 2022-06-21 ENCOUNTER — ANESTHESIA (OUTPATIENT)
Dept: SURGERY | Facility: HOSPITAL | Age: 62
End: 2022-06-21
Payer: COMMERCIAL

## 2022-06-21 VITALS
OXYGEN SATURATION: 100 % | SYSTOLIC BLOOD PRESSURE: 158 MMHG | DIASTOLIC BLOOD PRESSURE: 85 MMHG | HEART RATE: 72 BPM | TEMPERATURE: 98 F | RESPIRATION RATE: 17 BRPM | HEIGHT: 73 IN | BODY MASS INDEX: 30.48 KG/M2 | WEIGHT: 230 LBS

## 2022-06-21 DIAGNOSIS — C67.2 MALIGNANT NEOPLASM OF LATERAL WALL OF URINARY BLADDER: ICD-10-CM

## 2022-06-21 DIAGNOSIS — Z85.51 HISTORY OF BLADDER CANCER: Primary | ICD-10-CM

## 2022-06-21 PROCEDURE — 63600175 PHARM REV CODE 636 W HCPCS: Performed by: NURSE ANESTHETIST, CERTIFIED REGISTERED

## 2022-06-21 PROCEDURE — 64450 PR NERVE BLOCK INJ, ANES/STEROID, OTHER PERIPHERAL: ICD-10-PCS | Mod: 59,RT,, | Performed by: ANESTHESIOLOGY

## 2022-06-21 PROCEDURE — 25000003 PHARM REV CODE 250: Performed by: NURSE ANESTHETIST, CERTIFIED REGISTERED

## 2022-06-21 PROCEDURE — 36000707: Performed by: UROLOGY

## 2022-06-21 PROCEDURE — 63600175 PHARM REV CODE 636 W HCPCS: Performed by: ANESTHESIOLOGY

## 2022-06-21 PROCEDURE — 64450 NJX AA&/STRD OTHER PN/BRANCH: CPT | Mod: 59,RT,, | Performed by: ANESTHESIOLOGY

## 2022-06-21 PROCEDURE — D9220A PRA ANESTHESIA: ICD-10-PCS | Mod: CRNA,,, | Performed by: NURSE ANESTHETIST, CERTIFIED REGISTERED

## 2022-06-21 PROCEDURE — 64447 NJX AA&/STRD FEMORAL NRV IMG: CPT | Performed by: STUDENT IN AN ORGANIZED HEALTH CARE EDUCATION/TRAINING PROGRAM

## 2022-06-21 PROCEDURE — 71000015 HC POSTOP RECOV 1ST HR: Performed by: UROLOGY

## 2022-06-21 PROCEDURE — 52235 CYSTOSCOPY AND TREATMENT: CPT | Mod: ,,, | Performed by: UROLOGY

## 2022-06-21 PROCEDURE — 37000008 HC ANESTHESIA 1ST 15 MINUTES: Performed by: UROLOGY

## 2022-06-21 PROCEDURE — 37000009 HC ANESTHESIA EA ADD 15 MINS: Performed by: UROLOGY

## 2022-06-21 PROCEDURE — 88305 TISSUE EXAM BY PATHOLOGIST: CPT | Performed by: STUDENT IN AN ORGANIZED HEALTH CARE EDUCATION/TRAINING PROGRAM

## 2022-06-21 PROCEDURE — 76942: ICD-10-PCS | Mod: 26,,, | Performed by: ANESTHESIOLOGY

## 2022-06-21 PROCEDURE — 52235 PR CYSTOURETHROSCOPY,FULGUR 2-5 CM LESN: ICD-10-PCS | Mod: ,,, | Performed by: UROLOGY

## 2022-06-21 PROCEDURE — D9220A PRA ANESTHESIA: Mod: ANES,,, | Performed by: STUDENT IN AN ORGANIZED HEALTH CARE EDUCATION/TRAINING PROGRAM

## 2022-06-21 PROCEDURE — 63600175 PHARM REV CODE 636 W HCPCS: Performed by: STUDENT IN AN ORGANIZED HEALTH CARE EDUCATION/TRAINING PROGRAM

## 2022-06-21 PROCEDURE — 25000003 PHARM REV CODE 250: Performed by: STUDENT IN AN ORGANIZED HEALTH CARE EDUCATION/TRAINING PROGRAM

## 2022-06-21 PROCEDURE — 88305 TISSUE EXAM BY PATHOLOGIST: CPT | Mod: 26,,, | Performed by: STUDENT IN AN ORGANIZED HEALTH CARE EDUCATION/TRAINING PROGRAM

## 2022-06-21 PROCEDURE — D9220A PRA ANESTHESIA: ICD-10-PCS | Mod: ANES,,, | Performed by: STUDENT IN AN ORGANIZED HEALTH CARE EDUCATION/TRAINING PROGRAM

## 2022-06-21 PROCEDURE — 88305 TISSUE EXAM BY PATHOLOGIST: ICD-10-PCS | Mod: 26,,, | Performed by: STUDENT IN AN ORGANIZED HEALTH CARE EDUCATION/TRAINING PROGRAM

## 2022-06-21 PROCEDURE — 27201423 OPTIME MED/SURG SUP & DEVICES STERILE SUPPLY: Performed by: UROLOGY

## 2022-06-21 PROCEDURE — D9220A PRA ANESTHESIA: Mod: CRNA,,, | Performed by: NURSE ANESTHETIST, CERTIFIED REGISTERED

## 2022-06-21 PROCEDURE — 76942 ECHO GUIDE FOR BIOPSY: CPT | Mod: 26,,, | Performed by: ANESTHESIOLOGY

## 2022-06-21 PROCEDURE — 71000044 HC DOSC ROUTINE RECOVERY FIRST HOUR: Performed by: UROLOGY

## 2022-06-21 PROCEDURE — 36000706: Performed by: UROLOGY

## 2022-06-21 RX ORDER — FAMOTIDINE 10 MG/ML
INJECTION INTRAVENOUS
Status: DISCONTINUED | OUTPATIENT
Start: 2022-06-21 | End: 2022-06-21

## 2022-06-21 RX ORDER — DEXAMETHASONE SODIUM PHOSPHATE 4 MG/ML
INJECTION, SOLUTION INTRA-ARTICULAR; INTRALESIONAL; INTRAMUSCULAR; INTRAVENOUS; SOFT TISSUE
Status: DISCONTINUED | OUTPATIENT
Start: 2022-06-21 | End: 2022-06-21

## 2022-06-21 RX ORDER — SODIUM CHLORIDE 0.9 % (FLUSH) 0.9 %
10 SYRINGE (ML) INJECTION
Status: DISCONTINUED | OUTPATIENT
Start: 2022-06-21 | End: 2022-06-21 | Stop reason: HOSPADM

## 2022-06-21 RX ORDER — ROCURONIUM BROMIDE 10 MG/ML
INJECTION, SOLUTION INTRAVENOUS
Status: DISCONTINUED | OUTPATIENT
Start: 2022-06-21 | End: 2022-06-21

## 2022-06-21 RX ORDER — ACETAMINOPHEN 325 MG/1
650 TABLET ORAL EVERY 6 HOURS PRN
Status: COMPLETED | OUTPATIENT
Start: 2022-06-21 | End: 2022-06-21

## 2022-06-21 RX ORDER — KETAMINE HCL IN 0.9 % NACL 50 MG/5 ML
SYRINGE (ML) INTRAVENOUS
Status: DISCONTINUED | OUTPATIENT
Start: 2022-06-21 | End: 2022-06-21

## 2022-06-21 RX ORDER — PROPOFOL 10 MG/ML
VIAL (ML) INTRAVENOUS
Status: DISCONTINUED | OUTPATIENT
Start: 2022-06-21 | End: 2022-06-21

## 2022-06-21 RX ORDER — PROPOFOL 10 MG/ML
VIAL (ML) INTRAVENOUS CONTINUOUS PRN
Status: DISCONTINUED | OUTPATIENT
Start: 2022-06-21 | End: 2022-06-21

## 2022-06-21 RX ORDER — LIDOCAINE HCL/PF 100 MG/5ML
SYRINGE (ML) INTRAVENOUS
Status: DISCONTINUED | OUTPATIENT
Start: 2022-06-21 | End: 2022-06-21

## 2022-06-21 RX ORDER — CEFAZOLIN SODIUM/WATER 2 G/20 ML
2 SYRINGE (ML) INTRAVENOUS
Status: COMPLETED | OUTPATIENT
Start: 2022-06-21 | End: 2022-06-21

## 2022-06-21 RX ORDER — ONDANSETRON HYDROCHLORIDE 2 MG/ML
INJECTION, SOLUTION INTRAMUSCULAR; INTRAVENOUS
Status: DISCONTINUED | OUTPATIENT
Start: 2022-06-21 | End: 2022-06-21

## 2022-06-21 RX ORDER — MIDAZOLAM HYDROCHLORIDE 1 MG/ML
.5-4 INJECTION INTRAMUSCULAR; INTRAVENOUS
Status: DISCONTINUED | OUTPATIENT
Start: 2022-06-21 | End: 2022-06-21 | Stop reason: HOSPADM

## 2022-06-21 RX ORDER — FENTANYL CITRATE 50 UG/ML
25-200 INJECTION, SOLUTION INTRAMUSCULAR; INTRAVENOUS
Status: DISCONTINUED | OUTPATIENT
Start: 2022-06-21 | End: 2022-06-21 | Stop reason: HOSPADM

## 2022-06-21 RX ORDER — SCOLOPAMINE TRANSDERMAL SYSTEM 1 MG/1
1 PATCH, EXTENDED RELEASE TRANSDERMAL
Status: DISCONTINUED | OUTPATIENT
Start: 2022-06-21 | End: 2022-06-21 | Stop reason: HOSPADM

## 2022-06-21 RX ORDER — PROCHLORPERAZINE EDISYLATE 5 MG/ML
INJECTION INTRAMUSCULAR; INTRAVENOUS
Status: DISCONTINUED | OUTPATIENT
Start: 2022-06-21 | End: 2022-06-21

## 2022-06-21 RX ORDER — EPHEDRINE SULFATE 50 MG/ML
INJECTION, SOLUTION INTRAVENOUS
Status: DISCONTINUED | OUTPATIENT
Start: 2022-06-21 | End: 2022-06-21

## 2022-06-21 RX ORDER — DIPHENHYDRAMINE HYDROCHLORIDE 50 MG/ML
INJECTION INTRAMUSCULAR; INTRAVENOUS
Status: DISCONTINUED | OUTPATIENT
Start: 2022-06-21 | End: 2022-06-21

## 2022-06-21 RX ORDER — TRAMADOL HYDROCHLORIDE 50 MG/1
50 TABLET ORAL EVERY 6 HOURS PRN
Qty: 5 TABLET | Refills: 0 | Status: SHIPPED | OUTPATIENT
Start: 2022-06-21 | End: 2022-08-17 | Stop reason: CLARIF

## 2022-06-21 RX ADMIN — ACETAMINOPHEN 650 MG: 325 TABLET ORAL at 07:06

## 2022-06-21 RX ADMIN — Medication 30 MG: at 08:06

## 2022-06-21 RX ADMIN — FAMOTIDINE 20 MG: 10 INJECTION, SOLUTION INTRAVENOUS at 08:06

## 2022-06-21 RX ADMIN — PROPOFOL 150 MG: 10 INJECTION, EMULSION INTRAVENOUS at 08:06

## 2022-06-21 RX ADMIN — LIDOCAINE HYDROCHLORIDE 60 MG: 20 INJECTION, SOLUTION INTRAVENOUS at 08:06

## 2022-06-21 RX ADMIN — DIPHENHYDRAMINE HYDROCHLORIDE 12.5 MG: 50 INJECTION INTRAMUSCULAR; INTRAVENOUS at 08:06

## 2022-06-21 RX ADMIN — ROCURONIUM BROMIDE 50 MG: 10 INJECTION, SOLUTION INTRAVENOUS at 08:06

## 2022-06-21 RX ADMIN — ONDANSETRON 4 MG: 2 INJECTION, SOLUTION INTRAMUSCULAR; INTRAVENOUS at 08:06

## 2022-06-21 RX ADMIN — DEXAMETHASONE SODIUM PHOSPHATE 8 MG: 4 INJECTION, SOLUTION INTRAMUSCULAR; INTRAVENOUS at 08:06

## 2022-06-21 RX ADMIN — SODIUM CHLORIDE: 0.9 INJECTION, SOLUTION INTRAVENOUS at 08:06

## 2022-06-21 RX ADMIN — Medication 2 G: at 08:06

## 2022-06-21 RX ADMIN — MIDAZOLAM 2 MG: 1 INJECTION INTRAMUSCULAR; INTRAVENOUS at 08:06

## 2022-06-21 RX ADMIN — PROCHLORPERAZINE EDISYLATE 2.5 MG: 5 INJECTION INTRAMUSCULAR; INTRAVENOUS at 08:06

## 2022-06-21 RX ADMIN — EPHEDRINE SULFATE 15 MG: 50 INJECTION INTRAVENOUS at 08:06

## 2022-06-21 RX ADMIN — FENTANYL CITRATE 50 MCG: 50 INJECTION INTRAMUSCULAR; INTRAVENOUS at 08:06

## 2022-06-21 RX ADMIN — Medication 10 MG: at 08:06

## 2022-06-21 RX ADMIN — SCOPALAMINE 1 PATCH: 1 PATCH, EXTENDED RELEASE TRANSDERMAL at 07:06

## 2022-06-21 RX ADMIN — MEPIVACAINE HYDROCHLORIDE 30 ML: 15 INJECTION, SOLUTION EPIDURAL; INFILTRATION at 08:06

## 2022-06-21 RX ADMIN — PROPOFOL 200 MCG/KG/MIN: 10 INJECTION, EMULSION INTRAVENOUS at 08:06

## 2022-06-21 NOTE — PROGRESS NOTES
Belongings were returned to the patient while waiting for procedure. Belongings will need to be placed in the closet when patient rolls to surgery.

## 2022-06-21 NOTE — DISCHARGE SUMMARY
[facility]  Discharge Note  Short Stay    Admit Date: 6/21/2022    Discharge Date and Time: 06/21/2022 9:05 AM      Attending Physician: Lenny Ewing MD     Discharge Provider: Gretel Mtz    Diagnoses:  Active Hospital Problems    Diagnosis  POA    *Malignant neoplasm of lateral wall of urinary bladder [C67.2]  Yes    Adjustment disorder with anxious mood [F43.22]  Yes    Stage 3a chronic kidney disease [N18.31]  Yes    Erectile dysfunction due to arterial insufficiency [N52.01]  Yes    Hypertension, essential [I10]  Yes    BPH with urinary obstruction [N40.1, N13.8]  Yes      Resolved Hospital Problems   No resolved problems to display.       Discharged Condition: good    Hospital Course: Patient was admitted for TURBT and tolerated the procedure well with no complications. The patient was discharged home in good condition on the same day.       Final Diagnoses: Same as principal problem.    Disposition: Home or Self Care    Follow up/Patient Instructions:    Medications:  Reconciled Home Medications:   Current Discharge Medication List      START taking these medications    Details   traMADoL (ULTRAM) 50 mg tablet Take 1 tablet (50 mg total) by mouth every 6 (six) hours as needed for Pain.  Qty: 5 tablet, Refills: 0    Comments: n/a          CONTINUE these medications which have NOT CHANGED    Details   albuterol (PROVENTIL/VENTOLIN HFA) 90 mcg/actuation inhaler Inhale 2 puffs into the lungs every 6 (six) hours as needed for Wheezing (cough).  Qty: 18 g, Refills: 5    Associated Diagnoses: Cough      atorvastatin (LIPITOR) 10 MG tablet Take 1 tablet (10 mg total) by mouth once daily.  Qty: 90 tablet, Refills: 3    Comments: DX Code Needed  .  Associated Diagnoses: Hyperlipidemia, unspecified hyperlipidemia type      cetirizine (ZYRTEC) 10 MG tablet Take 10 mg by mouth once daily.      coenzyme Q10 100 mg capsule Take 200 mg by mouth once daily.      fluticasone propionate (FLONASE) 50  mcg/actuation nasal spray 2 sprays (100 mcg total) by Each Nostril route daily as needed for Rhinitis.  Qty: 16 mL, Refills: 5    Associated Diagnoses: Bronchitis with wheezing      hydroCHLOROthiazide (HYDRODIURIL) 25 MG tablet Take 1 tablet (25 mg total) by mouth once daily.  Qty: 90 tablet, Refills: 0    Comments: .  Associated Diagnoses: Hypertension, essential      NIFEdipine (ADALAT CC) 30 MG TbSR Take 1 tablet (30 mg total) by mouth once daily.  Qty: 90 tablet, Refills: 3    Comments: .      !! promethazine-dextromethorphan (PROMETHAZINE-DM) 6.25-15 mg/5 mL Syrp TAKE 5 MLS BY MOUTH NIGHTLY AS NEEDED (FOR COUGH AND CONGESTION).  Qty: 240 mL, Refills: 0    Associated Diagnoses: Chest congestion      papaverine 30 mg/mL injection Add Phentolamine 1 mg/cc   Add PGE1 10 mcg/cc   SIG: Use as directed  Qty: 10 mL, Refills: 11      !! promethazine-dextromethorphan (PROMETHAZINE-DM) 6.25-15 mg/5 mL Syrp Take 5 mLs by mouth nightly as needed (for cough and congestion).  Qty: 240 mL, Refills: 0    Associated Diagnoses: Chest congestion       !! - Potential duplicate medications found. Please discuss with provider.        Discharge Procedure Orders   Diet Adult Regular     No dressing needed     Activity as tolerated      Follow-up Information     Lenny Ewing MD Follow up on 6/29/2022.    Specialty: Urology  Why: post op  [facility] information:  [facility] [address]  [phone]                         Discharge Procedure Orders (must include Diet, Follow-up, Activity):   Discharge Procedure Orders (must include Diet, Follow-up, Activity)   Diet Adult Regular     No dressing needed     Activity as tolerated

## 2022-06-21 NOTE — PATIENT INSTRUCTIONS
Post Cystoscopy Instructions  Do not strain to have a bowel movement  No strenuous exercise x 7 days  No driving while you are on narcotic pain medications    You can expect:  See blood in your urine if you have a stent in place    If you have a catheter, please return to the ER if your catheter stops draining or you are having abdominal pain.    Call the doctor if:  Temperature is greater than 101F  Persistent vomiting and inability to keep food down  Inability to void if you do not have a catheter

## 2022-06-21 NOTE — ANESTHESIA PROCEDURE NOTES
Rt Obturator SS    Patient location during procedure: pre-op   Block not for primary anesthetic.  Reason for block: at surgeon's request and post-op pain management   Post-op Pain Location: Rt medial thigh   Start time: 6/21/2022 8:06 AM  Timeout: 6/21/2022 8:05 AM   End time: 6/21/2022 8:10 AM    Staffing  Authorizing Provider: Doe Rowland MD  Performing Provider: Yamilet Toledo MD    Preanesthetic Checklist  Completed: patient identified, IV checked, site marked, risks and benefits discussed, surgical consent, monitors and equipment checked, pre-op evaluation and timeout performed  Peripheral Block  Patient position: supine  Prep: ChloraPrep and site prepped and draped  Patient monitoring: heart rate, cardiac monitor, continuous pulse ox, continuous capnometry and frequent blood pressure checks  Block type: obturator  Laterality: right  Injection technique: single shot  Needle  Needle type: Tuohy   Needle gauge: 17 G  Needle length: 3.5 in  Needle localization: anatomical landmarks and ultrasound guidance  Catheter type: spring wound  Catheter size: 19 G  Test dose: lidocaine 1.5% with Epi 1-to-200,000 and negative   -ultrasound image captured on disc.  Assessment  Injection assessment: negative aspiration, negative parasthesia and local visualized surrounding nerve  Paresthesia pain: none  Heart rate change: no  Slow fractionated injection: yes    Medications:    Medications: mepivacaine (CARBOCAINE) injection 15 mg/mL (1.5%) - Perineural   30 mL - 6/21/2022 8:10:00 AM    Additional Notes  VSS.  [facility] RN monitoring vitals throughout procedure.  Patient tolerated procedure well.

## 2022-06-21 NOTE — ANESTHESIA POSTPROCEDURE EVALUATION
Anesthesia Post Evaluation    Patient: Joseph Jesus    Procedure(s) Performed: Procedure(s) (LRB):  TURBT (TRANSURETHRAL RESECTION OF BLADDER TUMOR) (N/A)  CYSTOSCOPY    Final Anesthesia Type: general      Patient location during evaluation: PACU  Patient participation: Yes- Able to Participate  Level of consciousness: awake and alert  Post-procedure vital signs: reviewed and stable  Pain management: adequate  Airway patency: patent  HUSSAIN mitigation strategies: Extubation while patient is awake  PONV status at discharge: No PONV  Anesthetic complications: no      Cardiovascular status: stable  Respiratory status: spontaneous ventilation and face mask  Hydration status: euvolemic  Follow-up not needed.          Vitals Value Taken Time   /85 06/21/22 1000   Temp 36.6 °C (97.9 °F) 06/21/22 0855   Pulse 71 06/21/22 1000   Resp 17 06/21/22 1000   SpO2 99 % 06/21/22 1000   Vitals shown include unvalidated device data.      No case tracking events are documented in the log.      Pain/Neymar Score: Pain Rating Prior to Med Admin: 0 (6/21/2022  8:04 AM)  Pain Rating Post Med Admin: 0 (6/21/2022  8:10 AM)  Neymar Score: 10 (6/21/2022 10:00 AM)

## 2022-06-21 NOTE — TRANSFER OF CARE
"Anesthesia Transfer of Care Note    Patient: Joseph Jesus    Procedure(s) Performed: Procedure(s) (LRB):  TURBT (TRANSURETHRAL RESECTION OF BLADDER TUMOR) (N/A)  CYSTOSCOPY    Patient location: PACU    Anesthesia Type: general    Transport from OR: Transported from OR on room air with adequate spontaneous ventilation    Post pain: adequate analgesia    Post assessment: no apparent anesthetic complications and tolerated procedure well    Post vital signs: stable    Level of consciousness: awake, alert and oriented    Nausea/Vomiting: no nausea/vomiting    Complications: none    Transfer of care protocol was followed      Last vitals:   Visit Vitals  /68   Pulse 65   Temp 36.6 °C (97.9 °F) (Temporal)   Resp 13   Ht 6' 1" (1.854 m)   Wt 104.3 kg (230 lb)   SpO2 (!) 94%   BMI 30.34 kg/m²     "

## 2022-06-21 NOTE — H&P
Urology ([facility]) H&P  Staff: Lenny Bullard MD      HPI:  Joseph Jesus is a 62 y.o. male with c/o ED.  This has been present for > 1 year.  He's tried and failed viagra and cialis.  His T is normal.  He went to [employer].  He has tried and failed 25 units of ICI.     He has LUTS.  + nocturia x 5, + feeling of incomplete emptying, + urgency.      He had microhematuria and was found to have an erythematous lesion with a positive cytology.  On 4/19/22 he underwent bladder biopsy showing CIS.    Here today for TURBT      ROS:  Neg except per HPI    Past Medical History:   Diagnosis Date    Allergy     seasonal    Anal fistula hx    Arthritis     Basal cell carcinoma 04/2013    left superior forehead    Callus     Diverticulosis     Epididymal cyst     GERD (gastroesophageal reflux disease)     Hayfever     Hydrocele, right     Hyperlipidemia     Hypertension     Neck pain     hx of muscular inury from weight lifting---resolved now    PONV (postoperative nausea and vomiting)     Prostatitis     Dec. '12-treated with antibx.    Squamous cell carcinoma 01/07/2019    anterior scalp    Squamous cell carcinoma of skin 03/2020    Left post scalp (SCC insitu-saucerization)    Visual impairment        Past Surgical History:   Procedure Laterality Date    APPENDECTOMY  1962    BIOPSY OF BLADDER  4/19/2022    Procedure: BIOPSY, BLADDER;  Surgeon: Lenny Ewing MD;  [facility];  Service: Urology;;    BLADDER FULGURATION  4/19/2022    Procedure: FULGURATION, BLADDER;  Surgeon: Lenny Ewing MD;  [facility];  Service: Urology;;    COLONOSCOPY  12/2012    due for repeat 12/2015    COLONOSCOPY N/A 5/19/2016    Procedure: COLONOSCOPY;  Surgeon: James Webber MD;  Location: [facility] (4TH FLR);  Service: Endoscopy;  Laterality: N/A; diverticulosis, repeat in 5-10 years for surveillance    COLONOSCOPY N/A 8/9/2021    Procedure: COLONOSCOPY;  Surgeon:  Fahad Bautista Jr., MD;  [facility];  Service: Endoscopy;  Laterality: N/A;    CYSTOSCOPY  2022    Procedure: CYSTOSCOPY;  Surgeon: Lenny Ewing MD;  Location: [facility] OR [address] [place] [address];  Service: Urology;;    ESOPHAGOGASTRODUODENOSCOPY N/A 2021    Procedure: EGD (ESOPHAGOGASTRODUODENOSCOPY);  Surgeon: Fahad Bautista Jr., MD;  Location: [facility] ENDO;  Service: Endoscopy;  Laterality: N/A;    EUA/Fistulotomy-'08      HERNIA REPAIR      [facility] with mesh    Hydrocelectomy      MASS EXCISION      BCC removal (twice)    MOLE REMOVAL  2019    2 moles removed from scalp =- 1 was basal cell and 1 was squamous cell - dermatology - Dr. 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     Employer: [employer]     Comment: [employer]   Tobacco Use    Smoking status: Former Smoker     Packs/day: 1.00     Years: 10.00     Pack years: 10.00     Quit date: 1987     Years since quittin.5    Smokeless tobacco: Never Used   Substance and Sexual Activity    Alcohol use: Not Currently     Alcohol/week: 0.0 - 1.0 standard drinks     Comment: socially- not currently    Drug use: No    Sexual activity: Yes     Partners: Female       Family History   Problem Relation Age of Onset    Skin cancer Mother     Hypertension Mother     Skin cancer Father     Cancer Father         prostate cancer    Hypertension Father     Hypertension Maternal Grandmother     Hypertension Maternal Grandfather     Hypertension Paternal Grandmother     Anesthesia problems Paternal Grandmother     Cancer Paternal Grandfather         prostate cancer    Hypertension Paternal Grandfather     Heart disease Paternal Grandfather     Diabetes Neg Hx     Colon  polyps Neg Hx     Colon cancer Neg Hx     Melanoma Neg Hx     Psoriasis Neg Hx     Lupus Neg Hx     Eczema Neg Hx     Acne Neg Hx     Cirrhosis Neg Hx     Prostate cancer Neg Hx     Kidney disease Neg Hx     Crohn's disease Neg Hx     Ulcerative colitis Neg Hx     Stomach cancer Neg Hx     Esophageal cancer Neg Hx        Review of patient's allergies indicates:   Allergen Reactions    Amlodipine      edema    Atenolol      Depression - circa 2000    Catechu herb     Irbesartan Other (See Comments)     Possibly caused cough    Erythromycin Rash    Sumycin [tetracycline] Rash    Tetracyclines Rash       No current facility-administered medications on file prior to encounter.     Current Outpatient Medications on File Prior to Encounter   Medication Sig Dispense Refill    coenzyme Q10 100 mg capsule Take 200 mg by mouth once daily.      hydroCHLOROthiazide (HYDRODIURIL) 25 MG tablet Take 1 tablet (25 mg total) by mouth once daily. 90 tablet 0    albuterol (PROVENTIL/VENTOLIN HFA) 90 mcg/actuation inhaler Inhale 2 puffs into the lungs every 6 (six) hours as needed for Wheezing (cough). 18 g 5    atorvastatin (LIPITOR) 10 MG tablet Take 1 tablet (10 mg total) by mouth once daily. 90 tablet 3    cetirizine (ZYRTEC) 10 MG tablet Take 10 mg by mouth once daily.      papaverine 30 mg/mL injection Add Phentolamine 1 mg/cc   Add PGE1 10 mcg/cc   SIG: Use as directed 10 mL 11       Anticoagulation:  No    Physical Exam:  General: No acute distress, well developed. AAOx3  Head: Normocephalic, Atraumatic  Eyes: Extra-occular movements intact, No discharge  Neck: supple, symmetrical, trachea midline  Lungs: normal respiratory effort, no respiratory distress, no wheezes  CV: regular rate, 2+ pulses  Abdomen: soft, non-tender, non-distended, no organomegaly  MSK: no edema, no deformities, normal ROM  Skin: skin color, texture, turgor normal.  Neurologic: no focal deficits, sensation intact    Labs:    Urine  dipstick today - negative for all components    Lab Results   Component Value Date    WBC 7.55 04/27/2022    HGB 13.6 (L) 04/27/2022    HCT 41.1 04/27/2022    MCV 91 04/27/2022     04/27/2022           BMP  Lab Results   Component Value Date     04/27/2022    K 4.7 04/27/2022    CL 97 04/27/2022    CO2 31 (H) 04/27/2022    BUN 25 (H) 04/27/2022    CREATININE 1.5 (H) 04/27/2022    CALCIUM 10.7 (H) 04/27/2022    ANIONGAP 8 04/27/2022    ESTGFRAFRICA 56.1 (A) 04/27/2022    EGFRNONAA 48.5 (A) 04/27/2022       Lab Results   Component Value Date    PSA 0.30 01/28/2022    PSA 0.22 07/29/2020    PSA 0.29 05/30/2019       Assessment: Joseph Jesus is a 62 y.o. male with CIS of the bladder    Plan:     1. To OR on 6/21/22 for TURBT  2. Consents signed   3. I have explained the risk, benefits, and alternatives of the procedure in detail. The patient voices understanding and all questions have been answered. The patient agrees to proceed as planned.       Tucker Presley MD

## 2022-06-21 NOTE — PLAN OF CARE
Pt tolerated procedure well.  Discharge paperwork printed and reviewed with pt and family-copies of paperwork sent home with pt.  No complaints of pain or nausea.  Pt tolerating PO liquids well.  VSS.  IV removed.  Pt urinated 100 cc prior to discharge.  Safety maintained throughout care.

## 2022-06-21 NOTE — OP NOTE
[facility]  Operative Note    Date: 06/21/2022    Pre-Op Diagnosis:   1. CIS of the bladder  Patient Active Problem List    Diagnosis Date Noted    Adjustment disorder with anxious mood 05/17/2022    Numbness of toes 05/17/2022    Stage 3a chronic kidney disease 05/17/2022    Malignant neoplasm of lateral wall of urinary bladder 05/11/2022    Edema leg 03/28/2022    Aortic atherosclerosis 01/28/2022    Coronary artery calcification 01/28/2022    Gastroesophageal reflux disease 08/09/2021    Hiatal hernia with gastroesophageal reflux disease without esophagitis 05/26/2021    Upper airway cough syndrome     AK (actinic keratosis) 11/02/2020    Erectile dysfunction due to arterial insufficiency 07/02/2018    Diverticulitis of large intestine without perforation or abscess without bleeding 04/18/2017    Hypertension, essential 08/17/2016    Hyperlipidemia 08/17/2016    NAFLD (nonalcoholic fatty liver disease) 08/17/2016    Lateral epicondylitis of right elbow 08/17/2016    BPH with urinary obstruction 09/08/2014    Colon polyps 09/27/2012      Post-Op Diagnosis: same    Procedure(s) Performed:   1.  Cystoscopy with TURBT of medium sized lesion (3 cm)    Specimen(s): Right lateral wall bladder lesion    Staff Surgeon: Lenny Ewing MD    Assistant Surgeon: Gretel Mtz MD; Abbe Concepcion MD    Anesthesia: GETA    Indications: Joseph Jesus is a 62 y.o. male with history of microscopic hematuria and a positive cytology. Biopsy 4/19/22 showed CIS.     Findings:  - Erythematous lesion (3 cm) on right lateral wall resected     Estimated Blood Loss: min    Drains: None    Procedure in Detail:  The risks, benefits and alternatives of the procedure were explained, and all questions were answered in the leslie-operative area. The patient was transferred to the cystoscopy suite and placed in the supine position. SCDs were applied and working. Anesthesia was administered. Once sedated, the  patient was placed in the dorsal lithotomy position and prepped and draped in the usual sterile fashion. Time out was performed, leslie-procedural antibiotics were confirmed.    The resectoscope was then assembled with the visual obturator. This was placed into the bladder via the urethra and the visual obturator was exchanged for the resecting mechanism.  Cystoscopy was performed which revealed a 3 cm erythematous lesion located along the right lateral wall lateral to and not involving the right UO. There were no trabeculations, no bladder stones and no bladder diverticula.The tumor was then resected, superficially until the base was identified.  Specimens were then removed and passed off the field for pathologic analysis.       The bladder was drained and hemostasis was achieved.  The resectoscope was removed.       Post-resection bimanual exam revealed 25 g smooth prostate, bladder was soft and mobile with no hard masses.      The patient tolerated the procedure well and was transferred to recovery in stable condition.    Disposition:  The patient will follow up with Dr. Ewing for pathology review.     Gretel Mtz MD

## 2022-06-21 NOTE — ADDENDUM NOTE
Addendum  created 06/21/22 1300 by Rosemarie Hilario RN    Clinical Note Signed, Diagnosis association updated, Intraprocedure Blocks edited, SmartForm saved

## 2022-06-24 ENCOUNTER — PATIENT MESSAGE (OUTPATIENT)
Dept: UROLOGY | Facility: CLINIC | Age: 62
End: 2022-06-24
Payer: COMMERCIAL

## 2022-06-25 RX ORDER — HYDROCHLOROTHIAZIDE 25 MG/1
TABLET ORAL
Qty: 90 TABLET | Refills: 0 | Status: SHIPPED | OUTPATIENT
Start: 2022-06-25 | End: 2022-09-22

## 2022-06-30 ENCOUNTER — PATIENT MESSAGE (OUTPATIENT)
Dept: UROLOGY | Facility: CLINIC | Age: 62
End: 2022-06-30
Payer: COMMERCIAL

## 2022-06-30 LAB
FINAL PATHOLOGIC DIAGNOSIS: NORMAL
Lab: NORMAL

## 2022-07-06 ENCOUNTER — OFFICE VISIT (OUTPATIENT)
Dept: UROLOGY | Facility: CLINIC | Age: 62
End: 2022-07-06
Payer: COMMERCIAL

## 2022-07-06 ENCOUNTER — TELEPHONE (OUTPATIENT)
Dept: UROLOGY | Facility: CLINIC | Age: 62
End: 2022-07-06

## 2022-07-06 VITALS
BODY MASS INDEX: 30.45 KG/M2 | HEIGHT: 73 IN | SYSTOLIC BLOOD PRESSURE: 139 MMHG | HEART RATE: 69 BPM | DIASTOLIC BLOOD PRESSURE: 92 MMHG | WEIGHT: 229.75 LBS

## 2022-07-06 DIAGNOSIS — I10 HYPERTENSION, ESSENTIAL: ICD-10-CM

## 2022-07-06 DIAGNOSIS — N40.1 BPH WITH URINARY OBSTRUCTION: ICD-10-CM

## 2022-07-06 DIAGNOSIS — N13.8 BPH WITH URINARY OBSTRUCTION: ICD-10-CM

## 2022-07-06 DIAGNOSIS — C67.9 MALIGNANT NEOPLASM OF URINARY BLADDER, UNSPECIFIED SITE: Primary | ICD-10-CM

## 2022-07-06 DIAGNOSIS — C67.2 MALIGNANT NEOPLASM OF LATERAL WALL OF URINARY BLADDER: Primary | ICD-10-CM

## 2022-07-06 DIAGNOSIS — N52.01 ERECTILE DYSFUNCTION DUE TO ARTERIAL INSUFFICIENCY: ICD-10-CM

## 2022-07-06 PROCEDURE — 1160F PR REVIEW ALL MEDS BY PRESCRIBER/CLIN PHARMACIST DOCUMENTED: ICD-10-PCS | Mod: CPTII,S$GLB,, | Performed by: UROLOGY

## 2022-07-06 PROCEDURE — 4010F ACE/ARB THERAPY RXD/TAKEN: CPT | Mod: CPTII,S$GLB,, | Performed by: UROLOGY

## 2022-07-06 PROCEDURE — 99999 PR PBB SHADOW E&M-EST. PATIENT-LVL IV: ICD-10-PCS | Mod: PBBFAC,,, | Performed by: UROLOGY

## 2022-07-06 PROCEDURE — 3075F PR MOST RECENT SYSTOLIC BLOOD PRESS GE 130-139MM HG: ICD-10-PCS | Mod: CPTII,S$GLB,, | Performed by: UROLOGY

## 2022-07-06 PROCEDURE — 1159F PR MEDICATION LIST DOCUMENTED IN MEDICAL RECORD: ICD-10-PCS | Mod: CPTII,S$GLB,, | Performed by: UROLOGY

## 2022-07-06 PROCEDURE — 3075F SYST BP GE 130 - 139MM HG: CPT | Mod: CPTII,S$GLB,, | Performed by: UROLOGY

## 2022-07-06 PROCEDURE — 3080F PR MOST RECENT DIASTOLIC BLOOD PRESSURE >= 90 MM HG: ICD-10-PCS | Mod: CPTII,S$GLB,, | Performed by: UROLOGY

## 2022-07-06 PROCEDURE — 99215 PR OFFICE/OUTPT VISIT, EST, LEVL V, 40-54 MIN: ICD-10-PCS | Mod: S$GLB,,, | Performed by: UROLOGY

## 2022-07-06 PROCEDURE — 99215 OFFICE O/P EST HI 40 MIN: CPT | Mod: S$GLB,,, | Performed by: UROLOGY

## 2022-07-06 PROCEDURE — 3080F DIAST BP >= 90 MM HG: CPT | Mod: CPTII,S$GLB,, | Performed by: UROLOGY

## 2022-07-06 PROCEDURE — 1159F MED LIST DOCD IN RCRD: CPT | Mod: CPTII,S$GLB,, | Performed by: UROLOGY

## 2022-07-06 PROCEDURE — 1160F RVW MEDS BY RX/DR IN RCRD: CPT | Mod: CPTII,S$GLB,, | Performed by: UROLOGY

## 2022-07-06 PROCEDURE — 3008F BODY MASS INDEX DOCD: CPT | Mod: CPTII,S$GLB,, | Performed by: UROLOGY

## 2022-07-06 PROCEDURE — 99999 PR PBB SHADOW E&M-EST. PATIENT-LVL IV: CPT | Mod: PBBFAC,,, | Performed by: UROLOGY

## 2022-07-06 PROCEDURE — 4010F PR ACE/ARB THEARPY RXD/TAKEN: ICD-10-PCS | Mod: CPTII,S$GLB,, | Performed by: UROLOGY

## 2022-07-06 PROCEDURE — 3008F PR BODY MASS INDEX (BMI) DOCUMENTED: ICD-10-PCS | Mod: CPTII,S$GLB,, | Performed by: UROLOGY

## 2022-07-06 RX ORDER — PHENAZOPYRIDINE HYDROCHLORIDE 100 MG/1
100 TABLET, FILM COATED ORAL 3 TIMES DAILY PRN
COMMUNITY
Start: 2022-06-28 | End: 2022-08-17 | Stop reason: CLARIF

## 2022-07-06 NOTE — PROGRESS NOTES
CHIEF COMPLAINT:    Mr. Jesus is a 62 y.o. male presenting with ED.    PRESENTING ILLNESS:    Joseph Jesus is a 62 y.o. male who c/o ED.  This has been present for > 1 year.  He's tried and failed viagra and cialis.  His T is normal.  He went to [employer].  He has tried and failed 25 units of ICI.    He has LUTS.  + nocturia x 5, + feeling of incomplete emptying, + urgency.     He had microhematuria and was found to have an erythematous lesion with a positive cytology.  On 4/19/22 he underwent bladder biopsy showing CIS.    He then underwent a TURBT on 6/21/22 showing CIS.  Muscle was present.    REVIEW OF SYSTEMS:    Joseph Jesus denies headache, blurred vision, fever, nausea, vomiting, chills, abdominal pain, bleeding per rectum, cough, SOB, recent loss of consciousness, recent mental status changes, seizures, dizziness, or upper or lower extremity weakness.    ANDREA  1. 1  2. 1  3. 1  4. 1  5. 1      PATIENT HISTORY:    Past Medical History:   Diagnosis Date    Allergy     seasonal    Anal fistula hx    Arthritis     Basal cell carcinoma 04/2013    left superior forehead    Callus     Diverticulosis     Epididymal cyst     GERD (gastroesophageal reflux disease)     Hayfever     Hydrocele, right     Hyperlipidemia     Hypertension     Neck pain     hx of muscular inury from weight lifting---resolved now    PONV (postoperative nausea and vomiting)     Prostatitis     Dec. '12-treated with antibx.    Squamous cell carcinoma 01/07/2019    anterior scalp    Squamous cell carcinoma of skin 03/2020    Left post scalp (SCC insitu-saucerization)    Visual impairment        Past Surgical History:   Procedure Laterality Date    APPENDECTOMY  1962    BIOPSY OF BLADDER  4/19/2022    Procedure: BIOPSY, BLADDER;  Surgeon: Lenny Ewing MD;  Location: [facility] OR [address] [place] [address];  Service: Urology;;    BLADDER FULGURATION  4/19/2022    Procedure: FULGURATION, BLADDER;  Surgeon: Lenny Ewing MD;   Location: [facility] OR 1ST FLR;  Service: Urology;;    COLONOSCOPY  12/2012    due for repeat 12/2015    COLONOSCOPY N/A 5/19/2016    Procedure: COLONOSCOPY;  Surgeon: James Webber MD;  Location: [facility] (4TH FLR);  Service: Endoscopy;  Laterality: N/A; diverticulosis, repeat in 5-10 years for surveillance    COLONOSCOPY N/A 8/9/2021    Procedure: COLONOSCOPY;  Surgeon: Fahad Bautista Jr., MD;  Location: [facility];  Service: Endoscopy;  Laterality: N/A;    CYSTOSCOPY  4/19/2022    Procedure: CYSTOSCOPY;  Surgeon: Lenny Ewing MD;  Location: [facility] OR 1ST FLR;  Service: Urology;;    CYSTOSCOPY  6/21/2022    Procedure: CYSTOSCOPY;  Surgeon: Lenny Ewing MD;  Location: [facility] OR 1ST FLR;  Service: Urology;;    ESOPHAGOGASTRODUODENOSCOPY N/A 8/9/2021    Procedure: EGD (ESOPHAGOGASTRODUODENOSCOPY);  Surgeon: Fahad Bautista Jr., MD;  Location: [facility];  Service: Endoscopy;  Laterality: N/A;    EUA/Fistulotomy-'08      HERNIA REPAIR      RIH with mesh    Hydrocelectomy  2013    MASS EXCISION  2004    BCC removal (twice)    MOLE REMOVAL  02/25/2019    2 moles removed from scalp =- 1 was basal cell and 1 was squamous cell - dermatology - Dr. londono    right Spermatocelectomy  2013    UPPER GASTROINTESTINAL ENDOSCOPY  prior to 2010    hiatal hernia, reflux esophagitis       Family History   Problem Relation Age of Onset    Skin cancer Mother     Hypertension Mother     Skin cancer Father     Cancer Father         prostate cancer    Hypertension Father     Hypertension Maternal Grandmother     Hypertension Maternal Grandfather     Hypertension Paternal Grandmother     Anesthesia problems Paternal Grandmother     Cancer Paternal Grandfather         prostate cancer    Hypertension Paternal Grandfather     Heart disease Paternal Grandfather     Diabetes Neg Hx     Colon polyps Neg Hx     Colon cancer Neg Hx     Melanoma Neg Hx     Psoriasis Neg Hx     Lupus Neg Hx     Eczema Neg Hx      Acne Neg Hx     Cirrhosis Neg Hx     Prostate cancer Neg Hx     Kidney disease Neg Hx     Crohn's disease Neg Hx     Ulcerative colitis Neg Hx     Stomach cancer Neg Hx     Esophageal cancer Neg Hx        Social History     Socioeconomic History    Marital status:      Spouse name: Traci    Number of children: 1   Occupational History    Occupation: [employer]     Employer: [employer]     Comment: [employer]   Tobacco Use    Smoking status: Former Smoker     Packs/day: 1.00     Years: 10.00     Pack years: 10.00     Quit date: 1987     Years since quittin.5    Smokeless tobacco: Never Used   Substance and Sexual Activity    Alcohol use: Not Currently     Alcohol/week: 0.0 - 1.0 standard drinks     Comment: socially- not currently    Drug use: No    Sexual activity: Yes     Partners: Female       Allergies:  Amlodipine, Atenolol, Catechu herb, Irbesartan, Erythromycin, Sumycin [tetracycline], and Tetracyclines    Medications:    Current Outpatient Medications:     albuterol (PROVENTIL/VENTOLIN HFA) 90 mcg/actuation inhaler, Inhale 2 puffs into the lungs every 6 (six) hours as needed for Wheezing (cough)., Disp: 18 g, Rfl: 5    atorvastatin (LIPITOR) 10 MG tablet, Take 1 tablet (10 mg total) by mouth once daily., Disp: 90 tablet, Rfl: 3    cetirizine (ZYRTEC) 10 MG tablet, Take 10 mg by mouth once daily., Disp: , Rfl:     coenzyme Q10 100 mg capsule, Take 200 mg by mouth once daily., Disp: , Rfl:     fluticasone propionate (FLONASE) 50 mcg/actuation nasal spray, 2 sprays (100 mcg total) by Each Nostril route daily as needed for Rhinitis., Disp: 16 mL, Rfl: 5    hydroCHLOROthiazide (HYDRODIURIL) 25 MG tablet, TAKE 1 TABLET BY MOUTH EVERY DAY, Disp: 90 tablet, Rfl: 0    NIFEdipine (ADALAT CC) 30 MG TbSR, Take 1 tablet (30 mg total) by mouth once daily., Disp: 90 tablet, Rfl: 3    papaverine 30 mg/mL injection, Add Phentolamine 1 mg/cc  Add PGE1 10 mcg/cc  SIG: Use as directed,  Disp: 10 mL, Rfl: 11    phenazopyridine (PYRIDIUM) 100 MG tablet, Take 100 mg by mouth 3 (three) times daily as needed., Disp: , Rfl:     promethazine-dextromethorphan (PROMETHAZINE-DM) 6.25-15 mg/5 mL Syrp, Take 5 mLs by mouth nightly as needed (for cough and congestion)., Disp: 240 mL, Rfl: 0    traMADoL (ULTRAM) 50 mg tablet, Take 1 tablet (50 mg total) by mouth every 6 (six) hours as needed for Pain., Disp: 5 tablet, Rfl: 0    promethazine-dextromethorphan (PROMETHAZINE-DM) 6.25-15 mg/5 mL Syrp, TAKE 5 MLS BY MOUTH NIGHTLY AS NEEDED (FOR COUGH AND CONGESTION)., Disp: 240 mL, Rfl: 0    PHYSICAL EXAMINATION:    The patient generally appears in good health, is appropriately interactive, and is in no apparent distress.     Eyes: anicteric sclerae, moist conjunctivae; no lid-lag; PERRLA     HENT: Atraumatic; oropharynx clear with moist mucous membranes and no mucosal ulcerations;normal hard and soft palate.  No evidence of lymphadenopathy.    Neck: Trachea midline.  No thyromegaly.    Musculoskeletal: No abnormal gait.    Skin: No lesions.    Mental: Cooperative with normal affect.  Is oriented to time, place, and person.    Neuro: Grossly intact.    Chest: Normal inspiratory effort.   No accessory muscles.  No audible wheezes.  Respirations symmetric on inspiration and expiration.    Heart: Regular rhythm.      Abdomen:  Soft, non-tender. No masses or organomegaly. Bladder is not palpable. No evidence of flank discomfort. No evidence of inguinal hernia.    Genitourinary: The penis is circumcised with no evidence of plaques or induration. The urethral meatus is normal. The testes, epididymides, and cord structures are normal in size and contour bilaterally. The scrotum is normal in size and contour.    Normal anal sphincter tone. No rectal mass.    The prostate is 30 g. Normal landmarks. Lateral sulci. Median furrow intact.  No nodularity or induration. Seminal vesicles are normal.    Extremities: No clubbing,  cyanosis, or edema      LABS:    UA dipped negative today  Lab Results   Component Value Date    PSA 0.30 01/28/2022    PSA 0.22 07/29/2020    PSA 0.29 05/30/2019    PSADIAG 0.37 09/08/2014       IMPRESSION:    Encounter Diagnoses   Name Primary?    Malignant neoplasm of lateral wall of urinary bladder Yes    BPH with urinary obstruction     Erectile dysfunction due to arterial insufficiency     Hypertension, essential    HTN, stable  Hyperlipidemia, stable      PLAN:    1. Discussed options for his ED (affected by above comorbidities).  He'd like to continue with ICI.  Can titrate up 5 units at a time.  Refilled.  2. Will observe his LUTS as they don't bother him.  3. Went over his pathology demonstrating cancer.  Discussed that CIS is high grade.  Recommend re-resection.  4. The risks and benefits of resection of a bladder tumor were discussed with the patient in detail.  The risks include but are not limited to burning with urination, bleeding, infection, pain, incomplete removal of bladder tumor, no guarantee of cancer cure, need for further procedures, injury to the kidney, ureter, bladder, bladder perforation and need for open surgery.  The patient was informed that they may require a ureteral stent and that stents can cause irritative voiding symptoms.  They also understand that ureteral stents are temporary and must be removed or exchanged in a timely fashion or they can calcify and make more stones and become difficult to remove. The patient understands they may also need a catheter post op.  Post operative intravesical chemotherapy may also be used. The risks of this medication were discussed with the patient to include a contact dermatitis, irritative voiding symptoms, hematuria, fatigue or a low grade fever.  Patient understands these risks and has agreed to proceed with surgery.       Copy to:

## 2022-07-07 ENCOUNTER — PATIENT MESSAGE (OUTPATIENT)
Dept: UROLOGY | Facility: CLINIC | Age: 62
End: 2022-07-07
Payer: COMMERCIAL

## 2022-07-08 ENCOUNTER — PATIENT MESSAGE (OUTPATIENT)
Dept: SURGERY | Facility: HOSPITAL | Age: 62
End: 2022-07-08
Payer: COMMERCIAL

## 2022-07-13 ENCOUNTER — PATIENT MESSAGE (OUTPATIENT)
Dept: INTERNAL MEDICINE | Facility: CLINIC | Age: 62
End: 2022-07-13
Payer: COMMERCIAL

## 2022-07-13 RX ORDER — SCOLOPAMINE TRANSDERMAL SYSTEM 1 MG/1
1 PATCH, EXTENDED RELEASE TRANSDERMAL
Qty: 8 PATCH | Refills: 1 | Status: SHIPPED | OUTPATIENT
Start: 2022-07-13 | End: 2022-08-17 | Stop reason: CLARIF

## 2022-07-20 ENCOUNTER — OFFICE VISIT (OUTPATIENT)
Dept: ALLERGY | Facility: CLINIC | Age: 62
End: 2022-07-20
Payer: COMMERCIAL

## 2022-07-20 ENCOUNTER — LAB VISIT (OUTPATIENT)
Dept: LAB | Facility: HOSPITAL | Age: 62
End: 2022-07-20
Attending: FAMILY MEDICINE
Payer: COMMERCIAL

## 2022-07-20 VITALS
SYSTOLIC BLOOD PRESSURE: 142 MMHG | HEIGHT: 73 IN | WEIGHT: 228.38 LBS | HEART RATE: 67 BPM | BODY MASS INDEX: 30.27 KG/M2 | OXYGEN SATURATION: 97 % | DIASTOLIC BLOOD PRESSURE: 96 MMHG

## 2022-07-20 DIAGNOSIS — R05.3 CHRONIC COUGH: Primary | ICD-10-CM

## 2022-07-20 DIAGNOSIS — L29.9 PRURITUS: ICD-10-CM

## 2022-07-20 DIAGNOSIS — J31.0 CHRONIC RHINITIS: ICD-10-CM

## 2022-07-20 DIAGNOSIS — L30.9 DERMATITIS: ICD-10-CM

## 2022-07-20 DIAGNOSIS — R05.3 CHRONIC COUGH: ICD-10-CM

## 2022-07-20 PROCEDURE — 3077F PR MOST RECENT SYSTOLIC BLOOD PRESSURE >= 140 MM HG: ICD-10-PCS | Mod: CPTII,S$GLB,, | Performed by: ALLERGY & IMMUNOLOGY

## 2022-07-20 PROCEDURE — 1159F PR MEDICATION LIST DOCUMENTED IN MEDICAL RECORD: ICD-10-PCS | Mod: CPTII,S$GLB,, | Performed by: ALLERGY & IMMUNOLOGY

## 2022-07-20 PROCEDURE — 36415 COLL VENOUS BLD VENIPUNCTURE: CPT | Mod: PO | Performed by: ALLERGY & IMMUNOLOGY

## 2022-07-20 PROCEDURE — 86003 ALLG SPEC IGE CRUDE XTRC EA: CPT | Performed by: ALLERGY & IMMUNOLOGY

## 2022-07-20 PROCEDURE — 3008F PR BODY MASS INDEX (BMI) DOCUMENTED: ICD-10-PCS | Mod: CPTII,S$GLB,, | Performed by: ALLERGY & IMMUNOLOGY

## 2022-07-20 PROCEDURE — 99999 PR PBB SHADOW E&M-EST. PATIENT-LVL IV: ICD-10-PCS | Mod: PBBFAC,,, | Performed by: ALLERGY & IMMUNOLOGY

## 2022-07-20 PROCEDURE — 1160F PR REVIEW ALL MEDS BY PRESCRIBER/CLIN PHARMACIST DOCUMENTED: ICD-10-PCS | Mod: CPTII,S$GLB,, | Performed by: ALLERGY & IMMUNOLOGY

## 2022-07-20 PROCEDURE — 99203 OFFICE O/P NEW LOW 30 MIN: CPT | Mod: S$GLB,,, | Performed by: ALLERGY & IMMUNOLOGY

## 2022-07-20 PROCEDURE — 4010F ACE/ARB THERAPY RXD/TAKEN: CPT | Mod: CPTII,S$GLB,, | Performed by: ALLERGY & IMMUNOLOGY

## 2022-07-20 PROCEDURE — 4010F PR ACE/ARB THEARPY RXD/TAKEN: ICD-10-PCS | Mod: CPTII,S$GLB,, | Performed by: ALLERGY & IMMUNOLOGY

## 2022-07-20 PROCEDURE — 99999 PR PBB SHADOW E&M-EST. PATIENT-LVL IV: CPT | Mod: PBBFAC,,, | Performed by: ALLERGY & IMMUNOLOGY

## 2022-07-20 PROCEDURE — 3080F DIAST BP >= 90 MM HG: CPT | Mod: CPTII,S$GLB,, | Performed by: ALLERGY & IMMUNOLOGY

## 2022-07-20 PROCEDURE — 99203 PR OFFICE/OUTPT VISIT, NEW, LEVL III, 30-44 MIN: ICD-10-PCS | Mod: S$GLB,,, | Performed by: ALLERGY & IMMUNOLOGY

## 2022-07-20 PROCEDURE — 3008F BODY MASS INDEX DOCD: CPT | Mod: CPTII,S$GLB,, | Performed by: ALLERGY & IMMUNOLOGY

## 2022-07-20 PROCEDURE — 1160F RVW MEDS BY RX/DR IN RCRD: CPT | Mod: CPTII,S$GLB,, | Performed by: ALLERGY & IMMUNOLOGY

## 2022-07-20 PROCEDURE — 1159F MED LIST DOCD IN RCRD: CPT | Mod: CPTII,S$GLB,, | Performed by: ALLERGY & IMMUNOLOGY

## 2022-07-20 PROCEDURE — 3080F PR MOST RECENT DIASTOLIC BLOOD PRESSURE >= 90 MM HG: ICD-10-PCS | Mod: CPTII,S$GLB,, | Performed by: ALLERGY & IMMUNOLOGY

## 2022-07-20 PROCEDURE — 86003 ALLG SPEC IGE CRUDE XTRC EA: CPT | Mod: 59 | Performed by: ALLERGY & IMMUNOLOGY

## 2022-07-20 PROCEDURE — 3077F SYST BP >= 140 MM HG: CPT | Mod: CPTII,S$GLB,, | Performed by: ALLERGY & IMMUNOLOGY

## 2022-07-20 NOTE — PROGRESS NOTES
Subjective:       Patient ID: Joseph Jesus is a 62 y.o. male.    Chief Complaint:  Nasal Congestion and Cough      61 yo man presents for consult from NP Kaye Lopes for possible allergies. He states he has congestion, stuffy nose and PND. He will lose his voice 1-2 times a month. Has some sneeze fits. No runny nose. Has burning watery eyes. Has chest congestion. No SOB. Has mot of time but may clear then come back. Has had this for over 30 years. Has take zyrtec daily for past 20 years and helps some. If having bad day may add xyzal as well and if really bad uses Flonase at night. No season worse. Not riggers. No recurrent infections. Never tested. He also has itch and rash on back. Itches and has little bumps. Started in 2021 had itchy back, saw doctor and given clobetasol which helped. Went away but came back on 2018. Clobetasol still helps but comes right back. He has no known food allergy. No latex allergy. reports blisters at sites from deer fly, sand fly, ant bites. No asthma. No eczema.       Environmental History: see history section for home environment  Review of Systems   Constitutional: Negative for activity change, appetite change, chills, fatigue, fever and unexpected weight change.   HENT: Positive for congestion, postnasal drip, sinus pressure and sneezing. Negative for ear discharge, ear pain, facial swelling, hearing loss, mouth sores, nosebleeds, rhinorrhea, sore throat, tinnitus, trouble swallowing and voice change.    Eyes: Negative for discharge, redness, itching and visual disturbance.   Respiratory: Positive for chest tightness and shortness of breath. Negative for cough and wheezing.    Cardiovascular: Negative for chest pain, palpitations and leg swelling.   Gastrointestinal: Negative for abdominal distention, abdominal pain, constipation, diarrhea, nausea and vomiting.   Genitourinary: Negative for difficulty urinating.   Musculoskeletal: Negative for arthralgias, back pain, joint  swelling and myalgias.   Skin: Positive for color change and rash. Negative for pallor.   Neurological: Negative for dizziness, tremors, speech difficulty, weakness, light-headedness and headaches.   Hematological: Negative for adenopathy. Does not bruise/bleed easily.   Psychiatric/Behavioral: Negative for agitation, confusion, decreased concentration and sleep disturbance. The patient is not nervous/anxious.         Objective:      Physical Exam  Vitals and nursing note reviewed.   Constitutional:       General: He is not in acute distress.     Appearance: Normal appearance. He is not ill-appearing.   HENT:      Head: Normocephalic and atraumatic.      Right Ear: External ear normal.      Left Ear: External ear normal.      Nose: No rhinorrhea.   Eyes:      General:         Right eye: No discharge.         Left eye: No discharge.      Conjunctiva/sclera: Conjunctivae normal.   Cardiovascular:      Rate and Rhythm: Normal rate and regular rhythm.   Pulmonary:      Effort: Pulmonary effort is normal. No respiratory distress.   Abdominal:      General: There is no distension.   Musculoskeletal:         General: Normal range of motion.      Cervical back: Normal range of motion.   Skin:     General: Skin is warm and dry.      Findings: No erythema or rash.   Neurological:      Mental Status: He is alert and oriented to person, place, and time.   Psychiatric:         Mood and Affect: Mood normal.         Behavior: Behavior normal.         Thought Content: Thought content normal.         Judgment: Judgment normal.         Laboratory:   none performed   Assessment:       1. Chronic cough    2. Chronic rhinitis    3. Dermatitis    4. Pruritus         Plan:       1. Immunocaps to see if any allergic triggers  2. continue zyrtec daily and may need to add Flonase daily  3. Phone review results  4. SHANNON Lopse notified of completed consult via [employer]

## 2022-07-26 LAB
A ALTERNATA IGE QN: <0.1 KU/L
A FUMIGATUS IGE QN: <0.1 KU/L
ALLERGEN CHAETOMIUM GLOBOSUM IGE: <0.1 KU/L
ALLERGEN WALNUT TREE IGE: 0.13 KU/L
ALLERGEN WHITE PINE TREE IGE: 0.11 KU/L
ALMOND IGE QN: <0.1 KU/L
BAHIA GRASS IGE QN: 2.11 KU/L
BALD CYPRESS IGE QN: <0.1 KU/L
BERMUDA GRASS IGE QN: 0.97 KU/L
C HERBARUM IGE QN: <0.1 KU/L
C LUNATA IGE QN: <0.1 KU/L
CAT DANDER IGE QN: <0.1 KU/L
CHAETOMIUM GLOB. CLASS: NORMAL
COMMON RAGWEED IGE QN: 0.12 KU/L
COTTONWOOD IGE QN: <0.1 KU/L
COW MILK IGE QN: 0.12 KU/L
D FARINAE IGE QN: <0.1 KU/L
D PTERONYSS IGE QN: <0.1 KU/L
DEPRECATED A ALTERNATA IGE RAST QL: NORMAL
DEPRECATED A FUMIGATUS IGE RAST QL: NORMAL
DEPRECATED ALMOND IGE RAST QL: NORMAL
DEPRECATED BAHIA GRASS IGE RAST QL: ABNORMAL
DEPRECATED BALD CYPRESS IGE RAST QL: NORMAL
DEPRECATED BERMUDA GRASS IGE RAST QL: ABNORMAL
DEPRECATED C HERBARUM IGE RAST QL: NORMAL
DEPRECATED C LUNATA IGE RAST QL: NORMAL
DEPRECATED CAT DANDER IGE RAST QL: NORMAL
DEPRECATED COMMON RAGWEED IGE RAST QL: ABNORMAL
DEPRECATED COTTONWOOD IGE RAST QL: NORMAL
DEPRECATED COW MILK IGE RAST QL: ABNORMAL
DEPRECATED D FARINAE IGE RAST QL: NORMAL
DEPRECATED D PTERONYSS IGE RAST QL: NORMAL
DEPRECATED DOG DANDER IGE RAST QL: ABNORMAL
DEPRECATED EGG WHITE IGE RAST QL: NORMAL
DEPRECATED ELDER IGE RAST QL: ABNORMAL
DEPRECATED ENGL PLANTAIN IGE RAST QL: ABNORMAL
DEPRECATED HORSE DANDER IGE RAST QL: NORMAL
DEPRECATED JOHNSON GRASS IGE RAST QL: ABNORMAL
DEPRECATED LONDON PLANE IGE RAST QL: NORMAL
DEPRECATED MUGWORT IGE RAST QL: NORMAL
DEPRECATED OAT IGE RAST QL: ABNORMAL
DEPRECATED P NOTATUM IGE RAST QL: NORMAL
DEPRECATED PEANUT IGE RAST QL: ABNORMAL
DEPRECATED PECAN/HICK NUT IGE RAST QL: NORMAL
DEPRECATED PECAN/HICK TREE IGE RAST QL: ABNORMAL
DEPRECATED ROACH IGE RAST QL: NORMAL
DEPRECATED S ROSTRATA IGE RAST QL: NORMAL
DEPRECATED SALTWORT IGE RAST QL: NORMAL
DEPRECATED SHRIMP IGE RAST QL: NORMAL
DEPRECATED SILVER BIRCH IGE RAST QL: NORMAL
DEPRECATED SOYBEAN IGE RAST QL: NORMAL
DEPRECATED TIMOTHY IGE RAST QL: ABNORMAL
DEPRECATED WEST RAGWEED IGE RAST QL: ABNORMAL
DEPRECATED WHEAT IGE RAST QL: ABNORMAL
DEPRECATED WHITE OAK IGE RAST QL: ABNORMAL
DEPRECATED WILLOW IGE RAST QL: ABNORMAL
DOG DANDER IGE QN: 0.11 KU/L
EGG WHITE IGE QN: <0.1 KU/L
ELDER IGE QN: 0.12 KU/L
ENGL PLANTAIN IGE QN: 0.15 KU/L
HORSE DANDER IGE QN: <0.1 KU/L
JOHNSON GRASS IGE QN: 0.96 KU/L
LONDON PLANE IGE QN: 0.1 KU/L
MUGWORT IGE QN: 0.1 KU/L
OAT IGE QN: 0.11 KU/L
P NOTATUM IGE QN: <0.1 KU/L
PEANUT IGE QN: 0.13 KU/L
PECAN/HICK NUT IGE QN: <0.1 KU/L
PECAN/HICK TREE IGE QN: 0.11 KU/L
ROACH IGE QN: 0.1 KU/L
S ROSTRATA IGE QN: <0.1 KU/L
SALTWORT IGE QN: 0.1 KU/L
SHRIMP IGE QN: <0.1 KU/L
SILVER BIRCH IGE QN: <0.1 KU/L
SOYBEAN IGE QN: <0.1 KU/L
TIMOTHY IGE QN: 1.49 KU/L
WALNUT TREE CLASS: ABNORMAL
WEST RAGWEED IGE QN: 0.14 KU/L
WHEAT IGE QN: 0.15 KU/L
WHITE OAK IGE QN: 0.11 KU/L
WHITE PINE CLASS: ABNORMAL
WILLOW IGE QN: 0.11 KU/L

## 2022-07-26 NOTE — ADDENDUM NOTE
Addendum  created 07/26/22 0711 by Doe Rowland MD    Clinical Note Signed, Diagnosis association updated, Intraprocedure Blocks edited, SmartForm saved

## 2022-08-03 ENCOUNTER — PATIENT MESSAGE (OUTPATIENT)
Dept: ALLERGY | Facility: CLINIC | Age: 62
End: 2022-08-03
Payer: COMMERCIAL

## 2022-08-17 DIAGNOSIS — Z01.818 PREOP TESTING: Primary | ICD-10-CM

## 2022-08-17 NOTE — ANESTHESIA PAT ROS NOTE
08/17/2022  Joseph Jesus is a 62 y.o., male.      Pre-op Assessment          Review of Systems  Anesthesia Hx:  Hx of Anesthetic complications PONV  Denies Family Hx of Anesthesia complications.  Personal Hx of Anesthesia complications, Post-Operative Nausea/Vomiting   Hematology/Oncology:        Current/Recent Cancer. -- Cancer in past history:  Other (see Oncology comments) surgery  Oncology Comments: Skin cancer x3 surgeries to remove/ S/P-Bladder cancer Sx x2     EENT/Dental:   Farsighted-reading glasses/ Sinus allergies   Cardiovascular:   Exercise tolerance: good Hypertension CAD asymptomatic  Ankle swelling sometimes when not active/ Walking/Gym workout-Treadmill/ Stationary bike/Weight-3x/wk./Housework   Pulmonary:   Sleep Apnea Maybe per patient   Renal/:   Chronic Renal Disease CKD-Stage 3/ Malignant neoplasm of urinary bladder, unspecified   Hepatic/GI:   Hiatal Hernia, GERD Liver Disease, Fatty Liver   Musculoskeletal:   Arthritis  Shoulders & ankles   Neurological:   Neuromuscular Disease,    Endocrine:  Endocrine Normal    Dermatological:   AK(actinic keratosis)/warts   Psych:   Psychiatric History Anxious     Karla Escoto RN  8/17/22       Anesthesia Assessment: Preoperative EQUATION    Planned Procedure: Procedure(s) (LRB):  TURBT (TRANSURETHRAL RESECTION OF BLADDER TUMOR) (N/A)  Requested Anesthesia Type:General  Surgeon: Lenny Ewing MD  Service: Urology  Known or anticipated Date of Surgery:8/23/2022    Surgeon notes: reviewed and Malignant neoplasm of urinary bladder, unspecified site    Previous anesthesia records:Hx PONV and 6/21/22-TURBT(Transurethral Resection of Bladder Tumor)/ Cystoscopy-General -no apparent anesthetic complications and tolerated procedure well-no nausea/vomiting     Anesthesia Hx:  Denies Family Hx of Anesthesia complications.  Personal Hx of Anesthesia  complications, Post-Operative Nausea/Vomiting      Airway:  Mallampati: II   Mouth Opening: Normal  TM Distance: Normal  Tongue: Normal  Neck ROM: Normal ROM      Last PCP note: 3-6 months ago , within [facility] , 5/17/22-[facility]-Malignant neoplasm of lateral wall of urinary bladder +6 more Dx--F/U     Subspecialty notes: Allergy, Cardiology: General, Dermatology, Gastroenterology, Hepatology, Podiatry visit/ Pulmonology visit    Other important co-morbidities: GERD, HLD, HTN and Malignant neoplasm of urinary bladder, unspecified site    Medical History    Diagnosis Date Comment Source   Allergy  seasonal    Anal fistula hx     Arthritis      Basal cell carcinoma 04/2013 left superior forehead    Callus      Diverticulosis      Epididymal cyst      GERD (gastroesophageal reflux disease)      Hayfever      Hydrocele, right      Hyperlipidemia      Hypertension      Neck pain  hx of muscular inury from weight lifting---resolved now    PONV (postoperative nausea and vomiting)      Prostatitis  Dec. '12-treated with antibx.    Squamous cell carcinoma 01/07/2019 anterior scalp    Squamous cell carcinoma of skin 03/2020 Left post scalp (SCC insitu-saucerization)    Visual impairment          Tests already available:  Results have been reviewed. Labs-7/20/22-Environmental  Allergen Testing-Several  / CXR-4/10/20/ EKG-3/25/22      Plan:Phone pending   Testing:  BMP and Hematology Profile     Patient  has previously scheduled Medical Appointment:Appointment on  8/19/22, prior to the surgery date.    Navigation: Phone Completed                        Tests Scheduled. Labs-Hem Profile/BMP done 8/19/22 & results reviewed by Dr. Blount.           Consults scheduled.None needed at this time.             Results will be tracked by Preop Clinic.      Karla Escoto RN  8/17/22 & 8/22/22

## 2022-08-18 ENCOUNTER — TELEPHONE (OUTPATIENT)
Dept: PREADMISSION TESTING | Facility: HOSPITAL | Age: 62
End: 2022-08-18
Payer: COMMERCIAL

## 2022-08-19 ENCOUNTER — LAB VISIT (OUTPATIENT)
Dept: LAB | Facility: HOSPITAL | Age: 62
End: 2022-08-19
Attending: ANESTHESIOLOGY
Payer: COMMERCIAL

## 2022-08-19 ENCOUNTER — OFFICE VISIT (OUTPATIENT)
Dept: DERMATOLOGY | Facility: CLINIC | Age: 62
End: 2022-08-19
Payer: COMMERCIAL

## 2022-08-19 DIAGNOSIS — L84 CLAVUS: ICD-10-CM

## 2022-08-19 DIAGNOSIS — Z01.818 PREOP TESTING: ICD-10-CM

## 2022-08-19 DIAGNOSIS — Z85.828 HISTORY OF NONMELANOMA SKIN CANCER: ICD-10-CM

## 2022-08-19 DIAGNOSIS — B07.9 VERRUCA VULGARIS: ICD-10-CM

## 2022-08-19 DIAGNOSIS — L57.0 AK (ACTINIC KERATOSIS): Primary | ICD-10-CM

## 2022-08-19 LAB
ANION GAP SERPL CALC-SCNC: 10 MMOL/L (ref 8–16)
BUN SERPL-MCNC: 20 MG/DL (ref 8–23)
CALCIUM SERPL-MCNC: 9.8 MG/DL (ref 8.7–10.5)
CHLORIDE SERPL-SCNC: 101 MMOL/L (ref 95–110)
CO2 SERPL-SCNC: 29 MMOL/L (ref 23–29)
CREAT SERPL-MCNC: 1.4 MG/DL (ref 0.5–1.4)
ERYTHROCYTE [DISTWIDTH] IN BLOOD BY AUTOMATED COUNT: 13.9 % (ref 11.5–14.5)
EST. GFR  (NO RACE VARIABLE): 56.1 ML/MIN/1.73 M^2
GLUCOSE SERPL-MCNC: 84 MG/DL (ref 70–110)
HCT VFR BLD AUTO: 40.1 % (ref 40–54)
HGB BLD-MCNC: 13.3 G/DL (ref 14–18)
MCH RBC QN AUTO: 31.1 PG (ref 27–31)
MCHC RBC AUTO-ENTMCNC: 33.2 G/DL (ref 32–36)
MCV RBC AUTO: 94 FL (ref 82–98)
PLATELET # BLD AUTO: 197 K/UL (ref 150–450)
PMV BLD AUTO: 10.5 FL (ref 9.2–12.9)
POTASSIUM SERPL-SCNC: 3.4 MMOL/L (ref 3.5–5.1)
RBC # BLD AUTO: 4.28 M/UL (ref 4.6–6.2)
SODIUM SERPL-SCNC: 140 MMOL/L (ref 136–145)
WBC # BLD AUTO: 7.35 K/UL (ref 3.9–12.7)

## 2022-08-19 PROCEDURE — 4010F PR ACE/ARB THEARPY RXD/TAKEN: ICD-10-PCS | Mod: CPTII,S$GLB,, | Performed by: DERMATOLOGY

## 2022-08-19 PROCEDURE — 17000 PR DESTRUCTION(LASER SURGERY,CRYOSURGERY,CHEMOSURGERY),PREMALIGNANT LESIONS,FIRST LESION: ICD-10-PCS | Mod: 59,S$GLB,, | Performed by: DERMATOLOGY

## 2022-08-19 PROCEDURE — 99212 OFFICE O/P EST SF 10 MIN: CPT | Mod: 25,S$GLB,, | Performed by: DERMATOLOGY

## 2022-08-19 PROCEDURE — 17003 DESTRUCTION, PREMALIGNANT LESIONS; SECOND THROUGH 14 LESIONS: ICD-10-PCS | Mod: 59,S$GLB,, | Performed by: DERMATOLOGY

## 2022-08-19 PROCEDURE — 1159F MED LIST DOCD IN RCRD: CPT | Mod: CPTII,S$GLB,, | Performed by: DERMATOLOGY

## 2022-08-19 PROCEDURE — 1159F PR MEDICATION LIST DOCUMENTED IN MEDICAL RECORD: ICD-10-PCS | Mod: CPTII,S$GLB,, | Performed by: DERMATOLOGY

## 2022-08-19 PROCEDURE — 36415 COLL VENOUS BLD VENIPUNCTURE: CPT | Performed by: ANESTHESIOLOGY

## 2022-08-19 PROCEDURE — 80048 BASIC METABOLIC PNL TOTAL CA: CPT | Performed by: ANESTHESIOLOGY

## 2022-08-19 PROCEDURE — 17110 DESTRUCTION B9 LES UP TO 14: CPT | Mod: S$GLB,,, | Performed by: DERMATOLOGY

## 2022-08-19 PROCEDURE — 17110 PR DESTRUCTION BENIGN LESIONS UP TO 14: ICD-10-PCS | Mod: S$GLB,,, | Performed by: DERMATOLOGY

## 2022-08-19 PROCEDURE — 85027 COMPLETE CBC AUTOMATED: CPT | Performed by: ANESTHESIOLOGY

## 2022-08-19 PROCEDURE — 17003 DESTRUCT PREMALG LES 2-14: CPT | Mod: 59,S$GLB,, | Performed by: DERMATOLOGY

## 2022-08-19 PROCEDURE — 99212 PR OFFICE/OUTPT VISIT, EST, LEVL II, 10-19 MIN: ICD-10-PCS | Mod: 25,S$GLB,, | Performed by: DERMATOLOGY

## 2022-08-19 PROCEDURE — 99999 PR PBB SHADOW E&M-EST. PATIENT-LVL III: CPT | Mod: PBBFAC,,, | Performed by: DERMATOLOGY

## 2022-08-19 PROCEDURE — 1160F PR REVIEW ALL MEDS BY PRESCRIBER/CLIN PHARMACIST DOCUMENTED: ICD-10-PCS | Mod: CPTII,S$GLB,, | Performed by: DERMATOLOGY

## 2022-08-19 PROCEDURE — 17000 DESTRUCT PREMALG LESION: CPT | Mod: 59,S$GLB,, | Performed by: DERMATOLOGY

## 2022-08-19 PROCEDURE — 4010F ACE/ARB THERAPY RXD/TAKEN: CPT | Mod: CPTII,S$GLB,, | Performed by: DERMATOLOGY

## 2022-08-19 PROCEDURE — 1160F RVW MEDS BY RX/DR IN RCRD: CPT | Mod: CPTII,S$GLB,, | Performed by: DERMATOLOGY

## 2022-08-19 PROCEDURE — 99999 PR PBB SHADOW E&M-EST. PATIENT-LVL III: ICD-10-PCS | Mod: PBBFAC,,, | Performed by: DERMATOLOGY

## 2022-08-19 NOTE — PATIENT INSTRUCTIONS

## 2022-08-19 NOTE — ASSESSMENT & PLAN NOTE
Today's Plan:      Cryosurgery Procedure Note    Verbal consent from the patient is obtained including, but not limited to, risk of hypopigmentation/hyperpigmentation, scar, recurrence of lesion. The patient is aware of the precancerous quality and need for treatment of these lesions. Liquid nitrogen cryosurgery is applied to the 4 actinic keratoses, as detailed in the physical exam, to produce a freeze injury. The patient is aware that blisters may form and is instructed on wound care with gentle cleansing and use of vaseline ointment to keep moist until healed. The patient is supplied a handout on cryosurgery and is instructed to call if lesions do not completely resolve.    Wear hat always

## 2022-08-19 NOTE — PROGRESS NOTES
Subjective:       Patient ID:  Joseph Jesus is a 62 y.o. male who presents for   Chief Complaint   Patient presents with    Skin Check     History of Present Illness: The patient presents for follow up of skin check.    The patient was last seen on 5/24/2022 for cryosurgery to clavus on foot which has resolved.     This is a high risk patient here to check for the development of new lesions.    Patient with new complaint of lesion(s)  Location: right hand, right foot  Duration: 2 months  Symptoms: wart  Relieving factors/Previous treatments: none          Review of Systems   Skin: Positive for wears hat (most). Negative for daily sunscreen use, activity-related sunscreen use and recent sunburn.   Hematologic/Lymphatic: Does not bruise/bleed easily.        Objective:    Physical Exam   Constitutional: He appears well-developed and well-nourished. No distress.   Neurological: He is alert and oriented to person, place, and time. He is not disoriented.   Psychiatric: He has a normal mood and affect.   Skin:   Areas Examined (abnormalities noted in diagram):   Scalp / Hair Palpated and Inspected  Head / Face Inspection Performed  Neck Inspection Performed  Chest / Axilla Inspection Performed  Back Inspection Performed  RUE Inspected  LUE Inspection Performed  RLE Inspected                           Diagram Legend     Erythematous scaling macule/papule c/w actinic keratosis       Vascular papule c/w angioma      Pigmented verrucoid papule/plaque c/w seborrheic keratosis      Yellow umbilicated papule c/w sebaceous hyperplasia      Irregularly shaped tan macule c/w lentigo     1-2 mm smooth white papules consistent with Milia      Movable subcutaneous cyst with punctum c/w epidermal inclusion cyst      Subcutaneous movable cyst c/w pilar cyst      Firm pink to brown papule c/w dermatofibroma      Pedunculated fleshy papule(s) c/w skin tag(s)      Evenly pigmented macule c/w junctional nevus     Mildly variegated  pigmented, slightly irregular-bordered macule c/w mildly atypical nevus      Flesh colored to evenly pigmented papule c/w intradermal nevus       Pink pearly papule/plaque c/w basal cell carcinoma      Erythematous hyperkeratotic cursted plaque c/w SCC      Surgical scar with no sign of skin cancer recurrence      Open and closed comedones      Inflammatory papules and pustules      Verrucoid papule consistent consistent with wart     Erythematous eczematous patches and plaques     Dystrophic onycholytic nail with subungual debris c/w onychomycosis     Umbilicated papule    Erythematous-base heme-crusted tan verrucoid plaque consistent with inflamed seborrheic keratosis     Erythematous Silvery Scaling Plaque c/w Psoriasis     See annotation      Assessment / Plan:          Verruca vulgaris - hands  Cryosurgery procedure note:    Verbal consent from the patient is obtained including, but not limited to, risk of hypopigmentation/hyperpigmentation, scar, recurrence of lesion. Liquid nitrogen cryosurgery is applied to 5 lesions to produce a freeze injury. The patient is aware that blisters may form and is instructed on wound care with gentle cleansing and use of vaseline ointment to keep moist until healed. The patient is supplied a handout on cryosurgery and is instructed to call if lesions do not completely resolve.      Clavus - right foot and right index finger  Discussed pathogenic factor of pressure. Patient advised to avoid/eliminate contributory footwear.    Use corn pads.  pared    History of nonmelanoma skin cancer  Area(s) of previous NMSC evaluated with no signs of recurrence.    Upper body skin examination performed today including at least 6 points as noted in physical examination. No lesions suspicious for malignancy noted.    Recommend daily sun protection/avoidance and use of at least SPF 30, broad spectrum sunscreen (OTC drug).         AK (actinic keratosis)  Today's Plan:      Cryosurgery Procedure  Note    Verbal consent from the patient is obtained including, but not limited to, risk of hypopigmentation/hyperpigmentation, scar, recurrence of lesion. The patient is aware of the precancerous quality and need for treatment of these lesions. Liquid nitrogen cryosurgery is applied to the 4 actinic keratoses, as detailed in the physical exam, to produce a freeze injury. The patient is aware that blisters may form and is instructed on wound care with gentle cleansing and use of vaseline ointment to keep moist until healed. The patient is supplied a handout on cryosurgery and is instructed to call if lesions do not completely resolve.    Wear hat always      Follow up in about 3 months (around 11/19/2022).

## 2022-08-23 ENCOUNTER — ANESTHESIA (OUTPATIENT)
Dept: SURGERY | Facility: HOSPITAL | Age: 62
End: 2022-08-23
Payer: COMMERCIAL

## 2022-08-23 ENCOUNTER — HOSPITAL ENCOUNTER (OUTPATIENT)
Facility: HOSPITAL | Age: 62
Discharge: HOME | End: 2022-08-23
Attending: UROLOGY | Admitting: UROLOGY
Payer: COMMERCIAL

## 2022-08-23 ENCOUNTER — ANESTHESIA EVENT (OUTPATIENT)
Dept: SURGERY | Facility: HOSPITAL | Age: 62
End: 2022-08-23
Payer: COMMERCIAL

## 2022-08-23 VITALS
BODY MASS INDEX: 30.48 KG/M2 | TEMPERATURE: 98 F | SYSTOLIC BLOOD PRESSURE: 124 MMHG | OXYGEN SATURATION: 95 % | DIASTOLIC BLOOD PRESSURE: 73 MMHG | RESPIRATION RATE: 20 BRPM | WEIGHT: 230 LBS | HEIGHT: 73 IN | HEART RATE: 63 BPM

## 2022-08-23 DIAGNOSIS — C67.9 BLADDER CANCER: Primary | ICD-10-CM

## 2022-08-23 PROCEDURE — 64450 NJX AA&/STRD OTHER PN/BRANCH: CPT | Mod: 59 | Performed by: STUDENT IN AN ORGANIZED HEALTH CARE EDUCATION/TRAINING PROGRAM

## 2022-08-23 PROCEDURE — 36000707: Performed by: UROLOGY

## 2022-08-23 PROCEDURE — 64450: ICD-10-PCS | Mod: 59,RT,, | Performed by: ANESTHESIOLOGY

## 2022-08-23 PROCEDURE — 76942: ICD-10-PCS | Mod: 26,,, | Performed by: ANESTHESIOLOGY

## 2022-08-23 PROCEDURE — 52240 PR CYSTOURETHROSCOPY,FULGUR >5 CM LESN: ICD-10-PCS | Mod: ,,, | Performed by: UROLOGY

## 2022-08-23 PROCEDURE — 52240 CYSTOSCOPY AND TREATMENT: CPT | Mod: ,,, | Performed by: UROLOGY

## 2022-08-23 PROCEDURE — D9220A PRA ANESTHESIA: ICD-10-PCS | Mod: ANES,,, | Performed by: ANESTHESIOLOGY

## 2022-08-23 PROCEDURE — 64450 NJX AA&/STRD OTHER PN/BRANCH: CPT | Mod: 59,RT,, | Performed by: ANESTHESIOLOGY

## 2022-08-23 PROCEDURE — 71000015 HC POSTOP RECOV 1ST HR: Performed by: UROLOGY

## 2022-08-23 PROCEDURE — D9220A PRA ANESTHESIA: Mod: ANES,,, | Performed by: ANESTHESIOLOGY

## 2022-08-23 PROCEDURE — 36000706: Performed by: UROLOGY

## 2022-08-23 PROCEDURE — 63600175 PHARM REV CODE 636 W HCPCS: Performed by: ANESTHESIOLOGY

## 2022-08-23 PROCEDURE — D9220A PRA ANESTHESIA: ICD-10-PCS | Mod: CRNA,,, | Performed by: NURSE ANESTHETIST, CERTIFIED REGISTERED

## 2022-08-23 PROCEDURE — 71000044 HC DOSC ROUTINE RECOVERY FIRST HOUR: Performed by: UROLOGY

## 2022-08-23 PROCEDURE — 27201423 OPTIME MED/SURG SUP & DEVICES STERILE SUPPLY: Performed by: UROLOGY

## 2022-08-23 PROCEDURE — 25000003 PHARM REV CODE 250: Performed by: NURSE ANESTHETIST, CERTIFIED REGISTERED

## 2022-08-23 PROCEDURE — 37000009 HC ANESTHESIA EA ADD 15 MINS: Performed by: UROLOGY

## 2022-08-23 PROCEDURE — 76942 ECHO GUIDE FOR BIOPSY: CPT | Mod: 26,,, | Performed by: ANESTHESIOLOGY

## 2022-08-23 PROCEDURE — 88307 PR  SURG PATH,LEVEL V: ICD-10-PCS | Mod: 26,,, | Performed by: PATHOLOGY

## 2022-08-23 PROCEDURE — 63600175 PHARM REV CODE 636 W HCPCS

## 2022-08-23 PROCEDURE — 88307 TISSUE EXAM BY PATHOLOGIST: CPT | Performed by: PATHOLOGY

## 2022-08-23 PROCEDURE — 63600175 PHARM REV CODE 636 W HCPCS: Performed by: NURSE ANESTHETIST, CERTIFIED REGISTERED

## 2022-08-23 PROCEDURE — D9220A PRA ANESTHESIA: Mod: CRNA,,, | Performed by: NURSE ANESTHETIST, CERTIFIED REGISTERED

## 2022-08-23 PROCEDURE — 88307 TISSUE EXAM BY PATHOLOGIST: CPT | Mod: 26,,, | Performed by: PATHOLOGY

## 2022-08-23 PROCEDURE — 37000008 HC ANESTHESIA 1ST 15 MINUTES: Performed by: UROLOGY

## 2022-08-23 RX ORDER — FENTANYL CITRATE 50 UG/ML
25-200 INJECTION, SOLUTION INTRAMUSCULAR; INTRAVENOUS
Status: DISCONTINUED | OUTPATIENT
Start: 2022-08-23 | End: 2022-08-23 | Stop reason: HOSPADM

## 2022-08-23 RX ORDER — GLUCAGON 1 MG
1 KIT INJECTION
Status: DISCONTINUED | OUTPATIENT
Start: 2022-08-23 | End: 2022-08-23 | Stop reason: HOSPADM

## 2022-08-23 RX ORDER — LIDOCAINE HYDROCHLORIDE 20 MG/ML
INJECTION INTRAVENOUS
Status: DISCONTINUED | OUTPATIENT
Start: 2022-08-23 | End: 2022-08-23

## 2022-08-23 RX ORDER — MIDAZOLAM HYDROCHLORIDE 1 MG/ML
INJECTION INTRAMUSCULAR; INTRAVENOUS
Status: COMPLETED
Start: 2022-08-23 | End: 2022-08-23

## 2022-08-23 RX ORDER — ROCURONIUM BROMIDE 10 MG/ML
INJECTION, SOLUTION INTRAVENOUS
Status: DISCONTINUED | OUTPATIENT
Start: 2022-08-23 | End: 2022-08-23

## 2022-08-23 RX ORDER — DEXAMETHASONE SODIUM PHOSPHATE 4 MG/ML
INJECTION, SOLUTION INTRA-ARTICULAR; INTRALESIONAL; INTRAMUSCULAR; INTRAVENOUS; SOFT TISSUE
Status: DISCONTINUED | OUTPATIENT
Start: 2022-08-23 | End: 2022-08-23

## 2022-08-23 RX ORDER — PROPOFOL 10 MG/ML
VIAL (ML) INTRAVENOUS
Status: DISCONTINUED | OUTPATIENT
Start: 2022-08-23 | End: 2022-08-23

## 2022-08-23 RX ORDER — CEFAZOLIN SODIUM/WATER 2 G/20 ML
2 SYRINGE (ML) INTRAVENOUS
Status: DISCONTINUED | OUTPATIENT
Start: 2022-08-23 | End: 2022-08-23 | Stop reason: HOSPADM

## 2022-08-23 RX ORDER — PHENYLEPHRINE HYDROCHLORIDE 10 MG/ML
INJECTION INTRAVENOUS
Status: DISCONTINUED | OUTPATIENT
Start: 2022-08-23 | End: 2022-08-23

## 2022-08-23 RX ORDER — TRAMADOL HYDROCHLORIDE 50 MG/1
50 TABLET ORAL EVERY 6 HOURS
Qty: 5 TABLET | Refills: 0 | Status: SHIPPED | OUTPATIENT
Start: 2022-08-23 | End: 2022-08-24 | Stop reason: SDUPTHER

## 2022-08-23 RX ORDER — IBUPROFEN 200 MG
16 TABLET ORAL
Status: DISCONTINUED | OUTPATIENT
Start: 2022-08-23 | End: 2022-08-23 | Stop reason: HOSPADM

## 2022-08-23 RX ORDER — PHENAZOPYRIDINE HYDROCHLORIDE 100 MG/1
100 TABLET, FILM COATED ORAL 3 TIMES DAILY PRN
Qty: 30 TABLET | Refills: 1 | Status: SHIPPED | OUTPATIENT
Start: 2022-08-23 | End: 2022-09-02

## 2022-08-23 RX ORDER — ONDANSETRON 2 MG/ML
INJECTION INTRAMUSCULAR; INTRAVENOUS
Status: DISCONTINUED | OUTPATIENT
Start: 2022-08-23 | End: 2022-08-23

## 2022-08-23 RX ORDER — FENTANYL CITRATE 50 UG/ML
INJECTION, SOLUTION INTRAMUSCULAR; INTRAVENOUS
Status: COMPLETED
Start: 2022-08-23 | End: 2022-08-23

## 2022-08-23 RX ORDER — IBUPROFEN 200 MG
24 TABLET ORAL
Status: DISCONTINUED | OUTPATIENT
Start: 2022-08-23 | End: 2022-08-23 | Stop reason: HOSPADM

## 2022-08-23 RX ORDER — MIDAZOLAM HYDROCHLORIDE 1 MG/ML
.5-4 INJECTION INTRAMUSCULAR; INTRAVENOUS
Status: DISCONTINUED | OUTPATIENT
Start: 2022-08-23 | End: 2022-08-23 | Stop reason: HOSPADM

## 2022-08-23 RX ADMIN — ONDANSETRON 4 MG: 2 INJECTION INTRAMUSCULAR; INTRAVENOUS at 08:08

## 2022-08-23 RX ADMIN — DEXAMETHASONE SODIUM PHOSPHATE 4 MG: 4 INJECTION, SOLUTION INTRAMUSCULAR; INTRAVENOUS at 08:08

## 2022-08-23 RX ADMIN — MIDAZOLAM HYDROCHLORIDE 2 MG: 1 INJECTION INTRAMUSCULAR; INTRAVENOUS at 08:08

## 2022-08-23 RX ADMIN — SODIUM CHLORIDE: 0.9 INJECTION, SOLUTION INTRAVENOUS at 08:08

## 2022-08-23 RX ADMIN — PHENYLEPHRINE HYDROCHLORIDE 100 MCG: 10 INJECTION INTRAVENOUS at 08:08

## 2022-08-23 RX ADMIN — MIDAZOLAM 2 MG: 1 INJECTION INTRAMUSCULAR; INTRAVENOUS at 08:08

## 2022-08-23 RX ADMIN — FENTANYL CITRATE 100 MCG: 50 INJECTION INTRAMUSCULAR; INTRAVENOUS at 08:08

## 2022-08-23 RX ADMIN — LIDOCAINE HYDROCHLORIDE 50 MG: 20 INJECTION, SOLUTION INTRAVENOUS at 08:08

## 2022-08-23 RX ADMIN — MEPIVACAINE HYDROCHLORIDE 25 ML: 15 INJECTION, SOLUTION EPIDURAL; INFILTRATION at 08:08

## 2022-08-23 RX ADMIN — ROCURONIUM BROMIDE 50 MG: 10 INJECTION, SOLUTION INTRAVENOUS at 08:08

## 2022-08-23 RX ADMIN — FENTANYL CITRATE 100 MCG: 50 INJECTION, SOLUTION INTRAMUSCULAR; INTRAVENOUS at 08:08

## 2022-08-23 RX ADMIN — PROPOFOL 200 MG: 10 INJECTION, EMULSION INTRAVENOUS at 08:08

## 2022-08-23 NOTE — ANESTHESIA PROCEDURE NOTES
Intubation    Date/Time: 8/23/2022 8:37 AM  Performed by: Fatimah Rueda CRNA  Authorized by: Castro Britt MD     Intubation:     Induction:  Intravenous    Intubated:  Postinduction    Mask Ventilation:  Easy with oral airway    Attempts:  1    Attempted By:  Student    Method of Intubation:  Video laryngoscopy    Blade:  Mancera 3    Laryngeal View Grade: Grade I - full view of cords      Difficult Airway Encountered?: No      Complications:  None    Airway Device:  Oral endotracheal tube    Airway Device Size:  7.5    Style/Cuff Inflation:  Cuffed (inflated to minimal occlusive pressure)    Tube secured:  22    Secured at:  The lips    Placement Verified By:  Capnometry    Complicating Factors:  None    Findings Post-Intubation:  BS equal bilateral and atraumatic/condition of teeth unchanged

## 2022-08-23 NOTE — H&P
Urology ([facility]) H&P  Staff: Lenny Ewing MD      HPI:  Joseph Jesus is a 62 y.o. male who c/o ED.  This has been present for > 1 year.  He's tried and failed viagra and cialis.  His T is normal.  He went to [employer].  He has tried and failed 25 units of ICI.     He has LUTS.  + nocturia x 5, + feeling of incomplete emptying, + urgency.      He had microhematuria and was found to have an erythematous lesion with a positive cytology.  On 4/19/22 he underwent bladder biopsy showing CIS.     He then underwent a TURBT on 6/21/22 showing CIS.  Muscle was present    Here today for repeat TURBT    ROS:  Neg except per HPI    Past Medical History:   Diagnosis Date    Allergy     seasonal    Anal fistula hx    Arthritis     Basal cell carcinoma 04/2013    left superior forehead    Callus     Diverticulosis     Epididymal cyst     GERD (gastroesophageal reflux disease)     Hayfever     Hydrocele, right     Hyperlipidemia     Hypertension     Neck pain     hx of muscular inury from weight lifting---resolved now    PONV (postoperative nausea and vomiting)     Prostatitis     Dec. '12-treated with antibx.    Squamous cell carcinoma 01/07/2019    anterior scalp    Squamous cell carcinoma of skin 03/2020    Left post scalp (SCC insitu-saucerization)    Visual impairment        Past Surgical History:   Procedure Laterality Date    APPENDECTOMY  1962    BIOPSY OF BLADDER  4/19/2022    Procedure: BIOPSY, BLADDER;  Surgeon: Lenny Ewing MD;  Location: [facility] OR 1ST FLR;  Service: Urology;;    BLADDER FULGURATION  4/19/2022    Procedure: FULGURATION, BLADDER;  Surgeon: Lenny Ewing MD;  Location: [facility] OR 1ST FLR;  Service: Urology;;    COLONOSCOPY  12/2012    due for repeat 12/2015    COLONOSCOPY N/A 5/19/2016    Procedure: COLONOSCOPY;  Surgeon: James Webber MD;  Location: [facility] (4TH FLR);  Service: Endoscopy;  Laterality: N/A; diverticulosis, repeat in 5-10 years for  surveillance    COLONOSCOPY N/A 2021    Procedure: COLONOSCOPY;  Surgeon: Fahad Bautista Jr., MD;  Location: [facility] ENDO;  Service: Endoscopy;  Laterality: N/A;    CYSTOSCOPY  2022    Procedure: CYSTOSCOPY;  Surgeon: Lenny Ewing MD;  Location: [facility] OR [facility] FLR;  Service: Urology;;    CYSTOSCOPY  2022    Procedure: CYSTOSCOPY;  Surgeon: Lenny Ewing MD;  Location: [facility] OR [facility] FLR;  Service: Urology;;    ESOPHAGOGASTRODUODENOSCOPY N/A 2021    Procedure: EGD (ESOPHAGOGASTRODUODENOSCOPY);  Surgeon: Fahad Bautista Jr., MD;  Location: [facility] ENDO;  Service: Endoscopy;  Laterality: N/A;    EUA/Fistulotomy-'08      HERNIA REPAIR      RIH with mesh    Hydrocelectomy      MASS EXCISION  2004    BCC removal (twice)    MOLE REMOVAL  2019    2 moles removed from scalp =- 1 was basal cell and 1 was squamous cell - dermatology - Dr. londono    right Spermatocelectomy      UPPER GASTROINTESTINAL ENDOSCOPY  prior to     hiatal hernia, reflux esophagitis       Social History     Socioeconomic History    Marital status:      Spouse name: Traci    Number of children: 1   Occupational History    Occupation: Software Eng.     Employer: [employer]     Comment: [employer]   Tobacco Use    Smoking status: Former Smoker     Packs/day: 1.00     Years: 10.00     Pack years: 10.00     Quit date: 1987     Years since quittin.7    Smokeless tobacco: Never Used    Tobacco comment: Decreased lung compassity per patient   Substance and Sexual Activity    Alcohol use: Not Currently     Alcohol/week: 0.0 - 1.0 standard drinks     Comment: No longer drinks ETOH    Drug use: No    Sexual activity: Yes     Partners: Female       Family History   Problem Relation Age of Onset    Skin cancer Mother     Hypertension Mother     Skin cancer Father     Cancer Father         prostate cancer    Hypertension Father     Hypertension Maternal Grandmother     Hypertension Maternal  Grandfather     Hypertension Paternal Grandmother     Anesthesia problems Paternal Grandmother     Cancer Paternal Grandfather         prostate cancer    Hypertension Paternal Grandfather     Heart disease Paternal Grandfather     Diabetes Neg Hx     Colon polyps Neg Hx     Colon cancer Neg Hx     Melanoma Neg Hx     Psoriasis Neg Hx     Lupus Neg Hx     Eczema Neg Hx     Acne Neg Hx     Cirrhosis Neg Hx     Prostate cancer Neg Hx     Kidney disease Neg Hx     Crohn's disease Neg Hx     Ulcerative colitis Neg Hx     Stomach cancer Neg Hx     Esophageal cancer Neg Hx        Review of patient's allergies indicates:   Allergen Reactions    Amlodipine      edema    Atenolol      Depression - circa 2000    Catechu herb     Irbesartan Other (See Comments)     Possibly caused cough    Erythromycin Rash    Sumycin [tetracycline] Rash    Tetracyclines Rash       No current facility-administered medications on file prior to encounter.     Current Outpatient Medications on File Prior to Encounter   Medication Sig Dispense Refill    albuterol (PROVENTIL/VENTOLIN HFA) 90 mcg/actuation inhaler Inhale 2 puffs into the lungs every 6 (six) hours as needed for Wheezing (cough). 18 g 5    atorvastatin (LIPITOR) 10 MG tablet Take 1 tablet (10 mg total) by mouth once daily. 90 tablet 3    cetirizine (ZYRTEC) 10 MG tablet Take 10 mg by mouth once daily.      coenzyme Q10 100 mg capsule Take 200 mg by mouth once daily.      fluticasone propionate (FLONASE) 50 mcg/actuation nasal spray 2 sprays (100 mcg total) by Each Nostril route daily as needed for Rhinitis. 16 mL 5    hydroCHLOROthiazide (HYDRODIURIL) 25 MG tablet TAKE 1 TABLET BY MOUTH EVERY DAY 90 tablet 0    NIFEdipine (ADALAT CC) 30 MG TbSR Take 1 tablet (30 mg total) by mouth once daily. 90 tablet 3       Anticoagulation:  No    Physical Exam:  General: No acute distress, well developed. AAOx3  Head: Normocephalic, Atraumatic  Eyes: Extra-occular  movements intact, No discharge  Neck: supple, symmetrical, trachea midline  Lungs: normal respiratory effort, no respiratory distress, no wheezes  CV: regular rate, 2+ pulses  Abdomen: soft, non-tender, non-distended, no organomegaly  MSK: no edema, no deformities, normal ROM  Skin: skin color, texture, turgor normal.  Neurologic: no focal deficits, sensation intact    Labs:    Urine dipstick today - negative for all components    Lab Results   Component Value Date    WBC 7.35 08/19/2022    HGB 13.3 (L) 08/19/2022    HCT 40.1 08/19/2022    MCV 94 08/19/2022     08/19/2022           BMP  Lab Results   Component Value Date     08/19/2022    K 3.4 (L) 08/19/2022     08/19/2022    CO2 29 08/19/2022    BUN 20 08/19/2022    CREATININE 1.4 08/19/2022    CALCIUM 9.8 08/19/2022    ANIONGAP 10 08/19/2022    ESTGFRAFRICA 56.1 (A) 04/27/2022    EGFRNONAA 48.5 (A) 04/27/2022       Lab Results   Component Value Date    PSA 0.30 01/28/2022    PSA 0.22 07/29/2020    PSA 0.29 05/30/2019       Assessment: Joseph Jesus is a 62 y.o. male with history of CIS    Plan:     1. To OR on 8/23/22 for TURBT  2. Consents signed   3. I have explained the risk, benefits, and alternatives of the procedure in detail. The patient voices understanding and all questions have been answered. The patient agrees to proceed as planned.     Tucker Presley MD

## 2022-08-23 NOTE — DISCHARGE INSTRUCTIONS
Post Cystoscopy Instructions  Do not strain to have a bowel movement  No strenuous exercise x 7 days  No driving while you are on narcotic pain medications or if your mac  catheter is in place     You can expect:  To pass stone fragments if you had a stone procedure  Have pain when you void from your stent if you have a stent in place  See blood in your urine if you have a stent in place     If you have a catheter, please return to the ER if your catheter stops draining or you are having abdominal pain.     Call the doctor if:  Temperature is greater than 101F  Persistent vomiting and inability to keep food down  Inability to void if you do not have a catheter      What to Expect After a Cystoscopy  For the next 24-48 hours, you may feel a mild burning when you urinate. This burning is normal and expected. Drink plenty of water to dilute the urine to help relieve the burning sensation. You may also see a small amount of blood in your urine after the procedure. Do not strain to have a bowel movement. No strenuous exercise x 7 days. No driving while you are on narcotic pain medications or if your mac catheter is in place.     You can expect:  To pass stone fragments if you had a stone procedure  Have pain when you void from your stent if you have a stent in place  See blood in your urine if you have a stent in place     Unless you are already taking antibiotics, you may be given an antibiotic after the test to prevent infection.     Signs and Symptoms to Report  Call the [facility] at [phone] if you develop any of the following:  Fever of 101 degrees or higher  Chills or persistent bleeding  Inability to urinate        If you have a catheter, please return to the ER if your catheter stops draining or you are having abdominal pain.

## 2022-08-23 NOTE — ANESTHESIA POSTPROCEDURE EVALUATION
Anesthesia Post Evaluation    Patient: Joseph Jesus    Procedure(s) Performed: Procedure(s):  TURBT (TRANSURETHRAL RESECTION OF BLADDER TUMOR)-LARGE  CYSTOSCOPY    Final Anesthesia Type: general      Patient location during evaluation: PACU  Patient participation: Yes- Able to Participate  Level of consciousness: awake and alert  Post-procedure vital signs: reviewed and stable  Pain management: adequate  Airway patency: patent    PONV status at discharge: No PONV  Anesthetic complications: no      Cardiovascular status: blood pressure returned to baseline  Respiratory status: unassisted  Hydration status: euvolemic  Follow-up not needed.          Vitals Value Taken Time   /73 08/23/22 1015   Temp 36.4 °C (97.5 °F) 08/23/22 0908   Pulse 63 08/23/22 1015   Resp 20 08/23/22 1015   SpO2 95 % 08/23/22 1015         No case tracking events are documented in the log.      Pain/Neymar Score: Pain Rating Prior to Med Admin: 0 (8/23/2022  8:26 AM)  Pain Rating Post Med Admin: 0 (8/23/2022  8:26 AM)  Neymar Score: 10 (8/23/2022  9:40 AM)

## 2022-08-23 NOTE — ANESTHESIA PROCEDURE NOTES
Right obturator nerve block single shot    Patient location during procedure: pre-op   Block not for primary anesthetic.  Reason for block: at surgeon's request and post-op pain management   Post-op Pain Location: Right leg pain   Start time: 8/23/2022 8:12 AM  Timeout: 8/23/2022 8:11 AM   End time: 8/23/2022 8:16 AM    Staffing  Authorizing Provider: Frida Kennedy MD  Performing Provider: Troy Mares MD    Preanesthetic Checklist  Completed: patient identified, IV checked, site marked, risks and benefits discussed, surgical consent, monitors and equipment checked, pre-op evaluation and timeout performed  Peripheral Block  Patient position: supine  Prep: ChloraPrep  Patient monitoring: heart rate, cardiac monitor, continuous pulse ox, continuous capnometry and frequent blood pressure checks  Block type: obturator  Laterality: right  Injection technique: single shot  Needle  Needle type: Stimuplex   Needle gauge: 22 G  Needle length: 2 in  Needle localization: anatomical landmarks and ultrasound guidance   -ultrasound image captured on disc.  Assessment  Injection assessment: negative aspiration, negative parasthesia and local visualized surrounding nerve  Paresthesia pain: none  Heart rate change: no  Slow fractionated injection: yes  Pain Tolerance: comfortable throughout block and no complaints  Medications:    Medications: mepivacaine (CARBOCAINE) injection 15 mg/mL (1.5%) - Perineural   25 mL - 8/23/2022 8:16:00 AM    Additional Notes  VSS.  [facility] RN monitoring vitals throughout procedure.  Patient tolerated procedure well.

## 2022-08-23 NOTE — TRANSFER OF CARE
"Anesthesia Transfer of Care Note    Patient: Joseph Jesus    Procedure(s) Performed: Procedure(s):  TURBT (TRANSURETHRAL RESECTION OF BLADDER TUMOR)-LARGE  CYSTOSCOPY    Patient location: PACU    Anesthesia Type: general    Transport from OR: Transported from OR on 6-10 L/min O2 by face mask with adequate spontaneous ventilation    Post pain: adequate analgesia    Post assessment: no apparent anesthetic complications and tolerated procedure well    Post vital signs: stable    Level of consciousness: awake and alert    Nausea/Vomiting: no nausea/vomiting    Complications: none    Transfer of care protocol was followed      Last vitals: 08/23/22 0909  Visit Vitals  /78 (BP Location: Left arm, Patient Position: Lying)   Pulse 71   Temp 36.4 °C (97.5 °F) (Temporal)   Resp 20   Ht 6' 1" (1.854 m)   Wt 104.3 kg (230 lb)   SpO2 98%   BMI 30.34 kg/m²     "

## 2022-08-23 NOTE — OP NOTE
[facility]  Operative Note    Date: 08/23/2022    Pre-Op Diagnosis: bladder tumor    Post-Op Diagnosis: same    Procedure(s) Performed:   1.  TURBT of large (>5 cm) sized bladder tumor    Specimen(s): Bladder tumor    Staff Surgeon: Lenny Ewing MD    Assistant Surgeon: Sabino Méndez MD; Tucker Presley MD (Assist)    Anesthesia: General endotracheal anesthesia    Indications: Joseph Jesus is a 62 y.o. 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 He presents today for surgical re-resection.      Findings:  - Prior scar located on right lateral wall. Right UO uninvolved with scar  - No visible recurrence of tumor or lesion along scar    Estimated Blood Loss: min    Drains: None    Procedure in detail:  After the risks, benefits and possible complications of the procedure were explained, consents were obtained. The patient was taken to the operating room and placed under anesthesia. Pre-operative antibiotics were administered 30 minutes prior to expected start time. The patient was placed in the dorsal lithotomy position and prepped and draped in the normal and sterile fashion. Time out was performed.      A resectoscope in a 28 Fr sheath was introduced into the bladder per urethra. This passed easily.  The entire urethra was visualized which showed no masses or strictures.  The right and left ureteral orifices were identified in the normal anatomic position and were seen effluxing clear urine.  Formal cystoscopy was performed which revealed no masses or lesions suspicious for malignancy, no trabeculations, no bladder stones and no bladder diverticuli. There was a residual scar along the right latera wall without involvement of the right UO.    The visual obturator was exchanged for the resecting mechanism. The tumor scar was then resected, superficially until muscle fibers were identified.  Total size > 5 cm. Specimens were then removed and passed off the field for  pathologic analysis.      The bladder was drained and hemostasis was achieved.  The resectoscope was removed.    The patient tolerated the procedure well and was transferred to recovery in stable condition.    Disposition:  The patient will follow up with Dr. Ewing in 2 weeks.  Prescriptions were given for tramadol and pyridium.      Sabino Méndez MD

## 2022-08-23 NOTE — ANESTHESIA PREPROCEDURE EVALUATION
08/23/2022  Joseph Jesus is a 62 y.o., male.    Pre-operative evaluation for Procedure(s) (LRB):  TURBT (TRANSURETHRAL RESECTION OF BLADDER TUMOR) (N/A)    Joseph Jesus is a 62 y.o. male     LDA:     Prev airway:     Drips:     Patient Active Problem List   Diagnosis    Colon polyps    BPH with urinary obstruction    Hypertension, essential    Hyperlipidemia    NAFLD (nonalcoholic fatty liver disease)    Lateral epicondylitis of right elbow    Diverticulitis of large intestine without perforation or abscess without bleeding    Erectile dysfunction due to arterial insufficiency    AK (actinic keratosis)    Hiatal hernia with gastroesophageal reflux disease without esophagitis    Gastroesophageal reflux disease    Aortic atherosclerosis    Coronary artery calcification    Edema leg    Malignant neoplasm of lateral wall of urinary bladder    Adjustment disorder with anxious mood    Numbness of toes    Stage 3a chronic kidney disease       Review of patient's allergies indicates:   Allergen Reactions    Amlodipine      edema    Atenolol      Depression - circa 2000    Catechu herb     Irbesartan Other (See Comments)     Possibly caused cough    Erythromycin Rash    Sumycin [tetracycline] Rash    Tetracyclines Rash        No current facility-administered medications on file prior to encounter.     Current Outpatient Medications on File Prior to Encounter   Medication Sig Dispense Refill    albuterol (PROVENTIL/VENTOLIN HFA) 90 mcg/actuation inhaler Inhale 2 puffs into the lungs every 6 (six) hours as needed for Wheezing (cough). 18 g 5    atorvastatin (LIPITOR) 10 MG tablet Take 1 tablet (10 mg total) by mouth once daily. 90 tablet 3    cetirizine (ZYRTEC) 10 MG tablet Take 10 mg by mouth once daily.      coenzyme Q10 100 mg capsule Take 200 mg by mouth once daily.      fluticasone  propionate (FLONASE) 50 mcg/actuation nasal spray 2 sprays (100 mcg total) by Each Nostril route daily as needed for Rhinitis. 16 mL 5    hydroCHLOROthiazide (HYDRODIURIL) 25 MG tablet TAKE 1 TABLET BY MOUTH EVERY DAY 90 tablet 0    NIFEdipine (ADALAT CC) 30 MG TbSR Take 1 tablet (30 mg total) by mouth once daily. 90 tablet 3       Past Surgical History:   Procedure Laterality Date    APPENDECTOMY  1962    BIOPSY OF BLADDER  4/19/2022    Procedure: BIOPSY, BLADDER;  Surgeon: Lenny Ewing MD;  [facility];  Service: Urology;;    BLADDER FULGURATION  4/19/2022    Procedure: FULGURATION, BLADDER;  Surgeon: Lenny Ewing MD;  [facility];  Service: Urology;;    COLONOSCOPY  12/2012    due for repeat 12/2015    COLONOSCOPY N/A 5/19/2016    Procedure: COLONOSCOPY;  Surgeon: James Webber MD;  Location: [facility] (4TH FLR);  Service: Endoscopy;  Laterality: N/A; diverticulosis, repeat in 5-10 years for surveillance    COLONOSCOPY N/A 8/9/2021    Procedure: COLONOSCOPY;  Surgeon: Fahad Bautista Jr., MD;  Location: [facility];  Service: Endoscopy;  Laterality: N/A;    CYSTOSCOPY  4/19/2022    Procedure: CYSTOSCOPY;  Surgeon: Lenny Ewing MD;  [facility];  Service: Urology;;    CYSTOSCOPY  6/21/2022    Procedure: CYSTOSCOPY;  Surgeon: Lenny Ewing MD;  [facility];  Service: Urology;;    ESOPHAGOGASTRODUODENOSCOPY N/A 8/9/2021    Procedure: EGD (ESOPHAGOGASTRODUODENOSCOPY);  Surgeon: Fahad Buatista Jr., MD;  Location: [facility];  Service: Endoscopy;  Laterality: N/A;    EUA/Fistulotomy-'08      HERNIA REPAIR      RI with mesh    Hydrocelectomy  2013    MASS EXCISION  2004    BCC removal (twice)    MOLE REMOVAL  02/25/2019    2 moles removed from scalp =- 1 was basal cell and 1 was squamous cell - dermatology - Dr. londono    right Spermatocelectomy  2013    UPPER GASTROINTESTINAL ENDOSCOPY  prior to 2010    hiatal hernia,  reflux esophagitis         CMP: No results for input(s): NA, K, CL, CO2, BUN, CREATININE, GLU, MG, PHOS, CALCIUM, ALBUMIN, PROT, ALKPHOS, ALT, AST, BILITOT in the last 72 hours.    INR  No results for input(s): PT, INR, PROTIME, APTT in the last 72 hours.        Diagnostic Studies:      EKD Echo:          Pre-op Assessment          Review of Systems  Anesthesia Hx:  Hx of Anesthetic complications PONV  Denies Family Hx of Anesthesia complications.  Personal Hx of Anesthesia complications, Post-Operative Nausea/Vomiting   Hematology/Oncology:        Current/Recent Cancer. -- Cancer in past history:  Other (see Oncology comments) surgery  Oncology Comments: Skin cancer x3 surgeries to remove/ S/P-Bladder cancer Sx x2     EENT/Dental:   Farsighted-reading glasses/ Sinus allergies   Cardiovascular:   Exercise tolerance: good Hypertension CAD asymptomatic  Ankle swelling sometimes when not active/ Walking/Gym workout-Treadmill/ Stationary bike/Weight-3x/wk./Housework   Pulmonary:   Sleep Apnea Maybe per patient   Renal/:   Chronic Renal Disease CKD-Stage 3/ Malignant neoplasm of urinary bladder, unspecified   Hepatic/GI:   Hiatal Hernia, GERD Liver Disease, Fatty Liver   Musculoskeletal:   Arthritis  Shoulders & ankles   Neurological:   Neuromuscular Disease,    Endocrine:  Endocrine Normal    Dermatological:   AK(actinic keratosis)/warts   Psych:   Psychiatric History Anxious         Physical Exam  General: Well nourished    Airway:  Mallampati: II   Mouth Opening: Normal  TM Distance: Normal  Tongue: Normal  Neck ROM: Normal ROM    Dental:Any loose and/or missing teeth verified with patient       Anesthesia Plan  Type of Anesthesia, risks & benefits discussed:    Anesthesia Type: Gen ETT, Gen Supraglottic Airway, Gen Natural Airway  Intra-op Monitoring Plan: Standard ASA Monitors  Post Op Pain Control Plan: multimodal analgesia  Induction:  IV  Airway Plan: Direct  Informed Consent: Informed consent signed  with the Patient representative and all parties understand the risks and agree with anesthesia plan.  All questions answered.   ASA Score: 3    Ready For Surgery From Anesthesia Perspective.     .

## 2022-08-23 NOTE — PLAN OF CARE
Patient tolerating oral liquids without difficulty. No apparent s&s of distress noted at this time, no complaints voiced at this time. Discharge instructions reviewed with patient/family/friend with good verbal feedback received. Patient ready for discharge  
Admission

## 2022-08-23 NOTE — DISCHARGE SUMMARY
[facility]  Discharge Note  Short Stay    Admit Date: 8/23/2022    Discharge Date and Time: 08/23/2022 9:15 AM      Attending Physician: Lenny Ewing MD     Discharge Provider: Sabino Méndez MD    Diagnoses:  Active Hospital Problems    Diagnosis  POA    *Malignant neoplasm of lateral wall of urinary bladder [C67.2]  Yes    Adjustment disorder with anxious mood [F43.22]  Yes    Stage 3a chronic kidney disease [N18.31]  Yes    Aortic atherosclerosis [I70.0]  Yes     CT abd 5/7/2020      Coronary artery calcification [I25.10, I25.84]  Yes     CTA 4/10/2020 - scattered calcific atherosclerosis of the coronary vessels      Hiatal hernia with gastroesophageal reflux disease without esophagitis [K44.9, K21.9]  Yes     Likely etiology of upper airway symptoms.       AK (actinic keratosis) [L57.0]  Yes     10/2020 efudex/dovonex bid x 1 week scalp      Erectile dysfunction due to arterial insufficiency [N52.01]  Yes    Diverticulitis of large intestine without perforation or abscess without bleeding [K57.32]  Yes    Hypertension, essential [I10]  Yes    Hyperlipidemia [E78.5]  Yes     discussed cohort risk 7.8% - does not want meds at this time      NAFLD (nonalcoholic fatty liver disease) [K76.0]  Yes    Lateral epicondylitis of right elbow [M77.11]  Yes    BPH with urinary obstruction [N40.1, N13.8]  Yes    Colon polyps [K63.5]  Yes      Resolved Hospital Problems   No resolved problems to display.       Discharged Condition: stable    Hospital Course: Patient was admitted for TURBT and tolerated the procedure well with no complications. He was discharged home in good condition on the same day.       Final Diagnoses: Same as principal problem.    Disposition: Home or Self Care    Follow up/Patient Instructions:    Medications:  Reconciled Home Medications:   Current Discharge Medication List      START taking these medications    Details   phenazopyridine (PYRIDIUM) 100 MG tablet Take 1  tablet (100 mg total) by mouth 3 (three) times daily as needed for Pain.  Qty: 30 tablet, Refills: 1      traMADoL (ULTRAM) 50 mg tablet Take 1 tablet (50 mg total) by mouth every 6 (six) hours.  Qty: 5 tablet, Refills: 0         CONTINUE these medications which have NOT CHANGED    Details   albuterol (PROVENTIL/VENTOLIN HFA) 90 mcg/actuation inhaler Inhale 2 puffs into the lungs every 6 (six) hours as needed for Wheezing (cough).  Qty: 18 g, Refills: 5    Associated Diagnoses: Cough      atorvastatin (LIPITOR) 10 MG tablet Take 1 tablet (10 mg total) by mouth once daily.  Qty: 90 tablet, Refills: 3    Comments: DX Code Needed  .  Associated Diagnoses: Hyperlipidemia, unspecified hyperlipidemia type      cetirizine (ZYRTEC) 10 MG tablet Take 10 mg by mouth once daily.      coenzyme Q10 100 mg capsule Take 200 mg by mouth once daily.      fluticasone propionate (FLONASE) 50 mcg/actuation nasal spray 2 sprays (100 mcg total) by Each Nostril route daily as needed for Rhinitis.  Qty: 16 mL, Refills: 5    Associated Diagnoses: Bronchitis with wheezing      hydroCHLOROthiazide (HYDRODIURIL) 25 MG tablet TAKE 1 TABLET BY MOUTH EVERY DAY  Qty: 90 tablet, Refills: 0    Associated Diagnoses: Hypertension, essential      NIFEdipine (ADALAT CC) 30 MG TbSR Take 1 tablet (30 mg total) by mouth once daily.  Qty: 90 tablet, Refills: 3    Comments: .           Discharge Procedure Orders   Diet Adult Regular     Activity as tolerated      Follow-up Information     Lenny Ewing MD Follow up on 9/7/2022.    Specialty: Urology  Why: f/u pathology  Contact information:  [address]  [facility] [address] [phone]

## 2022-08-24 ENCOUNTER — PATIENT MESSAGE (OUTPATIENT)
Dept: INTERNAL MEDICINE | Facility: CLINIC | Age: 62
End: 2022-08-24
Payer: COMMERCIAL

## 2022-08-24 ENCOUNTER — PATIENT MESSAGE (OUTPATIENT)
Dept: UROLOGY | Facility: CLINIC | Age: 62
End: 2022-08-24
Payer: COMMERCIAL

## 2022-08-24 RX ORDER — TRAMADOL HYDROCHLORIDE 50 MG/1
50 TABLET ORAL EVERY 6 HOURS
Qty: 12 TABLET | Refills: 0 | Status: SHIPPED | OUTPATIENT
Start: 2022-08-24 | End: 2022-08-27

## 2022-09-08 ENCOUNTER — PATIENT MESSAGE (OUTPATIENT)
Dept: UROLOGY | Facility: CLINIC | Age: 62
End: 2022-09-08
Payer: COMMERCIAL

## 2022-09-08 LAB
FINAL PATHOLOGIC DIAGNOSIS: ABNORMAL
Lab: ABNORMAL

## 2022-09-22 ENCOUNTER — OFFICE VISIT (OUTPATIENT)
Dept: UROLOGY | Facility: CLINIC | Age: 62
End: 2022-09-22
Payer: COMMERCIAL

## 2022-09-22 VITALS
SYSTOLIC BLOOD PRESSURE: 168 MMHG | WEIGHT: 234.81 LBS | HEIGHT: 73 IN | DIASTOLIC BLOOD PRESSURE: 99 MMHG | HEART RATE: 63 BPM | BODY MASS INDEX: 31.12 KG/M2

## 2022-09-22 DIAGNOSIS — N40.1 BPH WITH URINARY OBSTRUCTION: ICD-10-CM

## 2022-09-22 DIAGNOSIS — N52.01 ERECTILE DYSFUNCTION DUE TO ARTERIAL INSUFFICIENCY: ICD-10-CM

## 2022-09-22 DIAGNOSIS — N13.8 BPH WITH URINARY OBSTRUCTION: ICD-10-CM

## 2022-09-22 DIAGNOSIS — C67.2 MALIGNANT NEOPLASM OF LATERAL WALL OF URINARY BLADDER: Primary | ICD-10-CM

## 2022-09-22 PROCEDURE — 3080F DIAST BP >= 90 MM HG: CPT | Mod: CPTII,S$GLB,, | Performed by: UROLOGY

## 2022-09-22 PROCEDURE — 3008F BODY MASS INDEX DOCD: CPT | Mod: CPTII,S$GLB,, | Performed by: UROLOGY

## 2022-09-22 PROCEDURE — 1159F PR MEDICATION LIST DOCUMENTED IN MEDICAL RECORD: ICD-10-PCS | Mod: CPTII,S$GLB,, | Performed by: UROLOGY

## 2022-09-22 PROCEDURE — 3080F PR MOST RECENT DIASTOLIC BLOOD PRESSURE >= 90 MM HG: ICD-10-PCS | Mod: CPTII,S$GLB,, | Performed by: UROLOGY

## 2022-09-22 PROCEDURE — 3008F PR BODY MASS INDEX (BMI) DOCUMENTED: ICD-10-PCS | Mod: CPTII,S$GLB,, | Performed by: UROLOGY

## 2022-09-22 PROCEDURE — 3077F PR MOST RECENT SYSTOLIC BLOOD PRESSURE >= 140 MM HG: ICD-10-PCS | Mod: CPTII,S$GLB,, | Performed by: UROLOGY

## 2022-09-22 PROCEDURE — 3077F SYST BP >= 140 MM HG: CPT | Mod: CPTII,S$GLB,, | Performed by: UROLOGY

## 2022-09-22 PROCEDURE — 99999 PR PBB SHADOW E&M-EST. PATIENT-LVL III: ICD-10-PCS | Mod: PBBFAC,,, | Performed by: UROLOGY

## 2022-09-22 PROCEDURE — 1159F MED LIST DOCD IN RCRD: CPT | Mod: CPTII,S$GLB,, | Performed by: UROLOGY

## 2022-09-22 PROCEDURE — 4010F ACE/ARB THERAPY RXD/TAKEN: CPT | Mod: CPTII,S$GLB,, | Performed by: UROLOGY

## 2022-09-22 PROCEDURE — 99215 OFFICE O/P EST HI 40 MIN: CPT | Mod: S$GLB,,, | Performed by: UROLOGY

## 2022-09-22 PROCEDURE — 99999 PR PBB SHADOW E&M-EST. PATIENT-LVL III: CPT | Mod: PBBFAC,,, | Performed by: UROLOGY

## 2022-09-22 PROCEDURE — 4010F PR ACE/ARB THEARPY RXD/TAKEN: ICD-10-PCS | Mod: CPTII,S$GLB,, | Performed by: UROLOGY

## 2022-09-22 PROCEDURE — 99215 PR OFFICE/OUTPT VISIT, EST, LEVL V, 40-54 MIN: ICD-10-PCS | Mod: S$GLB,,, | Performed by: UROLOGY

## 2022-09-22 NOTE — PROGRESS NOTES
CHIEF COMPLAINT:    Mr. Jesus is a 62 y.o. male presenting with ED.    PRESENTING ILLNESS:    Joseph Jesus is a 62 y.o. male who c/o ED.  This has been present for > 1 year.  He's tried and failed viagra and cialis.  His T is normal.  He went to [employer].  He has tried and failed 25 units of ICI.    He has LUTS.  + nocturia x 5, + feeling of incomplete emptying, + urgency.     He had microhematuria and was found to have an erythematous lesion with a positive cytology.  On 4/19/22 he underwent bladder biopsy showing CIS.      He then underwent a TURBT on 6/21/22 showing CIS.  Muscle was present.  On 8/23/22, he underwent re-resection showing CIS.  Muscle was present.    REVIEW OF SYSTEMS:    Joseph Jesus denies headache, blurred vision, fever, nausea, vomiting, chills, abdominal pain, bleeding per rectum, cough, SOB, recent loss of consciousness, recent mental status changes, seizures, dizziness, or upper or lower extremity weakness.    ANDREA  1. 1  2. 1  3. 1  4. 1  5. 1     PATIENT HISTORY:    Past Medical History:   Diagnosis Date    Allergy     seasonal    Anal fistula hx    Arthritis     Basal cell carcinoma 04/2013    left superior forehead    Callus     Diverticulosis     Epididymal cyst     GERD (gastroesophageal reflux disease)     Hayfever     Hydrocele, right     Hyperlipidemia     Hypertension     Neck pain     hx of muscular inury from weight lifting---resolved now    PONV (postoperative nausea and vomiting)     Prostatitis     Dec. '12-treated with antibx.    Squamous cell carcinoma 01/07/2019    anterior scalp    Squamous cell carcinoma of skin 03/2020    Left post scalp (SCC insitu-saucerization)    Visual impairment        Past Surgical History:   Procedure Laterality Date    APPENDECTOMY  1962    BIOPSY OF BLADDER  4/19/2022    Procedure: BIOPSY, BLADDER;  Surgeon: Lenny Ewing MD;  Location: [facility] OR [address] [place] [address];  Service: Urology;;    BLADDER FULGURATION  4/19/2022    Procedure:  FULGURATION, BLADDER;  Surgeon: Lenny Ewing MD;  Location: [facility] OR 1ST FLR;  Service: Urology;;    COLONOSCOPY  12/2012    due for repeat 12/2015    COLONOSCOPY N/A 5/19/2016    Procedure: COLONOSCOPY;  Surgeon: James Webber MD;  Location: [facility] (4TH FLR);  Service: Endoscopy;  Laterality: N/A; diverticulosis, repeat in 5-10 years for surveillance    COLONOSCOPY N/A 8/9/2021    Procedure: COLONOSCOPY;  Surgeon: Fahad Bautista Jr., MD;  Location: [facility];  Service: Endoscopy;  Laterality: N/A;    CYSTOSCOPY  4/19/2022    Procedure: CYSTOSCOPY;  Surgeon: Lenny Ewing MD;  Location: [facility] OR 1ST FLR;  Service: Urology;;    CYSTOSCOPY  6/21/2022    Procedure: CYSTOSCOPY;  Surgeon: Lenny Ewing MD;  Location: [facility] OR 1ST FLR;  Service: Urology;;    CYSTOSCOPY  8/23/2022    Procedure: CYSTOSCOPY;  Surgeon: Lenny Ewing MD;  Location: [facility] OR 1ST FLR;  Service: Urology;;    ESOPHAGOGASTRODUODENOSCOPY N/A 8/9/2021    Procedure: EGD (ESOPHAGOGASTRODUODENOSCOPY);  Surgeon: Fahad Bautista Jr., MD;  Location: [facility];  Service: Endoscopy;  Laterality: N/A;    EUA/Fistulotomy-'08      HERNIA REPAIR      [facility] with mesh    Hydrocelectomy  2013    MASS EXCISION  2004    BCC removal (twice)    MOLE REMOVAL  02/25/2019    2 moles removed from scalp =- 1 was basal cell and 1 was squamous cell - dermatology - Dr. londono    right Spermatocelectomy  2013    UPPER GASTROINTESTINAL ENDOSCOPY  prior to 2010    hiatal hernia, reflux esophagitis       Family History   Problem Relation Age of Onset    Skin cancer Mother     Hypertension Mother     Skin cancer Father     Cancer Father         prostate cancer    Hypertension Father     Hypertension Maternal Grandmother     Hypertension Maternal Grandfather     Hypertension Paternal Grandmother     Anesthesia problems Paternal Grandmother     Cancer Paternal Grandfather         prostate cancer    Hypertension Paternal Grandfather     Heart disease Paternal  Grandfather     Diabetes Neg Hx     Colon polyps Neg Hx     Colon cancer Neg Hx     Melanoma Neg Hx     Psoriasis Neg Hx     Lupus Neg Hx     Eczema Neg Hx     Acne Neg Hx     Cirrhosis Neg Hx     Prostate cancer Neg Hx     Kidney disease Neg Hx     Crohn's disease Neg Hx     Ulcerative colitis Neg Hx     Stomach cancer Neg Hx     Esophageal cancer Neg Hx        Social History     Socioeconomic History    Marital status:      Spouse name: Traci    Number of children: 1   Occupational History    Occupation: Software Eng.     Employer: [employer]     Comment: [employer]   Tobacco Use    Smoking status: Former     Packs/day: 1.00     Years: 10.00     Pack years: 10.00     Types: Cigarettes     Quit date: 1987     Years since quittin.8    Smokeless tobacco: Never    Tobacco comments:     Decreased lung compassity per patient   Substance and Sexual Activity    Alcohol use: Not Currently     Alcohol/week: 0.0 - 1.0 standard drinks     Comment: No longer drinks ETOH    Drug use: No    Sexual activity: Yes     Partners: Female       Allergies:  Amlodipine, Atenolol, Catechu herb, Irbesartan, Erythromycin, Sumycin [tetracycline], and Tetracyclines    Medications:    Current Outpatient Medications:     albuterol (PROVENTIL/VENTOLIN HFA) 90 mcg/actuation inhaler, Inhale 2 puffs into the lungs every 6 (six) hours as needed for Wheezing (cough)., Disp: 18 g, Rfl: 5    atorvastatin (LIPITOR) 10 MG tablet, Take 1 tablet (10 mg total) by mouth once daily., Disp: 90 tablet, Rfl: 3    cetirizine (ZYRTEC) 10 MG tablet, Take 10 mg by mouth once daily., Disp: , Rfl:     coenzyme Q10 100 mg capsule, Take 200 mg by mouth once daily., Disp: , Rfl:     fluticasone propionate (FLONASE) 50 mcg/actuation nasal spray, 2 sprays (100 mcg total) by Each Nostril route daily as needed for Rhinitis., Disp: 16 mL, Rfl: 5    hydroCHLOROthiazide (HYDRODIURIL) 25 MG tablet, TAKE 1 TABLET BY MOUTH EVERY DAY, Disp: 90 tablet, Rfl: 0     NIFEdipine (ADALAT CC) 30 MG TbSR, Take 1 tablet (30 mg total) by mouth once daily., Disp: 90 tablet, Rfl: 3    PHYSICAL EXAMINATION:    The patient generally appears in good health, is appropriately interactive, and is in no apparent distress.     Eyes: anicteric sclerae, moist conjunctivae; no lid-lag; PERRLA     HENT: Atraumatic; oropharynx clear with moist mucous membranes and no mucosal ulcerations;normal hard and soft palate.  No evidence of lymphadenopathy.    Neck: Trachea midline.  No thyromegaly.    Musculoskeletal: No abnormal gait.    Skin: No lesions.    Mental: Cooperative with normal affect.  Is oriented to time, place, and person.    Neuro: Grossly intact.    Chest: Normal inspiratory effort.   No accessory muscles.  No audible wheezes.  Respirations symmetric on inspiration and expiration.    Heart: Regular rhythm.      Abdomen:  Soft, non-tender. No masses or organomegaly. Bladder is not palpable. No evidence of flank discomfort. No evidence of inguinal hernia.    Genitourinary: The penis is circumcised with no evidence of plaques or induration. The urethral meatus is normal. The testes, epididymides, and cord structures are normal in size and contour bilaterally. The scrotum is normal in size and contour.    Normal anal sphincter tone. No rectal mass.    The prostate is 30 g. Normal landmarks. Lateral sulci. Median furrow intact.  No nodularity or induration. Seminal vesicles are normal.    Extremities: No clubbing, cyanosis, or edema      LABS:    UA dipped negative today  Lab Results   Component Value Date    PSA 0.30 01/28/2022    PSA 0.22 07/29/2020    PSA 0.29 05/30/2019    PSADIAG 0.37 09/08/2014       IMPRESSION:    Encounter Diagnoses   Name Primary?    Malignant neoplasm of lateral wall of urinary bladder Yes    BPH with urinary obstruction     Erectile dysfunction due to arterial insufficiency    HTN, stable  Hyperlipidemia, stable      PLAN:    1. Discussed options for his ED (affected  by above comorbidities).  He'd like to continue with ICI.  Can titrate up 5 units at a time.  .  2. Will observe his LUTS as they don't bother him.  3. Went over his pathology demonstrating cancer.  Discussed that CIS is high grade.  Recommend BCG.  He is high risk.  If he responds, will need maint until 9/2025.  4. Will need blue light cysto after his induction BCG.       Copy to:

## 2022-09-26 ENCOUNTER — TELEPHONE (OUTPATIENT)
Dept: NEUROLOGY | Facility: CLINIC | Age: 62
End: 2022-09-26
Payer: COMMERCIAL

## 2022-10-04 ENCOUNTER — OFFICE VISIT (OUTPATIENT)
Dept: NEUROLOGY | Facility: CLINIC | Age: 62
End: 2022-10-04
Payer: COMMERCIAL

## 2022-10-04 VITALS
HEART RATE: 80 BPM | DIASTOLIC BLOOD PRESSURE: 70 MMHG | BODY MASS INDEX: 30.97 KG/M2 | HEIGHT: 73 IN | SYSTOLIC BLOOD PRESSURE: 104 MMHG | WEIGHT: 233.69 LBS

## 2022-10-04 DIAGNOSIS — R20.0 NUMBNESS OF TOES: ICD-10-CM

## 2022-10-04 PROCEDURE — 3074F SYST BP LT 130 MM HG: CPT | Mod: CPTII,S$GLB,, | Performed by: PSYCHIATRY & NEUROLOGY

## 2022-10-04 PROCEDURE — 1160F RVW MEDS BY RX/DR IN RCRD: CPT | Mod: CPTII,S$GLB,, | Performed by: PSYCHIATRY & NEUROLOGY

## 2022-10-04 PROCEDURE — 4010F ACE/ARB THERAPY RXD/TAKEN: CPT | Mod: CPTII,S$GLB,, | Performed by: PSYCHIATRY & NEUROLOGY

## 2022-10-04 PROCEDURE — 3078F PR MOST RECENT DIASTOLIC BLOOD PRESSURE < 80 MM HG: ICD-10-PCS | Mod: CPTII,S$GLB,, | Performed by: PSYCHIATRY & NEUROLOGY

## 2022-10-04 PROCEDURE — 99999 PR PBB SHADOW E&M-EST. PATIENT-LVL IV: CPT | Mod: PBBFAC,,, | Performed by: PSYCHIATRY & NEUROLOGY

## 2022-10-04 PROCEDURE — 3074F PR MOST RECENT SYSTOLIC BLOOD PRESSURE < 130 MM HG: ICD-10-PCS | Mod: CPTII,S$GLB,, | Performed by: PSYCHIATRY & NEUROLOGY

## 2022-10-04 PROCEDURE — 4010F PR ACE/ARB THEARPY RXD/TAKEN: ICD-10-PCS | Mod: CPTII,S$GLB,, | Performed by: PSYCHIATRY & NEUROLOGY

## 2022-10-04 PROCEDURE — 1159F MED LIST DOCD IN RCRD: CPT | Mod: CPTII,S$GLB,, | Performed by: PSYCHIATRY & NEUROLOGY

## 2022-10-04 PROCEDURE — 99205 OFFICE O/P NEW HI 60 MIN: CPT | Mod: S$GLB,,, | Performed by: PSYCHIATRY & NEUROLOGY

## 2022-10-04 PROCEDURE — 3078F DIAST BP <80 MM HG: CPT | Mod: CPTII,S$GLB,, | Performed by: PSYCHIATRY & NEUROLOGY

## 2022-10-04 PROCEDURE — 3008F PR BODY MASS INDEX (BMI) DOCUMENTED: ICD-10-PCS | Mod: CPTII,S$GLB,, | Performed by: PSYCHIATRY & NEUROLOGY

## 2022-10-04 PROCEDURE — 99999 PR PBB SHADOW E&M-EST. PATIENT-LVL IV: ICD-10-PCS | Mod: PBBFAC,,, | Performed by: PSYCHIATRY & NEUROLOGY

## 2022-10-04 PROCEDURE — 1160F PR REVIEW ALL MEDS BY PRESCRIBER/CLIN PHARMACIST DOCUMENTED: ICD-10-PCS | Mod: CPTII,S$GLB,, | Performed by: PSYCHIATRY & NEUROLOGY

## 2022-10-04 PROCEDURE — 99205 PR OFFICE/OUTPT VISIT, NEW, LEVL V, 60-74 MIN: ICD-10-PCS | Mod: S$GLB,,, | Performed by: PSYCHIATRY & NEUROLOGY

## 2022-10-04 PROCEDURE — 3008F BODY MASS INDEX DOCD: CPT | Mod: CPTII,S$GLB,, | Performed by: PSYCHIATRY & NEUROLOGY

## 2022-10-04 PROCEDURE — 1159F PR MEDICATION LIST DOCUMENTED IN MEDICAL RECORD: ICD-10-PCS | Mod: CPTII,S$GLB,, | Performed by: PSYCHIATRY & NEUROLOGY

## 2022-10-04 NOTE — PROGRESS NOTES
"      Date: 10/4/2022    Patient ID: Joseph Jesus is a 62 y.o. male.    Referring Provider:  Nick Trejo MD    Chief Complaint: Numbness (feet)      History of Present Illness:  Mr. Jesus is a 62 y.o. male who presents referred by Nick Trejo MD today for evaluation of numbness in feet. He has history of bladder cancer diagnosed earlier this year.      He has had numbness in the feet since 2011. This seemed to start rather suddenly and was a cold sensation. It was below his toes and initially only in the left foot. It has been progressive and moved to the right foot and it moving up his foot to his midfoot. His feet swell and that seems to exacerbate the numbness. He wears compression socks. Now it will burn a bit. It is a flat discomfort. No usually tingly. No numbness in his fingers. No trouble walking or unsteadiness. Sometimes the skin gets red and more irritated when the leg swells. Right leg is worse than the left.     No low back pain. He does have some pain along the back of his right thigh. He tries to walk daily.     His maternal grandfather had "terrible legs" with scales and bad circulation issues. His father had rashes on his legs. Unclear if they had neuropathy. No family history of diabetes.     Allergies:  Review of patient's allergies indicates:   Allergen Reactions    Amlodipine      edema    Atenolol      Depression - circa 2000    Catechu herb     Irbesartan Other (See Comments)     Possibly caused cough    Erythromycin Rash    Sumycin [tetracycline] Rash    Tetracyclines Rash       Current Medications:  Current Outpatient Medications   Medication Sig Dispense Refill    albuterol (PROVENTIL/VENTOLIN HFA) 90 mcg/actuation inhaler Inhale 2 puffs into the lungs every 6 (six) hours as needed for Wheezing (cough). 18 g 5    atorvastatin (LIPITOR) 10 MG tablet Take 1 tablet (10 mg total) by mouth once daily. 90 tablet 3    coenzyme Q10 100 mg capsule Take 200 mg by mouth once daily.   "    fluticasone propionate (FLONASE) 50 mcg/actuation nasal spray 2 sprays (100 mcg total) by Each Nostril route daily as needed for Rhinitis. 16 mL 5    NIFEdipine (ADALAT CC) 30 MG TbSR Take 1 tablet (30 mg total) by mouth once daily. 90 tablet 3     Current Facility-Administered Medications   Medication Dose Route Frequency Provider Last Rate Last Admin    BCG live (LORENZA) 50 mg in sodium chloride 0.9% 50 mL bladder instillation  50 mg Bladder Instillation Weekly Lenny Ewing MD           Past Medical History:  Past Medical History:   Diagnosis Date    Allergy     seasonal    Anal fistula hx    Arthritis     Basal cell carcinoma 04/2013    left superior forehead    Callus     Diverticulosis     Epididymal cyst     GERD (gastroesophageal reflux disease)     Hayfever     Hydrocele, right     Hyperlipidemia     Hypertension     Neck pain     hx of muscular inury from weight lifting---resolved now    PONV (postoperative nausea and vomiting)     Prostatitis     Dec. '12-treated with antibx.    Squamous cell carcinoma 01/07/2019    anterior scalp    Squamous cell carcinoma of skin 03/2020    Left post scalp (SCC insitu-saucerization)    Visual impairment        Past Surgical History:  Past Surgical History:   Procedure Laterality Date    APPENDECTOMY  1962    BIOPSY OF BLADDER  4/19/2022    Procedure: BIOPSY, BLADDER;  Surgeon: Lenny Ewing MD;  [facility];  Service: Urology;;    BLADDER FULGURATION  4/19/2022    Procedure: FULGURATION, BLADDER;  Surgeon: Lenny Ewing MD;  Location: [facility] OR 1ST FLR;  Service: Urology;;    COLONOSCOPY  12/2012    due for repeat 12/2015    COLONOSCOPY N/A 5/19/2016    Procedure: COLONOSCOPY;  Surgeon: James Webber MD;  Location: [facility] (4TH FLR);  Service: Endoscopy;  Laterality: N/A; diverticulosis, repeat in 5-10 years for surveillance    COLONOSCOPY N/A 8/9/2021    Procedure: COLONOSCOPY;  Surgeon: Fahad Bautista Jr., MD;  Location: [facility];   "Service: Endoscopy;  Laterality: N/A;    CYSTOSCOPY  4/19/2022    Procedure: CYSTOSCOPY;  Surgeon: Lenny Ewing MD;  [facility];  Service: Urology;;    CYSTOSCOPY  6/21/2022    Procedure: CYSTOSCOPY;  Surgeon: Lenny Ewing MD;  [facility];  Service: Urology;;    CYSTOSCOPY  8/23/2022    Procedure: CYSTOSCOPY;  Surgeon: Lenny Ewing MD;  [facility];  Service: Urology;;    ESOPHAGOGASTRODUODENOSCOPY N/A 8/9/2021    Procedure: EGD (ESOPHAGOGASTRODUODENOSCOPY);  Surgeon: Fahad Bautista Jr., MD;  Location: [facility];  Service: Endoscopy;  Laterality: N/A;    EUA/Fistulotomy-'08      HERNIA REPAIR      [facility] with mesh    Hydrocelectomy  2013    MASS EXCISION  2004    BCC removal (twice)    MOLE REMOVAL  02/25/2019    2 moles removed from scalp =- 1 was basal cell and 1 was squamous cell - dermatology - Dr. londono    right Spermatocelectomy  2013    UPPER GASTROINTESTINAL ENDOSCOPY  prior to 2010    hiatal hernia, reflux esophagitis       Family History:  family history includes Anesthesia problems in his paternal grandmother; Cancer in his father and paternal grandfather; Heart disease in his paternal grandfather; Hypertension in his father, maternal grandfather, maternal grandmother, mother, paternal grandfather, and paternal grandmother; Skin cancer in his father and mother.    Social History:   reports that he quit smoking about 34 years ago. He has a 10.00 pack-year smoking history. He has never used smokeless tobacco. He reports that he does not currently use alcohol. He reports that he does not use drugs.    Physical Exam:  Vitals:    10/04/22 1303   BP: 104/70   Pulse: 80   Weight: 106 kg (233 lb 11 oz)   Height: 6' 1" (1.854 m)   PainSc: 0-No pain     Body mass index is 30.83 kg/m².    Neurological Exam:  Mental status: Awake, alert.  Speech/Language: No dysarthria or aphasia on conversation.   Cranial nerves: Pupils equal round and reactive to light, " extraocular movements intact, facial strength and sensation intact bilaterally, palate elevates symmetrically and tongue midline, hearing grossly intact bilaterally. Shoulder shrug normal bilaterally.   Motor: 5 out of 5 strength throughout the upper and lower extremities bilaterally. Normal bulk and tone.   Sensation: intact to pin and vibration in the fingertips. Diminished to vibration tot he knee on the right and to the ankle on the left. Diminished to pinprick to the midfoot on the left and intact on the right.   DTR: 1+ at the knees and biceps bilaterally.  Coordination: Finger-nose-finger testing and rapid alternating movements normal bilaterally. No tremor.   Gait: Normal gait    Data:  I have personally reviewed the referring provider's notes, labs, & imaging made available to me today.     Labs:  CBC:   Lab Results   Component Value Date    WBC 7.35 08/19/2022    HGB 13.3 (L) 08/19/2022    HCT 40.1 08/19/2022     08/19/2022    MCV 94 08/19/2022    RDW 13.9 08/19/2022     BMP:   Lab Results   Component Value Date     08/19/2022    K 3.4 (L) 08/19/2022     08/19/2022    CO2 29 08/19/2022    BUN 20 08/19/2022    CREATININE 1.4 08/19/2022    GLU 84 08/19/2022    CALCIUM 9.8 08/19/2022     LFTS;   Lab Results   Component Value Date    PROT 7.9 04/27/2022    ALBUMIN 4.5 04/27/2022    BILITOT 0.4 04/27/2022    AST 49 (H) 04/27/2022    ALKPHOS 65 04/27/2022    ALT 52 (H) 04/27/2022    GGT 18 03/12/2015     COAGS:   Lab Results   Component Value Date    INR 1.0 03/29/2022     FLP:   Lab Results   Component Value Date    CHOL 147 10/12/2021    HDL 60 10/12/2021    LDLCALC 72.4 10/12/2021    TRIG 73 10/12/2021    CHOLHDL 40.8 10/12/2021         Assessment and Plan:  Mr. Jesus is a 62 y.o. male referred to me by Nick Trejo MD for evaluation of numbness in feet. Will obtain bloodwork and EMG of the right LE to look for neuropathy causes/type and for possible also sciatica or radiculopathy.      After testing, will discuss further studies if needed like lumbar imaging, sending to PT, or medication for discomfort though gabapentin may would worsen his swelling.        Numbness of toes  -     Ambulatory referral/consult to Neurology     I spent a total of 60 minutes on the day of the visit.This includes face to face time and non-face to face time preparing to see the patient (eg, review of tests), Obtaining and/or reviewing separately obtained history, Documenting clinical information in the electronic or other health record, Independently interpreting results and communicating results to the patient/family/caregiver, or Care coordination.

## 2022-10-06 ENCOUNTER — OFFICE VISIT (OUTPATIENT)
Dept: URGENT CARE | Facility: CLINIC | Age: 62
End: 2022-10-06
Payer: COMMERCIAL

## 2022-10-06 VITALS
SYSTOLIC BLOOD PRESSURE: 160 MMHG | HEIGHT: 73 IN | OXYGEN SATURATION: 98 % | RESPIRATION RATE: 18 BRPM | HEART RATE: 80 BPM | DIASTOLIC BLOOD PRESSURE: 104 MMHG | BODY MASS INDEX: 30.88 KG/M2 | WEIGHT: 233 LBS | TEMPERATURE: 98 F

## 2022-10-06 DIAGNOSIS — B96.89 BACTERIAL CONJUNCTIVITIS OF BOTH EYES: Primary | ICD-10-CM

## 2022-10-06 DIAGNOSIS — H10.9 BACTERIAL CONJUNCTIVITIS OF BOTH EYES: Primary | ICD-10-CM

## 2022-10-06 DIAGNOSIS — R03.0 ELEVATED BLOOD PRESSURE READING: ICD-10-CM

## 2022-10-06 PROCEDURE — 99203 PR OFFICE/OUTPT VISIT, NEW, LEVL III, 30-44 MIN: ICD-10-PCS | Mod: S$GLB,,, | Performed by: NURSE PRACTITIONER

## 2022-10-06 PROCEDURE — 99203 OFFICE O/P NEW LOW 30 MIN: CPT | Mod: S$GLB,,, | Performed by: NURSE PRACTITIONER

## 2022-10-06 PROCEDURE — 4010F PR ACE/ARB THEARPY RXD/TAKEN: ICD-10-PCS | Mod: CPTII,S$GLB,, | Performed by: NURSE PRACTITIONER

## 2022-10-06 PROCEDURE — 1159F PR MEDICATION LIST DOCUMENTED IN MEDICAL RECORD: ICD-10-PCS | Mod: CPTII,S$GLB,, | Performed by: NURSE PRACTITIONER

## 2022-10-06 PROCEDURE — 3077F PR MOST RECENT SYSTOLIC BLOOD PRESSURE >= 140 MM HG: ICD-10-PCS | Mod: CPTII,S$GLB,, | Performed by: NURSE PRACTITIONER

## 2022-10-06 PROCEDURE — 3008F PR BODY MASS INDEX (BMI) DOCUMENTED: ICD-10-PCS | Mod: CPTII,S$GLB,, | Performed by: NURSE PRACTITIONER

## 2022-10-06 PROCEDURE — 3077F SYST BP >= 140 MM HG: CPT | Mod: CPTII,S$GLB,, | Performed by: NURSE PRACTITIONER

## 2022-10-06 PROCEDURE — 3080F DIAST BP >= 90 MM HG: CPT | Mod: CPTII,S$GLB,, | Performed by: NURSE PRACTITIONER

## 2022-10-06 PROCEDURE — 4010F ACE/ARB THERAPY RXD/TAKEN: CPT | Mod: CPTII,S$GLB,, | Performed by: NURSE PRACTITIONER

## 2022-10-06 PROCEDURE — 1159F MED LIST DOCD IN RCRD: CPT | Mod: CPTII,S$GLB,, | Performed by: NURSE PRACTITIONER

## 2022-10-06 PROCEDURE — 3080F PR MOST RECENT DIASTOLIC BLOOD PRESSURE >= 90 MM HG: ICD-10-PCS | Mod: CPTII,S$GLB,, | Performed by: NURSE PRACTITIONER

## 2022-10-06 PROCEDURE — 3008F BODY MASS INDEX DOCD: CPT | Mod: CPTII,S$GLB,, | Performed by: NURSE PRACTITIONER

## 2022-10-06 RX ORDER — POLYMYXIN B SULFATE AND TRIMETHOPRIM 1; 10000 MG/ML; [USP'U]/ML
1 SOLUTION OPHTHALMIC EVERY 4 HOURS
Qty: 2.8 ML | Refills: 0 | Status: SHIPPED | OUTPATIENT
Start: 2022-10-06 | End: 2022-10-13

## 2022-10-06 NOTE — PROGRESS NOTES
"Subjective:       Patient ID: Joseph Jesus is a 62 y.o. male.    Vitals:  height is 6' 1" (1.854 m) and weight is 105.7 kg (233 lb). His oral temperature is 98.4 °F (36.9 °C). His blood pressure is 160/104 (abnormal) and his pulse is 80. His respiration is 18 and oxygen saturation is 98%.     Chief Complaint: Other Misc (Right eye pain and drainage - Entered by patient) and Conjunctivitis    Right eye pain and drainage began 10/4/22. He had slight drainage dried this morning making it hard to open his eye. The left eye has the same issue, but is not as bad. Possible pink eye?    Provider note begins below:  Reports discolored discharge this am with crusting in right eye. Left eye irritation this am. Denies eye injury, vision loss or FB sensation. Wears glasses but not contacts. Denies fever, chills or cough. Afebrile.    Eye Problem   Both eyes are affected. This is a new problem. The current episode started in the past 7 days. The problem occurs constantly. The problem has been gradually worsening. There was no injury mechanism. The pain is at a severity of 6/10. The pain is moderate. There is No known exposure to pink eye. He Does not wear contacts. Associated symptoms include blurred vision, an eye discharge, eye redness and itching. Pertinent negatives include no double vision, fever, nausea, photophobia or vomiting. He has tried nothing for the symptoms.     Constitution: Negative. Negative for chills, fatigue and fever.   HENT:  Negative for ear pain, ear discharge, facial swelling and sinus pressure.    Neck: Negative for neck pain, neck stiffness and painful lymph nodes.   Cardiovascular: Negative.  Negative for chest pain and sob on exertion.   Eyes:  Positive for eye discharge, eye itching, eye redness, blurred vision and eyelid swelling. Negative for eye trauma, foreign body in eye, eye pain, photophobia, vision loss and double vision.   Respiratory: Negative.  Negative for chest tightness, cough, " shortness of breath, wheezing and asthma.    Gastrointestinal: Negative.  Negative for abdominal pain, nausea and vomiting.   Endocrine: negative. excessive thirst.   Genitourinary: Negative.  Negative for dysuria, frequency, urgency and flank pain.   Musculoskeletal: Negative.  Negative for pain, trauma, joint pain and joint swelling.   Skin: Negative.  Negative for rash, wound, lesion and hives.   Allergic/Immunologic: Negative.  Negative for eczema, asthma, hives, itching and sneezing.   Neurological: Negative.  Negative for dizziness, passing out, disorientation and altered mental status.   Hematologic/Lymphatic: Negative.  Negative for swollen lymph nodes.   Psychiatric/Behavioral: Negative.  Negative for altered mental status, disorientation and confusion.      Objective:      Physical Exam   Constitutional: He is oriented to person, place, and time. He appears well-developed. He is cooperative.  Non-toxic appearance. He does not appear ill. No distress.   HENT:   Head: Normocephalic and atraumatic.   Ears:   Right Ear: Hearing, tympanic membrane, external ear and ear canal normal. impacted cerumen  Left Ear: Hearing, tympanic membrane, external ear and ear canal normal. impacted cerumen  Nose: Nose normal. No mucosal edema, rhinorrhea or nasal deformity. No epistaxis. Right sinus exhibits no maxillary sinus tenderness and no frontal sinus tenderness. Left sinus exhibits no maxillary sinus tenderness and no frontal sinus tenderness.   Mouth/Throat: Uvula is midline, oropharynx is clear and moist and mucous membranes are normal. No trismus in the jaw. Normal dentition. No uvula swelling. No oropharyngeal exudate, posterior oropharyngeal edema or posterior oropharyngeal erythema.   Eyes: Lids are normal. Lids are everted and swept, no foreign bodies found. No visual field deficit is present. Right eye exhibits discharge. Right eye exhibits no chemosis, no exudate and no hordeolum. No foreign body present in the  right eye. Left eye exhibits discharge. Left eye exhibits no chemosis, no exudate and no hordeolum. No foreign body present in the left eye. Right conjunctiva is injected (Mild.). Right conjunctiva has no hemorrhage. Left conjunctiva is not injected. Left conjunctiva has no hemorrhage. No scleral icterus. Right eye exhibits normal extraocular motion and no nystagmus. Left eye exhibits normal extraocular motion and no nystagmus. Extraocular movement intact vision grossly intact gaze aligned appropriately   Neck: Trachea normal and phonation normal. Neck supple. No edema present. No erythema present. No neck rigidity present.   Cardiovascular: Normal rate, regular rhythm, normal heart sounds and normal pulses.   Pulmonary/Chest: Effort normal and breath sounds normal. No stridor. No respiratory distress. He has no decreased breath sounds. He has no wheezes. He has no rhonchi. He has no rales. He exhibits no tenderness.   Abdominal: Normal appearance. Soft. flat abdomen There is no abdominal tenderness. There is no left CVA tenderness and no right CVA tenderness.   Musculoskeletal: Normal range of motion.         General: No deformity. Normal range of motion.   Neurological: no focal deficit. He is alert and oriented to person, place, and time. He exhibits normal muscle tone. Coordination normal.   Skin: Skin is warm, dry, intact, not diaphoretic and not pale.   Psychiatric: His speech is normal and behavior is normal. Mood, judgment and thought content normal.   Nursing note and vitals reviewed.  Vision Screening    Right eye Left eye Both eyes   Without correction 20/40 20/30 20/30   With correction             Assessment:       1. Bacterial conjunctivitis of both eyes    2. Elevated blood pressure reading          Plan:       Discussed patient elevated BP reading this am. Reports he took his Nifedipine this am but contributes the elevated bp to [place] [address] [place].  He will  monitor.  FOLLOWUP  Follow up if symptoms worsen or fail to improve, for PLEASE CONTACT PCP OR CONTACT THE EMERGENCY ROOM..     PATIENT INSTRUCTIONS  Patient Instructions   INSTRUCTIONS:  - Rest.  - Drink plenty of fluids.  - Take Tylenol and/or Ibuprofen as directed as needed for fever/pain.  Do not take more than the recommended dose.  - follow up with your PCP within the next 1-2 weeks as needed.  - You must understand that you have received an Urgent Care treatment only and that you may be released before all of your medical problems are known or treated.   - You, the patient, will arrange for follow up care as instructed.   - If your condition worsens or fails to improve we recommend that you receive another evaluation at the ER immediately or contact your PCP to discuss your concerns.   - You can call [phone] or [phone] to help schedule an appointment with the appropriate provider.          THANK YOU FOR ALLOWING ME TO PARTICIPATE IN YOUR HEALTHCARE,     Varun Zarco, NP     Bacterial conjunctivitis of both eyes  -     polymyxin B sulf-trimethoprim (POLYTRIM) 10,000 unit- 1 mg/mL Drop; Place 1 drop into both eyes every 4 (four) hours. for 7 days  Dispense: 2.8 mL; Refill: 0    Elevated blood pressure reading

## 2022-10-06 NOTE — PATIENT INSTRUCTIONS
INSTRUCTIONS:  - Rest.  - Drink plenty of fluids.  - Take Tylenol and/or Ibuprofen as directed as needed for fever/pain.  Do not take more than the recommended dose.  - follow up with your PCP within the next 1-2 weeks as needed.  - You must understand that you have received an Urgent Care treatment only and that you may be released before all of your medical problems are known or treated.   - You, the patient, will arrange for follow up care as instructed.   - If your condition worsens or fails to improve we recommend that you receive another evaluation at the ER immediately or contact your PCP to discuss your concerns.   - You can call [phone] or [phone] to help schedule an appointment with the appropriate provider.

## 2022-10-07 ENCOUNTER — TELEPHONE (OUTPATIENT)
Dept: NEUROLOGY | Facility: CLINIC | Age: 62
End: 2022-10-07
Payer: COMMERCIAL

## 2022-10-07 ENCOUNTER — LAB VISIT (OUTPATIENT)
Dept: LAB | Facility: HOSPITAL | Age: 62
End: 2022-10-07
Attending: PSYCHIATRY & NEUROLOGY
Payer: COMMERCIAL

## 2022-10-07 DIAGNOSIS — R20.0 NUMBNESS OF TOES: ICD-10-CM

## 2022-10-07 LAB
ESTIMATED AVG GLUCOSE: 114 MG/DL (ref 68–131)
FOLATE SERPL-MCNC: 11.7 NG/ML (ref 4–24)
HBA1C MFR BLD: 5.6 % (ref 4–5.6)
TSH SERPL DL<=0.005 MIU/L-ACNC: 2.43 UIU/ML (ref 0.4–4)
VIT B12 SERPL-MCNC: 636 PG/ML (ref 210–950)

## 2022-10-07 PROCEDURE — 84165 PATHOLOGIST INTERPRETATION SPE: ICD-10-PCS | Mod: 26,,, | Performed by: PATHOLOGY

## 2022-10-07 PROCEDURE — 82746 ASSAY OF FOLIC ACID SERUM: CPT | Performed by: PSYCHIATRY & NEUROLOGY

## 2022-10-07 PROCEDURE — 84425 ASSAY OF VITAMIN B-1: CPT | Performed by: PSYCHIATRY & NEUROLOGY

## 2022-10-07 PROCEDURE — 86334 IMMUNOFIX E-PHORESIS SERUM: CPT | Mod: 26,,, | Performed by: PATHOLOGY

## 2022-10-07 PROCEDURE — 83036 HEMOGLOBIN GLYCOSYLATED A1C: CPT | Performed by: PSYCHIATRY & NEUROLOGY

## 2022-10-07 PROCEDURE — 84165 PROTEIN E-PHORESIS SERUM: CPT | Mod: 26,,, | Performed by: PATHOLOGY

## 2022-10-07 PROCEDURE — 84165 PROTEIN E-PHORESIS SERUM: CPT | Performed by: PSYCHIATRY & NEUROLOGY

## 2022-10-07 PROCEDURE — 82607 VITAMIN B-12: CPT | Performed by: PSYCHIATRY & NEUROLOGY

## 2022-10-07 PROCEDURE — 84443 ASSAY THYROID STIM HORMONE: CPT | Performed by: PSYCHIATRY & NEUROLOGY

## 2022-10-07 PROCEDURE — 86592 SYPHILIS TEST NON-TREP QUAL: CPT | Performed by: PSYCHIATRY & NEUROLOGY

## 2022-10-07 PROCEDURE — 86334 PATHOLOGIST INTERPRETATION IFE: ICD-10-PCS | Mod: 26,,, | Performed by: PATHOLOGY

## 2022-10-07 PROCEDURE — 84207 ASSAY OF VITAMIN B-6: CPT | Performed by: PSYCHIATRY & NEUROLOGY

## 2022-10-07 PROCEDURE — 84446 ASSAY OF VITAMIN E: CPT | Performed by: PSYCHIATRY & NEUROLOGY

## 2022-10-07 PROCEDURE — 86334 IMMUNOFIX E-PHORESIS SERUM: CPT | Performed by: PSYCHIATRY & NEUROLOGY

## 2022-10-07 NOTE — TELEPHONE ENCOUNTER
----- Message from Gabriel Hanson sent at 10/7/2022  8:46 AM CDT -----  Regarding: reschedule EMG  Type: Needs Medical Advice    Who Called:  Joseph Claudio Call Back Number: [phone]    Additional Information: pt needs to reschedule upcoming EMG to the following Tuesday 10/18/22. Pt was diagnosed with pinkeye today. Wants to make sure resolved before coming in.

## 2022-10-08 LAB — RPR SER QL: NORMAL

## 2022-10-10 LAB
ALBUMIN SERPL ELPH-MCNC: 4.22 G/DL (ref 3.35–5.55)
ALPHA1 GLOB SERPL ELPH-MCNC: 0.26 G/DL (ref 0.17–0.41)
ALPHA2 GLOB SERPL ELPH-MCNC: 0.67 G/DL (ref 0.43–0.99)
B-GLOBULIN SERPL ELPH-MCNC: 0.71 G/DL (ref 0.5–1.1)
GAMMA GLOB SERPL ELPH-MCNC: 0.94 G/DL (ref 0.67–1.58)
INTERPRETATION SERPL IFE-IMP: NORMAL
PROT SERPL-MCNC: 6.8 G/DL (ref 6–8.4)

## 2022-10-11 ENCOUNTER — PROCEDURE VISIT (OUTPATIENT)
Dept: NEUROLOGY | Facility: CLINIC | Age: 62
End: 2022-10-11
Payer: COMMERCIAL

## 2022-10-11 ENCOUNTER — TELEPHONE (OUTPATIENT)
Dept: NEUROLOGY | Facility: CLINIC | Age: 62
End: 2022-10-11

## 2022-10-11 DIAGNOSIS — R20.0 NUMBNESS OF TOES: ICD-10-CM

## 2022-10-11 DIAGNOSIS — M54.17 LUMBOSACRAL RADICULOPATHY AT L5: Primary | ICD-10-CM

## 2022-10-11 LAB
A-TOCOPHEROL VIT E SERPL-MCNC: 1319 UG/DL (ref 500–1800)
PATHOLOGIST INTERPRETATION IFE: NORMAL
PATHOLOGIST INTERPRETATION SPE: NORMAL
PYRIDOXAL SERPL-MCNC: 86 UG/L (ref 5–50)
VIT B1 BLD-MCNC: 91 UG/L (ref 38–122)

## 2022-10-11 PROCEDURE — 95886 MUSC TEST DONE W/N TEST COMP: CPT | Mod: S$GLB,,, | Performed by: PSYCHIATRY & NEUROLOGY

## 2022-10-11 PROCEDURE — 95908 PR NERVE CONDUCTION STUDY; 3-4 STUDIES: ICD-10-PCS | Mod: S$GLB,,, | Performed by: PSYCHIATRY & NEUROLOGY

## 2022-10-11 PROCEDURE — 95908 NRV CNDJ TST 3-4 STUDIES: CPT | Mod: S$GLB,,, | Performed by: PSYCHIATRY & NEUROLOGY

## 2022-10-11 PROCEDURE — 95886 PR EMG COMPLETE, W/ NERVE CONDUCTION STUDIES, 5+ MUSCLES: ICD-10-PCS | Mod: S$GLB,,, | Performed by: PSYCHIATRY & NEUROLOGY

## 2022-10-11 NOTE — TELEPHONE ENCOUNTER
----- Message from Shruti Herrera MD sent at 10/11/2022 12:43 PM CDT -----  The EMG did show a neuropathy but no overly concerning features about it. Probably is genetic. Also recommend stopping B6 supplements.     The EMG also shows a L4/5 pinched nerve. Will obtain xray and order PT for the back. This can open up the area around the pinched nerve and help. If the pain persists after therapy, then insurance will approve a MRI lumbar spine.

## 2022-10-11 NOTE — TELEPHONE ENCOUNTER
"Spoke with patient and informed per Dr. Herrera "The EMG did show a neuropathy but no overly concerning features about it. Probably is genetic. Also recommend stopping B6 supplements.     The EMG also shows a L4/5 pinched nerve. Will obtain xray and order PT for the back. This can open up the area around the pinched nerve and help. If the pain persists after therapy, then insurance will approve a MRI lumbar spine." Patient voiced understanding.   "

## 2022-10-11 NOTE — PROCEDURES
[facility]  [address] [facility]  [place] [address]  [phone]      Full Name: Joseph Jesus Gender: Male  Patient ID: [id number] YOB: 1960      Visit Date: 10/11/2022 11:39  Age: 62 Years  Examining Physician: Shruti Herrera MD   Referring Physician: Shruti Herrera M.D.   Technologist: KACY Roman   Height: 6 feet 1 inch  History: 63 y/o male complaining of numbness on the botton of the toes in bilateral feet.  He also complains of right lower extremity pain from the hip to the knee.        Sensory NCS      Nerve / Sites Rec. Site Onset Lat Peak Lat NP Amp Segments Distance Velocity Temp.     ms ms µV  cm m/s °C   R Sural - Ankle (Calf)      Calf Ankle NR NR NR Calf - Ankle 14 NR 30.8      Ref.   ?4.60 ?3.0 Ref.  ?40    R Superficial peroneal - Ankle      Lat leg Ankle NR NR NR Lat leg - Ankle 12 NR 30.8      Ref.   ?4.60 ?3.0 Ref.          Motor NCS      Nerve / Sites Muscle Latency Ref. Amplitude Ref. Amp % Duration Segments Distance Lat Diff Velocity Ref. Temp.     ms ms mV mV % ms  cm ms m/s m/s °C   R Peroneal - EDB      Ankle EDB 5.10 ?6.00 3.0 ?2.5 100 5.48 Ankle - EDB     30.8      Fib head EDB 14.63  2.5  82.8 8.08 Fib head - Ankle 33.5 9.52 35 ?40 30.8      Pop fossa EDB 18.00  2.5  81.7 5.00 Pop fossa - Fib head 10 3.38 30  30.8   R Tibial - AH      Ankle AH 5.50 ?6.00 4.8 ?4.0 100 3.48 Ankle - AH     30.8      Pop fossa AH 16.77  2.7  55.2  Pop fossa - Ankle 44 11.27 39 ?40 30.8       F  Wave      Nerve Fmin Ref. Absent  F    ms ms    R Peroneal - EDB 69.06 ?56.00    R Tibial - AH 59.11 ?56.00 No       EMG Summary Table     Spontaneous Recruitment Activation Duration Amplitude Polyphasia Comment   Muscle Ins Act Fib Fasc Pattern - - - - -   R. Tibialis anterior Normal 0 0 Mod Dec Normal +2 +2 Normal Normal   R. Gastrocnemius (Medial head) Normal 0 0 Sl Dec Normal N/+1 N/+1 Normal Normal   R. Biceps femoris (short head) Normal 0 0 Normal  Normal Normal Normal Normal Normal   R. Vastus medialis Normal 0 0 Sl Dec Normal +1 +1 Normal Normal   R. Gluteus medius Normal 0 0 Sl Dec Normal Normal Normal +2 Normal   R. Abductor hallucis Normal 0 0 Mod Dec Poor +2 +2 Normal Normal           Summary: Right lower extremity nerve conduction studies show absent sensory responses. The motor responses shows slowed conduction velocities. Right lower extremity needle exmaination shows neurogenic changes in the distal muscles and in proximal L4/5 innervated muscles. No fibrillation potentials were seen.     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     Thank you for referring to the [facility] in [place]. Please feel free to contact the clinic if you have any further questions regarding this study or report.    _____________________________  Shruti Herrera MD

## 2022-10-13 ENCOUNTER — HOSPITAL ENCOUNTER (OUTPATIENT)
Dept: RADIOLOGY | Facility: HOSPITAL | Age: 62
Discharge: HOME | End: 2022-10-13
Attending: PSYCHIATRY & NEUROLOGY
Payer: COMMERCIAL

## 2022-10-13 DIAGNOSIS — M54.17 LUMBOSACRAL RADICULOPATHY AT L5: ICD-10-CM

## 2022-10-13 PROCEDURE — 72110 X-RAY EXAM L-2 SPINE 4/>VWS: CPT | Mod: 26,,, | Performed by: RADIOLOGY

## 2022-10-13 PROCEDURE — 72110 XR LUMBAR SPINE COMPLETE 5 VIEW: ICD-10-PCS | Mod: 26,,, | Performed by: RADIOLOGY

## 2022-10-13 PROCEDURE — 72110 X-RAY EXAM L-2 SPINE 4/>VWS: CPT | Mod: TC,PO

## 2022-10-20 ENCOUNTER — PATIENT MESSAGE (OUTPATIENT)
Dept: FAMILY MEDICINE | Facility: CLINIC | Age: 62
End: 2022-10-20

## 2022-10-20 ENCOUNTER — OFFICE VISIT (OUTPATIENT)
Dept: FAMILY MEDICINE | Facility: CLINIC | Age: 62
End: 2022-10-20
Payer: COMMERCIAL

## 2022-10-20 DIAGNOSIS — J06.9 VIRAL URI WITH COUGH: Primary | ICD-10-CM

## 2022-10-20 PROCEDURE — 4010F PR ACE/ARB THEARPY RXD/TAKEN: ICD-10-PCS | Mod: CPTII,95,, | Performed by: NURSE PRACTITIONER

## 2022-10-20 PROCEDURE — 3044F HG A1C LEVEL LT 7.0%: CPT | Mod: CPTII,95,, | Performed by: NURSE PRACTITIONER

## 2022-10-20 PROCEDURE — 1159F MED LIST DOCD IN RCRD: CPT | Mod: CPTII,95,, | Performed by: NURSE PRACTITIONER

## 2022-10-20 PROCEDURE — 1160F RVW MEDS BY RX/DR IN RCRD: CPT | Mod: CPTII,95,, | Performed by: NURSE PRACTITIONER

## 2022-10-20 PROCEDURE — 1160F PR REVIEW ALL MEDS BY PRESCRIBER/CLIN PHARMACIST DOCUMENTED: ICD-10-PCS | Mod: CPTII,95,, | Performed by: NURSE PRACTITIONER

## 2022-10-20 PROCEDURE — 1159F PR MEDICATION LIST DOCUMENTED IN MEDICAL RECORD: ICD-10-PCS | Mod: CPTII,95,, | Performed by: NURSE PRACTITIONER

## 2022-10-20 PROCEDURE — 99213 PR OFFICE/OUTPT VISIT, EST, LEVL III, 20-29 MIN: ICD-10-PCS | Mod: 95,,, | Performed by: NURSE PRACTITIONER

## 2022-10-20 PROCEDURE — 4010F ACE/ARB THERAPY RXD/TAKEN: CPT | Mod: CPTII,95,, | Performed by: NURSE PRACTITIONER

## 2022-10-20 PROCEDURE — 3044F PR MOST RECENT HEMOGLOBIN A1C LEVEL <7.0%: ICD-10-PCS | Mod: CPTII,95,, | Performed by: NURSE PRACTITIONER

## 2022-10-20 PROCEDURE — 99213 OFFICE O/P EST LOW 20 MIN: CPT | Mod: 95,,, | Performed by: NURSE PRACTITIONER

## 2022-10-20 RX ORDER — PROMETHAZINE HYDROCHLORIDE AND DEXTROMETHORPHAN HYDROBROMIDE 6.25; 15 MG/5ML; MG/5ML
5 SYRUP ORAL EVERY 6 HOURS PRN
Qty: 120 ML | Refills: 0 | Status: SHIPPED | OUTPATIENT
Start: 2022-10-20 | End: 2022-10-30

## 2022-10-20 NOTE — PROGRESS NOTES
Subjective:       Patient ID: Joseph Jesus is a 62 y.o. male.    Chief Complaint: URI    The patient location is: [facility]  The chief complaint leading to consultation is: URI s/s    Visit type: audiovisual    Face to Face time with patient: 15  15 minutes of total time spent on the encounter, which includes face to face time and non-face to face time preparing to see the patient (eg, review of tests), Obtaining and/or reviewing separately obtained history, Documenting clinical information in the electronic or other health record, Independently interpreting results (not separately reported) and communicating results to the patient/family/caregiver, or Care coordination (not separately reported).     Each patient to whom he or she provides medical services by telemedicine is:  (1) informed of the relationship between the physician and patient and the respective role of any other health care provider with respect to management of the patient; and (2) notified that he or she may decline to receive medical services by telemedicine and may withdraw from such care at any time.    Notes:    Cough  This is a new problem. The current episode started in the past 7 days. The problem has been rapidly worsening. The problem occurs constantly. The cough is Productive of brown sputum. Associated symptoms include headaches, nasal congestion, postnasal drip, rhinorrhea, a sore throat, shortness of breath and wheezing. Pertinent negatives include no chest pain, chills, ear congestion, ear pain, fever, heartburn, hemoptysis, myalgias, rash, sweats or weight loss. Nothing aggravates the symptoms. He has tried OTC cough suppressant and a beta-agonist inhaler for the symptoms. The treatment provided mild relief. His past medical history is significant for bronchitis and environmental allergies. There is no history of asthma, bronchiectasis, COPD, emphysema or pneumonia.     Review of Systems   Constitutional:  Negative for  chills, fatigue, fever and weight loss.   HENT:  Positive for congestion, postnasal drip, rhinorrhea, sinus pressure and sore throat. Negative for ear pain.    Respiratory:  Positive for cough, shortness of breath and wheezing. Negative for hemoptysis.    Cardiovascular:  Negative for chest pain.   Gastrointestinal:  Negative for heartburn, nausea and vomiting.   Musculoskeletal:  Negative for myalgias.   Skin:  Negative for rash.   Allergic/Immunologic: Positive for environmental allergies.   Neurological:  Positive for headaches.       Objective:     There were no vitals filed for this visit.       Physical Exam  Constitutional:       General: He is not in acute distress.     Appearance: He is well-developed. He is not diaphoretic.   HENT:      Head: Normocephalic and atraumatic.   Eyes:      General: No scleral icterus.        Right eye: No discharge.         Left eye: No discharge.      Conjunctiva/sclera: Conjunctivae normal.      Pupils: Pupils are equal, round, and reactive to light.   Pulmonary:      Effort: Pulmonary effort is normal. No respiratory distress.   Neurological:      Mental Status: He is alert and oriented to person, place, and time.   Psychiatric:         Behavior: Behavior normal.         Thought Content: Thought content normal.         Judgment: Judgment normal.         Assessment:         ICD-10-CM ICD-9-CM   1. Viral URI with cough  J06.9 465.9       Plan:       Viral URI with cough  Strongly advised patient to take a COVID test at home  Treat as viral rotating tylenol and ibuprofen every 3 hourse and prometh DM for cough and discharge.  -     promethazine-dextromethorphan (PROMETHAZINE-DM) 6.25-15 mg/5 mL Syrp; Take 5 mLs by mouth every 6 (six) hours as needed (congestion and cough).  Dispense: 120 mL; Refill: 0    Follow up if symptoms worsen or fail to improve.     Patient's Medications   New Prescriptions    PROMETHAZINE-DEXTROMETHORPHAN (PROMETHAZINE-DM) 6.25-15 MG/5 ML SYRP    Take 5  mLs by mouth every 6 (six) hours as needed (congestion and cough).   Previous Medications    ALBUTEROL (PROVENTIL/VENTOLIN HFA) 90 MCG/ACTUATION INHALER    Inhale 2 puffs into the lungs every 6 (six) hours as needed for Wheezing (cough).    ATORVASTATIN (LIPITOR) 10 MG TABLET    Take 1 tablet (10 mg total) by mouth once daily.    COENZYME Q10 100 MG CAPSULE    Take 200 mg by mouth once daily.    FLUTICASONE PROPIONATE (FLONASE) 50 MCG/ACTUATION NASAL SPRAY    2 sprays (100 mcg total) by Each Nostril route daily as needed for Rhinitis.    NIFEDIPINE (ADALAT CC) 30 MG TBSR    Take 1 tablet (30 mg total) by mouth once daily.   Modified Medications    No medications on file   Discontinued Medications    No medications on file       Past Medical History:   Diagnosis Date    Allergy     seasonal    Anal fistula hx    Arthritis     Basal cell carcinoma 04/2013    left superior forehead    Callus     Diverticulosis     Epididymal cyst     GERD (gastroesophageal reflux disease)     Hayfever     Hydrocele, right     Hyperlipidemia     Hypertension     Neck pain     hx of muscular inury from weight lifting---resolved now    PONV (postoperative nausea and vomiting)     Prostatitis     Dec. '12-treated with antibx.    Squamous cell carcinoma 01/07/2019    anterior scalp    Squamous cell carcinoma of skin 03/2020    Left post scalp (SCC insitu-saucerization)    Visual impairment        Past Surgical History:   Procedure Laterality Date    APPENDECTOMY  1962    BIOPSY OF BLADDER  4/19/2022    Procedure: BIOPSY, BLADDER;  Surgeon: Lenny Ewing MD;  Location: [facility] OR 1ST FLR;  Service: Urology;;    BLADDER FULGURATION  4/19/2022    Procedure: FULGURATION, BLADDER;  Surgeon: Lenny Ewing MD;  Location: [facility] OR 1ST FLR;  Service: Urology;;    COLONOSCOPY  12/2012    due for repeat 12/2015    COLONOSCOPY N/A 5/19/2016    Procedure: COLONOSCOPY;  Surgeon: James Webber MD;  Location: [facility] (4TH FLR);  Service:  Endoscopy;  Laterality: N/A; diverticulosis, repeat in 5-10 years for surveillance    COLONOSCOPY N/A 8/9/2021    Procedure: COLONOSCOPY;  Surgeon: Fahad Bautista Jr., MD;  Location: [facility] ENDO;  Service: Endoscopy;  Laterality: N/A;    CYSTOSCOPY  4/19/2022    Procedure: CYSTOSCOPY;  Surgeon: Lenny Ewing MD;  Location: [facility] OR 1ST FLR;  Service: Urology;;    CYSTOSCOPY  6/21/2022    Procedure: CYSTOSCOPY;  Surgeon: Lenny Ewing MD;  Location: [facility] OR 1ST FLR;  Service: Urology;;    CYSTOSCOPY  8/23/2022    Procedure: CYSTOSCOPY;  Surgeon: Lenny Ewing MD;  Location: [facility] OR [facility] FLR;  Service: Urology;;    ESOPHAGOGASTRODUODENOSCOPY N/A 8/9/2021    Procedure: EGD (ESOPHAGOGASTRODUODENOSCOPY);  Surgeon: Fahad Bautista Jr., MD;  Location: [facility];  Service: Endoscopy;  Laterality: N/A;    EUA/Fistulotomy-'08      HERNIA REPAIR      RI with mesh    Hydrocelectomy  2013    MASS EXCISION  2004    BCC removal (twice)    MOLE REMOVAL  02/25/2019    2 moles removed from scalp =- 1 was basal cell and 1 was squamous cell - dermatology - Dr. londono    right Spermatocelectomy  2013    UPPER GASTROINTESTINAL ENDOSCOPY  prior to 2010    hiatal hernia, reflux esophagitis       Family History   Problem Relation Age of Onset    Skin cancer Mother     Hypertension Mother     Skin cancer Father     Cancer Father         prostate cancer    Hypertension Father     Hypertension Maternal Grandmother     Hypertension Maternal Grandfather     Hypertension Paternal Grandmother     Anesthesia problems Paternal Grandmother     Cancer Paternal Grandfather         prostate cancer    Hypertension Paternal Grandfather     Heart disease Paternal Grandfather     Diabetes Neg Hx     Colon polyps Neg Hx     Colon cancer Neg Hx     Melanoma Neg Hx     Psoriasis Neg Hx     Lupus Neg Hx     Eczema Neg Hx     Acne Neg Hx     Cirrhosis Neg Hx     Prostate cancer Neg Hx     Kidney disease Neg Hx     Crohn's disease Neg Hx      Ulcerative colitis Neg Hx     Stomach cancer Neg Hx     Esophageal cancer Neg Hx        Social History     Socioeconomic History    Marital status:      Spouse name: Traci    Number of children: 1   Occupational History    Occupation: [employer]     Employer: [employer]     Comment: [employer]   Tobacco Use    Smoking status: Former     Packs/day: 1.00     Years: 10.00     Pack years: 10.00     Types: Cigarettes     Quit date: 1987     Years since quittin.8    Smokeless tobacco: Never    Tobacco comments:     Decreased lung compassity per patient   Substance and Sexual Activity    Alcohol use: Not Currently     Alcohol/week: 0.0 - 1.0 standard drinks     Comment: No longer drinks ETOH    Drug use: No    Sexual activity: Yes     Partners: Female

## 2022-10-25 ENCOUNTER — PATIENT MESSAGE (OUTPATIENT)
Dept: UROLOGY | Facility: CLINIC | Age: 62
End: 2022-10-25
Payer: COMMERCIAL

## 2022-11-09 ENCOUNTER — OFFICE VISIT (OUTPATIENT)
Dept: UROLOGY | Facility: CLINIC | Age: 62
End: 2022-11-09
Payer: COMMERCIAL

## 2022-11-09 DIAGNOSIS — C67.2 MALIGNANT NEOPLASM OF LATERAL WALL OF URINARY BLADDER: Primary | ICD-10-CM

## 2022-11-09 LAB
BILIRUB SERPL-MCNC: NORMAL MG/DL
BLOOD URINE, POC: NORMAL
CLARITY, POC UA: CLEAR
COLOR, POC UA: YELLOW
GLUCOSE UR QL STRIP: NORMAL
KETONES UR QL STRIP: NORMAL
LEUKOCYTE ESTERASE URINE, POC: NORMAL
NITRITE, POC UA: NORMAL
PH, POC UA: 5
PROTEIN, POC: NORMAL
SPECIFIC GRAVITY, POC UA: 1.01
UROBILINOGEN, POC UA: NORMAL

## 2022-11-09 PROCEDURE — 81002 POCT URINE DIPSTICK WITHOUT MICROSCOPE: ICD-10-PCS | Mod: S$GLB,,,

## 2022-11-09 PROCEDURE — 3044F PR MOST RECENT HEMOGLOBIN A1C LEVEL <7.0%: ICD-10-PCS | Mod: CPTII,S$GLB,,

## 2022-11-09 PROCEDURE — 1159F PR MEDICATION LIST DOCUMENTED IN MEDICAL RECORD: ICD-10-PCS | Mod: CPTII,S$GLB,,

## 2022-11-09 PROCEDURE — 4010F ACE/ARB THERAPY RXD/TAKEN: CPT | Mod: CPTII,S$GLB,,

## 2022-11-09 PROCEDURE — 99499 UNLISTED E&M SERVICE: CPT | Mod: S$GLB,,,

## 2022-11-09 PROCEDURE — 81002 URINALYSIS NONAUTO W/O SCOPE: CPT | Mod: S$GLB,,,

## 2022-11-09 PROCEDURE — 1160F PR REVIEW ALL MEDS BY PRESCRIBER/CLIN PHARMACIST DOCUMENTED: ICD-10-PCS | Mod: CPTII,S$GLB,,

## 2022-11-09 PROCEDURE — 99499 NO LOS: ICD-10-PCS | Mod: S$GLB,,,

## 2022-11-09 PROCEDURE — 99999 PR PBB SHADOW E&M-EST. PATIENT-LVL II: ICD-10-PCS | Mod: PBBFAC,,,

## 2022-11-09 PROCEDURE — 4010F PR ACE/ARB THEARPY RXD/TAKEN: ICD-10-PCS | Mod: CPTII,S$GLB,,

## 2022-11-09 PROCEDURE — 99999 PR PBB SHADOW E&M-EST. PATIENT-LVL II: CPT | Mod: PBBFAC,,,

## 2022-11-09 PROCEDURE — 1159F MED LIST DOCD IN RCRD: CPT | Mod: CPTII,S$GLB,,

## 2022-11-09 PROCEDURE — 51720 TREATMENT OF BLADDER LESION: CPT | Mod: S$GLB,,,

## 2022-11-09 PROCEDURE — 1160F RVW MEDS BY RX/DR IN RCRD: CPT | Mod: CPTII,S$GLB,,

## 2022-11-09 PROCEDURE — 51720 PR INSTILL, ANTICANCER AGENT, BLADDER: ICD-10-PCS | Mod: S$GLB,,,

## 2022-11-09 PROCEDURE — 3044F HG A1C LEVEL LT 7.0%: CPT | Mod: CPTII,S$GLB,,

## 2022-11-09 NOTE — PROGRESS NOTES
CHIEF COMPLAINT:  BCG bladder instillation     HISTORY OF PRESENTING ILLINESS:  Joseph Jesus is a 62 y.o. male established pt of Dr. Ewing. Here today for induction BCG dose #1/5. He had microhematuria and was found to have an erythematous lesion with a positive cytology.  On 4/19/22 he underwent bladder biopsy showing CIS.He then underwent a TURBT on 6/21/22 showing CIS.  Muscle was present. Another TURBT was performed 8/23/22.  Dr. Ewing went over his pathology demonstrating cancer from 8/23/22. CIS is high grade and BCG recommend. He is high risk. If he responds, he will need maintenance until 9/2025. Will need blue light cysto after his induction BCG.       REVIEW OF SYSTEMS:  Review of Systems   Constitutional:  Negative for chills and fever.   HENT:  Negative for congestion and sore throat.    Respiratory:  Negative for cough and shortness of breath.    Cardiovascular:  Negative for chest pain and palpitations.   Gastrointestinal:  Negative for nausea and vomiting.   Genitourinary:  Negative for dysuria, flank pain, frequency and hematuria.   Neurological:  Negative for dizziness and headaches.       PATIENT HISTORY:    Past Medical History:   Diagnosis Date    Allergy     seasonal    Anal fistula hx    Arthritis     Basal cell carcinoma 04/2013    left superior forehead    Callus     Diverticulosis     Epididymal cyst     GERD (gastroesophageal reflux disease)     Hayfever     Hydrocele, right     Hyperlipidemia     Hypertension     Neck pain     hx of muscular inury from weight lifting---resolved now    PONV (postoperative nausea and vomiting)     Prostatitis     Dec. '12-treated with antibx.    Squamous cell carcinoma 01/07/2019    anterior scalp    Squamous cell carcinoma of skin 03/2020    Left post scalp (SCC insitu-saucerization)    Visual impairment        Past Surgical History:   Procedure Laterality Date    APPENDECTOMY  1962    BIOPSY OF BLADDER  4/19/2022    Procedure: BIOPSY, BLADDER;   Surgeon: Lenny Ewing MD;  Location: [facility] OR 1ST FLR;  Service: Urology;;    BLADDER FULGURATION  4/19/2022    Procedure: FULGURATION, BLADDER;  Surgeon: Lenny Ewing MD;  Location: [facility] OR 1ST FLR;  Service: Urology;;    COLONOSCOPY  12/2012    due for repeat 12/2015    COLONOSCOPY N/A 5/19/2016    Procedure: COLONOSCOPY;  Surgeon: James Webber MD;  Location: [facility] (4TH FLR);  Service: Endoscopy;  Laterality: N/A; diverticulosis, repeat in 5-10 years for surveillance    COLONOSCOPY N/A 8/9/2021    Procedure: COLONOSCOPY;  Surgeon: Fahad Bautista Jr., MD;  Location: [facility];  Service: Endoscopy;  Laterality: N/A;    CYSTOSCOPY  4/19/2022    Procedure: CYSTOSCOPY;  Surgeon: Lenny Ewing MD;  [facility];  Service: Urology;;    CYSTOSCOPY  6/21/2022    Procedure: CYSTOSCOPY;  Surgeon: Lenny Ewing MD;  [facility];  Service: Urology;;    CYSTOSCOPY  8/23/2022    Procedure: CYSTOSCOPY;  Surgeon: Lenny Ewing MD;  [facility];  Service: Urology;;    ESOPHAGOGASTRODUODENOSCOPY N/A 8/9/2021    Procedure: EGD (ESOPHAGOGASTRODUODENOSCOPY);  Surgeon: Fahad Bautista Jr., MD;  Location: [facility];  Service: Endoscopy;  Laterality: N/A;    EUA/Fistulotomy-'08      HERNIA REPAIR      [facility] with mesh    Hydrocelectomy  2013    MASS EXCISION  2004    BCC removal (twice)    MOLE REMOVAL  02/25/2019    2 moles removed from scalp =- 1 was basal cell and 1 was squamous cell - dermatology - Dr. londono    right Spermatocelectomy  2013    UPPER GASTROINTESTINAL ENDOSCOPY  prior to 2010    hiatal hernia, reflux esophagitis       Family History   Problem Relation Age of Onset    Skin cancer Mother     Hypertension Mother     Skin cancer Father     Cancer Father         prostate cancer    Hypertension Father     Hypertension Maternal Grandmother     Hypertension Maternal Grandfather     Hypertension Paternal Grandmother     Anesthesia problems Paternal  Grandmother     Cancer Paternal Grandfather         prostate cancer    Hypertension Paternal Grandfather     Heart disease Paternal Grandfather     Diabetes Neg Hx     Colon polyps Neg Hx     Colon cancer Neg Hx     Melanoma Neg Hx     Psoriasis Neg Hx     Lupus Neg Hx     Eczema Neg Hx     Acne Neg Hx     Cirrhosis Neg Hx     Prostate cancer Neg Hx     Kidney disease Neg Hx     Crohn's disease Neg Hx     Ulcerative colitis Neg Hx     Stomach cancer Neg Hx     Esophageal cancer Neg Hx        Social History     Socioeconomic History    Marital status:      Spouse name: Traci    Number of children: 1   Occupational History    Occupation: Software Eng.     Employer: [employer]     Comment: [employer]   Tobacco Use    Smoking status: Former     Packs/day: 1.00     Years: 10.00     Pack years: 10.00     Types: Cigarettes     Quit date: 1987     Years since quittin.9    Smokeless tobacco: Never    Tobacco comments:     Decreased lung compassity per patient   Substance and Sexual Activity    Alcohol use: Not Currently     Alcohol/week: 0.0 - 1.0 standard drinks     Comment: No longer drinks ETOH    Drug use: No    Sexual activity: Yes     Partners: Female       Allergies:  Amlodipine, Atenolol, Catechu herb, Irbesartan, Erythromycin, Sumycin [tetracycline], and Tetracyclines    Medications:    Current Outpatient Medications:     albuterol (PROVENTIL/VENTOLIN HFA) 90 mcg/actuation inhaler, Inhale 2 puffs into the lungs every 6 (six) hours as needed for Wheezing (cough)., Disp: 18 g, Rfl: 5    atorvastatin (LIPITOR) 10 MG tablet, Take 1 tablet (10 mg total) by mouth once daily., Disp: 90 tablet, Rfl: 3    coenzyme Q10 100 mg capsule, Take 200 mg by mouth once daily., Disp: , Rfl:     fluticasone propionate (FLONASE) 50 mcg/actuation nasal spray, 2 sprays (100 mcg total) by Each Nostril route daily as needed for Rhinitis., Disp: 16 mL, Rfl: 5    NIFEdipine (ADALAT CC) 30 MG TbSR, Take 1 tablet (30 mg total) by  mouth once daily., Disp: 90 tablet, Rfl: 3    Current Facility-Administered Medications:     BCG live (LORENZA) 50 mg in sodium chloride 0.9% 50 mL bladder instillation, 50 mg, Intravesical, Once, Sparkle Lopez NP    PHYSICAL EXAMINATION:  Physical Exam  Constitutional:       Appearance: Normal appearance.   HENT:      Head: Normocephalic and atraumatic.      Right Ear: External ear normal.      Left Ear: External ear normal.   Pulmonary:      Effort: Pulmonary effort is normal. No respiratory distress.   Musculoskeletal:         General: Normal range of motion.   Skin:     General: Skin is warm and dry.   Neurological:      General: No focal deficit present.      Mental Status: He is alert and oriented to person, place, and time.   Psychiatric:         Mood and Affect: Mood normal.         Behavior: Behavior normal.       LABS:  Urine clear of blood and infection      Lab Results   Component Value Date    PSA 0.30 01/28/2022    PSA 0.22 07/29/2020    PSA 0.29 05/30/2019    PSADIAG 0.37 09/08/2014           IMPRESSION:  Encounter Diagnoses   Name Primary?    Malignant neoplasm of lateral wall of urinary bladder Yes         Assessment:       1. Malignant neoplasm of lateral wall of urinary bladder          Plan:   To reduce the risk of contamination, follow these instructions for six hours after every BCG treatment:    - Dont use public toilets.  - Drink lots of fluids to dilute your pee.  - Sit down on the toilet to avoid splashing.  - After you pee, add 2 cups of undiluted bleach to the toilet, close the lid, wait 15 to 20 minutes and then flush.  - If you have urinary incontinence (loss of bladder control), immediately wash your clothes in a washing machine. Dont wash them with other clothes.  - If you wear an incontinence pad, pour bleach on the pad, allow it to soak in, then place it in a plastic bag and discard it in the trash.  - Typically, youll need to refrain from having sex for a few days after each  BCG treatment session. In addition, use a condom any time you have sex throughout the entire course of treatment. Ask your healthcare provider about specific guidelines regarding sex.     BCG treatment administered by Nurse Zarate under sterile and BCG precautions. Patient tolerated well.  Patient was instructed to hold medication for 2 hours.   Discussed limiting fluid intake prior to appointment to ensure patient is able to hold medication for designated time.   Discussed post instillation expectations including side effects.   Reviewed how to properly bleach toilet after voiding post instillation.   Recommend drinking plenty of fluids after the 2 hour sarabjit to flush bladder.   Addressed patient's questions and concerns.   Educational material provided about BCG treatment and information discussed during today's visit.  Patient encouraged to contact the office with any additional questions or concerns.     Follow up in 1 week for BCG treatment 2/5.     I spent 30 minutes with the patient of which more than half was spent in direct consultation with the patient in regards to our treatment and plan.  We addressed the office findings and recent labs.   Education and recommendations of today's plan of care including home remedies and needed follow up with PCP.   We discussed the chief complaint/LUTS and the possible contributory factors.   Recommended lifestyle modifications with proper, healthy diet, good hydration if no fluid restrictions; reducing bladder irritants.   Benefits of regular exercise approved by PCP.

## 2022-11-09 NOTE — PATIENT INSTRUCTIONS
To reduce the risk of contamination, follow these instructions for six hours after every BCG treatment:    - Dont use public toilets.  - Drink lots of fluids to dilute your pee.  - Sit down on the toilet to avoid splashing.  - After you pee, add 2 cups of undiluted bleach to the toilet, close the lid, wait 15 to 20 minutes and then flush.  - If you have urinary incontinence (loss of bladder control), immediately wash your clothes in a washing machine. Dont wash them with other clothes.  - If you wear an incontinence pad, pour bleach on the pad, allow it to soak in, then place it in a plastic bag and discard it in the trash.  - Typically, youll need to refrain from having sex for a few days after each BCG treatment session. In addition, use a condom any time you have sex throughout the entire course of treatment. Ask your healthcare provider about specific guidelines regarding sex.

## 2022-11-16 ENCOUNTER — OFFICE VISIT (OUTPATIENT)
Dept: UROLOGY | Facility: CLINIC | Age: 62
End: 2022-11-16
Payer: COMMERCIAL

## 2022-11-16 VITALS
WEIGHT: 235 LBS | HEART RATE: 72 BPM | DIASTOLIC BLOOD PRESSURE: 100 MMHG | HEIGHT: 73 IN | BODY MASS INDEX: 31.14 KG/M2 | SYSTOLIC BLOOD PRESSURE: 149 MMHG

## 2022-11-16 DIAGNOSIS — C67.2 MALIGNANT NEOPLASM OF LATERAL WALL OF URINARY BLADDER: Primary | ICD-10-CM

## 2022-11-16 PROCEDURE — 3080F PR MOST RECENT DIASTOLIC BLOOD PRESSURE >= 90 MM HG: ICD-10-PCS | Mod: CPTII,S$GLB,,

## 2022-11-16 PROCEDURE — 3077F SYST BP >= 140 MM HG: CPT | Mod: CPTII,S$GLB,,

## 2022-11-16 PROCEDURE — 1159F MED LIST DOCD IN RCRD: CPT | Mod: CPTII,S$GLB,,

## 2022-11-16 PROCEDURE — 99499 NO LOS: ICD-10-PCS | Mod: S$GLB,,,

## 2022-11-16 PROCEDURE — 3044F PR MOST RECENT HEMOGLOBIN A1C LEVEL <7.0%: ICD-10-PCS | Mod: CPTII,S$GLB,,

## 2022-11-16 PROCEDURE — 3044F HG A1C LEVEL LT 7.0%: CPT | Mod: CPTII,S$GLB,,

## 2022-11-16 PROCEDURE — 81002 URINALYSIS NONAUTO W/O SCOPE: CPT | Mod: S$GLB,,,

## 2022-11-16 PROCEDURE — 4010F ACE/ARB THERAPY RXD/TAKEN: CPT | Mod: CPTII,S$GLB,,

## 2022-11-16 PROCEDURE — 51720 PR INSTILL, ANTICANCER AGENT, BLADDER: ICD-10-PCS | Mod: S$GLB,,,

## 2022-11-16 PROCEDURE — 1160F RVW MEDS BY RX/DR IN RCRD: CPT | Mod: CPTII,S$GLB,,

## 2022-11-16 PROCEDURE — 3080F DIAST BP >= 90 MM HG: CPT | Mod: CPTII,S$GLB,,

## 2022-11-16 PROCEDURE — 3008F BODY MASS INDEX DOCD: CPT | Mod: CPTII,S$GLB,,

## 2022-11-16 PROCEDURE — 51720 TREATMENT OF BLADDER LESION: CPT | Mod: S$GLB,,,

## 2022-11-16 PROCEDURE — 3077F PR MOST RECENT SYSTOLIC BLOOD PRESSURE >= 140 MM HG: ICD-10-PCS | Mod: CPTII,S$GLB,,

## 2022-11-16 PROCEDURE — 1159F PR MEDICATION LIST DOCUMENTED IN MEDICAL RECORD: ICD-10-PCS | Mod: CPTII,S$GLB,,

## 2022-11-16 PROCEDURE — 99999 PR PBB SHADOW E&M-EST. PATIENT-LVL III: CPT | Mod: PBBFAC,,,

## 2022-11-16 PROCEDURE — 99499 UNLISTED E&M SERVICE: CPT | Mod: S$GLB,,,

## 2022-11-16 PROCEDURE — 81002 POCT URINE DIPSTICK WITHOUT MICROSCOPE: ICD-10-PCS | Mod: S$GLB,,,

## 2022-11-16 PROCEDURE — 3008F PR BODY MASS INDEX (BMI) DOCUMENTED: ICD-10-PCS | Mod: CPTII,S$GLB,,

## 2022-11-16 PROCEDURE — 1160F PR REVIEW ALL MEDS BY PRESCRIBER/CLIN PHARMACIST DOCUMENTED: ICD-10-PCS | Mod: CPTII,S$GLB,,

## 2022-11-16 PROCEDURE — 99999 PR PBB SHADOW E&M-EST. PATIENT-LVL III: ICD-10-PCS | Mod: PBBFAC,,,

## 2022-11-16 PROCEDURE — 4010F PR ACE/ARB THEARPY RXD/TAKEN: ICD-10-PCS | Mod: CPTII,S$GLB,,

## 2022-11-16 NOTE — PROGRESS NOTES
CHIEF COMPLAINT:  BCG #2/5    HISTORY OF PRESENTING ILLINESS:  Joseph Jesus is a 62 y.o. male established pt of Dr. Ewing. Here today for induction BCG dose #2/5. He had microhematuria and was found to have an erythematous lesion with a positive cytology.  On 4/19/22 he underwent bladder biopsy showing CIS.He then underwent a TURBT on 6/21/22 showing CIS.  Muscle was present. Another TURBT was performed 8/23/22.  Dr. Ewing went over his pathology demonstrating cancer from 8/23/22. CIS is high grade and BCG recommend. He is high risk. If he responds, he will need maintenance until 9/2025. Will need blue light cysto after his induction BCG.      REVIEW OF SYSTEMS:  Review of Systems   Constitutional:  Negative for chills and fever.   HENT:  Negative for congestion and sore throat.    Respiratory:  Negative for cough and shortness of breath.    Cardiovascular:  Negative for chest pain and palpitations.   Gastrointestinal:  Negative for nausea and vomiting.   Genitourinary:  Negative for dysuria, flank pain, frequency, hematuria and urgency.   Neurological:  Negative for dizziness.       PATIENT HISTORY:    Past Medical History:   Diagnosis Date    Allergy     seasonal    Anal fistula hx    Arthritis     Basal cell carcinoma 04/2013    left superior forehead    Callus     Diverticulosis     Epididymal cyst     GERD (gastroesophageal reflux disease)     Hayfever     Hydrocele, right     Hyperlipidemia     Hypertension     Neck pain     hx of muscular inury from weight lifting---resolved now    PONV (postoperative nausea and vomiting)     Prostatitis     Dec. '12-treated with antibx.    Squamous cell carcinoma 01/07/2019    anterior scalp    Squamous cell carcinoma of skin 03/2020    Left post scalp (SCC insitu-saucerization)    Visual impairment        Past Surgical History:   Procedure Laterality Date    APPENDECTOMY  1962    BIOPSY OF BLADDER  4/19/2022    Procedure: BIOPSY, BLADDER;  Surgeon: Lenny Ewing MD;   Location: [facility] OR 1ST FLR;  Service: Urology;;    BLADDER FULGURATION  4/19/2022    Procedure: FULGURATION, BLADDER;  Surgeon: Lenny Ewing MD;  Location: [facility] OR 1ST FLR;  Service: Urology;;    COLONOSCOPY  12/2012    due for repeat 12/2015    COLONOSCOPY N/A 5/19/2016    Procedure: COLONOSCOPY;  Surgeon: James Webber MD;  Location: [facility] (4TH FLR);  Service: Endoscopy;  Laterality: N/A; diverticulosis, repeat in 5-10 years for surveillance    COLONOSCOPY N/A 8/9/2021    Procedure: COLONOSCOPY;  Surgeon: Fahad Bautista Jr., MD;  Location: [facility];  Service: Endoscopy;  Laterality: N/A;    CYSTOSCOPY  4/19/2022    Procedure: CYSTOSCOPY;  Surgeon: Lenny Ewing MD;  Location: [facility] OR 1ST FLR;  Service: Urology;;    CYSTOSCOPY  6/21/2022    Procedure: CYSTOSCOPY;  Surgeon: Lenny Ewing MD;  Location: [facility] OR 1ST FLR;  Service: Urology;;    CYSTOSCOPY  8/23/2022    Procedure: CYSTOSCOPY;  Surgeon: Lenny Ewing MD;  Location: [facility] OR 1ST FLR;  Service: Urology;;    ESOPHAGOGASTRODUODENOSCOPY N/A 8/9/2021    Procedure: EGD (ESOPHAGOGASTRODUODENOSCOPY);  Surgeon: Fahad Bautista Jr., MD;  Location: [facility];  Service: Endoscopy;  Laterality: N/A;    EUA/Fistulotomy-'08      HERNIA REPAIR      [facility] with mesh    Hydrocelectomy  2013    MASS EXCISION  2004    BCC removal (twice)    MOLE REMOVAL  02/25/2019    2 moles removed from scalp =- 1 was basal cell and 1 was squamous cell - dermatology - Dr. londono    right Spermatocelectomy  2013    UPPER GASTROINTESTINAL ENDOSCOPY  prior to 2010    hiatal hernia, reflux esophagitis       Family History   Problem Relation Age of Onset    Skin cancer Mother     Hypertension Mother     Skin cancer Father     Cancer Father         prostate cancer    Hypertension Father     Hypertension Maternal Grandmother     Hypertension Maternal Grandfather     Hypertension Paternal Grandmother     Anesthesia problems Paternal Grandmother     Cancer Paternal  Grandfather         prostate cancer    Hypertension Paternal Grandfather     Heart disease Paternal Grandfather     Diabetes Neg Hx     Colon polyps Neg Hx     Colon cancer Neg Hx     Melanoma Neg Hx     Psoriasis Neg Hx     Lupus Neg Hx     Eczema Neg Hx     Acne Neg Hx     Cirrhosis Neg Hx     Prostate cancer Neg Hx     Kidney disease Neg Hx     Crohn's disease Neg Hx     Ulcerative colitis Neg Hx     Stomach cancer Neg Hx     Esophageal cancer Neg Hx        Social History     Socioeconomic History    Marital status:      Spouse name: Traci    Number of children: 1   Occupational History    Occupation: Software Eng.     Employer: [employer]     Comment: [employer]   Tobacco Use    Smoking status: Former     Packs/day: 1.00     Years: 10.00     Pack years: 10.00     Types: Cigarettes     Quit date: 1987     Years since quittin.9    Smokeless tobacco: Never    Tobacco comments:     Decreased lung compassity per patient   Substance and Sexual Activity    Alcohol use: Not Currently     Alcohol/week: 0.0 - 1.0 standard drinks     Comment: No longer drinks ETOH    Drug use: No    Sexual activity: Yes     Partners: Female       Allergies:  Amlodipine, Atenolol, Catechu herb, Irbesartan, Erythromycin, Sumycin [tetracycline], and Tetracyclines    Medications:    Current Outpatient Medications:     albuterol (PROVENTIL/VENTOLIN HFA) 90 mcg/actuation inhaler, Inhale 2 puffs into the lungs every 6 (six) hours as needed for Wheezing (cough)., Disp: 18 g, Rfl: 5    atorvastatin (LIPITOR) 10 MG tablet, Take 1 tablet (10 mg total) by mouth once daily., Disp: 90 tablet, Rfl: 3    coenzyme Q10 100 mg capsule, Take 200 mg by mouth once daily., Disp: , Rfl:     fluticasone propionate (FLONASE) 50 mcg/actuation nasal spray, 2 sprays (100 mcg total) by Each Nostril route daily as needed for Rhinitis., Disp: 16 mL, Rfl: 5    NIFEdipine (ADALAT CC) 30 MG TbSR, Take 1 tablet (30 mg total) by mouth once daily., Disp: 90  tablet, Rfl: 3  No current facility-administered medications for this visit.    PHYSICAL EXAMINATION:  Physical Exam  Constitutional:       Appearance: Normal appearance.   HENT:      Head: Normocephalic and atraumatic.   Pulmonary:      Effort: Pulmonary effort is normal. No respiratory distress.   Skin:     General: Skin is warm and dry.   Neurological:      General: No focal deficit present.      Mental Status: He is alert and oriented to person, place, and time.   Psychiatric:         Mood and Affect: Mood normal.         Behavior: Behavior normal.         LABS:  UA not indicative of infection or blood      Lab Results   Component Value Date    PSA 0.30 01/28/2022    PSA 0.22 07/29/2020    PSA 0.29 05/30/2019    PSADIAG 0.37 09/08/2014         IMPRESSION:  Encounter Diagnoses   Name Primary?    Malignant neoplasm of lateral wall of urinary bladder Yes         Assessment:       1. Malignant neoplasm of lateral wall of urinary bladder          Plan:   To reduce the risk of contamination, follow these instructions for six hours after every BCG treatment:     - Dont use public toilets.  - Drink lots of fluids to dilute your pee.  - Sit down on the toilet to avoid splashing.  - After you pee, add 2 cups of undiluted bleach to the toilet, close the lid, wait 15 to 20 minutes and then flush.  - If you have urinary incontinence (loss of bladder control), immediately wash your clothes in a washing machine. Dont wash them with other clothes.  - If you wear an incontinence pad, pour bleach on the pad, allow it to soak in, then place it in a plastic bag and discard it in the trash.  - Typically, youll need to refrain from having sex for a few days after each BCG treatment session. In addition, use a condom any time you have sex throughout the entire course of treatment. Ask your healthcare provider about specific guidelines regarding sex.      BCG treatment administered by Nurse Zarate under sterile and BCG precautions.  Patient tolerated well.  Patient was instructed to hold medication for 2 hours.   Discussed limiting fluid intake prior to appointment to ensure patient is able to hold medication for designated time.   Discussed post instillation expectations including side effects.   Reviewed how to properly bleach toilet after voiding post instillation.   Recommend drinking plenty of fluids after the 2 hour sarabjit to flush bladder.   Addressed patient's questions and concerns.   Educational material provided about BCG treatment and information discussed during today's visit.  Patient encouraged to contact the office with any additional questions or concerns.      Follow up in 1 week for BCG treatment #3/5. Explained why his treatment is only 5 doses and not 6. Pt verbalized understanding.     I spent 30 minutes with the patient of which more than half was spent in direct consultation with the patient in regards to our treatment and plan.  We addressed the office findings and recent labs.   Education and recommendations of today's plan of care including home remedies and needed follow up with PCP.   We discussed the chief complaint/LUTS and the possible contributory factors.   Recommended lifestyle modifications with proper, healthy diet, good hydration if no fluid restrictions; reducing bladder irritants.   Benefits of regular exercise approved by PCP.

## 2022-11-23 ENCOUNTER — OFFICE VISIT (OUTPATIENT)
Dept: UROLOGY | Facility: CLINIC | Age: 62
End: 2022-11-23
Payer: COMMERCIAL

## 2022-11-23 VITALS
HEIGHT: 73 IN | WEIGHT: 235 LBS | HEART RATE: 88 BPM | DIASTOLIC BLOOD PRESSURE: 79 MMHG | SYSTOLIC BLOOD PRESSURE: 128 MMHG | BODY MASS INDEX: 31.14 KG/M2

## 2022-11-23 DIAGNOSIS — C67.2 MALIGNANT NEOPLASM OF LATERAL WALL OF URINARY BLADDER: Primary | ICD-10-CM

## 2022-11-23 LAB
BILIRUB SERPL-MCNC: NORMAL MG/DL
BLOOD URINE, POC: NORMAL
CLARITY, POC UA: CLEAR
COLOR, POC UA: NORMAL
GLUCOSE UR QL STRIP: NORMAL
KETONES UR QL STRIP: NORMAL
LEUKOCYTE ESTERASE URINE, POC: NORMAL
NITRITE, POC UA: NORMAL
PH, POC UA: 5
PROTEIN, POC: NORMAL
SPECIFIC GRAVITY, POC UA: 1.02
UROBILINOGEN, POC UA: NORMAL

## 2022-11-23 PROCEDURE — 81002 POCT URINE DIPSTICK WITHOUT MICROSCOPE: ICD-10-PCS | Mod: S$GLB,,,

## 2022-11-23 PROCEDURE — 3008F BODY MASS INDEX DOCD: CPT | Mod: CPTII,S$GLB,,

## 2022-11-23 PROCEDURE — 3008F PR BODY MASS INDEX (BMI) DOCUMENTED: ICD-10-PCS | Mod: CPTII,S$GLB,,

## 2022-11-23 PROCEDURE — 1160F PR REVIEW ALL MEDS BY PRESCRIBER/CLIN PHARMACIST DOCUMENTED: ICD-10-PCS | Mod: CPTII,S$GLB,,

## 2022-11-23 PROCEDURE — 3044F PR MOST RECENT HEMOGLOBIN A1C LEVEL <7.0%: ICD-10-PCS | Mod: CPTII,S$GLB,,

## 2022-11-23 PROCEDURE — 3044F HG A1C LEVEL LT 7.0%: CPT | Mod: CPTII,S$GLB,,

## 2022-11-23 PROCEDURE — 1159F PR MEDICATION LIST DOCUMENTED IN MEDICAL RECORD: ICD-10-PCS | Mod: CPTII,S$GLB,,

## 2022-11-23 PROCEDURE — 1160F RVW MEDS BY RX/DR IN RCRD: CPT | Mod: CPTII,S$GLB,,

## 2022-11-23 PROCEDURE — 99999 PR PBB SHADOW E&M-EST. PATIENT-LVL III: ICD-10-PCS | Mod: PBBFAC,,,

## 2022-11-23 PROCEDURE — 1159F MED LIST DOCD IN RCRD: CPT | Mod: CPTII,S$GLB,,

## 2022-11-23 PROCEDURE — 51720 TREATMENT OF BLADDER LESION: CPT | Mod: S$GLB,,,

## 2022-11-23 PROCEDURE — 4010F ACE/ARB THERAPY RXD/TAKEN: CPT | Mod: CPTII,S$GLB,,

## 2022-11-23 PROCEDURE — 99999 PR PBB SHADOW E&M-EST. PATIENT-LVL III: CPT | Mod: PBBFAC,,,

## 2022-11-23 PROCEDURE — 3078F DIAST BP <80 MM HG: CPT | Mod: CPTII,S$GLB,,

## 2022-11-23 PROCEDURE — 3078F PR MOST RECENT DIASTOLIC BLOOD PRESSURE < 80 MM HG: ICD-10-PCS | Mod: CPTII,S$GLB,,

## 2022-11-23 PROCEDURE — 3074F SYST BP LT 130 MM HG: CPT | Mod: CPTII,S$GLB,,

## 2022-11-23 PROCEDURE — 4010F PR ACE/ARB THEARPY RXD/TAKEN: ICD-10-PCS | Mod: CPTII,S$GLB,,

## 2022-11-23 PROCEDURE — 3074F PR MOST RECENT SYSTOLIC BLOOD PRESSURE < 130 MM HG: ICD-10-PCS | Mod: CPTII,S$GLB,,

## 2022-11-23 PROCEDURE — 81002 URINALYSIS NONAUTO W/O SCOPE: CPT | Mod: S$GLB,,,

## 2022-11-23 PROCEDURE — 51720 PR INSTILL, ANTICANCER AGENT, BLADDER: ICD-10-PCS | Mod: S$GLB,,,

## 2022-11-23 NOTE — PROGRESS NOTES
CHIEF COMPLAINT:  BCG instillation #3/5    HISTORY OF PRESENTING ILLINESS:  Joseph Jesus is a 62 y.o. male established pt of Dr. Ewing. Here today for induction BCG dose #3/5. He had microhematuria and was found to have an erythematous lesion with a positive cytology.  On 4/19/22 he underwent bladder biopsy showing CIS.He then underwent a TURBT on 6/21/22 showing CIS.  Muscle was present. Another TURBT was performed 8/23/22.  Dr. Ewing went over his pathology demonstrating cancer from 8/23/22. CIS is high grade and BCG recommend. He is high risk. If he responds, he will need maintenance until 9/2025. Will need blue light cysto after his induction BCG.        REVIEW OF SYSTEMS:  Review of Systems   Constitutional:  Negative for chills and fever.   HENT:  Negative for congestion and sore throat.    Respiratory:  Negative for cough and shortness of breath.    Cardiovascular:  Negative for chest pain and palpitations.   Gastrointestinal:  Negative for nausea and vomiting.   Genitourinary:  Negative for dysuria, flank pain, frequency, hematuria and urgency.   Neurological:  Negative for dizziness and headaches.       PATIENT HISTORY:  Past Medical History:   Diagnosis Date    Allergy     seasonal    Anal fistula hx    Arthritis     Basal cell carcinoma 04/2013    left superior forehead    Callus     Diverticulosis     Epididymal cyst     GERD (gastroesophageal reflux disease)     Hayfever     Hydrocele, right     Hyperlipidemia     Hypertension     Neck pain     hx of muscular inury from weight lifting---resolved now    PONV (postoperative nausea and vomiting)     Prostatitis     Dec. '12-treated with antibx.    Squamous cell carcinoma 01/07/2019    anterior scalp    Squamous cell carcinoma of skin 03/2020    Left post scalp (SCC insitu-saucerization)    Visual impairment        Past Surgical History:   Procedure Laterality Date    APPENDECTOMY  1962    BIOPSY OF BLADDER  4/19/2022    Procedure: BIOPSY, BLADDER;   Surgeon: Lenny Ewing MD;  Location: [facility] OR 1ST FLR;  Service: Urology;;    BLADDER FULGURATION  4/19/2022    Procedure: FULGURATION, BLADDER;  Surgeon: Lenny Ewing MD;  Location: [facility] OR 1ST FLR;  Service: Urology;;    COLONOSCOPY  12/2012    due for repeat 12/2015    COLONOSCOPY N/A 5/19/2016    Procedure: COLONOSCOPY;  Surgeon: James Webber MD;  Location: [facility] (4TH FLR);  Service: Endoscopy;  Laterality: N/A; diverticulosis, repeat in 5-10 years for surveillance    COLONOSCOPY N/A 8/9/2021    Procedure: COLONOSCOPY;  Surgeon: Fahad Bautista Jr., MD;  Location: [facility];  Service: Endoscopy;  Laterality: N/A;    CYSTOSCOPY  4/19/2022    Procedure: CYSTOSCOPY;  Surgeon: Lenny Ewing MD;  [facility];  Service: Urology;;    CYSTOSCOPY  6/21/2022    Procedure: CYSTOSCOPY;  Surgeon: Lenny Ewing MD;  [facility];  Service: Urology;;    CYSTOSCOPY  8/23/2022    Procedure: CYSTOSCOPY;  Surgeon: Lenny Ewing MD;  [facility];  Service: Urology;;    ESOPHAGOGASTRODUODENOSCOPY N/A 8/9/2021    Procedure: EGD (ESOPHAGOGASTRODUODENOSCOPY);  Surgeon: Fahad Bautista Jr., MD;  Location: [facility];  Service: Endoscopy;  Laterality: N/A;    EUA/Fistulotomy-'08      HERNIA REPAIR      [facility] with mesh    Hydrocelectomy  2013    MASS EXCISION  2004    BCC removal (twice)    MOLE REMOVAL  02/25/2019    2 moles removed from scalp =- 1 was basal cell and 1 was squamous cell - dermatology - Dr. londono    right Spermatocelectomy  2013    UPPER GASTROINTESTINAL ENDOSCOPY  prior to 2010    hiatal hernia, reflux esophagitis       Family History   Problem Relation Age of Onset    Skin cancer Mother     Hypertension Mother     Skin cancer Father     Cancer Father         prostate cancer    Hypertension Father     Hypertension Maternal Grandmother     Hypertension Maternal Grandfather     Hypertension Paternal Grandmother     Anesthesia problems Paternal  Grandmother     Cancer Paternal Grandfather         prostate cancer    Hypertension Paternal Grandfather     Heart disease Paternal Grandfather     Diabetes Neg Hx     Colon polyps Neg Hx     Colon cancer Neg Hx     Melanoma Neg Hx     Psoriasis Neg Hx     Lupus Neg Hx     Eczema Neg Hx     Acne Neg Hx     Cirrhosis Neg Hx     Prostate cancer Neg Hx     Kidney disease Neg Hx     Crohn's disease Neg Hx     Ulcerative colitis Neg Hx     Stomach cancer Neg Hx     Esophageal cancer Neg Hx        Social History     Socioeconomic History    Marital status:      Spouse name: Traci    Number of children: 1   Occupational History    Occupation: Software Eng.     Employer: [employer]     Comment: [employer]   Tobacco Use    Smoking status: Former     Packs/day: 1.00     Years: 10.00     Pack years: 10.00     Types: Cigarettes     Quit date: 1987     Years since quittin.9    Smokeless tobacco: Never    Tobacco comments:     Decreased lung compassity per patient   Substance and Sexual Activity    Alcohol use: Not Currently     Alcohol/week: 0.0 - 1.0 standard drinks     Comment: No longer drinks ETOH    Drug use: No    Sexual activity: Yes     Partners: Female       Allergies:  Amlodipine, Atenolol, Catechu herb, Irbesartan, Erythromycin, Sumycin [tetracycline], and Tetracyclines    Medications:    Current Outpatient Medications:     albuterol (PROVENTIL/VENTOLIN HFA) 90 mcg/actuation inhaler, Inhale 2 puffs into the lungs every 6 (six) hours as needed for Wheezing (cough)., Disp: 18 g, Rfl: 5    atorvastatin (LIPITOR) 10 MG tablet, Take 1 tablet (10 mg total) by mouth once daily., Disp: 90 tablet, Rfl: 3    coenzyme Q10 100 mg capsule, Take 200 mg by mouth once daily., Disp: , Rfl:     fluticasone propionate (FLONASE) 50 mcg/actuation nasal spray, 2 sprays (100 mcg total) by Each Nostril route daily as needed for Rhinitis., Disp: 16 mL, Rfl: 5    NIFEdipine (ADALAT CC) 30 MG TbSR, Take 1 tablet (30 mg total) by  mouth once daily., Disp: 90 tablet, Rfl: 3    Current Facility-Administered Medications:     BCG live (LORENZA) 50 mg in sodium chloride 0.9% 50 mL bladder instillation, 50 mg, Intravesical, Once, Sparkle Lopez NP    PHYSICAL EXAMINATION:  Physical Exam  Constitutional:       Appearance: Normal appearance.   HENT:      Head: Normocephalic and atraumatic.      Right Ear: External ear normal.      Left Ear: External ear normal.   Pulmonary:      Effort: Pulmonary effort is normal. No respiratory distress.   Skin:     General: Skin is warm and dry.   Neurological:      General: No focal deficit present.      Mental Status: He is alert and oriented to person, place, and time.   Psychiatric:         Mood and Affect: Mood normal.         Behavior: Behavior normal.       Lab Results   Component Value Date    PSA 0.30 01/28/2022    PSA 0.22 07/29/2020    PSA 0.29 05/30/2019    PSADIAG 0.37 09/08/2014             IMPRESSION:  Encounter Diagnoses   Name Primary?    Malignant neoplasm of lateral wall of urinary bladder Yes       Assessment:       1. Malignant neoplasm of lateral wall of urinary bladder          Plan:   To reduce the risk of contamination, follow these instructions for six hours after every BCG treatment:     - Dont use public toilets.  - Drink lots of fluids to dilute your pee.  - Sit down on the toilet to avoid splashing.  - After you pee, add 2 cups of undiluted bleach to the toilet, close the lid, wait 15 to 20 minutes and then flush.  - If you have urinary incontinence (loss of bladder control), immediately wash your clothes in a washing machine. Dont wash them with other clothes.  - If you wear an incontinence pad, pour bleach on the pad, allow it to soak in, then place it in a plastic bag and discard it in the trash.  - Typically, youll need to refrain from having sex for a few days after each BCG treatment session. In addition, use a condom any time you have sex throughout the entire course of  treatment. Ask your healthcare provider about specific guidelines regarding sex.      BCG treatment administered by Nurse Zarate under sterile and BCG precautions. Patient tolerated well.  Patient was instructed to hold medication for 2 hours.   Discussed limiting fluid intake prior to appointment to ensure patient is able to hold medication for designated time.   Discussed post instillation expectations including side effects.   Reviewed how to properly bleach toilet after voiding post instillation.   Recommend drinking plenty of fluids after the 2 hour sarabjit to flush bladder.   Addressed patient's questions and concerns.   Educational material provided about BCG treatment and information discussed during today's visit.  Patient encouraged to contact the office with any additional questions or concerns.      Follow up in 1 week for BCG treatment #4/5. Explained why his treatment is only 5 doses and not 6. Pt verbalized understanding.     I spent 30 minutes with the patient of which more than half was spent in direct consultation with the patient in regards to our treatment and plan.  We addressed the office findings and recent labs.   Education and recommendations of today's plan of care including home remedies and needed follow up with PCP.   We discussed the chief complaint/LUTS and the possible contributory factors.   Recommended lifestyle modifications with proper, healthy diet, good hydration if no fluid restrictions; reducing bladder irritants.   Benefits of regular exercise approved by PCP.

## 2022-11-28 ENCOUNTER — OFFICE VISIT (OUTPATIENT)
Dept: DERMATOLOGY | Facility: CLINIC | Age: 62
End: 2022-11-28
Payer: COMMERCIAL

## 2022-11-28 DIAGNOSIS — Z85.828 HISTORY OF NONMELANOMA SKIN CANCER: ICD-10-CM

## 2022-11-28 DIAGNOSIS — L57.0 AK (ACTINIC KERATOSIS): Primary | ICD-10-CM

## 2022-11-28 DIAGNOSIS — L82.1 SK (SEBORRHEIC KERATOSIS): ICD-10-CM

## 2022-11-28 PROCEDURE — 99213 OFFICE O/P EST LOW 20 MIN: CPT | Mod: 25,S$GLB,, | Performed by: DERMATOLOGY

## 2022-11-28 PROCEDURE — 4010F ACE/ARB THERAPY RXD/TAKEN: CPT | Mod: CPTII,S$GLB,, | Performed by: DERMATOLOGY

## 2022-11-28 PROCEDURE — 99999 PR PBB SHADOW E&M-EST. PATIENT-LVL III: ICD-10-PCS | Mod: PBBFAC,,, | Performed by: DERMATOLOGY

## 2022-11-28 PROCEDURE — 99213 PR OFFICE/OUTPT VISIT, EST, LEVL III, 20-29 MIN: ICD-10-PCS | Mod: 25,S$GLB,, | Performed by: DERMATOLOGY

## 2022-11-28 PROCEDURE — 1159F MED LIST DOCD IN RCRD: CPT | Mod: CPTII,S$GLB,, | Performed by: DERMATOLOGY

## 2022-11-28 PROCEDURE — 1159F PR MEDICATION LIST DOCUMENTED IN MEDICAL RECORD: ICD-10-PCS | Mod: CPTII,S$GLB,, | Performed by: DERMATOLOGY

## 2022-11-28 PROCEDURE — 4010F PR ACE/ARB THEARPY RXD/TAKEN: ICD-10-PCS | Mod: CPTII,S$GLB,, | Performed by: DERMATOLOGY

## 2022-11-28 PROCEDURE — 17000 DESTRUCT PREMALG LESION: CPT | Mod: S$GLB,,, | Performed by: DERMATOLOGY

## 2022-11-28 PROCEDURE — 99999 PR PBB SHADOW E&M-EST. PATIENT-LVL III: CPT | Mod: PBBFAC,,, | Performed by: DERMATOLOGY

## 2022-11-28 PROCEDURE — 3044F HG A1C LEVEL LT 7.0%: CPT | Mod: CPTII,S$GLB,, | Performed by: DERMATOLOGY

## 2022-11-28 PROCEDURE — 3044F PR MOST RECENT HEMOGLOBIN A1C LEVEL <7.0%: ICD-10-PCS | Mod: CPTII,S$GLB,, | Performed by: DERMATOLOGY

## 2022-11-28 PROCEDURE — 1160F RVW MEDS BY RX/DR IN RCRD: CPT | Mod: CPTII,S$GLB,, | Performed by: DERMATOLOGY

## 2022-11-28 PROCEDURE — 1160F PR REVIEW ALL MEDS BY PRESCRIBER/CLIN PHARMACIST DOCUMENTED: ICD-10-PCS | Mod: CPTII,S$GLB,, | Performed by: DERMATOLOGY

## 2022-11-28 PROCEDURE — 17003 DESTRUCT PREMALG LES 2-14: CPT | Mod: S$GLB,,, | Performed by: DERMATOLOGY

## 2022-11-28 PROCEDURE — 17000 PR DESTRUCTION(LASER SURGERY,CRYOSURGERY,CHEMOSURGERY),PREMALIGNANT LESIONS,FIRST LESION: ICD-10-PCS | Mod: S$GLB,,, | Performed by: DERMATOLOGY

## 2022-11-28 PROCEDURE — 17003 DESTRUCTION, PREMALIGNANT LESIONS; SECOND THROUGH 14 LESIONS: ICD-10-PCS | Mod: S$GLB,,, | Performed by: DERMATOLOGY

## 2022-11-28 NOTE — PROGRESS NOTES
Subjective:       Patient ID:  Joseph Jesus is a 62 y.o. male who presents for   Chief Complaint   Patient presents with    Spot     Scalp, arms, thighs, feet    Skin Check     TBSE     History of Present Illness: The patient presents for follow up of skin check.    The patient was last seen on 08/19/2022 for cryosurgery to ak's and clavus on finger and foot which has resolved.    This is a high risk patient here to check for the development of new lesions.    Patient with new complaint of lesion(s)  Location: scalp  Duration: 3 months  Symptoms: none  Relieving factors/Previous treatments: none    Patient with new complaint of lesion(s)  Location: upper arms  Duration: 3 months  Symptoms: none  Relieving factors/Previous treatments: none       Review of Systems   Skin:  Positive for activity-related sunscreen use (sometimes) and wears hat (most). Negative for daily sunscreen use and recent sunburn.   Hematologic/Lymphatic: Does not bruise/bleed easily.      Objective:    Physical Exam   Constitutional: He appears well-developed and well-nourished. No distress.   Neurological: He is alert and oriented to person, place, and time. He is not disoriented.   Psychiatric: He has a normal mood and affect.   Skin:   Areas Examined (abnormalities noted in diagram):   Scalp / Hair Palpated and Inspected  Head / Face Inspection Performed  Neck Inspection Performed  Chest / Axilla Inspection Performed  Abdomen Inspection Performed  Back Inspection Performed  RUE Inspected  LUE Inspection Performed  RLE Inspected  LLE Inspection Performed                 Diagram Legend     Erythematous scaling macule/papule c/w actinic keratosis       Vascular papule c/w angioma      Pigmented verrucoid papule/plaque c/w seborrheic keratosis      Yellow umbilicated papule c/w sebaceous hyperplasia      Irregularly shaped tan macule c/w lentigo     1-2 mm smooth white papules consistent with Milia      Movable subcutaneous cyst with punctum c/w  epidermal inclusion cyst      Subcutaneous movable cyst c/w pilar cyst      Firm pink to brown papule c/w dermatofibroma      Pedunculated fleshy papule(s) c/w skin tag(s)      Evenly pigmented macule c/w junctional nevus     Mildly variegated pigmented, slightly irregular-bordered macule c/w mildly atypical nevus      Flesh colored to evenly pigmented papule c/w intradermal nevus       Pink pearly papule/plaque c/w basal cell carcinoma      Erythematous hyperkeratotic cursted plaque c/w SCC      Surgical scar with no sign of skin cancer recurrence      Open and closed comedones      Inflammatory papules and pustules      Verrucoid papule consistent consistent with wart     Erythematous eczematous patches and plaques     Dystrophic onycholytic nail with subungual debris c/w onychomycosis     Umbilicated papule    Erythematous-base heme-crusted tan verrucoid plaque consistent with inflamed seborrheic keratosis     Erythematous Silvery Scaling Plaque c/w Psoriasis     See annotation      Assessment / Plan:        AK (actinic keratosis)  Cryosurgery Procedure Note    Verbal consent from the patient is obtained including, but not limited to, risk of hypopigmentation/hyperpigmentation, scar, recurrence of lesion. The patient is aware of the precancerous quality and need for treatment of these lesions. Liquid nitrogen cryosurgery is applied to the 4 actinic keratoses, as detailed in the physical exam, to produce a freeze injury. The patient is aware that blisters may form and is instructed on wound care with gentle cleansing and use of vaseline ointment to keep moist until healed. The patient is supplied a handout on cryosurgery and is instructed to call if lesions do not completely resolve.    Cont wear hat always    SK (seborrheic keratosis)  These are benign inherited growths without a malignant potential. Reassurance given to patient. No treatment is necessary.       History of nonmelanoma skin cancer  Area of  previous nmsc examined. Site well healed with no signs of recurrence.    Total body skin examination performed today including at least 12 points as noted in physical examination. No lesions suspicious for malignancy noted.    Recommend daily sun protection/avoidance, use of at least SPF 30, broad spectrum sunscreen (OTC drug), skin self examinations, and routine physician surveillance to optimize early detection               Follow up in about 1 year (around 11/28/2023).

## 2022-11-28 NOTE — PATIENT INSTRUCTIONS

## 2022-11-30 ENCOUNTER — OFFICE VISIT (OUTPATIENT)
Dept: UROLOGY | Facility: CLINIC | Age: 62
End: 2022-11-30
Payer: COMMERCIAL

## 2022-11-30 VITALS
BODY MASS INDEX: 30.61 KG/M2 | DIASTOLIC BLOOD PRESSURE: 99 MMHG | HEART RATE: 66 BPM | WEIGHT: 232 LBS | SYSTOLIC BLOOD PRESSURE: 162 MMHG

## 2022-11-30 DIAGNOSIS — C67.2 MALIGNANT NEOPLASM OF LATERAL WALL OF URINARY BLADDER: Primary | ICD-10-CM

## 2022-11-30 PROCEDURE — 3008F BODY MASS INDEX DOCD: CPT | Mod: CPTII,S$GLB,,

## 2022-11-30 PROCEDURE — 3080F PR MOST RECENT DIASTOLIC BLOOD PRESSURE >= 90 MM HG: ICD-10-PCS | Mod: CPTII,S$GLB,,

## 2022-11-30 PROCEDURE — 3077F SYST BP >= 140 MM HG: CPT | Mod: CPTII,S$GLB,,

## 2022-11-30 PROCEDURE — 1160F PR REVIEW ALL MEDS BY PRESCRIBER/CLIN PHARMACIST DOCUMENTED: ICD-10-PCS | Mod: CPTII,S$GLB,,

## 2022-11-30 PROCEDURE — 51720 TREATMENT OF BLADDER LESION: CPT | Mod: S$GLB,,,

## 2022-11-30 PROCEDURE — 99499 NO LOS: ICD-10-PCS | Mod: S$GLB,,,

## 2022-11-30 PROCEDURE — 4010F PR ACE/ARB THEARPY RXD/TAKEN: ICD-10-PCS | Mod: CPTII,S$GLB,,

## 2022-11-30 PROCEDURE — 1159F MED LIST DOCD IN RCRD: CPT | Mod: CPTII,S$GLB,,

## 2022-11-30 PROCEDURE — 4010F ACE/ARB THERAPY RXD/TAKEN: CPT | Mod: CPTII,S$GLB,,

## 2022-11-30 PROCEDURE — 81002 POCT URINE DIPSTICK WITHOUT MICROSCOPE: ICD-10-PCS | Mod: S$GLB,,,

## 2022-11-30 PROCEDURE — 51720 PR INSTILL, ANTICANCER AGENT, BLADDER: ICD-10-PCS | Mod: S$GLB,,,

## 2022-11-30 PROCEDURE — 3077F PR MOST RECENT SYSTOLIC BLOOD PRESSURE >= 140 MM HG: ICD-10-PCS | Mod: CPTII,S$GLB,,

## 2022-11-30 PROCEDURE — 1159F PR MEDICATION LIST DOCUMENTED IN MEDICAL RECORD: ICD-10-PCS | Mod: CPTII,S$GLB,,

## 2022-11-30 PROCEDURE — 3044F HG A1C LEVEL LT 7.0%: CPT | Mod: CPTII,S$GLB,,

## 2022-11-30 PROCEDURE — 99999 PR PBB SHADOW E&M-EST. PATIENT-LVL III: CPT | Mod: PBBFAC,,,

## 2022-11-30 PROCEDURE — 3044F PR MOST RECENT HEMOGLOBIN A1C LEVEL <7.0%: ICD-10-PCS | Mod: CPTII,S$GLB,,

## 2022-11-30 PROCEDURE — 81002 URINALYSIS NONAUTO W/O SCOPE: CPT | Mod: S$GLB,,,

## 2022-11-30 PROCEDURE — 3080F DIAST BP >= 90 MM HG: CPT | Mod: CPTII,S$GLB,,

## 2022-11-30 PROCEDURE — 1160F RVW MEDS BY RX/DR IN RCRD: CPT | Mod: CPTII,S$GLB,,

## 2022-11-30 PROCEDURE — 3008F PR BODY MASS INDEX (BMI) DOCUMENTED: ICD-10-PCS | Mod: CPTII,S$GLB,,

## 2022-11-30 PROCEDURE — 99499 UNLISTED E&M SERVICE: CPT | Mod: S$GLB,,,

## 2022-11-30 PROCEDURE — 99999 PR PBB SHADOW E&M-EST. PATIENT-LVL III: ICD-10-PCS | Mod: PBBFAC,,,

## 2022-11-30 NOTE — PROGRESS NOTES
CHIEF COMPLAINT:  BCG instillation #4/5    HISTORY OF PRESENTING ILLINESS:  Joseph Jesus is a 62 y.o. male established pt of Dr. Ewing. Here today for induction BCG dose #4/5. He had microhematuria and was found to have an erythematous lesion with a positive cytology. On 4/19/22 he underwent bladder biopsy showing CIS.He then underwent a TURBT on 6/21/22 showing CIS.  Muscle was present. Another TURBT was performed 8/23/22.  Dr. Ewing went over his pathology demonstrating cancer from 8/23/22. CIS is high grade and BCG recommend. He is high risk. If he responds, he will need maintenance until 9/2025. Will need blue light cysto after his induction BCG. He is tolerating his BCG treatments well. Denies fever, chills, GH.        REVIEW OF SYSTEMS:  Review of Systems   Constitutional:  Negative for chills and fever.   HENT:  Negative for congestion and sore throat.    Respiratory:  Negative for cough and shortness of breath.    Cardiovascular:  Negative for chest pain and palpitations.   Gastrointestinal:  Negative for nausea and vomiting.   Genitourinary:  Negative for dysuria, flank pain, frequency, hematuria and urgency.   Neurological:  Negative for dizziness and headaches.       PATIENT HISTORY:    Past Medical History:   Diagnosis Date    Allergy     seasonal    Anal fistula hx    Arthritis     Basal cell carcinoma 04/2013    left superior forehead    Callus     Diverticulosis     Epididymal cyst     GERD (gastroesophageal reflux disease)     Hayfever     Hydrocele, right     Hyperlipidemia     Hypertension     Neck pain     hx of muscular inury from weight lifting---resolved now    PONV (postoperative nausea and vomiting)     Prostatitis     Dec. '12-treated with antibx.    Squamous cell carcinoma 01/07/2019    anterior scalp    Squamous cell carcinoma of skin 03/2020    Left post scalp (SCC insitu-saucerization)    Visual impairment        Past Surgical History:   Procedure Laterality Date    APPENDECTOMY   1962    BIOPSY OF BLADDER  4/19/2022    Procedure: BIOPSY, BLADDER;  Surgeon: Lenny Ewing MD;  Location: [facility] OR 1ST FLR;  Service: Urology;;    BLADDER FULGURATION  4/19/2022    Procedure: FULGURATION, BLADDER;  Surgeon: Lenny Ewing MD;  Location: [facility] OR 1ST FLR;  Service: Urology;;    COLONOSCOPY  12/2012    due for repeat 12/2015    COLONOSCOPY N/A 5/19/2016    Procedure: COLONOSCOPY;  Surgeon: James Webber MD;  Location: [facility] (4TH FLR);  Service: Endoscopy;  Laterality: N/A; diverticulosis, repeat in 5-10 years for surveillance    COLONOSCOPY N/A 8/9/2021    Procedure: COLONOSCOPY;  Surgeon: Fahad Bautista Jr., MD;  Location: [facility];  Service: Endoscopy;  Laterality: N/A;    CYSTOSCOPY  4/19/2022    Procedure: CYSTOSCOPY;  Surgeon: Lenny Ewing MD;  [facility];  Service: Urology;;    CYSTOSCOPY  6/21/2022    Procedure: CYSTOSCOPY;  Surgeon: Lenny Ewing MD;  [facility];  Service: Urology;;    CYSTOSCOPY  8/23/2022    Procedure: CYSTOSCOPY;  Surgeon: Lenny Ewing MD;  [facility];  Service: Urology;;    ESOPHAGOGASTRODUODENOSCOPY N/A 8/9/2021    Procedure: EGD (ESOPHAGOGASTRODUODENOSCOPY);  Surgeon: Fahad Bautista Jr., MD;  Location: [facility];  Service: Endoscopy;  Laterality: N/A;    EUA/Fistulotomy-'08      HERNIA REPAIR      [facility] with mesh    Hydrocelectomy  2013    MASS EXCISION  2004    BCC removal (twice)    MOLE REMOVAL  02/25/2019    2 moles removed from scalp =- 1 was basal cell and 1 was squamous cell - dermatology - Dr. londono    right Spermatocelectomy  2013    UPPER GASTROINTESTINAL ENDOSCOPY  prior to 2010    hiatal hernia, reflux esophagitis       Family History   Problem Relation Age of Onset    Skin cancer Mother     Hypertension Mother     Skin cancer Father     Cancer Father         prostate cancer    Hypertension Father     Hypertension Maternal Grandmother     Hypertension Maternal Grandfather      Hypertension Paternal Grandmother     Anesthesia problems Paternal Grandmother     Cancer Paternal Grandfather         prostate cancer    Hypertension Paternal Grandfather     Heart disease Paternal Grandfather     Diabetes Neg Hx     Colon polyps Neg Hx     Colon cancer Neg Hx     Melanoma Neg Hx     Psoriasis Neg Hx     Lupus Neg Hx     Eczema Neg Hx     Acne Neg Hx     Cirrhosis Neg Hx     Prostate cancer Neg Hx     Kidney disease Neg Hx     Crohn's disease Neg Hx     Ulcerative colitis Neg Hx     Stomach cancer Neg Hx     Esophageal cancer Neg Hx        Social History     Socioeconomic History    Marital status:      Spouse name: Traci    Number of children: 1   Occupational History    Occupation: Software Eng.     Employer: [employer]     Comment: [employer]   Tobacco Use    Smoking status: Former     Packs/day: 1.00     Years: 10.00     Pack years: 10.00     Types: Cigarettes     Quit date: 1987     Years since quittin.9    Smokeless tobacco: Never    Tobacco comments:     Decreased lung compassity per patient   Substance and Sexual Activity    Alcohol use: Not Currently     Alcohol/week: 0.0 - 1.0 standard drinks     Comment: No longer drinks ETOH    Drug use: No    Sexual activity: Yes     Partners: Female       Allergies:  Amlodipine, Atenolol, Catechu herb, Irbesartan, Erythromycin, Sumycin [tetracycline], and Tetracyclines    Medications:    Current Outpatient Medications:     albuterol (PROVENTIL/VENTOLIN HFA) 90 mcg/actuation inhaler, Inhale 2 puffs into the lungs every 6 (six) hours as needed for Wheezing (cough)., Disp: 18 g, Rfl: 5    atorvastatin (LIPITOR) 10 MG tablet, Take 1 tablet (10 mg total) by mouth once daily., Disp: 90 tablet, Rfl: 0    coenzyme Q10 100 mg capsule, Take 200 mg by mouth once daily., Disp: , Rfl:     fluticasone propionate (FLONASE) 50 mcg/actuation nasal spray, 2 sprays (100 mcg total) by Each Nostril route daily as needed for Rhinitis., Disp: 16 mL, Rfl: 5     NIFEdipine (ADALAT CC) 30 MG TbSR, Take 1 tablet (30 mg total) by mouth once daily., Disp: 90 tablet, Rfl: 3    Current Facility-Administered Medications:     BCG live (LORENZA) 50 mg in sodium chloride 0.9% 50 mL bladder instillation, 50 mg, Intravesical, Once, Sparkle Lopez NP    PHYSICAL EXAMINATION:  Physical Exam  Constitutional:       Appearance: Normal appearance.   HENT:      Head: Normocephalic and atraumatic.      Right Ear: External ear normal.      Left Ear: External ear normal.   Pulmonary:      Effort: Pulmonary effort is normal. No respiratory distress.   Skin:     General: Skin is warm and dry.   Neurological:      General: No focal deficit present.      Mental Status: He is alert and oriented to person, place, and time.   Psychiatric:         Mood and Affect: Mood normal.         Behavior: Behavior normal.         LABS:  UA dip in office free of blood, no sign of infection     Lab Results   Component Value Date    PSA 0.30 01/28/2022    PSA 0.22 07/29/2020    PSA 0.29 05/30/2019    PSADIAG 0.37 09/08/2014       IMPRESSION:  Encounter Diagnoses   Name Primary?    Malignant neoplasm of lateral wall of urinary bladder Yes       Assessment:       1. Malignant neoplasm of lateral wall of urinary bladder          Plan:   To reduce the risk of contamination, follow these instructions for six hours after every BCG treatment:  - Dont use public toilets.  - Drink lots of fluids to dilute your pee.  - Sit down on the toilet to avoid splashing.  - After you pee, add 2 cups of undiluted bleach to the toilet, close the lid, wait 15 to 20 minutes and then flush.  - If you have urinary incontinence (loss of bladder control), immediately wash your clothes in a washing machine. Dont wash them with other clothes.  - If you wear an incontinence pad, pour bleach on the pad, allow it to soak in, then place it in a plastic bag and discard it in the trash.  - Typically, youll need to refrain from having sex for a few  days after each BCG treatment session. In addition, use a condom any time you have sex throughout the entire course of treatment. Ask your healthcare provider about specific guidelines regarding sex.      BCG treatment administered by Nurse Elliot under sterile and BCG precautions. Patient tolerated well.  Patient was instructed to hold medication for 2 hours.   Discussed limiting fluid intake prior to appointment to ensure patient is able to hold medication for designated time.   Discussed post instillation expectations including side effects.   Reviewed how to properly bleach toilet after voiding post instillation.   Recommend drinking plenty of fluids after the 2 hour sarabjit to flush bladder.   Addressed patient's questions and concerns.   Educational material provided about BCG treatment and information discussed during today's visit.  Patient encouraged to contact the office with any additional questions or concerns.      Follow up in 1 week for BCG treatment #5/5. Explained why his treatment is only 5 doses and not 6. Pt verbalized understanding.     I spent 30 minutes with the patient of which more than half was spent in direct consultation with the patient in regards to our treatment and plan.  We addressed the office findings and recent labs.   Education and recommendations of today's plan of care including home remedies and needed follow up with PCP.   We discussed the chief complaint/LUTS and the possible contributory factors.   Recommended lifestyle modifications with proper, healthy diet, good hydration if no fluid restrictions; reducing bladder irritants.   Benefits of regular exercise approved by PCP.

## 2022-12-07 ENCOUNTER — OFFICE VISIT (OUTPATIENT)
Dept: UROLOGY | Facility: CLINIC | Age: 62
End: 2022-12-07
Payer: COMMERCIAL

## 2022-12-07 VITALS
WEIGHT: 230 LBS | DIASTOLIC BLOOD PRESSURE: 90 MMHG | HEIGHT: 73 IN | SYSTOLIC BLOOD PRESSURE: 140 MMHG | BODY MASS INDEX: 30.48 KG/M2 | HEART RATE: 67 BPM

## 2022-12-07 DIAGNOSIS — C67.2 MALIGNANT NEOPLASM OF LATERAL WALL OF URINARY BLADDER: Primary | ICD-10-CM

## 2022-12-07 LAB
BILIRUB SERPL-MCNC: NORMAL MG/DL
BLOOD URINE, POC: NORMAL
CLARITY, POC UA: CLEAR
COLOR, POC UA: YELLOW
GLUCOSE UR QL STRIP: NORMAL
KETONES UR QL STRIP: NORMAL
LEUKOCYTE ESTERASE URINE, POC: NORMAL
NITRITE, POC UA: NORMAL
PH, POC UA: 6
PROTEIN, POC: NORMAL
SPECIFIC GRAVITY, POC UA: 1.01
UROBILINOGEN, POC UA: NORMAL

## 2022-12-07 PROCEDURE — 1159F MED LIST DOCD IN RCRD: CPT | Mod: CPTII,S$GLB,,

## 2022-12-07 PROCEDURE — 3080F DIAST BP >= 90 MM HG: CPT | Mod: CPTII,S$GLB,,

## 2022-12-07 PROCEDURE — 4010F PR ACE/ARB THEARPY RXD/TAKEN: ICD-10-PCS | Mod: CPTII,S$GLB,,

## 2022-12-07 PROCEDURE — 3080F PR MOST RECENT DIASTOLIC BLOOD PRESSURE >= 90 MM HG: ICD-10-PCS | Mod: CPTII,S$GLB,,

## 2022-12-07 PROCEDURE — 4010F ACE/ARB THERAPY RXD/TAKEN: CPT | Mod: CPTII,S$GLB,,

## 2022-12-07 PROCEDURE — 99999 PR PBB SHADOW E&M-EST. PATIENT-LVL III: CPT | Mod: PBBFAC,,,

## 2022-12-07 PROCEDURE — 81002 URINALYSIS NONAUTO W/O SCOPE: CPT | Mod: S$GLB,,,

## 2022-12-07 PROCEDURE — 51720 PR INSTILL, ANTICANCER AGENT, BLADDER: ICD-10-PCS | Mod: S$GLB,,,

## 2022-12-07 PROCEDURE — 3008F BODY MASS INDEX DOCD: CPT | Mod: CPTII,S$GLB,,

## 2022-12-07 PROCEDURE — 99499 NO LOS: ICD-10-PCS | Mod: S$GLB,,,

## 2022-12-07 PROCEDURE — 3044F HG A1C LEVEL LT 7.0%: CPT | Mod: CPTII,S$GLB,,

## 2022-12-07 PROCEDURE — 81002 POCT URINE DIPSTICK WITHOUT MICROSCOPE: ICD-10-PCS | Mod: S$GLB,,,

## 2022-12-07 PROCEDURE — 3008F PR BODY MASS INDEX (BMI) DOCUMENTED: ICD-10-PCS | Mod: CPTII,S$GLB,,

## 2022-12-07 PROCEDURE — 3044F PR MOST RECENT HEMOGLOBIN A1C LEVEL <7.0%: ICD-10-PCS | Mod: CPTII,S$GLB,,

## 2022-12-07 PROCEDURE — 99999 PR PBB SHADOW E&M-EST. PATIENT-LVL III: ICD-10-PCS | Mod: PBBFAC,,,

## 2022-12-07 PROCEDURE — 51720 TREATMENT OF BLADDER LESION: CPT | Mod: S$GLB,,,

## 2022-12-07 PROCEDURE — 3077F PR MOST RECENT SYSTOLIC BLOOD PRESSURE >= 140 MM HG: ICD-10-PCS | Mod: CPTII,S$GLB,,

## 2022-12-07 PROCEDURE — 3077F SYST BP >= 140 MM HG: CPT | Mod: CPTII,S$GLB,,

## 2022-12-07 PROCEDURE — 99499 UNLISTED E&M SERVICE: CPT | Mod: S$GLB,,,

## 2022-12-07 PROCEDURE — 1159F PR MEDICATION LIST DOCUMENTED IN MEDICAL RECORD: ICD-10-PCS | Mod: CPTII,S$GLB,,

## 2022-12-07 NOTE — PROGRESS NOTES
CHIEF COMPLAINT:  BCG instillation #5/5    HISTORY OF PRESENTING ILLINESS:  Joseph Jesus is a 62 y.o. male established pt of Dr. Ewing. Here today for induction BCG dose #5/5. He had microhematuria and was found to have an erythematous lesion with a positive cytology. On 4/19/22 he underwent bladder biopsy showing CIS.He then underwent a TURBT on 6/21/22 showing CIS.  Muscle was present. Another TURBT was performed 8/23/22.  Dr. Ewing went over his pathology demonstrating cancer from 8/23/22. CIS is high grade and BCG recommend. He is high risk. If he responds, he will need maintenance until 9/2025. Will need blue light cysto scheduled.        REVIEW OF SYSTEMS:  Review of Systems   Constitutional:  Negative for chills and fever.   HENT:  Negative for congestion and sore throat.    Respiratory:  Negative for cough and shortness of breath.    Cardiovascular:  Negative for chest pain and palpitations.   Gastrointestinal:  Negative for nausea and vomiting.   Genitourinary:  Negative for dysuria, flank pain, frequency, hematuria and urgency.   Neurological:  Negative for dizziness and headaches.       PATIENT HISTORY:    Past Medical History:   Diagnosis Date    Allergy     seasonal    Anal fistula hx    Arthritis     Basal cell carcinoma 04/2013    left superior forehead    Callus     Diverticulosis     Epididymal cyst     GERD (gastroesophageal reflux disease)     Hayfever     Hydrocele, right     Hyperlipidemia     Hypertension     Neck pain     hx of muscular inury from weight lifting---resolved now    PONV (postoperative nausea and vomiting)     Prostatitis     Dec. '12-treated with antibx.    Squamous cell carcinoma 01/07/2019    anterior scalp    Squamous cell carcinoma of skin 03/2020    Left post scalp (SCC insitu-saucerization)    Visual impairment        Past Surgical History:   Procedure Laterality Date    APPENDECTOMY  1962    BIOPSY OF BLADDER  4/19/2022    Procedure: BIOPSY, BLADDER;  Surgeon: Lenny AYALA  MD Kaylin;  Location: [facility] OR 1ST FLR;  Service: Urology;;    BLADDER FULGURATION  4/19/2022    Procedure: FULGURATION, BLADDER;  Surgeon: Lenny Ewing MD;  Location: [facility] OR 1ST FLR;  Service: Urology;;    COLONOSCOPY  12/2012    due for repeat 12/2015    COLONOSCOPY N/A 5/19/2016    Procedure: COLONOSCOPY;  Surgeon: James Webber MD;  Location: [facility] (4TH FLR);  Service: Endoscopy;  Laterality: N/A; diverticulosis, repeat in 5-10 years for surveillance    COLONOSCOPY N/A 8/9/2021    Procedure: COLONOSCOPY;  Surgeon: Fahad Bautista Jr., MD;  Location: [facility];  Service: Endoscopy;  Laterality: N/A;    CYSTOSCOPY  4/19/2022    Procedure: CYSTOSCOPY;  Surgeon: Lenny Ewing MD;  Location: [facility] OR 1ST FLR;  Service: Urology;;    CYSTOSCOPY  6/21/2022    Procedure: CYSTOSCOPY;  Surgeon: Lenny Ewing MD;  Location: [facility] OR 1ST FLR;  Service: Urology;;    CYSTOSCOPY  8/23/2022    Procedure: CYSTOSCOPY;  Surgeon: Lenny Ewing MD;  Location: [facility] OR 1ST FLR;  Service: Urology;;    ESOPHAGOGASTRODUODENOSCOPY N/A 8/9/2021    Procedure: EGD (ESOPHAGOGASTRODUODENOSCOPY);  Surgeon: Fahad Bautista Jr., MD;  Location: [facility];  Service: Endoscopy;  Laterality: N/A;    EUA/Fistulotomy-'08      HERNIA REPAIR      [facility] with mesh    Hydrocelectomy  2013    MASS EXCISION  2004    BCC removal (twice)    MOLE REMOVAL  02/25/2019    2 moles removed from scalp =- 1 was basal cell and 1 was squamous cell - dermatology - Dr. londono    right Spermatocelectomy  2013    UPPER GASTROINTESTINAL ENDOSCOPY  prior to 2010    hiatal hernia, reflux esophagitis       Family History   Problem Relation Age of Onset    Skin cancer Mother     Hypertension Mother     Skin cancer Father     Cancer Father         prostate cancer    Hypertension Father     Hypertension Maternal Grandmother     Hypertension Maternal Grandfather     Hypertension Paternal Grandmother     Anesthesia problems Paternal Grandmother     Cancer  Paternal Grandfather         prostate cancer    Hypertension Paternal Grandfather     Heart disease Paternal Grandfather     Diabetes Neg Hx     Colon polyps Neg Hx     Colon cancer Neg Hx     Melanoma Neg Hx     Psoriasis Neg Hx     Lupus Neg Hx     Eczema Neg Hx     Acne Neg Hx     Cirrhosis Neg Hx     Prostate cancer Neg Hx     Kidney disease Neg Hx     Crohn's disease Neg Hx     Ulcerative colitis Neg Hx     Stomach cancer Neg Hx     Esophageal cancer Neg Hx        Social History     Socioeconomic History    Marital status:      Spouse name: Traci    Number of children: 1   Occupational History    Occupation: Software Eng.     Employer: [employer]     Comment: [employer]   Tobacco Use    Smoking status: Former     Packs/day: 1.00     Years: 10.00     Pack years: 10.00     Types: Cigarettes     Quit date: 1987     Years since quittin.0    Smokeless tobacco: Never    Tobacco comments:     Decreased lung compassity per patient   Substance and Sexual Activity    Alcohol use: Not Currently     Alcohol/week: 0.0 - 1.0 standard drinks     Comment: No longer drinks ETOH    Drug use: No    Sexual activity: Yes     Partners: Female       Allergies:  Amlodipine, Atenolol, Catechu herb, Irbesartan, Erythromycin, Sumycin [tetracycline], and Tetracyclines    Medications:    Current Outpatient Medications:     albuterol (PROVENTIL/VENTOLIN HFA) 90 mcg/actuation inhaler, Inhale 2 puffs into the lungs every 6 (six) hours as needed for Wheezing (cough)., Disp: 18 g, Rfl: 5    atorvastatin (LIPITOR) 10 MG tablet, Take 1 tablet (10 mg total) by mouth once daily., Disp: 90 tablet, Rfl: 0    coenzyme Q10 100 mg capsule, Take 200 mg by mouth once daily., Disp: , Rfl:     fluticasone propionate (FLONASE) 50 mcg/actuation nasal spray, 2 sprays (100 mcg total) by Each Nostril route daily as needed for Rhinitis., Disp: 16 mL, Rfl: 5    NIFEdipine (ADALAT CC) 30 MG TbSR, Take 1 tablet (30 mg total) by mouth once daily., Disp:  90 tablet, Rfl: 3    Current Facility-Administered Medications:     BCG live (LORENZA) 50 mg in sodium chloride 0.9% 50 mL bladder instillation, 50 mg, Intravesical, Once, Sparkle Lopez NP    PHYSICAL EXAMINATION:  Physical Exam  Constitutional:       Appearance: Normal appearance.   HENT:      Head: Normocephalic and atraumatic.      Right Ear: External ear normal.      Left Ear: External ear normal.   Pulmonary:      Effort: Pulmonary effort is normal. No respiratory distress.   Skin:     General: Skin is warm and dry.   Neurological:      General: No focal deficit present.      Mental Status: He is alert and oriented to person, place, and time.   Psychiatric:         Mood and Affect: Mood normal.         Behavior: Behavior normal.         LABS:  UA dip without blood or signs of infection    Lab Results   Component Value Date    PSA 0.30 01/28/2022    PSA 0.22 07/29/2020    PSA 0.29 05/30/2019    PSADIAG 0.37 09/08/2014         IMPRESSION:  Encounter Diagnoses   Name Primary?    Malignant neoplasm of lateral wall of urinary bladder Yes         Assessment:       1. Malignant neoplasm of lateral wall of urinary bladder          Plan:   To reduce the risk of contamination, follow these instructions for six hours after every BCG treatment:  - Dont use public toilets.  - Drink lots of fluids to dilute your pee.  - Sit down on the toilet to avoid splashing.  - After you pee, add 2 cups of undiluted bleach to the toilet, close the lid, wait 15 to 20 minutes and then flush.  - If you have urinary incontinence (loss of bladder control), immediately wash your clothes in a washing machine. Dont wash them with other clothes.  - If you wear an incontinence pad, pour bleach on the pad, allow it to soak in, then place it in a plastic bag and discard it in the trash.  - Typically, youll need to refrain from having sex for a few days after each BCG treatment session. In addition, use a condom any time you have sex throughout  the entire course of treatment. Ask your healthcare provider about specific guidelines regarding sex.      BCG treatment administered by Nurse Mehta under sterile and BCG precautions. Patient tolerated well.  Patient was instructed to hold medication for 2 hours.   Discussed limiting fluid intake prior to appointment to ensure patient is able to hold medication for designated time.   Discussed post instillation expectations including side effects.   Reviewed how to properly bleach toilet after voiding post instillation.   Recommend drinking plenty of fluids after the 2 hour sarabjit to flush bladder.   Addressed patient's questions and concerns.   Educational material provided about BCG treatment and information discussed during today's visit.  Patient encouraged to contact the office with any additional questions or concerns.      Follow up with blue light cysto - I messaged Nurse Francia GOODRICH spent 30 minutes with the patient of which more than half was spent in direct consultation with the patient in regards to our treatment and plan.  We addressed the office findings and recent labs.   Education and recommendations of today's plan of care including home remedies and needed follow up with PCP.   We discussed the chief complaint/LUTS and the possible contributory factors.   Recommended lifestyle modifications with proper, healthy diet, good hydration if no fluid restrictions; reducing bladder irritants.   Benefits of regular exercise approved by PCP.

## 2022-12-21 ENCOUNTER — TELEPHONE (OUTPATIENT)
Dept: UROLOGY | Facility: CLINIC | Age: 62
End: 2022-12-21
Payer: COMMERCIAL

## 2022-12-21 DIAGNOSIS — C67.2 MALIGNANT NEOPLASM OF LATERAL WALL OF URINARY BLADDER: Primary | ICD-10-CM

## 2023-01-30 ENCOUNTER — PATIENT MESSAGE (OUTPATIENT)
Dept: INTERNAL MEDICINE | Facility: CLINIC | Age: 63
End: 2023-01-30
Payer: COMMERCIAL

## 2023-02-27 ENCOUNTER — TELEPHONE (OUTPATIENT)
Dept: INTERNAL MEDICINE | Facility: CLINIC | Age: 63
End: 2023-02-27
Payer: COMMERCIAL

## 2023-02-27 DIAGNOSIS — Z01.818 PREOPERATIVE TESTING: Primary | ICD-10-CM

## 2023-02-27 DIAGNOSIS — K76.0 NAFLD (NONALCOHOLIC FATTY LIVER DISEASE): ICD-10-CM

## 2023-02-27 RX ORDER — HYDROCHLOROTHIAZIDE 12.5 MG/1
25 CAPSULE ORAL DAILY
COMMUNITY
End: 2023-04-13 | Stop reason: SDUPTHER

## 2023-02-27 NOTE — TELEPHONE ENCOUNTER
----- Message from Azalia Aden RN sent at 2/27/2023 10:43 AM CST -----  Good morning,    This patient is scheduled for Cystoscopy with Dr. Ewing on 3/8. It is listed under MAC anesthesia. I am requesting recommendations and/or optimization prior to procedure.    Thanks,  Azalia Aden RN  [facility]

## 2023-02-27 NOTE — ANESTHESIA PAT ROS NOTE
02/27/2023  Joseph Jesus is a 62 y.o., male.      Pre-op Assessment    I have reviewed the NPO Status.   I have reviewed the Medications.     Review of Systems  Anesthesia Hx:    H/o PONV History of prior surgery of interest to airway management or planning:          Denies Family Hx of Anesthesia complications.   Personal Hx of Anesthesia complications, Post-Operative Nausea/Vomiting, in the past, but not with recent anesthetics / prophylaxis                    Social:  Former Smoker, No Alcohol Use       Hematology/Oncology:  Hematology Normal                     Current/Recent Cancer.         surgery   Oncology Comments: Malignant neoplasm of lateral wall of urinary bladder; H/o squamous cell carcinoma     EENT/Dental:  EENT/Dental Normal           Cardiovascular:  Exercise tolerance: good   Hypertension, well controlled    Denies CAD.       Denies Angina.     hyperlipidemia  Denies HILL.   Aortic Atherosclerosis, coronary artery claudications noted in chart.   Functional Capacity does housework, yardwork, works out 1 time per week, outdoor activities  Denies Cardiovascular Symptoms.                              Pulmonary:       Denies Shortness of breath.  Denies Recent URI. Sleep Apnea                Renal/:   Denies Chronic Renal Disease.  BPH Stage 3a CKD             Hepatic/GI:    Hiatal Hernia,  Denies GERD. Denies Liver Disease.  GERD, colon polyps, NAFLD (nonalcoholic fatty liver disease) noted in chart          Musculoskeletal:  Arthritis   Arthritis in upper extremities, neck, and knees. Reports limited ROM of head and neck            Neurological:    Denies CVA.                                    Endocrine:        Obesity / BMI > 30  Dermatological:  H/o skin cancer   Psych:  Denies Psychiatric History.                     Anesthesia Assessment: Preoperative EQUATION    Planned Procedure:  Procedure(s) (LRB):  CYSTOSCOPY (N/A)  Requested Anesthesia Type:Monitor Anesthesia Care  Surgeon: Lenny Ewing MD  Service: Urology  Known or anticipated Date of Surgery:3/8/2023    Surgeon notes: reviewed and seen by Dottie Orantes NP on 12/7/22 and noted in chart.    Electronic QUestionnaire Assessment completed via nurse interview with patient.        Triage considerations:     The patient has no apparent active cardiac condition (No unstable coronary Syndrome such as severe unstable angina or recent [<1 month] myocardial infarction, decompensated CHF, severe valvular   disease or significant arrhythmia)    Previous anesthesia records:Hx PONV  08/23/2023:  Airway:  Mallampati: II   Mouth Opening: Normal  TM Distance: Normal  Tongue: Normal  Neck ROM: Normal ROM     Final Anesthesia Type: general        Patient location during evaluation: PACU  Patient participation: Yes- Able to Participate  Level of consciousness: awake and alert  Post-procedure vital signs: reviewed and stable  Pain management: adequate  Airway patency: patent   PONV status at discharge: No PONV  Anesthetic complications: no      Cardiovascular status: blood pressure returned to baseline  Respiratory status: unassisted  Hydration status: euvolemic  Follow-up not needed.  Intubation     Date/Time: 8/23/2022 8:37 AM  Performed by: Fatimah Rueda CRNA  Authorized by: Castro Britt MD      Intubation:     Induction:  Intravenous    Intubated:  Postinduction    Mask Ventilation:  Easy with oral airway    Attempts:  1    Attempted By:  Student    Method of Intubation:  Video laryngoscopy    Blade:  Mancera 3    Laryngeal View Grade: Grade I - full view of cords      Difficult Airway Encountered?: No      Complications:  None    Airway Device:  Oral endotracheal tube    Airway Device Size:  7.5    Style/Cuff Inflation:  Cuffed (inflated to minimal occlusive pressure)    Tube secured:  22    Secured at:  The lips    Placement Verified By:   Capnometry    Complicating Factors:  None    Findings Post-Intubation:  BS equal bilateral and atraumatic/condition of teeth unchanged         Electronically signed by Fatimah Rueda CRNA at 8/23/2022  8:46 AM  Last PCP note: 3-6 months ago , seen by Micki Warren NP pm 10/20/22 and noted in chart.  Subspecialty notes: Dermatology, Neurology, Urology    Other important co-morbidities: GERD, HLD, HTN, Obesity, and HUSSAIN      Tests already available:  No recent tests.             Instructions given. (See in Nurse's note)    Optimization:  Anesthesia Preop Clinic Assessment  Indicated    Medical Opinion Indicated       Sub-specialist consult indicated:   TBD       Plan:    Testing:  CMP, EKG, Hematology Profile, PT/INR, and PTT        Consultation:Patient's PCP for a statement of optimization      Patient  has previously scheduled Medical Appointment: 3/7       Clear for surgery and cc to referring physician.(Cysto under sedation).         Electronically signed by Nick Trejo MD at 3/7/2023  2:00 PM     Navigation: Tests Complete.              Consults Scheduled.             Results will be tracked by Preop Clinic.     02/27/2023 Medications reviewed and instructions given to patient over the phone. Need labs and EKG. Reaching out to PCP for optimization/recommendations. Azalia Aden RN.    03/02/2023 Labs done and reviewed by Dr. Reyes 3/1. PCP scheduled 3/7. Azalia Aden RN.

## 2023-02-27 NOTE — TELEPHONE ENCOUNTER
Please call patient and schedule patient for an appointment with me.  Needs to be sometime soon for the preop clearance, Thank you.

## 2023-03-01 ENCOUNTER — HOSPITAL ENCOUNTER (OUTPATIENT)
Dept: CARDIOLOGY | Facility: HOSPITAL | Age: 63
Discharge: HOME | End: 2023-03-01
Payer: COMMERCIAL

## 2023-03-01 DIAGNOSIS — Z01.818 PREOPERATIVE TESTING: ICD-10-CM

## 2023-03-01 PROCEDURE — 93005 ELECTROCARDIOGRAM TRACING: CPT | Mod: PO

## 2023-03-01 PROCEDURE — 93010 ELECTROCARDIOGRAM REPORT: CPT | Mod: ,,, | Performed by: INTERNAL MEDICINE

## 2023-03-01 PROCEDURE — 93010 EKG 12-LEAD: ICD-10-PCS | Mod: ,,, | Performed by: INTERNAL MEDICINE

## 2023-03-02 ENCOUNTER — TELEPHONE (OUTPATIENT)
Dept: PREADMISSION TESTING | Facility: HOSPITAL | Age: 63
End: 2023-03-02
Payer: COMMERCIAL

## 2023-03-02 NOTE — TELEPHONE ENCOUNTER
----- Message from Azalia Aden RN sent at 2/27/2023 10:37 AM CST -----  Surgery 3/8 Please schedule CMP, EKG, CBC. Thanks, HV.

## 2023-03-07 ENCOUNTER — OFFICE VISIT (OUTPATIENT)
Dept: INTERNAL MEDICINE | Facility: CLINIC | Age: 63
End: 2023-03-07
Attending: FAMILY MEDICINE
Payer: COMMERCIAL

## 2023-03-07 ENCOUNTER — LAB VISIT (OUTPATIENT)
Dept: LAB | Facility: HOSPITAL | Age: 63
End: 2023-03-07
Attending: FAMILY MEDICINE
Payer: COMMERCIAL

## 2023-03-07 ENCOUNTER — TELEPHONE (OUTPATIENT)
Dept: UROLOGY | Facility: CLINIC | Age: 63
End: 2023-03-07
Payer: COMMERCIAL

## 2023-03-07 VITALS
BODY MASS INDEX: 30.35 KG/M2 | TEMPERATURE: 98 F | HEART RATE: 67 BPM | OXYGEN SATURATION: 100 % | DIASTOLIC BLOOD PRESSURE: 89 MMHG | SYSTOLIC BLOOD PRESSURE: 134 MMHG | HEIGHT: 73 IN | WEIGHT: 229 LBS

## 2023-03-07 DIAGNOSIS — I10 HYPERTENSION, ESSENTIAL: ICD-10-CM

## 2023-03-07 DIAGNOSIS — Z01.818 PREOPERATIVE CLEARANCE: Primary | ICD-10-CM

## 2023-03-07 DIAGNOSIS — I70.0 AORTIC ATHEROSCLEROSIS: ICD-10-CM

## 2023-03-07 DIAGNOSIS — C67.2 MALIGNANT NEOPLASM OF LATERAL WALL OF URINARY BLADDER: ICD-10-CM

## 2023-03-07 DIAGNOSIS — E78.5 HYPERLIPIDEMIA, UNSPECIFIED HYPERLIPIDEMIA TYPE: ICD-10-CM

## 2023-03-07 DIAGNOSIS — N18.31 STAGE 3A CHRONIC KIDNEY DISEASE: ICD-10-CM

## 2023-03-07 DIAGNOSIS — E87.6 HYPOKALEMIA: ICD-10-CM

## 2023-03-07 LAB — POTASSIUM SERPL-SCNC: 3.6 MMOL/L (ref 3.5–5.1)

## 2023-03-07 PROCEDURE — 1101F PT FALLS ASSESS-DOCD LE1/YR: CPT | Mod: CPTII,S$GLB,, | Performed by: FAMILY MEDICINE

## 2023-03-07 PROCEDURE — 3075F PR MOST RECENT SYSTOLIC BLOOD PRESS GE 130-139MM HG: ICD-10-PCS | Mod: CPTII,S$GLB,, | Performed by: FAMILY MEDICINE

## 2023-03-07 PROCEDURE — 99213 PR OFFICE/OUTPT VISIT, EST, LEVL III, 20-29 MIN: ICD-10-PCS | Mod: S$GLB,,, | Performed by: FAMILY MEDICINE

## 2023-03-07 PROCEDURE — 1160F PR REVIEW ALL MEDS BY PRESCRIBER/CLIN PHARMACIST DOCUMENTED: ICD-10-PCS | Mod: CPTII,S$GLB,, | Performed by: FAMILY MEDICINE

## 2023-03-07 PROCEDURE — 1160F RVW MEDS BY RX/DR IN RCRD: CPT | Mod: CPTII,S$GLB,, | Performed by: FAMILY MEDICINE

## 2023-03-07 PROCEDURE — 99999 PR PBB SHADOW E&M-EST. PATIENT-LVL IV: ICD-10-PCS | Mod: PBBFAC,,, | Performed by: FAMILY MEDICINE

## 2023-03-07 PROCEDURE — 3079F PR MOST RECENT DIASTOLIC BLOOD PRESSURE 80-89 MM HG: ICD-10-PCS | Mod: CPTII,S$GLB,, | Performed by: FAMILY MEDICINE

## 2023-03-07 PROCEDURE — 1159F MED LIST DOCD IN RCRD: CPT | Mod: CPTII,S$GLB,, | Performed by: FAMILY MEDICINE

## 2023-03-07 PROCEDURE — 3288F PR FALLS RISK ASSESSMENT DOCUMENTED: ICD-10-PCS | Mod: CPTII,S$GLB,, | Performed by: FAMILY MEDICINE

## 2023-03-07 PROCEDURE — 3288F FALL RISK ASSESSMENT DOCD: CPT | Mod: CPTII,S$GLB,, | Performed by: FAMILY MEDICINE

## 2023-03-07 PROCEDURE — 3075F SYST BP GE 130 - 139MM HG: CPT | Mod: CPTII,S$GLB,, | Performed by: FAMILY MEDICINE

## 2023-03-07 PROCEDURE — 36415 COLL VENOUS BLD VENIPUNCTURE: CPT | Performed by: FAMILY MEDICINE

## 2023-03-07 PROCEDURE — 3079F DIAST BP 80-89 MM HG: CPT | Mod: CPTII,S$GLB,, | Performed by: FAMILY MEDICINE

## 2023-03-07 PROCEDURE — 84132 ASSAY OF SERUM POTASSIUM: CPT | Performed by: FAMILY MEDICINE

## 2023-03-07 PROCEDURE — 3008F PR BODY MASS INDEX (BMI) DOCUMENTED: ICD-10-PCS | Mod: CPTII,S$GLB,, | Performed by: FAMILY MEDICINE

## 2023-03-07 PROCEDURE — 1159F PR MEDICATION LIST DOCUMENTED IN MEDICAL RECORD: ICD-10-PCS | Mod: CPTII,S$GLB,, | Performed by: FAMILY MEDICINE

## 2023-03-07 PROCEDURE — 99213 OFFICE O/P EST LOW 20 MIN: CPT | Mod: S$GLB,,, | Performed by: FAMILY MEDICINE

## 2023-03-07 PROCEDURE — 99999 PR PBB SHADOW E&M-EST. PATIENT-LVL IV: CPT | Mod: PBBFAC,,, | Performed by: FAMILY MEDICINE

## 2023-03-07 PROCEDURE — 3008F BODY MASS INDEX DOCD: CPT | Mod: CPTII,S$GLB,, | Performed by: FAMILY MEDICINE

## 2023-03-07 PROCEDURE — 1101F PR PT FALLS ASSESS DOC 0-1 FALLS W/OUT INJ PAST YR: ICD-10-PCS | Mod: CPTII,S$GLB,, | Performed by: FAMILY MEDICINE

## 2023-03-07 NOTE — TELEPHONE ENCOUNTER
Called pt to confirm arrival time of 6:30 for surgery on 03-.  Left detailed message including location and surgery instructions.

## 2023-03-07 NOTE — PROGRESS NOTES
Subjective:       Patient ID: Joseph Jesus is a 63 y.o. male.    Chief Complaint: Pre-op Exam    Patient referred for a preoperative clearance. No acute complaints today.     Specific complaints - see dictation and please see ROS.    Past, Surgical, Family, Social histories; Medications, allergies - reviewed and reconciled.    Health maintenance file reviewed and addressed items due.    Problem list items reviewed and modified or added entries (in the overview section) may not be transcribed into this encounter note due to note writer format.      For sedation for upcoming cysto.    No CP or CV sx. Labs and recent EKG (no sig change noted).      Review of Systems   Constitutional:  Negative for appetite change, chills, diaphoresis, fatigue and fever.   HENT:  Negative for congestion, postnasal drip, rhinorrhea, sore throat and trouble swallowing.    Eyes:  Negative for visual disturbance.   Respiratory:  Negative for cough, choking, chest tightness, shortness of breath and wheezing.    Cardiovascular:  Negative for chest pain and leg swelling.   Gastrointestinal:  Negative for abdominal distention, abdominal pain, diarrhea, nausea and vomiting.   Genitourinary:  Negative for difficulty urinating and hematuria.   Musculoskeletal:  Negative for arthralgias and myalgias.   Skin:  Negative for rash.   Neurological:  Negative for weakness, light-headedness and headaches.   Psychiatric/Behavioral:  Negative for confusion and dysphoric mood.      Objective:      Physical Exam  Vitals and nursing note reviewed.   Constitutional:       Appearance: He is well-developed. He is not diaphoretic.   HENT:      Head: Normocephalic and atraumatic.   Eyes:      General: No scleral icterus.     Conjunctiva/sclera: Conjunctivae normal.   Neck:      Vascular: No carotid bruit.   Cardiovascular:      Rate and Rhythm: Normal rate and regular rhythm.      Heart sounds: Normal heart sounds. No murmur heard.    No friction rub. No gallop.    Pulmonary:      Effort: Pulmonary effort is normal. No respiratory distress.      Breath sounds: Normal breath sounds. No wheezing or rales.   Abdominal:      General: There is no distension.      Tenderness: There is no abdominal tenderness.   Musculoskeletal:         General: No deformity.      Cervical back: Normal range of motion and neck supple.   Skin:     General: Skin is warm and dry.      Findings: No erythema or rash.   Neurological:      Mental Status: He is alert and oriented to person, place, and time.      Cranial Nerves: No cranial nerve deficit.      Motor: No tremor.      Coordination: Coordination normal.      Gait: Gait normal.   Psychiatric:         Behavior: Behavior normal.         Thought Content: Thought content normal.         Judgment: Judgment normal.       Assessment:       1. Preoperative clearance    2. Malignant neoplasm of lateral wall of urinary bladder    3. Hypertension, essential    4. Hyperlipidemia, unspecified hyperlipidemia type    5. Hypokalemia    6. Aortic atherosclerosis    7. Stage 3a chronic kidney disease          Plan:     Medication List with Changes/Refills   Current Medications    ALBUTEROL (PROVENTIL/VENTOLIN HFA) 90 MCG/ACTUATION INHALER    Inhale 2 puffs into the lungs every 6 (six) hours as needed for Wheezing (cough).    ATORVASTATIN (LIPITOR) 10 MG TABLET    Take 1 tablet (10 mg total) by mouth once daily.    COENZYME Q10 100 MG CAPSULE    Take 200 mg by mouth once daily.    FLUTICASONE PROPIONATE (FLONASE) 50 MCG/ACTUATION NASAL SPRAY    2 sprays (100 mcg total) by Each Nostril route daily as needed for Rhinitis.    HYDROCHLOROTHIAZIDE (MICROZIDE) 12.5 MG CAPSULE    Take 25 mg by mouth once daily.    NIFEDIPINE (ADALAT CC) 30 MG TBSR    Take 1 tablet (30 mg total) by mouth once daily.    POMEGRANATE XT/POMEGRANAT SEED (POMEGRAN FRUIT XT-POMEGRA SEED ORAL)    Take by mouth.     1. Preoperative clearance    2. Malignant neoplasm of lateral wall of urinary  "bladder  Overview:  -followed by urology  -microhematuria w/u found to have an erythematous lesion with a positive cytology. On 4/19/22 bladder biopsy showing CIS. TURBT on 6/21/22 showing CIS.  Muscle was present.   -TURBT performed 8/23/22. Dr. Ewing went over pathology demonstrating cancer (CIS is high grade and BCG recommend)  -had 5 treatments. "He is high risk. If he responds, he will need maintenance until 9/2025."    Assessment & Plan:  -see note 12/7/2022      3. Hypertension, essential  -     Potassium; Future; Expected date: 03/07/2023    4. Hyperlipidemia, unspecified hyperlipidemia type  Overview:  discussed cohort risk 7.8% - does not want meds at this time      5. Hypokalemia    6. Aortic atherosclerosis  Overview:  -noted on CT abd 5/7/2020, on statin      7. Stage 3a chronic kidney disease      See meds, orders, follow up, routing and instructions sections of encounter and AVS. Discussed with patient and provided on AVS.    Discussed diet and exercise and links provided on AVS for detailed information.  Lab Results   Component Value Date     03/01/2023    K 3.3 (L) 03/01/2023     03/01/2023    BUN 26 (H) 03/01/2023    CREATININE 1.4 03/01/2023    GLU 97 03/01/2023    HGBA1C 5.6 10/07/2022    AST 64 (H) 03/01/2023    ALT 60 (H) 03/01/2023    ALBUMIN 4.3 03/01/2023    ALBUMIN 4.3 06/21/2018    PROT 7.4 03/01/2023    BILITOT 0.4 03/01/2023    CHOL 147 10/12/2021    HDL 60 10/12/2021    LDLCALC 72.4 10/12/2021    TRIG 73 10/12/2021    WBC 7.31 03/01/2023    HGB 13.7 (L) 03/01/2023    HCT 42.6 03/01/2023     03/01/2023    PSA 0.30 01/28/2022    PSADIAG 0.37 09/08/2014    TSH 2.433 10/07/2022    BNP 16 03/16/2022         Surgical Risk Assessment     Active cardiac issues:  Active decompensated heart failure? No   Unstable angina?  No   Significant uncontrolled arrhythmias? No   Severe valvular heart disease-Aortic or Mitral Stenosis? No   Recent MI or coronary revascularization < 30 " days? No     Clinical risk factors predicting perioperative major adverse cardiac events per RCRI  High risk surgery (suprainguinal vascular, intraperitoneal, or intrathoracic surgery)? No   History of CAD/ischemic heart disease? No   History of cerebrovascular disease (CVA or TIA)? No   History of compensated heart failure? No   Type 2 diabetes requiring insulin? No   Serum Creatinine > 2? No   Total cardiac risk factors 0     0 predictors = 3.9%, 1 predictor = 6.0%, 2 predictors = 10.1%, ?3 predictors = >15%    According to the revised cardiac risk index, the risk of leslie-procedural major cardiac complications (cardiac death, nonfatal MI, nonfatal cardiac arrest, postoperative cardiogenic pulmonary edema, complete heart block) is: 3.9 .     From Duceppe 2017, based on pooled data from 5 high quality external validations (4 prospective). These numbers are higher than those often quoted from the now-outdated original study (Mehdi 1999).    If patient has a low risk of MACE (<1%), proceed to surgery. If the patient is at elevated risk of MACE, then determine functional capacity (pt reported activity or DASI model).     If the patient has moderate, good, or excellent functional capacity (?4 METs), then proceed to surgery without further evaluation. If patient has poor or unknown functional capacity, will further testing impact decision making or perioperative care? If yes, then pharmacological stress testing is appropriate. In those patients with unknown functional capacity, exercise stress testing may be reasonable to perform.     Patient's functional mets: over 4-6    < 4 METs -unable to walk > 2 blocks on level ground without stopping due to symptoms  - eating, dressing, toileting, walking indoors, light housework. POOR   > 4 METs -climbing > 1 flight of stairs without stopping  -walking up hill > 1-2 blocks  -scrubbing floors  -moving furniture  - golf, bowling, dancing or tennis  -running short distance MODERATE to  EXCELLENT     OR   [email or web address]    Clear for surgery and cc to referring physician.(Cysto under sedation).

## 2023-03-08 ENCOUNTER — PATIENT MESSAGE (OUTPATIENT)
Dept: SURGERY | Facility: HOSPITAL | Age: 63
End: 2023-03-08
Payer: COMMERCIAL

## 2023-03-08 ENCOUNTER — ANESTHESIA (OUTPATIENT)
Dept: SURGERY | Facility: HOSPITAL | Age: 63
End: 2023-03-08
Payer: COMMERCIAL

## 2023-03-08 ENCOUNTER — ANESTHESIA EVENT (OUTPATIENT)
Dept: SURGERY | Facility: HOSPITAL | Age: 63
End: 2023-03-08
Payer: COMMERCIAL

## 2023-03-08 ENCOUNTER — HOSPITAL ENCOUNTER (OUTPATIENT)
Facility: HOSPITAL | Age: 63
Discharge: HOME | End: 2023-03-08
Attending: UROLOGY | Admitting: UROLOGY
Payer: COMMERCIAL

## 2023-03-08 VITALS
HEART RATE: 56 BPM | SYSTOLIC BLOOD PRESSURE: 148 MMHG | RESPIRATION RATE: 13 BRPM | DIASTOLIC BLOOD PRESSURE: 108 MMHG | OXYGEN SATURATION: 100 % | WEIGHT: 230 LBS | HEIGHT: 73 IN | TEMPERATURE: 98 F | BODY MASS INDEX: 30.48 KG/M2

## 2023-03-08 DIAGNOSIS — C67.9 BLADDER CANCER: Primary | ICD-10-CM

## 2023-03-08 PROCEDURE — 88305 TISSUE EXAM BY PATHOLOGIST: CPT | Mod: 26,,, | Performed by: PATHOLOGY

## 2023-03-08 PROCEDURE — D9220A PRA ANESTHESIA: ICD-10-PCS | Mod: CRNA,,, | Performed by: NURSE ANESTHETIST, CERTIFIED REGISTERED

## 2023-03-08 PROCEDURE — D9220A PRA ANESTHESIA: Mod: ANES,,, | Performed by: ANESTHESIOLOGY

## 2023-03-08 PROCEDURE — 37000009 HC ANESTHESIA EA ADD 15 MINS: Performed by: UROLOGY

## 2023-03-08 PROCEDURE — 88309 PR  SURG PATH,LEVEL VI: ICD-10-PCS | Mod: 26,,, | Performed by: PATHOLOGY

## 2023-03-08 PROCEDURE — 71000044 HC DOSC ROUTINE RECOVERY FIRST HOUR: Performed by: UROLOGY

## 2023-03-08 PROCEDURE — 88112 CYTOPATH CELL ENHANCE TECH: CPT | Performed by: PATHOLOGY

## 2023-03-08 PROCEDURE — A9589 INSTI HEXAMINOLEVULINATE HCL: HCPCS

## 2023-03-08 PROCEDURE — 71000016 HC POSTOP RECOV ADDL HR: Performed by: UROLOGY

## 2023-03-08 PROCEDURE — 63600175 PHARM REV CODE 636 W HCPCS: Performed by: STUDENT IN AN ORGANIZED HEALTH CARE EDUCATION/TRAINING PROGRAM

## 2023-03-08 PROCEDURE — 36000706: Performed by: UROLOGY

## 2023-03-08 PROCEDURE — 71000015 HC POSTOP RECOV 1ST HR: Performed by: UROLOGY

## 2023-03-08 PROCEDURE — 52204 PR CYSTOURETHROSCOPY,BIOPSY: ICD-10-PCS | Mod: ,,, | Performed by: UROLOGY

## 2023-03-08 PROCEDURE — 25000003 PHARM REV CODE 250: Performed by: ANESTHESIOLOGY

## 2023-03-08 PROCEDURE — 37000008 HC ANESTHESIA 1ST 15 MINUTES: Performed by: UROLOGY

## 2023-03-08 PROCEDURE — 25000003 PHARM REV CODE 250

## 2023-03-08 PROCEDURE — 88305 TISSUE EXAM BY PATHOLOGIST: ICD-10-PCS | Mod: 26,,, | Performed by: PATHOLOGY

## 2023-03-08 PROCEDURE — 88305 TISSUE EXAM BY PATHOLOGIST: CPT | Performed by: PATHOLOGY

## 2023-03-08 PROCEDURE — 52204 CYSTOSCOPY W/BIOPSY(S): CPT | Mod: ,,, | Performed by: UROLOGY

## 2023-03-08 PROCEDURE — 88309 TISSUE EXAM BY PATHOLOGIST: CPT | Performed by: PATHOLOGY

## 2023-03-08 PROCEDURE — 88309 TISSUE EXAM BY PATHOLOGIST: CPT | Mod: 26,,, | Performed by: PATHOLOGY

## 2023-03-08 PROCEDURE — 63600175 PHARM REV CODE 636 W HCPCS: Performed by: NURSE ANESTHETIST, CERTIFIED REGISTERED

## 2023-03-08 PROCEDURE — 36000707: Performed by: UROLOGY

## 2023-03-08 PROCEDURE — 88112 CYTOPATH CELL ENHANCE TECH: CPT | Mod: 26,,, | Performed by: PATHOLOGY

## 2023-03-08 PROCEDURE — D9220A PRA ANESTHESIA: ICD-10-PCS | Mod: ANES,,, | Performed by: ANESTHESIOLOGY

## 2023-03-08 PROCEDURE — 25000003 PHARM REV CODE 250: Performed by: STUDENT IN AN ORGANIZED HEALTH CARE EDUCATION/TRAINING PROGRAM

## 2023-03-08 PROCEDURE — 25000003 PHARM REV CODE 250: Performed by: NURSE ANESTHETIST, CERTIFIED REGISTERED

## 2023-03-08 PROCEDURE — 88112 PR  CYTOPATH, CELL ENHANCE TECH: ICD-10-PCS | Mod: 26,,, | Performed by: PATHOLOGY

## 2023-03-08 PROCEDURE — D9220A PRA ANESTHESIA: Mod: CRNA,,, | Performed by: NURSE ANESTHETIST, CERTIFIED REGISTERED

## 2023-03-08 PROCEDURE — 71000045 HC DOSC ROUTINE RECOVERY EA ADD'L HR: Performed by: UROLOGY

## 2023-03-08 RX ORDER — PROPOFOL 10 MG/ML
VIAL (ML) INTRAVENOUS
Status: DISCONTINUED | OUTPATIENT
Start: 2023-03-08 | End: 2023-03-08

## 2023-03-08 RX ORDER — PHENAZOPYRIDINE HYDROCHLORIDE 100 MG/1
100 TABLET, FILM COATED ORAL 3 TIMES DAILY PRN
Qty: 30 TABLET | Refills: 0 | Status: SHIPPED | OUTPATIENT
Start: 2023-03-08 | End: 2023-04-13 | Stop reason: SDUPTHER

## 2023-03-08 RX ORDER — DEXAMETHASONE SODIUM PHOSPHATE 4 MG/ML
INJECTION, SOLUTION INTRA-ARTICULAR; INTRALESIONAL; INTRAMUSCULAR; INTRAVENOUS; SOFT TISSUE
Status: DISCONTINUED | OUTPATIENT
Start: 2023-03-08 | End: 2023-03-08

## 2023-03-08 RX ORDER — FUROSEMIDE 10 MG/ML
20 INJECTION INTRAMUSCULAR; INTRAVENOUS ONCE
Status: DISCONTINUED | OUTPATIENT
Start: 2023-03-08 | End: 2023-03-08

## 2023-03-08 RX ORDER — SODIUM CHLORIDE 9 MG/ML
INJECTION, SOLUTION INTRAVENOUS CONTINUOUS
Status: DISCONTINUED | OUTPATIENT
Start: 2023-03-08 | End: 2023-03-08 | Stop reason: HOSPADM

## 2023-03-08 RX ORDER — FENTANYL CITRATE 50 UG/ML
INJECTION, SOLUTION INTRAMUSCULAR; INTRAVENOUS
Status: DISCONTINUED | OUTPATIENT
Start: 2023-03-08 | End: 2023-03-08

## 2023-03-08 RX ORDER — PROCHLORPERAZINE EDISYLATE 5 MG/ML
5 INJECTION INTRAMUSCULAR; INTRAVENOUS EVERY 30 MIN PRN
Status: DISCONTINUED | OUTPATIENT
Start: 2023-03-08 | End: 2023-03-08 | Stop reason: HOSPADM

## 2023-03-08 RX ORDER — SCOLOPAMINE TRANSDERMAL SYSTEM 1 MG/1
1 PATCH, EXTENDED RELEASE TRANSDERMAL
Status: DISCONTINUED | OUTPATIENT
Start: 2023-03-08 | End: 2023-03-08 | Stop reason: HOSPADM

## 2023-03-08 RX ORDER — LIDOCAINE HCL/PF 100 MG/5ML
SYRINGE (ML) INTRAVENOUS
Status: DISCONTINUED | OUTPATIENT
Start: 2023-03-08 | End: 2023-03-08

## 2023-03-08 RX ORDER — MIDAZOLAM HYDROCHLORIDE 1 MG/ML
INJECTION INTRAMUSCULAR; INTRAVENOUS
Status: DISCONTINUED | OUTPATIENT
Start: 2023-03-08 | End: 2023-03-08

## 2023-03-08 RX ORDER — PROPOFOL 10 MG/ML
VIAL (ML) INTRAVENOUS CONTINUOUS PRN
Status: DISCONTINUED | OUTPATIENT
Start: 2023-03-08 | End: 2023-03-08

## 2023-03-08 RX ORDER — SODIUM CHLORIDE 0.9 % (FLUSH) 0.9 %
10 SYRINGE (ML) INJECTION
Status: DISCONTINUED | OUTPATIENT
Start: 2023-03-08 | End: 2023-03-08 | Stop reason: HOSPADM

## 2023-03-08 RX ORDER — OXYBUTYNIN CHLORIDE 5 MG/1
5 TABLET ORAL 3 TIMES DAILY PRN
Qty: 30 TABLET | Refills: 0 | Status: SHIPPED | OUTPATIENT
Start: 2023-03-08 | End: 2023-03-18

## 2023-03-08 RX ORDER — PROMETHAZINE HYDROCHLORIDE AND DEXTROMETHORPHAN HYDROBROMIDE 6.25; 15 MG/5ML; MG/5ML
SYRUP ORAL
COMMUNITY
Start: 2022-10-20 | End: 2023-04-13

## 2023-03-08 RX ORDER — SODIUM CHLORIDE 9 MG/ML
INJECTION, SOLUTION INTRAVENOUS CONTINUOUS PRN
Status: DISCONTINUED | OUTPATIENT
Start: 2023-03-08 | End: 2023-03-08

## 2023-03-08 RX ORDER — LIDOCAINE HYDROCHLORIDE 10 MG/ML
1 INJECTION, SOLUTION EPIDURAL; INFILTRATION; INTRACAUDAL; PERINEURAL ONCE
Status: DISCONTINUED | OUTPATIENT
Start: 2023-03-08 | End: 2023-03-08 | Stop reason: HOSPADM

## 2023-03-08 RX ORDER — CETIRIZINE HYDROCHLORIDE 10 MG/1
10 TABLET ORAL DAILY
COMMUNITY

## 2023-03-08 RX ORDER — LIDOCAINE HYDROCHLORIDE 20 MG/ML
JELLY TOPICAL ONCE AS NEEDED
Status: COMPLETED | OUTPATIENT
Start: 2023-03-08 | End: 2023-03-08

## 2023-03-08 RX ORDER — FUROSEMIDE 10 MG/ML
10 INJECTION INTRAMUSCULAR; INTRAVENOUS ONCE
Status: COMPLETED | OUTPATIENT
Start: 2023-03-08 | End: 2023-03-08

## 2023-03-08 RX ORDER — FENTANYL CITRATE 50 UG/ML
25 INJECTION, SOLUTION INTRAMUSCULAR; INTRAVENOUS EVERY 5 MIN PRN
Status: DISCONTINUED | OUTPATIENT
Start: 2023-03-08 | End: 2023-03-08 | Stop reason: HOSPADM

## 2023-03-08 RX ORDER — ONDANSETRON 2 MG/ML
INJECTION INTRAMUSCULAR; INTRAVENOUS
Status: DISCONTINUED | OUTPATIENT
Start: 2023-03-08 | End: 2023-03-08

## 2023-03-08 RX ADMIN — SODIUM CHLORIDE 1000 ML: 9 INJECTION, SOLUTION INTRAVENOUS at 12:03

## 2023-03-08 RX ADMIN — DEXAMETHASONE SODIUM PHOSPHATE 4 MG: 4 INJECTION, SOLUTION INTRAMUSCULAR; INTRAVENOUS at 09:03

## 2023-03-08 RX ADMIN — FENTANYL CITRATE 25 MCG: 50 INJECTION, SOLUTION INTRAMUSCULAR; INTRAVENOUS at 10:03

## 2023-03-08 RX ADMIN — ONDANSETRON 4 MG: 2 INJECTION INTRAMUSCULAR; INTRAVENOUS at 09:03

## 2023-03-08 RX ADMIN — LIDOCAINE HYDROCHLORIDE 100 MG: 20 INJECTION, SOLUTION INTRAVENOUS at 09:03

## 2023-03-08 RX ADMIN — FUROSEMIDE 10 MG: 10 INJECTION, SOLUTION INTRAMUSCULAR; INTRAVENOUS at 01:03

## 2023-03-08 RX ADMIN — SCOPALAMINE 1 PATCH: 1 PATCH, EXTENDED RELEASE TRANSDERMAL at 07:03

## 2023-03-08 RX ADMIN — MIDAZOLAM HYDROCHLORIDE 2 MG: 1 INJECTION, SOLUTION INTRAMUSCULAR; INTRAVENOUS at 09:03

## 2023-03-08 RX ADMIN — SODIUM CHLORIDE: 0.9 INJECTION, SOLUTION INTRAVENOUS at 09:03

## 2023-03-08 RX ADMIN — GLYCOPYRROLATE 0.2 MG: 0.2 INJECTION, SOLUTION INTRAMUSCULAR; INTRAVENOUS at 09:03

## 2023-03-08 RX ADMIN — HEXAMINOLEVULINATE HYDROCHLORIDE 100 MG: KIT at 07:03

## 2023-03-08 RX ADMIN — FENTANYL CITRATE 50 MCG: 50 INJECTION, SOLUTION INTRAMUSCULAR; INTRAVENOUS at 09:03

## 2023-03-08 RX ADMIN — PROPOFOL 200 MG: 10 INJECTION, EMULSION INTRAVENOUS at 09:03

## 2023-03-08 RX ADMIN — CEFAZOLIN 2 G: 2 INJECTION, POWDER, FOR SOLUTION INTRAMUSCULAR; INTRAVENOUS at 09:03

## 2023-03-08 RX ADMIN — PROPOFOL 200 MCG/KG/MIN: 10 INJECTION, EMULSION INTRAVENOUS at 09:03

## 2023-03-08 RX ADMIN — LIDOCAINE HYDROCHLORIDE 10 ML: 20 JELLY TOPICAL at 07:03

## 2023-03-08 NOTE — PROGRESS NOTES
DR. Hughes at bedside. Order for 1 liter NS bolus and 10 mg of IV Lasix received. If patient not able to void after, please place Richardson per Dr. Hughes.

## 2023-03-08 NOTE — PLAN OF CARE
Spoke with urology resident about large blood clot pt passed while attempting urinate. Resident visualized clot. No new orders at this time.

## 2023-03-08 NOTE — ANESTHESIA POSTPROCEDURE EVALUATION
Anesthesia Post Evaluation    Patient: Joseph Jesus    Procedure(s) Performed: Procedure(s):  CYSTOSCOPY-BLUE LIGHT  BIOPSY, BLADDER  FULGURATION, BLADDER    Final Anesthesia Type: general      Patient location during evaluation: PACU  Patient participation: Yes- Able to Participate  Level of consciousness: awake and alert, awake and oriented  Post-procedure vital signs: reviewed and stable  Pain management: adequate  Airway patency: patent    PONV status at discharge: No PONV  Anesthetic complications: no      Cardiovascular status: blood pressure returned to baseline, stable and hemodynamically stable  Respiratory status: unassisted, spontaneous ventilation and room air  Hydration status: euvolemic  Follow-up not needed.          Vitals Value Taken Time   /110 03/08/23 1305   Temp 36.4 °C (97.5 °F) 03/08/23 1050   Pulse 74 03/08/23 1135   Resp 31 03/08/23 1133   SpO2 93 % 03/08/23 1135   Vitals shown include unvalidated device data.      No case tracking events are documented in the log.      Pain/Neymar Score: Neymar Score: 10 (3/8/2023 12:00 PM)

## 2023-03-08 NOTE — ANESTHESIA PREPROCEDURE EVALUATION
03/08/2023  Joseph Jesus is a 63 y.o., male with a history of CIS bladder cancer. TURBT x2 showing CIS with muscle present. CTU in 4/22 showed no upper tract disease. Now s/p BCG induction. Completed 5/5 in late December. He presents today for blue light cysto.       Pre-op Assessment    I have reviewed the Patient Summary Reports.     I have reviewed the Nursing Notes. I have reviewed the NPO Status.   I have reviewed the Medications.     Review of Systems  Anesthesia Hx:  PONV  Denies Family Hx of Anesthesia complications.  Personal Hx of Anesthesia complications, Post-Operative Nausea/Vomiting, in the past, but not with recent anesthetics / prophylaxis   Hematology/Oncology:        Current/Recent Cancer. -- Cancer in past history:  Other (see Oncology comments) surgery  Oncology Comments: Skin cancer x3 surgeries to remove/ S/P-Bladder cancer Sx x2     EENT/Dental:   Farsighted-reading glasses/ Sinus allergies   Cardiovascular:   Exercise tolerance: good Hypertension, well controlled CAD asymptomatic  ECG has been reviewed. Ankle swelling sometimes when not active/ Walking/Gym workout-Treadmill/ Stationary bike/Weight-3x/wk./Housework   Pulmonary:   Sleep Apnea Maybe per patient   Renal/:   Chronic Renal Disease CKD-Stage 3/ Malignant neoplasm of urinary bladder, unspecified   Hepatic/GI:   Hiatal Hernia, GERD Liver Disease, Fatty Liver   Musculoskeletal:   Arthritis  Shoulders & ankles   Neurological:   Neuromuscular Disease,    Endocrine:  Endocrine Normal    Dermatological:   AK(actinic keratosis)/warts   Psych:   Psychiatric History Anxious         Physical Exam  General: Well nourished, Cooperative, Alert and Oriented    Airway:  Mallampati: II   Mouth Opening: Normal  TM Distance: Normal  Tongue: Normal  Neck ROM: Normal ROM    Dental:Any loose and/or missing teeth verified with patient        Anesthesia Plan  Type of Anesthesia, risks & benefits discussed:    Anesthesia Type: Gen Supraglottic Airway, Gen ETT  Intra-op Monitoring Plan: Standard ASA Monitors  Post Op Pain Control Plan: multimodal analgesia  Induction:  IV  Airway Plan: Direct, Post-Induction  Informed Consent: Informed consent signed with the Patient and all parties understand the risks and agree with anesthesia plan.  All questions answered. Patient consented to blood products? No  ASA Score: 3  Day of Surgery Review of History & Physical: H&P Update referred to the surgeon/provider.    Ready For Surgery From Anesthesia Perspective.     .

## 2023-03-08 NOTE — OP NOTE
[facility]  Operative Note    Date: 03/08/2023    Pre-Op Diagnosis:   History of cis bladder cancer.   Patient Active Problem List    Diagnosis Date Noted    Adjustment disorder with anxious mood 05/17/2022    Numbness of toes 05/17/2022    Stage 3a chronic kidney disease 05/17/2022    Malignant neoplasm of lateral wall of urinary bladder 05/11/2022    Edema leg 03/28/2022    Aortic atherosclerosis 01/28/2022    Coronary artery calcification 01/28/2022    Gastroesophageal reflux disease 08/09/2021    Hiatal hernia with gastroesophageal reflux disease without esophagitis 05/26/2021    AK (actinic keratosis) 11/02/2020    Erectile dysfunction due to arterial insufficiency 07/02/2018    Diverticulitis of large intestine without perforation or abscess without bleeding 04/18/2017    Hypertension, essential 08/17/2016    Hyperlipidemia 08/17/2016    NAFLD (nonalcoholic fatty liver disease) 08/17/2016    Lateral epicondylitis of right elbow 08/17/2016    BPH with urinary obstruction 09/08/2014    Colon polyps 09/27/2012      Post-Op Diagnosis: Same    Procedure(s) Performed:   1.  Cystoscopy with bladder biopsy and fulguration    Specimen(s): Papillary bladder tumor  Random bladder biopsies.    Staff Surgeon: Lenny Ewing MD    Assistant Surgeon: Shahrzad Hughes MD     Anesthesia: General LMA anesthesia    Indications: Joseph Jesus is a 63 y.o. male with a history of CIS bladder cancer, s/p TURBT 8/2022 and then induction BCG. He presents today for surveillance cystoscopy under cystoscopy with possible bladder biopsy and fulguration.    Findings:   No urethral tumors seen in prostatic urethra.   White light cysto with 30 and 70 degree lens showing one papillary lesion rightward of right UO. Mild hyperemia in scattered areas with area of yellow nodules consistent with BCG granuloma on left lateral wall.     Blue light cysto showing car areas of hyperemia on the right lateral wall. Overall no overt  concerning blue light lesions except papillary focal area right of left UO which was clearly hyperemic.    Papillary lesion biopsied. Random bladder biopsies performed. Fulgurated with excellent hemostasis.     Estimated Blood Loss: min    Drains: none    Procedure in Detail:  The risks, benefits and alternatives of the procedure were explained, and all questions were answered in the leslie-operative area. The patient was transferred to the cystoscopy suite and placed in the supine position. SCDs were applied and working. Anesthesia was administered. Once sedated, the patient was placed in the dorsal lithotomy position and prepped and draped in the usual sterile fashion. Time out was performed, leslie-procedural antibiotics were confirmed.    A rigid cystoscope in a 22 Fr sheath was introduced into the bladder per urethra. This passed easily. The entire urethra was visualized which showed no masses or strictures. The right and left ureteral orifices were identified in the normal anatomic position. Cystoscopy was performed which revealed a lesions as abovementioned. A 70 degree cystoscopy was performed showing no concerning findings at the bladder neck. This was confirmed with blue light.    We resumed 30 degree cystoscopy and used blue light to delineate possible evasive lesions. The precise location of lesions is abovementioned.     The papillary lesion lateral to the right UO was then biopsied with cold cup biopsy forceps and passed off as a specimen. Random bladder biopsies were taken at the right lateral wall, dome, posterior wall and left lateral wall.   The bugbee electrocautery was introduced through the scope and the bleeding areas were fulgurated. Hemostasis was achieved. The bladder was drained and refilled. Hemostasis was confirmed.    The bladder was drained, and the patient was removed from lithotomy.     The patient tolerated the procedure well and was transferred to recovery in stable  condition.    Disposition:  The patient will follow up with Dr. Ewing for path review. Urine cytology was sent today.

## 2023-03-08 NOTE — TRANSFER OF CARE
"Anesthesia Transfer of Care Note    Patient: Joseph Jesus    Procedure(s) Performed: Procedure(s):  CYSTOSCOPY-BLUE LIGHT  BIOPSY, BLADDER  FULGURATION, BLADDER    Patient location: PACU    Anesthesia Type: general    Transport from OR: Transported from OR on 6-10 L/min O2 by face mask with adequate spontaneous ventilation    Post pain: adequate analgesia    Post assessment: no apparent anesthetic complications and tolerated procedure well    Post vital signs: stable    Level of consciousness: sedated    Nausea/Vomiting: no nausea/vomiting    Complications: none    Transfer of care protocol was followed      Last vitals:   Visit Vitals  BP (!) 143/95 (BP Location: Left arm, Patient Position: Lying)   Pulse 65   Temp 36.6 °C (97.9 °F) (Temporal)   Resp 16   Ht 6' 1" (1.854 m)   Wt 104.3 kg (230 lb)   SpO2 99%   BMI 30.34 kg/m²     "

## 2023-03-08 NOTE — ANESTHESIA PROCEDURE NOTES
Intubation    Date/Time: 3/8/2023 9:39 AM  Performed by: Sheryl Gonzalez CRNA  Authorized by: Tee Zuniga MD     Intubation:     Induction:  Intravenous    Intubated:  Postinduction    Mask Ventilation:  Easy mask    Attempts:  1    Attempted By:  CRNA    Difficult Airway Encountered?: No      Complications:  None    Airway Device:  Supraglottic airway/LMA    Airway Device Size:  4.5    Inflation Amount (mL):  5    Secured at:  The lips    Placement Verified By:  Capnometry    Complicating Factors:  None    Findings Post-Intubation:  BS equal bilateral and atraumatic/condition of teeth unchanged

## 2023-03-08 NOTE — PATIENT INSTRUCTIONS
Post Cystoscopy Instructions  Do not strain to have a bowel movement  No strenuous exercise x 7 days  No driving while you are on narcotic pain medications or if your mac  catheter is in place    You can expect:  To pass stone fragments if you had a stone procedure  Have pain when you void from your stent if you have a stent in place  See blood in your urine if you have a stent in place    If you have a catheter, please return to the ER if your catheter stops draining or you are having abdominal pain.    Call the doctor if:  Temperature is greater than 101F  Persistent vomiting and inability to keep food down  Inability to void if you do not have a catheter

## 2023-03-08 NOTE — H&P
[facility]    CC: bladder cancer/    HPI:  Joseph Jesus is a 63 y.o. male with a history of CIS bladder cancer. TURBT x2 showing CIS with muscle present. CTU in 4/22 showed no upper tract disease.   Now s/p BCG induction. Completed 5/5 in late December.   He presents today for blue light cysto.   No blood thinners.       ROS:  Neg except per HPI    Past Medical History:   Diagnosis Date    Allergy     seasonal    Anal fistula hx    Arthritis     Basal cell carcinoma 04/2013    left superior forehead    Callus     Diverticulosis     Epididymal cyst     GERD (gastroesophageal reflux disease)     Hayfever     Hydrocele, right     Hyperlipidemia     Hypertension     Neck pain     hx of muscular inury from weight lifting---resolved now    PONV (postoperative nausea and vomiting)     Prostatitis     Dec. '12-treated with antibx.    Squamous cell carcinoma 01/07/2019    anterior scalp    Squamous cell carcinoma of skin 03/2020    Left post scalp (SCC insitu-saucerization)    Visual impairment        Past Surgical History:   Procedure Laterality Date    APPENDECTOMY  1962    BIOPSY OF BLADDER  4/19/2022    Procedure: BIOPSY, BLADDER;  Surgeon: Lenny Ewing MD;  Location: [address];  Service: Urology;;    BLADDER FULGURATION  4/19/2022    Procedure: FULGURATION, BLADDER;  Surgeon: Lenny Ewing MD;  Location: [address];  Service: Urology;;    COLONOSCOPY  12/2012    due for repeat 12/2015    COLONOSCOPY N/A 5/19/2016    Procedure: COLONOSCOPY;  Surgeon: James Webber MD;  Location: [facility] ([address] [place] [address]);  Service: Endoscopy;  Laterality: N/A; diverticulosis, repeat in 5-10 years for surveillance    COLONOSCOPY N/A 8/9/2021    Procedure: COLONOSCOPY;  Surgeon: Fahad Bautista Jr., MD;  Location: [facility];  Service: Endoscopy;  Laterality: N/A;    CYSTOSCOPY  4/19/2022    Procedure: CYSTOSCOPY;  Surgeon: Lenny Ewing MD;  Location: [address];  Service: Urology;;    CYSTOSCOPY   2022    Procedure: CYSTOSCOPY;  Surgeon: Lenny Ewing MD;  Location: [facility] OR [address] [place] [address];  Service: Urology;;    CYSTOSCOPY  2022    Procedure: CYSTOSCOPY;  Surgeon: Lenny Ewing MD;  Location: [facility] OR [address] [place] [address];  Service: Urology;;    ESOPHAGOGASTRODUODENOSCOPY N/A 2021    Procedure: EGD (ESOPHAGOGASTRODUODENOSCOPY);  Surgeon: Fahad Bautista Jr., MD;  Location: [facility];  Service: Endoscopy;  Laterality: N/A;    EUA/Fistulotomy-'08      HERNIA REPAIR      RIH with mesh    Hydrocelectomy      MASS EXCISION      BCC removal (twice)    MOLE REMOVAL  2019    2 moles removed from scalp =- 1 was basal cell and 1 was squamous cell - dermatology - Dr. londono    right Spermatocelectomy      UPPER GASTROINTESTINAL ENDOSCOPY  prior to     hiatal hernia, reflux esophagitis       Social History     Socioeconomic History    Marital status:      Spouse name: Traci    Number of children: 1   Occupational History    Occupation: [employer]     Employer: [employer]     Comment: [employer]   Tobacco Use    Smoking status: Former     Packs/day: 1.00     Years: 10.00     Pack years: 10.00     Types: Cigarettes     Quit date: 1987     Years since quittin.2    Smokeless tobacco: Never    Tobacco comments:     Decreased lung compassity per patient   Substance and Sexual Activity    Alcohol use: Not Currently     Alcohol/week: 0.0 - 1.0 standard drinks     Comment: No longer drinks ETOH    Drug use: No    Sexual activity: Yes     Partners: Female       Family History   Problem Relation Age of Onset    Skin cancer Mother     Hypertension Mother     Skin cancer Father     Cancer Father         prostate cancer    Hypertension Father     Hypertension Maternal Grandmother     Hypertension Maternal Grandfather     Hypertension Paternal Grandmother     Anesthesia problems Paternal Grandmother     Cancer Paternal Grandfather         prostate cancer    Hypertension Paternal Grandfather      Heart disease Paternal Grandfather     Diabetes Neg Hx     Colon polyps Neg Hx     Colon cancer Neg Hx     Melanoma Neg Hx     Psoriasis Neg Hx     Lupus Neg Hx     Eczema Neg Hx     Acne Neg Hx     Cirrhosis Neg Hx     Prostate cancer Neg Hx     Kidney disease Neg Hx     Crohn's disease Neg Hx     Ulcerative colitis Neg Hx     Stomach cancer Neg Hx     Esophageal cancer Neg Hx        Review of patient's allergies indicates:   Allergen Reactions    Amlodipine      edema    Atenolol      Depression - circa 2000    Catechu herb     Irbesartan Other (See Comments)     Possibly caused cough    Erythromycin Rash    Sumycin [tetracycline] Rash    Tetracyclines Rash       No current facility-administered medications on file prior to encounter.     Current Outpatient Medications on File Prior to Encounter   Medication Sig Dispense Refill    albuterol (PROVENTIL/VENTOLIN HFA) 90 mcg/actuation inhaler Inhale 2 puffs into the lungs every 6 (six) hours as needed for Wheezing (cough). 18 g 5    atorvastatin (LIPITOR) 10 MG tablet Take 1 tablet (10 mg total) by mouth once daily. 90 tablet 0    cetirizine (ZYRTEC) 10 MG tablet Take 10 mg by mouth Daily.      coenzyme Q10 100 mg capsule Take 200 mg by mouth once daily.      fluticasone propionate (FLONASE) 50 mcg/actuation nasal spray 2 sprays (100 mcg total) by Each Nostril route daily as needed for Rhinitis. 16 mL 5    hydroCHLOROthiazide (MICROZIDE) 12.5 mg capsule Take 25 mg by mouth once daily.      Lactobacillus acidophilus (PROBIOTIC ORAL) Take by mouth.      NIFEdipine (ADALAT CC) 30 MG TbSR Take 1 tablet (30 mg total) by mouth once daily. 90 tablet 3    pomegranate xt/pomegranat seed (POMEGRAN FRUIT XT-POMEGRA SEED ORAL) Take by mouth.      promethazine-dextromethorphan (PROMETHAZINE-DM) 6.25-15 mg/5 mL Syrp TAKE 5 MLS BY MOUTH EVERY 6 HOURS AS NEEDED FOR CONGESTION AND COUGH.         Anticoagulation:  No    Physical Exam:  Estimated body mass index is 30.34 kg/m² as  "calculated from the following:    Height as of this encounter: 6' 1" (1.854 m).    Weight as of this encounter: 104.3 kg (230 lb).    General: No acute distress, well developed. AAOx3  Head: Normocephalic, Atraumatic  Eyes: Extra-occular movements intact, No discharge  Neck: supple, symmetrical, trachea midline  Lungs: normal respiratory effort, no respiratory distress, no wheezes  CV: regular rate, 2+ pulses  Abdomen: soft, non-tender, non-distended, no organomegaly  MSK: no edema, no deformities, normal ROM  Skin: skin color, texture, turgor normal.  Neurologic: no focal deficits, sensation intact    Labs:    Urine dipstick today - negative for infection.     Lab Results   Component Value Date    WBC 7.31 03/01/2023    HGB 13.7 (L) 03/01/2023    HCT 42.6 03/01/2023    MCV 94 03/01/2023     03/01/2023           BMP  Lab Results   Component Value Date     03/01/2023    K 3.6 03/07/2023     03/01/2023    CO2 31 (H) 03/01/2023    BUN 26 (H) 03/01/2023    CREATININE 1.4 03/01/2023    CALCIUM 10.1 03/01/2023    ANIONGAP 6 (L) 03/01/2023    EGFRNORACEVR 56.1 (A) 03/01/2023       Lab Results   Component Value Date    PSA 0.30 01/28/2022    PSA 0.22 07/29/2020    PSA 0.29 05/30/2019         Assessment: Joseph Jesus is a 63 y.o. male with cis bladder cancer s/p induction BCG.     Plan:     1. To OR on today for cystoscopy with blue light, possible TURBT/bladder biopsies.   2. Consents signed   3. I have explained the risk, benefits, and alternatives of the procedure in detail. The patient voices understanding and all questions have been answered. The patient agrees to proceed as planned.       Shahrzad Hughes MD    "

## 2023-03-08 NOTE — DISCHARGE SUMMARY
[facility]  Discharge Note  Short Stay    Admit Date: 3/8/2023    Discharge Date and Time: 03/08/2023 10:34 AM      Attending Physician: Lenny Ewing MD     Discharge Provider: Shahrzad Hughes MD    Diagnoses:      Patient Active Problem List    Diagnosis Date Noted    Adjustment disorder with anxious mood 05/17/2022    Numbness of toes 05/17/2022    Stage 3a chronic kidney disease 05/17/2022    Malignant neoplasm of lateral wall of urinary bladder 05/11/2022    Edema leg 03/28/2022    Aortic atherosclerosis 01/28/2022    Coronary artery calcification 01/28/2022    Gastroesophageal reflux disease 08/09/2021    Hiatal hernia with gastroesophageal reflux disease without esophagitis 05/26/2021    AK (actinic keratosis) 11/02/2020    Erectile dysfunction due to arterial insufficiency 07/02/2018    Diverticulitis of large intestine without perforation or abscess without bleeding 04/18/2017    Hypertension, essential 08/17/2016    Hyperlipidemia 08/17/2016    NAFLD (nonalcoholic fatty liver disease) 08/17/2016    Lateral epicondylitis of right elbow 08/17/2016    BPH with urinary obstruction 09/08/2014    Colon polyps 09/27/2012         Discharged Condition: stable    Hospital Course: Patient was admitted for cystoscopy and bladder biopsy and tolerated the procedure well with no complications. He was discharged home in good condition on the same day.       Final Diagnoses: Same as principal problem.    Disposition: Home or Self Care    Follow up/Patient Instructions:    Medications:  Reconciled Home Medications:   Current Discharge Medication List        START taking these medications    Details   oxybutynin (DITROPAN) 5 MG Tab Take 1 tablet (5 mg total) by mouth 3 (three) times daily as needed (bladder spasms).  Qty: 30 tablet, Refills: 0      phenazopyridine (PYRIDIUM) 100 MG tablet Take 1 tablet (100 mg total) by mouth 3 (three) times daily as needed for Pain (painful urination).  Qty: 30 tablet,  Refills: 0           CONTINUE these medications which have NOT CHANGED    Details   albuterol (PROVENTIL/VENTOLIN HFA) 90 mcg/actuation inhaler Inhale 2 puffs into the lungs every 6 (six) hours as needed for Wheezing (cough).  Qty: 18 g, Refills: 5    Associated Diagnoses: Cough      atorvastatin (LIPITOR) 10 MG tablet Take 1 tablet (10 mg total) by mouth once daily.  Qty: 90 tablet, Refills: 0    Associated Diagnoses: Hyperlipidemia, unspecified hyperlipidemia type      cetirizine (ZYRTEC) 10 MG tablet Take 10 mg by mouth Daily.      coenzyme Q10 100 mg capsule Take 200 mg by mouth once daily.      fluticasone propionate (FLONASE) 50 mcg/actuation nasal spray 2 sprays (100 mcg total) by Each Nostril route daily as needed for Rhinitis.  Qty: 16 mL, Refills: 5    Associated Diagnoses: Bronchitis with wheezing      hydroCHLOROthiazide (MICROZIDE) 12.5 mg capsule Take 25 mg by mouth once daily.      Lactobacillus acidophilus (PROBIOTIC ORAL) Take by mouth.      NIFEdipine (ADALAT CC) 30 MG TbSR Take 1 tablet (30 mg total) by mouth once daily.  Qty: 90 tablet, Refills: 3    Comments: .      pomegranate xt/pomegranat seed (POMEGRAN FRUIT XT-POMEGRA SEED ORAL) Take by mouth.      promethazine-dextromethorphan (PROMETHAZINE-DM) 6.25-15 mg/5 mL Syrp TAKE 5 MLS BY MOUTH EVERY 6 HOURS AS NEEDED FOR CONGESTION AND COUGH.           Discharge Procedure Orders   Activity as tolerated

## 2023-03-10 LAB
FINAL PATHOLOGIC DIAGNOSIS: ABNORMAL
Lab: ABNORMAL

## 2023-03-23 ENCOUNTER — OFFICE VISIT (OUTPATIENT)
Dept: OPHTHALMOLOGY | Facility: CLINIC | Age: 63
End: 2023-03-23
Payer: COMMERCIAL

## 2023-03-23 ENCOUNTER — OFFICE VISIT (OUTPATIENT)
Dept: UROLOGY | Facility: CLINIC | Age: 63
End: 2023-03-23
Payer: COMMERCIAL

## 2023-03-23 VITALS
BODY MASS INDEX: 30.97 KG/M2 | WEIGHT: 233.69 LBS | SYSTOLIC BLOOD PRESSURE: 153 MMHG | DIASTOLIC BLOOD PRESSURE: 95 MMHG | HEIGHT: 73 IN | HEART RATE: 70 BPM

## 2023-03-23 DIAGNOSIS — H52.7 REFRACTIVE ERRORS: ICD-10-CM

## 2023-03-23 DIAGNOSIS — H43.393 VITREOUS FLOATER, BILATERAL: Primary | ICD-10-CM

## 2023-03-23 DIAGNOSIS — H35.373 EPIRETINAL MEMBRANE (ERM), BILATERAL: ICD-10-CM

## 2023-03-23 DIAGNOSIS — C67.2 MALIGNANT NEOPLASM OF LATERAL WALL OF URINARY BLADDER: Primary | ICD-10-CM

## 2023-03-23 LAB
COMMENT: NORMAL
FINAL PATHOLOGIC DIAGNOSIS: NORMAL
GROSS: NORMAL
Lab: NORMAL

## 2023-03-23 PROCEDURE — 1101F PT FALLS ASSESS-DOCD LE1/YR: CPT | Mod: CPTII,S$GLB,, | Performed by: UROLOGY

## 2023-03-23 PROCEDURE — 99999 PR PBB SHADOW E&M-EST. PATIENT-LVL III: ICD-10-PCS | Mod: PBBFAC,,, | Performed by: OPTOMETRIST

## 2023-03-23 PROCEDURE — 3077F PR MOST RECENT SYSTOLIC BLOOD PRESSURE >= 140 MM HG: ICD-10-PCS | Mod: CPTII,S$GLB,, | Performed by: UROLOGY

## 2023-03-23 PROCEDURE — 3008F BODY MASS INDEX DOCD: CPT | Mod: CPTII,S$GLB,, | Performed by: UROLOGY

## 2023-03-23 PROCEDURE — 3077F SYST BP >= 140 MM HG: CPT | Mod: CPTII,S$GLB,, | Performed by: UROLOGY

## 2023-03-23 PROCEDURE — 92015 DETERMINE REFRACTIVE STATE: CPT | Mod: S$GLB,,, | Performed by: OPTOMETRIST

## 2023-03-23 PROCEDURE — 1159F MED LIST DOCD IN RCRD: CPT | Mod: CPTII,S$GLB,, | Performed by: OPTOMETRIST

## 2023-03-23 PROCEDURE — 3080F PR MOST RECENT DIASTOLIC BLOOD PRESSURE >= 90 MM HG: ICD-10-PCS | Mod: CPTII,S$GLB,, | Performed by: UROLOGY

## 2023-03-23 PROCEDURE — 99214 PR OFFICE/OUTPT VISIT, EST, LEVL IV, 30-39 MIN: ICD-10-PCS | Mod: S$GLB,,, | Performed by: UROLOGY

## 2023-03-23 PROCEDURE — 3008F PR BODY MASS INDEX (BMI) DOCUMENTED: ICD-10-PCS | Mod: CPTII,S$GLB,, | Performed by: UROLOGY

## 2023-03-23 PROCEDURE — 1160F PR REVIEW ALL MEDS BY PRESCRIBER/CLIN PHARMACIST DOCUMENTED: ICD-10-PCS | Mod: CPTII,S$GLB,, | Performed by: UROLOGY

## 2023-03-23 PROCEDURE — 92015 PR REFRACTION: ICD-10-PCS | Mod: S$GLB,,, | Performed by: OPTOMETRIST

## 2023-03-23 PROCEDURE — 92004 PR EYE EXAM, NEW PATIENT,COMPREHESV: ICD-10-PCS | Mod: S$GLB,,, | Performed by: OPTOMETRIST

## 2023-03-23 PROCEDURE — 92134 CPTRZ OPH DX IMG PST SGM RTA: CPT | Mod: S$GLB,,, | Performed by: OPTOMETRIST

## 2023-03-23 PROCEDURE — 1160F RVW MEDS BY RX/DR IN RCRD: CPT | Mod: CPTII,S$GLB,, | Performed by: UROLOGY

## 2023-03-23 PROCEDURE — 1101F PR PT FALLS ASSESS DOC 0-1 FALLS W/OUT INJ PAST YR: ICD-10-PCS | Mod: CPTII,S$GLB,, | Performed by: UROLOGY

## 2023-03-23 PROCEDURE — 99214 OFFICE O/P EST MOD 30 MIN: CPT | Mod: S$GLB,,, | Performed by: UROLOGY

## 2023-03-23 PROCEDURE — 3080F DIAST BP >= 90 MM HG: CPT | Mod: CPTII,S$GLB,, | Performed by: UROLOGY

## 2023-03-23 PROCEDURE — 99999 PR PBB SHADOW E&M-EST. PATIENT-LVL III: CPT | Mod: PBBFAC,,, | Performed by: OPTOMETRIST

## 2023-03-23 PROCEDURE — 92004 COMPRE OPH EXAM NEW PT 1/>: CPT | Mod: S$GLB,,, | Performed by: OPTOMETRIST

## 2023-03-23 PROCEDURE — 3288F PR FALLS RISK ASSESSMENT DOCUMENTED: ICD-10-PCS | Mod: CPTII,S$GLB,, | Performed by: UROLOGY

## 2023-03-23 PROCEDURE — 92134 POSTERIOR SEGMENT OCT RETINA (OCULAR COHERENCE TOMOGRAPHY)-BOTH EYES: ICD-10-PCS | Mod: S$GLB,,, | Performed by: OPTOMETRIST

## 2023-03-23 PROCEDURE — 3288F FALL RISK ASSESSMENT DOCD: CPT | Mod: CPTII,S$GLB,, | Performed by: UROLOGY

## 2023-03-23 PROCEDURE — 1159F PR MEDICATION LIST DOCUMENTED IN MEDICAL RECORD: ICD-10-PCS | Mod: CPTII,S$GLB,, | Performed by: UROLOGY

## 2023-03-23 PROCEDURE — 99999 PR PBB SHADOW E&M-EST. PATIENT-LVL IV: ICD-10-PCS | Mod: PBBFAC,,, | Performed by: UROLOGY

## 2023-03-23 PROCEDURE — 1159F PR MEDICATION LIST DOCUMENTED IN MEDICAL RECORD: ICD-10-PCS | Mod: CPTII,S$GLB,, | Performed by: OPTOMETRIST

## 2023-03-23 PROCEDURE — 1159F MED LIST DOCD IN RCRD: CPT | Mod: CPTII,S$GLB,, | Performed by: UROLOGY

## 2023-03-23 PROCEDURE — 99999 PR PBB SHADOW E&M-EST. PATIENT-LVL IV: CPT | Mod: PBBFAC,,, | Performed by: UROLOGY

## 2023-03-23 NOTE — PROGRESS NOTES
CHIEF COMPLAINT:    Mr. Jseus is a 63 y.o. male presenting with bladder ca.    PRESENTING ILLNESS:    Joseph Jesus is a 63 y.o. male who went [employer].  He had microhematuria and was found to have an erythematous lesion with a positive cytology.  On 4/19/22 he underwent bladder biopsy showing CIS.      He then underwent a TURBT on 6/21/22 showing CIS.  Muscle was present.  On 8/23/22, he underwent re-resection showing CIS.  Muscle was present.  He then underwent an induction course of BGC which he finished 12/22.  Blue light cysto 3/8/23 revealed a papillary lesion which was biopsied.  It returned high grade Ta papilary TCC.  Random bladder biopsies were positive for CIS.    He c/o ED.  This has been present for > 1 year.  He's tried and failed viagra and cialis.  His T is normal.   He has tried and failed 25 units of ICI.    He has LUTS.  + nocturia x 5, + feeling of incomplete emptying, + urgency.     REVIEW OF SYSTEMS:    Joseph Jesus denies headache, blurred vision, fever, nausea, vomiting, chills, abdominal pain, bleeding per rectum, cough, SOB, recent loss of consciousness, recent mental status changes, seizures, dizziness, or upper or lower extremity weakness.    ANDREA  1. 1  2. 1  3. 1  4. 1  5. 1     PATIENT HISTORY:    Past Medical History:   Diagnosis Date    Allergy     seasonal    Anal fistula hx    Arthritis     Basal cell carcinoma 04/2013    left superior forehead    Callus     Diverticulosis     Epididymal cyst     GERD (gastroesophageal reflux disease)     Hayfever     Hydrocele, right     Hyperlipidemia     Hypertension     Neck pain     hx of muscular inury from weight lifting---resolved now    PONV (postoperative nausea and vomiting)     Prostatitis     Dec. '12-treated with antibx.    Squamous cell carcinoma 01/07/2019    anterior scalp    Squamous cell carcinoma of skin 03/2020    Left post scalp (SCC insitu-saucerization)    Visual impairment        Past Surgical History:    Procedure Laterality Date    APPENDECTOMY  1962    BIOPSY OF BLADDER  4/19/2022    Procedure: BIOPSY, BLADDER;  Surgeon: Lenny Ewing MD;  Location: [facility] OR 1ST FLR;  Service: Urology;;    BIOPSY OF BLADDER  3/8/2023    Procedure: BIOPSY, BLADDER;  Surgeon: Lenny Ewing MD;  Location: [facility] OR 1ST FLR;  Service: Urology;;    BLADDER FULGURATION  4/19/2022    Procedure: FULGURATION, BLADDER;  Surgeon: Lenny Ewing MD;  Location: [facility] OR 1ST FLR;  Service: Urology;;    BLADDER FULGURATION  3/8/2023    Procedure: FULGURATION, BLADDER;  Surgeon: Lenny Ewing MD;  Location: [facility] OR 1ST FLR;  Service: Urology;;    COLONOSCOPY  12/2012    due for repeat 12/2015    COLONOSCOPY N/A 5/19/2016    Procedure: COLONOSCOPY;  Surgeon: James Webber MD;  Location: [facility] (4TH FLR);  Service: Endoscopy;  Laterality: N/A; diverticulosis, repeat in 5-10 years for surveillance    COLONOSCOPY N/A 8/9/2021    Procedure: COLONOSCOPY;  Surgeon: Fahad Bautista Jr., MD;  Location: [facility];  Service: Endoscopy;  Laterality: N/A;    CYSTOSCOPY  4/19/2022    Procedure: CYSTOSCOPY;  Surgeon: Lenny Ewing MD;  Location: [facility] OR 1ST FLR;  Service: Urology;;    CYSTOSCOPY  6/21/2022    Procedure: CYSTOSCOPY;  Surgeon: Lenny Ewing MD;  Location: [facility] OR 1ST FLR;  Service: Urology;;    CYSTOSCOPY  8/23/2022    Procedure: CYSTOSCOPY;  Surgeon: Lenny Ewing MD;  Location: [facility] OR 1ST FLR;  Service: Urology;;    CYSTOSCOPY  3/8/2023    Procedure: CYSTOSCOPY-BLUE LIGHT;  Surgeon: Lenny Ewing MD;  Location: [facility] OR 1ST FLR;  Service: Urology;;    ESOPHAGOGASTRODUODENOSCOPY N/A 8/9/2021    Procedure: EGD (ESOPHAGOGASTRODUODENOSCOPY);  Surgeon: Fahad Bautista Jr., MD;  Location: [facility] ENDO;  Service: Endoscopy;  Laterality: N/A;    EUA/Fistulotomy-'08      HERNIA REPAIR      RIH with mesh    Hydrocelectomy  2013    MASS EXCISION  2004    BCC removal (twice)    MOLE REMOVAL  02/25/2019    2 moles removed from  scalp =- 1 was basal cell and 1 was squamous cell - dermatology - Dr. londono    right Spermatocelectomy  2013    UPPER GASTROINTESTINAL ENDOSCOPY  prior to     hiatal hernia, reflux esophagitis       Family History   Problem Relation Age of Onset    Skin cancer Mother     Hypertension Mother     Skin cancer Father     Cancer Father         prostate cancer    Hypertension Father     Hypertension Maternal Grandmother     Hypertension Maternal Grandfather     Hypertension Paternal Grandmother     Anesthesia problems Paternal Grandmother     Cancer Paternal Grandfather         prostate cancer    Hypertension Paternal Grandfather     Heart disease Paternal Grandfather     Diabetes Neg Hx     Colon polyps Neg Hx     Colon cancer Neg Hx     Melanoma Neg Hx     Psoriasis Neg Hx     Lupus Neg Hx     Eczema Neg Hx     Acne Neg Hx     Cirrhosis Neg Hx     Prostate cancer Neg Hx     Kidney disease Neg Hx     Crohn's disease Neg Hx     Ulcerative colitis Neg Hx     Stomach cancer Neg Hx     Esophageal cancer Neg Hx        Social History     Socioeconomic History    Marital status:      Spouse name: Traci    Number of children: 1   Occupational History    Occupation: [employer]     Employer: [employer]     Comment: [employer]   Tobacco Use    Smoking status: Former     Packs/day: 1.00     Years: 10.00     Pack years: 10.00     Types: Cigarettes     Quit date: 1987     Years since quittin.2    Smokeless tobacco: Never    Tobacco comments:     Decreased lung compassity per patient   Substance and Sexual Activity    Alcohol use: Not Currently     Alcohol/week: 0.0 - 1.0 standard drinks     Comment: No longer drinks ETOH    Drug use: No    Sexual activity: Yes     Partners: Female       Allergies:  Amlodipine, Atenolol, Catechu herb, Irbesartan, Erythromycin, Sumycin [tetracycline], and Tetracyclines    Medications:    Current Outpatient Medications:     albuterol (PROVENTIL/VENTOLIN HFA) 90 mcg/actuation  inhaler, Inhale 2 puffs into the lungs every 6 (six) hours as needed for Wheezing (cough)., Disp: 18 g, Rfl: 5    atorvastatin (LIPITOR) 10 MG tablet, Take 1 tablet (10 mg total) by mouth once daily., Disp: 90 tablet, Rfl: 0    cetirizine (ZYRTEC) 10 MG tablet, Take 10 mg by mouth Daily., Disp: , Rfl:     coenzyme Q10 100 mg capsule, Take 200 mg by mouth once daily., Disp: , Rfl:     fluticasone propionate (FLONASE) 50 mcg/actuation nasal spray, 2 sprays (100 mcg total) by Each Nostril route daily as needed for Rhinitis., Disp: 16 mL, Rfl: 5    hydroCHLOROthiazide (MICROZIDE) 12.5 mg capsule, Take 25 mg by mouth once daily., Disp: , Rfl:     Lactobacillus acidophilus (PROBIOTIC ORAL), Take by mouth., Disp: , Rfl:     NIFEdipine (ADALAT CC) 30 MG TbSR, Take 1 tablet (30 mg total) by mouth once daily., Disp: 90 tablet, Rfl: 3    pomegranate xt/pomegranat seed (POMEGRAN FRUIT XT-POMEGRA SEED ORAL), Take by mouth., Disp: , Rfl:     promethazine-dextromethorphan (PROMETHAZINE-DM) 6.25-15 mg/5 mL Syrp, TAKE 5 MLS BY MOUTH EVERY 6 HOURS AS NEEDED FOR CONGESTION AND COUGH., Disp: , Rfl:     oxybutynin (DITROPAN) 5 MG Tab, Take 1 tablet (5 mg total) by mouth 3 (three) times daily as needed (bladder spasms)., Disp: 30 tablet, Rfl: 0    PHYSICAL EXAMINATION:    The patient generally appears in good health, is appropriately interactive, and is in no apparent distress.     Eyes: anicteric sclerae, moist conjunctivae; no lid-lag; PERRLA     HENT: Atraumatic; oropharynx clear with moist mucous membranes and no mucosal ulcerations;normal hard and soft palate.  No evidence of lymphadenopathy.    Neck: Trachea midline.  No thyromegaly.    Musculoskeletal: No abnormal gait.    Skin: No lesions.    Mental: Cooperative with normal affect.  Is oriented to time, place, and person.    Neuro: Grossly intact.    Chest: Normal inspiratory effort.   No accessory muscles.  No audible wheezes.  Respirations symmetric on inspiration and  expiration.    Heart: Regular rhythm.      Abdomen:  Soft, non-tender. No masses or organomegaly. Bladder is not palpable. No evidence of flank discomfort. No evidence of inguinal hernia.    Genitourinary: The penis is circumcised with no evidence of plaques or induration. The urethral meatus is normal. The testes, epididymides, and cord structures are normal in size and contour bilaterally. The scrotum is normal in size and contour.    Normal anal sphincter tone. No rectal mass.    The prostate is 30 g. Normal landmarks. Lateral sulci. Median furrow intact.  No nodularity or induration. Seminal vesicles are normal.    Extremities: No clubbing, cyanosis, or edema      LABS:    UA dipped negative today  Lab Results   Component Value Date    PSA 0.30 01/28/2022    PSA 0.22 07/29/2020    PSA 0.29 05/30/2019    PSADIAG 0.37 09/08/2014       IMPRESSION:    Encounter Diagnoses   Name Primary?    Malignant neoplasm of lateral wall of urinary bladder Yes   HTN, stable  Hyperlipidemia, stable      PLAN:    1. Discussed options for his ED (affected by above comorbidities).  He'd like to continue with ICI.  Can titrate up 5 units at a time.  .  2. Will observe his LUTS as they don't bother him.  3. Went over his pathology demonstrating failed BCG.  Discussed repeating his TURBT and a re-induction course of BCG vs cystectomy.  Will consult Dr. Cervantes.       Copy to:

## 2023-03-23 NOTE — PROGRESS NOTES
CHIEF COMPLAINT:    Mr. Jesus is a 63 y.o. male presenting with bladder ca..    PRESENTING ILLNESS:    Joseph Jesus is a 63 y.o. male s/p failed induction BCG and recent TURBT 3/8/23 positive for HG Ta.  He had microhematuria and was found to have an erythematous lesion with a positive cytology. On 4/19/22 he underwent bladder biopsy showing CIS. He then underwent a TURBT on 6/21/22 showing CIS.  Muscle was present. Another TURBT was performed 8/23/22 showing high grade CIS.  He underwent induction BCG and finished in Dec 2022.        REVIEW OF SYSTEMS:    Joseph Jesus denies headache, blurred vision, fever, nausea, vomiting, chills, abdominal pain, chest pain, sore throat, bleeding per rectum, cough, SOB, recent loss of consciousness, recent mental status changes, seizures, dizziness, or upper or lower extremity weakness.    PATIENT HISTORY:    Past Medical History:   Diagnosis Date    Allergy     seasonal    Anal fistula hx    Arthritis     Basal cell carcinoma 04/2013    left superior forehead    Callus     Diverticulosis     Epididymal cyst     GERD (gastroesophageal reflux disease)     Hayfever     Hydrocele, right     Hyperlipidemia     Hypertension     Neck pain     hx of muscular inury from weight lifting---resolved now    PONV (postoperative nausea and vomiting)     Prostatitis     Dec. '12-treated with antibx.    Squamous cell carcinoma 01/07/2019    anterior scalp    Squamous cell carcinoma of skin 03/2020    Left post scalp (SCC insitu-saucerization)    Visual impairment        Past Surgical History:   Procedure Laterality Date    APPENDECTOMY  1962    BIOPSY OF BLADDER  4/19/2022    Procedure: BIOPSY, BLADDER;  Surgeon: Lenny Ewing MD;  Location: [facility] OR [address] [place] [address];  Service: Urology;;    BIOPSY OF BLADDER  3/8/2023    Procedure: BIOPSY, BLADDER;  Surgeon: Lenny Ewing MD;  Location: [facility] OR [address] [place] [address];  Service: Urology;;    BLADDER FULGURATION  4/19/2022    Procedure: FULGURATION, BLADDER;  Surgeon:  Lenny Ewing MD;  Location: [facility] OR 1ST FLR;  Service: Urology;;    BLADDER FULGURATION  3/8/2023    Procedure: FULGURATION, BLADDER;  Surgeon: Lenny Ewing MD;  Location: [facility] OR 1ST FLR;  Service: Urology;;    COLONOSCOPY  12/2012    due for repeat 12/2015    COLONOSCOPY N/A 5/19/2016    Procedure: COLONOSCOPY;  Surgeon: James Webber MD;  Location: [facility] (4TH FLR);  Service: Endoscopy;  Laterality: N/A; diverticulosis, repeat in 5-10 years for surveillance    COLONOSCOPY N/A 8/9/2021    Procedure: COLONOSCOPY;  Surgeon: Fahad Bautista Jr., MD;  Location: [facility];  Service: Endoscopy;  Laterality: N/A;    CYSTOSCOPY  4/19/2022    Procedure: CYSTOSCOPY;  Surgeon: Lenny Ewing MD;  [facility];  Service: Urology;;    CYSTOSCOPY  6/21/2022    Procedure: CYSTOSCOPY;  Surgeon: Lenny Ewing MD;  Location: [facility] OR 1ST FLR;  Service: Urology;;    CYSTOSCOPY  8/23/2022    Procedure: CYSTOSCOPY;  Surgeon: Lenny Ewing MD;  [facility];  Service: Urology;;    CYSTOSCOPY  3/8/2023    Procedure: CYSTOSCOPY-BLUE LIGHT;  Surgeon: Lenny Ewing MD;  Location: [facility] OR 1ST FLR;  Service: Urology;;    ESOPHAGOGASTRODUODENOSCOPY N/A 8/9/2021    Procedure: EGD (ESOPHAGOGASTRODUODENOSCOPY);  Surgeon: Fahad Bautista Jr., MD;  Location: [facility];  Service: Endoscopy;  Laterality: N/A;    EUA/Fistulotomy-'08      HERNIA REPAIR      RIH with mesh    Hydrocelectomy  2013    MASS EXCISION  2004    BCC removal (twice)    MOLE REMOVAL  02/25/2019    2 moles removed from scalp =- 1 was basal cell and 1 was squamous cell - dermatology - Dr. londono    right Spermatocelectomy  2013    UPPER GASTROINTESTINAL ENDOSCOPY  prior to 2010    hiatal hernia, reflux esophagitis       Family History   Problem Relation Age of Onset    Skin cancer Mother     Hypertension Mother     Skin cancer Father     Cancer Father         prostate cancer    Hypertension Father     Hypertension Maternal  Grandmother     Hypertension Maternal Grandfather     Hypertension Paternal Grandmother     Anesthesia problems Paternal Grandmother     Cancer Paternal Grandfather         prostate cancer    Hypertension Paternal Grandfather     Heart disease Paternal Grandfather     Diabetes Neg Hx     Colon polyps Neg Hx     Colon cancer Neg Hx     Melanoma Neg Hx     Psoriasis Neg Hx     Lupus Neg Hx     Eczema Neg Hx     Acne Neg Hx     Cirrhosis Neg Hx     Prostate cancer Neg Hx     Kidney disease Neg Hx     Crohn's disease Neg Hx     Ulcerative colitis Neg Hx     Stomach cancer Neg Hx     Esophageal cancer Neg Hx        Social History     Socioeconomic History    Marital status:      Spouse name: Traci    Number of children: 1   Occupational History    Occupation: [employer].     Employer: [employer]     Comment: [employer]   Tobacco Use    Smoking status: Former     Packs/day: 1.00     Years: 10.00     Pack years: 10.00     Types: Cigarettes     Quit date: 1987     Years since quittin.2    Smokeless tobacco: Never    Tobacco comments:     Decreased lung compassity per patient   Substance and Sexual Activity    Alcohol use: Not Currently     Alcohol/week: 0.0 - 1.0 standard drinks     Comment: No longer drinks ETOH    Drug use: No    Sexual activity: Yes     Partners: Female       Allergies:  Amlodipine, Atenolol, Catechu herb, Irbesartan, Erythromycin, Sumycin [tetracycline], and Tetracyclines    Medications:    Current Outpatient Medications:     albuterol (PROVENTIL/VENTOLIN HFA) 90 mcg/actuation inhaler, Inhale 2 puffs into the lungs every 6 (six) hours as needed for Wheezing (cough)., Disp: 18 g, Rfl: 5    atorvastatin (LIPITOR) 10 MG tablet, Take 1 tablet (10 mg total) by mouth once daily., Disp: 90 tablet, Rfl: 0    cetirizine (ZYRTEC) 10 MG tablet, Take 10 mg by mouth Daily., Disp: , Rfl:     coenzyme Q10 100 mg capsule, Take 200 mg by mouth once daily., Disp: , Rfl:     fluticasone propionate (FLONASE)  50 mcg/actuation nasal spray, 2 sprays (100 mcg total) by Each Nostril route daily as needed for Rhinitis., Disp: 16 mL, Rfl: 5    hydroCHLOROthiazide (MICROZIDE) 12.5 mg capsule, Take 25 mg by mouth once daily., Disp: , Rfl:     Lactobacillus acidophilus (PROBIOTIC ORAL), Take by mouth., Disp: , Rfl:     NIFEdipine (ADALAT CC) 30 MG TbSR, Take 1 tablet (30 mg total) by mouth once daily., Disp: 90 tablet, Rfl: 3    pomegranate xt/pomegranat seed (POMEGRAN FRUIT XT-POMEGRA SEED ORAL), Take by mouth., Disp: , Rfl:     promethazine-dextromethorphan (PROMETHAZINE-DM) 6.25-15 mg/5 mL Syrp, TAKE 5 MLS BY MOUTH EVERY 6 HOURS AS NEEDED FOR CONGESTION AND COUGH., Disp: , Rfl:     oxybutynin (DITROPAN) 5 MG Tab, Take 1 tablet (5 mg total) by mouth 3 (three) times daily as needed (bladder spasms)., Disp: 30 tablet, Rfl: 0    PHYSICAL EXAMINATION:    The patient generally appears in good health, is appropriately interactive, and is in no apparent distress.     Eyes: anicteric sclerae, moist conjunctivae; no lid-lag; PERRLA     HENT: Atraumatic; oropharynx clear with moist mucous membranes and no mucosal ulcerations;normal hard and soft palate.  No evidence of lymphadenopathy.    Neck: Trachea midline.  No thyromegaly.    Skin: No lesions.    Mental: Cooperative with normal affect.  Is oriented to time, place, and person.    Neuro: Grossly intact.    Chest: Normal inspiratory effort.   No accessory muscles.  No audible wheezes.  Respirations symmetric on inspiration and expiration.    Heart: Regular rhythm.      Abdomen:  Soft, non-tender. No masses or organomegaly. Bladder is not palpable. No evidence of flank discomfort. No evidence of inguinal hernia.    Genitourinary: The penis *** circumcised with no evidence of plaques or induration. The urethral meatus is normal. The testes, epididymides, and cord structures are normal in size and contour bilaterally. The scrotum is normal in size and contour.    Normal anal sphincter  tone. No rectal mass.    The prostate is *** g. Normal landmarks. Lateral sulci. Median furrow intact.  No nodularity or induration. Seminal vesicles are normal.    Extremities: No clubbing, cyanosis, or edema      LABS:    ***  Lab Results   Component Value Date    PSA 0.30 01/28/2022    PSA 0.22 07/29/2020    PSA 0.29 05/30/2019    PSADIAG 0.37 09/08/2014       IMPRESSION:    No diagnosis found.      PLAN:    1. ***    Copy to: ***

## 2023-03-23 NOTE — PROGRESS NOTES
"HPI     Blurred Vision     Additional comments: NP Routine           Comments    Has been seeing Dr Mishra in [facility] for years, she referred him for   retinal eval due to floaters and large "sunflower shaped" floater OD - has   been about 3 years since last exam - noticing decrease in va at distance -   near va is pretty blurry also - was looking through telescope a couple of   weeks ago, rings of vin looked honeycombed and had never looked that   way before - states that floaters are very bothersome           Last edited by Amparo Mcadams MA on 3/23/2023  3:04 PM.            Assessment /Plan     For exam results, see Encounter Report.    Vitreous floater, bilateral    Epiretinal membrane (ERM), bilateral  -     Posterior Segment OCT Retina-Both eyes    Refractive errors      mOCT shows elevated macula possible macular traction OU vs early ERM OU  Consult Dr Cobb for macula eval OU                       "

## 2023-03-27 ENCOUNTER — OFFICE VISIT (OUTPATIENT)
Dept: UROLOGY | Facility: CLINIC | Age: 63
End: 2023-03-27
Payer: COMMERCIAL

## 2023-03-27 VITALS
HEIGHT: 73 IN | WEIGHT: 230 LBS | BODY MASS INDEX: 30.48 KG/M2 | SYSTOLIC BLOOD PRESSURE: 149 MMHG | DIASTOLIC BLOOD PRESSURE: 103 MMHG | HEART RATE: 71 BPM

## 2023-03-27 DIAGNOSIS — C67.2 MALIGNANT NEOPLASM OF LATERAL WALL OF URINARY BLADDER: ICD-10-CM

## 2023-03-27 PROCEDURE — 3288F PR FALLS RISK ASSESSMENT DOCUMENTED: ICD-10-PCS | Mod: CPTII,S$GLB,, | Performed by: STUDENT IN AN ORGANIZED HEALTH CARE EDUCATION/TRAINING PROGRAM

## 2023-03-27 PROCEDURE — 87086 URINE CULTURE/COLONY COUNT: CPT | Performed by: STUDENT IN AN ORGANIZED HEALTH CARE EDUCATION/TRAINING PROGRAM

## 2023-03-27 PROCEDURE — 99999 PR PBB SHADOW E&M-EST. PATIENT-LVL V: CPT | Mod: PBBFAC,,, | Performed by: STUDENT IN AN ORGANIZED HEALTH CARE EDUCATION/TRAINING PROGRAM

## 2023-03-27 PROCEDURE — 3008F BODY MASS INDEX DOCD: CPT | Mod: CPTII,S$GLB,, | Performed by: STUDENT IN AN ORGANIZED HEALTH CARE EDUCATION/TRAINING PROGRAM

## 2023-03-27 PROCEDURE — 3080F DIAST BP >= 90 MM HG: CPT | Mod: CPTII,S$GLB,, | Performed by: STUDENT IN AN ORGANIZED HEALTH CARE EDUCATION/TRAINING PROGRAM

## 2023-03-27 PROCEDURE — 3288F FALL RISK ASSESSMENT DOCD: CPT | Mod: CPTII,S$GLB,, | Performed by: STUDENT IN AN ORGANIZED HEALTH CARE EDUCATION/TRAINING PROGRAM

## 2023-03-27 PROCEDURE — 1160F PR REVIEW ALL MEDS BY PRESCRIBER/CLIN PHARMACIST DOCUMENTED: ICD-10-PCS | Mod: CPTII,S$GLB,, | Performed by: STUDENT IN AN ORGANIZED HEALTH CARE EDUCATION/TRAINING PROGRAM

## 2023-03-27 PROCEDURE — 99999 PR PBB SHADOW E&M-EST. PATIENT-LVL V: ICD-10-PCS | Mod: PBBFAC,,, | Performed by: STUDENT IN AN ORGANIZED HEALTH CARE EDUCATION/TRAINING PROGRAM

## 2023-03-27 PROCEDURE — 3008F PR BODY MASS INDEX (BMI) DOCUMENTED: ICD-10-PCS | Mod: CPTII,S$GLB,, | Performed by: STUDENT IN AN ORGANIZED HEALTH CARE EDUCATION/TRAINING PROGRAM

## 2023-03-27 PROCEDURE — 99214 OFFICE O/P EST MOD 30 MIN: CPT | Mod: S$GLB,,, | Performed by: STUDENT IN AN ORGANIZED HEALTH CARE EDUCATION/TRAINING PROGRAM

## 2023-03-27 PROCEDURE — 3080F PR MOST RECENT DIASTOLIC BLOOD PRESSURE >= 90 MM HG: ICD-10-PCS | Mod: CPTII,S$GLB,, | Performed by: STUDENT IN AN ORGANIZED HEALTH CARE EDUCATION/TRAINING PROGRAM

## 2023-03-27 PROCEDURE — 1101F PR PT FALLS ASSESS DOC 0-1 FALLS W/OUT INJ PAST YR: ICD-10-PCS | Mod: CPTII,S$GLB,, | Performed by: STUDENT IN AN ORGANIZED HEALTH CARE EDUCATION/TRAINING PROGRAM

## 2023-03-27 PROCEDURE — 1160F RVW MEDS BY RX/DR IN RCRD: CPT | Mod: CPTII,S$GLB,, | Performed by: STUDENT IN AN ORGANIZED HEALTH CARE EDUCATION/TRAINING PROGRAM

## 2023-03-27 PROCEDURE — 99214 PR OFFICE/OUTPT VISIT, EST, LEVL IV, 30-39 MIN: ICD-10-PCS | Mod: S$GLB,,, | Performed by: STUDENT IN AN ORGANIZED HEALTH CARE EDUCATION/TRAINING PROGRAM

## 2023-03-27 PROCEDURE — 3077F SYST BP >= 140 MM HG: CPT | Mod: CPTII,S$GLB,, | Performed by: STUDENT IN AN ORGANIZED HEALTH CARE EDUCATION/TRAINING PROGRAM

## 2023-03-27 PROCEDURE — 1159F MED LIST DOCD IN RCRD: CPT | Mod: CPTII,S$GLB,, | Performed by: STUDENT IN AN ORGANIZED HEALTH CARE EDUCATION/TRAINING PROGRAM

## 2023-03-27 PROCEDURE — 1159F PR MEDICATION LIST DOCUMENTED IN MEDICAL RECORD: ICD-10-PCS | Mod: CPTII,S$GLB,, | Performed by: STUDENT IN AN ORGANIZED HEALTH CARE EDUCATION/TRAINING PROGRAM

## 2023-03-27 PROCEDURE — 1101F PT FALLS ASSESS-DOCD LE1/YR: CPT | Mod: CPTII,S$GLB,, | Performed by: STUDENT IN AN ORGANIZED HEALTH CARE EDUCATION/TRAINING PROGRAM

## 2023-03-27 PROCEDURE — 3077F PR MOST RECENT SYSTOLIC BLOOD PRESSURE >= 140 MM HG: ICD-10-PCS | Mod: CPTII,S$GLB,, | Performed by: STUDENT IN AN ORGANIZED HEALTH CARE EDUCATION/TRAINING PROGRAM

## 2023-03-27 NOTE — PROGRESS NOTES
[facility]  Urologic Oncology Clinic Note        Date of Service: 3/27/2023      Chief Complaint/Reason for Consultation: Non-muscle invasive bladder cancer    Requesting Provider:   Lenny Ewing MD  [address] [place] [address]      History of Present Illness:   Patient 63 y.o. 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 Doing well from most recent TURBT. No blood urine.     Former smoker    Hx of appendectomy when he was 2  Inguinal hernia repair   Family hx of prostate cancer in father and grand father  Family hx of kidney cancer in aunt    Urologic History:     4/1/2022 -- Office Cytoscopy -- erythema right lateral wall  4/5/2022 -- CTUrogram -- no renal mass, no bladder mass, non-obstructing renal stones  4/19/2022 -- Bladder bx -- CIS  6/21/2022 -- TURBT -- CIS, muscle present and uninvolved  8/23/2022 -- re-TURBT -- CIS, muscle present and uninvolved  12/2022 -- completed induction BCG  3/8/2023 -- Blue light TURBT -- HG Ta, random bladder bx -- CIS      Patient Active Problem List    Diagnosis Date Noted    Adjustment disorder with anxious mood 05/17/2022    Numbness of toes 05/17/2022    Stage 3a chronic kidney disease 05/17/2022    Malignant neoplasm of lateral wall of urinary bladder 05/11/2022    Edema leg 03/28/2022    Aortic atherosclerosis 01/28/2022    Coronary artery calcification 01/28/2022    Gastroesophageal reflux disease 08/09/2021    Hiatal hernia with gastroesophageal reflux disease without esophagitis 05/26/2021    AK (actinic keratosis) 11/02/2020    Erectile dysfunction due to arterial insufficiency 07/02/2018    Diverticulitis of large intestine without perforation or abscess without bleeding 04/18/2017    Hypertension, essential 08/17/2016    Hyperlipidemia 08/17/2016    NAFLD (nonalcoholic fatty liver disease) 08/17/2016    Lateral epicondylitis of right elbow 08/17/2016    BPH with urinary  obstruction 09/08/2014    Colon polyps 09/27/2012          Review of patient's allergies indicates:   Allergen Reactions    Amlodipine      edema    Atenolol      Depression - circa 2000    Catechu herb     Irbesartan Other (See Comments)     Possibly caused cough    Erythromycin Rash    Sumycin [tetracycline] Rash    Tetracyclines Rash        Past Medical History:   Diagnosis Date    Allergy     seasonal    Anal fistula hx    Arthritis     Basal cell carcinoma 04/2013    left superior forehead    Callus     Diverticulosis     Epididymal cyst     GERD (gastroesophageal reflux disease)     Hayfever     Hydrocele, right     Hyperlipidemia     Hypertension     Neck pain     hx of muscular inury from weight lifting---resolved now    PONV (postoperative nausea and vomiting)     Prostatitis     Dec. '12-treated with antibx.    Squamous cell carcinoma 01/07/2019    anterior scalp    Squamous cell carcinoma of skin 03/2020    Left post scalp (SCC insitu-saucerization)    Visual impairment       Past Surgical History:   Procedure Laterality Date    APPENDECTOMY  1962    BIOPSY OF BLADDER  4/19/2022    Procedure: BIOPSY, BLADDER;  Surgeon: Lenny Ewing MD;  [facility];  Service: Urology;;    BIOPSY OF BLADDER  3/8/2023    Procedure: BIOPSY, BLADDER;  Surgeon: Lenny Ewing MD;  Location: [facility] OR 1ST FLR;  Service: Urology;;    BLADDER FULGURATION  4/19/2022    Procedure: FULGURATION, BLADDER;  Surgeon: Lenny Ewing MD;  Location: [facility] OR 1ST FLR;  Service: Urology;;    BLADDER FULGURATION  3/8/2023    Procedure: FULGURATION, BLADDER;  Surgeon: Lenny Ewing MD;  [facility];  Service: Urology;;    COLONOSCOPY  12/2012    due for repeat 12/2015    COLONOSCOPY N/A 5/19/2016    Procedure: COLONOSCOPY;  Surgeon: James Webber MD;  Location: [facility] (4TH FLR);  Service: Endoscopy;  Laterality: N/A; diverticulosis, repeat in 5-10 years for surveillance    COLONOSCOPY N/A 8/9/2021     Procedure: COLONOSCOPY;  Surgeon: Fahad Bautista Jr., MD;  Location: [facility] ENDO;  Service: Endoscopy;  Laterality: N/A;    CYSTOSCOPY  4/19/2022    Procedure: CYSTOSCOPY;  Surgeon: Lenny Ewing MD;  Location: [facility] OR [facility] FLR;  Service: Urology;;    CYSTOSCOPY  6/21/2022    Procedure: CYSTOSCOPY;  Surgeon: Lenny Ewing MD;  Location: [facility] OR [facility] FLR;  Service: Urology;;    CYSTOSCOPY  8/23/2022    Procedure: CYSTOSCOPY;  Surgeon: Lenny Ewing MD;  Location: [facility] OR [facility] FLR;  Service: Urology;;    CYSTOSCOPY  3/8/2023    Procedure: CYSTOSCOPY-BLUE LIGHT;  Surgeon: Lenny Ewing MD;  [facility];  Service: Urology;;    ESOPHAGOGASTRODUODENOSCOPY N/A 8/9/2021    Procedure: EGD (ESOPHAGOGASTRODUODENOSCOPY);  Surgeon: Fahad Bautista Jr., MD;  Location: [facility];  Service: Endoscopy;  Laterality: N/A;    EUA/Fistulotomy-'08      HERNIA REPAIR      [facility] with mesh    Hydrocelectomy  2013    MASS EXCISION  2004    BCC removal (twice)    MOLE REMOVAL  02/25/2019    2 moles removed from scalp =- 1 was basal cell and 1 was squamous cell - dermatology - Dr. londono    right Spermatocelectomy  2013    UPPER GASTROINTESTINAL ENDOSCOPY  prior to 2010    hiatal hernia, reflux esophagitis      Family History   Problem Relation Age of Onset    Skin cancer Mother     Hypertension Mother     Skin cancer Father     Cancer Father         prostate cancer    Hypertension Father     Hypertension Maternal Grandmother     Hypertension Maternal Grandfather     Hypertension Paternal Grandmother     Anesthesia problems Paternal Grandmother     Cancer Paternal Grandfather         prostate cancer    Hypertension Paternal Grandfather     Heart disease Paternal Grandfather     Diabetes Neg Hx     Colon polyps Neg Hx     Colon cancer Neg Hx     Melanoma Neg Hx     Psoriasis Neg Hx     Lupus Neg Hx     Eczema Neg Hx     Acne Neg Hx     Cirrhosis Neg Hx     Prostate cancer Neg Hx     Kidney disease Neg Hx     Crohn's  "disease Neg Hx     Ulcerative colitis Neg Hx     Stomach cancer Neg Hx     Esophageal cancer Neg Hx       Social History     Tobacco Use    Smoking status: Former     Packs/day: 1.00     Years: 10.00     Pack years: 10.00     Types: Cigarettes     Quit date: 1987     Years since quittin.3    Smokeless tobacco: Never    Tobacco comments:     Decreased lung compassity per patient   Substance Use Topics    Alcohol use: Not Currently     Alcohol/week: 0.0 - 1.0 standard drinks     Comment: No longer drinks ETOH        Review of Systems   Constitutional:  Negative for activity change, fatigue and fever.   Respiratory:  Negative for cough.    Cardiovascular:  Negative for chest pain.   Gastrointestinal:  Negative for abdominal pain.   Genitourinary:  Negative for difficulty urinating, dysuria, flank pain and hematuria.           OBJECTIVE:     Vitals:    23 0908   BP: (!) 149/103   BP Location: Left arm   Patient Position: Sitting   BP Method: Large (Automatic)   Pulse: 71   Weight: 104.3 kg (230 lb)   Height: 6' 1" (1.854 m)          General Appearance: Alert, cooperative, no distress  Head: Normocephalic  Eyes: Clear conjunctiva  Neck: No obvious LND or JVD  Lungs: Normal chest excursion, no accessory muscle use  Chest: Regular rate rhythm by palpation, no pedal edema  Abdomen: Soft, non-tender, non-distended  Extremities: Atraumatic   Lymph Nodes: No appreciable lymph adenopathy  Neurologic: No gross gait, motor or sensory deficits            LAB:      CMP  Sodium   Date Value Ref Range Status   2023 137 136 - 145 mmol/L Final     Potassium   Date Value Ref Range Status   2023 3.6 3.5 - 5.1 mmol/L Final     Chloride   Date Value Ref Range Status   2023 100 95 - 110 mmol/L Final     CO2   Date Value Ref Range Status   2023 31 (H) 23 - 29 mmol/L Final     Glucose   Date Value Ref Range Status   2023 97 70 - 110 mg/dL Final     BUN   Date Value Ref Range Status   2023 " 26 (H) 8 - 23 mg/dL Final     Creatinine   Date Value Ref Range Status   2023 1.4 0.5 - 1.4 mg/dL Final     Calcium   Date Value Ref Range Status   2023 10.1 8.7 - 10.5 mg/dL Final     Total Protein   Date Value Ref Range Status   2023 7.4 6.0 - 8.4 g/dL Final     Albumin   Date Value Ref Range Status   2023 4.3 3.5 - 5.2 g/dL Final   2018 4.3 3.6 - 5.1 g/dL Final     Comment:     @ Test Performed By:  [facility]  Alexandru White M.D., Ph.D.,   [address] [place] [id number]  [facility]  [id number]       Total Bilirubin   Date Value Ref Range Status   2023 0.4 0.1 - 1.0 mg/dL Final     Comment:     For infants and newborns, interpretation of results should be based  on gestational age, weight and in agreement with clinical  observations.    Premature Infant recommended reference ranges:  Up to 24 hours.............<8.0 mg/dL  Up to 48 hours............<12.0 mg/dL  3-5 days..................<15.0 mg/dL  6-29 days.................<15.0 mg/dL       Alkaline Phosphatase   Date Value Ref Range Status   2023 67 55 - 135 U/L Final     AST   Date Value Ref Range Status   2023 64 (H) 10 - 40 U/L Final     ALT   Date Value Ref Range Status   2023 60 (H) 10 - 44 U/L Final     Anion Gap   Date Value Ref Range Status   2023 6 (L) 8 - 16 mmol/L Final     eGFR   Date Value Ref Range Status   2023 56.1 (A) >60 mL/min/1.73 m^2 Final     Lab Results   Component Value Date    WBC 7.31 2023    HGB 13.7 (L) 2023    HCT 42.6 2023    MCV 94 2023     2023             IMAGIN/5/2022  CT UROGRAM ABD PELVIS W WO     CLINICAL HISTORY:  Hematuria; Other microscopic hematuria     TECHNIQUE:  Both before and following the intravenous administration of 125 cc of Omnipaque 350 contrast material, 3.75-mm contiguous axial images were acquired through the abdomen and pelvis utilizing the CT  urography protocol.  10 minute delayed images through the abdomen and pelvis were also acquired during the renal excretion phase.  Coronal 2-D reconstructed images of the abdomen and pelvis were generated from the source data.     COMPARISON:  CT abdomen and pelvis-05/07/2020     FINDINGS:  Kidneys, ureters, and bladder: There are multiple bilateral simple cysts present in the both kidneys, the largest on the right measuring 62 mm in the upper pole and the largest in the left measuring 54 mm in the left lower lobe.  There are multiple tiny punctate non-obstructing stones present in both kidneys.  No enhancing renal masses.  No hydronephrosis.  The ureters are normal in caliber and course without evidence of stones.  The urinary bladder is unremarkable.     Lung bases and mediastinum: There is a small hiatal hernia present at the gastroesophageal junction.  The lung bases are clear..     Remaining soft tissues of the abdomen and pelvis: There are multiple circumscribed simple hepatic cysts present, the largest of which measures 29 mm in the right hepatic lobe on series 4, image 30.  No enhancing hepatic mass.  The portal vein is patent.  The hepatic veins are patent.  The gallbladder is unremarkable.  No intra or extrahepatic biliary dilatation.  The spleen, duodenal C-loop, pancreas, and adrenal glands are unremarkable.  No abdominal aortic aneurysm.  There is atherosclerotic calcification present within the abdominal aorta.  No bulky periaortic or retroperitoneal lymphadenopathy.     The appendix is not visualized.  No inflammatory changes noted in the right lower quadrant of the abdomen adjacent to the cecum.  There is scattered diverticulosis coli present throughout the colon, most pronounced within the descending and sigmoid colon.  No imaging evidence of acute diverticulitis.  No high-grade bowel obstruction.  There is a large volume of stool present within the colon.  Please correlate clinically for symptoms  of constipation.  No ascites, pneumoperitoneum, peritoneal soft tissue mass, mesenteric lymphadenopathy, or intra-abdominal abscess.     Bones: No evidence of acute lumbar compression fracture. There is a levoconvex curvature of the lower lumbar spine and multilevel degenerative change of the lumbar spine in the form of marginal osteophyte formation and disc space narrowing.     Impression:     1. No interval detrimental change when compared to the prior study.  Bilateral non-obstructing nephrolithiasis.  No solid enhancing renal mass or enhancing bladder mass.  2. Hepatic cysts and renal cysts  3. Diverticulosis coli.  4. Hiatal hernia.  5. Prominent stool in the rectum.  Please correlate for symptoms of constipation.        Electronically signed by: Brayan Hoyos MD  Date:                                            04/05/2022  Time:                                           13:55        ASSESSMENT/PLAN:     64 yo M hx of BCG refractory CIS and HG Ta here to discuss management options.    Plan:    Today we discussed the incidence, risk factors, and natural history of bladder cancer, including in general the management options such as TURBT and radical cystectomy, and the use of intravesical therapies to avoid recurrence of cancer and progression.     Specifically, we spoke about non-muscle invasive bladder cancer and that the natural history of this cancer is to recur when low grade and progress when high grade. We also discussed that higher stages portend higher risk of recurrence and progression.     In his case, we specifically spoke about his diagnsosi of CIS and its refractory nature to BCG therapy. We spoke about bladder sparing management strategies which would employ more intravesical therapy vs early radical cystectomy.     To this end I have advised him that we should schedule him for repeat TURBT to better inform his stage and ensure maximal resection. Following TURBT, we advised him to consider  gemcitabine + docetaxel vs clinical trail for his BCG refractory disease.    Patient understood all counseling well. He is in agreement with the plan. He  was allowed to ask questions and all were       The total time for the established patient visit was at least 30 minutes in both face-to-face and non-face-to-face activities, which included chart review, interpretation of results, counseling, education, ordering meds/tests/procedures, and/or coordination of care.         Benjamin Haile MD  Urologic Oncology  P: [phone]

## 2023-03-27 NOTE — H&P (VIEW-ONLY)
[facility]  Urologic Oncology Clinic Note        Date of Service: 3/27/2023      Chief Complaint/Reason for Consultation: Non-muscle invasive bladder cancer    Requesting Provider:   Lenny Ewing MD  [address] [place] [address]      History of Present Illness:   Patient 63 y.o. 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 Doing well from most recent TURBT. No blood urine.     Former smoker    Hx of appendectomy when he was 2  Inguinal hernia repair   Family hx of prostate cancer in father and grand father  Family hx of kidney cancer in aunt    Urologic History:     4/1/2022 -- Office Cytoscopy -- erythema right lateral wall  4/5/2022 -- CTUrogram -- no renal mass, no bladder mass, non-obstructing renal stones  4/19/2022 -- Bladder bx -- CIS  6/21/2022 -- TURBT -- CIS, muscle present and uninvolved  8/23/2022 -- re-TURBT -- CIS, muscle present and uninvolved  12/2022 -- completed induction BCG  3/8/2023 -- Blue light TURBT -- HG Ta, random bladder bx -- CIS      Patient Active Problem List    Diagnosis Date Noted    Adjustment disorder with anxious mood 05/17/2022    Numbness of toes 05/17/2022    Stage 3a chronic kidney disease 05/17/2022    Malignant neoplasm of lateral wall of urinary bladder 05/11/2022    Edema leg 03/28/2022    Aortic atherosclerosis 01/28/2022    Coronary artery calcification 01/28/2022    Gastroesophageal reflux disease 08/09/2021    Hiatal hernia with gastroesophageal reflux disease without esophagitis 05/26/2021    AK (actinic keratosis) 11/02/2020    Erectile dysfunction due to arterial insufficiency 07/02/2018    Diverticulitis of large intestine without perforation or abscess without bleeding 04/18/2017    Hypertension, essential 08/17/2016    Hyperlipidemia 08/17/2016    NAFLD (nonalcoholic fatty liver disease) 08/17/2016    Lateral epicondylitis of right elbow 08/17/2016    BPH with urinary  obstruction 09/08/2014    Colon polyps 09/27/2012          Review of patient's allergies indicates:   Allergen Reactions    Amlodipine      edema    Atenolol      Depression - circa 2000    Catechu herb     Irbesartan Other (See Comments)     Possibly caused cough    Erythromycin Rash    Sumycin [tetracycline] Rash    Tetracyclines Rash        Past Medical History:   Diagnosis Date    Allergy     seasonal    Anal fistula hx    Arthritis     Basal cell carcinoma 04/2013    left superior forehead    Callus     Diverticulosis     Epididymal cyst     GERD (gastroesophageal reflux disease)     Hayfever     Hydrocele, right     Hyperlipidemia     Hypertension     Neck pain     hx of muscular inury from weight lifting---resolved now    PONV (postoperative nausea and vomiting)     Prostatitis     Dec. '12-treated with antibx.    Squamous cell carcinoma 01/07/2019    anterior scalp    Squamous cell carcinoma of skin 03/2020    Left post scalp (SCC insitu-saucerization)    Visual impairment       Past Surgical History:   Procedure Laterality Date    APPENDECTOMY  1962    BIOPSY OF BLADDER  4/19/2022    Procedure: BIOPSY, BLADDER;  Surgeon: Lenny Ewing MD;  [facility];  Service: Urology;;    BIOPSY OF BLADDER  3/8/2023    Procedure: BIOPSY, BLADDER;  Surgeon: Lenny Ewing MD;  Location: [facility] OR 1ST FLR;  Service: Urology;;    BLADDER FULGURATION  4/19/2022    Procedure: FULGURATION, BLADDER;  Surgeon: Lenny Ewing MD;  Location: [facility] OR 1ST FLR;  Service: Urology;;    BLADDER FULGURATION  3/8/2023    Procedure: FULGURATION, BLADDER;  Surgeon: Lenny Ewing MD;  [facility];  Service: Urology;;    COLONOSCOPY  12/2012    due for repeat 12/2015    COLONOSCOPY N/A 5/19/2016    Procedure: COLONOSCOPY;  Surgeon: James Webber MD;  Location: [facility] (4TH FLR);  Service: Endoscopy;  Laterality: N/A; diverticulosis, repeat in 5-10 years for surveillance    COLONOSCOPY N/A 8/9/2021     Procedure: COLONOSCOPY;  Surgeon: Fahad Bautista Jr., MD;  Location: [facility] ENDO;  Service: Endoscopy;  Laterality: N/A;    CYSTOSCOPY  4/19/2022    Procedure: CYSTOSCOPY;  Surgeon: Lenny Ewing MD;  [facility]: [facility] F[facility];  Service: Urology;;    CYSTOSCOPY  6/21/2022    Procedure: CYSTOSCOPY;  Surgeon: Lenny Ewing MD;  Location: [facility] FLR;  Service: Urology;;    CYSTOSCOPY  8/23/2022    Procedure: CYSTOSCOPY;  Surgeon: Lenny Ewing MD;  Location: [facility] FLR;  Service: Urology;;    CYSTOSCOPY  3/8/2023    Procedure: CYSTOSCOPY-BLUE LIGHT;  Surgeon: Lenny Ewing MD;  [facility];  Service: Urology;;    ESOPHAGOGASTRODUODENOSCOPY N/A 8/9/2021    Procedure: EGD (ESOPHAGOGASTRODUODENOSCOPY);  Surgeon: Fahad Bautista Jr., MD;  Location: [facility];  Service: Endoscopy;  Laterality: N/A;    EUA/Fistulotomy-'08      HERNIA REPAIR      [facility] with mesh    Hydrocelectomy  2013    MASS EXCISION  2004    BCC removal (twice)    MOLE REMOVAL  02/25/2019    2 moles removed from scalp =- 1 was basal cell and 1 was squamous cell - dermatology - Dr. londono    right Spermatocelectomy  2013    UPPER GASTROINTESTINAL ENDOSCOPY  prior to 2010    hiatal hernia, reflux esophagitis      Family History   Problem Relation Age of Onset    Skin cancer Mother     Hypertension Mother     Skin cancer Father     Cancer Father         prostate cancer    Hypertension Father     Hypertension Maternal Grandmother     Hypertension Maternal Grandfather     Hypertension Paternal Grandmother     Anesthesia problems Paternal Grandmother     Cancer Paternal Grandfather         prostate cancer    Hypertension Paternal Grandfather     Heart disease Paternal Grandfather     Diabetes Neg Hx     Colon polyps Neg Hx     Colon cancer Neg Hx     Melanoma Neg Hx     Psoriasis Neg Hx     Lupus Neg Hx     Eczema Neg Hx     Acne Neg Hx     Cirrhosis Neg Hx     Prostate cancer Neg Hx     Kidney disease Neg Hx     Crohn's  "disease Neg Hx     Ulcerative colitis Neg Hx     Stomach cancer Neg Hx     Esophageal cancer Neg Hx       Social History     Tobacco Use    Smoking status: Former     Packs/day: 1.00     Years: 10.00     Pack years: 10.00     Types: Cigarettes     Quit date: 1987     Years since quittin.3    Smokeless tobacco: Never    Tobacco comments:     Decreased lung compassity per patient   Substance Use Topics    Alcohol use: Not Currently     Alcohol/week: 0.0 - 1.0 standard drinks     Comment: No longer drinks ETOH        Review of Systems   Constitutional:  Negative for activity change, fatigue and fever.   Respiratory:  Negative for cough.    Cardiovascular:  Negative for chest pain.   Gastrointestinal:  Negative for abdominal pain.   Genitourinary:  Negative for difficulty urinating, dysuria, flank pain and hematuria.           OBJECTIVE:     Vitals:    23 0908   BP: (!) 149/103   BP Location: Left arm   Patient Position: Sitting   BP Method: Large (Automatic)   Pulse: 71   Weight: 104.3 kg (230 lb)   Height: 6' 1" (1.854 m)          General Appearance: Alert, cooperative, no distress  Head: Normocephalic  Eyes: Clear conjunctiva  Neck: No obvious LND or JVD  Lungs: Normal chest excursion, no accessory muscle use  Chest: Regular rate rhythm by palpation, no pedal edema  Abdomen: Soft, non-tender, non-distended  Extremities: Atraumatic   Lymph Nodes: No appreciable lymph adenopathy  Neurologic: No gross gait, motor or sensory deficits            LAB:      CMP  Sodium   Date Value Ref Range Status   2023 137 136 - 145 mmol/L Final     Potassium   Date Value Ref Range Status   2023 3.6 3.5 - 5.1 mmol/L Final     Chloride   Date Value Ref Range Status   2023 100 95 - 110 mmol/L Final     CO2   Date Value Ref Range Status   2023 31 (H) 23 - 29 mmol/L Final     Glucose   Date Value Ref Range Status   2023 97 70 - 110 mg/dL Final     BUN   Date Value Ref Range Status   2023 " 26 (H) 8 - 23 mg/dL Final     Creatinine   Date Value Ref Range Status   2023 1.4 0.5 - 1.4 mg/dL Final     Calcium   Date Value Ref Range Status   2023 10.1 8.7 - 10.5 mg/dL Final     Total Protein   Date Value Ref Range Status   2023 7.4 6.0 - 8.4 g/dL Final     Albumin   Date Value Ref Range Status   2023 4.3 3.5 - 5.2 g/dL Final   2018 4.3 3.6 - 5.1 g/dL Final     Comment:     @ Test Performed By:  [facility]  Alexandru White M.D., Ph.D.,   [address] [place] [id number]  [facility]  [id number]       Total Bilirubin   Date Value Ref Range Status   2023 0.4 0.1 - 1.0 mg/dL Final     Comment:     For infants and newborns, interpretation of results should be based  on gestational age, weight and in agreement with clinical  observations.    Premature Infant recommended reference ranges:  Up to 24 hours.............<8.0 mg/dL  Up to 48 hours............<12.0 mg/dL  3-5 days..................<15.0 mg/dL  6-29 days.................<15.0 mg/dL       Alkaline Phosphatase   Date Value Ref Range Status   2023 67 55 - 135 U/L Final     AST   Date Value Ref Range Status   2023 64 (H) 10 - 40 U/L Final     ALT   Date Value Ref Range Status   2023 60 (H) 10 - 44 U/L Final     Anion Gap   Date Value Ref Range Status   2023 6 (L) 8 - 16 mmol/L Final     eGFR   Date Value Ref Range Status   2023 56.1 (A) >60 mL/min/1.73 m^2 Final     Lab Results   Component Value Date    WBC 7.31 2023    HGB 13.7 (L) 2023    HCT 42.6 2023    MCV 94 2023     2023             IMAGIN/5/2022  CT UROGRAM ABD PELVIS W WO     CLINICAL HISTORY:  Hematuria; Other microscopic hematuria     TECHNIQUE:  Both before and following the intravenous administration of 125 cc of Omnipaque 350 contrast material, 3.75-mm contiguous axial images were acquired through the abdomen and pelvis utilizing the CT  urography protocol.  10 minute delayed images through the abdomen and pelvis were also acquired during the renal excretion phase.  Coronal 2-D reconstructed images of the abdomen and pelvis were generated from the source data.     COMPARISON:  CT abdomen and pelvis-05/07/2020     FINDINGS:  Kidneys, ureters, and bladder: There are multiple bilateral simple cysts present in the both kidneys, the largest on the right measuring 62 mm in the upper pole and the largest in the left measuring 54 mm in the left lower lobe.  There are multiple tiny punctate non-obstructing stones present in both kidneys.  No enhancing renal masses.  No hydronephrosis.  The ureters are normal in caliber and course without evidence of stones.  The urinary bladder is unremarkable.     Lung bases and mediastinum: There is a small hiatal hernia present at the gastroesophageal junction.  The lung bases are clear..     Remaining soft tissues of the abdomen and pelvis: There are multiple circumscribed simple hepatic cysts present, the largest of which measures 29 mm in the right hepatic lobe on series 4, image 30.  No enhancing hepatic mass.  The portal vein is patent.  The hepatic veins are patent.  The gallbladder is unremarkable.  No intra or extrahepatic biliary dilatation.  The spleen, duodenal C-loop, pancreas, and adrenal glands are unremarkable.  No abdominal aortic aneurysm.  There is atherosclerotic calcification present within the abdominal aorta.  No bulky periaortic or retroperitoneal lymphadenopathy.     The appendix is not visualized.  No inflammatory changes noted in the right lower quadrant of the abdomen adjacent to the cecum.  There is scattered diverticulosis coli present throughout the colon, most pronounced within the descending and sigmoid colon.  No imaging evidence of acute diverticulitis.  No high-grade bowel obstruction.  There is a large volume of stool present within the colon.  Please correlate clinically for symptoms  of constipation.  No ascites, pneumoperitoneum, peritoneal soft tissue mass, mesenteric lymphadenopathy, or intra-abdominal abscess.     Bones: No evidence of acute lumbar compression fracture. There is a levoconvex curvature of the lower lumbar spine and multilevel degenerative change of the lumbar spine in the form of marginal osteophyte formation and disc space narrowing.     Impression:     1. No interval detrimental change when compared to the prior study.  Bilateral non-obstructing nephrolithiasis.  No solid enhancing renal mass or enhancing bladder mass.  2. Hepatic cysts and renal cysts  3. Diverticulosis coli.  4. Hiatal hernia.  5. Prominent stool in the rectum.  Please correlate for symptoms of constipation.        Electronically signed by: Brayan Hoyos MD  Date:                                            04/05/2022  Time:                                           13:55        ASSESSMENT/PLAN:     64 yo M hx of BCG refractory CIS and HG Ta here to discuss management options.    Plan:    Today we discussed the incidence, risk factors, and natural history of bladder cancer, including in general the management options such as TURBT and radical cystectomy, and the use of intravesical therapies to avoid recurrence of cancer and progression.     Specifically, we spoke about non-muscle invasive bladder cancer and that the natural history of this cancer is to recur when low grade and progress when high grade. We also discussed that higher stages portend higher risk of recurrence and progression.     In his case, we specifically spoke about his diagnsosi of CIS and its refractory nature to BCG therapy. We spoke about bladder sparing management strategies which would employ more intravesical therapy vs early radical cystectomy.     To this end I have advised him that we should schedule him for repeat TURBT to better inform his stage and ensure maximal resection. Following TURBT, we advised him to consider  gemcitabine + docetaxel vs clinical trail for his BCG refractory disease.    Patient understood all counseling well. He is in agreement with the plan. He  was allowed to ask questions and all were       The total time for the established patient visit was at least 30 minutes in both face-to-face and non-face-to-face activities, which included chart review, interpretation of results, counseling, education, ordering meds/tests/procedures, and/or coordination of care.         Benjamin Haile MD  Urologic Oncology  P: [phone]

## 2023-03-28 ENCOUNTER — PATIENT MESSAGE (OUTPATIENT)
Dept: FAMILY MEDICINE | Facility: CLINIC | Age: 63
End: 2023-03-28
Payer: COMMERCIAL

## 2023-03-28 ENCOUNTER — PATIENT MESSAGE (OUTPATIENT)
Dept: INTERNAL MEDICINE | Facility: CLINIC | Age: 63
End: 2023-03-28
Payer: COMMERCIAL

## 2023-03-28 ENCOUNTER — TELEPHONE (OUTPATIENT)
Dept: UROLOGY | Facility: CLINIC | Age: 63
End: 2023-03-28
Payer: COMMERCIAL

## 2023-03-28 LAB — BACTERIA UR CULT: NO GROWTH

## 2023-03-28 RX ORDER — PROMETHAZINE HYDROCHLORIDE AND DEXTROMETHORPHAN HYDROBROMIDE 6.25; 15 MG/5ML; MG/5ML
5 SYRUP ORAL 3 TIMES DAILY PRN
Qty: 118 ML | Refills: 0 | Status: SHIPPED | OUTPATIENT
Start: 2023-03-28 | End: 2023-04-13

## 2023-03-29 ENCOUNTER — TELEPHONE (OUTPATIENT)
Dept: UROLOGY | Facility: CLINIC | Age: 63
End: 2023-03-29
Payer: COMMERCIAL

## 2023-03-29 ENCOUNTER — PATIENT MESSAGE (OUTPATIENT)
Dept: UROLOGY | Facility: CLINIC | Age: 63
End: 2023-03-29
Payer: COMMERCIAL

## 2023-03-29 ENCOUNTER — TELEPHONE (OUTPATIENT)
Dept: INTERNAL MEDICINE | Facility: CLINIC | Age: 63
End: 2023-03-29
Payer: COMMERCIAL

## 2023-03-29 ENCOUNTER — TELEPHONE (OUTPATIENT)
Dept: PREADMISSION TESTING | Facility: HOSPITAL | Age: 63
End: 2023-03-29
Payer: COMMERCIAL

## 2023-03-29 DIAGNOSIS — Z01.818 PREOPERATIVE CLEARANCE: Primary | ICD-10-CM

## 2023-03-29 DIAGNOSIS — Z01.818 PREOPERATIVE TESTING: Primary | ICD-10-CM

## 2023-03-29 DIAGNOSIS — K76.0 NAFLD (NONALCOHOLIC FATTY LIVER DISEASE): ICD-10-CM

## 2023-03-29 NOTE — TELEPHONE ENCOUNTER
Called the patient to confirm surgery with Dr. Haile on 4/5/23. Given an arrival time of 1:30pm and preoperative instructions as follows:      You are to report to the [facility] located in the back of the [facility] on the [place] side of the building right behind the [facility]. You need someone with you to drive you home following your surgery.       Prior to Surgery:  No alcohol 24 hours before and after and no smoking a few days prior. Pt states he does not smoke or drink.      Day Prior to Surgery:  NOTHING TO EAT OR DRINK AFTER MIDNIGHT THE NIGHT PRIOR TO THE SURGERY INCLUDING GUM, CANDY AND MINTS.      Take a shower with Hibiclens or Dial (antibacterial soap) the night before surgery. This can be obtained at most pharmacies.     Day of Surgery:  Take a shower with Hibiclens or Dial (antibacterial soap) the morning of surgery. No lotion, cologne, deodorant, or powder in the morning.      Please follow the instructions from the preop center and your doctor regarding which medications to take prior to surgery. Pt received his instructions today from preop center nurse Azalia. Pt has no questions regarding the med instructions.     Leave all jewelry and valuables at home.           Patient notified that these instructions were also sent to the patient via the portal. Re-reviewed preop medication instructions provided by the preop center. Patient verbalized understanding of instructions and knows to call with any questions/concerns.

## 2023-03-29 NOTE — TELEPHONE ENCOUNTER
----- Message from Azalia Aden RN sent at 3/29/2023 11:55 AM CDT -----  Surgery 4/5. Please schedule PT/INR & APTT. Thanks, HV.

## 2023-03-29 NOTE — TELEPHONE ENCOUNTER
----- Message from Azalia Aden RN sent at 3/29/2023 11:55 AM CDT -----  Good afternoon,    This patient is scheduled to have TURBT and cystoscopy with Dr. Haile on 4/5. Listed under general anesthesia. I am requesting any recommendations prior to procedure.    Thanks,  Azalia Aden RN  [facility] Anesthesia

## 2023-03-29 NOTE — ANESTHESIA PAT ROS NOTE
03/29/2023  Joseph Jesus is a 63 y.o., male.      Pre-op Assessment    I have reviewed the NPO Status.   I have reviewed the Medications.     Review of Systems  Anesthesia Hx:    PONV History of prior surgery of interest to airway management or planning:          Denies Family Hx of Anesthesia complications.   Personal Hx of Anesthesia complications, Post-Operative Nausea/Vomiting, in the past, but not with recent anesthetics / prophylaxis                    Social:  Former Smoker, No Alcohol Use       Hematology/Oncology:  Hematology Normal                     Current/Recent Cancer.  --  Cancer in past history:              surgery   Oncology Comments: Malignant neoplasm of lateral wall of urinary bladder, HX of squamous cell carcinoma and basal cell carcinoma     EENT/Dental:   Current allergy problems          Cardiovascular:  Exercise tolerance: good   Hypertension   CAD       Denies Angina.     hyperlipidemia  Denies HILL.  ECG has been reviewed. Aortic atherosclerosis, coronary artery calcification noted in chart.  Functional Capacity light walking since last surgery  Denies Cardiovascular Symptoms.                              Pulmonary:       Denies Shortness of breath.  Denies Recent URI. (Denies, having some issues with runny nose and cough, denies any fevers)                 Renal/:  Chronic Renal Disease, CKD  BPH Stage 3a CKD, bladder cancer             Hepatic/GI:     GERD Liver Disease, (NAFLD)  Hx colon polyps, diverticulitis, hiatal hernia noted in chart.          Musculoskeletal:  Arthritis   Reports limited ROM of neck moving left and right.             Neurological:    Denies CVA.                                    Endocrine:        Obesity / BMI > 30  Dermatological:  SCC, BCC, AK   Psych:   anxiety                    Anesthesia Assessment: Preoperative EQUATION    Planned Procedure:  Procedure(s) (LRB):  TURBT (TRANSURETHRAL RESECTION OF BLADDER TUMOR) (N/A)  CYSTOSCOPY (N/A)  Requested Anesthesia Type:General  Surgeon: Benjamin Haile MD  Service: Urology  Known or anticipated Date of Surgery:4/5/2023    Surgeon notes: reviewed and seen by Dr. Haile on 3/27 and noted in chart.     Electronic QUestionnaire Assessment completed via nurse interview with patient.        Triage considerations:     The patient has no apparent active cardiac condition (No unstable coronary Syndrome such as severe unstable angina or recent [<1 month] myocardial infarction, decompensated CHF, severe valvular   disease or significant arrhythmia)    Previous anesthesia records:Hx PONV    03/08/2023:  Airway:  Mallampati: II   Mouth Opening: Normal  TM Distance: Normal  Tongue: Normal  Neck ROM: Normal ROM     Final Anesthesia Type: general        Patient location during evaluation: PACU  Patient participation: Yes- Able to Participate  Level of consciousness: awake and alert, awake and oriented  Post-procedure vital signs: reviewed and stable  Pain management: adequate  Airway patency: patent   PONV status at discharge: No PONV  Anesthetic complications: no      Cardiovascular status: blood pressure returned to baseline, stable and hemodynamically stable  Respiratory status: unassisted, spontaneous ventilation and room air  Hydration status: euvolemic  Follow-up not needed.  Intubation     Date/Time: 3/8/2023 9:39 AM  Performed by: Sheryl Gonzalez CRNA  Authorized by: Tee Zuniga MD      Intubation:     Induction:  Intravenous    Intubated:  Postinduction    Mask Ventilation:  Easy mask    Attempts:  1    Attempted By:  CRNA    Difficult Airway Encountered?: No      Complications:  None    Airway Device:  Supraglottic airway/LMA    Airway Device Size:  4.5    Inflation Amount (mL):  5    Secured at:  The lips    Placement Verified By:  Capnometry    Complicating Factors:  None    Findings Post-Intubation:  BS equal  bilateral and atraumatic/condition of teeth unchanged         Electronically signed by Sheryl Gonzalez CRNA at 3/8/2023 10:02 AM  Last ETT noted 08/23/2022:  Intubation     Date/Time: 8/23/2022 8:37 AM  Performed by: Fatimah Rueda CRNA  Authorized by: Castro Britt MD      Intubation:     Induction:  Intravenous    Intubated:  Postinduction    Mask Ventilation:  Easy with oral airway    Attempts:  1    Attempted By:  Student    Method of Intubation:  Video laryngoscopy    Blade:  Mancera 3    Laryngeal View Grade: Grade I - full view of cords      Difficult Airway Encountered?: No      Complications:  None    Airway Device:  Oral endotracheal tube    Airway Device Size:  7.5    Style/Cuff Inflation:  Cuffed (inflated to minimal occlusive pressure)    Tube secured:  22    Secured at:  The lips    Placement Verified By:  Capnometry    Complicating Factors:  None    Findings Post-Intubation:  BS equal bilateral and atraumatic/condition of teeth unchanged         Electronically signed by Fatimah Rueda CRNA at 8/23/2022  8:46 AM  Last PCP note: within 1 month , seen by Nick Trejo MD on 3/7 and noted in chart.   Subspecialty notes: Allergy, Dermatology, Hepatology, Neurology, Urology, Ophthalmology, Podiatry    Other important co-morbidities: GERD, HLD, HTN, Obesity, and CKD stage 3, NAFLD, CAD       Tests already available:  Available tests,  within 1 month . (3/7) Potassium; (3/1) CMP, CBC, EKG              Instructions given. (See in Nurse's note)    Optimization:      Nick Trejo MD Yesterday (8:25 AM)       Surgical clearance done 3/7.     NSIHx.     Clear for surgery and cc to referring physician.                   Plan:    Testing:  PT/INR and PTT        Consultation:Patient's PCP for a statement of optimization      Patient  has previously scheduled Medical Appointment: TBA    Navigation: Tests Ordered.              Consults Complete             Results will be tracked by Preop Clinic.      03/29/2023 Medications reviewed and instructions given to patient over the phone. Need PT/INR and APTT. Messaging PCP for any additional recommendations, clearance provided on 3/7 for previous urology procedure. Azalia Aden RN.    03/31/2023 Labs reviewed by Dr. Chi on 3/31. Azalia Aden RN.

## 2023-03-30 ENCOUNTER — PATIENT MESSAGE (OUTPATIENT)
Dept: INTERNAL MEDICINE | Facility: CLINIC | Age: 63
End: 2023-03-30
Payer: COMMERCIAL

## 2023-03-30 ENCOUNTER — TELEPHONE (OUTPATIENT)
Dept: UROLOGY | Facility: CLINIC | Age: 63
End: 2023-03-30
Payer: COMMERCIAL

## 2023-03-30 ENCOUNTER — LAB VISIT (OUTPATIENT)
Dept: LAB | Facility: HOSPITAL | Age: 63
End: 2023-03-30
Attending: ANESTHESIOLOGY
Payer: COMMERCIAL

## 2023-03-30 DIAGNOSIS — K76.0 NAFLD (NONALCOHOLIC FATTY LIVER DISEASE): ICD-10-CM

## 2023-03-30 DIAGNOSIS — Z01.818 PREOPERATIVE TESTING: ICD-10-CM

## 2023-03-30 LAB
APTT PPP: 28.8 SEC (ref 21–32)
INR PPP: 1 (ref 0.8–1.2)
PROTHROMBIN TIME: 10.5 SEC (ref 9–12.5)

## 2023-03-30 PROCEDURE — 85610 PROTHROMBIN TIME: CPT | Performed by: ANESTHESIOLOGY

## 2023-03-30 PROCEDURE — 85730 THROMBOPLASTIN TIME PARTIAL: CPT | Performed by: ANESTHESIOLOGY

## 2023-03-30 PROCEDURE — 36415 COLL VENOUS BLD VENIPUNCTURE: CPT | Mod: PO | Performed by: ANESTHESIOLOGY

## 2023-03-30 NOTE — TELEPHONE ENCOUNTER
Called and spoke to the pt regarding the pre op appt. The pt stated that he was just cleared for this procedure on 3/7/23 and Dr. Haile is just performing the procedure again. Dr. Haile let him to believe that he did not have to come back into the clinic to have another pre op done. I informed the pt that I would double check with you and let him know.     Please Advise,

## 2023-03-30 NOTE — TELEPHONE ENCOUNTER
Patient will need a preop appointment.  I went ahead and scheduled him for Saturday, April 1st at 11:00 a.m. in the morning.  Please call patient advise him about appointment time and necessity for a preop visit.  Thank you

## 2023-03-30 NOTE — TELEPHONE ENCOUNTER
Called and spoke to the pt. Please advise patient that we will go ahead and just update our last clinic note, he does not have to come in on Saturday.

## 2023-03-31 ENCOUNTER — TELEPHONE (OUTPATIENT)
Dept: UROLOGY | Facility: CLINIC | Age: 63
End: 2023-03-31
Payer: COMMERCIAL

## 2023-03-31 ENCOUNTER — PATIENT MESSAGE (OUTPATIENT)
Dept: INTERNAL MEDICINE | Facility: CLINIC | Age: 63
End: 2023-03-31
Payer: COMMERCIAL

## 2023-04-02 ENCOUNTER — TELEPHONE (OUTPATIENT)
Dept: INTERNAL MEDICINE | Facility: CLINIC | Age: 63
End: 2023-04-02

## 2023-04-04 ENCOUNTER — PATIENT MESSAGE (OUTPATIENT)
Dept: SURGERY | Facility: HOSPITAL | Age: 63
End: 2023-04-04
Payer: COMMERCIAL

## 2023-04-04 ENCOUNTER — ANESTHESIA (OUTPATIENT)
Dept: SURGERY | Facility: HOSPITAL | Age: 63
End: 2023-04-04
Payer: COMMERCIAL

## 2023-04-04 ENCOUNTER — ANESTHESIA EVENT (OUTPATIENT)
Dept: SURGERY | Facility: HOSPITAL | Age: 63
End: 2023-04-04
Payer: COMMERCIAL

## 2023-04-04 ENCOUNTER — HOSPITAL ENCOUNTER (OUTPATIENT)
Facility: HOSPITAL | Age: 63
Discharge: HOME | End: 2023-04-04
Attending: STUDENT IN AN ORGANIZED HEALTH CARE EDUCATION/TRAINING PROGRAM | Admitting: STUDENT IN AN ORGANIZED HEALTH CARE EDUCATION/TRAINING PROGRAM
Payer: COMMERCIAL

## 2023-04-04 VITALS
OXYGEN SATURATION: 98 % | RESPIRATION RATE: 20 BRPM | HEIGHT: 73 IN | TEMPERATURE: 98 F | DIASTOLIC BLOOD PRESSURE: 102 MMHG | BODY MASS INDEX: 30.48 KG/M2 | HEART RATE: 87 BPM | SYSTOLIC BLOOD PRESSURE: 167 MMHG | WEIGHT: 230 LBS

## 2023-04-04 DIAGNOSIS — C67.9 BLADDER CANCER: ICD-10-CM

## 2023-04-04 DIAGNOSIS — C67.9 MALIGNANT NEOPLASM OF URINARY BLADDER, UNSPECIFIED SITE: Primary | ICD-10-CM

## 2023-04-04 PROCEDURE — 71000044 HC DOSC ROUTINE RECOVERY FIRST HOUR: Performed by: STUDENT IN AN ORGANIZED HEALTH CARE EDUCATION/TRAINING PROGRAM

## 2023-04-04 PROCEDURE — D9220A PRA ANESTHESIA: ICD-10-PCS | Mod: CRNA,,, | Performed by: NURSE ANESTHETIST, CERTIFIED REGISTERED

## 2023-04-04 PROCEDURE — 25000003 PHARM REV CODE 250

## 2023-04-04 PROCEDURE — 71000016 HC POSTOP RECOV ADDL HR: Performed by: STUDENT IN AN ORGANIZED HEALTH CARE EDUCATION/TRAINING PROGRAM

## 2023-04-04 PROCEDURE — 88305 TISSUE EXAM BY PATHOLOGIST: CPT | Performed by: PATHOLOGY

## 2023-04-04 PROCEDURE — 27201423 OPTIME MED/SURG SUP & DEVICES STERILE SUPPLY: Performed by: STUDENT IN AN ORGANIZED HEALTH CARE EDUCATION/TRAINING PROGRAM

## 2023-04-04 PROCEDURE — D9220A PRA ANESTHESIA: Mod: CRNA,,, | Performed by: NURSE ANESTHETIST, CERTIFIED REGISTERED

## 2023-04-04 PROCEDURE — 88307 TISSUE EXAM BY PATHOLOGIST: CPT | Mod: 26,,, | Performed by: PATHOLOGY

## 2023-04-04 PROCEDURE — 52235 PR CYSTOURETHROSCOPY,FULGUR 2-5 CM LESN: ICD-10-PCS | Mod: ,,, | Performed by: STUDENT IN AN ORGANIZED HEALTH CARE EDUCATION/TRAINING PROGRAM

## 2023-04-04 PROCEDURE — 88307 TISSUE EXAM BY PATHOLOGIST: CPT | Performed by: PATHOLOGY

## 2023-04-04 PROCEDURE — 25000003 PHARM REV CODE 250: Performed by: NURSE ANESTHETIST, CERTIFIED REGISTERED

## 2023-04-04 PROCEDURE — D9220A PRA ANESTHESIA: ICD-10-PCS | Mod: ANES,,, | Performed by: ANESTHESIOLOGY

## 2023-04-04 PROCEDURE — 63600175 PHARM REV CODE 636 W HCPCS: Performed by: ANESTHESIOLOGY

## 2023-04-04 PROCEDURE — A9589 INSTI HEXAMINOLEVULINATE HCL: HCPCS

## 2023-04-04 PROCEDURE — 52235 CYSTOSCOPY AND TREATMENT: CPT | Mod: ,,, | Performed by: STUDENT IN AN ORGANIZED HEALTH CARE EDUCATION/TRAINING PROGRAM

## 2023-04-04 PROCEDURE — 36000706: Performed by: STUDENT IN AN ORGANIZED HEALTH CARE EDUCATION/TRAINING PROGRAM

## 2023-04-04 PROCEDURE — 88307 PR  SURG PATH,LEVEL V: ICD-10-PCS | Mod: 26,,, | Performed by: PATHOLOGY

## 2023-04-04 PROCEDURE — D9220A PRA ANESTHESIA: Mod: ANES,,, | Performed by: ANESTHESIOLOGY

## 2023-04-04 PROCEDURE — 71000015 HC POSTOP RECOV 1ST HR: Performed by: STUDENT IN AN ORGANIZED HEALTH CARE EDUCATION/TRAINING PROGRAM

## 2023-04-04 PROCEDURE — 63600175 PHARM REV CODE 636 W HCPCS: Performed by: NURSE ANESTHETIST, CERTIFIED REGISTERED

## 2023-04-04 PROCEDURE — 63600175 PHARM REV CODE 636 W HCPCS

## 2023-04-04 PROCEDURE — 36000707: Performed by: STUDENT IN AN ORGANIZED HEALTH CARE EDUCATION/TRAINING PROGRAM

## 2023-04-04 PROCEDURE — 37000009 HC ANESTHESIA EA ADD 15 MINS: Performed by: STUDENT IN AN ORGANIZED HEALTH CARE EDUCATION/TRAINING PROGRAM

## 2023-04-04 PROCEDURE — 37000008 HC ANESTHESIA 1ST 15 MINUTES: Performed by: STUDENT IN AN ORGANIZED HEALTH CARE EDUCATION/TRAINING PROGRAM

## 2023-04-04 RX ORDER — TRAMADOL HYDROCHLORIDE 50 MG/1
50 TABLET ORAL EVERY 6 HOURS PRN
Qty: 5 TABLET | Refills: 0 | Status: SHIPPED | OUTPATIENT
Start: 2023-04-04 | End: 2023-04-13

## 2023-04-04 RX ORDER — OXYCODONE HYDROCHLORIDE 5 MG/1
5 TABLET ORAL
Status: DISCONTINUED | OUTPATIENT
Start: 2023-04-04 | End: 2023-04-06 | Stop reason: HOSPADM

## 2023-04-04 RX ORDER — SODIUM CHLORIDE 0.9 % (FLUSH) 0.9 %
10 SYRINGE (ML) INJECTION
Status: DISCONTINUED | OUTPATIENT
Start: 2023-04-04 | End: 2023-04-06 | Stop reason: HOSPADM

## 2023-04-04 RX ORDER — FENTANYL CITRATE 50 UG/ML
INJECTION, SOLUTION INTRAMUSCULAR; INTRAVENOUS
Status: DISCONTINUED | OUTPATIENT
Start: 2023-04-04 | End: 2023-04-04

## 2023-04-04 RX ORDER — OXYBUTYNIN CHLORIDE 5 MG/1
5 TABLET ORAL 3 TIMES DAILY PRN
Qty: 30 TABLET | Refills: 0 | Status: SHIPPED | OUTPATIENT
Start: 2023-04-04 | End: 2023-09-06

## 2023-04-04 RX ORDER — DEXAMETHASONE SODIUM PHOSPHATE 4 MG/ML
INJECTION, SOLUTION INTRA-ARTICULAR; INTRALESIONAL; INTRAMUSCULAR; INTRAVENOUS; SOFT TISSUE
Status: DISCONTINUED | OUTPATIENT
Start: 2023-04-04 | End: 2023-04-04

## 2023-04-04 RX ORDER — HYDROMORPHONE HYDROCHLORIDE 1 MG/ML
0.2 INJECTION, SOLUTION INTRAMUSCULAR; INTRAVENOUS; SUBCUTANEOUS EVERY 5 MIN PRN
Status: DISCONTINUED | OUTPATIENT
Start: 2023-04-04 | End: 2023-04-06 | Stop reason: HOSPADM

## 2023-04-04 RX ORDER — LIDOCAINE HYDROCHLORIDE 20 MG/ML
INJECTION, SOLUTION EPIDURAL; INFILTRATION; INTRACAUDAL; PERINEURAL
Status: DISCONTINUED | OUTPATIENT
Start: 2023-04-04 | End: 2023-04-04

## 2023-04-04 RX ORDER — PROPOFOL 10 MG/ML
VIAL (ML) INTRAVENOUS
Status: DISCONTINUED | OUTPATIENT
Start: 2023-04-04 | End: 2023-04-04

## 2023-04-04 RX ORDER — VASOPRESSIN 20 [USP'U]/ML
INJECTION, SOLUTION INTRAMUSCULAR; SUBCUTANEOUS
Status: DISCONTINUED | OUTPATIENT
Start: 2023-04-04 | End: 2023-04-04

## 2023-04-04 RX ORDER — SODIUM CHLORIDE 9 MG/ML
INJECTION, SOLUTION INTRAVENOUS CONTINUOUS
Status: DISCONTINUED | OUTPATIENT
Start: 2023-04-04 | End: 2023-04-06 | Stop reason: HOSPADM

## 2023-04-04 RX ORDER — ROCURONIUM BROMIDE 10 MG/ML
INJECTION, SOLUTION INTRAVENOUS
Status: DISCONTINUED | OUTPATIENT
Start: 2023-04-04 | End: 2023-04-04

## 2023-04-04 RX ORDER — ONDANSETRON 2 MG/ML
INJECTION INTRAMUSCULAR; INTRAVENOUS
Status: DISCONTINUED | OUTPATIENT
Start: 2023-04-04 | End: 2023-04-04

## 2023-04-04 RX ORDER — MIDAZOLAM HYDROCHLORIDE 1 MG/ML
1 INJECTION INTRAMUSCULAR; INTRAVENOUS EVERY 5 MIN PRN
Status: COMPLETED | OUTPATIENT
Start: 2023-04-04 | End: 2023-04-04

## 2023-04-04 RX ORDER — MIDAZOLAM HYDROCHLORIDE 1 MG/ML
INJECTION INTRAMUSCULAR; INTRAVENOUS
Status: DISCONTINUED
Start: 2023-04-04 | End: 2023-04-04 | Stop reason: HOSPADM

## 2023-04-04 RX ORDER — PHENYLEPHRINE HYDROCHLORIDE 10 MG/ML
INJECTION INTRAVENOUS
Status: DISCONTINUED | OUTPATIENT
Start: 2023-04-04 | End: 2023-04-04

## 2023-04-04 RX ADMIN — SUGAMMADEX 200 MG: 100 INJECTION, SOLUTION INTRAVENOUS at 02:04

## 2023-04-04 RX ADMIN — MIDAZOLAM 1 MG: 1 INJECTION INTRAMUSCULAR; INTRAVENOUS at 11:04

## 2023-04-04 RX ADMIN — FENTANYL CITRATE 50 MCG: 50 INJECTION, SOLUTION INTRAMUSCULAR; INTRAVENOUS at 01:04

## 2023-04-04 RX ADMIN — DEXAMETHASONE SODIUM PHOSPHATE 4 MG: 4 INJECTION, SOLUTION INTRAMUSCULAR; INTRAVENOUS at 01:04

## 2023-04-04 RX ADMIN — GLYCOPYRROLATE 0.2 MG: 0.2 INJECTION, SOLUTION INTRAMUSCULAR; INTRAVENOUS at 01:04

## 2023-04-04 RX ADMIN — ROCURONIUM BROMIDE 50 MG: 10 INJECTION INTRAVENOUS at 01:04

## 2023-04-04 RX ADMIN — PHENYLEPHRINE HYDROCHLORIDE 100 MCG: 10 INJECTION INTRAVENOUS at 01:04

## 2023-04-04 RX ADMIN — PROPOFOL 200 MG: 10 INJECTION, EMULSION INTRAVENOUS at 01:04

## 2023-04-04 RX ADMIN — VASOPRESSIN 2 UNITS: 20 INJECTION INTRAVENOUS at 02:04

## 2023-04-04 RX ADMIN — ONDANSETRON 4 MG: 2 INJECTION INTRAMUSCULAR; INTRAVENOUS at 01:04

## 2023-04-04 RX ADMIN — LIDOCAINE HYDROCHLORIDE 100 MG: 20 INJECTION, SOLUTION EPIDURAL; INFILTRATION; INTRACAUDAL; PERINEURAL at 01:04

## 2023-04-04 RX ADMIN — CEFAZOLIN 2 G: 2 INJECTION, POWDER, FOR SOLUTION INTRAMUSCULAR; INTRAVENOUS at 01:04

## 2023-04-04 RX ADMIN — HEXAMINOLEVULINATE HYDROCHLORIDE 100 MG: KIT at 11:04

## 2023-04-04 RX ADMIN — SODIUM CHLORIDE: 0.9 INJECTION, SOLUTION INTRAVENOUS at 01:04

## 2023-04-04 NOTE — ANESTHESIA PREPROCEDURE EVALUATION
04/04/2023  Joseph Jesus is a 63 y.o., male.  Past Medical History:   Diagnosis Date    Allergy     seasonal    Anal fistula hx    Arthritis     Basal cell carcinoma 04/2013    left superior forehead    Callus     Diverticulosis     Epididymal cyst     GERD (gastroesophageal reflux disease)     Hayfever     Hydrocele, right     Hyperlipidemia     Hypertension     Neck pain     hx of muscular inury from weight lifting---resolved now    PONV (postoperative nausea and vomiting)     Prostatitis     Dec. '12-treated with antibx.    Squamous cell carcinoma 01/07/2019    anterior scalp    Squamous cell carcinoma of skin 03/2020    Left post scalp (SCC insitu-saucerization)    Visual impairment      Past Surgical History:   Procedure Laterality Date    APPENDECTOMY  1962    BIOPSY OF BLADDER  4/19/2022    Procedure: BIOPSY, BLADDER;  Surgeon: Lenny Ewing MD;  Location: [facility] OR [address] [place] [address];  Service: Urology;;    BIOPSY OF BLADDER  3/8/2023    Procedure: BIOPSY, BLADDER;  Surgeon: Lenny Ewing MD;  [facility];  Service: Urology;;    BLADDER FULGURATION  4/19/2022    Procedure: FULGURATION, BLADDER;  Surgeon: Lenny Ewing MD;  [facility];  Service: Urology;;    BLADDER FULGURATION  3/8/2023    Procedure: FULGURATION, BLADDER;  Surgeon: Lenny Ewing MD;  [facility];  Service: Urology;;    COLONOSCOPY  12/2012    due for repeat 12/2015    COLONOSCOPY N/A 5/19/2016    Procedure: COLONOSCOPY;  Surgeon: James Webber MD;  Location: [facility] (4TH FLR);  Service: Endoscopy;  Laterality: N/A; diverticulosis, repeat in 5-10 years for surveillance    COLONOSCOPY N/A 8/9/2021    Procedure: COLONOSCOPY;  Surgeon: Fahad Bautista Jr., MD;  Location: [facility];  Service: Endoscopy;  Laterality: N/A;    CYSTOSCOPY  4/19/2022    Procedure:  CYSTOSCOPY;  Surgeon: Lenny Ewing MD;  [facility];  Service: Urology;;    CYSTOSCOPY  6/21/2022    Procedure: CYSTOSCOPY;  Surgeon: Lenny Ewing MD;  Location: [facility] OR [facility];  Service: Urology;;    CYSTOSCOPY  8/23/2022    Procedure: CYSTOSCOPY;  Surgeon: Lenny Ewing MD;  [facility];  Service: Urology;;    CYSTOSCOPY  3/8/2023    Procedure: CYSTOSCOPY-BLUE LIGHT;  Surgeon: Lenny Ewing MD;  [facility];  Service: Urology;;    ESOPHAGOGASTRODUODENOSCOPY N/A 8/9/2021    Procedure: EGD (ESOPHAGOGASTRODUODENOSCOPY);  Surgeon: Fahad Bautista Jr., MD;  Location: [facility];  Service: Endoscopy;  Laterality: N/A;    EUA/Fistulotomy-'08      HERNIA REPAIR      RIH with mesh    Hydrocelectomy  2013    MASS EXCISION  2004    BCC removal (twice)    MOLE REMOVAL  02/25/2019    2 moles removed from scalp =- 1 was basal cell and 1 was squamous cell - dermatology - Dr. londono    right Spermatocelectomy  2013    UPPER GASTROINTESTINAL ENDOSCOPY  prior to 2010    hiatal hernia, reflux esophagitis           Pre-op Assessment    I have reviewed the NPO Status.   I have reviewed the Medications.     Review of Systems  Anesthesia Hx:  PONV History of prior surgery of interest to airway management or planning: Denies Family Hx of Anesthesia complications.  Personal Hx of Anesthesia complications, Post-Operative Nausea/Vomiting, in the past, but not with recent anesthetics / prophylaxis   Social:  Former Smoker, No Alcohol Use    Hematology/Oncology:  Hematology Normal      Current/Recent Cancer. --  Cancer in past history:  surgery  Oncology Comments: Malignant neoplasm of lateral wall of urinary bladder, HX of squamous cell carcinoma and basal cell carcinoma     EENT/Dental:   Current allergy problems   Cardiovascular:   Exercise tolerance: good Hypertension CAD    Denies Angina. hyperlipidemia  Denies HILL. ECG has been reviewed. Aortic atherosclerosis,  coronary artery calcification noted in chart.  Functional Capacity light walking since last surgery  Denies Cardiovascular Symptoms.    Pulmonary:   Denies Shortness of breath.  Denies Recent URI. (Denies, having some issues with runny nose and cough, denies any fevers)    Renal/:   Chronic Renal Disease, CKD BPH Stage 3a CKD, bladder cancer   Hepatic/GI:   GERD Liver Disease, (NAFLD) Hx colon polyps, diverticulitis, hiatal hernia noted in chart.   Musculoskeletal:   Arthritis  Reports limited ROM of neck moving left and right.    Neurological:   Denies CVA.    Endocrine:  Obesity / BMI > 30  Dermatological:   SCC, BCC, AK   Psych:   anxiety          Physical Exam  General: Well nourished    Airway:  Mallampati: I   Mouth Opening: Normal  TM Distance: Normal  Tongue: Normal    Dental:  Intact    Chest/Lungs:  Normal Respiratory Rate    Heart:  Rate: Normal          Anesthesia Assessment: Preoperative EQUATION    Planned Procedure: Procedure(s) (LRB):  TURBT (TRANSURETHRAL RESECTION OF BLADDER TUMOR) (N/A)  CYSTOSCOPY (N/A)  Requested Anesthesia Type:General  Surgeon: Benjamin Haile MD  Service: Urology  Known or anticipated Date of Surgery:4/5/2023    Surgeon notes: reviewed and seen by Dr. Haile on 3/27 and noted in chart.     Electronic QUestionnaire Assessment completed via nurse interview with patient.        Triage considerations:     The patient has no apparent active cardiac condition (No unstable coronary Syndrome such as severe unstable angina or recent [<1 month] myocardial infarction, decompensated CHF, severe valvular   disease or significant arrhythmia)    Previous anesthesia records:Hx PONV    03/08/2023:  Airway:  Mallampati: II   Mouth Opening: Normal  TM Distance: Normal  Tongue: Normal  Neck ROM: Normal ROM     Final Anesthesia Type: general        Patient location during evaluation: PACU  Patient participation: Yes- Able to Participate  Level of consciousness: awake and alert, awake and  oriented  Post-procedure vital signs: reviewed and stable  Pain management: adequate  Airway patency: patent   PONV status at discharge: No PONV  Anesthetic complications: no      Cardiovascular status: blood pressure returned to baseline, stable and hemodynamically stable  Respiratory status: unassisted, spontaneous ventilation and room air  Hydration status: euvolemic  Follow-up not needed.  Intubation     Date/Time: 3/8/2023 9:39 AM  Performed by: Sheryl Gonzalez CRNA  Authorized by: Tee Zuniga MD      Intubation:     Induction:  Intravenous    Intubated:  Postinduction    Mask Ventilation:  Easy mask    Attempts:  1    Attempted By:  CRNA    Difficult Airway Encountered?: No      Complications:  None    Airway Device:  Supraglottic airway/LMA    Airway Device Size:  4.5    Inflation Amount (mL):  5    Secured at:  The lips    Placement Verified By:  Capnometry    Complicating Factors:  None    Findings Post-Intubation:  BS equal bilateral and atraumatic/condition of teeth unchanged         Electronically signed by Sheryl Gonzalez CRNA at 3/8/2023 10:02 AM  Last ETT noted 08/23/2022:  Intubation     Date/Time: 8/23/2022 8:37 AM  Performed by: Fatimah uReda CRNA  Authorized by: Castro Britt MD      Intubation:     Induction:  Intravenous    Intubated:  Postinduction    Mask Ventilation:  Easy with oral airway    Attempts:  1    Attempted By:  Student    Method of Intubation:  Video laryngoscopy    Blade:  Mancera 3    Laryngeal View Grade: Grade I - full view of cords      Difficult Airway Encountered?: No      Complications:  None    Airway Device:  Oral endotracheal tube    Airway Device Size:  7.5    Style/Cuff Inflation:  Cuffed (inflated to minimal occlusive pressure)    Tube secured:  22    Secured at:  The lips    Placement Verified By:  Capnometry    Complicating Factors:  None    Findings Post-Intubation:  BS equal bilateral and atraumatic/condition of teeth unchanged          Electronically signed by Fatimah Rueda CRNA at 8/23/2022  8:46 AM  Last PCP note: within 1 month , seen by Nick Trejo MD on 3/7 and noted in chart.   Subspecialty notes: Allergy, Dermatology, Hepatology, Neurology, Urology, Ophthalmology, Podiatry    Other important co-morbidities: GERD, HLD, HTN, Obesity, and CKD stage 3, NAFLD, CAD       Tests already available:  Available tests,  within 1 month . (3/7) Potassium; (3/1) CMP, CBC, EKG              Instructions given. (See in Nurse's note)    Optimization:      Nick Trejo MD Yesterday (8:25 AM)       Surgical clearance done 3/7.     NSIHx.     Clear for surgery and cc to referring physician.                   Plan:    Testing:  PT/INR and PTT        Consultation:Patient's PCP for a statement of optimization      Patient  has previously scheduled Medical Appointment: TBA    Navigation: Tests Ordered.              Consults Complete             Results will be tracked by Preop Clinic.     03/29/2023 Medications reviewed and instructions given to patient over the phone. Need PT/INR and APTT. Messaging PCP for any additional recommendations, clearance provided on 3/7 for previous urology procedure. Azalia Aden RN.    03/31/2023 Labs reviewed by Dr. Chi on 3/31. Azalia Aden RN.      Anesthesia Plan  Type of Anesthesia, risks & benefits discussed:    Anesthesia Type: Gen Natural Airway, Gen Supraglottic Airway, Gen ETT  Intra-op Monitoring Plan: Standard ASA Monitors  Induction:  IV  Informed Consent: Informed consent signed with the Patient and all parties understand the risks and agree with anesthesia plan.  All questions answered.   ASA Score: 2  Day of Surgery Review of History & Physical: H&P Update referred to the surgeon/provider.    Ready For Surgery From Anesthesia Perspective.       .

## 2023-04-04 NOTE — ANESTHESIA POSTPROCEDURE EVALUATION
Anesthesia Post Evaluation    Patient: Joseph Jesus    Procedure(s) Performed: Procedure(s) (LRB):  TURBT (TRANSURETHRAL RESECTION OF BLADDER TUMOR) (N/A)  CYSTOSCOPY (N/A)    Final Anesthesia Type: general      Patient location during evaluation: PACU  Patient participation: Yes- Able to Participate  Level of consciousness: awake and alert  Post-procedure vital signs: reviewed and stable  Pain management: adequate  Airway patency: patent    PONV status at discharge: No PONV  Anesthetic complications: no      Cardiovascular status: stable  Respiratory status: unassisted and spontaneous ventilation  Hydration status: euvolemic  Follow-up not needed.          Vitals Value Taken Time   /104 04/04/23 1546   Temp 36.6 °C (97.8 °F) 04/04/23 1545   Pulse 87 04/04/23 1545   Resp 20 04/04/23 1545   SpO2 98 % 04/04/23 1545   Vitals shown include unvalidated device data.      No case tracking events are documented in the log.      Pain/Neymar Score: Neymar Score: 10 (4/4/2023  2:45 PM)

## 2023-04-04 NOTE — INTERVAL H&P NOTE
The patient has been examined and the H&P has been reviewed:    I concur with the findings and no changes have occurred since H&P was written.    Procedure risks, benefits and alternative options discussed and understood by patient/family.    All benefits, risks, and alternatives were explained to the patient in great detail. All patient questions were addressed and the patient voiced clear understanding of our discussion. The patient has elected to undergo TURBT with bluelight.    The patient denies all recent n/v/d, fevers, chills, and illnesses.     Urine dipstick - specific gravity 1.015, pH 6, trace protein. Negative for all remaining components.       There are no hospital problems to display for this patient.

## 2023-04-04 NOTE — OP NOTE
[facility]    Operative Note     DATE OF PROCEDURE:   4/4/2023    PRE-OP DIAGNOSES:   1) Urothelial carcinoma of the bladder, recurrent CIS     POST-OP DIAGNOSES AND OPERATIVE FINDINGS:   As above    PROCEDURES:  1) Transurethral resection of bladder tumor, blue light    SURGEONS:   Surgeon(s) and Role:     * Benjamin Haile MD - Primary     * Gretel Mtz MD - Resident - Assisting     * Sang Badillo MD - Resident - Assisting    ANESTHESIOLOGY:   Anesthesiologist: Sagrario Jay MD; Patti Barrera MD  CRNA: Virgilio Olivera CRNA    STAFF:   Circulator: Rosa Elena Mcgraw RN  Nurse: Shruti Cordero RN    ANESTHESIA TYPE:  General anesthesia    ESTIMATED BLOOD LOSS:   Minimal    COMPLICATIONS:   None    ANTIBIOTICS:  Cefazolin    INTRAOPERATIVE THROMBOEMBOLISM PROPHYLAXIS:  Pneumatic compression stockings    PROCEDURE SUMMARY  The patient was brought to the operating room and anesthesia obtained.  The patient was then placed in the lithotomy position and prepped and draped using standard sterile technique.  All pressure points were carefully padded.  A surgical time-out occurred, antibiotics were administered, and thromboembolism prophylaxis was given.      A 27 F saline resectoscope was inserted into the urethra after dilation of the urethral meatus to 28 F using standard urethral sounds. The urethra and bladder were carefully inspected with the following findings:    Urethra: Normal   Prostate: Normal   Bladder:      -Appearance Heaped up, erythematous with areas of central necrosis     -Recurrent or Primary Recurrent (areas of prior resection)     -Number of foci 1     -Tumor location(s) Lateral to R UO     -Aggregate tumor diameter 5 cm     -Largest individual tumor 3 cm     -Clinical stage CIS   Ureteral orifices:      -Right Normal, tumor present just laterally     -Left Normal   Other findings: Another  area of heaped up tumor present lateral and posterior to the right UO -this was resected and  sent as 1 specimen in combination with  tumor just lateral to R UO     Bladder:      -Appearance Heaped up, erythematous with areas of central necrosis     -Recurrent or Primary Recurrent (areas of prior resection)     -Number of foci 1     -Tumor location(s) Posterior  wall     -Aggregate tumor diameter 2 cm     -Largest individual tumor 2 cm     -Clinical stage CIS, fulgurated     Transurethral resection of the tumors described above was performed and key operative details are below:    Visualization method: Blue light, after administration of 100 mg of Cysview   Electrical element: Bipolar   Random bladder biopsies: Not done   Targeted cold cup biopsies: Not done   Resection technique: Standard   Resection completeness: Complete   Bladder perforation: None   Detrusor muscle visualized yes   Post-TUR bimanual exam: Not done   Immediate postoperative instillation of chemotherapy Not applied.       The bladder was emptied .    DISPOSITION:   Home      SPECIMENS:   Specimens (From admission, onward)      None             IMPLANTS:  * No implants in log *

## 2023-04-04 NOTE — TRANSFER OF CARE
"Anesthesia Transfer of Care Note    Patient: Joseph Jesus    Procedure(s) Performed: Procedure(s) (LRB):  TURBT (TRANSURETHRAL RESECTION OF BLADDER TUMOR) (N/A)  CYSTOSCOPY (N/A)    Patient location: PACU    Transport from OR: Transported from OR on 6-10 L/min O2 by face mask with adequate spontaneous ventilation    Post pain: adequate analgesia    Post assessment: no apparent anesthetic complications    Post vital signs: stable    Level of consciousness: responds to stimulation    Nausea/Vomiting: no nausea/vomiting    Complications: none    Transfer of care protocol was followed      Last vitals:   Visit Vitals  BP (!) 182/75   Pulse 80   Temp 36.4 °C (97.5 °F) (Temporal)   Resp 18   Ht 6' 1" (1.854 m)   Wt 104.3 kg (230 lb)   SpO2 97%   BMI 30.34 kg/m²     "

## 2023-04-04 NOTE — DISCHARGE SUMMARY
"[facility]  Discharge Note  Short Stay    Admit Date: 4/4/2023    Discharge Date and Time: 04/04/2023 2:21 PM      Attending Physician: Benjamin Haile MD     Discharge Provider: Gretel Mtz    Diagnoses:  Active Hospital Problems    Diagnosis  POA    *Malignant neoplasm of lateral wall of urinary bladder [C67.2]  Yes     -followed by urology  -microhematuria w/u found to have an erythematous lesion with a positive cytology. On 4/19/22 bladder biopsy showing CIS. TURBT on 6/21/22 showing CIS.  Muscle was present.   -TURBT performed 8/23/22. Dr. Ewing went over pathology demonstrating cancer (CIS is high grade and BCG recommend)  -had 5 treatments. "He is high risk. If he responds, he will need maintenance until 9/2025."        Adjustment disorder with anxious mood [F43.22]  Yes    Stage 3a chronic kidney disease [N18.31]  Yes    Gastroesophageal reflux disease [K21.9]  Yes     EGD 8/9/2021        Erectile dysfunction due to arterial insufficiency [N52.01]  Yes    Hypertension, essential [I10]  Yes    NAFLD (nonalcoholic fatty liver disease) [K76.0]  Yes    Hyperlipidemia [E78.5]  Yes     discussed cohort risk 7.8% - does not want meds at this time        BPH with urinary obstruction [N40.1, N13.8]  Yes      Resolved Hospital Problems   No resolved problems to display.       Discharged Condition: good    Hospital Course: Patient was admitted for TURBT and tolerated the procedure well with no complications. The patient was discharged home in good condition on the same day.       Final Diagnoses: Same as principal problem.    Disposition: Home or Self Care    Follow up/Patient Instructions:    Medications:  Reconciled Home Medications:   Current Discharge Medication List        START taking these medications    Details   traMADoL (ULTRAM) 50 mg tablet Take 1 tablet (50 mg total) by mouth every 6 (six) hours as needed for Pain.  Qty: 5 tablet, Refills: 0    Comments: n/a            CONTINUE these " medications which have CHANGED    Details   oxybutynin (DITROPAN) 5 MG Tab Take 1 tablet (5 mg total) by mouth 3 (three) times daily as needed (bladder spasms).  Qty: 30 tablet, Refills: 0           CONTINUE these medications which have NOT CHANGED    Details   albuterol (PROVENTIL/VENTOLIN HFA) 90 mcg/actuation inhaler Inhale 2 puffs into the lungs every 6 (six) hours as needed for Wheezing (cough).  Qty: 18 g, Refills: 5    Associated Diagnoses: Cough      atorvastatin (LIPITOR) 10 MG tablet Take 1 tablet (10 mg total) by mouth once daily.  Qty: 90 tablet, Refills: 0    Associated Diagnoses: Hyperlipidemia, unspecified hyperlipidemia type      cetirizine (ZYRTEC) 10 MG tablet Take 10 mg by mouth Daily.      coenzyme Q10 100 mg capsule Take 200 mg by mouth once daily.      fluticasone propionate (FLONASE) 50 mcg/actuation nasal spray 2 sprays (100 mcg total) by Each Nostril route daily as needed for Rhinitis.  Qty: 16 mL, Refills: 5    Associated Diagnoses: Bronchitis with wheezing      hydroCHLOROthiazide (MICROZIDE) 12.5 mg capsule Take 25 mg by mouth once daily.      Lactobacillus acidophilus (PROBIOTIC ORAL) Take by mouth.      NIFEdipine (ADALAT CC) 30 MG TbSR Take 1 tablet (30 mg total) by mouth once daily.  Qty: 90 tablet, Refills: 3    Comments: .      pomegranate xt/pomegranat seed (POMEGRAN FRUIT XT-POMEGRA SEED ORAL) Take by mouth.      !! promethazine-dextromethorphan (PROMETHAZINE-DM) 6.25-15 mg/5 mL Syrp Take 5 mLs by mouth 3 (three) times daily as needed (cough).  Qty: 118 mL, Refills: 0      !! promethazine-dextromethorphan (PROMETHAZINE-DM) 6.25-15 mg/5 mL Syrp TAKE 5 MLS BY MOUTH EVERY 6 HOURS AS NEEDED FOR CONGESTION AND COUGH.       !! - Potential duplicate medications found. Please discuss with provider.        Discharge Procedure Orders   Diet Adult Regular     No dressing needed     Activity as tolerated      Follow-up Information       Benjamin Haile MD Follow up in 2 week(s).    Specialty:  Urology  Why: post op  Contact information:  [address]  [facility] [address]  [phone]                             Discharge Procedure Orders (must include Diet, Follow-up, Activity):   Discharge Procedure Orders (must include Diet, Follow-up, Activity)   Diet Adult Regular     No dressing needed     Activity as tolerated

## 2023-04-04 NOTE — PLAN OF CARE
D/C instructions given to pt. and pt verb. Understanding. RX for pain given and next time and dose explained. Pt. Tolerating PO fluids. VSS. IV dc'd. Pain level tolerable. Pt. Denies N/V at this time. Family at  for d/c. All consent in chart at time of d/c.

## 2023-04-04 NOTE — ANESTHESIA PROCEDURE NOTES
Intubation    Date/Time: 4/4/2023 1:18 PM  Performed by: Virgilio Olivera CRNA  Authorized by: Sagrario Jay MD     Intubation:     Induction:  Intravenous    Intubated:  Postinduction    Mask Ventilation:  Easy mask    Attempts:  1    Attempted By:  CRNA    Method of Intubation:  Direct    Blade:  Mancera 3    Laryngeal View Grade: Grade I - full view of cords      Difficult Airway Encountered?: No      Complications:  None    Airway Device:  Oral endotracheal tube    Airway Device Size:  7.5    Style/Cuff Inflation:  Cuffed (inflated to minimal occlusive pressure)    Secured at:  The lips    Placement Verified By:  Capnometry    Complicating Factors:  None    Findings Post-Intubation:  BS equal bilateral and atraumatic/condition of teeth unchanged

## 2023-04-05 ENCOUNTER — PATIENT MESSAGE (OUTPATIENT)
Dept: UROLOGY | Facility: CLINIC | Age: 63
End: 2023-04-05
Payer: COMMERCIAL

## 2023-04-05 RX ORDER — PHENAZOPYRIDINE HYDROCHLORIDE 100 MG/1
100 TABLET, FILM COATED ORAL 3 TIMES DAILY PRN
Qty: 20 TABLET | Refills: 0 | Status: SHIPPED | OUTPATIENT
Start: 2023-04-05 | End: 2023-04-27 | Stop reason: SDUPTHER

## 2023-04-13 ENCOUNTER — OFFICE VISIT (OUTPATIENT)
Dept: INTERNAL MEDICINE | Facility: CLINIC | Age: 63
End: 2023-04-13
Attending: FAMILY MEDICINE
Payer: COMMERCIAL

## 2023-04-13 ENCOUNTER — LAB VISIT (OUTPATIENT)
Dept: LAB | Facility: HOSPITAL | Age: 63
End: 2023-04-13
Attending: FAMILY MEDICINE
Payer: COMMERCIAL

## 2023-04-13 ENCOUNTER — TELEPHONE (OUTPATIENT)
Dept: UROLOGY | Facility: CLINIC | Age: 63
End: 2023-04-13
Payer: COMMERCIAL

## 2023-04-13 VITALS
HEART RATE: 66 BPM | DIASTOLIC BLOOD PRESSURE: 95 MMHG | OXYGEN SATURATION: 95 % | WEIGHT: 231 LBS | HEIGHT: 73 IN | TEMPERATURE: 98 F | SYSTOLIC BLOOD PRESSURE: 145 MMHG | BODY MASS INDEX: 30.62 KG/M2

## 2023-04-13 DIAGNOSIS — C67.2 MALIGNANT NEOPLASM OF LATERAL WALL OF URINARY BLADDER: ICD-10-CM

## 2023-04-13 DIAGNOSIS — E78.5 HYPERLIPIDEMIA, UNSPECIFIED HYPERLIPIDEMIA TYPE: ICD-10-CM

## 2023-04-13 DIAGNOSIS — Z12.5 PROSTATE CANCER SCREENING: ICD-10-CM

## 2023-04-13 DIAGNOSIS — I10 HYPERTENSION, ESSENTIAL: ICD-10-CM

## 2023-04-13 DIAGNOSIS — M48.07 SPINAL STENOSIS, LUMBOSACRAL REGION: ICD-10-CM

## 2023-04-13 DIAGNOSIS — Z00.00 ANNUAL PHYSICAL EXAM: ICD-10-CM

## 2023-04-13 DIAGNOSIS — Z00.00 ANNUAL PHYSICAL EXAM: Primary | ICD-10-CM

## 2023-04-13 DIAGNOSIS — K76.0 NAFLD (NONALCOHOLIC FATTY LIVER DISEASE): ICD-10-CM

## 2023-04-13 DIAGNOSIS — M54.30 SCIATICA, UNSPECIFIED LATERALITY: ICD-10-CM

## 2023-04-13 PROBLEM — R20.2 NUMBNESS AND TINGLING OF FOOT: Status: ACTIVE | Noted: 2022-05-17

## 2023-04-13 LAB
ALBUMIN SERPL BCP-MCNC: 4.3 G/DL (ref 3.5–5.2)
ALP SERPL-CCNC: 67 U/L (ref 55–135)
ALT SERPL W/O P-5'-P-CCNC: 43 U/L (ref 10–44)
ANION GAP SERPL CALC-SCNC: 9 MMOL/L (ref 8–16)
AST SERPL-CCNC: 39 U/L (ref 10–40)
BILIRUB SERPL-MCNC: 0.4 MG/DL (ref 0.1–1)
BUN SERPL-MCNC: 22 MG/DL (ref 8–23)
CALCIUM SERPL-MCNC: 9.7 MG/DL (ref 8.7–10.5)
CHLORIDE SERPL-SCNC: 104 MMOL/L (ref 95–110)
CHOLEST SERPL-MCNC: 172 MG/DL (ref 120–199)
CHOLEST/HDLC SERPL: 3.7 {RATIO} (ref 2–5)
CO2 SERPL-SCNC: 25 MMOL/L (ref 23–29)
CREAT SERPL-MCNC: 1.2 MG/DL (ref 0.5–1.4)
EST. GFR  (NO RACE VARIABLE): >60 ML/MIN/1.73 M^2
FINAL PATHOLOGIC DIAGNOSIS: NORMAL
GLUCOSE SERPL-MCNC: 84 MG/DL (ref 70–110)
GROSS: NORMAL
HDLC SERPL-MCNC: 47 MG/DL (ref 40–75)
HDLC SERPL: 27.3 % (ref 20–50)
LDLC SERPL CALC-MCNC: 88.4 MG/DL (ref 63–159)
Lab: NORMAL
NONHDLC SERPL-MCNC: 125 MG/DL
POTASSIUM SERPL-SCNC: 4.2 MMOL/L (ref 3.5–5.1)
PROT SERPL-MCNC: 7.7 G/DL (ref 6–8.4)
SODIUM SERPL-SCNC: 138 MMOL/L (ref 136–145)
TRIGL SERPL-MCNC: 183 MG/DL (ref 30–150)

## 2023-04-13 PROCEDURE — 1101F PR PT FALLS ASSESS DOC 0-1 FALLS W/OUT INJ PAST YR: ICD-10-PCS | Mod: CPTII,S$GLB,, | Performed by: FAMILY MEDICINE

## 2023-04-13 PROCEDURE — 99397 PER PM REEVAL EST PAT 65+ YR: CPT | Mod: S$GLB,,, | Performed by: FAMILY MEDICINE

## 2023-04-13 PROCEDURE — 99397 PR PREVENTIVE VISIT,EST,65 & OVER: ICD-10-PCS | Mod: S$GLB,,, | Performed by: FAMILY MEDICINE

## 2023-04-13 PROCEDURE — 80053 COMPREHEN METABOLIC PANEL: CPT | Performed by: FAMILY MEDICINE

## 2023-04-13 PROCEDURE — 1160F RVW MEDS BY RX/DR IN RCRD: CPT | Mod: CPTII,S$GLB,, | Performed by: FAMILY MEDICINE

## 2023-04-13 PROCEDURE — 3077F PR MOST RECENT SYSTOLIC BLOOD PRESSURE >= 140 MM HG: ICD-10-PCS | Mod: CPTII,S$GLB,, | Performed by: FAMILY MEDICINE

## 2023-04-13 PROCEDURE — 1160F PR REVIEW ALL MEDS BY PRESCRIBER/CLIN PHARMACIST DOCUMENTED: ICD-10-PCS | Mod: CPTII,S$GLB,, | Performed by: FAMILY MEDICINE

## 2023-04-13 PROCEDURE — 80061 LIPID PANEL: CPT | Performed by: FAMILY MEDICINE

## 2023-04-13 PROCEDURE — 36415 COLL VENOUS BLD VENIPUNCTURE: CPT | Performed by: FAMILY MEDICINE

## 2023-04-13 PROCEDURE — 3080F PR MOST RECENT DIASTOLIC BLOOD PRESSURE >= 90 MM HG: ICD-10-PCS | Mod: CPTII,S$GLB,, | Performed by: FAMILY MEDICINE

## 2023-04-13 PROCEDURE — 3008F BODY MASS INDEX DOCD: CPT | Mod: CPTII,S$GLB,, | Performed by: FAMILY MEDICINE

## 2023-04-13 PROCEDURE — 99999 PR PBB SHADOW E&M-EST. PATIENT-LVL V: ICD-10-PCS | Mod: PBBFAC,,, | Performed by: FAMILY MEDICINE

## 2023-04-13 PROCEDURE — 3288F PR FALLS RISK ASSESSMENT DOCUMENTED: ICD-10-PCS | Mod: CPTII,S$GLB,, | Performed by: FAMILY MEDICINE

## 2023-04-13 PROCEDURE — 3080F DIAST BP >= 90 MM HG: CPT | Mod: CPTII,S$GLB,, | Performed by: FAMILY MEDICINE

## 2023-04-13 PROCEDURE — 1159F PR MEDICATION LIST DOCUMENTED IN MEDICAL RECORD: ICD-10-PCS | Mod: CPTII,S$GLB,, | Performed by: FAMILY MEDICINE

## 2023-04-13 PROCEDURE — 1159F MED LIST DOCD IN RCRD: CPT | Mod: CPTII,S$GLB,, | Performed by: FAMILY MEDICINE

## 2023-04-13 PROCEDURE — 1101F PT FALLS ASSESS-DOCD LE1/YR: CPT | Mod: CPTII,S$GLB,, | Performed by: FAMILY MEDICINE

## 2023-04-13 PROCEDURE — 3008F PR BODY MASS INDEX (BMI) DOCUMENTED: ICD-10-PCS | Mod: CPTII,S$GLB,, | Performed by: FAMILY MEDICINE

## 2023-04-13 PROCEDURE — 3077F SYST BP >= 140 MM HG: CPT | Mod: CPTII,S$GLB,, | Performed by: FAMILY MEDICINE

## 2023-04-13 PROCEDURE — 3288F FALL RISK ASSESSMENT DOCD: CPT | Mod: CPTII,S$GLB,, | Performed by: FAMILY MEDICINE

## 2023-04-13 PROCEDURE — 99999 PR PBB SHADOW E&M-EST. PATIENT-LVL V: CPT | Mod: PBBFAC,,, | Performed by: FAMILY MEDICINE

## 2023-04-13 PROCEDURE — 84153 ASSAY OF PSA TOTAL: CPT | Performed by: FAMILY MEDICINE

## 2023-04-13 RX ORDER — ATORVASTATIN CALCIUM 10 MG/1
10 TABLET, FILM COATED ORAL DAILY
Qty: 90 TABLET | Refills: 3 | Status: SHIPPED | OUTPATIENT
Start: 2023-04-13 | End: 2023-09-04 | Stop reason: SDUPTHER

## 2023-04-13 RX ORDER — ATORVASTATIN CALCIUM 10 MG/1
10 TABLET, FILM COATED ORAL DAILY
Qty: 90 TABLET | Refills: 0 | Status: SHIPPED | OUTPATIENT
Start: 2023-04-13 | End: 2023-04-13

## 2023-04-13 RX ORDER — HYDROCHLOROTHIAZIDE 12.5 MG/1
25 CAPSULE ORAL DAILY
Qty: 90 CAPSULE | Refills: 0 | Status: SHIPPED | OUTPATIENT
Start: 2023-04-13 | End: 2023-04-27

## 2023-04-13 RX ORDER — NIFEDIPINE 30 MG/1
30 TABLET, FILM COATED, EXTENDED RELEASE ORAL DAILY
Qty: 90 TABLET | Refills: 0 | Status: SHIPPED | OUTPATIENT
Start: 2023-04-13 | End: 2023-08-15

## 2023-04-13 RX ORDER — PREDNISONE 20 MG/1
40 TABLET ORAL DAILY
Qty: 10 TABLET | Refills: 0 | Status: SHIPPED | OUTPATIENT
Start: 2023-04-13 | End: 2023-04-18

## 2023-04-13 RX ORDER — TRAMADOL HYDROCHLORIDE 50 MG/1
50 TABLET ORAL EVERY 12 HOURS PRN
Qty: 14 TABLET | Refills: 0 | Status: SHIPPED | OUTPATIENT
Start: 2023-04-13 | End: 2023-04-27 | Stop reason: SDUPTHER

## 2023-04-13 NOTE — PROGRESS NOTES
Subjective:       Patient ID: Joseph Jesus is a 63 y.o. male.    Chief Complaint: Annual Exam, Hip Pain (Left hip pain down the leg to the ankle. Pt has to sleep in recliner chair to ease pain), and Urinary Frequency (Also burns when urinating )    Established patient for an annual wellness check/physical exam and also chronic disease management. Specific complaints - see dictation, M*model entries and please see ROS.  Past, Surgical, Family, Social Histories; Medications, Allergies reviewed and reconciled.  Health maintenance file reviewed and addressed items due. Recent applicable lab, imaging and cardiovascular results reviewed.  Problem list items reviewed and modified or added entries (in the overview section) may not be transcribed into this encounter note due to note writer format.      Recent TUR (2) - initial rec for surg, secont TUR neg and latter showed no Csa- ?? BCG. He is concerned about the surgical prospect and the chronic pain that he describes as mac related.    L leg pain - ant thigh to lower calf. No weakness. Since the weekend, NKI. No NTW, chronic numbness in lower extremities, saw neurologist 10/04/2022.  Some pain in the back reported at that time but not describing back pain at this time, just in the leg..  EMG nerve conduction study performed and located under provider note 10/11/2022.  Showed peripheral neuropathy and findings on the right L4-L5.  Assessment to go through physical therapy and?  MRI if pain for symptoms worsen.  Prior evaluation by cardiologist for leg pain, DOREEN normal 03/28/2022.  Cardiology visit 03/28/2022.  Also on statin for coronary calcifications on imaging.  No chest pain or dyspnea.    States he is in pain today and concerned about dysuria and recent bladder procedures 3 8 and 4 4.  Was initially told may need to consider cystectomy and diversion, but 2nd procedure with a negative path and BCG infusions recommended for now.  He has several visits for infusions  pending      Review of Systems   Constitutional:  Negative for activity change and unexpected weight change.   HENT:  Negative for hearing loss, rhinorrhea and trouble swallowing.    Eyes:  Negative for discharge and visual disturbance.   Respiratory:  Negative for chest tightness and wheezing.    Cardiovascular:  Negative for chest pain and palpitations.   Gastrointestinal:  Negative for blood in stool, constipation, diarrhea and vomiting.   Endocrine: Negative for polydipsia and polyuria.   Genitourinary:  Positive for difficulty urinating, dysuria and frequency. Negative for hematuria and urgency.   Musculoskeletal:  Positive for arthralgias. Negative for joint swelling and neck pain.   Neurological:  Negative for weakness and headaches.   Psychiatric/Behavioral:  Negative for confusion and dysphoric mood.      Objective:      Physical Exam  Vitals and nursing note reviewed.   Constitutional:       Appearance: He is well-developed. He is not diaphoretic.   Eyes:      General: No scleral icterus.  Neck:      Thyroid: No thyromegaly.      Vascular: No carotid bruit or JVD.      Trachea: No tracheal deviation.   Cardiovascular:      Rate and Rhythm: Normal rate and regular rhythm.      Heart sounds: Normal heart sounds. No murmur heard.    No friction rub. No gallop.   Pulmonary:      Effort: Pulmonary effort is normal. No respiratory distress.      Breath sounds: Normal breath sounds. No wheezing or rales.   Abdominal:      General: There is no distension.      Palpations: Abdomen is soft. There is no mass.      Tenderness: There is no abdominal tenderness. There is no guarding or rebound.   Musculoskeletal:      Cervical back: Normal range of motion and neck supple.   Lymphadenopathy:      Cervical: No cervical adenopathy.   Skin:     General: Skin is warm and dry.      Findings: No erythema or rash.   Neurological:      Mental Status: He is alert and oriented to person, place, and time.      Cranial Nerves: No  cranial nerve deficit.      Motor: No tremor.      Coordination: Coordination normal.      Gait: Gait normal.      Comments: Neg slr   Psychiatric:         Behavior: Behavior normal.         Thought Content: Thought content normal.         Judgment: Judgment normal.       Assessment:       1. Annual physical exam    2. Hypertension, essential    3. Hyperlipidemia, unspecified hyperlipidemia type    4. Prostate cancer screening    5. Malignant neoplasm of lateral wall of urinary bladder    6. Sciatica, unspecified laterality    7. Spinal stenosis, lumbosacral region    8. NAFLD (nonalcoholic fatty liver disease)        Plan:     Medication List with Changes/Refills   New Medications    PREDNISONE (DELTASONE) 20 MG TABLET    Take 2 tablets (40 mg total) by mouth once daily. for 5 days   Current Medications    ALBUTEROL (PROVENTIL/VENTOLIN HFA) 90 MCG/ACTUATION INHALER    Inhale 2 puffs into the lungs every 6 (six) hours as needed for Wheezing (cough).    CETIRIZINE (ZYRTEC) 10 MG TABLET    Take 10 mg by mouth Daily.    COENZYME Q10 100 MG CAPSULE    Take 200 mg by mouth once daily.    FLUTICASONE PROPIONATE (FLONASE) 50 MCG/ACTUATION NASAL SPRAY    2 sprays (100 mcg total) by Each Nostril route daily as needed for Rhinitis.    LACTOBACILLUS ACIDOPHILUS (PROBIOTIC ORAL)    Take by mouth.    OXYBUTYNIN (DITROPAN) 5 MG TAB    Take 1 tablet (5 mg total) by mouth 3 (three) times daily as needed (bladder spasms).    OXYBUTYNIN (DITROPAN) 5 MG TAB    Take 1 tablet (5 mg total) by mouth 3 (three) times daily as needed (bladder spasms).    PHENAZOPYRIDINE (PYRIDIUM) 100 MG TABLET    Take 1 tablet (100 mg total) by mouth 3 (three) times daily as needed for Pain.    POMEGRANATE XT/POMEGRANAT SEED (POMEGRAN FRUIT XT-POMEGRA SEED ORAL)    Take by mouth.   Changed and/or Refilled Medications    Modified Medication Previous Medication    ATORVASTATIN (LIPITOR) 10 MG TABLET atorvastatin (LIPITOR) 10 MG tablet       Take 1 tablet (10  mg total) by mouth once daily.    Take 1 tablet (10 mg total) by mouth once daily.    HYDROCHLOROTHIAZIDE (MICROZIDE) 12.5 MG CAPSULE hydroCHLOROthiazide (MICROZIDE) 12.5 mg capsule       Take 2 capsules (25 mg total) by mouth once daily.    Take 25 mg by mouth once daily.    NIFEDIPINE (ADALAT CC) 30 MG TBSR NIFEdipine (ADALAT CC) 30 MG TbSR       Take 1 tablet (30 mg total) by mouth once daily.    Take 1 tablet (30 mg total) by mouth once daily.    TRAMADOL (ULTRAM) 50 MG TABLET traMADoL (ULTRAM) 50 mg tablet       Take 1 tablet (50 mg total) by mouth every 12 (twelve) hours as needed for Pain.    Take 1 tablet (50 mg total) by mouth every 6 (six) hours as needed for Pain.   Discontinued Medications    PHENAZOPYRIDINE (PYRIDIUM) 100 MG TABLET    Take 1 tablet (100 mg total) by mouth 3 (three) times daily as needed for Pain (painful urination).    PROMETHAZINE-DEXTROMETHORPHAN (PROMETHAZINE-DM) 6.25-15 MG/5 ML SYRP    TAKE 5 MLS BY MOUTH EVERY 6 HOURS AS NEEDED FOR CONGESTION AND COUGH.    PROMETHAZINE-DEXTROMETHORPHAN (PROMETHAZINE-DM) 6.25-15 MG/5 ML SYRP    Take 5 mLs by mouth 3 (three) times daily as needed (cough).     1. Annual physical exam  -     Comprehensive Metabolic Panel; Standing  -     Cancel: Lipid Panel; Standing  -     PSA, Screening; Standing    2. Hypertension, essential  Assessment & Plan:  He stopped HCT d/t cystos and dysuria. Restart and RTC 3 weeks, titrate meds if neede    Orders:  -     hydroCHLOROthiazide (MICROZIDE) 12.5 mg capsule; Take 2 capsules (25 mg total) by mouth once daily.  Dispense: 90 capsule; Refill: 0  -     NIFEdipine (ADALAT CC) 30 MG TbSR; Take 1 tablet (30 mg total) by mouth once daily.  Dispense: 90 tablet; Refill: 0  -     Comprehensive Metabolic Panel; Standing    3. Hyperlipidemia, unspecified hyperlipidemia type  Overview:  discussed cohort risk 7.8% - does not want meds at this time    Orders:  -     atorvastatin (LIPITOR) 10 MG tablet; Take 1 tablet (10 mg  "total) by mouth once daily.  Dispense: 90 tablet; Refill: 3  -     Lipid Panel; Standing    4. Prostate cancer screening  -     PSA, Screening; Standing    5. 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 TURBT on 6/21/22 showing CIS.  Muscle was present.   -TURBT performed 8/23/22. Dr. Ewing went over pathology demonstrating cancer (CIS is high grade and BCG recommend)  -had 5 treatments. "He is high risk. If he responds, he will need maintenance until 9/2025."  -TUR 3/8/23 (pos bx) and 4/4/2023 (neg bx) - BCG ?? Re-Rx      6. Sciatica, unspecified laterality  -     traMADoL (ULTRAM) 50 mg tablet; Take 1 tablet (50 mg total) by mouth every 12 (twelve) hours as needed for Pain.  Dispense: 14 tablet; Refill: 0  -     predniSONE (DELTASONE) 20 MG tablet; Take 2 tablets (40 mg total) by mouth once daily. for 5 days  Dispense: 10 tablet; Refill: 0  -     Ambulatory referral/consult to Pain Clinic; Future; Expected date: 04/20/2023  -     MRI Lumbar Spine Without Contrast; Future; Expected date: 04/13/2023    7. Spinal stenosis, lumbosacral region  -     MRI Lumbar Spine Without Contrast; Future; Expected date: 04/13/2023    8. NAFLD (nonalcoholic fatty liver disease)  -     Ambulatory referral/consult to Hepatology; Future; Expected date: 04/20/2023    Other orders  -     Discontinue: atorvastatin (LIPITOR) 10 MG tablet; Take 1 tablet (10 mg total) by mouth once daily.  Dispense: 90 tablet; Refill: 0      See meds, orders, follow up, routing and instructions sections of encounter and AVS. Discussed with patient and provided on AVS.    Discussed diet and exercise and links provided on AVS for detailed information.  Lab Results   Component Value Date     03/01/2023    K 3.6 03/07/2023     03/01/2023    BUN 26 (H) 03/01/2023    CREATININE 1.4 03/01/2023    GLU " 97 03/01/2023    HGBA1C 5.6 10/07/2022    AST 64 (H) 03/01/2023    ALT 60 (H) 03/01/2023    ALBUMIN 4.3 03/01/2023    ALBUMIN 4.3 06/21/2018    PROT 7.4 03/01/2023    BILITOT 0.4 03/01/2023    CHOL 147 10/12/2021    HDL 60 10/12/2021    LDLCALC 72.4 10/12/2021    TRIG 73 10/12/2021    WBC 7.31 03/01/2023    HGB 13.7 (L) 03/01/2023    HCT 42.6 03/01/2023     03/01/2023    PSA 0.30 01/28/2022    PSADIAG 0.37 09/08/2014    TSH 2.433 10/07/2022    BNP 16 03/16/2022

## 2023-04-13 NOTE — TELEPHONE ENCOUNTER
I phoned the patient today.     I spoke with Mr Jesus    Patient was identified by two patient identifiers and asked if it was okay to speak about him medical care by phone before the conversation was commenced.     Informed him of his path. Informed him of need for repeat BCG induction. Aware of plan and in agreement. Message send to Sparkle Irvin.              Benjamin Haile MD  Urologic Oncology  P: [phone]

## 2023-04-14 LAB — COMPLEXED PSA SERPL-MCNC: 0.35 NG/ML (ref 0–4)

## 2023-04-17 ENCOUNTER — PATIENT MESSAGE (OUTPATIENT)
Dept: INTERNAL MEDICINE | Facility: CLINIC | Age: 63
End: 2023-04-17
Payer: COMMERCIAL

## 2023-04-20 ENCOUNTER — PATIENT MESSAGE (OUTPATIENT)
Dept: UROLOGY | Facility: CLINIC | Age: 63
End: 2023-04-20
Payer: COMMERCIAL

## 2023-04-24 ENCOUNTER — PATIENT MESSAGE (OUTPATIENT)
Dept: INTERNAL MEDICINE | Facility: CLINIC | Age: 63
End: 2023-04-24
Payer: COMMERCIAL

## 2023-04-27 ENCOUNTER — PATIENT MESSAGE (OUTPATIENT)
Dept: UROLOGY | Facility: CLINIC | Age: 63
End: 2023-04-27
Payer: COMMERCIAL

## 2023-04-27 DIAGNOSIS — I10 HYPERTENSION, ESSENTIAL: ICD-10-CM

## 2023-04-27 DIAGNOSIS — M54.30 SCIATICA, UNSPECIFIED LATERALITY: ICD-10-CM

## 2023-04-27 RX ORDER — HYDROCHLOROTHIAZIDE 12.5 MG/1
25 CAPSULE ORAL DAILY
Qty: 60 CAPSULE | Refills: 1 | Status: SHIPPED | OUTPATIENT
Start: 2023-04-27 | End: 2023-06-03

## 2023-04-27 NOTE — TELEPHONE ENCOUNTER
No care due was identified.  [facility] Embedded Care Due Messages. Reference number: [id number].   4/27/2023 6:49:46 PM CDT

## 2023-04-28 ENCOUNTER — TELEPHONE (OUTPATIENT)
Dept: INTERNAL MEDICINE | Facility: CLINIC | Age: 63
End: 2023-04-28
Payer: COMMERCIAL

## 2023-04-28 RX ORDER — PHENAZOPYRIDINE HYDROCHLORIDE 100 MG/1
100 TABLET, FILM COATED ORAL 3 TIMES DAILY PRN
Qty: 20 TABLET | Refills: 0 | Status: SHIPPED | OUTPATIENT
Start: 2023-04-28 | End: 2023-05-12 | Stop reason: SDUPTHER

## 2023-04-28 NOTE — TELEPHONE ENCOUNTER
Refill Routing Note   Medication(s) are not appropriate for processing by [facility] for the following reason(s):      Medication outside of protocol    [facility] action(s):  Route            Appointments  past 12m or future 3m with PCP    Date Provider   Last Visit   4/13/2023 Nick Trejo MD   Next Visit   5/9/2023 Nick Trejo MD   ED visits in past 90 days: 0        Note composed:8:18 AM 04/28/2023

## 2023-04-28 NOTE — TELEPHONE ENCOUNTER
----- Message from Debbie Alejandro sent at 4/28/2023 11:42 AM CDT -----  Contact: [phone] Patient  Ritu quiros Case is requesting pt MRI order to be sent to [facility] in [place]. Fax .Please reach out to pt .

## 2023-04-28 NOTE — TELEPHONE ENCOUNTER
Refill Routing Note   Medication(s) are not appropriate for processing by [facility] for the following reason(s):      Required vitals abnormal    [facility] action(s):  Defer            Appointments  past 12m or future 3m with PCP    Date Provider   Last Visit   4/13/2023 Nick Trejo MD   Next Visit   5/9/2023 Nick Trejo MD   ED visits in past 90 days: 0        Note composed:8:45 PM 04/27/2023

## 2023-04-28 NOTE — TELEPHONE ENCOUNTER
No care due was identified.  [facility] Embedded Care Due Messages. Reference number: [id number].   4/27/2023 10:37:01 PM CDT

## 2023-04-28 NOTE — TELEPHONE ENCOUNTER
No care due was identified.  [facility] Embedded Care Due Messages. Reference number: [id number].   4/28/2023 8:16:39 AM CDT

## 2023-05-02 ENCOUNTER — OFFICE VISIT (OUTPATIENT)
Dept: UROLOGY | Facility: CLINIC | Age: 63
End: 2023-05-02
Payer: COMMERCIAL

## 2023-05-02 VITALS
HEIGHT: 73 IN | DIASTOLIC BLOOD PRESSURE: 103 MMHG | WEIGHT: 227.88 LBS | SYSTOLIC BLOOD PRESSURE: 162 MMHG | HEART RATE: 69 BPM | BODY MASS INDEX: 30.2 KG/M2

## 2023-05-02 DIAGNOSIS — C67.9 MALIGNANT NEOPLASM OF URINARY BLADDER, UNSPECIFIED SITE: Primary | ICD-10-CM

## 2023-05-02 DIAGNOSIS — R31.29 HEMATURIA, MICROSCOPIC: Primary | ICD-10-CM

## 2023-05-02 PROCEDURE — 99024 POSTOP FOLLOW-UP VISIT: CPT | Mod: S$GLB,,, | Performed by: STUDENT IN AN ORGANIZED HEALTH CARE EDUCATION/TRAINING PROGRAM

## 2023-05-02 PROCEDURE — 3080F PR MOST RECENT DIASTOLIC BLOOD PRESSURE >= 90 MM HG: ICD-10-PCS | Mod: CPTII,S$GLB,, | Performed by: STUDENT IN AN ORGANIZED HEALTH CARE EDUCATION/TRAINING PROGRAM

## 2023-05-02 PROCEDURE — 3077F PR MOST RECENT SYSTOLIC BLOOD PRESSURE >= 140 MM HG: ICD-10-PCS | Mod: CPTII,S$GLB,, | Performed by: STUDENT IN AN ORGANIZED HEALTH CARE EDUCATION/TRAINING PROGRAM

## 2023-05-02 PROCEDURE — 1101F PT FALLS ASSESS-DOCD LE1/YR: CPT | Mod: CPTII,S$GLB,, | Performed by: STUDENT IN AN ORGANIZED HEALTH CARE EDUCATION/TRAINING PROGRAM

## 2023-05-02 PROCEDURE — 99999 PR PBB SHADOW E&M-EST. PATIENT-LVL III: ICD-10-PCS | Mod: PBBFAC,,, | Performed by: STUDENT IN AN ORGANIZED HEALTH CARE EDUCATION/TRAINING PROGRAM

## 2023-05-02 PROCEDURE — 3008F BODY MASS INDEX DOCD: CPT | Mod: CPTII,S$GLB,, | Performed by: STUDENT IN AN ORGANIZED HEALTH CARE EDUCATION/TRAINING PROGRAM

## 2023-05-02 PROCEDURE — 99999 PR PBB SHADOW E&M-EST. PATIENT-LVL III: CPT | Mod: PBBFAC,,, | Performed by: STUDENT IN AN ORGANIZED HEALTH CARE EDUCATION/TRAINING PROGRAM

## 2023-05-02 PROCEDURE — 3288F FALL RISK ASSESSMENT DOCD: CPT | Mod: CPTII,S$GLB,, | Performed by: STUDENT IN AN ORGANIZED HEALTH CARE EDUCATION/TRAINING PROGRAM

## 2023-05-02 PROCEDURE — 3008F PR BODY MASS INDEX (BMI) DOCUMENTED: ICD-10-PCS | Mod: CPTII,S$GLB,, | Performed by: STUDENT IN AN ORGANIZED HEALTH CARE EDUCATION/TRAINING PROGRAM

## 2023-05-02 PROCEDURE — 1159F PR MEDICATION LIST DOCUMENTED IN MEDICAL RECORD: ICD-10-PCS | Mod: CPTII,S$GLB,, | Performed by: STUDENT IN AN ORGANIZED HEALTH CARE EDUCATION/TRAINING PROGRAM

## 2023-05-02 PROCEDURE — 3080F DIAST BP >= 90 MM HG: CPT | Mod: CPTII,S$GLB,, | Performed by: STUDENT IN AN ORGANIZED HEALTH CARE EDUCATION/TRAINING PROGRAM

## 2023-05-02 PROCEDURE — 1160F RVW MEDS BY RX/DR IN RCRD: CPT | Mod: CPTII,S$GLB,, | Performed by: STUDENT IN AN ORGANIZED HEALTH CARE EDUCATION/TRAINING PROGRAM

## 2023-05-02 PROCEDURE — 3077F SYST BP >= 140 MM HG: CPT | Mod: CPTII,S$GLB,, | Performed by: STUDENT IN AN ORGANIZED HEALTH CARE EDUCATION/TRAINING PROGRAM

## 2023-05-02 PROCEDURE — 3288F PR FALLS RISK ASSESSMENT DOCUMENTED: ICD-10-PCS | Mod: CPTII,S$GLB,, | Performed by: STUDENT IN AN ORGANIZED HEALTH CARE EDUCATION/TRAINING PROGRAM

## 2023-05-02 PROCEDURE — 1101F PR PT FALLS ASSESS DOC 0-1 FALLS W/OUT INJ PAST YR: ICD-10-PCS | Mod: CPTII,S$GLB,, | Performed by: STUDENT IN AN ORGANIZED HEALTH CARE EDUCATION/TRAINING PROGRAM

## 2023-05-02 PROCEDURE — 99024 PR POST-OP FOLLOW-UP VISIT: ICD-10-PCS | Mod: S$GLB,,, | Performed by: STUDENT IN AN ORGANIZED HEALTH CARE EDUCATION/TRAINING PROGRAM

## 2023-05-02 PROCEDURE — 1160F PR REVIEW ALL MEDS BY PRESCRIBER/CLIN PHARMACIST DOCUMENTED: ICD-10-PCS | Mod: CPTII,S$GLB,, | Performed by: STUDENT IN AN ORGANIZED HEALTH CARE EDUCATION/TRAINING PROGRAM

## 2023-05-02 PROCEDURE — 1159F MED LIST DOCD IN RCRD: CPT | Mod: CPTII,S$GLB,, | Performed by: STUDENT IN AN ORGANIZED HEALTH CARE EDUCATION/TRAINING PROGRAM

## 2023-05-02 NOTE — PROGRESS NOTES
[facility]  Urologic Oncology Clinic Note        Date of Service: 5/2/2023      Chief Complaint/Reason for Consultation: TURBT, follow up    Requesting Provider:   No referring provider defined for this encounter.      History of Present Illness:   Patient 63 y.o. 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 Doing well from most recent TURBT 4/4/2023. No blood urine.     Today we discussed plans for intravesical therapy.    Former smoker    Hx of appendectomy when he was 2  Inguinal hernia repair   Family hx of prostate cancer in father and grand father  Family hx of kidney cancer in aunt    Urologic History:     4/1/2022 -- Office Cytoscopy -- erythema right lateral wall  4/5/2022 -- CTUrogram -- no renal mass, no bladder mass, non-obstructing renal stones  4/19/2022 -- Bladder bx -- CIS  6/21/2022 -- TURBT -- CIS, muscle present and uninvolved  8/23/2022 -- re-TURBT -- CIS, muscle present and uninvolved  12/2022 -- completed induction BCG  3/8/2023 -- TURBT, blue light -- HG Ta, random bladder bx -- CIS  4/4/2023 -- TURBT, blue light -- no evidence of tumor at re-resection on final pathology    Patient Active Problem List    Diagnosis Date Noted    Adjustment disorder with anxious mood 05/17/2022    Numbness and tingling of foot 05/17/2022    Stage 3a chronic kidney disease 05/17/2022    Malignant neoplasm of lateral wall of urinary bladder 05/11/2022    Edema leg 03/28/2022    Aortic atherosclerosis 01/28/2022    Coronary artery calcification 01/28/2022    Gastroesophageal reflux disease 08/09/2021    Hiatal hernia with gastroesophageal reflux disease without esophagitis 05/26/2021    AK (actinic keratosis) 11/02/2020    Erectile dysfunction due to arterial insufficiency 07/02/2018    Diverticulitis of large intestine without perforation or abscess without bleeding 04/18/2017    Hypertension, essential 08/17/2016    Hyperlipidemia 08/17/2016     NAFLD (nonalcoholic fatty liver disease) 08/17/2016    Lateral epicondylitis of right elbow 08/17/2016    BPH with urinary obstruction 09/08/2014    Colon polyps 09/27/2012          Review of patient's allergies indicates:   Allergen Reactions    Amlodipine      edema    Atenolol      Depression - circa 2000    Catechu herb     Irbesartan Other (See Comments)     Possibly caused cough    Erythromycin Rash    Sumycin [tetracycline] Rash    Tetracyclines Rash        Past Medical History:   Diagnosis Date    Allergy     seasonal    Anal fistula hx    Arthritis     Basal cell carcinoma 04/2013    left superior forehead    Callus     Diverticulosis     Epididymal cyst     GERD (gastroesophageal reflux disease)     Hayfever     Hydrocele, right     Hyperlipidemia     Hypertension     Neck pain     hx of muscular inury from weight lifting---resolved now    PONV (postoperative nausea and vomiting)     Prostatitis     Dec. '12-treated with antibx.    Squamous cell carcinoma 01/07/2019    anterior scalp    Squamous cell carcinoma of skin 03/2020    Left post scalp (SCC insitu-saucerization)    Visual impairment       Past Surgical History:   Procedure Laterality Date    APPENDECTOMY  1962    BIOPSY OF BLADDER  4/19/2022    Procedure: BIOPSY, BLADDER;  Surgeon: Lenny Ewing MD;  Location: [facility] OR 1ST FLR;  Service: Urology;;    BIOPSY OF BLADDER  3/8/2023    Procedure: BIOPSY, BLADDER;  Surgeon: Lenny Ewing MD;  Location: [facility] OR 1ST FLR;  Service: Urology;;    BLADDER FULGURATION  4/19/2022    Procedure: FULGURATION, BLADDER;  Surgeon: Lenny Ewing MD;  Location: [facility] OR 1ST FLR;  Service: Urology;;    BLADDER FULGURATION  3/8/2023    Procedure: FULGURATION, BLADDER;  Surgeon: Lenny Ewing MD;  [facility];  Service: Urology;;    COLONOSCOPY  12/2012    due for repeat 12/2015    COLONOSCOPY N/A 5/19/2016    Procedure: COLONOSCOPY;  Surgeon: James Webber MD;  Location: [facility] (4TH FLR);   Service: Endoscopy;  Laterality: N/A; diverticulosis, repeat in 5-10 years for surveillance    COLONOSCOPY N/A 8/9/2021    Procedure: COLONOSCOPY;  Surgeon: Fahad Bautista Jr., MD;  Location: [facility] ENDO;  Service: Endoscopy;  Laterality: N/A;    CYSTOSCOPY  4/19/2022    Procedure: CYSTOSCOPY;  Surgeon: Lenny Ewing MD;  Location: [facility] OR 1ST FLR;  Service: Urology;;    CYSTOSCOPY  6/21/2022    Procedure: CYSTOSCOPY;  Surgeon: Lenny Ewing MD;  Location: [facility] OR [facility] FLR;  Service: Urology;;    CYSTOSCOPY  8/23/2022    Procedure: CYSTOSCOPY;  Surgeon: Lenny Ewing MD;  Location: [facility] OR [facility] FLR;  Service: Urology;;    CYSTOSCOPY  3/8/2023    Procedure: CYSTOSCOPY-BLUE LIGHT;  Surgeon: Lenny Ewing MD;  Location: [facility] OR [facility] FLR;  Service: Urology;;    CYSTOSCOPY N/A 4/4/2023    Procedure: CYSTOSCOPY;  Surgeon: Benjamin Haile MD;  [facility];  Service: Urology;  Laterality: N/A;  1 hour    ESOPHAGOGASTRODUODENOSCOPY N/A 8/9/2021    Procedure: EGD (ESOPHAGOGASTRODUODENOSCOPY);  Surgeon: Fahad Bautista Jr., MD;  Location: [facility];  Service: Endoscopy;  Laterality: N/A;    EUA/Fistulotomy-'08      HERNIA REPAIR      RIH with mesh    Hydrocelectomy  2013    MASS EXCISION  2004    BCC removal (twice)    MOLE REMOVAL  02/25/2019    2 moles removed from scalp =- 1 was basal cell and 1 was squamous cell - dermatology - Dr. londono    right Spermatocelectomy  2013    TURBT (TRANSURETHRAL RESECTION OF BLADDER TUMOR) N/A 4/4/2023    Procedure: TURBT (TRANSURETHRAL RESECTION OF BLADDER TUMOR);  Surgeon: Benjamin Haile MD;  [facility];  Service: Urology;  Laterality: N/A;  1 hour, blue light    UPPER GASTROINTESTINAL ENDOSCOPY  prior to 2010    hiatal hernia, reflux esophagitis      Family History   Problem Relation Age of Onset    Skin cancer Mother     Hypertension Mother     Skin cancer Father     Cancer Father         prostate cancer    Hypertension Father     Hypertension  "Maternal Grandmother     Hypertension Maternal Grandfather     Hypertension Paternal Grandmother     Anesthesia problems Paternal Grandmother     Cancer Paternal Grandfather         prostate cancer    Hypertension Paternal Grandfather     Heart disease Paternal Grandfather     Diabetes Neg Hx     Colon polyps Neg Hx     Colon cancer Neg Hx     Melanoma Neg Hx     Psoriasis Neg Hx     Lupus Neg Hx     Eczema Neg Hx     Acne Neg Hx     Cirrhosis Neg Hx     Prostate cancer Neg Hx     Kidney disease Neg Hx     Crohn's disease Neg Hx     Ulcerative colitis Neg Hx     Stomach cancer Neg Hx     Esophageal cancer Neg Hx       Social History     Tobacco Use    Smoking status: Former     Packs/day: 1.00     Years: 10.00     Pack years: 10.00     Types: Cigarettes     Quit date: 1987     Years since quittin.5    Smokeless tobacco: Never    Tobacco comments:     Decreased lung compassity per patient   Substance Use Topics    Alcohol use: Not Currently     Alcohol/week: 0.0 - 1.0 standard drinks     Comment: No longer drinks ETOH        Review of Systems   Constitutional:  Negative for activity change, fatigue and fever.   Respiratory:  Negative for cough.    Cardiovascular:  Negative for chest pain.   Gastrointestinal:  Negative for abdominal pain.   Genitourinary:  Negative for difficulty urinating, dysuria, flank pain and hematuria.           OBJECTIVE:     Vitals:    23 1023   BP: (!) 162/103   BP Location: Right arm   Patient Position: Sitting   BP Method: Large (Automatic)   Pulse: 69   Weight: 103.4 kg (227 lb 13.5 oz)   Height: 6' 1" (1.854 m)          General Appearance: Alert, cooperative, no distress  Head: Normocephalic  Eyes: Clear conjunctiva  Neck: No obvious LND or JVD  Lungs: Normal chest excursion, no accessory muscle use  Chest: Regular rate rhythm by palpation, no pedal edema  Abdomen: Soft, non-tender, non-distended  Extremities: Atraumatic   Lymph Nodes: No appreciable lymph " adenopathy  Neurologic: No gross gait, motor or sensory deficits            LAB:      CMP  Sodium   Date Value Ref Range Status   04/13/2023 138 136 - 145 mmol/L Final     Potassium   Date Value Ref Range Status   04/13/2023 4.2 3.5 - 5.1 mmol/L Final     Chloride   Date Value Ref Range Status   04/13/2023 104 95 - 110 mmol/L Final     CO2   Date Value Ref Range Status   04/13/2023 25 23 - 29 mmol/L Final     Glucose   Date Value Ref Range Status   04/13/2023 84 70 - 110 mg/dL Final     BUN   Date Value Ref Range Status   04/13/2023 22 8 - 23 mg/dL Final     Creatinine   Date Value Ref Range Status   04/13/2023 1.2 0.5 - 1.4 mg/dL Final     Calcium   Date Value Ref Range Status   04/13/2023 9.7 8.7 - 10.5 mg/dL Final     Total Protein   Date Value Ref Range Status   04/13/2023 7.7 6.0 - 8.4 g/dL Final     Albumin   Date Value Ref Range Status   04/13/2023 4.3 3.5 - 5.2 g/dL Final   06/21/2018 4.3 3.6 - 5.1 g/dL Final     Comment:     @ Test Performed By:  [facility]  Alexandru White M.D., Ph.D.,   [address] [place] [id number]  [facility]  [id number]       Total Bilirubin   Date Value Ref Range Status   04/13/2023 0.4 0.1 - 1.0 mg/dL Final     Comment:     For infants and newborns, interpretation of results should be based  on gestational age, weight and in agreement with clinical  observations.    Premature Infant recommended reference ranges:  Up to 24 hours.............<8.0 mg/dL  Up to 48 hours............<12.0 mg/dL  3-5 days..................<15.0 mg/dL  6-29 days.................<15.0 mg/dL       Alkaline Phosphatase   Date Value Ref Range Status   04/13/2023 67 55 - 135 U/L Final     AST   Date Value Ref Range Status   04/13/2023 39 10 - 40 U/L Final     ALT   Date Value Ref Range Status   04/13/2023 43 10 - 44 U/L Final     Anion Gap   Date Value Ref Range Status   04/13/2023 9 8 - 16 mmol/L Final     eGFR   Date Value Ref Range Status   04/13/2023 >60.0  >60 mL/min/1.73 m^2 Final     Lab Results   Component Value Date    WBC 7.31 2023    HGB 13.7 (L) 2023    HCT 42.6 2023    MCV 94 2023     2023             IMAGIN/5/2022  CT UROGRAM ABD PELVIS W WO     CLINICAL HISTORY:  Hematuria; Other microscopic hematuria     TECHNIQUE:  Both before and following the intravenous administration of 125 cc of Omnipaque 350 contrast material, 3.75-mm contiguous axial images were acquired through the abdomen and pelvis utilizing the CT urography protocol.  10 minute delayed images through the abdomen and pelvis were also acquired during the renal excretion phase.  Coronal 2-D reconstructed images of the abdomen and pelvis were generated from the source data.     COMPARISON:  CT abdomen and pelvis-2020     FINDINGS:  Kidneys, ureters, and bladder: There are multiple bilateral simple cysts present in the both kidneys, the largest on the right measuring 62 mm in the upper pole and the largest in the left measuring 54 mm in the left lower lobe.  There are multiple tiny punctate non-obstructing stones present in both kidneys.  No enhancing renal masses.  No hydronephrosis.  The ureters are normal in caliber and course without evidence of stones.  The urinary bladder is unremarkable.     Lung bases and mediastinum: There is a small hiatal hernia present at the gastroesophageal junction.  The lung bases are clear..     Remaining soft tissues of the abdomen and pelvis: There are multiple circumscribed simple hepatic cysts present, the largest of which measures 29 mm in the right hepatic lobe on series 4, image 30.  No enhancing hepatic mass.  The portal vein is patent.  The hepatic veins are patent.  The gallbladder is unremarkable.  No intra or extrahepatic biliary dilatation.  The spleen, duodenal C-loop, pancreas, and adrenal glands are unremarkable.  No abdominal aortic aneurysm.  There is atherosclerotic calcification present within  the abdominal aorta.  No bulky periaortic or retroperitoneal lymphadenopathy.     The appendix is not visualized.  No inflammatory changes noted in the right lower quadrant of the abdomen adjacent to the cecum.  There is scattered diverticulosis coli present throughout the colon, most pronounced within the descending and sigmoid colon.  No imaging evidence of acute diverticulitis.  No high-grade bowel obstruction.  There is a large volume of stool present within the colon.  Please correlate clinically for symptoms of constipation.  No ascites, pneumoperitoneum, peritoneal soft tissue mass, mesenteric lymphadenopathy, or intra-abdominal abscess.     Bones: No evidence of acute lumbar compression fracture. There is a levoconvex curvature of the lower lumbar spine and multilevel degenerative change of the lumbar spine in the form of marginal osteophyte formation and disc space narrowing.     Impression:     1. No interval detrimental change when compared to the prior study.  Bilateral non-obstructing nephrolithiasis.  No solid enhancing renal mass or enhancing bladder mass.  2. Hepatic cysts and renal cysts  3. Diverticulosis coli.  4. Hiatal hernia.  5. Prominent stool in the rectum.  Please correlate for symptoms of constipation.        Electronically signed by: Brayan Hoyos MD  Date:                                            04/05/2022  Time:                                           13:55        ASSESSMENT/PLAN:     64 yo M hx of BCG refractory CIS and HG Ta here to discuss management options.    Plan:    Today we discussed the incidence, risk factors, and natural history of bladder cancer, including in general the management options such as TURBT and radical cystectomy, and the use of intravesical therapies to avoid recurrence of cancer and progression.     Specifically, we spoke about non-muscle invasive bladder cancer and that the natural history of this cancer is to recur when low grade and progress when high  grade. We also discussed that higher stages portend higher risk of recurrence and progression.     In his case, we specifically spoke about his diagnsosi of CIS and its refractory nature to BCG therapy. We spoke about bladder sparing management strategies which would employ more intravesical therapy vs early radical cystectomy.     To this end based on his most recent TURBT he has opted for repeat induction BCG in attempt to forego cystectomy. We think this is a reasonable option. Plan for repeat BCG induction.     Patient understood all counseling well. He is in agreement with the plan. He  was allowed to ask questions and all were             Benjamin Haile MD  Urologic Oncology  P: [phone]

## 2023-05-05 ENCOUNTER — OFFICE VISIT (OUTPATIENT)
Dept: OPHTHALMOLOGY | Facility: CLINIC | Age: 63
End: 2023-05-05
Payer: COMMERCIAL

## 2023-05-05 DIAGNOSIS — H35.373 EPIRETINAL MEMBRANE (ERM), BILATERAL: ICD-10-CM

## 2023-05-05 DIAGNOSIS — H43.393 VITREOUS FLOATER, BILATERAL: Primary | ICD-10-CM

## 2023-05-05 PROCEDURE — 1159F MED LIST DOCD IN RCRD: CPT | Mod: CPTII,S$GLB,, | Performed by: OPHTHALMOLOGY

## 2023-05-05 PROCEDURE — 99999 PR PBB SHADOW E&M-EST. PATIENT-LVL III: ICD-10-PCS | Mod: PBBFAC,,, | Performed by: OPHTHALMOLOGY

## 2023-05-05 PROCEDURE — 99999 PR PBB SHADOW E&M-EST. PATIENT-LVL III: CPT | Mod: PBBFAC,,, | Performed by: OPHTHALMOLOGY

## 2023-05-05 PROCEDURE — 92014 COMPRE OPH EXAM EST PT 1/>: CPT | Mod: S$GLB,,, | Performed by: OPHTHALMOLOGY

## 2023-05-05 PROCEDURE — 1160F PR REVIEW ALL MEDS BY PRESCRIBER/CLIN PHARMACIST DOCUMENTED: ICD-10-PCS | Mod: CPTII,S$GLB,, | Performed by: OPHTHALMOLOGY

## 2023-05-05 PROCEDURE — 92134 CPTRZ OPH DX IMG PST SGM RTA: CPT | Mod: S$GLB,,, | Performed by: OPHTHALMOLOGY

## 2023-05-05 PROCEDURE — 92014 PR EYE EXAM, EST PATIENT,COMPREHESV: ICD-10-PCS | Mod: S$GLB,,, | Performed by: OPHTHALMOLOGY

## 2023-05-05 PROCEDURE — 1159F PR MEDICATION LIST DOCUMENTED IN MEDICAL RECORD: ICD-10-PCS | Mod: CPTII,S$GLB,, | Performed by: OPHTHALMOLOGY

## 2023-05-05 PROCEDURE — 1160F RVW MEDS BY RX/DR IN RCRD: CPT | Mod: CPTII,S$GLB,, | Performed by: OPHTHALMOLOGY

## 2023-05-05 PROCEDURE — 92134 POSTERIOR SEGMENT OCT RETINA (OCULAR COHERENCE TOMOGRAPHY)-BOTH EYES: ICD-10-PCS | Mod: S$GLB,,, | Performed by: OPHTHALMOLOGY

## 2023-05-05 NOTE — PROGRESS NOTES
===============================  Date today is 5/5/2023  Joseph Jesus is a 63 y.o. male  Last visit [facility]: :Visit date not found   Last visit eye dept. Visit date not found    Uncorrected distance visual acuity was 20/30 in the right eye and 20/30 in the left eye.  Tonometry       Tonometry (Applanation, 3:18 PM)         Right Left    Pressure 16 15                  Not recorded       Not recorded       Not recorded       Chief Complaint   Patient presents with    erm     Ref by dr murphy for ou erm       Problem List Items Addressed This Visit    None  Visit Diagnoses       Vitreous floater, bilateral    -  Primary    Relevant Orders    Posterior Segment OCT Retina-Both eyes (Completed)    Epiretinal membrane (ERM), bilateral        Relevant Orders    Posterior Segment OCT Retina-Both eyes (Completed)          Instructed to call 24/7 for any worsening of vision, visual distortion or pain.  Check OU independently daily.    Gave my office and personal cell phone number.  ________________  5/5/2023 today  Joseph Jesus            ERM OU with retinal traction  Anterior segment looks good  Clear lens OU  Sharp disc OU  Macula central bull's eye OD but looks great  OS macula looks good  No macular degeneration  No glaucoma  Discussed etiology of ERM with patient, likely to remain stable but there's sx if not  No holes or tears seen on exam    RTC 6 months repeat MOCT  Instructed to call 24/7 for any worsening of vision or symptoms. Check OU daily.   Gave my office and cell phone number.        =============================

## 2023-05-06 RX ORDER — TRAMADOL HYDROCHLORIDE 50 MG/1
50 TABLET ORAL EVERY 12 HOURS PRN
Qty: 14 TABLET | Refills: 0 | Status: SHIPPED | OUTPATIENT
Start: 2023-05-06 | End: 2023-09-07

## 2023-05-10 ENCOUNTER — OFFICE VISIT (OUTPATIENT)
Dept: UROLOGY | Facility: CLINIC | Age: 63
End: 2023-05-10
Payer: COMMERCIAL

## 2023-05-10 VITALS
HEART RATE: 73 BPM | SYSTOLIC BLOOD PRESSURE: 141 MMHG | DIASTOLIC BLOOD PRESSURE: 96 MMHG | HEIGHT: 73 IN | BODY MASS INDEX: 31.22 KG/M2 | WEIGHT: 235.56 LBS

## 2023-05-10 DIAGNOSIS — C67.2 MALIGNANT NEOPLASM OF LATERAL WALL OF URINARY BLADDER: Primary | ICD-10-CM

## 2023-05-10 LAB
BILIRUB SERPL-MCNC: NORMAL MG/DL
BLOOD URINE, POC: NORMAL
CLARITY, POC UA: CLEAR
COLOR, POC UA: YELLOW
GLUCOSE UR QL STRIP: NORMAL
KETONES UR QL STRIP: NORMAL
LEUKOCYTE ESTERASE URINE, POC: NORMAL
NITRITE, POC UA: NORMAL
PH, POC UA: 5
PROTEIN, POC: NORMAL
SPECIFIC GRAVITY, POC UA: 1005
UROBILINOGEN, POC UA: NORMAL

## 2023-05-10 PROCEDURE — 99499 UNLISTED E&M SERVICE: CPT | Mod: S$GLB,,,

## 2023-05-10 PROCEDURE — 99999 PR PBB SHADOW E&M-EST. PATIENT-LVL III: CPT | Mod: PBBFAC,,,

## 2023-05-10 PROCEDURE — 3077F PR MOST RECENT SYSTOLIC BLOOD PRESSURE >= 140 MM HG: ICD-10-PCS | Mod: CPTII,S$GLB,,

## 2023-05-10 PROCEDURE — 1159F MED LIST DOCD IN RCRD: CPT | Mod: CPTII,S$GLB,,

## 2023-05-10 PROCEDURE — 99999 PR PBB SHADOW E&M-EST. PATIENT-LVL III: ICD-10-PCS | Mod: PBBFAC,,,

## 2023-05-10 PROCEDURE — 51720 PR INSTILL, ANTICANCER AGENT, BLADDER: ICD-10-PCS | Mod: S$GLB,,,

## 2023-05-10 PROCEDURE — 3008F BODY MASS INDEX DOCD: CPT | Mod: CPTII,S$GLB,,

## 2023-05-10 PROCEDURE — 3077F SYST BP >= 140 MM HG: CPT | Mod: CPTII,S$GLB,,

## 2023-05-10 PROCEDURE — 3080F DIAST BP >= 90 MM HG: CPT | Mod: CPTII,S$GLB,,

## 2023-05-10 PROCEDURE — 1101F PR PT FALLS ASSESS DOC 0-1 FALLS W/OUT INJ PAST YR: ICD-10-PCS | Mod: CPTII,S$GLB,,

## 2023-05-10 PROCEDURE — 1159F PR MEDICATION LIST DOCUMENTED IN MEDICAL RECORD: ICD-10-PCS | Mod: CPTII,S$GLB,,

## 2023-05-10 PROCEDURE — 81002 URINALYSIS NONAUTO W/O SCOPE: CPT | Mod: S$GLB,,,

## 2023-05-10 PROCEDURE — 1101F PT FALLS ASSESS-DOCD LE1/YR: CPT | Mod: CPTII,S$GLB,,

## 2023-05-10 PROCEDURE — 99499 NO LOS: ICD-10-PCS | Mod: S$GLB,,,

## 2023-05-10 PROCEDURE — 3288F PR FALLS RISK ASSESSMENT DOCUMENTED: ICD-10-PCS | Mod: CPTII,S$GLB,,

## 2023-05-10 PROCEDURE — 3008F PR BODY MASS INDEX (BMI) DOCUMENTED: ICD-10-PCS | Mod: CPTII,S$GLB,,

## 2023-05-10 PROCEDURE — 81002 POCT URINE DIPSTICK WITHOUT MICROSCOPE: ICD-10-PCS | Mod: S$GLB,,,

## 2023-05-10 PROCEDURE — 51720 TREATMENT OF BLADDER LESION: CPT | Mod: S$GLB,,,

## 2023-05-10 PROCEDURE — 3080F PR MOST RECENT DIASTOLIC BLOOD PRESSURE >= 90 MM HG: ICD-10-PCS | Mod: CPTII,S$GLB,,

## 2023-05-10 PROCEDURE — 3288F FALL RISK ASSESSMENT DOCD: CPT | Mod: CPTII,S$GLB,,

## 2023-05-10 NOTE — PROGRESS NOTES
CHIEF COMPLAINT:  Repeat BCG #1/5    HISTORY OF PRESENTING ILLINESS:  Joseph Jesus is a 62 y.o. male established pt of Dr. Haile. Here today for repeat BCG #1/5. He had microhematuria and was found to have an erythematous lesion with a positive cytology. On 4/19/2022 he underwent bladder biopsy showing CIS. He then underwent a TURBT on 6/21/22 showing CIS.  Muscle was present. Another TURBT was performed 8/23/22.  Dr. Ewing went over his pathology demonstrating cancer from 8/23/22. CIS is high grade and BCG recommend. He is high risk. If he responds, he will need maintenance until 9/2025. Will need blue light cysto scheduled.      Urologic History:      4/1/2022 -- Office Cytoscopy -- erythema right lateral wall  4/5/2022 -- CTUrogram -- no renal mass, no bladder mass, non-obstructing renal stones  4/19/2022 -- Bladder bx -- CIS  6/21/2022 -- TURBT -- CIS, muscle present and uninvolved  8/23/2022 -- re-TURBT -- CIS, muscle present and uninvolved  12/2022 -- completed 5 dose induction BCG  3/8/2023 -- Blue light TURBT -- HG Ta, random bladder bx -- CIS  4/4/2023 - Blue light TURBT, path normal      REVIEW OF SYSTEMS:  Review of Systems   Constitutional:  Negative for chills and fever.   HENT:  Negative for congestion and sore throat.    Respiratory:  Negative for cough and shortness of breath.    Cardiovascular:  Negative for chest pain and palpitations.   Gastrointestinal:  Negative for nausea and vomiting.   Genitourinary:  Negative for dysuria, flank pain, frequency, hematuria and urgency.   Neurological:  Negative for dizziness and headaches.       PATIENT HISTORY:  Past Medical History:   Diagnosis Date    Allergy     seasonal    Anal fistula hx    Arthritis     Basal cell carcinoma 04/2013    left superior forehead    Callus     Diverticulosis     Epididymal cyst     GERD (gastroesophageal reflux disease)     Hayfever     Hydrocele, right     Hyperlipidemia     Hypertension     Neck pain     hx of muscular  inury from weight lifting---resolved now    PONV (postoperative nausea and vomiting)     Prostatitis     Dec. '12-treated with antibx.    Squamous cell carcinoma 01/07/2019    anterior scalp    Squamous cell carcinoma of skin 03/2020    Left post scalp (SCC insitu-saucerization)    Visual impairment        Past Surgical History:   Procedure Laterality Date    APPENDECTOMY  1962    BIOPSY OF BLADDER  4/19/2022    Procedure: BIOPSY, BLADDER;  Surgeon: Lenny Ewing MD;  [facility];  Service: Urology;;    BIOPSY OF BLADDER  3/8/2023    Procedure: BIOPSY, BLADDER;  Surgeon: Lenny Ewing MD;  [facility];  Service: Urology;;    BLADDER FULGURATION  4/19/2022    Procedure: FULGURATION, BLADDER;  Surgeon: Lenny Ewing MD;  [facility];  Service: Urology;;    BLADDER FULGURATION  3/8/2023    Procedure: FULGURATION, BLADDER;  Surgeon: Lenny Ewing MD;  [facility];  Service: Urology;;    COLONOSCOPY  12/2012    due for repeat 12/2015    COLONOSCOPY N/A 5/19/2016    Procedure: COLONOSCOPY;  Surgeon: James Webber MD;  Location: [facility] (4TH FLR);  Service: Endoscopy;  Laterality: N/A; diverticulosis, repeat in 5-10 years for surveillance    COLONOSCOPY N/A 8/9/2021    Procedure: COLONOSCOPY;  Surgeon: Fahad Bautista Jr., MD;  Location: [facility];  Service: Endoscopy;  Laterality: N/A;    CYSTOSCOPY  4/19/2022    Procedure: CYSTOSCOPY;  Surgeon: Lenny Ewing MD;  [facility];  Service: Urology;;    CYSTOSCOPY  6/21/2022    Procedure: CYSTOSCOPY;  Surgeon: Lenny Ewing MD;  [facility];  Service: Urology;;    CYSTOSCOPY  8/23/2022    Procedure: CYSTOSCOPY;  Surgeon: Lenny Ewing MD;  [facility];  Service: Urology;;    CYSTOSCOPY  3/8/2023    Procedure: CYSTOSCOPY-BLUE LIGHT;  Surgeon: Lenny Ewing MD;  Location: [facility] OR [address] [place] [address];  Service: Urology;;    CYSTOSCOPY N/A 4/4/2023    Procedure:  CYSTOSCOPY;  Surgeon: Benjamin Haile MD;  Location: [facility] OR [address] [place] [address];  Service: Urology;  Laterality: N/A;  1 hour    ESOPHAGOGASTRODUODENOSCOPY N/A 8/9/2021    Procedure: EGD (ESOPHAGOGASTRODUODENOSCOPY);  Surgeon: Fahad Bautista Jr., MD;  Location: [facility];  Service: Endoscopy;  Laterality: N/A;    EUA/Fistulotomy-'08      HERNIA REPAIR      RIH with mesh    Hydrocelectomy  2013    MASS EXCISION  2004    BCC removal (twice)    MOLE REMOVAL  02/25/2019    2 moles removed from scalp =- 1 was basal cell and 1 was squamous cell - dermatology - Dr. londono    right Spermatocelectomy  2013    TURBT (TRANSURETHRAL RESECTION OF BLADDER TUMOR) N/A 4/4/2023    Procedure: TURBT (TRANSURETHRAL RESECTION OF BLADDER TUMOR);  Surgeon: Benjamin Haile MD;  Location: [facility] OR [address] [place] [address];  Service: Urology;  Laterality: N/A;  1 hour, blue light    UPPER GASTROINTESTINAL ENDOSCOPY  prior to 2010    hiatal hernia, reflux esophagitis       Family History   Problem Relation Age of Onset    Skin cancer Mother     Hypertension Mother     Skin cancer Father     Cancer Father         prostate cancer    Hypertension Father     Hypertension Maternal Grandmother     Hypertension Maternal Grandfather     Hypertension Paternal Grandmother     Anesthesia problems Paternal Grandmother     Cancer Paternal Grandfather         prostate cancer    Hypertension Paternal Grandfather     Heart disease Paternal Grandfather     Diabetes Neg Hx     Colon polyps Neg Hx     Colon cancer Neg Hx     Melanoma Neg Hx     Psoriasis Neg Hx     Lupus Neg Hx     Eczema Neg Hx     Acne Neg Hx     Cirrhosis Neg Hx     Prostate cancer Neg Hx     Kidney disease Neg Hx     Crohn's disease Neg Hx     Ulcerative colitis Neg Hx     Stomach cancer Neg Hx     Esophageal cancer Neg Hx        Social History     Socioeconomic History    Marital status:      Spouse name: Traci    Number of children: 1   Occupational History    Occupation: [employer]     Employer: [employer]      Comment: [employer]   Tobacco Use    Smoking status: Former     Packs/day: 1.00     Years: 10.00     Pack years: 10.00     Types: Cigarettes     Quit date: 1987     Years since quittin.4    Smokeless tobacco: Never    Tobacco comments:     Decreased lung compassity per patient   Substance and Sexual Activity    Alcohol use: Not Currently     Alcohol/week: 0.0 - 1.0 standard drinks     Comment: No longer drinks ETOH    Drug use: No    Sexual activity: Yes     Partners: Female       Allergies:  Amlodipine, Atenolol, Catechu herb, Irbesartan, Erythromycin, Sumycin [tetracycline], and Tetracyclines    Medications:    Current Outpatient Medications:     albuterol (PROVENTIL/VENTOLIN HFA) 90 mcg/actuation inhaler, Inhale 2 puffs into the lungs every 6 (six) hours as needed for Wheezing (cough)., Disp: 18 g, Rfl: 5    atorvastatin (LIPITOR) 10 MG tablet, Take 1 tablet (10 mg total) by mouth once daily., Disp: 90 tablet, Rfl: 3    cetirizine (ZYRTEC) 10 MG tablet, Take 10 mg by mouth Daily., Disp: , Rfl:     coenzyme Q10 100 mg capsule, Take 200 mg by mouth once daily., Disp: , Rfl:     fluticasone propionate (FLONASE) 50 mcg/actuation nasal spray, 2 sprays (100 mcg total) by Each Nostril route daily as needed for Rhinitis., Disp: 16 mL, Rfl: 5    hydroCHLOROthiazide (MICROZIDE) 12.5 mg capsule, TAKE 2 CAPSULES (25 MG TOTAL) BY MOUTH ONCE DAILY., Disp: 60 capsule, Rfl: 1    Lactobacillus acidophilus (PROBIOTIC ORAL), Take by mouth., Disp: , Rfl:     NIFEdipine (ADALAT CC) 30 MG TbSR, Take 1 tablet (30 mg total) by mouth once daily., Disp: 90 tablet, Rfl: 0    oxybutynin (DITROPAN) 5 MG Tab, Take 1 tablet (5 mg total) by mouth 3 (three) times daily as needed (bladder spasms)., Disp: 30 tablet, Rfl: 0    pomegranate xt/pomegranat seed (POMEGRAN FRUIT XT-POMEGRA SEED ORAL), Take by mouth., Disp: , Rfl:     traMADoL (ULTRAM) 50 mg tablet, Take 1 tablet (50 mg total) by mouth every 12 (twelve) hours as needed for  Pain., Disp: 14 tablet, Rfl: 0    Current Facility-Administered Medications:     BCG live (LORENZA) 50 mg in sodium chloride 0.9% 50 mL bladder instillation, 50 mg, Intravesical, Once, Sparkle Lopez NP    PHYSICAL EXAMINATION:  Physical Exam  Constitutional:       Appearance: Normal appearance.   HENT:      Head: Normocephalic and atraumatic.      Right Ear: External ear normal.      Left Ear: External ear normal.   Pulmonary:      Effort: Pulmonary effort is normal. No respiratory distress.   Skin:     General: Skin is warm and dry.   Neurological:      General: No focal deficit present.      Mental Status: He is alert and oriented to person, place, and time.   Psychiatric:         Mood and Affect: Mood normal.         Behavior: Behavior normal.         LABS:  UA -GH -nitrites       Lab Results   Component Value Date    PSA 0.35 04/13/2023    PSA 0.30 01/28/2022    PSA 0.22 07/29/2020    PSADIAG 0.37 09/08/2014       Lab Results   Component Value Date    CREATININE 1.2 04/13/2023    EGFRNORACEVR >60.0 04/13/2023         IMPRESSION:  Encounter Diagnoses   Name Primary?    Malignant neoplasm of lateral wall of urinary bladder Yes         Assessment:       1. Malignant neoplasm of lateral wall of urinary bladder        Plan:   BCG treatment administered by Nurse Zarate under sterile and BCG precautions. Patient tolerated well.  Patient was instructed to hold medication for 2 hours.   Discussed limiting fluid intake prior to appointment to ensure patient is able to hold medication for designated time.   Discussed post instillation expectations including side effects.   Reviewed how to properly bleach toilet after voiding post instillation.   Recommend drinking plenty of fluids after the 2 hour sarabjit to flush bladder.   Addressed patient's questions and concerns.   Educational material provided about BCG treatment and information discussed during today's visit.  Patient encouraged to contact the office with any additional  questions or concerns.     Follow up in 1 week for BCG treatment 2/5.     I spent 30 minutes with the patient of which more than half was spent in direct consultation with the patient in regards to our treatment and plan.  We addressed the office findings and recent labs.   Education and recommendations of today's plan of care including home remedies and needed follow up with PCP.   We discussed the chief complaint; reviewed the LUTS and the possible contributory factors.   Reassurance no infection.  Recommended lifestyle modifications with a proper, healthy diet, good hydration but during the day. Reducing bladder irritants.   Benefits of regular exercise.

## 2023-05-10 NOTE — PATIENT INSTRUCTIONS
To reduce the risk of contamination, follow these instructions for six hours after every BCG treatment:    - Dont use public toilets.  - Drink lots of fluids to dilute your pee.  - Sit down on the toilet to avoid splashing.  - After you pee, add 2 cups of undiluted bleach to the toilet, close the lid, wait 15 to 20 minutes and then flush. Use bleach for 6-8 hours after treatment.  - If you have urinary incontinence (loss of bladder control), immediately wash your clothes in a washing machine. Dont wash them with other clothes.  - If you wear an incontinence pad, pour bleach on the pad, allow it to soak in, then place it in a plastic bag and discard it in the trash.  - Typically, youll need to refrain from having sex for a few days after each BCG treatment session. In addition, use a condom any time you have sex throughout the entire course of treatment. Ask your healthcare provider about specific guidelines regarding sex.

## 2023-05-11 ENCOUNTER — PATIENT MESSAGE (OUTPATIENT)
Dept: UROLOGY | Facility: CLINIC | Age: 63
End: 2023-05-11
Payer: COMMERCIAL

## 2023-05-11 RX ORDER — OXYBUTYNIN CHLORIDE 5 MG/1
5 TABLET ORAL 3 TIMES DAILY
Qty: 90 TABLET | Refills: 1 | Status: SHIPPED | OUTPATIENT
Start: 2023-05-11 | End: 2023-07-11

## 2023-05-11 NOTE — PROGRESS NOTES
From: Stacey Noel  To: Kyle Berrios MD  Sent: 11/5/2021 4:22 PM CDT  Subject: Post-op medication question     Hi Dr. Liset Cline! I had surgery on 10/13 where a brain tumor was removed.  At [facility], they started me back on some of my rheumatoid arthri Orders only -- ditropan

## 2023-05-15 RX ORDER — PHENAZOPYRIDINE HYDROCHLORIDE 100 MG/1
100 TABLET, FILM COATED ORAL 3 TIMES DAILY PRN
Qty: 20 TABLET | Refills: 0 | Status: SHIPPED | OUTPATIENT
Start: 2023-05-15 | End: 2023-07-18

## 2023-05-17 ENCOUNTER — OFFICE VISIT (OUTPATIENT)
Dept: UROLOGY | Facility: CLINIC | Age: 63
End: 2023-05-17
Payer: COMMERCIAL

## 2023-05-17 VITALS
BODY MASS INDEX: 31.14 KG/M2 | DIASTOLIC BLOOD PRESSURE: 102 MMHG | HEART RATE: 64 BPM | HEIGHT: 73 IN | WEIGHT: 235 LBS | SYSTOLIC BLOOD PRESSURE: 158 MMHG

## 2023-05-17 DIAGNOSIS — C67.2 MALIGNANT NEOPLASM OF LATERAL WALL OF URINARY BLADDER: Primary | ICD-10-CM

## 2023-05-17 LAB
BILIRUB SERPL-MCNC: NORMAL MG/DL
BLOOD URINE, POC: 50
CLARITY, POC UA: NORMAL
COLOR, POC UA: YELLOW
GLUCOSE UR QL STRIP: NORMAL
KETONES UR QL STRIP: NORMAL
LEUKOCYTE ESTERASE URINE, POC: NORMAL
NITRITE, POC UA: NORMAL
PH, POC UA: 7
PROTEIN, POC: NORMAL
SPECIFIC GRAVITY, POC UA: 1.01
UROBILINOGEN, POC UA: NORMAL

## 2023-05-17 PROCEDURE — 99499 UNLISTED E&M SERVICE: CPT | Mod: S$GLB,,,

## 2023-05-17 PROCEDURE — 3288F PR FALLS RISK ASSESSMENT DOCUMENTED: ICD-10-PCS | Mod: CPTII,S$GLB,,

## 2023-05-17 PROCEDURE — 1159F PR MEDICATION LIST DOCUMENTED IN MEDICAL RECORD: ICD-10-PCS | Mod: CPTII,S$GLB,,

## 2023-05-17 PROCEDURE — 51720 PR INSTILL, ANTICANCER AGENT, BLADDER: ICD-10-PCS | Mod: S$GLB,,,

## 2023-05-17 PROCEDURE — 3288F FALL RISK ASSESSMENT DOCD: CPT | Mod: CPTII,S$GLB,,

## 2023-05-17 PROCEDURE — 1160F PR REVIEW ALL MEDS BY PRESCRIBER/CLIN PHARMACIST DOCUMENTED: ICD-10-PCS | Mod: CPTII,S$GLB,,

## 2023-05-17 PROCEDURE — 3077F PR MOST RECENT SYSTOLIC BLOOD PRESSURE >= 140 MM HG: ICD-10-PCS | Mod: CPTII,S$GLB,,

## 2023-05-17 PROCEDURE — 3080F DIAST BP >= 90 MM HG: CPT | Mod: CPTII,S$GLB,,

## 2023-05-17 PROCEDURE — 1101F PR PT FALLS ASSESS DOC 0-1 FALLS W/OUT INJ PAST YR: ICD-10-PCS | Mod: CPTII,S$GLB,,

## 2023-05-17 PROCEDURE — 3008F PR BODY MASS INDEX (BMI) DOCUMENTED: ICD-10-PCS | Mod: CPTII,S$GLB,,

## 2023-05-17 PROCEDURE — 3077F SYST BP >= 140 MM HG: CPT | Mod: CPTII,S$GLB,,

## 2023-05-17 PROCEDURE — 99999 PR PBB SHADOW E&M-EST. PATIENT-LVL III: ICD-10-PCS | Mod: PBBFAC,,,

## 2023-05-17 PROCEDURE — 81002 POCT URINE DIPSTICK WITHOUT MICROSCOPE: ICD-10-PCS | Mod: S$GLB,,,

## 2023-05-17 PROCEDURE — 99999 PR PBB SHADOW E&M-EST. PATIENT-LVL III: CPT | Mod: PBBFAC,,,

## 2023-05-17 PROCEDURE — 81002 URINALYSIS NONAUTO W/O SCOPE: CPT | Mod: S$GLB,,,

## 2023-05-17 PROCEDURE — 3080F PR MOST RECENT DIASTOLIC BLOOD PRESSURE >= 90 MM HG: ICD-10-PCS | Mod: CPTII,S$GLB,,

## 2023-05-17 PROCEDURE — 51720 TREATMENT OF BLADDER LESION: CPT | Mod: S$GLB,,,

## 2023-05-17 PROCEDURE — 1101F PT FALLS ASSESS-DOCD LE1/YR: CPT | Mod: CPTII,S$GLB,,

## 2023-05-17 PROCEDURE — 3008F BODY MASS INDEX DOCD: CPT | Mod: CPTII,S$GLB,,

## 2023-05-17 PROCEDURE — 99499 NO LOS: ICD-10-PCS | Mod: S$GLB,,,

## 2023-05-17 PROCEDURE — 1159F MED LIST DOCD IN RCRD: CPT | Mod: CPTII,S$GLB,,

## 2023-05-17 PROCEDURE — 1160F RVW MEDS BY RX/DR IN RCRD: CPT | Mod: CPTII,S$GLB,,

## 2023-05-17 NOTE — PROGRESS NOTES
CHIEF COMPLAINT:  Repeat BCG #2/5      HISTORY OF PRESENTING ILLINESS:  Joseph Jesus is a 63 y.o. male established pt of Dr. Haile. Here today for repeat BCG #2/5. He had gross hematuria and bladder spasms after his BCG tx last week, lasted 1.5 days. No GH or UTI s/s today. He takes Ditropan PRN for bladder spasms.       He had microhematuria and was found to have an erythematous lesion with a positive cytology. On 4/19/2022 he underwent bladder biopsy showing CIS. He then underwent a TURBT on 6/21/22 showing CIS.  Muscle was present. Another TURBT was performed 8/23/22.  Dr. Ewing went over his pathology demonstrating cancer from 8/23/22. CIS is high grade and BCG recommend. He is high risk. If he responds, he will need maintenance until 9/2025. Will need blue light cysto scheduled.         Urologic History:   4/1/2022 -- Office Cytoscopy -- erythema right lateral wall  4/5/2022 -- CTUrogram -- no renal mass, no bladder mass, non-obstructing renal stones  4/19/2022 -- Bladder bx -- CIS  6/21/2022 -- TURBT -- CIS, muscle present and uninvolved  8/23/2022 -- re-TURBT -- CIS, muscle present and uninvolved  12/2022 -- completed 5 dose induction BCG  3/8/2023 -- Blue light TURBT -- HG Ta, random bladder bx -- CIS  4/4/2023 - Blue light TURBT, path normal      REVIEW OF SYSTEMS:  Review of Systems   Constitutional:  Negative for chills and fever.   HENT:  Negative for congestion and sore throat.    Respiratory:  Negative for cough and shortness of breath.    Cardiovascular:  Negative for chest pain and palpitations.   Gastrointestinal:  Negative for nausea and vomiting.   Genitourinary:  Negative for dysuria, flank pain, frequency, hematuria and urgency.   Neurological:  Negative for dizziness and headaches.         PATIENT HISTORY:  Past Medical History:   Diagnosis Date    Allergy     seasonal    Anal fistula hx    Arthritis     Basal cell carcinoma 04/2013    left superior forehead    Callus     Diverticulosis      Epididymal cyst     GERD (gastroesophageal reflux disease)     Hayfever     Hydrocele, right     Hyperlipidemia     Hypertension     Neck pain     hx of muscular inury from weight lifting---resolved now    PONV (postoperative nausea and vomiting)     Prostatitis     Dec. '12-treated with antibx.    Squamous cell carcinoma 01/07/2019    anterior scalp    Squamous cell carcinoma of skin 03/2020    Left post scalp (SCC insitu-saucerization)    Visual impairment        Past Surgical History:   Procedure Laterality Date    APPENDECTOMY  1962    BIOPSY OF BLADDER  4/19/2022    Procedure: BIOPSY, BLADDER;  Surgeon: Lenny Ewing MD;  [facility];  Service: Urology;;    BIOPSY OF BLADDER  3/8/2023    Procedure: BIOPSY, BLADDER;  Surgeon: Lenny Ewing MD;  [facility];  Service: Urology;;    BLADDER FULGURATION  4/19/2022    Procedure: FULGURATION, BLADDER;  Surgeon: Lenny Ewing MD;  [facility];  Service: Urology;;    BLADDER FULGURATION  3/8/2023    Procedure: FULGURATION, BLADDER;  Surgeon: Lenny Ewing MD;  [facility];  Service: Urology;;    COLONOSCOPY  12/2012    due for repeat 12/2015    COLONOSCOPY N/A 5/19/2016    Procedure: COLONOSCOPY;  Surgeon: James Webber MD;  Location: [facility] (4TH FLR);  Service: Endoscopy;  Laterality: N/A; diverticulosis, repeat in 5-10 years for surveillance    COLONOSCOPY N/A 8/9/2021    Procedure: COLONOSCOPY;  Surgeon: Fahad Bautista Jr., MD;  Location: [facility] ENDO;  Service: Endoscopy;  Laterality: N/A;    CYSTOSCOPY  4/19/2022    Procedure: CYSTOSCOPY;  Surgeon: Lenny Ewing MD;  [facility];  Service: Urology;;    CYSTOSCOPY  6/21/2022    Procedure: CYSTOSCOPY;  Surgeon: Lenny Ewing MD;  Location: [facility] OR [address] [place] [address];  Service: Urology;;    CYSTOSCOPY  8/23/2022    Procedure: CYSTOSCOPY;  Surgeon: Lenny Ewing MD;  [facility];  Service: Urology;;    CYSTOSCOPY   3/8/2023    Procedure: CYSTOSCOPY-BLUE LIGHT;  Surgeon: Lenny Ewing MD;  [facility];  Service: Urology;;    CYSTOSCOPY N/A 4/4/2023    Procedure: CYSTOSCOPY;  Surgeon: Benjamin Haile MD;  [facility];  Service: Urology;  Laterality: N/A;  1 hour    ESOPHAGOGASTRODUODENOSCOPY N/A 8/9/2021    Procedure: EGD (ESOPHAGOGASTRODUODENOSCOPY);  Surgeon: Fahad Bautista Jr., MD;  Location: [facility];  Service: Endoscopy;  Laterality: N/A;    EUA/Fistulotomy-'08      HERNIA REPAIR      RIH with mesh    Hydrocelectomy  2013    MASS EXCISION  2004    BCC removal (twice)    MOLE REMOVAL  02/25/2019    2 moles removed from scalp =- 1 was basal cell and 1 was squamous cell - dermatology - Dr. londono    right Spermatocelectomy  2013    TURBT (TRANSURETHRAL RESECTION OF BLADDER TUMOR) N/A 4/4/2023    Procedure: TURBT (TRANSURETHRAL RESECTION OF BLADDER TUMOR);  Surgeon: Benjamin Haile MD;  Location: [facility] OR [address] [place] [address];  Service: Urology;  Laterality: N/A;  1 hour, blue light    UPPER GASTROINTESTINAL ENDOSCOPY  prior to 2010    hiatal hernia, reflux esophagitis       Family History   Problem Relation Age of Onset    Skin cancer Mother     Hypertension Mother     Skin cancer Father     Cancer Father         prostate cancer    Hypertension Father     Hypertension Maternal Grandmother     Hypertension Maternal Grandfather     Hypertension Paternal Grandmother     Anesthesia problems Paternal Grandmother     Cancer Paternal Grandfather         prostate cancer    Hypertension Paternal Grandfather     Heart disease Paternal Grandfather     Diabetes Neg Hx     Colon polyps Neg Hx     Colon cancer Neg Hx     Melanoma Neg Hx     Psoriasis Neg Hx     Lupus Neg Hx     Eczema Neg Hx     Acne Neg Hx     Cirrhosis Neg Hx     Prostate cancer Neg Hx     Kidney disease Neg Hx     Crohn's disease Neg Hx     Ulcerative colitis Neg Hx     Stomach cancer Neg Hx     Esophageal cancer Neg Hx        Social History      Socioeconomic History    Marital status:      Spouse name: Traci    Number of children: 1   Occupational History    Occupation: Software Eng.     Employer: [employer]     Comment: [employer]   Tobacco Use    Smoking status: Former     Packs/day: 1.00     Years: 10.00     Pack years: 10.00     Types: Cigarettes     Quit date: 1987     Years since quittin.4    Smokeless tobacco: Never    Tobacco comments:     Decreased lung compassity per patient   Substance and Sexual Activity    Alcohol use: Not Currently     Alcohol/week: 0.0 - 1.0 standard drinks     Comment: No longer drinks ETOH    Drug use: No    Sexual activity: Yes     Partners: Female       Allergies:  Amlodipine, Atenolol, Catechu herb, Irbesartan, Erythromycin, Sumycin [tetracycline], and Tetracyclines    Medications:    Current Outpatient Medications:     albuterol (PROVENTIL/VENTOLIN HFA) 90 mcg/actuation inhaler, Inhale 2 puffs into the lungs every 6 (six) hours as needed for Wheezing (cough)., Disp: 18 g, Rfl: 5    atorvastatin (LIPITOR) 10 MG tablet, Take 1 tablet (10 mg total) by mouth once daily., Disp: 90 tablet, Rfl: 3    cetirizine (ZYRTEC) 10 MG tablet, Take 10 mg by mouth Daily., Disp: , Rfl:     coenzyme Q10 100 mg capsule, Take 200 mg by mouth once daily., Disp: , Rfl:     fluticasone propionate (FLONASE) 50 mcg/actuation nasal spray, 2 sprays (100 mcg total) by Each Nostril route daily as needed for Rhinitis., Disp: 16 mL, Rfl: 5    hydroCHLOROthiazide (MICROZIDE) 12.5 mg capsule, TAKE 2 CAPSULES (25 MG TOTAL) BY MOUTH ONCE DAILY., Disp: 60 capsule, Rfl: 1    Lactobacillus acidophilus (PROBIOTIC ORAL), Take by mouth., Disp: , Rfl:     NIFEdipine (ADALAT CC) 30 MG TbSR, Take 1 tablet (30 mg total) by mouth once daily., Disp: 90 tablet, Rfl: 0    oxybutynin (DITROPAN) 5 MG Tab, Take 1 tablet (5 mg total) by mouth 3 (three) times daily as needed (bladder spasms)., Disp: 30 tablet, Rfl: 0    oxybutynin (DITROPAN) 5 MG Tab, Take  1 tablet (5 mg total) by mouth 3 (three) times daily., Disp: 90 tablet, Rfl: 1    phenazopyridine (PYRIDIUM) 100 MG tablet, Take 1 tablet (100 mg total) by mouth 3 (three) times daily as needed for Pain., Disp: 20 tablet, Rfl: 0    pomegranate xt/pomegranat seed (POMEGRAN FRUIT XT-POMEGRA SEED ORAL), Take by mouth., Disp: , Rfl:     traMADoL (ULTRAM) 50 mg tablet, Take 1 tablet (50 mg total) by mouth every 12 (twelve) hours as needed for Pain., Disp: 14 tablet, Rfl: 0  No current facility-administered medications for this visit.    PHYSICAL EXAMINATION:  Physical Exam  Constitutional:       Appearance: Normal appearance.   HENT:      Head: Normocephalic and atraumatic.      Right Ear: External ear normal.      Left Ear: External ear normal.   Pulmonary:      Effort: Pulmonary effort is normal. No respiratory distress.   Skin:     General: Skin is warm and dry.   Neurological:      General: No focal deficit present.      Mental Status: He is alert and oriented to person, place, and time.   Psychiatric:         Mood and Affect: Mood normal.         Behavior: Behavior normal.         LABS:  UA: 50 blood, no GH or signs of infection      Lab Results   Component Value Date    PSA 0.35 04/13/2023    PSA 0.30 01/28/2022    PSA 0.22 07/29/2020    PSADIAG 0.37 09/08/2014       Lab Results   Component Value Date    CREATININE 1.2 04/13/2023    EGFRNORACEVR >60.0 04/13/2023           IMPRESSION:  Encounter Diagnoses   Name Primary?    Malignant neoplasm of lateral wall of urinary bladder Yes         Assessment:       1. Malignant neoplasm of lateral wall of urinary bladder        Plan:   - continue Ditropan PRN    BCG treatment administered by Nurse Zarate under sterile and BCG precautions. Patient tolerated well.  Patient was instructed to hold medication for 2 hours.   Discussed limiting fluid intake prior to appointment to ensure patient is able to hold medication for designated time.   Discussed post instillation  expectations including side effects.   Reviewed how to properly bleach toilet after voiding post instillation.   Recommend drinking plenty of fluids after the 2 hour sarabjit to flush bladder.   Addressed patient's questions and concerns.   Educational material provided about BCG treatment and information discussed during today's visit.  Patient encouraged to contact the office with any additional questions or concerns.      Follow up in 1 week for BCG treatment 3/5.      I spent 30 minutes with the patient of which more than half was spent in direct consultation with the patient in regards to our treatment and plan.  We addressed the office findings and recent labs.   Education and recommendations of today's plan of care including home remedies and needed follow up with PCP.   We discussed the chief complaint; reviewed the LUTS and the possible contributory factors.   Reassurance no infection.  Recommended lifestyle modifications with a proper, healthy diet, good hydration but during the day. Reducing bladder irritants.   Benefits of regular exercise.

## 2023-05-24 ENCOUNTER — OFFICE VISIT (OUTPATIENT)
Dept: UROLOGY | Facility: CLINIC | Age: 63
End: 2023-05-24
Payer: COMMERCIAL

## 2023-05-24 VITALS
DIASTOLIC BLOOD PRESSURE: 119 MMHG | SYSTOLIC BLOOD PRESSURE: 182 MMHG | HEIGHT: 73 IN | HEART RATE: 72 BPM | BODY MASS INDEX: 31.14 KG/M2 | WEIGHT: 235 LBS

## 2023-05-24 DIAGNOSIS — C67.2 MALIGNANT NEOPLASM OF LATERAL WALL OF URINARY BLADDER: Primary | ICD-10-CM

## 2023-05-24 LAB
BILIRUB SERPL-MCNC: NORMAL MG/DL
BLOOD URINE, POC: NORMAL
CLARITY, POC UA: CLEAR
COLOR, POC UA: YELLOW
GLUCOSE UR QL STRIP: NORMAL
KETONES UR QL STRIP: NORMAL
LEUKOCYTE ESTERASE URINE, POC: NORMAL
NITRITE, POC UA: NORMAL
PH, POC UA: 5
PROTEIN, POC: NORMAL
SPECIFIC GRAVITY, POC UA: 1.02
UROBILINOGEN, POC UA: NORMAL

## 2023-05-24 PROCEDURE — 51720 TREATMENT OF BLADDER LESION: CPT | Mod: S$GLB,,,

## 2023-05-24 PROCEDURE — 3008F BODY MASS INDEX DOCD: CPT | Mod: CPTII,S$GLB,,

## 2023-05-24 PROCEDURE — 51720 PR INSTILL, ANTICANCER AGENT, BLADDER: ICD-10-PCS | Mod: S$GLB,,,

## 2023-05-24 PROCEDURE — 1160F RVW MEDS BY RX/DR IN RCRD: CPT | Mod: CPTII,S$GLB,,

## 2023-05-24 PROCEDURE — 3077F SYST BP >= 140 MM HG: CPT | Mod: CPTII,S$GLB,,

## 2023-05-24 PROCEDURE — 3080F DIAST BP >= 90 MM HG: CPT | Mod: CPTII,S$GLB,,

## 2023-05-24 PROCEDURE — 1159F MED LIST DOCD IN RCRD: CPT | Mod: CPTII,S$GLB,,

## 2023-05-24 PROCEDURE — 99999 PR PBB SHADOW E&M-EST. PATIENT-LVL IV: ICD-10-PCS | Mod: PBBFAC,,,

## 2023-05-24 PROCEDURE — 3008F PR BODY MASS INDEX (BMI) DOCUMENTED: ICD-10-PCS | Mod: CPTII,S$GLB,,

## 2023-05-24 PROCEDURE — 99499 NO LOS: ICD-10-PCS | Mod: S$GLB,,,

## 2023-05-24 PROCEDURE — 1159F PR MEDICATION LIST DOCUMENTED IN MEDICAL RECORD: ICD-10-PCS | Mod: CPTII,S$GLB,,

## 2023-05-24 PROCEDURE — 81002 POCT URINE DIPSTICK WITHOUT MICROSCOPE: ICD-10-PCS | Mod: S$GLB,,,

## 2023-05-24 PROCEDURE — 1160F PR REVIEW ALL MEDS BY PRESCRIBER/CLIN PHARMACIST DOCUMENTED: ICD-10-PCS | Mod: CPTII,S$GLB,,

## 2023-05-24 PROCEDURE — 3077F PR MOST RECENT SYSTOLIC BLOOD PRESSURE >= 140 MM HG: ICD-10-PCS | Mod: CPTII,S$GLB,,

## 2023-05-24 PROCEDURE — 99999 PR PBB SHADOW E&M-EST. PATIENT-LVL IV: CPT | Mod: PBBFAC,,,

## 2023-05-24 PROCEDURE — 81002 URINALYSIS NONAUTO W/O SCOPE: CPT | Mod: S$GLB,,,

## 2023-05-24 PROCEDURE — 3080F PR MOST RECENT DIASTOLIC BLOOD PRESSURE >= 90 MM HG: ICD-10-PCS | Mod: CPTII,S$GLB,,

## 2023-05-24 PROCEDURE — 99499 UNLISTED E&M SERVICE: CPT | Mod: S$GLB,,,

## 2023-05-24 NOTE — PROGRESS NOTES
CHIEF COMPLAINT:  BCG #3/5    HISTORY OF PRESENTING ILLINESS:  Joseph Jesus is a 63 y.o. male established pt of Dr. Haile. Here today for repeat BCG #3/5. No GH or UTI s/s today. He takes Ditropan PRN for bladder spasms.      He had microhematuria and was found to have an erythematous lesion with a positive cytology. On 4/19/2022 he underwent bladder biopsy showing CIS. He then underwent a TURBT on 6/21/22 showing CIS.  Muscle was present. Another TURBT was performed 8/23/22.  Dr. Ewing went over his pathology demonstrating cancer from 8/23/22. CIS is high grade and BCG recommend. He is high risk. If he responds, he will need maintenance until 9/2025. Will need blue light cysto scheduled.       Urologic History:   4/1/2022 -- Office Cytoscopy -- erythema right lateral wall  4/5/2022 -- CTUrogram -- no renal mass, no bladder mass, non-obstructing renal stones  4/19/2022 -- Bladder bx -- CIS  6/21/2022 -- TURBT -- CIS, muscle present and uninvolved  8/23/2022 -- re-TURBT -- CIS, muscle present and uninvolved  12/2022 -- completed 5 dose induction BCG  3/8/2023 -- Blue light TURBT -- HG Ta, random bladder bx -- CIS  4/4/2023 - Blue light TURBT, path normal        REVIEW OF SYSTEMS:  Review of Systems   Constitutional:  Negative for chills and fever.   HENT:  Negative for congestion and sore throat.    Respiratory:  Negative for cough and shortness of breath.    Cardiovascular:  Negative for chest pain and palpitations.   Gastrointestinal:  Negative for nausea and vomiting.   Genitourinary:  Negative for flank pain, frequency, hematuria and urgency.   Neurological:  Negative for dizziness and headaches.       PATIENT HISTORY:  Past Medical History:   Diagnosis Date    Allergy     seasonal    Anal fistula hx    Arthritis     Basal cell carcinoma 04/2013    left superior forehead    Callus     Diverticulosis     Epididymal cyst     GERD (gastroesophageal reflux disease)     Hayfever     Hydrocele, right      Hyperlipidemia     Hypertension     Neck pain     hx of muscular inury from weight lifting---resolved now    PONV (postoperative nausea and vomiting)     Prostatitis     Dec. '12-treated with antibx.    Squamous cell carcinoma 01/07/2019    anterior scalp    Squamous cell carcinoma of skin 03/2020    Left post scalp (SCC insitu-saucerization)    Visual impairment        Past Surgical History:   Procedure Laterality Date    APPENDECTOMY  1962    BIOPSY OF BLADDER  4/19/2022    Procedure: BIOPSY, BLADDER;  Surgeon: Lenny Ewing MD;  [facility];  Service: Urology;;    BIOPSY OF BLADDER  3/8/2023    Procedure: BIOPSY, BLADDER;  Surgeon: Lenny Ewing MD;  [facility];  Service: Urology;;    BLADDER FULGURATION  4/19/2022    Procedure: FULGURATION, BLADDER;  Surgeon: Lenny Ewing MD;  [facility];  Service: Urology;;    BLADDER FULGURATION  3/8/2023    Procedure: FULGURATION, BLADDER;  Surgeon: Lenny Ewing MD;  [facility];  Service: Urology;;    COLONOSCOPY  12/2012    due for repeat 12/2015    COLONOSCOPY N/A 5/19/2016    Procedure: COLONOSCOPY;  Surgeon: James Webber MD;  Location: [facility] (4TH FLR);  Service: Endoscopy;  Laterality: N/A; diverticulosis, repeat in 5-10 years for surveillance    COLONOSCOPY N/A 8/9/2021    Procedure: COLONOSCOPY;  Surgeon: Fahad Bautista Jr., MD;  Location: [facility];  Service: Endoscopy;  Laterality: N/A;    CYSTOSCOPY  4/19/2022    Procedure: CYSTOSCOPY;  Surgeon: Lenny Ewing MD;  [facility];  Service: Urology;;    CYSTOSCOPY  6/21/2022    Procedure: CYSTOSCOPY;  Surgeon: Lenny Ewing MD;  Location: [facility] OR [address] [place] [address];  Service: Urology;;    CYSTOSCOPY  8/23/2022    Procedure: CYSTOSCOPY;  Surgeon: Lenny Ewing MD;  [facility];  Service: Urology;;    CYSTOSCOPY  3/8/2023    Procedure: CYSTOSCOPY-BLUE LIGHT;  Surgeon: Lenny Ewing MD;  [facility]: [facility];  Service: Urology;;    CYSTOSCOPY N/A 4/4/2023    Procedure: CYSTOSCOPY;  Surgeon: Benjamin Haile MD;  Location: [facility] OR 1ST FLR;  Service: Urology;  Laterality: N/A;  1 hour    ESOPHAGOGASTRODUODENOSCOPY N/A 8/9/2021    Procedure: EGD (ESOPHAGOGASTRODUODENOSCOPY);  Surgeon: Fahad Bautista Jr., MD;  Location: [facility];  Service: Endoscopy;  Laterality: N/A;    EUA/Fistulotomy-'08      HERNIA REPAIR      RI with mesh    Hydrocelectomy  2013    MASS EXCISION  2004    BCC removal (twice)    MOLE REMOVAL  02/25/2019    2 moles removed from scalp =- 1 was basal cell and 1 was squamous cell - dermatology - Dr. londono    right Spermatocelectomy  2013    TURBT (TRANSURETHRAL RESECTION OF BLADDER TUMOR) N/A 4/4/2023    Procedure: TURBT (TRANSURETHRAL RESECTION OF BLADDER TUMOR);  Surgeon: Benjamin Haile MD;  [facility];  Service: Urology;  Laterality: N/A;  1 hour, blue light    UPPER GASTROINTESTINAL ENDOSCOPY  prior to 2010    hiatal hernia, reflux esophagitis       Family History   Problem Relation Age of Onset    Skin cancer Mother     Hypertension Mother     Skin cancer Father     Cancer Father         prostate cancer    Hypertension Father     Hypertension Maternal Grandmother     Hypertension Maternal Grandfather     Hypertension Paternal Grandmother     Anesthesia problems Paternal Grandmother     Cancer Paternal Grandfather         prostate cancer    Hypertension Paternal Grandfather     Heart disease Paternal Grandfather     Diabetes Neg Hx     Colon polyps Neg Hx     Colon cancer Neg Hx     Melanoma Neg Hx     Psoriasis Neg Hx     Lupus Neg Hx     Eczema Neg Hx     Acne Neg Hx     Cirrhosis Neg Hx     Prostate cancer Neg Hx     Kidney disease Neg Hx     Crohn's disease Neg Hx     Ulcerative colitis Neg Hx     Stomach cancer Neg Hx     Esophageal cancer Neg Hx        Social History     Socioeconomic History    Marital status:      Spouse name: Traci    Number of children: 1    Occupational History    Occupation: [employer]     Employer: [employer]     Comment: [employer]   Tobacco Use    Smoking status: Former     Packs/day: 1.00     Years: 10.00     Pack years: 10.00     Types: Cigarettes     Quit date: 1987     Years since quittin.4    Smokeless tobacco: Never    Tobacco comments:     Decreased lung compassity per patient   Substance and Sexual Activity    Alcohol use: Not Currently     Alcohol/week: 0.0 - 1.0 standard drinks     Comment: No longer drinks ETOH    Drug use: No    Sexual activity: Yes     Partners: Female       Allergies:  Amlodipine, Atenolol, Catechu herb, Irbesartan, Erythromycin, Sumycin [tetracycline], and Tetracyclines    Medications:    Current Outpatient Medications:     albuterol (PROVENTIL/VENTOLIN HFA) 90 mcg/actuation inhaler, Inhale 2 puffs into the lungs every 6 (six) hours as needed for Wheezing (cough)., Disp: 18 g, Rfl: 5    atorvastatin (LIPITOR) 10 MG tablet, Take 1 tablet (10 mg total) by mouth once daily., Disp: 90 tablet, Rfl: 3    cetirizine (ZYRTEC) 10 MG tablet, Take 10 mg by mouth Daily., Disp: , Rfl:     coenzyme Q10 100 mg capsule, Take 200 mg by mouth once daily., Disp: , Rfl:     fluticasone propionate (FLONASE) 50 mcg/actuation nasal spray, 2 sprays (100 mcg total) by Each Nostril route daily as needed for Rhinitis., Disp: 16 mL, Rfl: 5    hydroCHLOROthiazide (MICROZIDE) 12.5 mg capsule, TAKE 2 CAPSULES (25 MG TOTAL) BY MOUTH ONCE DAILY., Disp: 60 capsule, Rfl: 1    Lactobacillus acidophilus (PROBIOTIC ORAL), Take by mouth., Disp: , Rfl:     NIFEdipine (ADALAT CC) 30 MG TbSR, Take 1 tablet (30 mg total) by mouth once daily., Disp: 90 tablet, Rfl: 0    oxybutynin (DITROPAN) 5 MG Tab, Take 1 tablet (5 mg total) by mouth 3 (three) times daily as needed (bladder spasms)., Disp: 30 tablet, Rfl: 0    oxybutynin (DITROPAN) 5 MG Tab, Take 1 tablet (5 mg total) by mouth 3 (three) times daily., Disp: 90 tablet, Rfl: 1    phenazopyridine  (PYRIDIUM) 100 MG tablet, Take 1 tablet (100 mg total) by mouth 3 (three) times daily as needed for Pain., Disp: 20 tablet, Rfl: 0    pomegranate xt/pomegranat seed (POMEGRAN FRUIT XT-POMEGRA SEED ORAL), Take by mouth., Disp: , Rfl:     traMADoL (ULTRAM) 50 mg tablet, Take 1 tablet (50 mg total) by mouth every 12 (twelve) hours as needed for Pain., Disp: 14 tablet, Rfl: 0  No current facility-administered medications for this visit.    PHYSICAL EXAMINATION:  Physical Exam  Constitutional:       Appearance: Normal appearance.   HENT:      Head: Normocephalic and atraumatic.      Right Ear: External ear normal.      Left Ear: External ear normal.   Pulmonary:      Effort: Pulmonary effort is normal. No respiratory distress.   Skin:     General: Skin is warm and dry.   Neurological:      General: No focal deficit present.      Mental Status: He is alert and oriented to person, place, and time.   Psychiatric:         Mood and Affect: Mood normal.         Behavior: Behavior normal.       LABS:  - blood, - leuks, - nitrites    Lab Results   Component Value Date    PSA 0.35 04/13/2023    PSA 0.30 01/28/2022    PSA 0.22 07/29/2020    PSADIAG 0.37 09/08/2014       Lab Results   Component Value Date    CREATININE 1.2 04/13/2023    EGFRNORACEVR >60.0 04/13/2023         IMPRESSION:  Encounter Diagnoses   Name Primary?    Malignant neoplasm of lateral wall of urinary bladder Yes         Assessment:       1. Malignant neoplasm of lateral wall of urinary bladder        Plan:   - continue Ditropan PRN     BCG treatment administered by Nurse Zarate under sterile and BCG precautions. Patient tolerated well.  Patient was instructed to hold medication for 2 hours.   Discussed limiting fluid intake prior to appointment to ensure patient is able to hold medication for designated time.   Discussed post instillation expectations including side effects.   Reviewed how to properly bleach toilet after voiding post instillation.   Recommend  drinking plenty of fluids after the 2 hour sarabjit to flush bladder.   Addressed patient's questions and concerns.   Educational material provided about BCG treatment and information discussed during today's visit.  Patient encouraged to contact the office with any additional questions or concerns.      Follow up in 1 week for BCG treatment 4/5.      I spent 30 minutes with the patient of which more than half was spent in direct consultation with the patient in regards to our treatment and plan.

## 2023-05-31 ENCOUNTER — OFFICE VISIT (OUTPATIENT)
Dept: UROLOGY | Facility: CLINIC | Age: 63
End: 2023-05-31
Payer: COMMERCIAL

## 2023-05-31 VITALS
SYSTOLIC BLOOD PRESSURE: 149 MMHG | HEIGHT: 73 IN | HEART RATE: 65 BPM | DIASTOLIC BLOOD PRESSURE: 102 MMHG | BODY MASS INDEX: 31.01 KG/M2

## 2023-05-31 DIAGNOSIS — C67.2 MALIGNANT NEOPLASM OF LATERAL WALL OF URINARY BLADDER: Primary | ICD-10-CM

## 2023-05-31 PROCEDURE — 3080F PR MOST RECENT DIASTOLIC BLOOD PRESSURE >= 90 MM HG: ICD-10-PCS | Mod: CPTII,S$GLB,,

## 2023-05-31 PROCEDURE — 51720 PR INSTILL, ANTICANCER AGENT, BLADDER: ICD-10-PCS | Mod: S$GLB,,,

## 2023-05-31 PROCEDURE — 1159F PR MEDICATION LIST DOCUMENTED IN MEDICAL RECORD: ICD-10-PCS | Mod: CPTII,S$GLB,,

## 2023-05-31 PROCEDURE — 3077F SYST BP >= 140 MM HG: CPT | Mod: CPTII,S$GLB,,

## 2023-05-31 PROCEDURE — 3077F PR MOST RECENT SYSTOLIC BLOOD PRESSURE >= 140 MM HG: ICD-10-PCS | Mod: CPTII,S$GLB,,

## 2023-05-31 PROCEDURE — 1160F PR REVIEW ALL MEDS BY PRESCRIBER/CLIN PHARMACIST DOCUMENTED: ICD-10-PCS | Mod: CPTII,S$GLB,,

## 2023-05-31 PROCEDURE — 99499 NO LOS: ICD-10-PCS | Mod: S$GLB,,,

## 2023-05-31 PROCEDURE — 1160F RVW MEDS BY RX/DR IN RCRD: CPT | Mod: CPTII,S$GLB,,

## 2023-05-31 PROCEDURE — 3080F DIAST BP >= 90 MM HG: CPT | Mod: CPTII,S$GLB,,

## 2023-05-31 PROCEDURE — 1159F MED LIST DOCD IN RCRD: CPT | Mod: CPTII,S$GLB,,

## 2023-05-31 PROCEDURE — 3008F BODY MASS INDEX DOCD: CPT | Mod: CPTII,S$GLB,,

## 2023-05-31 PROCEDURE — 51720 TREATMENT OF BLADDER LESION: CPT | Mod: S$GLB,,,

## 2023-05-31 PROCEDURE — 3008F PR BODY MASS INDEX (BMI) DOCUMENTED: ICD-10-PCS | Mod: CPTII,S$GLB,,

## 2023-05-31 PROCEDURE — 99999 PR PBB SHADOW E&M-EST. PATIENT-LVL III: ICD-10-PCS | Mod: PBBFAC,,,

## 2023-05-31 PROCEDURE — 99999 PR PBB SHADOW E&M-EST. PATIENT-LVL III: CPT | Mod: PBBFAC,,,

## 2023-05-31 PROCEDURE — 99499 UNLISTED E&M SERVICE: CPT | Mod: S$GLB,,,

## 2023-05-31 NOTE — PROGRESS NOTES
CHIEF COMPLAINT:  BCG #4/5      HISTORY OF PRESENTING ILLINESS:  Joseph Jesus is a 63 y.o. male established pt of Dr. Haile. Here today for repeat BCG #4/5. No GH or UTI s/s today. He takes Ditropan PRN for bladder spasms.      He had microhematuria and was found to have an erythematous lesion with a positive cytology. On 4/19/2022 he underwent bladder biopsy showing CIS. He then underwent a TURBT on 6/21/22 showing CIS.  Muscle was present. Another TURBT was performed 8/23/22.  Dr. Ewing went over his pathology demonstrating cancer from 8/23/22. CIS is high grade and BCG recommend. He is high risk. If he responds, he will need maintenance until 9/2025. Will need blue light cysto scheduled.       Urologic History:   4/1/2022 -- Office Cytoscopy -- erythema right lateral wall  4/5/2022 -- CTUrogram -- no renal mass, no bladder mass, non-obstructing renal stones  4/19/2022 -- Bladder bx -- CIS  6/21/2022 -- TURBT -- CIS, muscle present and uninvolved  8/23/2022 -- re-TURBT -- CIS, muscle present and uninvolved  12/2022 -- completed 5 dose induction BCG  3/8/2023 -- Blue light TURBT -- HG Ta, random bladder bx -- CIS  4/4/2023 - Blue light TURBT, path normal  5/10-6/7/2023 - repeat 5 dose induction BCG          REVIEW OF SYSTEMS:  Review of Systems   Constitutional:  Negative for chills and fever.   HENT:  Negative for congestion and sore throat.    Respiratory:  Negative for cough and shortness of breath.    Cardiovascular:  Negative for chest pain and palpitations.   Gastrointestinal:  Negative for nausea and vomiting.   Genitourinary:  Negative for dysuria, flank pain, frequency, hematuria and urgency.   Neurological:  Negative for dizziness and headaches.       PATIENT HISTORY:    Past Medical History:   Diagnosis Date    Allergy     seasonal    Anal fistula hx    Arthritis     Basal cell carcinoma 04/2013    left superior forehead    Callus     Diverticulosis     Epididymal cyst     GERD (gastroesophageal reflux  disease)     Hayfever     Hydrocele, right     Hyperlipidemia     Hypertension     Neck pain     hx of muscular inury from weight lifting---resolved now    PONV (postoperative nausea and vomiting)     Prostatitis     Dec. '12-treated with antibx.    Squamous cell carcinoma 01/07/2019    anterior scalp    Squamous cell carcinoma of skin 03/2020    Left post scalp (SCC insitu-saucerization)    Visual impairment        Past Surgical History:   Procedure Laterality Date    APPENDECTOMY  1962    BIOPSY OF BLADDER  4/19/2022    Procedure: BIOPSY, BLADDER;  Surgeon: Lenny Ewing MD;  [facility];  Service: Urology;;    BIOPSY OF BLADDER  3/8/2023    Procedure: BIOPSY, BLADDER;  Surgeon: Lenny Ewing MD;  [facility];  Service: Urology;;    BLADDER FULGURATION  4/19/2022    Procedure: FULGURATION, BLADDER;  Surgeon: Lenny Ewing MD;  [facility];  Service: Urology;;    BLADDER FULGURATION  3/8/2023    Procedure: FULGURATION, BLADDER;  Surgeon: Lenny Ewing MD;  [facility];  Service: Urology;;    COLONOSCOPY  12/2012    due for repeat 12/2015    COLONOSCOPY N/A 5/19/2016    Procedure: COLONOSCOPY;  Surgeon: James Webber MD;  Location: [facility] (4TH FLR);  Service: Endoscopy;  Laterality: N/A; diverticulosis, repeat in 5-10 years for surveillance    COLONOSCOPY N/A 8/9/2021    Procedure: COLONOSCOPY;  Surgeon: Fahad Bautista Jr., MD;  Location: [facility];  Service: Endoscopy;  Laterality: N/A;    CYSTOSCOPY  4/19/2022    Procedure: CYSTOSCOPY;  Surgeon: Lenny wEing MD;  Location: [facility] OR 1ST FLR;  Service: Urology;;    CYSTOSCOPY  6/21/2022    Procedure: CYSTOSCOPY;  Surgeon: Lenny Ewing MD;  [facility];  Service: Urology;;    CYSTOSCOPY  8/23/2022    Procedure: CYSTOSCOPY;  Surgeon: Lenny Ewing MD;  [facility];  Service: Urology;;    CYSTOSCOPY  3/8/2023    Procedure: CYSTOSCOPY-BLUE LIGHT;   Surgeon: Lenny Ewing MD;  Location: [facility] OR [address] [place] [address];  Service: Urology;;    CYSTOSCOPY N/A 4/4/2023    Procedure: CYSTOSCOPY;  Surgeon: Benjamin Haile MD;  Location: [facility] OR [address] [place] [address];  Service: Urology;  Laterality: N/A;  1 hour    ESOPHAGOGASTRODUODENOSCOPY N/A 8/9/2021    Procedure: EGD (ESOPHAGOGASTRODUODENOSCOPY);  Surgeon: Fahad Bautista Jr., MD;  Location: [facility];  Service: Endoscopy;  Laterality: N/A;    EUA/Fistulotomy-'08      HERNIA REPAIR      RI with mesh    Hydrocelectomy  2013    MASS EXCISION  2004    BCC removal (twice)    MOLE REMOVAL  02/25/2019    2 moles removed from scalp =- 1 was basal cell and 1 was squamous cell - dermatology - Dr. londono    right Spermatocelectomy  2013    TURBT (TRANSURETHRAL RESECTION OF BLADDER TUMOR) N/A 4/4/2023    Procedure: TURBT (TRANSURETHRAL RESECTION OF BLADDER TUMOR);  Surgeon: Benjamin Haile MD;  Location: [facility] OR [address] [place] [address];  Service: Urology;  Laterality: N/A;  1 hour, blue light    UPPER GASTROINTESTINAL ENDOSCOPY  prior to 2010    hiatal hernia, reflux esophagitis       Family History   Problem Relation Age of Onset    Skin cancer Mother     Hypertension Mother     Skin cancer Father     Cancer Father         prostate cancer    Hypertension Father     Hypertension Maternal Grandmother     Hypertension Maternal Grandfather     Hypertension Paternal Grandmother     Anesthesia problems Paternal Grandmother     Cancer Paternal Grandfather         prostate cancer    Hypertension Paternal Grandfather     Heart disease Paternal Grandfather     Diabetes Neg Hx     Colon polyps Neg Hx     Colon cancer Neg Hx     Melanoma Neg Hx     Psoriasis Neg Hx     Lupus Neg Hx     Eczema Neg Hx     Acne Neg Hx     Cirrhosis Neg Hx     Prostate cancer Neg Hx     Kidney disease Neg Hx     Crohn's disease Neg Hx     Ulcerative colitis Neg Hx     Stomach cancer Neg Hx     Esophageal cancer Neg Hx        Social History     Socioeconomic History    Marital status:       Spouse name: Traci    Number of children: 1   Occupational History    Occupation: [employer].     Employer: [employer]     Comment: [employer]   Tobacco Use    Smoking status: Former     Packs/day: 1.00     Years: 10.00     Pack years: 10.00     Types: Cigarettes     Quit date: 1987     Years since quittin.4    Smokeless tobacco: Never    Tobacco comments:     Decreased lung compassity per patient   Substance and Sexual Activity    Alcohol use: Not Currently     Alcohol/week: 0.0 - 1.0 standard drinks     Comment: No longer drinks ETOH    Drug use: No    Sexual activity: Yes     Partners: Female       Allergies:  Amlodipine, Atenolol, Catechu herb, Irbesartan, Erythromycin, Sumycin [tetracycline], and Tetracyclines    Medications:    Current Outpatient Medications:     albuterol (PROVENTIL/VENTOLIN HFA) 90 mcg/actuation inhaler, Inhale 2 puffs into the lungs every 6 (six) hours as needed for Wheezing (cough)., Disp: 18 g, Rfl: 5    atorvastatin (LIPITOR) 10 MG tablet, Take 1 tablet (10 mg total) by mouth once daily., Disp: 90 tablet, Rfl: 3    cetirizine (ZYRTEC) 10 MG tablet, Take 10 mg by mouth Daily., Disp: , Rfl:     coenzyme Q10 100 mg capsule, Take 200 mg by mouth once daily., Disp: , Rfl:     fluticasone propionate (FLONASE) 50 mcg/actuation nasal spray, 2 sprays (100 mcg total) by Each Nostril route daily as needed for Rhinitis., Disp: 16 mL, Rfl: 5    hydroCHLOROthiazide (MICROZIDE) 12.5 mg capsule, TAKE 2 CAPSULES (25 MG TOTAL) BY MOUTH ONCE DAILY., Disp: 60 capsule, Rfl: 1    Lactobacillus acidophilus (PROBIOTIC ORAL), Take by mouth., Disp: , Rfl:     NIFEdipine (ADALAT CC) 30 MG TbSR, Take 1 tablet (30 mg total) by mouth once daily., Disp: 90 tablet, Rfl: 0    oxybutynin (DITROPAN) 5 MG Tab, Take 1 tablet (5 mg total) by mouth 3 (three) times daily as needed (bladder spasms)., Disp: 30 tablet, Rfl: 0    oxybutynin (DITROPAN) 5 MG Tab, Take 1 tablet (5 mg total) by mouth 3 (three) times daily.,  Disp: 90 tablet, Rfl: 1    pomegranate xt/pomegranat seed (POMEGRAN FRUIT XT-POMEGRA SEED ORAL), Take by mouth., Disp: , Rfl:     traMADoL (ULTRAM) 50 mg tablet, Take 1 tablet (50 mg total) by mouth every 12 (twelve) hours as needed for Pain., Disp: 14 tablet, Rfl: 0  No current facility-administered medications for this visit.    PHYSICAL EXAMINATION:  Physical Exam  Constitutional:       Appearance: Normal appearance.   HENT:      Head: Normocephalic and atraumatic.      Right Ear: External ear normal.      Left Ear: External ear normal.   Pulmonary:      Effort: Pulmonary effort is normal. No respiratory distress.   Skin:     General: Skin is warm and dry.   Neurological:      General: No focal deficit present.      Mental Status: He is alert and oriented to person, place, and time.   Psychiatric:         Mood and Affect: Mood normal.         Behavior: Behavior normal.         LABS:  - GH, - nitrites, - leuks    Lab Results   Component Value Date    PSA 0.35 04/13/2023    PSA 0.30 01/28/2022    PSA 0.22 07/29/2020    PSADIAG 0.37 09/08/2014       Lab Results   Component Value Date    CREATININE 1.2 04/13/2023    EGFRNORACEVR >60.0 04/13/2023             IMPRESSION:    Encounter Diagnoses   Name Primary?    Malignant neoplasm of lateral wall of urinary bladder Yes         Assessment:       1. Malignant neoplasm of lateral wall of urinary bladder        Plan:   - continue Ditropan PRN     BCG treatment administered by Nurse Zarate under sterile and BCG precautions. Patient tolerated well.  Patient was instructed to hold medication for 2 hours.   Discussed limiting fluid intake prior to appointment to ensure patient is able to hold medication for designated time.   Discussed post instillation expectations including side effects.   Reviewed how to properly bleach toilet after voiding post instillation.   Recommend drinking plenty of fluids after the 2 hour sarabjit to flush bladder.   Addressed patient's questions and  concerns.   Educational material provided about BCG treatment and information discussed during today's visit.  Patient encouraged to contact the office with any additional questions or concerns.      Follow up in 1 week for BCG treatment 5/5.      I spent 30 minutes with the patient of which more than half was spent in direct consultation with the patient in regards to our treatment and plan.

## 2023-06-07 ENCOUNTER — OFFICE VISIT (OUTPATIENT)
Dept: UROLOGY | Facility: CLINIC | Age: 63
End: 2023-06-07
Payer: COMMERCIAL

## 2023-06-07 VITALS
DIASTOLIC BLOOD PRESSURE: 93 MMHG | WEIGHT: 224 LBS | BODY MASS INDEX: 29.69 KG/M2 | HEART RATE: 66 BPM | HEIGHT: 73 IN | SYSTOLIC BLOOD PRESSURE: 147 MMHG

## 2023-06-07 DIAGNOSIS — C67.9 MALIGNANT NEOPLASM OF URINARY BLADDER, UNSPECIFIED SITE: Primary | ICD-10-CM

## 2023-06-07 DIAGNOSIS — C67.2 MALIGNANT NEOPLASM OF LATERAL WALL OF URINARY BLADDER: Primary | ICD-10-CM

## 2023-06-07 LAB
BILIRUB SERPL-MCNC: NORMAL MG/DL
BLOOD URINE, POC: 250
CLARITY, POC UA: CLEAR
COLOR, POC UA: YELLOW
GLUCOSE UR QL STRIP: NORMAL
KETONES UR QL STRIP: NORMAL
LEUKOCYTE ESTERASE URINE, POC: NORMAL
NITRITE, POC UA: NORMAL
PH, POC UA: 7
PROTEIN, POC: NORMAL
SPECIFIC GRAVITY, POC UA: 1.01
UROBILINOGEN, POC UA: NORMAL

## 2023-06-07 PROCEDURE — 99499 NO LOS: ICD-10-PCS | Mod: S$GLB,,,

## 2023-06-07 PROCEDURE — 3008F PR BODY MASS INDEX (BMI) DOCUMENTED: ICD-10-PCS | Mod: CPTII,S$GLB,,

## 2023-06-07 PROCEDURE — 1159F MED LIST DOCD IN RCRD: CPT | Mod: CPTII,S$GLB,,

## 2023-06-07 PROCEDURE — 99999 PR PBB SHADOW E&M-EST. PATIENT-LVL IV: CPT | Mod: PBBFAC,,,

## 2023-06-07 PROCEDURE — 99999 PR PBB SHADOW E&M-EST. PATIENT-LVL IV: ICD-10-PCS | Mod: PBBFAC,,,

## 2023-06-07 PROCEDURE — 3008F BODY MASS INDEX DOCD: CPT | Mod: CPTII,S$GLB,,

## 2023-06-07 PROCEDURE — 3080F DIAST BP >= 90 MM HG: CPT | Mod: CPTII,S$GLB,,

## 2023-06-07 PROCEDURE — 81002 POCT URINE DIPSTICK WITHOUT MICROSCOPE: ICD-10-PCS | Mod: S$GLB,,,

## 2023-06-07 PROCEDURE — 3080F PR MOST RECENT DIASTOLIC BLOOD PRESSURE >= 90 MM HG: ICD-10-PCS | Mod: CPTII,S$GLB,,

## 2023-06-07 PROCEDURE — 51720 PR INSTILL, ANTICANCER AGENT, BLADDER: ICD-10-PCS | Mod: S$GLB,,,

## 2023-06-07 PROCEDURE — 81002 URINALYSIS NONAUTO W/O SCOPE: CPT | Mod: S$GLB,,,

## 2023-06-07 PROCEDURE — 1160F PR REVIEW ALL MEDS BY PRESCRIBER/CLIN PHARMACIST DOCUMENTED: ICD-10-PCS | Mod: CPTII,S$GLB,,

## 2023-06-07 PROCEDURE — 3077F SYST BP >= 140 MM HG: CPT | Mod: CPTII,S$GLB,,

## 2023-06-07 PROCEDURE — 1160F RVW MEDS BY RX/DR IN RCRD: CPT | Mod: CPTII,S$GLB,,

## 2023-06-07 PROCEDURE — 3288F FALL RISK ASSESSMENT DOCD: CPT | Mod: CPTII,S$GLB,,

## 2023-06-07 PROCEDURE — 3288F PR FALLS RISK ASSESSMENT DOCUMENTED: ICD-10-PCS | Mod: CPTII,S$GLB,,

## 2023-06-07 PROCEDURE — 1101F PT FALLS ASSESS-DOCD LE1/YR: CPT | Mod: CPTII,S$GLB,,

## 2023-06-07 PROCEDURE — 1101F PR PT FALLS ASSESS DOC 0-1 FALLS W/OUT INJ PAST YR: ICD-10-PCS | Mod: CPTII,S$GLB,,

## 2023-06-07 PROCEDURE — 3077F PR MOST RECENT SYSTOLIC BLOOD PRESSURE >= 140 MM HG: ICD-10-PCS | Mod: CPTII,S$GLB,,

## 2023-06-07 PROCEDURE — 51720 TREATMENT OF BLADDER LESION: CPT | Mod: S$GLB,,,

## 2023-06-07 PROCEDURE — 1159F PR MEDICATION LIST DOCUMENTED IN MEDICAL RECORD: ICD-10-PCS | Mod: CPTII,S$GLB,,

## 2023-06-07 PROCEDURE — 99499 UNLISTED E&M SERVICE: CPT | Mod: S$GLB,,,

## 2023-06-07 NOTE — PROGRESS NOTES
CHIEF COMPLAINT:  Bladder cancer BCG #5/5      HISTORY OF PRESENTING ILLINESS:  Joseph Jesus is a 63 y.o. male established pt of Dr. Haile. Here today for repeat BCG #5/5. No GH or UTI s/s today. He takes Ditropan PRN for bladder spasms.        He had microhematuria and was found to have an erythematous lesion with a positive cytology. On 4/19/2022 he underwent bladder biopsy showing CIS. He then underwent a TURBT on 6/21/22 showing CIS.  Muscle was present. Another TURBT was performed 8/23/22.  Dr. Ewing went over his pathology demonstrating cancer from 8/23/22. CIS is high grade and BCG recommend. He is high risk. If he responds, he will need maintenance until 9/2025. Will need blue light cysto scheduled.         Urologic History:   4/1/2022 -- Office Cytoscopy -- erythema right lateral wall  4/5/2022 -- CTUrogram -- no renal mass, no bladder mass, non-obstructing renal stones  4/19/2022 -- Bladder bx -- CIS  6/21/2022 -- TURBT -- CIS, muscle present and uninvolved  8/23/2022 -- re-TURBT -- CIS, muscle present and uninvolved  12/2022 -- completed 5 dose induction BCG  3/8/2023 -- Blue light TURBT -- HG Ta, random bladder bx -- CIS  4/4/2023 - Blue light TURBT, path normal  5/10-6/7/2023 - repeat 5 dose induction BCG      REVIEW OF SYSTEMS:  Review of Systems   Constitutional:  Negative for chills and fever.   HENT:  Negative for congestion and sore throat.    Respiratory:  Negative for cough and shortness of breath.    Cardiovascular:  Negative for chest pain and palpitations.   Gastrointestinal:  Negative for nausea and vomiting.   Genitourinary:  Negative for dysuria, flank pain, frequency, hematuria and urgency.   Neurological:  Negative for dizziness and headaches.       PATIENT HISTORY:  Past Medical History:   Diagnosis Date    Allergy     seasonal    Anal fistula hx    Arthritis     Basal cell carcinoma 04/2013    left superior forehead    Callus     Diverticulosis     Epididymal cyst     GERD  (gastroesophageal reflux disease)     Hayfever     Hydrocele, right     Hyperlipidemia     Hypertension     Neck pain     hx of muscular inury from weight lifting---resolved now    PONV (postoperative nausea and vomiting)     Prostatitis     Dec. '12-treated with antibx.    Squamous cell carcinoma 01/07/2019    anterior scalp    Squamous cell carcinoma of skin 03/2020    Left post scalp (SCC insitu-saucerization)    Visual impairment        Past Surgical History:   Procedure Laterality Date    APPENDECTOMY  1962    BIOPSY OF BLADDER  4/19/2022    Procedure: BIOPSY, BLADDER;  Surgeon: Lenny Ewing MD;  [facility];  Service: Urology;;    BIOPSY OF BLADDER  3/8/2023    Procedure: BIOPSY, BLADDER;  Surgeon: Lenny Ewing MD;  [facility];  Service: Urology;;    BLADDER FULGURATION  4/19/2022    Procedure: FULGURATION, BLADDER;  Surgeon: Lenny Ewing MD;  [facility];  Service: Urology;;    BLADDER FULGURATION  3/8/2023    Procedure: FULGURATION, BLADDER;  Surgeon: Lenny Ewing MD;  [facility];  Service: Urology;;    COLONOSCOPY  12/2012    due for repeat 12/2015    COLONOSCOPY N/A 5/19/2016    Procedure: COLONOSCOPY;  Surgeon: James Webber MD;  Location: [facility] (4TH FLR);  Service: Endoscopy;  Laterality: N/A; diverticulosis, repeat in 5-10 years for surveillance    COLONOSCOPY N/A 8/9/2021    Procedure: COLONOSCOPY;  Surgeon: Fahad Bautista Jr., MD;  Location: [facility] ENDO;  Service: Endoscopy;  Laterality: N/A;    CYSTOSCOPY  4/19/2022    Procedure: CYSTOSCOPY;  Surgeon: Lenny Ewing MD;  [facility];  Service: Urology;;    CYSTOSCOPY  6/21/2022    Procedure: CYSTOSCOPY;  Surgeon: Lenny Ewing MD;  [facility];  Service: Urology;;    CYSTOSCOPY  8/23/2022    Procedure: CYSTOSCOPY;  Surgeon: Lenny Ewing MD;  [facility];  Service: Urology;;    CYSTOSCOPY  3/8/2023    Procedure:  CYSTOSCOPY-BLUE LIGHT;  Surgeon: Lenny Ewing MD;  Location: [facility] OR [address] [place] [address];  Service: Urology;;    CYSTOSCOPY N/A 4/4/2023    Procedure: CYSTOSCOPY;  Surgeon: Benjamin Haile MD;  Location: [facility] OR [address] [place] [address];  Service: Urology;  Laterality: N/A;  1 hour    ESOPHAGOGASTRODUODENOSCOPY N/A 8/9/2021    Procedure: EGD (ESOPHAGOGASTRODUODENOSCOPY);  Surgeon: Fahad Bautista Jr., MD;  Location: [facility];  Service: Endoscopy;  Laterality: N/A;    EUA/Fistulotomy-'08      HERNIA REPAIR      RIH with mesh    Hydrocelectomy  2013    MASS EXCISION  2004    BCC removal (twice)    MOLE REMOVAL  02/25/2019    2 moles removed from scalp =- 1 was basal cell and 1 was squamous cell - dermatology - Dr. londono    right Spermatocelectomy  2013    TURBT (TRANSURETHRAL RESECTION OF BLADDER TUMOR) N/A 4/4/2023    Procedure: TURBT (TRANSURETHRAL RESECTION OF BLADDER TUMOR);  Surgeon: Benjamin Haile MD;  Location: [facility] OR [address] [place] [address];  Service: Urology;  Laterality: N/A;  1 hour, blue light    UPPER GASTROINTESTINAL ENDOSCOPY  prior to 2010    hiatal hernia, reflux esophagitis       Family History   Problem Relation Age of Onset    Skin cancer Mother     Hypertension Mother     Skin cancer Father     Cancer Father         prostate cancer    Hypertension Father     Hypertension Maternal Grandmother     Hypertension Maternal Grandfather     Hypertension Paternal Grandmother     Anesthesia problems Paternal Grandmother     Cancer Paternal Grandfather         prostate cancer    Hypertension Paternal Grandfather     Heart disease Paternal Grandfather     Diabetes Neg Hx     Colon polyps Neg Hx     Colon cancer Neg Hx     Melanoma Neg Hx     Psoriasis Neg Hx     Lupus Neg Hx     Eczema Neg Hx     Acne Neg Hx     Cirrhosis Neg Hx     Prostate cancer Neg Hx     Kidney disease Neg Hx     Crohn's disease Neg Hx     Ulcerative colitis Neg Hx     Stomach cancer Neg Hx     Esophageal cancer Neg Hx        Social History     Socioeconomic History     Marital status:      Spouse name: Traci    Number of children: 1   Occupational History    Occupation: [employer]     Employer: [employer]     Comment: [employer]   Tobacco Use    Smoking status: Former     Packs/day: 1.00     Years: 10.00     Pack years: 10.00     Types: Cigarettes     Quit date: 1987     Years since quittin.5    Smokeless tobacco: Never    Tobacco comments:     Decreased lung compassity per patient   Substance and Sexual Activity    Alcohol use: Not Currently     Alcohol/week: 0.0 - 1.0 standard drinks     Comment: No longer drinks ETOH    Drug use: No    Sexual activity: Yes     Partners: Female       Allergies:  Amlodipine, Atenolol, Catechu herb, Irbesartan, Erythromycin, Sumycin [tetracycline], and Tetracyclines    Medications:    Current Outpatient Medications:     albuterol (PROVENTIL/VENTOLIN HFA) 90 mcg/actuation inhaler, Inhale 2 puffs into the lungs every 6 (six) hours as needed for Wheezing (cough)., Disp: 18 g, Rfl: 5    atorvastatin (LIPITOR) 10 MG tablet, Take 1 tablet (10 mg total) by mouth once daily., Disp: 90 tablet, Rfl: 3    cetirizine (ZYRTEC) 10 MG tablet, Take 10 mg by mouth Daily., Disp: , Rfl:     coenzyme Q10 100 mg capsule, Take 200 mg by mouth once daily., Disp: , Rfl:     fluticasone propionate (FLONASE) 50 mcg/actuation nasal spray, 2 sprays (100 mcg total) by Each Nostril route daily as needed for Rhinitis., Disp: 16 mL, Rfl: 5    hydroCHLOROthiazide (HYDRODIURIL) 25 MG tablet, Take 1 tablet (25 mg total) by mouth once daily., Disp: 90 tablet, Rfl: 1    Lactobacillus acidophilus (PROBIOTIC ORAL), Take by mouth., Disp: , Rfl:     NIFEdipine (ADALAT CC) 30 MG TbSR, Take 1 tablet (30 mg total) by mouth once daily., Disp: 90 tablet, Rfl: 0    oxybutynin (DITROPAN) 5 MG Tab, Take 1 tablet (5 mg total) by mouth 3 (three) times daily as needed (bladder spasms)., Disp: 30 tablet, Rfl: 0    oxybutynin (DITROPAN) 5 MG Tab, Take 1 tablet (5 mg total) by mouth 3  (three) times daily., Disp: 90 tablet, Rfl: 1    pomegranate xt/pomegranat seed (POMEGRAN FRUIT XT-POMEGRA SEED ORAL), Take by mouth., Disp: , Rfl:     traMADoL (ULTRAM) 50 mg tablet, Take 1 tablet (50 mg total) by mouth every 12 (twelve) hours as needed for Pain., Disp: 14 tablet, Rfl: 0    Current Facility-Administered Medications:     BCG live (LORENZA) 25 mg in sodium chloride 0.9% 50 mL bladder instillation, 25 mg, Intravesical, Once, Dottie Orantes NP    PHYSICAL EXAMINATION:  Physical Exam  Constitutional:       Appearance: Normal appearance.   HENT:      Head: Normocephalic and atraumatic.      Right Ear: External ear normal.      Left Ear: External ear normal.   Pulmonary:      Effort: Pulmonary effort is normal. No respiratory distress.   Skin:     General: Skin is warm and dry.   Neurological:      General: No focal deficit present.      Mental Status: He is alert and oriented to person, place, and time.   Psychiatric:         Mood and Affect: Mood normal.         Behavior: Behavior normal.         LABS:  - leuks, - nitrites, - GH    Lab Results   Component Value Date    PSA 0.35 04/13/2023    PSA 0.30 01/28/2022    PSA 0.22 07/29/2020    PSADIAG 0.37 09/08/2014       Lab Results   Component Value Date    CREATININE 1.2 04/13/2023    EGFRNORACEVR >60.0 04/13/2023             IMPRESSION:    Encounter Diagnoses   Name Primary?    Malignant neoplasm of lateral wall of urinary bladder Yes         Assessment:       1. Malignant neoplasm of lateral wall of urinary bladder        Plan:   - continue Ditropan PRN     BCG treatment administered by Nurse Zarate under sterile and BCG precautions. Patient tolerated well.  Patient was instructed to hold medication for 2 hours.   Discussed limiting fluid intake prior to appointment to ensure patient is able to hold medication for designated time.   Discussed post instillation expectations including side effects.   Reviewed how to properly bleach toilet after voiding post  instillation.   Recommend drinking plenty of fluids after the 2 hour sarabjit to flush bladder.   Addressed patient's questions and concerns.   Educational material provided about BCG treatment and information discussed during today's visit.  Patient encouraged to contact the office with any additional questions or concerns.      Follow up in 3 months for cystoscopy - message sent to Dr. Haile's team. Pt would like sedation.      I spent 30 minutes with the patient of which more than half was spent in direct consultation with the patient in regards to our treatment and plan.

## 2023-06-09 ENCOUNTER — TELEPHONE (OUTPATIENT)
Dept: UROLOGY | Facility: CLINIC | Age: 63
End: 2023-06-09
Payer: COMMERCIAL

## 2023-06-09 DIAGNOSIS — C67.9 MALIGNANT NEOPLASM OF URINARY BLADDER, UNSPECIFIED SITE: Primary | ICD-10-CM

## 2023-07-10 DIAGNOSIS — I10 HYPERTENSION, ESSENTIAL: ICD-10-CM

## 2023-07-10 RX ORDER — HYDROCHLOROTHIAZIDE 12.5 MG/1
25 CAPSULE ORAL DAILY
Qty: 60 CAPSULE | Refills: 1 | OUTPATIENT
Start: 2023-07-10

## 2023-07-10 NOTE — TELEPHONE ENCOUNTER
Refill Decision Note   Jsoeph Jesus  is requesting a refill authorization.  Brief Assessment and Rationale for Refill:  Quick Discontinue     Medication Therapy Plan:       Medication Reconciliation Completed: No   Comments:     No Care Gaps recommended.     Note composed:1:00 PM 07/10/2023

## 2023-07-10 NOTE — TELEPHONE ENCOUNTER
No care due was identified.  [facility] Embedded Care Due Messages. Reference number: [id number].   7/10/2023 12:49:13 AM CDT

## 2023-07-11 RX ORDER — OXYBUTYNIN CHLORIDE 5 MG/1
TABLET ORAL
Qty: 90 TABLET | Refills: 1 | Status: SHIPPED | OUTPATIENT
Start: 2023-07-11 | End: 2023-09-06

## 2023-07-13 NOTE — PRE-PROCEDURE INSTRUCTIONS
PreOp Instructions given:   - Verbal medication information (what to hold and what to take)   - NPO guidelines   - Arrival place directions given; time to be given the day before procedure by the   Surgeon's Office   - Bathing with antibacterial soap   - Don't wear any jewelry or bring any valuables AM of surgery   - No makeup or moisturizer to face   - No perfume/cologne, powder, lotions or aftershave   Pt. verbalized understanding.   Pt reports h/o PONV in the past, but not w/recent procedures

## 2023-07-14 ENCOUNTER — ANESTHESIA EVENT (OUTPATIENT)
Dept: SURGERY | Facility: HOSPITAL | Age: 63
End: 2023-07-14
Payer: COMMERCIAL

## 2023-07-14 ENCOUNTER — PATIENT MESSAGE (OUTPATIENT)
Dept: SURGERY | Facility: HOSPITAL | Age: 63
End: 2023-07-14
Payer: COMMERCIAL

## 2023-07-14 ENCOUNTER — HOSPITAL ENCOUNTER (OUTPATIENT)
Facility: HOSPITAL | Age: 63
Discharge: HOME | End: 2023-07-14
Attending: STUDENT IN AN ORGANIZED HEALTH CARE EDUCATION/TRAINING PROGRAM | Admitting: STUDENT IN AN ORGANIZED HEALTH CARE EDUCATION/TRAINING PROGRAM
Payer: COMMERCIAL

## 2023-07-14 ENCOUNTER — NURSE TRIAGE (OUTPATIENT)
Dept: ADMINISTRATIVE | Facility: CLINIC | Age: 63
End: 2023-07-14
Payer: COMMERCIAL

## 2023-07-14 ENCOUNTER — ANESTHESIA (OUTPATIENT)
Dept: SURGERY | Facility: HOSPITAL | Age: 63
End: 2023-07-14
Payer: COMMERCIAL

## 2023-07-14 ENCOUNTER — TELEPHONE (OUTPATIENT)
Dept: UROLOGY | Facility: HOSPITAL | Age: 63
End: 2023-07-14
Payer: COMMERCIAL

## 2023-07-14 VITALS
HEART RATE: 49 BPM | TEMPERATURE: 98 F | SYSTOLIC BLOOD PRESSURE: 155 MMHG | WEIGHT: 224 LBS | RESPIRATION RATE: 15 BRPM | HEIGHT: 73 IN | DIASTOLIC BLOOD PRESSURE: 96 MMHG | OXYGEN SATURATION: 100 % | BODY MASS INDEX: 29.69 KG/M2

## 2023-07-14 DIAGNOSIS — C67.9 MALIGNANT NEOPLASM OF URINARY BLADDER, UNSPECIFIED SITE: Primary | ICD-10-CM

## 2023-07-14 DIAGNOSIS — C67.9 BLADDER CANCER: Primary | ICD-10-CM

## 2023-07-14 PROCEDURE — 71000044 HC DOSC ROUTINE RECOVERY FIRST HOUR: Performed by: STUDENT IN AN ORGANIZED HEALTH CARE EDUCATION/TRAINING PROGRAM

## 2023-07-14 PROCEDURE — 88305 TISSUE EXAM BY PATHOLOGIST: CPT | Mod: 26,,, | Performed by: PATHOLOGY

## 2023-07-14 PROCEDURE — 37000008 HC ANESTHESIA 1ST 15 MINUTES: Performed by: STUDENT IN AN ORGANIZED HEALTH CARE EDUCATION/TRAINING PROGRAM

## 2023-07-14 PROCEDURE — 52204 CYSTOSCOPY W/BIOPSY(S): CPT | Mod: ,,, | Performed by: STUDENT IN AN ORGANIZED HEALTH CARE EDUCATION/TRAINING PROGRAM

## 2023-07-14 PROCEDURE — D9220A PRA ANESTHESIA: Mod: ANES,,, | Performed by: ANESTHESIOLOGY

## 2023-07-14 PROCEDURE — 71000015 HC POSTOP RECOV 1ST HR: Performed by: STUDENT IN AN ORGANIZED HEALTH CARE EDUCATION/TRAINING PROGRAM

## 2023-07-14 PROCEDURE — 25000003 PHARM REV CODE 250: Performed by: NURSE ANESTHETIST, CERTIFIED REGISTERED

## 2023-07-14 PROCEDURE — 25000003 PHARM REV CODE 250: Performed by: STUDENT IN AN ORGANIZED HEALTH CARE EDUCATION/TRAINING PROGRAM

## 2023-07-14 PROCEDURE — 36000707: Performed by: STUDENT IN AN ORGANIZED HEALTH CARE EDUCATION/TRAINING PROGRAM

## 2023-07-14 PROCEDURE — 88305 TISSUE EXAM BY PATHOLOGIST: ICD-10-PCS | Mod: 26,,, | Performed by: PATHOLOGY

## 2023-07-14 PROCEDURE — D9220A PRA ANESTHESIA: Mod: CRNA,,, | Performed by: NURSE ANESTHETIST, CERTIFIED REGISTERED

## 2023-07-14 PROCEDURE — D9220A PRA ANESTHESIA: ICD-10-PCS | Mod: ANES,,, | Performed by: ANESTHESIOLOGY

## 2023-07-14 PROCEDURE — 52204 PR CYSTOURETHROSCOPY,BIOPSY: ICD-10-PCS | Mod: ,,, | Performed by: STUDENT IN AN ORGANIZED HEALTH CARE EDUCATION/TRAINING PROGRAM

## 2023-07-14 PROCEDURE — A9589 INSTI HEXAMINOLEVULINATE HCL: HCPCS | Performed by: STUDENT IN AN ORGANIZED HEALTH CARE EDUCATION/TRAINING PROGRAM

## 2023-07-14 PROCEDURE — 88305 TISSUE EXAM BY PATHOLOGIST: CPT | Performed by: PATHOLOGY

## 2023-07-14 PROCEDURE — 63600175 PHARM REV CODE 636 W HCPCS: Performed by: STUDENT IN AN ORGANIZED HEALTH CARE EDUCATION/TRAINING PROGRAM

## 2023-07-14 PROCEDURE — 63600175 PHARM REV CODE 636 W HCPCS: Performed by: NURSE ANESTHETIST, CERTIFIED REGISTERED

## 2023-07-14 PROCEDURE — 37000009 HC ANESTHESIA EA ADD 15 MINS: Performed by: STUDENT IN AN ORGANIZED HEALTH CARE EDUCATION/TRAINING PROGRAM

## 2023-07-14 PROCEDURE — D9220A PRA ANESTHESIA: ICD-10-PCS | Mod: CRNA,,, | Performed by: NURSE ANESTHETIST, CERTIFIED REGISTERED

## 2023-07-14 PROCEDURE — 36000706: Performed by: STUDENT IN AN ORGANIZED HEALTH CARE EDUCATION/TRAINING PROGRAM

## 2023-07-14 RX ORDER — HALOPERIDOL 5 MG/ML
0.5 INJECTION INTRAMUSCULAR EVERY 10 MIN PRN
Status: DISCONTINUED | OUTPATIENT
Start: 2023-07-14 | End: 2023-07-14 | Stop reason: HOSPADM

## 2023-07-14 RX ORDER — PROPOFOL 10 MG/ML
VIAL (ML) INTRAVENOUS CONTINUOUS PRN
Status: DISCONTINUED | OUTPATIENT
Start: 2023-07-14 | End: 2023-07-14

## 2023-07-14 RX ORDER — LIDOCAINE HYDROCHLORIDE 20 MG/ML
INJECTION, SOLUTION EPIDURAL; INFILTRATION; INTRACAUDAL; PERINEURAL
Status: DISCONTINUED | OUTPATIENT
Start: 2023-07-14 | End: 2023-07-14

## 2023-07-14 RX ORDER — SODIUM CHLORIDE 9 MG/ML
INJECTION, SOLUTION INTRAVENOUS CONTINUOUS
Status: DISCONTINUED | OUTPATIENT
Start: 2023-07-14 | End: 2024-01-16

## 2023-07-14 RX ORDER — HYDROMORPHONE HYDROCHLORIDE 1 MG/ML
0.2 INJECTION, SOLUTION INTRAMUSCULAR; INTRAVENOUS; SUBCUTANEOUS EVERY 5 MIN PRN
Status: DISCONTINUED | OUTPATIENT
Start: 2023-07-14 | End: 2023-07-14 | Stop reason: HOSPADM

## 2023-07-14 RX ORDER — FENTANYL CITRATE 50 UG/ML
INJECTION, SOLUTION INTRAMUSCULAR; INTRAVENOUS
Status: DISCONTINUED | OUTPATIENT
Start: 2023-07-14 | End: 2023-07-14

## 2023-07-14 RX ORDER — MIDAZOLAM HYDROCHLORIDE 1 MG/ML
INJECTION, SOLUTION INTRAMUSCULAR; INTRAVENOUS
Status: DISCONTINUED | OUTPATIENT
Start: 2023-07-14 | End: 2023-07-14

## 2023-07-14 RX ORDER — LIDOCAINE HYDROCHLORIDE 10 MG/ML
1 INJECTION, SOLUTION EPIDURAL; INFILTRATION; INTRACAUDAL; PERINEURAL ONCE
Status: COMPLETED | OUTPATIENT
Start: 2023-07-14 | End: 2023-07-14

## 2023-07-14 RX ORDER — MIDAZOLAM HYDROCHLORIDE 1 MG/ML
2 INJECTION INTRAMUSCULAR; INTRAVENOUS ONCE
Status: DISCONTINUED | OUTPATIENT
Start: 2023-07-14 | End: 2023-07-14 | Stop reason: HOSPADM

## 2023-07-14 RX ORDER — OXYCODONE HYDROCHLORIDE 5 MG/1
5 TABLET ORAL
Status: DISCONTINUED | OUTPATIENT
Start: 2023-07-14 | End: 2023-07-14 | Stop reason: HOSPADM

## 2023-07-14 RX ORDER — PROPOFOL 10 MG/ML
VIAL (ML) INTRAVENOUS
Status: DISCONTINUED | OUTPATIENT
Start: 2023-07-14 | End: 2023-07-14

## 2023-07-14 RX ORDER — PHENAZOPYRIDINE HYDROCHLORIDE 200 MG/1
200 TABLET, FILM COATED ORAL 3 TIMES DAILY PRN
Qty: 21 TABLET | Refills: 0 | Status: SHIPPED | OUTPATIENT
Start: 2023-07-14 | End: 2023-07-21

## 2023-07-14 RX ADMIN — FENTANYL CITRATE 25 MCG: 50 INJECTION INTRAMUSCULAR; INTRAVENOUS at 09:07

## 2023-07-14 RX ADMIN — SODIUM CHLORIDE: 0.9 INJECTION, SOLUTION INTRAVENOUS at 09:07

## 2023-07-14 RX ADMIN — Medication 100 MCG/KG/MIN: at 09:07

## 2023-07-14 RX ADMIN — CEFAZOLIN 2 G: 2 INJECTION, POWDER, FOR SOLUTION INTRAMUSCULAR; INTRAVENOUS at 09:07

## 2023-07-14 RX ADMIN — LIDOCAINE HYDROCHLORIDE 50 MG: 20 INJECTION, SOLUTION EPIDURAL; INFILTRATION; INTRACAUDAL at 09:07

## 2023-07-14 RX ADMIN — HEXAMINOLEVULINATE HYDROCHLORIDE 100 MG: KIT at 07:07

## 2023-07-14 RX ADMIN — SODIUM CHLORIDE: 9 INJECTION, SOLUTION INTRAVENOUS at 07:07

## 2023-07-14 RX ADMIN — MIDAZOLAM 2 MG: 1 INJECTION INTRAMUSCULAR; INTRAVENOUS at 09:07

## 2023-07-14 RX ADMIN — LIDOCAINE HYDROCHLORIDE 10 MG: 10 INJECTION, SOLUTION EPIDURAL; INFILTRATION; INTRACAUDAL; PERINEURAL at 07:07

## 2023-07-14 RX ADMIN — PROPOFOL 100 MG: 10 INJECTION, EMULSION INTRAVENOUS at 09:07

## 2023-07-14 NOTE — PLAN OF CARE
Pt /ao x4.VSS. n c/o pain or nausea. Pt able to tolerate oral intake. Pt able to void prior to discharge. D/c instructions given to pt and spouse both verbally agreed to understanding. Pt d/c in stable condition.

## 2023-07-14 NOTE — DISCHARGE SUMMARY
[facility]  Discharge Note  Short Stay    Admit Date: 7/14/2023    Discharge Date and Time: 07/14/2023 10:22 AM      Attending Physician: Benjamin Haile MD     Discharge Provider: Shahrzad Hughes MD    Diagnoses:  There are no hospital problems to display for this patient.      Discharged Condition: stable    Hospital Course: Patient was admitted for bladder biopsy and fulguration and tolerated the procedure well with no complications. He was discharged home in good condition on the same day.       Final Diagnoses: Same as principal problem.    Disposition: Home or Self Care    Follow up/Patient Instructions:    Medications:  Reconciled Home Medications:   Current Discharge Medication List        CONTINUE these medications which have NOT CHANGED    Details   atorvastatin (LIPITOR) 10 MG tablet Take 1 tablet (10 mg total) by mouth once daily.  Qty: 90 tablet, Refills: 3    Associated Diagnoses: Hyperlipidemia, unspecified hyperlipidemia type      cetirizine (ZYRTEC) 10 MG tablet Take 10 mg by mouth Daily.      coenzyme Q10 100 mg capsule Take 200 mg by mouth once daily.      fluticasone propionate (FLONASE) 50 mcg/actuation nasal spray 2 sprays (100 mcg total) by Each Nostril route daily as needed for Rhinitis.  Qty: 16 mL, Refills: 5    Associated Diagnoses: Bronchitis with wheezing      hydroCHLOROthiazide (HYDRODIURIL) 25 MG tablet Take 1 tablet (25 mg total) by mouth once daily.  Qty: 90 tablet, Refills: 1    Associated Diagnoses: Hypertension, essential      Lactobacillus acidophilus (PROBIOTIC ORAL) Take by mouth.      NIFEdipine (ADALAT CC) 30 MG TbSR Take 1 tablet (30 mg total) by mouth once daily.  Qty: 90 tablet, Refills: 0    Comments: .  Associated Diagnoses: Hypertension, essential      pomegranate xt/pomegranat seed (POMEGRAN FRUIT XT-POMEGRA SEED ORAL) Take by mouth.      albuterol (PROVENTIL/VENTOLIN HFA) 90 mcg/actuation inhaler Inhale 2 puffs into the lungs every 6 (six) hours as needed  for Wheezing (cough).  Qty: 18 g, Refills: 5    Associated Diagnoses: Cough      !! oxybutynin (DITROPAN) 5 MG Tab Take 1 tablet (5 mg total) by mouth 3 (three) times daily as needed (bladder spasms).  Qty: 30 tablet, Refills: 0      !! oxybutynin (DITROPAN) 5 MG Tab TAKE 1 TABLET BY MOUTH THREE TIMES A DAY  Qty: 90 tablet, Refills: 1      traMADoL (ULTRAM) 50 mg tablet Take 1 tablet (50 mg total) by mouth every 12 (twelve) hours as needed for Pain.  Qty: 14 tablet, Refills: 0    Comments: n/a   Associated Diagnoses: Sciatica, unspecified laterality       !! - Potential duplicate medications found. Please discuss with provider.        Discharge Procedure Orders   Activity as tolerated

## 2023-07-14 NOTE — TRANSFER OF CARE
"Anesthesia Transfer of Care Note    Patient: Joseph Jesus    Procedure(s) Performed: Procedure(s):  CYSTOSCOPY  BIOPSY, BLADDER  FULGURATION, BLADDER    Patient location: [facility]    Anesthesia Type: general    Transport from OR: Transported from OR on 6-10 L/min O2 by face mask with adequate spontaneous ventilation    Post pain: adequate analgesia    Post assessment: no apparent anesthetic complications    Post vital signs: stable    Level of consciousness: awake and alert    Nausea/Vomiting: no nausea/vomiting    Complications: none    Transfer of care protocol was followed      Last vitals:   Visit Vitals  /82 (BP Location: Left arm, Patient Position: Lying)   Pulse (!) 59   Temp 36.8 °C (98.2 °F) (Oral)   Resp 20   Ht 6' 1" (1.854 m)   Wt 101.6 kg (224 lb)   SpO2 100%   BMI 29.55 kg/m²     "

## 2023-07-14 NOTE — TELEPHONE ENCOUNTER
Pt is s/p TURBT today and is requesting pyridium for irritative bladder symptoms.  Prescription sent to patient's home pharmacy.     Shruti Antoine MD, PGY-2  [facility]

## 2023-07-14 NOTE — ANESTHESIA POSTPROCEDURE EVALUATION
Anesthesia Post Evaluation    Patient: Joseph Jesus    Procedure(s) Performed: Procedure(s):  CYSTOSCOPY  BIOPSY, BLADDER  FULGURATION, BLADDER    Final Anesthesia Type: general      Patient location during evaluation: PACU  Patient participation: Yes- Able to Participate  Level of consciousness: awake and alert  Post-procedure vital signs: reviewed and stable  Pain control: Pain has been treated.  Airway patency: patent    PONV status: PONV absent or treated.  Anesthetic complications: no      Cardiovascular status: hemodynamically stable  Respiratory status: spontaneous ventilation  Hydration status: euvolemic  Follow-up not needed.          Vitals Value Taken Time   /95 07/14/23 1203   Temp 36.4 °C (97.5 °F) 07/14/23 1027   Pulse 50 07/14/23 1205   Resp 33 07/14/23 1205   SpO2 100 % 07/14/23 1204   Vitals shown include unvalidated device data.      No case tracking events are documented in the log.      Pain/Neymar Score: Neymar Score: 10 (7/14/2023 11:15 AM)

## 2023-07-14 NOTE — OP NOTE
[facility]    Operative Note     DATE OF PROCEDURE:   7/14/2023    PRE-OP DIAGNOSES:   1)  Hx of Carcinoma In Situ, Bladder Cancer     POST-OP DIAGNOSES AND OPERATIVE FINDINGS:   As above    PROCEDURES:  1) Cystoscopy  2) Random Bladder Biopsy  3) Fulguration of bladder bleeding      SURGEONS:   Surgeon(s) and Role:     * Benjamin Haile MD - Primary     * Shahrzad Hughes MD - Resident - Assisting    ANESTHESIOLOGY:   Anesthesiologist: Cheko Chacon MD  CRNA: Ana Garcia CRNA    STAFF:   Circulator: Diana Albright RN  Technician: Kendra Ch LPN    ANESTHESIA TYPE:  General anesthesia    ESTIMATED BLOOD LOSS:   Minimal    COMPLICATIONS:   None    ANTIBIOTICS:  Cefazolin    INTRAOPERATIVE THROMBOEMBOLISM PROPHYLAXIS:  Pneumatic compression stockings    PROCEDURE SUMMARY  The patient was brought to the operating room and anesthesia obtained.  The patient was then placed in the lithotomy position and prepped and draped using standard sterile technique.  All pressure points were carefully padded.  A surgical time-out occurred, antibiotics were administered, and thromboembolism prophylaxis was given.      A 22F cystoscopy was introduced into the bladder, Cysview blue light cystoscopy performed -- entire bladder lit up red. No obvious papillary tumor or CIS.     Random bladder biopsies were taken around previous resection site scar and in areas where patient had tumor in the past. These specimens were sent off. Switched to sterile water and monopolar bugbee and fulgurated biopsy bleeding sites.    Once hemostasis was achieved bladder was emptied and operation terminated without complication.    DISPOSITION:   PACU hemodynamically stable    ATTESTATION:   I Dr. Haile was present and scrubbed for the entire operation and agree with the note above.    SPECIMENS:   Specimens (From admission, onward)       Start     Ordered    07/14/23 1007  Specimen to Pathology, Surgery Urology  Once         Comments: Pre-op Diagnosis: Malignant neoplasm of urinary bladder, unspecified site [C67.9]Procedure(s):CYSTOSCOPYBIOPSY, BLADDERFULGURATION, BLADDER Number of specimens:2Name of specimens: 1-posterior wall2-right lateral wall     References:    Click here for ordering Quick Tip   Question Answer Comment   Procedure Type: Urology    Which provider would you like to cc? SOLO KING.    Release to patient Immediate        07/14/23 1007

## 2023-07-14 NOTE — H&P
Urology [facility]    CC: bladder cancer.     HPI:  Joseph Jesus is a 63 y.o. male with a history of NMIBC s/p BCG induction x2. He was diagnosed with CIS and then subsequently HGTa and CIS on re-TUR after induction BCG. He then completed another round of induction BCG. He presents for TURBT.      ROS:  Neg except per HPI    Past Medical History:   Diagnosis Date    Allergy     seasonal    Anal fistula hx    Arthritis     Basal cell carcinoma 04/2013    left superior forehead    Callus     Diverticulosis     Epididymal cyst     GERD (gastroesophageal reflux disease)     Hayfever     Hydrocele, right     Hyperlipidemia     Hypertension     Neck pain     hx of muscular inury from weight lifting---resolved now    PONV (postoperative nausea and vomiting)     Prostatitis     Dec. '12-treated with antibx.    Squamous cell carcinoma 01/07/2019    anterior scalp    Squamous cell carcinoma of skin 03/2020    Left post scalp (SCC insitu-saucerization)    Visual impairment        Past Surgical History:   Procedure Laterality Date    APPENDECTOMY  1962    BIOPSY OF BLADDER  4/19/2022    Procedure: BIOPSY, BLADDER;  Surgeon: Lenny Ewing MD;  Location: [facility] OR [address] [place] [address];  Service: Urology;;    BIOPSY OF BLADDER  3/8/2023    Procedure: BIOPSY, BLADDER;  Surgeon: Lenny Ewing MD;  [facility];  Service: Urology;;    BLADDER FULGURATION  4/19/2022    Procedure: FULGURATION, BLADDER;  Surgeon: Lenny Ewing MD;  [facility];  Service: Urology;;    BLADDER FULGURATION  3/8/2023    Procedure: FULGURATION, BLADDER;  Surgeon: Lenny Ewing MD;  [facility];  Service: Urology;;    COLONOSCOPY  12/2012    due for repeat 12/2015    COLONOSCOPY N/A 5/19/2016    Procedure: COLONOSCOPY;  Surgeon: James Webber MD;  Location: [facility] (4TH FLR);  Service: Endoscopy;  Laterality: N/A; diverticulosis, repeat in 5-10 years for surveillance    COLONOSCOPY N/A 8/9/2021    Procedure:  COLONOSCOPY;  Surgeon: Fahad Bautista Jr., MD;  Location: [facility] ENDO;  Service: Endoscopy;  Laterality: N/A;    CYSTOSCOPY  4/19/2022    Procedure: CYSTOSCOPY;  Surgeon: Lenny Ewing MD;  Location: [facility] OR 1ST FLR;  Service: Urology;;    CYSTOSCOPY  6/21/2022    Procedure: CYSTOSCOPY;  Surgeon: Lenny Ewing MD;  Location: [facility] OR 1ST FLR;  Service: Urology;;    CYSTOSCOPY  8/23/2022    Procedure: CYSTOSCOPY;  Surgeon: Lenny Ewing MD;  Location: [facility] OR 1ST FLR;  Service: Urology;;    CYSTOSCOPY  3/8/2023    Procedure: CYSTOSCOPY-BLUE LIGHT;  Surgeon: Lenny Ewing MD;  Location: [facility] OR 1ST FLR;  Service: Urology;;    CYSTOSCOPY N/A 4/4/2023    Procedure: CYSTOSCOPY;  Surgeon: Benjamin Haile MD;  [facility];  Service: Urology;  Laterality: N/A;  1 hour    ESOPHAGOGASTRODUODENOSCOPY N/A 8/9/2021    Procedure: EGD (ESOPHAGOGASTRODUODENOSCOPY);  Surgeon: Fahad Bautista Jr., MD;  Location: [facility];  Service: Endoscopy;  Laterality: N/A;    EUA/Fistulotomy-'08      HERNIA REPAIR      RIH with mesh    Hydrocelectomy  2013    MASS EXCISION  2004    BCC removal (twice)    MOLE REMOVAL  02/25/2019    2 moles removed from scalp =- 1 was basal cell and 1 was squamous cell - dermatology - Dr. londono    right Spermatocelectomy  2013    TURBT (TRANSURETHRAL RESECTION OF BLADDER TUMOR) N/A 4/4/2023    Procedure: TURBT (TRANSURETHRAL RESECTION OF BLADDER TUMOR);  Surgeon: Benjamin Haile MD;  [facility];  Service: Urology;  Laterality: N/A;  1 hour, blue light    UPPER GASTROINTESTINAL ENDOSCOPY  prior to 2010    hiatal hernia, reflux esophagitis       Social History     Socioeconomic History    Marital status:      Spouse name: Traci    Number of children: 1   Occupational History    Occupation: Software Eng.     Employer: [employer]     Comment: [employer]   Tobacco Use    Smoking status: Former     Packs/day: 1.00     Years: 10.00     Pack years: 10.00     Types: Cigarettes      Quit date: 1987     Years since quittin.6    Smokeless tobacco: Never    Tobacco comments:     Decreased lung compassity per patient   Substance and Sexual Activity    Alcohol use: Not Currently     Alcohol/week: 0.0 - 1.0 standard drinks     Comment: No longer drinks ETOH    Drug use: No    Sexual activity: Yes     Partners: Female       Family History   Problem Relation Age of Onset    Skin cancer Mother     Hypertension Mother     Skin cancer Father     Cancer Father         prostate cancer    Hypertension Father     Hypertension Maternal Grandmother     Hypertension Maternal Grandfather     Hypertension Paternal Grandmother     Anesthesia problems Paternal Grandmother     Cancer Paternal Grandfather         prostate cancer    Hypertension Paternal Grandfather     Heart disease Paternal Grandfather     Diabetes Neg Hx     Colon polyps Neg Hx     Colon cancer Neg Hx     Melanoma Neg Hx     Psoriasis Neg Hx     Lupus Neg Hx     Eczema Neg Hx     Acne Neg Hx     Cirrhosis Neg Hx     Prostate cancer Neg Hx     Kidney disease Neg Hx     Crohn's disease Neg Hx     Ulcerative colitis Neg Hx     Stomach cancer Neg Hx     Esophageal cancer Neg Hx        Review of patient's allergies indicates:   Allergen Reactions    Amlodipine      edema    Atenolol      Depression - circa     Catechu herb     Irbesartan Other (See Comments)     Possibly caused cough    Erythromycin Rash    Sumycin [tetracycline] Rash    Tetracyclines Rash       No current facility-administered medications on file prior to encounter.     Current Outpatient Medications on File Prior to Encounter   Medication Sig Dispense Refill    atorvastatin (LIPITOR) 10 MG tablet Take 1 tablet (10 mg total) by mouth once daily. (Patient taking differently: Take 10 mg by mouth every evening.) 90 tablet 3    cetirizine (ZYRTEC) 10 MG tablet Take 10 mg by mouth Daily.      coenzyme Q10 100 mg capsule Take 200 mg by mouth once daily.      fluticasone  "propionate (FLONASE) 50 mcg/actuation nasal spray 2 sprays (100 mcg total) by Each Nostril route daily as needed for Rhinitis. 16 mL 5    hydroCHLOROthiazide (HYDRODIURIL) 25 MG tablet Take 1 tablet (25 mg total) by mouth once daily. (Patient taking differently: Take 25 mg by mouth nightly.) 90 tablet 1    Lactobacillus acidophilus (PROBIOTIC ORAL) Take by mouth.      NIFEdipine (ADALAT CC) 30 MG TbSR Take 1 tablet (30 mg total) by mouth once daily. (Patient taking differently: Take 30 mg by mouth nightly.) 90 tablet 0    pomegranate xt/pomegranat seed (POMEGRAN FRUIT XT-POMEGRA SEED ORAL) Take by mouth.      albuterol (PROVENTIL/VENTOLIN HFA) 90 mcg/actuation inhaler Inhale 2 puffs into the lungs every 6 (six) hours as needed for Wheezing (cough). 18 g 5    oxybutynin (DITROPAN) 5 MG Tab Take 1 tablet (5 mg total) by mouth 3 (three) times daily as needed (bladder spasms). 30 tablet 0    traMADoL (ULTRAM) 50 mg tablet Take 1 tablet (50 mg total) by mouth every 12 (twelve) hours as needed for Pain. 14 tablet 0       Anticoagulation:  No    Physical Exam:  Estimated body mass index is 29.55 kg/m² as calculated from the following:    Height as of this encounter: 6' 1" (1.854 m).    Weight as of this encounter: 101.6 kg (224 lb).    General: No acute distress, well developed. AAOx3  Head: Normocephalic, Atraumatic  Eyes: Extra-occular movements intact, No discharge  Neck: supple, symmetrical, trachea midline  Lungs: normal respiratory effort, no respiratory distress, no wheezes  CV: regular rate, 2+ pulses  Abdomen: soft, non-tender, non-distended, no organomegaly  MSK: no edema, no deformities, normal ROM  Skin: skin color, texture, turgor normal.  Neurologic: no focal deficits, sensation intact    Labs:    Urine dipstick today - negative for infection.     Lab Results   Component Value Date    WBC 7.31 03/01/2023    HGB 13.7 (L) 03/01/2023    HCT 42.6 03/01/2023    MCV 94 03/01/2023     03/01/2023 "           BMP  Lab Results   Component Value Date     04/13/2023    K 4.2 04/13/2023     04/13/2023    CO2 25 04/13/2023    BUN 22 04/13/2023    CREATININE 1.2 04/13/2023    CALCIUM 9.7 04/13/2023    ANIONGAP 9 04/13/2023    EGFRNORACEVR >60.0 04/13/2023       Lab Results   Component Value Date    PSA 0.35 04/13/2023    PSA 0.30 01/28/2022    PSA 0.22 07/29/2020         Assessment: Joseph Jesus is a 63 y.o. male with NMIBC s/p BCG induction x2.     Plan:     1. To OR on today for TURBT and BL RPG  2. Consents signed   3. I have explained the risk, benefits, and alternatives of the procedure in detail. The patient voices understanding and all questions have been answered. The patient agrees to proceed as planned.       Shahrzad Hughes MD

## 2023-07-14 NOTE — ANESTHESIA PREPROCEDURE EVALUATION
07/14/2023  Joseph Jesus is a 63 y.o., male.      Pre-op Assessment    I have reviewed the Patient Summary Reports.          Review of Systems  Anesthesia Hx:  PONV   Social:  Non-Smoker    Hematology/Oncology:  Hematology Normal   Oncology Normal     EENT/Dental:EENT/Dental Normal   Cardiovascular:   Hypertension CAD      Pulmonary:  Pulmonary Normal    Renal/:   Chronic Renal Disease, CKD    Hepatic/GI:   GERD Liver Disease, (NAFLD)    Musculoskeletal:  Musculoskeletal Normal    Neurological:  Neurology Normal    Endocrine:  Endocrine Normal    Dermatological:  Skin Normal    Psych:  Psychiatric Normal           Physical Exam  General: Alert and Oriented    Airway:  Mallampati: II / II  Mouth Opening: Normal  TM Distance: Normal  Tongue: Normal  Neck ROM: Normal ROM    Dental:  Intact    Chest/Lungs:  Clear to auscultation, Normal Respiratory Rate    Heart:  Rate: Normal  Rhythm: Regular Rhythm  Sounds: Normal        Anesthesia Plan  Type of Anesthesia, risks & benefits discussed:    Anesthesia Type: Gen Natural Airway  Intra-op Monitoring Plan: Standard ASA Monitors  Post Op Pain Control Plan: multimodal analgesia  Airway Plan: Direct  Informed Consent: Informed consent signed with the Patient and all parties understand the risks and agree with anesthesia plan.  All questions answered.   ASA Score: 3    Ready For Surgery From Anesthesia Perspective.     .

## 2023-07-14 NOTE — TELEPHONE ENCOUNTER
Pt reports had a bladder procedure today, this is the 4th time he has had the procedure done and usually has a prescription for pyridium sent in to help with his pain, but nothing was sent in today. Pt advised a call will be given to the On Call MD for [facility] to see if a prescription can be done now. Call placed to Dr. Mendoza, who stated she would send it in to the pt's requested pharmacy of [facility] the [employer] at [facility]. Pt called back and informed. Pt encouraged to call back with any worsening symptoms or questions and verbalized understanding.    Reason for Disposition   [1] Prescription not at pharmacy AND [2] was prescribed by PCP recently (Exception: [facility] has access to EMR and prescription is recorded there. Go to Home Care and confirm for pharmacy.)    Protocols used: Medication Question Call-A-

## 2023-07-18 RX ORDER — PHENAZOPYRIDINE HYDROCHLORIDE 100 MG/1
TABLET, FILM COATED ORAL
Qty: 20 TABLET | Refills: 0 | Status: SHIPPED | OUTPATIENT
Start: 2023-07-18 | End: 2023-09-06 | Stop reason: SDUPTHER

## 2023-07-20 LAB
FINAL PATHOLOGIC DIAGNOSIS: NORMAL
GROSS: NORMAL
Lab: NORMAL

## 2023-07-21 ENCOUNTER — TELEPHONE (OUTPATIENT)
Dept: UROLOGY | Facility: CLINIC | Age: 63
End: 2023-07-21
Payer: COMMERCIAL

## 2023-07-21 DIAGNOSIS — C67.9 MALIGNANT NEOPLASM OF URINARY BLADDER, UNSPECIFIED SITE: Primary | ICD-10-CM

## 2023-07-21 NOTE — TELEPHONE ENCOUNTER
I phoned the patient today.     I spoke with Mr. Jesus    Patient was identified by two patient identifiers and asked if it was okay to speak about his medical care by phone before the conversation was commenced.     I informed of his negative biopsy results and that BCG appears to be working. Will have him start maintenance BCG in Sept. Will cysto him again in Nov.             Benjamin Haile MD  Urologic Oncology  P: [phone]

## 2023-07-24 ENCOUNTER — TELEPHONE (OUTPATIENT)
Dept: UROLOGY | Facility: CLINIC | Age: 63
End: 2023-07-24
Payer: COMMERCIAL

## 2023-07-24 NOTE — TELEPHONE ENCOUNTER
Spoke with patient who was able to provide acceptable patient identifier prior to conversation start.    Scheduled  Jesus for cystocopy in procedure area for 11/16/23.  Mr. Jesus will contact this RN prior to procedure for any questions.

## 2023-07-25 ENCOUNTER — OFFICE VISIT (OUTPATIENT)
Dept: INTERNAL MEDICINE | Facility: CLINIC | Age: 63
End: 2023-07-25
Attending: FAMILY MEDICINE
Payer: COMMERCIAL

## 2023-07-25 VITALS
SYSTOLIC BLOOD PRESSURE: 136 MMHG | HEART RATE: 56 BPM | WEIGHT: 227.31 LBS | OXYGEN SATURATION: 97 % | HEIGHT: 73 IN | BODY MASS INDEX: 30.13 KG/M2 | DIASTOLIC BLOOD PRESSURE: 88 MMHG

## 2023-07-25 DIAGNOSIS — C67.2 MALIGNANT NEOPLASM OF LATERAL WALL OF URINARY BLADDER: ICD-10-CM

## 2023-07-25 DIAGNOSIS — I10 HYPERTENSION, ESSENTIAL: Primary | ICD-10-CM

## 2023-07-25 DIAGNOSIS — I25.10 CORONARY ARTERY CALCIFICATION: ICD-10-CM

## 2023-07-25 DIAGNOSIS — I25.84 CORONARY ARTERY CALCIFICATION: ICD-10-CM

## 2023-07-25 DIAGNOSIS — R20.0 NUMBNESS AND TINGLING OF FOOT: ICD-10-CM

## 2023-07-25 DIAGNOSIS — R20.2 NUMBNESS AND TINGLING OF FOOT: ICD-10-CM

## 2023-07-25 PROCEDURE — 3288F PR FALLS RISK ASSESSMENT DOCUMENTED: ICD-10-PCS | Mod: CPTII,S$GLB,, | Performed by: FAMILY MEDICINE

## 2023-07-25 PROCEDURE — 3008F PR BODY MASS INDEX (BMI) DOCUMENTED: ICD-10-PCS | Mod: CPTII,S$GLB,, | Performed by: FAMILY MEDICINE

## 2023-07-25 PROCEDURE — 3079F PR MOST RECENT DIASTOLIC BLOOD PRESSURE 80-89 MM HG: ICD-10-PCS | Mod: CPTII,S$GLB,, | Performed by: FAMILY MEDICINE

## 2023-07-25 PROCEDURE — 3079F DIAST BP 80-89 MM HG: CPT | Mod: CPTII,S$GLB,, | Performed by: FAMILY MEDICINE

## 2023-07-25 PROCEDURE — 1160F PR REVIEW ALL MEDS BY PRESCRIBER/CLIN PHARMACIST DOCUMENTED: ICD-10-PCS | Mod: CPTII,S$GLB,, | Performed by: FAMILY MEDICINE

## 2023-07-25 PROCEDURE — 3008F BODY MASS INDEX DOCD: CPT | Mod: CPTII,S$GLB,, | Performed by: FAMILY MEDICINE

## 2023-07-25 PROCEDURE — 1160F RVW MEDS BY RX/DR IN RCRD: CPT | Mod: CPTII,S$GLB,, | Performed by: FAMILY MEDICINE

## 2023-07-25 PROCEDURE — 3075F SYST BP GE 130 - 139MM HG: CPT | Mod: CPTII,S$GLB,, | Performed by: FAMILY MEDICINE

## 2023-07-25 PROCEDURE — 1101F PR PT FALLS ASSESS DOC 0-1 FALLS W/OUT INJ PAST YR: ICD-10-PCS | Mod: CPTII,S$GLB,, | Performed by: FAMILY MEDICINE

## 2023-07-25 PROCEDURE — 99213 OFFICE O/P EST LOW 20 MIN: CPT | Mod: S$GLB,,, | Performed by: FAMILY MEDICINE

## 2023-07-25 PROCEDURE — 99999 PR PBB SHADOW E&M-EST. PATIENT-LVL IV: CPT | Mod: PBBFAC,,, | Performed by: FAMILY MEDICINE

## 2023-07-25 PROCEDURE — 3075F PR MOST RECENT SYSTOLIC BLOOD PRESS GE 130-139MM HG: ICD-10-PCS | Mod: CPTII,S$GLB,, | Performed by: FAMILY MEDICINE

## 2023-07-25 PROCEDURE — 1101F PT FALLS ASSESS-DOCD LE1/YR: CPT | Mod: CPTII,S$GLB,, | Performed by: FAMILY MEDICINE

## 2023-07-25 PROCEDURE — 1159F MED LIST DOCD IN RCRD: CPT | Mod: CPTII,S$GLB,, | Performed by: FAMILY MEDICINE

## 2023-07-25 PROCEDURE — 99999 PR PBB SHADOW E&M-EST. PATIENT-LVL IV: ICD-10-PCS | Mod: PBBFAC,,, | Performed by: FAMILY MEDICINE

## 2023-07-25 PROCEDURE — 1159F PR MEDICATION LIST DOCUMENTED IN MEDICAL RECORD: ICD-10-PCS | Mod: CPTII,S$GLB,, | Performed by: FAMILY MEDICINE

## 2023-07-25 PROCEDURE — 99213 PR OFFICE/OUTPT VISIT, EST, LEVL III, 20-29 MIN: ICD-10-PCS | Mod: S$GLB,,, | Performed by: FAMILY MEDICINE

## 2023-07-25 PROCEDURE — 3288F FALL RISK ASSESSMENT DOCD: CPT | Mod: CPTII,S$GLB,, | Performed by: FAMILY MEDICINE

## 2023-07-25 NOTE — PROGRESS NOTES
Subjective:       Patient ID: Joseph Jesus is a 63 y.o. male.    Chief Complaint: Follow-up    Established patient follows up for management of chronic medical illnesses with complaints today. Please see dictation and ROS for interval problems, specific complaints and disease management discussion.    Past, Surgical, Family, Social, Histories; Medications, allergies reviewed and reconciled.  Health maintenance file reviewed and addressed items due. Recent applicable lab, imaging and cardiovascular results reviewed.  Problem list items reviewed and modified or added entries (in the overview section) may not be transcribed into this encounter note due to note writer format.     - TURBT w/ neg path, cystos surveillance    Today's visit, blood pressure recheck. BP good today.    Today states - R leg pain - ant thigh to lower calf. No weakness. Chronic numbness in lower extremities, saw neurologist 10/04/2022.  Some pain in the back reported at that time but not describing back pain at this time, just in the leg. EMG nerve conduction study performed and located under provider note 10/11/2022.  Showed peripheral neuropathy and findings on the right L4-L5.  Assessment to go through physical therapy and?  MRI if pain for symptoms worsen.  Prior evaluation by cardiologist for leg pain, DOREEN normal 03/28/2022.  Cardiology visit 03/28/2022.  Also on statin for coronary calcifications on imaging.  No chest pain or dyspnea.      See 4/28 tel note, had requested outside MRI d/t to cost. States done, but we never got any results.  Nurses researching and attempting to get that faxed to us at this time.        Review of Systems   Constitutional:  Negative for appetite change, chills, diaphoresis, fatigue and fever.   HENT:  Negative for congestion, postnasal drip, rhinorrhea, sore throat and trouble swallowing.    Eyes:  Negative for visual disturbance.   Respiratory:  Negative for cough, choking, chest tightness, shortness of  breath and wheezing.    Cardiovascular:  Positive for leg swelling. Negative for chest pain.   Gastrointestinal:  Negative for abdominal distention, abdominal pain, diarrhea, nausea and vomiting.   Genitourinary:  Negative for difficulty urinating and hematuria.   Musculoskeletal:  Positive for neck pain and neck stiffness. Negative for arthralgias and myalgias.   Skin:  Negative for rash.   Neurological:  Positive for numbness. Negative for weakness, light-headedness and headaches.   Psychiatric/Behavioral:  Negative for confusion and dysphoric mood.      Objective:      Physical Exam  Vitals and nursing note reviewed.   Constitutional:       General: He is not in acute distress.     Appearance: He is well-developed.   Pulmonary:      Effort: Pulmonary effort is normal.   Musculoskeletal:      Cervical back: Neck supple.      Right lower leg: No edema.      Left lower leg: No edema.   Skin:     General: Skin is warm and dry.      Findings: No rash.   Neurological:      Mental Status: He is alert and oriented to person, place, and time.   Psychiatric:         Behavior: Behavior normal.         Thought Content: Thought content normal.         Judgment: Judgment normal.       Assessment:       1. Hypertension, essential    2. Numbness and tingling of foot    3. Malignant neoplasm of lateral wall of urinary bladder    4. Coronary artery calcification        Plan:     Medication List with Changes/Refills   Current Medications    ALBUTEROL (PROVENTIL/VENTOLIN HFA) 90 MCG/ACTUATION INHALER    Inhale 2 puffs into the lungs every 6 (six) hours as needed for Wheezing (cough).    ATORVASTATIN (LIPITOR) 10 MG TABLET    Take 1 tablet (10 mg total) by mouth once daily.    CETIRIZINE (ZYRTEC) 10 MG TABLET    Take 10 mg by mouth Daily.    COENZYME Q10 100 MG CAPSULE    Take 200 mg by mouth once daily.    FLUTICASONE PROPIONATE (FLONASE) 50 MCG/ACTUATION NASAL SPRAY    2 sprays (100 mcg total) by Each Nostril route daily as needed  "for Rhinitis.    HYDROCHLOROTHIAZIDE (HYDRODIURIL) 25 MG TABLET    Take 1 tablet (25 mg total) by mouth once daily.    LACTOBACILLUS ACIDOPHILUS (PROBIOTIC ORAL)    Take by mouth.    NIFEDIPINE (ADALAT CC) 30 MG TBSR    Take 1 tablet (30 mg total) by mouth once daily.    OXYBUTYNIN (DITROPAN) 5 MG TAB    Take 1 tablet (5 mg total) by mouth 3 (three) times daily as needed (bladder spasms).    OXYBUTYNIN (DITROPAN) 5 MG TAB    TAKE 1 TABLET BY MOUTH THREE TIMES A DAY    PHENAZOPYRIDINE (PYRIDIUM) 100 MG TABLET    TAKE 1 TABLET BY MOUTH 3 TIMES DAILY AS NEEDED FOR PAIN.    POMEGRANATE XT/POMEGRANAT SEED (POMEGRAN FRUIT XT-POMEGRA SEED ORAL)    Take by mouth.    TRAMADOL (ULTRAM) 50 MG TABLET    Take 1 tablet (50 mg total) by mouth every 12 (twelve) hours as needed for Pain.     1. Hypertension, essential    2. Numbness and tingling of foot    3. 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 TURBT on 6/21/22 showing CIS.  Muscle was present.   -TURBT performed 8/23/22. Dr. Ewing went over pathology demonstrating cancer (CIS is high grade and BCG recommend)  -had 5 treatments. "He is high risk. If he responds, he will need maintenance until 9/2025."  -TUR 3/8/23 (pos bx) and 4/4/2023, 7/14/2023 (neg bx) - BCG ?? Re-Rx      4. Coronary artery calcification  Overview:  -CTA 4/10/2020 - scattered calcific atherosclerosis of the coronary vessels, on statin        See meds, orders, follow up, routing and instructions sections of encounter and AVS. Discussed with patient and provided on AVS.    Discussed diet and exercise as therapeutic modalities for metabolic and other conditions. Provided patient information, which are included as links on the AVS for detailed information.    Lab Results   Component Value Date     04/13/2023    K 4.2 04/13/2023     04/13/2023 "    BUN 22 04/13/2023    CREATININE 1.2 04/13/2023    GLU 84 04/13/2023    HGBA1C 5.6 10/07/2022    AST 39 04/13/2023    ALT 43 04/13/2023    ALBUMIN 4.3 04/13/2023    ALBUMIN 4.3 06/21/2018    PROT 7.7 04/13/2023    BILITOT 0.4 04/13/2023    CHOL 172 04/13/2023    HDL 47 04/13/2023    LDLCALC 88.4 04/13/2023    TRIG 183 (H) 04/13/2023    WBC 7.31 03/01/2023    HGB 13.7 (L) 03/01/2023    HCT 42.6 03/01/2023     03/01/2023    PSA 0.35 04/13/2023    PSADIAG 0.37 09/08/2014    TSH 2.433 10/07/2022    BNP 16 03/16/2022

## 2023-08-15 DIAGNOSIS — I10 HYPERTENSION, ESSENTIAL: ICD-10-CM

## 2023-08-15 RX ORDER — NIFEDIPINE 30 MG/1
30 TABLET, FILM COATED, EXTENDED RELEASE ORAL
Qty: 90 TABLET | Refills: 3 | Status: SHIPPED | OUTPATIENT
Start: 2023-08-15

## 2023-08-15 NOTE — TELEPHONE ENCOUNTER
No care due was identified.  [facility] Embedded Care Due Messages. Reference number: [id number].   8/15/2023 12:55:02 AM CDT

## 2023-08-15 NOTE — TELEPHONE ENCOUNTER
Refill Decision Note   Joseph Jesus  is requesting a refill authorization.  Brief Assessment and Rationale for Refill:  Approve     Medication Therapy Plan:         Alert overridden per protocol: Yes   Comments:     Note composed:6:09 AM 08/15/2023

## 2023-09-04 DIAGNOSIS — E78.5 HYPERLIPIDEMIA, UNSPECIFIED HYPERLIPIDEMIA TYPE: ICD-10-CM

## 2023-09-04 RX ORDER — ATORVASTATIN CALCIUM 10 MG/1
10 TABLET, FILM COATED ORAL DAILY
Qty: 90 TABLET | Refills: 3 | Status: SHIPPED | OUTPATIENT
Start: 2023-09-04

## 2023-09-04 NOTE — TELEPHONE ENCOUNTER
No care due was identified.  [facility] Embedded Care Due Messages. Reference number: [id number].   9/04/2023 10:19:29 AM CDT

## 2023-09-05 ENCOUNTER — PATIENT MESSAGE (OUTPATIENT)
Dept: UROLOGY | Facility: CLINIC | Age: 63
End: 2023-09-05
Payer: COMMERCIAL

## 2023-09-06 ENCOUNTER — OFFICE VISIT (OUTPATIENT)
Dept: UROLOGY | Facility: CLINIC | Age: 63
End: 2023-09-06
Payer: COMMERCIAL

## 2023-09-06 ENCOUNTER — PATIENT MESSAGE (OUTPATIENT)
Dept: UROLOGY | Facility: CLINIC | Age: 63
End: 2023-09-06

## 2023-09-06 VITALS
BODY MASS INDEX: 28.89 KG/M2 | DIASTOLIC BLOOD PRESSURE: 97 MMHG | WEIGHT: 218 LBS | HEIGHT: 73 IN | SYSTOLIC BLOOD PRESSURE: 145 MMHG | HEART RATE: 63 BPM

## 2023-09-06 DIAGNOSIS — C67.2 MALIGNANT NEOPLASM OF LATERAL WALL OF URINARY BLADDER: Primary | ICD-10-CM

## 2023-09-06 PROCEDURE — 99999 PR PBB SHADOW E&M-EST. PATIENT-LVL III: ICD-10-PCS | Mod: PBBFAC,,,

## 2023-09-06 PROCEDURE — 3008F BODY MASS INDEX DOCD: CPT | Mod: CPTII,S$GLB,,

## 2023-09-06 PROCEDURE — 3288F FALL RISK ASSESSMENT DOCD: CPT | Mod: CPTII,S$GLB,,

## 2023-09-06 PROCEDURE — 99499 UNLISTED E&M SERVICE: CPT | Mod: S$GLB,,,

## 2023-09-06 PROCEDURE — 99999 PR PBB SHADOW E&M-EST. PATIENT-LVL III: CPT | Mod: PBBFAC,,,

## 2023-09-06 PROCEDURE — 3077F PR MOST RECENT SYSTOLIC BLOOD PRESSURE >= 140 MM HG: ICD-10-PCS | Mod: CPTII,S$GLB,,

## 2023-09-06 PROCEDURE — 1160F PR REVIEW ALL MEDS BY PRESCRIBER/CLIN PHARMACIST DOCUMENTED: ICD-10-PCS | Mod: CPTII,S$GLB,,

## 2023-09-06 PROCEDURE — 1101F PT FALLS ASSESS-DOCD LE1/YR: CPT | Mod: CPTII,S$GLB,,

## 2023-09-06 PROCEDURE — 1159F MED LIST DOCD IN RCRD: CPT | Mod: CPTII,S$GLB,,

## 2023-09-06 PROCEDURE — 1159F PR MEDICATION LIST DOCUMENTED IN MEDICAL RECORD: ICD-10-PCS | Mod: CPTII,S$GLB,,

## 2023-09-06 PROCEDURE — 3288F PR FALLS RISK ASSESSMENT DOCUMENTED: ICD-10-PCS | Mod: CPTII,S$GLB,,

## 2023-09-06 PROCEDURE — 3077F SYST BP >= 140 MM HG: CPT | Mod: CPTII,S$GLB,,

## 2023-09-06 PROCEDURE — 3008F PR BODY MASS INDEX (BMI) DOCUMENTED: ICD-10-PCS | Mod: CPTII,S$GLB,,

## 2023-09-06 PROCEDURE — 99499 NO LOS: ICD-10-PCS | Mod: S$GLB,,,

## 2023-09-06 PROCEDURE — 3080F DIAST BP >= 90 MM HG: CPT | Mod: CPTII,S$GLB,,

## 2023-09-06 PROCEDURE — 3080F PR MOST RECENT DIASTOLIC BLOOD PRESSURE >= 90 MM HG: ICD-10-PCS | Mod: CPTII,S$GLB,,

## 2023-09-06 PROCEDURE — 1160F RVW MEDS BY RX/DR IN RCRD: CPT | Mod: CPTII,S$GLB,,

## 2023-09-06 PROCEDURE — 51720 TREATMENT OF BLADDER LESION: CPT | Mod: S$GLB,,,

## 2023-09-06 PROCEDURE — 51720 PR INSTILL, ANTICANCER AGENT, BLADDER: ICD-10-PCS | Mod: S$GLB,,,

## 2023-09-06 PROCEDURE — 1101F PR PT FALLS ASSESS DOC 0-1 FALLS W/OUT INJ PAST YR: ICD-10-PCS | Mod: CPTII,S$GLB,,

## 2023-09-06 RX ORDER — PHENAZOPYRIDINE HYDROCHLORIDE 100 MG/1
100 TABLET, FILM COATED ORAL
Qty: 60 TABLET | Refills: 0 | Status: SHIPPED | OUTPATIENT
Start: 2023-09-06 | End: 2023-09-26

## 2023-09-06 NOTE — PROGRESS NOTES
CHIEF COMPLAINT:  Bladder cancer      HISTORY OF PRESENTING ILLINESS:  Joseph Jesus is a 63 y.o. male established pt of Dr. Haile. Here today for maintenance BCG #1/2. No GH or UTI s/s today. He takes Ditropan PRN for bladder spasms.         He had microhematuria and was found to have an erythematous lesion with a positive cytology. On 4/19/2022 he underwent bladder biopsy showing CIS. He then underwent a TURBT on 6/21/22 showing CIS.  Muscle was present. Another TURBT was performed 8/23/22.  Dr. Ewing went over his pathology demonstrating cancer from 8/23/22. CIS is high grade and BCG recommend. He is high risk. If he responds, he will need maintenance until 9/2025. Will need blue light cysto scheduled.          Urologic History:   4/1/2022 -- Office Cytoscopy -- erythema right lateral wall  4/5/2022 -- CTUrogram -- no renal mass, no bladder mass, non-obstructing renal stones  4/19/2022 -- Bladder bx -- CIS  6/21/2022 -- TURBT -- CIS, muscle present and uninvolved  8/23/2022 -- re-TURBT -- CIS, muscle present and uninvolved  12/2022 -- completed 5 dose induction BCG  3/8/2023 -- Blue light TURBT -- HG Ta, random bladder bx -- CIS  4/4/2023 - Blue light TURBT, path normal  5/10-6/7/2023 - repeat 5 dose induction BCG  7/14/2023 - TURBT with Dr. Haile, biopsy negative, maintenance BCG ordered, will need cysto in November 9/6/2023 - BCG maintenance #1/2          REVIEW OF SYSTEMS:  Review of Systems   Constitutional:  Negative for chills and fever.   HENT:  Negative for congestion and sore throat.    Respiratory:  Negative for cough and shortness of breath.    Cardiovascular:  Negative for chest pain and palpitations.   Gastrointestinal:  Negative for nausea and vomiting.   Genitourinary:  Negative for dysuria, flank pain, frequency, hematuria and urgency.   Neurological:  Negative for dizziness and headaches.         PATIENT HISTORY:    Past Medical History:   Diagnosis Date    Allergy     seasonal    Anal fistula  hx    Arthritis     Basal cell carcinoma 04/2013    left superior forehead    Callus     Diverticulosis     Epididymal cyst     GERD (gastroesophageal reflux disease)     Hayfever     Hydrocele, right     Hyperlipidemia     Hypertension     Neck pain     hx of muscular inury from weight lifting---resolved now    PONV (postoperative nausea and vomiting)     Prostatitis     Dec. '12-treated with antibx.    Squamous cell carcinoma 01/07/2019    anterior scalp    Squamous cell carcinoma of skin 03/2020    Left post scalp (SCC insitu-saucerization)    Visual impairment        Past Surgical History:   Procedure Laterality Date    APPENDECTOMY  1962    BIOPSY OF BLADDER  4/19/2022    Procedure: BIOPSY, BLADDER;  Surgeon: Lenny Ewing MD;  Location: [facility] OR 1ST FLR;  Service: Urology;;    BIOPSY OF BLADDER  3/8/2023    Procedure: BIOPSY, BLADDER;  Surgeon: Lenny Ewing MD;  [facility];  Service: Urology;;    BIOPSY OF BLADDER  7/14/2023    Procedure: BIOPSY, BLADDER;  Surgeon: Benjamin Haile MD;  [facility];  Service: Urology;;    BLADDER FULGURATION  4/19/2022    Procedure: FULGURATION, BLADDER;  Surgeon: Lenny Ewing MD;  [facility];  Service: Urology;;    BLADDER FULGURATION  3/8/2023    Procedure: FULGURATION, BLADDER;  Surgeon: Lenny Ewing MD;  Location: [facility] OR 1ST FLR;  Service: Urology;;    BLADDER FULGURATION  7/14/2023    Procedure: FULGURATION, BLADDER;  Surgeon: Benjamin Haile MD;  Location: [facility] OR [facility] FLR;  Service: Urology;;    COLONOSCOPY  12/2012    due for repeat 12/2015    COLONOSCOPY N/A 5/19/2016    Procedure: COLONOSCOPY;  Surgeon: James Webber MD;  Location: [facility] (4TH FLR);  Service: Endoscopy;  Laterality: N/A; diverticulosis, repeat in 5-10 years for surveillance    COLONOSCOPY N/A 8/9/2021    Procedure: COLONOSCOPY;  Surgeon: Fahad Bautista Jr., MD;  Location: [facility] ENDO;  Service: Endoscopy;  Laterality: N/A;    CYSTOSCOPY   4/19/2022    Procedure: CYSTOSCOPY;  Surgeon: Lenny Ewing MD;  Location: [facility] OR [facility] FLR;  Service: Urology;;    CYSTOSCOPY  6/21/2022    Procedure: CYSTOSCOPY;  Surgeon: Lenny Ewing MD;  [facility];  Service: Urology;;    CYSTOSCOPY  8/23/2022    Procedure: CYSTOSCOPY;  Surgeon: Lenny Ewing MD;  [facility];  Service: Urology;;    CYSTOSCOPY  3/8/2023    Procedure: CYSTOSCOPY-BLUE LIGHT;  Surgeon: Lenny Ewing MD;  [facility];  Service: Urology;;    CYSTOSCOPY N/A 4/4/2023    Procedure: CYSTOSCOPY;  Surgeon: Benjamin Haile MD;  [facility];  Service: Urology;  Laterality: N/A;  1 hour    CYSTOSCOPY  7/14/2023    Procedure: CYSTOSCOPY;  Surgeon: Benjamin Haile MD;  [facility];  Service: Urology;;  BLUE LIGHT    ESOPHAGOGASTRODUODENOSCOPY N/A 8/9/2021    Procedure: EGD (ESOPHAGOGASTRODUODENOSCOPY);  Surgeon: Fahad Bautista Jr., MD;  Location[facility];  Service: Endoscopy;  Laterality: N/A;    EUA/Fistulotomy-'08      HERNIA REPAIR      RIH with mesh    Hydrocelectomy  2013    MASS EXCISION  2004    BCC removal (twice)    MOLE REMOVAL  02/25/2019    2 moles removed from scalp =- 1 was basal cell and 1 was squamous cell - dermatology - Dr. londono    right Spermatocelectomy  2013    TURBT (TRANSURETHRAL RESECTION OF BLADDER TUMOR) N/A 4/4/2023    Procedure: TURBT (TRANSURETHRAL RESECTION OF BLADDER TUMOR);  Surgeon: Benjamin Haile MD;  Location: [facility] OR [address] [place] [address];  Service: Urology;  Laterality: N/A;  1 hour, blue light    UPPER GASTROINTESTINAL ENDOSCOPY  prior to 2010    hiatal hernia, reflux esophagitis       Family History   Problem Relation Age of Onset    Skin cancer Mother     Hypertension Mother     Skin cancer Father     Cancer Father         prostate cancer    Hypertension Father     Hypertension Maternal Grandmother     Hypertension Maternal Grandfather     Hypertension Paternal Grandmother     Anesthesia problems  Paternal Grandmother     Cancer Paternal Grandfather         prostate cancer    Hypertension Paternal Grandfather     Heart disease Paternal Grandfather     Diabetes Neg Hx     Colon polyps Neg Hx     Colon cancer Neg Hx     Melanoma Neg Hx     Psoriasis Neg Hx     Lupus Neg Hx     Eczema Neg Hx     Acne Neg Hx     Cirrhosis Neg Hx     Prostate cancer Neg Hx     Kidney disease Neg Hx     Crohn's disease Neg Hx     Ulcerative colitis Neg Hx     Stomach cancer Neg Hx     Esophageal cancer Neg Hx        Social History     Socioeconomic History    Marital status:      Spouse name: Traci    Number of children: 1   Occupational History    Occupation: Software Eng.     Employer: [employer]     Comment: [employer]   Tobacco Use    Smoking status: Former     Current packs/day: 0.00     Average packs/day: 1 pack/day for 10.0 years (10.0 ttl pk-yrs)     Types: Cigarettes     Start date: 1977     Quit date: 1987     Years since quittin.7    Smokeless tobacco: Never    Tobacco comments:     Decreased lung compassity per patient   Substance and Sexual Activity    Alcohol use: Not Currently     Alcohol/week: 0.0 - 1.0 standard drinks of alcohol     Comment: No longer drinks ETOH    Drug use: No    Sexual activity: Yes     Partners: Female       Allergies:  Amlodipine, Atenolol, Catechu herb, Irbesartan, Erythromycin, Sumycin [tetracycline], and Tetracyclines    Medications:    Current Outpatient Medications:     atorvastatin (LIPITOR) 10 MG tablet, Take 1 tablet (10 mg total) by mouth once daily., Disp: 90 tablet, Rfl: 3    cetirizine (ZYRTEC) 10 MG tablet, Take 10 mg by mouth Daily., Disp: , Rfl:     coenzyme Q10 100 mg capsule, Take 200 mg by mouth once daily., Disp: , Rfl:     fluticasone propionate (FLONASE) 50 mcg/actuation nasal spray, 2 sprays (100 mcg total) by Each Nostril route daily as needed for Rhinitis., Disp: 16 mL, Rfl: 5    hydroCHLOROthiazide (HYDRODIURIL) 25 MG tablet, Take 1 tablet (25 mg  total) by mouth once daily. (Patient taking differently: Take 25 mg by mouth nightly.), Disp: 90 tablet, Rfl: 1    Lactobacillus acidophilus (PROBIOTIC ORAL), Take by mouth., Disp: , Rfl:     NIFEdipine (ADALAT CC) 30 MG TbSR, TAKE 1 TABLET BY MOUTH EVERY DAY, Disp: 90 tablet, Rfl: 3    phenazopyridine (PYRIDIUM) 100 MG tablet, TAKE 1 TABLET BY MOUTH 3 TIMES DAILY AS NEEDED FOR PAIN., Disp: 20 tablet, Rfl: 0    pomegranate xt/pomegranat seed (POMEGRAN FRUIT XT-POMEGRA SEED ORAL), Take by mouth., Disp: , Rfl:     traMADoL (ULTRAM) 50 mg tablet, Take 1 tablet (50 mg total) by mouth every 12 (twelve) hours as needed for Pain., Disp: 14 tablet, Rfl: 0    albuterol (PROVENTIL/VENTOLIN HFA) 90 mcg/actuation inhaler, Inhale 2 puffs into the lungs every 6 (six) hours as needed for Wheezing (cough)., Disp: 18 g, Rfl: 5    oxybutynin (DITROPAN) 5 MG Tab, Take 1 tablet (5 mg total) by mouth 3 (three) times daily as needed (bladder spasms)., Disp: 30 tablet, Rfl: 0    oxybutynin (DITROPAN) 5 MG Tab, TAKE 1 TABLET BY MOUTH THREE TIMES A DAY, Disp: 90 tablet, Rfl: 1    Current Facility-Administered Medications:     BCG live (LORENZA) 50 mg in sodium chloride 0.9% 50 mL bladder instillation, 50 mg, Intravesical, Once, Sparkle Lopez NP    Facility-Administered Medications Ordered in Other Visits:     0.9%  NaCl infusion, , Intravenous, Continuous, Shahrzad Hughes MD, Last Rate: 10 mL/hr at 07/14/23 0734, New Bag at 07/14/23 0734    PHYSICAL EXAMINATION:  Physical Exam  Constitutional:       Appearance: Normal appearance.   HENT:      Head: Normocephalic and atraumatic.      Right Ear: External ear normal.      Left Ear: External ear normal.   Pulmonary:      Effort: Pulmonary effort is normal. No respiratory distress.   Musculoskeletal:         General: Normal range of motion.   Skin:     General: Skin is warm and dry.   Neurological:      General: No focal deficit present.      Mental Status: He is alert.   Psychiatric:          Mood and Affect: Mood normal.         Behavior: Behavior normal.         LABS:  UA 50 blood      Lab Results   Component Value Date    PSA 0.35 04/13/2023    PSA 0.30 01/28/2022    PSA 0.22 07/29/2020    PSADIAG 0.37 09/08/2014       Lab Results   Component Value Date    CREATININE 1.2 04/13/2023    EGFRNORACEVR >60.0 04/13/2023         IMPRESSION:  Encounter Diagnoses   Name Primary?    Malignant neoplasm of lateral wall of urinary bladder Yes         Assessment:       1. Malignant neoplasm of lateral wall of urinary bladder        Plan:   BCG treatment administered by Nurse Elliot under sterile and BCG precautions. Patient tolerated well.  Patient was instructed to hold medication for 2 hours.   Discussed limiting fluid intake prior to appointment to ensure patient is able to hold medication for designated time.   Discussed post instillation expectations including side effects.   Reviewed how to properly bleach toilet after voiding post instillation.   Recommend drinking plenty of fluids after the 2 hour sarabjit to flush bladder.   Addressed patient's questions and concerns.   Educational material provided about BCG treatment and information discussed during today's visit.  Patient encouraged to contact the office with any additional questions or concerns.     Follow up in 1 week for BCG treatment 2/2    I spent 30 minutes with the patient of which more than half was spent in direct consultation with the patient in regards to our treatment and plan.  We addressed the office findings and recent labs.   Education and recommendations of today's plan of care including home remedies and needed follow up with PCP.   We discussed the chief complaint; reviewed the LUTS and the possible contributory factors.

## 2023-09-07 ENCOUNTER — PATIENT MESSAGE (OUTPATIENT)
Dept: INTERNAL MEDICINE | Facility: CLINIC | Age: 63
End: 2023-09-07
Payer: COMMERCIAL

## 2023-09-07 ENCOUNTER — TELEPHONE (OUTPATIENT)
Dept: INTERNAL MEDICINE | Facility: CLINIC | Age: 63
End: 2023-09-07
Payer: COMMERCIAL

## 2023-09-07 DIAGNOSIS — R20.2 NUMBNESS AND TINGLING OF FOOT: Primary | ICD-10-CM

## 2023-09-07 DIAGNOSIS — M54.50 BACK PAIN, LUMBOSACRAL: ICD-10-CM

## 2023-09-07 DIAGNOSIS — R20.0 NUMBNESS AND TINGLING OF FOOT: Primary | ICD-10-CM

## 2023-09-07 RX ORDER — TRAMADOL HYDROCHLORIDE 50 MG/1
50 TABLET ORAL EVERY 6 HOURS PRN
Qty: 12 TABLET | Refills: 0 | Status: SHIPPED | OUTPATIENT
Start: 2023-09-07

## 2023-09-07 NOTE — TELEPHONE ENCOUNTER
Please call and advise patient that we never received a copy of his outside MRI scan, sometime from April.  I tasked Laura MCCONNELL to call last month and find out what was going on, I have not received a response.    Please advise patient to request a copy of the report from the performing radiology center directly and provide that to me directly so I can review it.    Also, patient requesting pain medications for back pain.  Please see referral for back and spine center and please contact patient to schedule.

## 2023-09-07 NOTE — TELEPHONE ENCOUNTER
----- Message from Lora Ortiz sent at 1/20/2020  8:35 AM CST -----  Regarding: Notice of patient decision to dis-enroll from [facility] Dr. Olson,    Your patient Joseph Jesus was enrolled in [facility]. Unfortunately, he has requested discharge from [facility] monitoring and we wanted to make you aware.     Thanks for your support of Digital Medicine programs! Please let me know if you any questions or concerns.    Sincerely,   Lora Ortiz     General: Crying and interacting.   HEENT: NCAT. Non erythematous, non swollen tonsils. No exudates. No TM bulging, effusion.  Cardiac: RRR, cap refill less than 2 seconds.   Chest: CTA  Abdomen: soft, non-distended, no ttp, no rebound or guarding  Extremities: no peripheral edema, calf tenderness, or leg size discrepancies  Skin: no rashes  Neuro: Crying and interacting. General: Crying and interacting.   HEENT: NCAT. Non erythematous, non swollen tonsils. No exudates. No TM bulging, effusion.  Cardiac: Regular rhythm, tachycardic, cap refill less than 2 seconds.   Chest: CTA  Abdomen: soft, non-distended, no ttp, no rebound or guarding  Extremities: no peripheral edema, calf tenderness, or leg size discrepancies  Skin: no rashes  Neuro: Crying and interacting.

## 2023-09-08 DIAGNOSIS — E78.5 HYPERLIPIDEMIA, UNSPECIFIED HYPERLIPIDEMIA TYPE: ICD-10-CM

## 2023-09-08 RX ORDER — ATORVASTATIN CALCIUM 10 MG/1
10 TABLET, FILM COATED ORAL DAILY
Qty: 90 TABLET | Refills: 3 | OUTPATIENT
Start: 2023-09-08

## 2023-09-08 NOTE — TELEPHONE ENCOUNTER
No care due was identified.  [facility] Embedded Care Due Messages. Reference number: [id number].   9/08/2023 12:37:45 PM CDT

## 2023-09-08 NOTE — TELEPHONE ENCOUNTER
Refill Decision Note   Joseph Jesus  is requesting a refill authorization.  Brief Assessment and Rationale for Refill:  Quick Discontinue     Medication Therapy Plan:         Comments:     Note composed:6:55 PM 09/08/2023

## 2023-09-11 ENCOUNTER — PATIENT OUTREACH (OUTPATIENT)
Dept: ADMINISTRATIVE | Facility: HOSPITAL | Age: 63
End: 2023-09-11
Payer: COMMERCIAL

## 2023-09-11 NOTE — PROGRESS NOTES
Health Maintenance Due   Topic Date Due    HIV Screening  Never done    Aspirin/Antiplatelet Therapy  Never done    COVID-19 Vaccine (3 - Pfizer series) 05/20/2021       HM updated. Triggered LINKS and Care Everywhere. Reconciled immunizations.     Jacklyn Toney LPN   Clinical Care Coordinator  Primary Care and Wellness

## 2023-09-13 ENCOUNTER — OFFICE VISIT (OUTPATIENT)
Dept: UROLOGY | Facility: CLINIC | Age: 63
End: 2023-09-13
Payer: COMMERCIAL

## 2023-09-13 DIAGNOSIS — C67.2 MALIGNANT NEOPLASM OF LATERAL WALL OF URINARY BLADDER: Primary | ICD-10-CM

## 2023-09-13 DIAGNOSIS — N13.8 BPH WITH URINARY OBSTRUCTION: ICD-10-CM

## 2023-09-13 DIAGNOSIS — I10 HYPERTENSION, ESSENTIAL: ICD-10-CM

## 2023-09-13 DIAGNOSIS — N40.1 BPH WITH URINARY OBSTRUCTION: ICD-10-CM

## 2023-09-13 LAB
BILIRUB SERPL-MCNC: NORMAL MG/DL
BLOOD URINE, POC: 50
CLARITY, POC UA: CLEAR
COLOR, POC UA: YELLOW
GLUCOSE UR QL STRIP: NORMAL
KETONES UR QL STRIP: NORMAL
LEUKOCYTE ESTERASE URINE, POC: NORMAL
NITRITE, POC UA: NORMAL
PH, POC UA: 7
PROTEIN, POC: NORMAL
SPECIFIC GRAVITY, POC UA: 1
UROBILINOGEN, POC UA: NORMAL

## 2023-09-13 PROCEDURE — 81002 POCT URINE DIPSTICK WITHOUT MICROSCOPE: ICD-10-PCS | Mod: S$GLB,,,

## 2023-09-13 PROCEDURE — 99499 NO LOS: ICD-10-PCS | Mod: S$GLB,,,

## 2023-09-13 PROCEDURE — 81002 URINALYSIS NONAUTO W/O SCOPE: CPT | Mod: S$GLB,,,

## 2023-09-13 PROCEDURE — 51720 TREATMENT OF BLADDER LESION: CPT | Mod: S$GLB,,,

## 2023-09-13 PROCEDURE — 1159F PR MEDICATION LIST DOCUMENTED IN MEDICAL RECORD: ICD-10-PCS | Mod: CPTII,S$GLB,,

## 2023-09-13 PROCEDURE — 1160F RVW MEDS BY RX/DR IN RCRD: CPT | Mod: CPTII,S$GLB,,

## 2023-09-13 PROCEDURE — 1159F MED LIST DOCD IN RCRD: CPT | Mod: CPTII,S$GLB,,

## 2023-09-13 PROCEDURE — 51720 PR INSTILL, ANTICANCER AGENT, BLADDER: ICD-10-PCS | Mod: S$GLB,,,

## 2023-09-13 PROCEDURE — 99499 UNLISTED E&M SERVICE: CPT | Mod: S$GLB,,,

## 2023-09-13 PROCEDURE — 99999 PR PBB SHADOW E&M-EST. PATIENT-LVL III: ICD-10-PCS | Mod: PBBFAC,,,

## 2023-09-13 PROCEDURE — 1160F PR REVIEW ALL MEDS BY PRESCRIBER/CLIN PHARMACIST DOCUMENTED: ICD-10-PCS | Mod: CPTII,S$GLB,,

## 2023-09-13 PROCEDURE — 99999 PR PBB SHADOW E&M-EST. PATIENT-LVL III: CPT | Mod: PBBFAC,,,

## 2023-09-13 NOTE — PROGRESS NOTES
CHIEF COMPLAINT:  Bladder cancer      HISTORY OF PRESENTING ILLINESS:  Joseph Jesus is a 63 y.o. male established pt of Dr. Haile. Here today for maintenance BCG #2/2. No GH or UTI s/s today. He takes Ditropan PRN for bladder spasms.         He had microhematuria and was found to have an erythematous lesion with a positive cytology. On 4/19/2022 he underwent bladder biopsy showing CIS. He then underwent a TURBT on 6/21/22 showing CIS.  Muscle was present. Another TURBT was performed 8/23/22.  Dr. Ewing went over his pathology demonstrating cancer from 8/23/22. CIS is high grade and BCG recommend. He is high risk. If he responds, he will need maintenance until 9/2025. Will need blue light cysto scheduled.          Urologic History:   4/1/2022 -- Office Cytoscopy -- erythema right lateral wall  4/5/2022 -- CTUrogram -- no renal mass, no bladder mass, non-obstructing renal stones  4/19/2022 -- Bladder bx -- CIS  6/21/2022 -- TURBT -- CIS, muscle present and uninvolved  8/23/2022 -- re-TURBT -- CIS, muscle present and uninvolved  12/2022 -- completed 5 dose induction BCG  3/8/2023 -- Blue light TURBT -- HG Ta, random bladder bx -- CIS  4/4/2023 - Blue light TURBT, path normal  5/10-6/7/2023 - repeat 5 dose induction BCG  7/14/2023 - TURBT with Dr. Haile, biopsy negative, maintenance BCG ordered, will need cysto in November 9/13/2023 - BCG maintenance #2/2          REVIEW OF SYSTEMS:  Review of Systems   Constitutional:  Negative for chills and fever.   HENT:  Negative for congestion and sore throat.    Respiratory:  Negative for cough and shortness of breath.    Cardiovascular:  Negative for chest pain and palpitations.   Gastrointestinal:  Negative for nausea and vomiting.   Genitourinary:  Negative for dysuria, flank pain, frequency, hematuria and urgency.   Neurological:  Negative for dizziness and headaches.         PATIENT HISTORY:  Past Medical History:   Diagnosis Date    Allergy     seasonal    Anal fistula  hx    Arthritis     Basal cell carcinoma 04/2013    left superior forehead    Callus     Diverticulosis     Epididymal cyst     GERD (gastroesophageal reflux disease)     Hayfever     Hydrocele, right     Hyperlipidemia     Hypertension     Neck pain     hx of muscular inury from weight lifting---resolved now    PONV (postoperative nausea and vomiting)     Prostatitis     Dec. '12-treated with antibx.    Squamous cell carcinoma 01/07/2019    anterior scalp    Squamous cell carcinoma of skin 03/2020    Left post scalp (SCC insitu-saucerization)    Visual impairment        Past Surgical History:   Procedure Laterality Date    APPENDECTOMY  1962    BIOPSY OF BLADDER  4/19/2022    Procedure: BIOPSY, BLADDER;  Surgeon: Lenny Ewing MD;  Location: [facility] OR 1ST FLR;  Service: Urology;;    BIOPSY OF BLADDER  3/8/2023    Procedure: BIOPSY, BLADDER;  Surgeon: Lenny Ewing MD;  [facility];  Service: Urology;;    BIOPSY OF BLADDER  7/14/2023    Procedure: BIOPSY, BLADDER;  Surgeon: Benjamin Haile MD;  [facility];  Service: Urology;;    BLADDER FULGURATION  4/19/2022    Procedure: FULGURATION, BLADDER;  Surgeon: Lenny Ewing MD;  [facility];  Service: Urology;;    BLADDER FULGURATION  3/8/2023    Procedure: FULGURATION, BLADDER;  Surgeon: Lenny Ewing MD;  Location: [facility] OR 1ST FLR;  Service: Urology;;    BLADDER FULGURATION  7/14/2023    Procedure: FULGURATION, BLADDER;  Surgeon: Benjamin Haile MD;  Location: [facility] OR [facility] FLR;  Service: Urology;;    COLONOSCOPY  12/2012    due for repeat 12/2015    COLONOSCOPY N/A 5/19/2016    Procedure: COLONOSCOPY;  Surgeon: James Webber MD;  Location: [facility] (4TH FLR);  Service: Endoscopy;  Laterality: N/A; diverticulosis, repeat in 5-10 years for surveillance    COLONOSCOPY N/A 8/9/2021    Procedure: COLONOSCOPY;  Surgeon: Fahad Bautista Jr., MD;  Location: [facility] ENDO;  Service: Endoscopy;  Laterality: N/A;    CYSTOSCOPY   4/19/2022    Procedure: CYSTOSCOPY;  Surgeon: Lenny Ewing MD;  Location: [facility] OR [facility] FLR;  Service: Urology;;    CYSTOSCOPY  6/21/2022    Procedure: CYSTOSCOPY;  Surgeon: Lenny Ewing MD;  [facility];  Service: Urology;;    CYSTOSCOPY  8/23/2022    Procedure: CYSTOSCOPY;  Surgeon: Lenny Ewing MD;  [facility];  Service: Urology;;    CYSTOSCOPY  3/8/2023    Procedure: CYSTOSCOPY-BLUE LIGHT;  Surgeon: Lenny Ewing MD;  [facility];  Service: Urology;;    CYSTOSCOPY N/A 4/4/2023    Procedure: CYSTOSCOPY;  Surgeon: Benjamin Haile MD;  [facility];  Service: Urology;  Laterality: N/A;  1 hour    CYSTOSCOPY  7/14/2023    Procedure: CYSTOSCOPY;  Surgeon: Benjamin Haile MD;  [facility];  Service: Urology;;  BLUE LIGHT    ESOPHAGOGASTRODUODENOSCOPY N/A 8/9/2021    Procedure: EGD (ESOPHAGOGASTRODUODENOSCOPY);  Surgeon: Fahad Bautista Jr., MD;  Location: [facility];  Service: Endoscopy;  Laterality: N/A;    EUA/Fistulotomy-'08      HERNIA REPAIR      RIH with mesh    Hydrocelectomy  2013    MASS EXCISION  2004    BCC removal (twice)    MOLE REMOVAL  02/25/2019    2 moles removed from scalp =- 1 was basal cell and 1 was squamous cell - dermatology - Dr. londono    right Spermatocelectomy  2013    TURBT (TRANSURETHRAL RESECTION OF BLADDER TUMOR) N/A 4/4/2023    Procedure: TURBT (TRANSURETHRAL RESECTION OF BLADDER TUMOR);  Surgeon: Benjamin Haile MD;  Location: [facility] OR [address] [place] [address];  Service: Urology;  Laterality: N/A;  1 hour, blue light    UPPER GASTROINTESTINAL ENDOSCOPY  prior to 2010    hiatal hernia, reflux esophagitis       Family History   Problem Relation Age of Onset    Skin cancer Mother     Hypertension Mother     Skin cancer Father     Cancer Father         prostate cancer    Hypertension Father     Hypertension Maternal Grandmother     Hypertension Maternal Grandfather     Hypertension Paternal Grandmother     Anesthesia problems  Paternal Grandmother     Cancer Paternal Grandfather         prostate cancer    Hypertension Paternal Grandfather     Heart disease Paternal Grandfather     Diabetes Neg Hx     Colon polyps Neg Hx     Colon cancer Neg Hx     Melanoma Neg Hx     Psoriasis Neg Hx     Lupus Neg Hx     Eczema Neg Hx     Acne Neg Hx     Cirrhosis Neg Hx     Prostate cancer Neg Hx     Kidney disease Neg Hx     Crohn's disease Neg Hx     Ulcerative colitis Neg Hx     Stomach cancer Neg Hx     Esophageal cancer Neg Hx        Social History     Socioeconomic History    Marital status:      Spouse name: Traci    Number of children: 1   Occupational History    Occupation: Software Eng.     Employer: [employer]     Comment: [employer]   Tobacco Use    Smoking status: Former     Current packs/day: 0.00     Average packs/day: 1 pack/day for 10.0 years (10.0 ttl pk-yrs)     Types: Cigarettes     Start date: 1977     Quit date: 1987     Years since quittin.7    Smokeless tobacco: Never    Tobacco comments:     Decreased lung compassity per patient   Substance and Sexual Activity    Alcohol use: Not Currently     Alcohol/week: 0.0 - 1.0 standard drinks of alcohol     Comment: No longer drinks ETOH    Drug use: No    Sexual activity: Yes     Partners: Female       Allergies:  Amlodipine, Atenolol, Catechu herb, Irbesartan, Erythromycin, Sumycin [tetracycline], and Tetracyclines    Medications:    Current Outpatient Medications:     atorvastatin (LIPITOR) 10 MG tablet, Take 1 tablet (10 mg total) by mouth once daily., Disp: 90 tablet, Rfl: 3    cetirizine (ZYRTEC) 10 MG tablet, Take 10 mg by mouth Daily., Disp: , Rfl:     coenzyme Q10 100 mg capsule, Take 200 mg by mouth once daily., Disp: , Rfl:     fluticasone propionate (FLONASE) 50 mcg/actuation nasal spray, 2 sprays (100 mcg total) by Each Nostril route daily as needed for Rhinitis., Disp: 16 mL, Rfl: 5    hydroCHLOROthiazide (HYDRODIURIL) 25 MG tablet, Take 1 tablet (25 mg  total) by mouth once daily. (Patient taking differently: Take 25 mg by mouth nightly.), Disp: 90 tablet, Rfl: 1    Lactobacillus acidophilus (PROBIOTIC ORAL), Take by mouth., Disp: , Rfl:     NIFEdipine (ADALAT CC) 30 MG TbSR, TAKE 1 TABLET BY MOUTH EVERY DAY, Disp: 90 tablet, Rfl: 3    phenazopyridine (PYRIDIUM) 100 MG tablet, Take 1 tablet (100 mg total) by mouth 3 (three) times daily with meals. for 20 days, Disp: 60 tablet, Rfl: 0    pomegranate xt/pomegranat seed (POMEGRAN FRUIT XT-POMEGRA SEED ORAL), Take by mouth., Disp: , Rfl:     traMADoL (ULTRAM) 50 mg tablet, Take 1 tablet (50 mg total) by mouth every 6 (six) hours as needed for Pain., Disp: 12 tablet, Rfl: 0  No current facility-administered medications for this visit.    Facility-Administered Medications Ordered in Other Visits:     0.9%  NaCl infusion, , Intravenous, Continuous, Shahrzad Hughes MD, Last Rate: 10 mL/hr at 07/14/23 0734, New Bag at 07/14/23 0734      PHYSICAL EXAMINATION:  Physical Exam  Constitutional:       Appearance: Normal appearance.   HENT:      Head: Normocephalic and atraumatic.      Right Ear: External ear normal.      Left Ear: External ear normal.   Pulmonary:      Effort: Pulmonary effort is normal. No respiratory distress.   Skin:     General: Skin is warm and dry.   Neurological:      General: No focal deficit present.      Mental Status: He is alert and oriented to person, place, and time.   Psychiatric:         Mood and Affect: Mood normal.         Behavior: Behavior normal.         LABS:  UA 50 blood, - nitrite, - GH, - leuks      Lab Results   Component Value Date    PSA 0.35 04/13/2023    PSA 0.30 01/28/2022    PSA 0.22 07/29/2020    PSADIAG 0.37 09/08/2014       Lab Results   Component Value Date    CREATININE 1.2 04/13/2023    EGFRNORACEVR >60.0 04/13/2023         IMPRESSION:  Encounter Diagnoses   Name Primary?    Malignant neoplasm of lateral wall of urinary bladder Yes    BPH with urinary obstruction      Hypertension, essential          Assessment:       1. Malignant neoplasm of lateral wall of urinary bladder    2. BPH with urinary obstruction    3. Hypertension, essential        Plan:   BCG treatment administered by Nurse Elliot under sterile and BCG precautions. Patient tolerated well.  Patient was instructed to hold medication for 2 hours.   Discussed limiting fluid intake prior to appointment to ensure patient is able to hold medication for designated time.   Discussed post instillation expectations including side effects.   Reviewed how to properly bleach toilet after voiding post instillation.   Recommend drinking plenty of fluids after the 2 hour sarabjit to flush bladder.   Addressed patient's questions and concerns.   Educational material provided about BCG treatment and information discussed during today's visit.  Patient encouraged to contact the office with any additional questions or concerns.      Follow up in November for cystoscopy. Pt would like anesthesia, message sent to Dr. Haile's staff.     I spent 30 minutes with the patient of which more than half was spent in direct consultation with the patient in regards to our treatment and plan.  We addressed the office findings and recent labs.   Education and recommendations of today's plan of care including home remedies and needed follow up with PCP.   We discussed the chief complaint; reviewed the LUTS and the possible contributory factors.

## 2023-10-26 ENCOUNTER — TELEPHONE (OUTPATIENT)
Dept: INTERNAL MEDICINE | Facility: CLINIC | Age: 63
End: 2023-10-26

## 2023-10-26 ENCOUNTER — PATIENT MESSAGE (OUTPATIENT)
Dept: INTERNAL MEDICINE | Facility: CLINIC | Age: 63
End: 2023-10-26
Payer: COMMERCIAL

## 2023-10-26 NOTE — TELEPHONE ENCOUNTER
I called pt's number and call went straight to voice mail. I left a detailed message on his recorder.  Also, sent a Portal message.

## 2023-10-26 NOTE — TELEPHONE ENCOUNTER
Sometime in the April or May time frame I ordered an outside MRI scan for this patient.  We have yet to receive a report.  I have sent request to nursing staff to try to track this down to no avail.    Please reach out to patient to see if he can assist us in getting a report.  I currently do not have information as to what location or testing facility he used.    Also, see letter that was printed today and mail out to patient, certify, thank you

## 2023-10-26 NOTE — LETTER
October 26, 2023        Joseph Jesus  [address] [facility] [address]             [facility]  [address]  [facility] [address]  Phone: [phone]  Fax: [phone]   Patient: Joseph Jesus   MR Number: [id number]   YOB: 1960   Date of Visit: 10/26/2023       Dear Dr. Jesus:    Thank you for referring Joseph Jesus to me for evaluation. Below are the relevant portions of my assessment and plan of care.            If you have questions, please do not hesitate to call me. I look forward to following Joseph along with you.    Sincerely,      Nick Trejo MD           CC    No Recipients

## 2023-10-30 ENCOUNTER — TELEPHONE (OUTPATIENT)
Dept: INTERNAL MEDICINE | Facility: CLINIC | Age: 63
End: 2023-10-30
Payer: COMMERCIAL

## 2023-10-30 NOTE — PROGRESS NOTES
DATE: 11/16/2023  PATIENT: Joseph Jesus    Supervising Physician: Devante Brink M.D.    CHIEF COMPLAINT: low back pain    HISTORY:  Joseph Jesus is a 63 y.o. male here for initial evaluation of intermittent low back pain and bilateral foot numbness (Back - 2, Leg - 0).  Denies leg pain.  The numbness has been present for about 13 years and has slowly progressed to the entire foot. The patient describes the intermittent low back pain as aching with activity.  The pain improved by rest. There is constant associated numbness and tingling in his bilateral feet. There is no subjective weakness. Prior treatments have included nothing, and no PT, TAMAR or surgery.  The patient denies myelopathic symptoms such as handwriting changes or difficulty with buttons/coins/keys. Denies perineal paresthesias, bowel/bladder dysfunction.  Of note, the patient had a normal work up with vascular surgery.     PAST MEDICAL/SURGICAL HISTORY:  Past Medical History:   Diagnosis Date    Allergy     seasonal    Anal fistula hx    Arthritis     Basal cell carcinoma 04/2013    left superior forehead    Callus     Diverticulosis     Epididymal cyst     GERD (gastroesophageal reflux disease)     Hayfever     Hydrocele, right     Hyperlipidemia     Hypertension     Neck pain     hx of muscular inury from weight lifting---resolved now    PONV (postoperative nausea and vomiting)     Prostatitis     Dec. '12-treated with antibx.    Squamous cell carcinoma 01/07/2019    anterior scalp    Squamous cell carcinoma of skin 03/2020    Left post scalp (SCC insitu-saucerization)    Visual impairment      Past Surgical History:   Procedure Laterality Date    APPENDECTOMY  1962    BIOPSY OF BLADDER  4/19/2022    Procedure: BIOPSY, BLADDER;  Surgeon: Lenny Ewing MD;  Location: [facility] OR [address] [place] [address];  Service: Urology;;    BIOPSY OF BLADDER  3/8/2023    Procedure: BIOPSY, BLADDER;  Surgeon: Lenny Ewing MD;  Location: [facility] OR [address] [place] [address];  Service: Urology;;     BIOPSY OF BLADDER  7/14/2023    Procedure: BIOPSY, BLADDER;  Surgeon: Benjamin Haile MD;  Location: [facility] OR 1ST FLR;  Service: Urology;;    BLADDER FULGURATION  4/19/2022    Procedure: FULGURATION, BLADDER;  Surgeon: Lenny Ewing MD;  Location: [facility] OR 1ST FLR;  Service: Urology;;    BLADDER FULGURATION  3/8/2023    Procedure: FULGURATION, BLADDER;  Surgeon: Lenny Ewing MD;  Location: [facility] OR 1ST FLR;  Service: Urology;;    BLADDER FULGURATION  7/14/2023    Procedure: FULGURATION, BLADDER;  Surgeon: Benjamin Haile MD;  Location: [facility] OR 1ST FLR;  Service: Urology;;    COLONOSCOPY  12/2012    due for repeat 12/2015    COLONOSCOPY N/A 5/19/2016    Procedure: COLONOSCOPY;  Surgeon: James Webber MD;  Location: [facility] ENDO (4TH FLR);  Service: Endoscopy;  Laterality: N/A; diverticulosis, repeat in 5-10 years for surveillance    COLONOSCOPY N/A 8/9/2021    Procedure: COLONOSCOPY;  Surgeon: Fahad Bautista Jr., MD;  Location: [facility] ENDO;  Service: Endoscopy;  Laterality: N/A;    CYSTOSCOPY  4/19/2022    Procedure: CYSTOSCOPY;  Surgeon: Lenny Ewing MD;  Location: [facility] OR 1ST FLR;  Service: Urology;;    CYSTOSCOPY  6/21/2022    Procedure: CYSTOSCOPY;  Surgeon: Lenny Ewing MD;  Location: [facility] OR 1ST FLR;  Service: Urology;;    CYSTOSCOPY  8/23/2022    Procedure: CYSTOSCOPY;  Surgeon: Lenny Ewing MD;  Location: [facility] OR 1ST FLR;  Service: Urology;;    CYSTOSCOPY  3/8/2023    Procedure: CYSTOSCOPY-BLUE LIGHT;  Surgeon: Lenny Ewing MD;  Location: [facility] OR 1ST FLR;  Service: Urology;;    CYSTOSCOPY N/A 4/4/2023    Procedure: CYSTOSCOPY;  Surgeon: Benjamin Haile MD;  Location: [facility] OR 1ST FLR;  Service: Urology;  Laterality: N/A;  1 hour    CYSTOSCOPY  7/14/2023    Procedure: CYSTOSCOPY;  Surgeon: Benjamin Haile MD;  Location: [facility] OR [address] [place] [address];  Service: Urology;;  BLUE LIGHT    ESOPHAGOGASTRODUODENOSCOPY N/A 8/9/2021    Procedure: EGD (ESOPHAGOGASTRODUODENOSCOPY);  Surgeon: Fahad Bautista Jr., MD;   [facility]: [facility] ENDO;  Service: Endoscopy;  Laterality: N/A;    EUA/Fistulotomy-'08      HERNIA REPAIR      [facility] with mesh    Hydrocelectomy  2013    MASS EXCISION  2004    BCC removal (twice)    MOLE REMOVAL  02/25/2019    2 moles removed from scalp =- 1 was basal cell and 1 was squamous cell - dermatology - Dr. londono    right Spermatocelectomy  2013    TURBT (TRANSURETHRAL RESECTION OF BLADDER TUMOR) N/A 4/4/2023    Procedure: TURBT (TRANSURETHRAL RESECTION OF BLADDER TUMOR);  Surgeon: Benjamin Haile MD;  Location: [facility] OR [address] [place] [address];  Service: Urology;  Laterality: N/A;  1 hour, blue light    UPPER GASTROINTESTINAL ENDOSCOPY  prior to 2010    hiatal hernia, reflux esophagitis       Medications:   Current Outpatient Medications on File Prior to Visit   Medication Sig Dispense Refill    atorvastatin (LIPITOR) 10 MG tablet Take 1 tablet (10 mg total) by mouth once daily. 90 tablet 3    cetirizine (ZYRTEC) 10 MG tablet Take 10 mg by mouth Daily.      coenzyme Q10 100 mg capsule Take 200 mg by mouth once daily.      fluorouraciL (EFUDEX) 5 % cream Compound fluorouracil 5% + calcipotriene 0.005% cream. Apply a pea-sized amount to entire scalp BID x 7 days. 30 g 0    fluticasone propionate (FLONASE) 50 mcg/actuation nasal spray 2 sprays (100 mcg total) by Each Nostril route daily as needed for Rhinitis. 16 mL 5    hydroCHLOROthiazide (HYDRODIURIL) 25 MG tablet Take 1 tablet (25 mg total) by mouth once daily. (Patient taking differently: Take 25 mg by mouth nightly.) 90 tablet 1    Lactobacillus acidophilus (PROBIOTIC ORAL) Take by mouth.      NIFEdipine (ADALAT CC) 30 MG TbSR TAKE 1 TABLET BY MOUTH EVERY DAY 90 tablet 3    pomegranate xt/pomegranat seed (POMEGRAN FRUIT XT-POMEGRA SEED ORAL) Take by mouth.      traMADoL (ULTRAM) 50 mg tablet Take 1 tablet (50 mg total) by mouth every 6 (six) hours as needed for Pain. 12 tablet 0     Current Facility-Administered Medications on File Prior to Visit   Medication Dose Route  "Frequency Provider Last Rate Last Admin    0.9%  NaCl infusion   Intravenous Continuous Shahrzad Hughes MD 10 mL/hr at 23 0734 New Bag at 23 0734       Social History:   Social History     Socioeconomic History    Marital status:      Spouse name: Traci    Number of children: 1   Occupational History    Occupation: [employer].     Employer: [employer]     Comment: [employer]   Tobacco Use    Smoking status: Former     Current packs/day: 0.00     Average packs/day: 1 pack/day for 10.0 years (10.0 ttl pk-yrs)     Types: Cigarettes     Start date: 1977     Quit date: 1987     Years since quittin.9    Smokeless tobacco: Never    Tobacco comments:     Decreased lung compassity per patient   Substance and Sexual Activity    Alcohol use: Not Currently     Alcohol/week: 0.0 - 1.0 standard drinks of alcohol     Comment: No longer drinks ETOH    Drug use: No    Sexual activity: Yes     Partners: Female       REVIEW OF SYSTEMS:  Constitution: Negative. Negative for chills, fever and night sweats.   Cardiovascular: Negative for chest pain and syncope.   Respiratory: Negative for cough and shortness of breath.   Gastrointestinal: See HPI. Negative for nausea/vomiting. Negative for abdominal pain.  Genitourinary: See HPI. Negative for discoloration or dysuria.  Skin: Negative for dry skin, itching and rash.   Hematologic/Lymphatic: Negative for bleeding problem. Does not bruise/bleed easily.   Musculoskeletal: Negative for falls and muscle weakness.   Neurological: See HPI. No seizures.   Endocrine: Negative for polydipsia, polyphagia and polyuria.   Allergic/Immunologic: Negative for hives and persistent infections.     EXAM:  Ht 6' 1" (1.854 m)   Wt 101.6 kg (223 lb 15.8 oz)   BMI 29.55 kg/m²     General: The patient is a 63 y.o. male in no apparent distress, the patient is oriented to person, place and time.  Psych: Normal mood and affect  HEENT: Vision grossly intact, hearing intact to the " spoken word.  Lungs: Respirations unlabored.  Gait: Normal station and gait, no difficulty with toe or heel walk.   Skin: Dorsal lumbar skin negative for rashes, lesions, hairy patches and surgical scars. There is mild lumbar tenderness to palpation.  Range of motion: Lumbar range of motion is acceptable.  Spinal Balance: Global saggital and coronal spinal balance acceptable, not significant for scoliosis and kyphosis.  Musculoskeletal: No pain with the range of motion of the bilateral hips. No trochanteric tenderness to palpation.  Vascular: Bilateral lower extremities warm and well perfused, dorsalis pedis pulses 2+ bilaterally.  Neurological: Normal strength and tone in all major motor groups in the bilateral lower extremities. Normal sensation to light touch in the L2-S1 dermatomes bilaterally.  Deep tendon reflexes symmetric 2+ in the bilateral lower extremities.  Negative Babinski bilaterally. Straight leg raise negative bilaterally.    IMAGING:      Today I personally reviewed AP, Lat and Flex/Ex  upright L-spine films that demonstrate mild curve with moderate DDD from L4-S1.       Body mass index is 29.55 kg/m².    Hemoglobin A1C   Date Value Ref Range Status   10/07/2022 5.6 4.0 - 5.6 % Final     Comment:     ADA Screening Guidelines:  5.7-6.4%  Consistent with prediabetes  >or=6.5%  Consistent with diabetes    High levels of fetal hemoglobin interfere with the HbA1C  assay. Heterozygous hemoglobin variants (HbS, HgC, etc)do  not significantly interfere with this assay.   However, presence of multiple variants may affect accuracy.     09/27/2012 6.1 4.0 - 6.2 % Final   11/09/2010 5.9 4.0 - 6.2 % Final           ASSESSMENT/PLAN:    Joseph was seen today for low-back pain and foot pain.    Diagnoses and all orders for this visit:    Numbness and tingling of foot  -     Ambulatory referral/consult to [facility]    Back pain, lumbosacral  -     Ambulatory referral/consult to [facility]      Today we discussed at length all of the different treatment options including anti-inflammatories, acetaminophen, rest, ice, heat, physical therapy including strengthening and stretching exercises, home exercises, ROM, aerobic conditioning, aqua therapy, other modalities including ultrasound, massage, and dry needling, epidural steroid injections and finally surgical intervention.  I will upload the MRI to [facility] and call with results. I do believe the numbness in his feet is related to his lumbar spine.

## 2023-10-31 ENCOUNTER — OFFICE VISIT (OUTPATIENT)
Dept: DERMATOLOGY | Facility: CLINIC | Age: 63
End: 2023-10-31
Payer: COMMERCIAL

## 2023-10-31 DIAGNOSIS — Z85.828 HISTORY OF NONMELANOMA SKIN CANCER: ICD-10-CM

## 2023-10-31 DIAGNOSIS — L57.0 AK (ACTINIC KERATOSIS): ICD-10-CM

## 2023-10-31 DIAGNOSIS — L72.0 EIC (EPIDERMAL INCLUSION CYST): ICD-10-CM

## 2023-10-31 DIAGNOSIS — L82.1 SK (SEBORRHEIC KERATOSIS): Primary | ICD-10-CM

## 2023-10-31 PROCEDURE — 99214 OFFICE O/P EST MOD 30 MIN: CPT | Mod: 25,S$GLB,, | Performed by: DERMATOLOGY

## 2023-10-31 PROCEDURE — 3288F PR FALLS RISK ASSESSMENT DOCUMENTED: ICD-10-PCS | Mod: CPTII,S$GLB,, | Performed by: DERMATOLOGY

## 2023-10-31 PROCEDURE — 1160F RVW MEDS BY RX/DR IN RCRD: CPT | Mod: CPTII,S$GLB,, | Performed by: DERMATOLOGY

## 2023-10-31 PROCEDURE — 1101F PR PT FALLS ASSESS DOC 0-1 FALLS W/OUT INJ PAST YR: ICD-10-PCS | Mod: CPTII,S$GLB,, | Performed by: DERMATOLOGY

## 2023-10-31 PROCEDURE — 1101F PT FALLS ASSESS-DOCD LE1/YR: CPT | Mod: CPTII,S$GLB,, | Performed by: DERMATOLOGY

## 2023-10-31 PROCEDURE — 1159F MED LIST DOCD IN RCRD: CPT | Mod: CPTII,S$GLB,, | Performed by: DERMATOLOGY

## 2023-10-31 PROCEDURE — 17003 DESTRUCT PREMALG LES 2-14: CPT | Mod: S$GLB,,, | Performed by: DERMATOLOGY

## 2023-10-31 PROCEDURE — 17000 PR DESTRUCTION(LASER SURGERY,CRYOSURGERY,CHEMOSURGERY),PREMALIGNANT LESIONS,FIRST LESION: ICD-10-PCS | Mod: S$GLB,,, | Performed by: DERMATOLOGY

## 2023-10-31 PROCEDURE — 17003 DESTRUCTION, PREMALIGNANT LESIONS; SECOND THROUGH 14 LESIONS: ICD-10-PCS | Mod: S$GLB,,, | Performed by: DERMATOLOGY

## 2023-10-31 PROCEDURE — 3288F FALL RISK ASSESSMENT DOCD: CPT | Mod: CPTII,S$GLB,, | Performed by: DERMATOLOGY

## 2023-10-31 PROCEDURE — 1160F PR REVIEW ALL MEDS BY PRESCRIBER/CLIN PHARMACIST DOCUMENTED: ICD-10-PCS | Mod: CPTII,S$GLB,, | Performed by: DERMATOLOGY

## 2023-10-31 PROCEDURE — 99999 PR PBB SHADOW E&M-EST. PATIENT-LVL III: ICD-10-PCS | Mod: PBBFAC,,, | Performed by: DERMATOLOGY

## 2023-10-31 PROCEDURE — 99999 PR PBB SHADOW E&M-EST. PATIENT-LVL III: CPT | Mod: PBBFAC,,, | Performed by: DERMATOLOGY

## 2023-10-31 PROCEDURE — 1159F PR MEDICATION LIST DOCUMENTED IN MEDICAL RECORD: ICD-10-PCS | Mod: CPTII,S$GLB,, | Performed by: DERMATOLOGY

## 2023-10-31 PROCEDURE — 99214 PR OFFICE/OUTPT VISIT, EST, LEVL IV, 30-39 MIN: ICD-10-PCS | Mod: 25,S$GLB,, | Performed by: DERMATOLOGY

## 2023-10-31 PROCEDURE — 17000 DESTRUCT PREMALG LESION: CPT | Mod: S$GLB,,, | Performed by: DERMATOLOGY

## 2023-10-31 RX ORDER — FLUOROURACIL 50 MG/G
CREAM TOPICAL
Qty: 30 G | Refills: 0 | Status: SHIPPED | OUTPATIENT
Start: 2023-10-31 | End: 2024-03-07 | Stop reason: SDUPTHER

## 2023-10-31 NOTE — PROGRESS NOTES
Subjective:      Patient ID:  Joseph Jesus is a 63 y.o. male who presents for   Chief Complaint   Patient presents with    Skin Check     Ubse     History of Present Illness: The patient presents for follow up of skin check.    The patient was last seen on: 11/28/2022 for cryosurgery to actinic keratoses which have resolved.   This is a high risk patient here to check for the development of new lesions.    Other skin complaints:   Patient with new complaint of lesion(s)  Location: R forearm  Duration: 1 month  Symptoms: Bleeds  Relieving factors/Previous treatments: none            Review of Systems   Skin:  Positive for activity-related sunscreen use (sometimes) and wears hat (most). Negative for daily sunscreen use and recent sunburn.   Hematologic/Lymphatic: Does not bruise/bleed easily.       Objective:   Physical Exam   Constitutional: He appears well-developed and well-nourished. No distress.   Neurological: He is alert and oriented to person, place, and time. He is not disoriented.   Psychiatric: He has a normal mood and affect.   Skin:   Areas Examined (abnormalities noted in diagram):   Scalp / Hair Palpated and Inspected  Head / Face Inspection Performed  Neck Inspection Performed  Chest / Axilla Inspection Performed  Back Inspection Performed  RUE Inspected  LUE Inspection Performed                 Diagram Legend     Erythematous scaling macule/papule c/w actinic keratosis       Vascular papule c/w angioma      Pigmented verrucoid papule/plaque c/w seborrheic keratosis      Yellow umbilicated papule c/w sebaceous hyperplasia      Irregularly shaped tan macule c/w lentigo     1-2 mm smooth white papules consistent with Milia      Movable subcutaneous cyst with punctum c/w epidermal inclusion cyst      Subcutaneous movable cyst c/w pilar cyst      Firm pink to brown papule c/w dermatofibroma      Pedunculated fleshy papule(s) c/w skin tag(s)      Evenly pigmented macule c/w junctional nevus     Mildly  variegated pigmented, slightly irregular-bordered macule c/w mildly atypical nevus      Flesh colored to evenly pigmented papule c/w intradermal nevus       Pink pearly papule/plaque c/w basal cell carcinoma      Erythematous hyperkeratotic cursted plaque c/w SCC      Surgical scar with no sign of skin cancer recurrence      Open and closed comedones      Inflammatory papules and pustules      Verrucoid papule consistent consistent with wart     Erythematous eczematous patches and plaques     Dystrophic onycholytic nail with subungual debris c/w onychomycosis     Umbilicated papule    Erythematous-base heme-crusted tan verrucoid plaque consistent with inflamed seborrheic keratosis     Erythematous Silvery Scaling Plaque c/w Psoriasis     See annotation      Assessment / Plan:        SK (seborrheic keratosis)  These are benign inherited growths without a malignant potential. Reassurance given to patient. No treatment is necessary.     Epidermal inclusion cyst  Reassurance given to patient. No treatment is necessary.     AK (actinic keratosis)  -     fluorouraciL (EFUDEX) 5 % cream; Compound fluorouracil 5% + calcipotriene 0.005% cream. Apply a pea-sized amount to entire scalp BID x 7 days.  Dispense: 30 g; Refill: 0    History of nonmelanoma skin cancer  Area(s) of previous NMSC evaluated with no signs of recurrence.    Upper body skin examination performed today including at least 6 points as noted in physical examination. No lesions suspicious for malignancy noted.    Recommend daily sun protection/avoidance and use of at least SPF 30, broad spectrum sunscreen (OTC drug).     For back itching:  Recommend apply Sarna lotion or Cerave anti-itch lotion/cream or Dermeleve to skin as often as needed. May store in refrigerator for additional cooling properties.    Sarna and Cerave anti-itch can be purchased at any [employer] or [employer].  Dermeleve is available exclusively at [employer] or by calling:  [phone]    Jaylin HOWARD Discount Code: [id number]    30gram tube = $28.49 with code  60gram tube = $37.99 with code    Shipping is free for both sizes        AK (actinic keratosis)  Today's Plan:      Cryosurgery Procedure Note    Verbal consent from the patient is obtained including, but not limited to, risk of hypopigmentation/hyperpigmentation, scar, recurrence of lesion. The patient is aware of the precancerous quality and need for treatment of these lesions. Liquid nitrogen cryosurgery is applied to the 6 actinic keratoses, as detailed in the physical exam, to produce a freeze injury. The patient is aware that blisters may form and is instructed on wound care with gentle cleansing and use of vaseline ointment to keep moist until healed. The patient is supplied a handout on cryosurgery and is instructed to call if lesions do not completely resolve.      And Sent Rx to [facility] for efudex/dovonex bid to AA scalp x 7 days (30g $45) or 60g $75)    Wear hat always!!!      Follow up in about 3 months (around 1/31/2024).

## 2023-10-31 NOTE — ASSESSMENT & PLAN NOTE
Today's Plan:      Cryosurgery Procedure Note    Verbal consent from the patient is obtained including, but not limited to, risk of hypopigmentation/hyperpigmentation, scar, recurrence of lesion. The patient is aware of the precancerous quality and need for treatment of these lesions. Liquid nitrogen cryosurgery is applied to the 6 actinic keratoses, as detailed in the physical exam, to produce a freeze injury. The patient is aware that blisters may form and is instructed on wound care with gentle cleansing and use of vaseline ointment to keep moist until healed. The patient is supplied a handout on cryosurgery and is instructed to call if lesions do not completely resolve.      And Sent Rx to [facility] for efudex/dovonex bid to AA scalp x 7 days (30g $45) or 60g $75)    Wear hat always!!!

## 2023-10-31 NOTE — PATIENT INSTRUCTIONS
Field Treatment for Actinic Keratoses (precancerous lesions)    5-Fluoruracil + Dovonex (calcipotriene) - This is a topical chemotherapy for your skin. This cream should never be applied without discussing with you dermatologist.    This treatment gets your immune system involved in fighting precancers (actinic keratoses), even those we can't yet see.     HOW TO APPLY: Apply the combination cream to the affected area scalp 2x/day x 7 days. Apply a fingertip length amount to the entire area. Wash your hands carefully after applying the creams and wipe glasses, BIPAP/CPAP machines, or anything else that comes in frequent contact with the creams.    WHAT TO EXPECT: Your skin will likely become red, crusted, sore, and tender during the treatment usually starting around day 3 and continuing for about 1 week after last application.     HOW TO HEAL FAST: To speed healing, wash with a gentle wash and apply Vaseline jelly especially to crusted open areas.    WE CAN HELP: Please contact us for any questions or problem shooting the application of these creams: [phone] or [email or web address]    GREAT NEWS: Newer studies suggest that the combination of these creams not only decrease the sun damage spots and precancers (actinic keratoses) but also decrease your risk of getting skin cancer the year following application.     IT WILL BE WORTH IT!!!        Your [facility] has been [facility] [place] [facility].  The phone number is [phone].  Their location is:  [address] [place] [address], [facility]    The prescription will be mailed to you unless you indicated at the time of your appointment that you would like to pick it up    There hours are:  Monday- Friday:  8 a.m. to 6:00 p.m.  Saturday:  8 a.m. to 1:00 p.m.  Sunday:  Closed     CRYOSURGERY      Your doctor has used a method called cryosurgery to treat your skin condition. Cryosurgery refers to the use of very cold substances to treat a variety of skin  conditions such as warts, pre-skin cancers, molluscum contagiosum, sun spots, and several benign growths. The substance we use in cryosurgery is liquid nitrogen and is so cold (-195 degrees Celsius) that is burns when administered.     Following treatment in the office, the skin may immediately burn and become red. You may find the area around the lesion is affected as well. It is sometimes necessary to treat not only the lesion, but a small area of the surrounding normal skin to achieve a good response.     A blister, and even a blood filled blister, may form after treatment.   This is a normal response. If the blister is painful, it is acceptable to sterilize a needle and with rubbing alcohol and gently pop the blister. It is important that you gently wash the area with soap and warm water as the blister fluid may contain wart virus if a wart was treated. Do no remove the roof of the blister.     The area treated can take anywhere from 1-3 weeks to heal. Healing time depends on the kind skin lesion treated, the location, and how aggressively the lesion was treated. It is recommended that the areas treated are covered with Vaseline or bacitracin ointment and a band-aid. If a band-aid is not practical, just ointment applied several times per day will do. Keeping these areas moist will speed the healing time.    Treatment with liquid nitrogen can leave a scar. In dark skin, it may be a light or dark scar, in light skin it may be a white or pink scar. These will generally fade with time, but may never go away completely.     If you have any concerns after your treatment, please feel free to call the office.       [facility] [place] [address]/ [phone] FAX/ [email or web address]     Back:  Recommend apply Sarna lotion or Cerave anti-itch lotion/cream or Dermeleve to skin as often as needed. May store in refrigerator for additional cooling properties.    Sarna and Cerave anti-itch can be  purchased at any [employer] or [employer].  Dermeleve is available exclusively at [employer] or by [phone]    Jaylin HOWARD Discount Code: [email or web address]    30gram tube = $28.49 with code  60gram tube = $37.99 with code    Shipping is free for both sizes

## 2023-11-10 DIAGNOSIS — M51.36 DDD (DEGENERATIVE DISC DISEASE), LUMBAR: Primary | ICD-10-CM

## 2023-11-16 ENCOUNTER — OFFICE VISIT (OUTPATIENT)
Dept: ORTHOPEDICS | Facility: CLINIC | Age: 63
End: 2023-11-16
Attending: FAMILY MEDICINE
Payer: COMMERCIAL

## 2023-11-16 ENCOUNTER — HOSPITAL ENCOUNTER (OUTPATIENT)
Dept: RADIOLOGY | Facility: HOSPITAL | Age: 63
Discharge: HOME | End: 2023-11-16
Attending: REGISTERED NURSE
Payer: COMMERCIAL

## 2023-11-16 VITALS — WEIGHT: 224 LBS | BODY MASS INDEX: 29.69 KG/M2 | HEIGHT: 73 IN

## 2023-11-16 DIAGNOSIS — R20.0 NUMBNESS AND TINGLING OF FOOT: ICD-10-CM

## 2023-11-16 DIAGNOSIS — R20.2 NUMBNESS AND TINGLING OF FOOT: ICD-10-CM

## 2023-11-16 DIAGNOSIS — M54.50 BACK PAIN, LUMBOSACRAL: ICD-10-CM

## 2023-11-16 DIAGNOSIS — M51.36 DDD (DEGENERATIVE DISC DISEASE), LUMBAR: ICD-10-CM

## 2023-11-16 PROCEDURE — 72110 X-RAY EXAM L-2 SPINE 4/>VWS: CPT | Mod: TC

## 2023-11-16 PROCEDURE — 72110 XR LUMBAR SPINE AP AND LAT WITH FLEX/EXT: ICD-10-PCS | Mod: 26,,, | Performed by: RADIOLOGY

## 2023-11-16 PROCEDURE — 3288F PR FALLS RISK ASSESSMENT DOCUMENTED: ICD-10-PCS | Mod: CPTII,S$GLB,, | Performed by: REGISTERED NURSE

## 2023-11-16 PROCEDURE — 1101F PT FALLS ASSESS-DOCD LE1/YR: CPT | Mod: CPTII,S$GLB,, | Performed by: REGISTERED NURSE

## 2023-11-16 PROCEDURE — 1101F PR PT FALLS ASSESS DOC 0-1 FALLS W/OUT INJ PAST YR: ICD-10-PCS | Mod: CPTII,S$GLB,, | Performed by: REGISTERED NURSE

## 2023-11-16 PROCEDURE — 3008F PR BODY MASS INDEX (BMI) DOCUMENTED: ICD-10-PCS | Mod: CPTII,S$GLB,, | Performed by: REGISTERED NURSE

## 2023-11-16 PROCEDURE — 99204 PR OFFICE/OUTPT VISIT, NEW, LEVL IV, 45-59 MIN: ICD-10-PCS | Mod: S$GLB,,, | Performed by: REGISTERED NURSE

## 2023-11-16 PROCEDURE — 1159F MED LIST DOCD IN RCRD: CPT | Mod: CPTII,S$GLB,, | Performed by: REGISTERED NURSE

## 2023-11-16 PROCEDURE — 72110 X-RAY EXAM L-2 SPINE 4/>VWS: CPT | Mod: 26,,, | Performed by: RADIOLOGY

## 2023-11-16 PROCEDURE — 3288F FALL RISK ASSESSMENT DOCD: CPT | Mod: CPTII,S$GLB,, | Performed by: REGISTERED NURSE

## 2023-11-16 PROCEDURE — 99999 PR PBB SHADOW E&M-EST. PATIENT-LVL III: CPT | Mod: PBBFAC,,, | Performed by: REGISTERED NURSE

## 2023-11-16 PROCEDURE — 1159F PR MEDICATION LIST DOCUMENTED IN MEDICAL RECORD: ICD-10-PCS | Mod: CPTII,S$GLB,, | Performed by: REGISTERED NURSE

## 2023-11-16 PROCEDURE — 3008F BODY MASS INDEX DOCD: CPT | Mod: CPTII,S$GLB,, | Performed by: REGISTERED NURSE

## 2023-11-16 PROCEDURE — 99204 OFFICE O/P NEW MOD 45 MIN: CPT | Mod: S$GLB,,, | Performed by: REGISTERED NURSE

## 2023-11-16 PROCEDURE — 99999 PR PBB SHADOW E&M-EST. PATIENT-LVL III: ICD-10-PCS | Mod: PBBFAC,,, | Performed by: REGISTERED NURSE

## 2023-11-20 NOTE — H&P (VIEW-ONLY)
Established Patient - Audio Only Telehealth Visit     The patient location is: home  The chief complaint leading to consultation is: MRI results  Visit type: Virtual visit with audio only (telephone)  Total time spent with patient: 15min     The reason for the audio only service rather than synchronous audio and video virtual visit was related to technical difficulties or patient preference/necessity.     Each patient to whom I provide medical services by telemedicine is:  (1) informed of the relationship between the physician and patient and the respective role of any other health care provider with respect to management of the patient; and (2) notified that they may decline to receive medical services by telemedicine and may withdraw from such care at any time. Patient verbally consented to receive this service via voice-only telephone call.    DATE: 11/22/2023  PATIENT: Joseph Jesus    Attending Physician: Devante Brink M.D.    HISTORY:  Joseph Jesus is a 63 y.o. male who returns to me today for MRI results.  He was last seen by me 11/16/2023.  Today he is doing well but notes low back pain and bilateral foot numbness (Back - 2, Leg - 0).     The Patient denies myelopathic symptoms such as handwriting changes or difficulty with buttons/coins/keys. Denies perineal paresthesias, bowel/bladder dysfunction.    PMH/PSH/FamHx/SocHx:  Unchanged from prior visit    ROS:  REVIEW OF SYSTEMS:  Constitution: Negative. Negative for chills, fever and night sweats.   HENT: Negative for congestion and headaches.    Eyes: Negative for blurred vision, left vision loss and right vision loss.   Cardiovascular: Negative for chest pain and syncope.   Respiratory: Negative for cough and shortness of breath.    Endocrine: Negative for polydipsia, polyphagia and polyuria.   Hematologic/Lymphatic: Negative for bleeding problem. Does not bruise/bleed easily.   Skin: Negative for dry skin, itching and rash.   Musculoskeletal: Negative  for falls and muscle weakness.   Gastrointestinal: Negative for abdominal pain and bowel incontinence.   Allergic/Immunologic: Negative for hives and persistent infections.  Genitourinary: Negative for urinary retention/incontinence and nocturia.   Neurological: negative for disturbances in coordination, no myelopathic symptoms such as handwriting changes or difficulty with buttons, coins, keys or small objects. No loss of balance and seizures.   Psychiatric/Behavioral: Negative for depression. The patient does not have insomnia.   Denies perineal paresthesias, bowel or bladder incontinence    EXAM:  There were no vitals taken for this visit.  Stable.     IMAGING:    Today I personally re- reviewed AP, Lat and Flex/Ex  upright L-spine that demonstrate mild curve with moderate DDD from L4-S1.       Lumbar MRI shows multilevel foraminal narrowing.     There is no height or weight on file to calculate BMI.    Hemoglobin A1C   Date Value Ref Range Status   10/07/2022 5.6 4.0 - 5.6 % Final     Comment:     ADA Screening Guidelines:  5.7-6.4%  Consistent with prediabetes  >or=6.5%  Consistent with diabetes    High levels of fetal hemoglobin interfere with the HbA1C  assay. Heterozygous hemoglobin variants (HbS, HgC, etc)do  not significantly interfere with this assay.   However, presence of multiple variants may affect accuracy.     09/27/2012 6.1 4.0 - 6.2 % Final   11/09/2010 5.9 4.0 - 6.2 % Final         ASSESSMENT/PLAN:    Diagnoses and all orders for this visit:    DDD (degenerative disc disease), lumbar  -     Procedure Order to Pain Management; Future    Numbness and tingling of foot  -     Procedure Order to Pain Management; Future        Caudal TAMAR ordered, he may follow up 2 weeks after if his pain persists.      This service was not originating from a related E/M service provided within the previous 7 days nor will  to an E/M service or procedure within the next 24 hours or my soonest available  appointment.  Prevailing standard of care was able to be met in this audio-only visit.

## 2023-11-22 ENCOUNTER — OFFICE VISIT (OUTPATIENT)
Dept: ORTHOPEDICS | Facility: CLINIC | Age: 63
End: 2023-11-22
Payer: COMMERCIAL

## 2023-11-22 DIAGNOSIS — R20.2 NUMBNESS AND TINGLING OF FOOT: ICD-10-CM

## 2023-11-22 DIAGNOSIS — R20.0 NUMBNESS AND TINGLING OF FOOT: ICD-10-CM

## 2023-11-22 DIAGNOSIS — M51.36 DDD (DEGENERATIVE DISC DISEASE), LUMBAR: Primary | ICD-10-CM

## 2023-11-22 PROCEDURE — 1160F PR REVIEW ALL MEDS BY PRESCRIBER/CLIN PHARMACIST DOCUMENTED: ICD-10-PCS | Mod: CPTII,95,, | Performed by: REGISTERED NURSE

## 2023-11-22 PROCEDURE — 99213 PR OFFICE/OUTPT VISIT, EST, LEVL III, 20-29 MIN: ICD-10-PCS | Mod: 95,,, | Performed by: REGISTERED NURSE

## 2023-11-22 PROCEDURE — 1159F MED LIST DOCD IN RCRD: CPT | Mod: CPTII,95,, | Performed by: REGISTERED NURSE

## 2023-11-22 PROCEDURE — 1160F RVW MEDS BY RX/DR IN RCRD: CPT | Mod: CPTII,95,, | Performed by: REGISTERED NURSE

## 2023-11-22 PROCEDURE — 99213 OFFICE O/P EST LOW 20 MIN: CPT | Mod: 95,,, | Performed by: REGISTERED NURSE

## 2023-11-22 PROCEDURE — 1159F PR MEDICATION LIST DOCUMENTED IN MEDICAL RECORD: ICD-10-PCS | Mod: CPTII,95,, | Performed by: REGISTERED NURSE

## 2023-11-25 DIAGNOSIS — I10 HYPERTENSION, ESSENTIAL: ICD-10-CM

## 2023-11-25 NOTE — TELEPHONE ENCOUNTER
No care due was identified.  [facility] Embedded Care Due Messages. Reference number: [id number].   11/25/2023 1:11:04 AM CST

## 2023-11-26 RX ORDER — HYDROCHLOROTHIAZIDE 25 MG/1
25 TABLET ORAL
Qty: 90 TABLET | Refills: 1 | Status: SHIPPED | OUTPATIENT
Start: 2023-11-26

## 2023-11-26 NOTE — TELEPHONE ENCOUNTER
Refill Routing Note   Medication(s) are not appropriate for processing by [facility] for the following reason(s):        Required vitals abnormal    [facility] action(s):  Defer        Medication Therapy Plan: BP 9/06/23 (!) 145/97      Appointments  past 12m or future 3m with PCP    Date Provider   Last Visit   7/25/2023 Nick Trejo MD   Next Visit   Visit date not found Nick Trejo MD   ED visits in past 90 days: 0        Note composed:6:56 PM 11/25/2023

## 2023-11-29 ENCOUNTER — TELEPHONE (OUTPATIENT)
Dept: PAIN MEDICINE | Facility: CLINIC | Age: 63
End: 2023-11-29
Payer: COMMERCIAL

## 2023-11-29 DIAGNOSIS — M54.16 LUMBAR RADICULOPATHY: Primary | ICD-10-CM

## 2023-11-29 RX ORDER — SODIUM CHLORIDE, SODIUM LACTATE, POTASSIUM CHLORIDE, CALCIUM CHLORIDE 600; 310; 30; 20 MG/100ML; MG/100ML; MG/100ML; MG/100ML
INJECTION, SOLUTION INTRAVENOUS CONTINUOUS
Status: CANCELLED | OUTPATIENT
Start: 2023-11-29

## 2023-11-29 NOTE — TELEPHONE ENCOUNTER
Spoke with patient to schedule. Patient scheduled for caudal TAMAR on 12/13 with Dr. Boone. Pre op information given and follow up appointment scheduled.

## 2023-12-05 ENCOUNTER — OFFICE VISIT (OUTPATIENT)
Dept: OPHTHALMOLOGY | Facility: CLINIC | Age: 63
End: 2023-12-05
Payer: COMMERCIAL

## 2023-12-05 DIAGNOSIS — H25.13 NUCLEAR SCLEROTIC CATARACT, BILATERAL: ICD-10-CM

## 2023-12-05 DIAGNOSIS — H35.373 EPIRETINAL MEMBRANE (ERM), BILATERAL: Primary | ICD-10-CM

## 2023-12-05 PROCEDURE — 92134 CPTRZ OPH DX IMG PST SGM RTA: CPT | Mod: S$GLB,,, | Performed by: OPHTHALMOLOGY

## 2023-12-05 PROCEDURE — 99999 PR PBB SHADOW E&M-EST. PATIENT-LVL II: CPT | Mod: PBBFAC,,, | Performed by: OPHTHALMOLOGY

## 2023-12-05 PROCEDURE — 92014 PR EYE EXAM, EST PATIENT,COMPREHESV: ICD-10-PCS | Mod: S$GLB,,, | Performed by: OPHTHALMOLOGY

## 2023-12-05 PROCEDURE — 99999 PR PBB SHADOW E&M-EST. PATIENT-LVL II: ICD-10-PCS | Mod: PBBFAC,,, | Performed by: OPHTHALMOLOGY

## 2023-12-05 PROCEDURE — 92134 POSTERIOR SEGMENT OCT RETINA (OCULAR COHERENCE TOMOGRAPHY)-BOTH EYES: ICD-10-PCS | Mod: S$GLB,,, | Performed by: OPHTHALMOLOGY

## 2023-12-05 PROCEDURE — 92014 COMPRE OPH EXAM EST PT 1/>: CPT | Mod: S$GLB,,, | Performed by: OPHTHALMOLOGY

## 2023-12-05 NOTE — PROGRESS NOTES
"        ===============================  Date today is 12/5/2023  Joseph Jesus is a 63 y.o. male  Last visit [facility]: :5/5/2023   Last visit eye dept. Visit date not found    Corrected distance visual acuity was 20/30 in the right eye and 20/30+2 in the left eye.  Tonometry       Tonometry (Applanation, 9:21 AM)         Right Left    Pressure 16 18                  Wearing Rx       Wearing Rx         Sphere Cylinder Axis Add    Right +0.25 +1.25 010 +2.50    Left [place] +1.00 163 +2.50      Type: PAL                  Not recorded       Not recorded       Chief Complaint   Patient presents with    Follow-up     6 month repeat MOCT      HPI     Follow-up     Additional comments: 6 month repeat MOCT            Comments    States that his vision is stable with his glasses and that he finds that   he has been more dependent on his glasses.           Last edited by Saritha Jenkins on 12/5/2023  9:19 AM.      Problem List Items Addressed This Visit    None  Visit Diagnoses       Epiretinal membrane (ERM), bilateral    -  Primary    Relevant Orders    Posterior Segment OCT Retina-Both eyes (Completed)    Nuclear sclerotic cataract, bilateral              Instructed to call 24/7 for any worsening of vision, visual distortion or pain.  Check OU independently daily.    Gave my office and personal cell phone number.  ________________  12/5/2023 today  Joseph Jesus    :  Ou erm  "Not as good as I used to be"  OCT stable  Min ns   Ou erm  Mr + pc       Rtc 1 year  Instructed to call 24/7 for any worsening of vision or symptoms. Check OU daily.   Gave my office and cell phone number.    =============================        "

## 2023-12-13 ENCOUNTER — HOSPITAL ENCOUNTER (OUTPATIENT)
Facility: HOSPITAL | Age: 63
Discharge: HOME | End: 2023-12-13
Attending: ANESTHESIOLOGY | Admitting: ANESTHESIOLOGY
Payer: COMMERCIAL

## 2023-12-13 ENCOUNTER — HOSPITAL ENCOUNTER (OUTPATIENT)
Dept: RADIOLOGY | Facility: HOSPITAL | Age: 63
Discharge: HOME | End: 2023-12-13
Attending: ANESTHESIOLOGY | Admitting: ANESTHESIOLOGY
Payer: COMMERCIAL

## 2023-12-13 DIAGNOSIS — M54.16 LUMBAR RADICULOPATHY: Primary | ICD-10-CM

## 2023-12-13 DIAGNOSIS — M54.50 LOWER BACK PAIN: ICD-10-CM

## 2023-12-13 PROCEDURE — 62323 NJX INTERLAMINAR LMBR/SAC: CPT | Mod: ,,, | Performed by: ANESTHESIOLOGY

## 2023-12-13 PROCEDURE — 25000003 PHARM REV CODE 250: Mod: PO | Performed by: ANESTHESIOLOGY

## 2023-12-13 PROCEDURE — 62323 PR INJ LUMBAR/SACRAL, W/IMAGING GUIDANCE: ICD-10-PCS | Mod: ,,, | Performed by: ANESTHESIOLOGY

## 2023-12-13 PROCEDURE — 62323 NJX INTERLAMINAR LMBR/SAC: CPT | Mod: PO | Performed by: ANESTHESIOLOGY

## 2023-12-13 PROCEDURE — 76000 FLUOROSCOPY <1 HR PHYS/QHP: CPT | Mod: TC,PO

## 2023-12-13 PROCEDURE — 25500020 PHARM REV CODE 255: Mod: PO | Performed by: ANESTHESIOLOGY

## 2023-12-13 PROCEDURE — 63600175 PHARM REV CODE 636 W HCPCS: Mod: PO | Performed by: ANESTHESIOLOGY

## 2023-12-13 RX ORDER — METHYLPREDNISOLONE ACETATE 80 MG/ML
INJECTION, SUSPENSION INTRA-ARTICULAR; INTRALESIONAL; INTRAMUSCULAR; SOFT TISSUE
Status: DISCONTINUED | OUTPATIENT
Start: 2023-12-13 | End: 2023-12-13 | Stop reason: HOSPADM

## 2023-12-13 RX ORDER — BUPIVACAINE HYDROCHLORIDE 2.5 MG/ML
INJECTION, SOLUTION EPIDURAL; INFILTRATION; INTRACAUDAL
Status: DISCONTINUED | OUTPATIENT
Start: 2023-12-13 | End: 2023-12-13 | Stop reason: HOSPADM

## 2023-12-13 RX ORDER — SODIUM CHLORIDE, SODIUM LACTATE, POTASSIUM CHLORIDE, CALCIUM CHLORIDE 600; 310; 30; 20 MG/100ML; MG/100ML; MG/100ML; MG/100ML
INJECTION, SOLUTION INTRAVENOUS CONTINUOUS
Status: DISCONTINUED | OUTPATIENT
Start: 2023-12-13 | End: 2023-12-13 | Stop reason: HOSPADM

## 2023-12-13 RX ORDER — MIDAZOLAM HYDROCHLORIDE 2 MG/2ML
INJECTION, SOLUTION INTRAMUSCULAR; INTRAVENOUS
Status: DISCONTINUED | OUTPATIENT
Start: 2023-12-13 | End: 2023-12-13 | Stop reason: HOSPADM

## 2023-12-13 RX ORDER — LIDOCAINE HYDROCHLORIDE 10 MG/ML
INJECTION, SOLUTION EPIDURAL; INFILTRATION; INTRACAUDAL; PERINEURAL
Status: DISCONTINUED | OUTPATIENT
Start: 2023-12-13 | End: 2023-12-13 | Stop reason: HOSPADM

## 2023-12-13 RX ADMIN — SODIUM CHLORIDE, POTASSIUM CHLORIDE, SODIUM LACTATE AND CALCIUM CHLORIDE: 600; 310; 30; 20 INJECTION, SOLUTION INTRAVENOUS at 02:12

## 2023-12-13 NOTE — DISCHARGE SUMMARY
[facility]  Discharge Note  Short Stay    Admit Date: 12/13/2023    Discharge Date: 12/13/2023    Attending Physician: Devante Boone     Discharge Provider: Devante Boone    Diagnoses:  There are no hospital problems to display for this patient.      Discharged Condition: Good    Final Diagnoses: Lumbar radiculopathy [M54.16]    Disposition: Home or Self Care    Hospital Course: No complications, uneventful    Outcome of Hospitalization, Treatment, Procedure, or Surgery:  Patient was admitted for outpatient interventional pain management procedure. The patient tolerated the procedure well with no complications.    Follow up/Patient Instructions:  Follow up as scheduled in Pain Management office in 2-3 weeks.  Patient has received instructions and follow up date and time.    Medications:  Continue previous medications    Discharge Procedure Orders   Notify your health care provider if you experience any of the following:  temperature >100.4     Notify your health care provider if you experience any of the following:  persistent nausea and vomiting or diarrhea     Notify your health care provider if you experience any of the following:  severe uncontrolled pain     Notify your health care provider if you experience any of the following:  redness, tenderness, or signs of infection (pain, swelling, redness, odor or green/yellow discharge around incision site)     Notify your health care provider if you experience any of the following:  difficulty breathing or increased cough     Notify your health care provider if you experience any of the following:  severe persistent headache     Notify your health care provider if you experience any of the following:  worsening rash     Notify your health care provider if you experience any of the following:  persistent dizziness, light-headedness, or visual disturbances     Notify your health care provider if you experience any of the following:  increased confusion or  weakness     Activity as tolerated

## 2023-12-13 NOTE — INTERVAL H&P NOTE
The patient has been examined and the H&P has been reviewed:    I concur with the findings and no changes have occurred since H&P was written.  Referred for caudal TAMAR.  On exam he has full strength and intact sensation to light touch.  The risks and benefits of this intervention, and alternative therapies were discussed with the patient.  The discussion of risks included infection, bleeding, need for additional procedures or surgery, nerve damage.  Questions regarding the procedure, risks, expected outcome, and possible side effects were solicited and answered to the patient's satisfaction.  Joseph Jesus wishes to proceed with the injection or procedure.  Written consent was obtained.  ASA 3, MP II      There are no hospital problems to display for this patient.

## 2023-12-13 NOTE — OP NOTE
Procedure Note    Procedure Date: 12/13/2023    Procedure Performed:  Caudal epidural steroid injection under fluoroscopy    Indications:  Joseph Jesus presents with lumbar radiculitis/radiculopathy secondary to disc herniation, osteophyte/osteophyte complexes, and/or severe degenerative disc disease producing foraminal or central spinal stenosis.  The pain has been present for at least 4 weeks and the patient has failed to respond to noninvasive conservative care.  Pain rated by NRS at baseline prior to intervention is 6/10.  Their radiculitis/radiculopathy and/or neurogenic claudication is severe enough to greatly impact their quality of life or function.     Pre-op diagnosis: Lumbar radiculitis/radiculopathy    Post-op diagnosis: same    Physician: Devante Boone MD    IV anxiolysis medications: versed 2mg    Medications injected: Depomedrol 80mg, 2ml Bupivacaine 0.25%, 6 mL sterile, preservative-free normal saline.    Local anesthetic used: 1% Lidocaine, 1 ml    Estimated Blood Loss: none    Complications:  none    Technique:   The patient was interviewed in the holding area and Risks/Benefits were discussed, including, but not limited to, the possibility of new or different pain, bleeding or infection.   All questions were answered.  The patient understood and accepted risks.  Consent was reviewed.  A time-out was taken to identify patient and procedure side prior to starting the procedure.  After the patient was placed in prone position, the patient was prepped and draped in the usual sterile fashion using ChloraPrep x3 and sterile towels.  Appropriate anatomic landmarks were determined by identifying the sacral hiatus in the lateral fluoroscopic view.  Local anesthetic was given via a 25g 1.5 inch needle by raising a wheal and infiltrating down to the periosteum.  A 25 gauge 3.5 inch needle was introduced thru the sacral hiatus.  Omnipaque was injected to confirm placement in the appropriate area and that there  was no vascular uptake.  The medication was then injected slowly.  The patient tolerated the procedure well.  The patient was monitored after the procedure.  Patient was given post procedure and discharge instructions to follow at home.  The patient was discharged in a stable condition

## 2023-12-14 VITALS
WEIGHT: 223 LBS | TEMPERATURE: 98 F | HEART RATE: 60 BPM | BODY MASS INDEX: 29.55 KG/M2 | HEIGHT: 73 IN | OXYGEN SATURATION: 95 % | DIASTOLIC BLOOD PRESSURE: 88 MMHG | SYSTOLIC BLOOD PRESSURE: 137 MMHG | RESPIRATION RATE: 17 BRPM

## 2023-12-26 ENCOUNTER — PATIENT MESSAGE (OUTPATIENT)
Dept: DERMATOLOGY | Facility: CLINIC | Age: 63
End: 2023-12-26
Payer: COMMERCIAL

## 2024-01-11 ENCOUNTER — OFFICE VISIT (OUTPATIENT)
Dept: PAIN MEDICINE | Facility: CLINIC | Age: 64
End: 2024-01-11
Payer: COMMERCIAL

## 2024-01-11 VITALS
SYSTOLIC BLOOD PRESSURE: 134 MMHG | HEART RATE: 73 BPM | BODY MASS INDEX: 29.69 KG/M2 | DIASTOLIC BLOOD PRESSURE: 82 MMHG | HEIGHT: 73 IN | WEIGHT: 224 LBS

## 2024-01-11 DIAGNOSIS — M54.16 LUMBAR RADICULOPATHY: Primary | ICD-10-CM

## 2024-01-11 DIAGNOSIS — G89.4 CHRONIC PAIN SYNDROME: ICD-10-CM

## 2024-01-11 PROCEDURE — 1159F MED LIST DOCD IN RCRD: CPT | Mod: CPTII,S$GLB,, | Performed by: ANESTHESIOLOGY

## 2024-01-11 PROCEDURE — 99999 PR PBB SHADOW E&M-EST. PATIENT-LVL III: CPT | Mod: PBBFAC,,, | Performed by: ANESTHESIOLOGY

## 2024-01-11 PROCEDURE — 3079F DIAST BP 80-89 MM HG: CPT | Mod: CPTII,S$GLB,, | Performed by: ANESTHESIOLOGY

## 2024-01-11 PROCEDURE — 3008F BODY MASS INDEX DOCD: CPT | Mod: CPTII,S$GLB,, | Performed by: ANESTHESIOLOGY

## 2024-01-11 PROCEDURE — 99214 OFFICE O/P EST MOD 30 MIN: CPT | Mod: S$GLB,,, | Performed by: ANESTHESIOLOGY

## 2024-01-11 PROCEDURE — 3075F SYST BP GE 130 - 139MM HG: CPT | Mod: CPTII,S$GLB,, | Performed by: ANESTHESIOLOGY

## 2024-01-11 PROCEDURE — 1125F AMNT PAIN NOTED PAIN PRSNT: CPT | Mod: CPTII,S$GLB,, | Performed by: ANESTHESIOLOGY

## 2024-01-11 RX ORDER — PREGABALIN 75 MG/1
75 CAPSULE ORAL 2 TIMES DAILY
Qty: 60 CAPSULE | Refills: 1 | Status: SHIPPED | OUTPATIENT
Start: 2024-01-11 | End: 2024-03-07

## 2024-01-11 NOTE — PROGRESS NOTES
[facility] New Patient Evaluation      Referred by: Axel Wright    PCP:     CC:   Chief Complaint   Patient presents with    Foot Pain     Right foot          1/11/2024     8:09 AM   Last 3 PDI Scores   Pain Disability Index (PDI) 22         HPI:   Joseph Jesus is a 63 y.o. male patient who has a past medical history of Allergy, Anal fistula, Arthritis, Basal cell carcinoma, Callus, Diverticulosis, Epididymal cyst, GERD (gastroesophageal reflux disease), Hayfever, Hydrocele, right, Hyperlipidemia, Hypertension, Neck pain, PONV (postoperative nausea and vomiting), Prostatitis, Squamous cell carcinoma, Squamous cell carcinoma of skin, and Visual impairment. He presents with back pain.  Referred to me by surgery for an epidural steroid injection.  He is now status post caudal TAMAR on 12/13/2023.  Reports my 50% relief of his pain.  Today he reports he continues to have pain, numbness, tingling in his bilateral lower extremities specifically the feet.  This has been bothering him for the past 3 years.  His pain is constant, tingling, numb, tight.  It can be worse with sitting, standing, lying and not relieved with anything in particular.      Pain Intervention History:      Past Spine Surgical History:      Past and current medications:  Antineuropathics: lyrica   NSAIDs:  Physical therapy: yes, completed   Antidepressants:  Muscle relaxers:  Opioids:  Antiplatelets/Anticoagulants:    History:    Current Outpatient Medications:     atorvastatin (LIPITOR) 10 MG tablet, Take 1 tablet (10 mg total) by mouth once daily., Disp: 90 tablet, Rfl: 3    cetirizine (ZYRTEC) 10 MG tablet, Take 10 mg by mouth Daily., Disp: , Rfl:     coenzyme Q10 100 mg capsule, Take 200 mg by mouth once daily., Disp: , Rfl:     fluorouraciL (EFUDEX) 5 % cream, Compound fluorouracil 5% + calcipotriene 0.005% cream. Apply a pea-sized amount to entire scalp BID x 7 days., Disp: 30 g, Rfl: 0    fluticasone propionate (FLONASE) 50  mcg/actuation nasal spray, 2 sprays (100 mcg total) by Each Nostril route daily as needed for Rhinitis., Disp: 16 mL, Rfl: 5    hydroCHLOROthiazide (HYDRODIURIL) 25 MG tablet, TAKE 1 TABLET BY MOUTH EVERY DAY, Disp: 90 tablet, Rfl: 1    Lactobacillus acidophilus (PROBIOTIC ORAL), Take by mouth., Disp: , Rfl:     NIFEdipine (ADALAT CC) 30 MG TbSR, TAKE 1 TABLET BY MOUTH EVERY DAY, Disp: 90 tablet, Rfl: 3    pomegranate xt/pomegranat seed (POMEGRAN FRUIT XT-POMEGRA SEED ORAL), Take by mouth., Disp: , Rfl:     traMADoL (ULTRAM) 50 mg tablet, Take 1 tablet (50 mg total) by mouth every 6 (six) hours as needed for Pain., Disp: 12 tablet, Rfl: 0    pregabalin (LYRICA) 75 MG capsule, Take 1 capsule (75 mg total) by mouth 2 (two) times daily., Disp: 60 capsule, Rfl: 1  No current facility-administered medications for this visit.    Facility-Administered Medications Ordered in Other Visits:     0.9%  NaCl infusion, , Intravenous, Continuous, Shahrzad Hughes MD, Last Rate: 10 mL/hr at 07/14/23 0734, New Bag at 07/14/23 0734    Past Medical History:   Diagnosis Date    Allergy     seasonal    Anal fistula hx    Arthritis     Basal cell carcinoma 04/2013    left superior forehead    Callus     Diverticulosis     Epididymal cyst     GERD (gastroesophageal reflux disease)     Hayfever     Hydrocele, right     Hyperlipidemia     Hypertension     Neck pain     hx of muscular inury from weight lifting---resolved now    PONV (postoperative nausea and vomiting)     Prostatitis     Dec. '12-treated with antibx.    Squamous cell carcinoma 01/07/2019    anterior scalp    Squamous cell carcinoma of skin 03/2020    Left post scalp (SCC insitu-saucerization)    Visual impairment        Past Surgical History:   Procedure Laterality Date    APPENDECTOMY  1962    BIOPSY OF BLADDER  4/19/2022    Procedure: BIOPSY, BLADDER;  Surgeon: Lenny Ewing MD;  Location: [facility] OR [address] [place] [address];  Service: Urology;;    BIOPSY OF BLADDER  3/8/2023     Procedure: BIOPSY, BLADDER;  Surgeon: Lenny Ewing MD;  Location: [facility] OR 1ST FLR;  Service: Urology;;    BIOPSY OF BLADDER  7/14/2023    Procedure: BIOPSY, BLADDER;  Surgeon: Benjamin Haile MD;  Location: [facility] OR 1ST FLR;  Service: Urology;;    BLADDER FULGURATION  4/19/2022    Procedure: FULGURATION, BLADDER;  Surgeon: Lenny Ewing MD;  Location: [facility] OR 1ST FLR;  Service: Urology;;    BLADDER FULGURATION  3/8/2023    Procedure: FULGURATION, BLADDER;  Surgeon: Lenny Ewing MD;  Location: [facility] OR 1ST FLR;  Service: Urology;;    BLADDER FULGURATION  7/14/2023    Procedure: FULGURATION, BLADDER;  Surgeon: Benjamin Haile MD;  Location: [facility] OR 1ST FLR;  Service: Urology;;    CAUDAL EPIDURAL STEROID INJECTION N/A 12/13/2023    Procedure: Injection-steroid-epidural-caudal;  Surgeon: Devante Boone MD;  Location: [facility] OR;  Service: Pain Management;  Laterality: N/A;    COLONOSCOPY  12/2012    due for repeat 12/2015    COLONOSCOPY N/A 5/19/2016    Procedure: COLONOSCOPY;  Surgeon: James Webber MD;  Location: [facility] (4TH FLR);  Service: Endoscopy;  Laterality: N/A; diverticulosis, repeat in 5-10 years for surveillance    COLONOSCOPY N/A 8/9/2021    Procedure: COLONOSCOPY;  Surgeon: Fahad Bautista Jr., MD;  Location: [facility] ENDO;  Service: Endoscopy;  Laterality: N/A;    CYSTOSCOPY  4/19/2022    Procedure: CYSTOSCOPY;  Surgeon: Lenny Ewing MD;  [facility] 1ST FLR;  Service: Urology;;    CYSTOSCOPY  6/21/2022    Procedure: CYSTOSCOPY;  Surgeon: Lenny Ewing MD;  Location: [facility] OR 1ST FLR;  Service: Urology;;    CYSTOSCOPY  8/23/2022    Procedure: CYSTOSCOPY;  Surgeon: Lenny Ewing MD;  [facility]: [facility] OR 1ST FLR;  Service: Urology;;    CYSTOSCOPY  3/8/2023    Procedure: CYSTOSCOPY-BLUE LIGHT;  Surgeon: Lenny Ewing MD;  Location: [facility] OR [address] [place] [address];  Service: Urology;;    CYSTOSCOPY N/A 4/4/2023    Procedure: CYSTOSCOPY;  Surgeon: Benjamin Haile MD;  Location: [facility] OR [address] [place] [address];   Service: Urology;  Laterality: N/A;  1 hour    CYSTOSCOPY  7/14/2023    Procedure: CYSTOSCOPY;  Surgeon: Benjamin Haile MD;  [facility];  Service: Urology;;  BLUE LIGHT    ESOPHAGOGASTRODUODENOSCOPY N/A 8/9/2021    Procedure: EGD (ESOPHAGOGASTRODUODENOSCOPY);  Surgeon: Fahad Bautista Jr., MD;  Location: [facility];  Service: Endoscopy;  Laterality: N/A;    EUA/Fistulotomy-'08      HERNIA REPAIR      RI with mesh    Hydrocelectomy  2013    MASS EXCISION  2004    BCC removal (twice)    MOLE REMOVAL  02/25/2019    2 moles removed from scalp =- 1 was basal cell and 1 was squamous cell - dermatology - Dr. londono    right Spermatocelectomy  2013    TURBT (TRANSURETHRAL RESECTION OF BLADDER TUMOR) N/A 4/4/2023    Procedure: TURBT (TRANSURETHRAL RESECTION OF BLADDER TUMOR);  Surgeon: Benjamin Haile MD;  Location: [facility] OR [address] [place] [address];  Service: Urology;  Laterality: N/A;  1 hour, blue light    UPPER GASTROINTESTINAL ENDOSCOPY  prior to 2010    hiatal hernia, reflux esophagitis       Family History   Problem Relation Age of Onset    Skin cancer Mother     Hypertension Mother     Skin cancer Father     Cancer Father         prostate cancer    Hypertension Father     Hypertension Maternal Grandmother     Hypertension Maternal Grandfather     Hypertension Paternal Grandmother     Anesthesia problems Paternal Grandmother     Cancer Paternal Grandfather         prostate cancer    Hypertension Paternal Grandfather     Heart disease Paternal Grandfather     Diabetes Neg Hx     Colon polyps Neg Hx     Colon cancer Neg Hx     Melanoma Neg Hx     Psoriasis Neg Hx     Lupus Neg Hx     Eczema Neg Hx     Acne Neg Hx     Cirrhosis Neg Hx     Prostate cancer Neg Hx     Kidney disease Neg Hx     Crohn's disease Neg Hx     Ulcerative colitis Neg Hx     Stomach cancer Neg Hx     Esophageal cancer Neg Hx        Social History     Socioeconomic History    Marital status:      Spouse name: Traci    Number of children: 1    Occupational History    Occupation: Software Eng.     Employer: [employer]     Comment: [employer]   Tobacco Use    Smoking status: Former     Current packs/day: 0.00     Average packs/day: 1 pack/day for 10.0 years (10.0 ttl pk-yrs)     Types: Cigarettes     Start date: 1977     Quit date: 1987     Years since quittin.1    Smokeless tobacco: Never    Tobacco comments:     Decreased lung compassity per patient   Substance and Sexual Activity    Alcohol use: Not Currently     Alcohol/week: 0.0 - 1.0 standard drinks of alcohol     Comment: No longer drinks ETOH    Drug use: No    Sexual activity: Yes     Partners: Female     Social Determinants of Health     Financial Resource Strain: Medium Risk (2023)    Overall Financial Resource Strain (CARDIA)     Difficulty of Paying Living Expenses: Somewhat hard   Food Insecurity: No Food Insecurity (2023)    Hunger Vital Sign     Worried About Running Out of Food in the Last Year: Never true     Ran Out of Food in the Last Year: Never true   Transportation Needs: No Transportation Needs (2023)    PRAPARE - Transportation     Lack of Transportation (Medical): No     Lack of Transportation (Non-Medical): No   Physical Activity: Insufficiently Active (2023)    Exercise Vital Sign     Days of Exercise per Week: 1 day     Minutes of Exercise per Session: 40 min   Stress: Stress Concern Present (2023)    [employer] [place] [employer] - Occupational Stress Questionnaire     Feeling of Stress : To some extent   Social Connections: Unknown (2023)    Social Connection and Isolation Panel [NHANES]     Frequency of Communication with Friends and Family: More than three times a week     Frequency of Social Gatherings with Friends and Family: Twice a week     Active Member of Clubs or Organizations: Yes     Attends Club or Organization Meetings: More than 4 times per year     Marital Status:    Housing Stability: Low Risk  " (11/20/2023)    Housing Stability Vital Sign     Unable to Pay for Housing in the Last Year: No     Number of Places Lived in the Last Year: 1     Unstable Housing in the Last Year: No       Review of patient's allergies indicates:   Allergen Reactions    Amlodipine      edema    Atenolol      Depression - circa 2000    Catechu herb     Irbesartan Other (See Comments)     Possibly caused cough    Erythromycin Rash    Sumycin [tetracycline] Rash    Tetracyclines Rash       Review of Systems:  Positive for headaches, urinary urgency, urinary frequency, joint stiffness, memory loss.  Balance of review of systems is negative.    Physical Exam:  Vitals:    01/11/24 0811   BP: 134/82   Pulse: 73   Weight: 101.6 kg (223 lb 15.8 oz)   Height: 6' 1" (1.854 m)   PainSc:   5   PainLoc: Foot     Body mass index is 29.55 kg/m².    Gen: NAD  Psych: mood appropriate for given condition  HEENT: eyes anicteric   CV: RRR  HEENT: anicteric   Respiratory: non-labored, no signs of respiratory distress  Abd: non-distended  Skin: warm, dry and intact.  Gait: No antalgic gait.     Sensory:  Intact and symmetrical to light touch in L1-S1 dermatomes bilaterally, except decreased bilaterally over his feet, greater on the plantar surface    Motor:     Right Left   L2/3 Iliacus Hip flexion  5  5   L3/4 Qudratus Femoris Knee Extension  5  5   L4/5 Tib Anterior Ankle Dorsiflexion   5  5   L5/S1 Extensor Hallicus Longus Great toe extension  5  5   S1/S2 Gastroc/Soleus Plantar Flexion  5  5      Right Left                  Patellar DTR 0 0   Achilles DTR 0 0                      Labs:  Lab Results   Component Value Date    HGBA1C 5.6 10/07/2022       Lab Results   Component Value Date    WBC 7.31 03/01/2023    HGB 13.7 (L) 03/01/2023    HCT 42.6 03/01/2023    MCV 94 03/01/2023     03/01/2023           Imaging:  Xray lumbar spine 11/16/23  FINDINGS:  Lumbar spine alignment demonstrates levoscoliosis and straightening of the normal lordosis.  " No spondylolisthesis.  No dynamic instability.  Vertebral body heights are well maintained without evidence for acute fracture.  Osseous mineralization is preserved.  No suspicious lytic or blastic lesions.  Multilevel degenerative disc space narrowing most pronounced from L2-L3 through L4-L5.  Multilevel facet arthropathy most pronounced at L4-L5 and L5-S1.  Atherosclerotic calcification of the abdominal aorta.    I independently reviewed his MRI from May 16, 2023.  He has levoscoliosis.  He has multilevel bilateral facet arthropathy.  At L5-S1 he has left-sided foraminal narrowing.  At L4-5 he has some left lateral recess narrowing and left-sided foraminal narrowing    EMG/NCS 10/11/22  Impression: There is EMG evidence of: 1) a length-dependent axonal sensorimotor peripheral neuropathy, and 2) a chronic right L45/ radiculopathy without active denervation.     Assessment:   Problem List Items Addressed This Visit    None  Visit Diagnoses       Lumbar radiculopathy    -  Primary    Relevant Medications    pregabalin (LYRICA) 75 MG capsule    Chronic pain syndrome                  Joseph Jesus is a 63 y.o. male patient who has a past medical history of Allergy, Anal fistula, Arthritis, Basal cell carcinoma, Callus, Diverticulosis, Epididymal cyst, GERD (gastroesophageal reflux disease), Hayfever, Hydrocele, right, Hyperlipidemia, Hypertension, Neck pain, PONV (postoperative nausea and vomiting), Prostatitis, Squamous cell carcinoma, Squamous cell carcinoma of skin, and Visual impairment. He presents with back pain.  Referred to me by surgery for an epidural steroid injection.  He is now status post caudal TAMAR on 12/13/2023.  Reports my 50% relief of his pain.  Today he reports he continues to have pain, numbness, tingling in his bilateral lower extremities specifically the feet.  This has been bothering him for the past 3 years.  His pain is constant, tingling, numb, tight.  It can be worse with sitting, standing,  lying and not relieved with anything in particular.    - on exam he has full strength in his lower extremities.  He has decreased sensation in a nondermatomal distribution in his bilateral feet worse on the plantar surface.  0 bilateral patellar and Achilles DTR  - I independently reviewed his MRI from May 16, 2023.  He has levoscoliosis.  He has multilevel bilateral facet arthropathy.  At L5-S1 he has left-sided foraminal narrowing.  At L4-5 he has some left lateral recess narrowing and left-sided foraminal narrowing  - he has completed formal physical therapy in the past.  He maintains PT directed home exercise program.  I provided him with physician lead home exercises to continue.  - he has an EMG in 2022 that is consistent with a length-dependent axonal sensorimotor peripheral neuropathy, and a chronic right L45/ radiculopathy  - he has seen cardiology and venous and arterial studies in her lower extremity were negative for any pathology requiring intervention  - we discussed future treatment options.  He may be a candidate for an SCS trial.  Prior to that I would like to maximize conservative treatment.  I would like to trial Lyrica 75 mg twice a day for the neuropathic component of his pain.  He understands side effects include drowsiness.  I have given him a pamphlet on a [place] [employer] spinal cord stimulator.  He will follow-up in 6 weeks      : Not applicable    Devante Boone M.D.  Interventional Pain Medicine / Anesthesiology    This note was completed with dictation software and grammatical errors may exist.

## 2024-01-16 ENCOUNTER — PATIENT MESSAGE (OUTPATIENT)
Dept: INTERNAL MEDICINE | Facility: CLINIC | Age: 64
End: 2024-01-16
Payer: COMMERCIAL

## 2024-01-16 DIAGNOSIS — R05.9 COUGH, UNSPECIFIED TYPE: Primary | ICD-10-CM

## 2024-01-16 RX ORDER — PROMETHAZINE HYDROCHLORIDE AND DEXTROMETHORPHAN HYDROBROMIDE 6.25; 15 MG/5ML; MG/5ML
5 SYRUP ORAL 3 TIMES DAILY PRN
Qty: 118 ML | Refills: 0 | Status: SHIPPED | OUTPATIENT
Start: 2024-01-16 | End: 2024-01-26

## 2024-03-07 ENCOUNTER — OFFICE VISIT (OUTPATIENT)
Dept: DERMATOLOGY | Facility: CLINIC | Age: 64
End: 2024-03-07
Payer: COMMERCIAL

## 2024-03-07 DIAGNOSIS — L57.0 AK (ACTINIC KERATOSIS): Primary | ICD-10-CM

## 2024-03-07 PROCEDURE — 1160F RVW MEDS BY RX/DR IN RCRD: CPT | Mod: CPTII,S$GLB,, | Performed by: DERMATOLOGY

## 2024-03-07 PROCEDURE — 99214 OFFICE O/P EST MOD 30 MIN: CPT | Mod: S$GLB,,, | Performed by: DERMATOLOGY

## 2024-03-07 PROCEDURE — 99999 PR PBB SHADOW E&M-EST. PATIENT-LVL III: CPT | Mod: PBBFAC,,, | Performed by: DERMATOLOGY

## 2024-03-07 PROCEDURE — 1101F PT FALLS ASSESS-DOCD LE1/YR: CPT | Mod: CPTII,S$GLB,, | Performed by: DERMATOLOGY

## 2024-03-07 PROCEDURE — 1126F AMNT PAIN NOTED NONE PRSNT: CPT | Mod: CPTII,S$GLB,, | Performed by: DERMATOLOGY

## 2024-03-07 PROCEDURE — 1159F MED LIST DOCD IN RCRD: CPT | Mod: CPTII,S$GLB,, | Performed by: DERMATOLOGY

## 2024-03-07 PROCEDURE — 3288F FALL RISK ASSESSMENT DOCD: CPT | Mod: CPTII,S$GLB,, | Performed by: DERMATOLOGY

## 2024-03-07 RX ORDER — FLUOROURACIL 50 MG/G
CREAM TOPICAL
Qty: 30 G | Refills: 0 | Status: SHIPPED | OUTPATIENT
Start: 2024-03-07

## 2024-03-07 NOTE — PROGRESS NOTES
Subjective:      Patient ID:  Joseph Jesus is a 64 y.o. male who presents for   Chief Complaint   Patient presents with    Follow-up     Efudex/dovonex     History of Present Illness: The patient presents for follow up of skin check.    The patient was last seen on: 10/31/23 for UBSE and treatment of Aks on scalp with efudex/dovonex - pt treated scalp BID x 9 days.  This is a high risk patient here to check for the development of new lesions.    Has itching on back intermittent x years. Not using any moisturizers. Tried cerave anti-itch lotion and gold bond (no relief)    Other skin complaints: none          Review of Systems   Skin:  Positive for activity-related sunscreen use (sometimes) and wears hat (always). Negative for daily sunscreen use and recent sunburn.   Hematologic/Lymphatic: Does not bruise/bleed easily.       Objective:   Physical Exam   Constitutional: He appears well-developed and well-nourished. No distress.   Neurological: He is alert and oriented to person, place, and time. He is not disoriented.   Psychiatric: He has a normal mood and affect.   Skin:   Areas Examined (abnormalities noted in diagram):   Scalp / Hair Palpated and Inspected  Head / Face Inspection Performed            Diagram Legend     Erythematous scaling macule/papule c/w actinic keratosis       Vascular papule c/w angioma      Pigmented verrucoid papule/plaque c/w seborrheic keratosis      Yellow umbilicated papule c/w sebaceous hyperplasia      Irregularly shaped tan macule c/w lentigo     1-2 mm smooth white papules consistent with Milia      Movable subcutaneous cyst with punctum c/w epidermal inclusion cyst      Subcutaneous movable cyst c/w pilar cyst      Firm pink to brown papule c/w dermatofibroma      Pedunculated fleshy papule(s) c/w skin tag(s)      Evenly pigmented macule c/w junctional nevus     Mildly variegated pigmented, slightly irregular-bordered macule c/w mildly atypical nevus      Flesh colored to evenly  pigmented papule c/w intradermal nevus       Pink pearly papule/plaque c/w basal cell carcinoma      Erythematous hyperkeratotic cursted plaque c/w SCC      Surgical scar with no sign of skin cancer recurrence      Open and closed comedones      Inflammatory papules and pustules      Verrucoid papule consistent consistent with wart     Erythematous eczematous patches and plaques     Dystrophic onycholytic nail with subungual debris c/w onychomycosis     Umbilicated papule    Erythematous-base heme-crusted tan verrucoid plaque consistent with inflamed seborrheic keratosis     Erythematous Silvery Scaling Plaque c/w Psoriasis     See annotation      Assessment / Plan:        AK (actinic keratosis)  -     fluorouraciL (EFUDEX) 5 % cream; Compound fluorouracil 5% + calcipotriene 0.005% cream. Apply a pea-sized amount to entire scalp and left lower cheek BID x 7 - 10 days.  Dispense: 30 g; Refill: 0      AK (actinic keratosis)  Today's Plan:      Sent Rx to [facility] for efudex/dovonex bid to AA scalp and left lower cheek x 7 - 10 days (30g $45)     Cont wear hat always      Follow up in about 3 months (around 6/7/2024).

## 2024-03-07 NOTE — PATIENT INSTRUCTIONS
Field Treatment for Actinic Keratoses (precancerous lesions)    5-Fluoruracil + Dovonex (calcipotriene) - This is a topical chemotherapy for your skin. This cream should never be applied without discussing with you dermatologist.    This treatment gets your immune system involved in fighting precancers (actinic keratoses), even those we can't yet see.     HOW TO APPLY: Apply the combination cream to the affected area scalp and left lower cheek 2x/day x 7 - 10 days. Apply a fingertip length amount to the entire area. Wash your hands carefully after applying the creams and wipe glasses, BIPAP/CPAP machines, or anything else that comes in frequent contact with the creams.    WHAT TO EXPECT: Your skin will likely become red, crusted, sore, and tender during the treatment usually starting around day 3 and continuing for about 1 week after last application.     HOW TO HEAL FAST: To speed healing, wash with a gentle wash and apply Vaseline jelly especially to crusted open areas.    WE CAN HELP: Please contact us for any questions or problem shooting the application of these creams: [phone] or [email or web address]    GREAT NEWS: Newer studies suggest that the combination of these creams not only decrease the sun damage spots and precancers (actinic keratoses) but also decrease your risk of getting skin cancer the year following application.     IT WILL BE WORTH IT!!!     Your [facility] has been [facility] to [place] [facility].  The phone number is [phone].  Their location is:  [facility]. [place], [place], [address] [place] [address] Pharmacy should call you. You can opt to pick the Rx up or have the Rx mailed to you.     Hours of operation:   Monday- Friday:  8 a.m. to 6:00 p.m.  Saturday:  8 a.m. to 1:00 p.m.  Sunday:  Closed

## 2024-03-07 NOTE — ASSESSMENT & PLAN NOTE
Today's Plan:      Sent Rx to [facility] for efudex/dovonex bid to AA scalp and left lower cheek x 7 - 10 days (30g $45)     Cont wear hat always

## 2024-04-09 ENCOUNTER — PATIENT MESSAGE (OUTPATIENT)
Dept: INTERNAL MEDICINE | Facility: CLINIC | Age: 64
End: 2024-04-09
Payer: COMMERCIAL

## 2024-06-10 ENCOUNTER — PATIENT MESSAGE (OUTPATIENT)
Dept: INTERNAL MEDICINE | Facility: CLINIC | Age: 64
End: 2024-06-10
Payer: COMMERCIAL

## 2024-06-27 ENCOUNTER — PATIENT MESSAGE (OUTPATIENT)
Dept: DERMATOLOGY | Facility: CLINIC | Age: 64
End: 2024-06-27
Payer: COMMERCIAL

## 2024-06-27 ENCOUNTER — PATIENT MESSAGE (OUTPATIENT)
Dept: INTERNAL MEDICINE | Facility: CLINIC | Age: 64
End: 2024-06-27
Payer: COMMERCIAL

## 2024-07-02 ENCOUNTER — OFFICE VISIT (OUTPATIENT)
Dept: DERMATOLOGY | Facility: CLINIC | Age: 64
End: 2024-07-02
Payer: COMMERCIAL

## 2024-07-02 DIAGNOSIS — L84 CLAVUS: ICD-10-CM

## 2024-07-02 DIAGNOSIS — L57.0 AK (ACTINIC KERATOSIS): Primary | ICD-10-CM

## 2024-07-02 PROCEDURE — 99999 PR PBB SHADOW E&M-EST. PATIENT-LVL III: CPT | Mod: PBBFAC,,, | Performed by: DERMATOLOGY

## 2024-07-02 PROCEDURE — 17003 DESTRUCT PREMALG LES 2-14: CPT | Mod: S$GLB,,, | Performed by: DERMATOLOGY

## 2024-07-02 PROCEDURE — 99499 UNLISTED E&M SERVICE: CPT | Mod: S$GLB,,, | Performed by: DERMATOLOGY

## 2024-07-02 PROCEDURE — 11055 PARING/CUTG B9 HYPRKER LES 1: CPT | Mod: XS,S$GLB,, | Performed by: DERMATOLOGY

## 2024-07-02 PROCEDURE — 17000 DESTRUCT PREMALG LESION: CPT | Mod: S$GLB,,, | Performed by: DERMATOLOGY

## 2024-07-02 NOTE — PATIENT INSTRUCTIONS

## 2024-07-02 NOTE — PROGRESS NOTES
Subjective:      Patient ID:  Joseph Jesus is a 64 y.o. male who presents for   Chief Complaint   Patient presents with    Follow-up     Efudex/dovonex     History of Present Illness: The patient presents for follow up of skin check.    The patient was last seen on: 3/7/2024 for efudex/dovonex treatment bid to scalp and left lower cheek x 2 weeks.  This is a high risk patient here to check for the development of new lesions.     Other skin complaints: c/o lesions on feet, arms and neck        Review of Systems   Skin:  Positive for activity-related sunscreen use (sometimes) and wears hat (always). Negative for daily sunscreen use and recent sunburn.   Hematologic/Lymphatic: Does not bruise/bleed easily.       Objective:   Physical Exam   Constitutional: He appears well-developed and well-nourished. No distress.   Neurological: He is alert and oriented to person, place, and time. He is not disoriented.   Psychiatric: He has a normal mood and affect.   Skin:   Areas Examined (abnormalities noted in diagram):   Scalp / Hair Palpated and Inspected  Head / Face Inspection Performed  Nails and Digits Inspection Performed                Diagram Legend     Erythematous scaling macule/papule c/w actinic keratosis       Vascular papule c/w angioma      Pigmented verrucoid papule/plaque c/w seborrheic keratosis      Yellow umbilicated papule c/w sebaceous hyperplasia      Irregularly shaped tan macule c/w lentigo     1-2 mm smooth white papules consistent with Milia      Movable subcutaneous cyst with punctum c/w epidermal inclusion cyst      Subcutaneous movable cyst c/w pilar cyst      Firm pink to brown papule c/w dermatofibroma      Pedunculated fleshy papule(s) c/w skin tag(s)      Evenly pigmented macule c/w junctional nevus     Mildly variegated pigmented, slightly irregular-bordered macule c/w mildly atypical nevus      Flesh colored to evenly pigmented papule c/w intradermal nevus       Pink pearly papule/plaque c/w  basal cell carcinoma      Erythematous hyperkeratotic cursted plaque c/w SCC      Surgical scar with no sign of skin cancer recurrence      Open and closed comedones      Inflammatory papules and pustules      Verrucoid papule consistent consistent with wart     Erythematous eczematous patches and plaques     Dystrophic onycholytic nail with subungual debris c/w onychomycosis     Umbilicated papule    Erythematous-base heme-crusted tan verrucoid plaque consistent with inflamed seborrheic keratosis     Erythematous Silvery Scaling Plaque c/w Psoriasis     See annotation      Assessment / Plan:        AK (actinic keratosis)  Cryosurgery Procedure Note    Verbal consent from the patient is obtained including, but not limited to, risk of hypopigmentation/hyperpigmentation, scar, recurrence of lesion. The patient is aware of the precancerous quality and need for treatment of these lesions. Liquid nitrogen cryosurgery is applied to the 7 actinic keratoses, as detailed in the physical exam, to produce a freeze injury. The patient is aware that blisters may form and is instructed on wound care with gentle cleansing and use of vaseline ointment to keep moist until healed. The patient is supplied a handout on cryosurgery and is instructed to call if lesions do not completely resolve.      Clavus - right foot  Discussed pathogenic factor of pressure. Patient advised to avoid/eliminate contributory footwear.    Use corn pads.    Lesion pared to comfort               No follow-ups on file.

## 2024-07-23 ENCOUNTER — TELEPHONE (OUTPATIENT)
Dept: UROLOGY | Facility: CLINIC | Age: 64
End: 2024-07-23
Payer: COMMERCIAL

## 2024-07-23 NOTE — TELEPHONE ENCOUNTER
Attempted to call patient regarding f/u appt with Dr Haile.  VM left with return call information provided.   No

## 2024-07-29 ENCOUNTER — TELEPHONE (OUTPATIENT)
Dept: UROLOGY | Facility: CLINIC | Age: 64
End: 2024-07-29
Payer: COMMERCIAL

## 2024-07-29 NOTE — TELEPHONE ENCOUNTER
Spoke with patient who was able to provide acceptable patient identifiers prior to start of conversation.   The following instructions provided:  Instructions for Day of Surgery at [facility]      Report To:    The [facility], located across from [place] side of the 1st floor of the hospital    Arrival time: 0530    Please note:     If you are allergic to any medication please let your care team know the day of surgery.  If you have ever had adverse reaction to anesthesia please let your care team know the day of surgery   Please arrange transportation from the hospital, you will not be allowed to drive yourself home from surgery since you have been under sedation or anesthesia   Notify your doctor or care team of any diabetic medications that you take as these may need to be held or adjusted prior to surgery     Before Surgery     You should stop taking any blood thinners for up to 7 days depending on the blood thinner, please let care team know what you are taking so you can be directed when to stop certain medications.    You should hold any aspirin containing products for 7 days    Stop taking any herbal supplements 14 days prior to surgery     Night Before Surgery     Nothing to eat or drink after midnight the night before your surgery  Take medications as instructed the morning of surgery  No alcoholic beverages 24 hours prior to surgery

## 2024-08-19 ENCOUNTER — TELEPHONE (OUTPATIENT)
Dept: PHARMACY | Facility: CLINIC | Age: 64
End: 2024-08-19
Payer: COMMERCIAL

## 2024-08-19 NOTE — TELEPHONE ENCOUNTER
[facility]/Population Health Chart Review & Patient Outreach Details For Medication Adherence Project    Reason for Outreach Encounter: 3rd Party payor non-compliance report ([employer], [employer], C, etc)    2.  Patient Outreach Method: Reviewed patient chart     3.   Medication in question:   Hyperlipidemia Medications               atorvastatin (LIPITOR) 10 MG tablet Take 1 tablet (10 mg total) by mouth once daily.                LAST FILLED: 6/26/24 for 90 day supply    4.  Reviewed and or Updates Made To: Patient Chart    5. Outreach Outcomes and/or actions taken: Patient filled medication and is on track to be adherent    Additional Notes:

## 2024-09-06 ENCOUNTER — PATIENT MESSAGE (OUTPATIENT)
Dept: INTERNAL MEDICINE | Facility: CLINIC | Age: 64
End: 2024-09-06
Payer: COMMERCIAL

## 2024-09-19 ENCOUNTER — PATIENT MESSAGE (OUTPATIENT)
Dept: INTERNAL MEDICINE | Facility: CLINIC | Age: 64
End: 2024-09-19
Payer: COMMERCIAL

## 2024-09-19 DIAGNOSIS — Z00.00 ANNUAL PHYSICAL EXAM: Primary | ICD-10-CM

## 2024-09-19 DIAGNOSIS — E78.5 HYPERLIPIDEMIA, UNSPECIFIED HYPERLIPIDEMIA TYPE: ICD-10-CM

## 2024-09-19 DIAGNOSIS — I10 HYPERTENSION, ESSENTIAL: ICD-10-CM

## 2024-09-19 DIAGNOSIS — Z12.5 PROSTATE CANCER SCREENING: ICD-10-CM

## 2024-09-20 DIAGNOSIS — E78.5 HYPERLIPIDEMIA, UNSPECIFIED HYPERLIPIDEMIA TYPE: ICD-10-CM

## 2024-09-20 NOTE — TELEPHONE ENCOUNTER
Refill Routing Note   Medication(s) are not appropriate for processing by [facility] for the following reason(s):        Required labs outdated    [facility] action(s):  Defer               Appointments  past 12m or future 3m with PCP    Date Provider   Last Visit   7/25/2023 Nick Trejo MD   Next Visit   9/24/2024 Nick Trejo MD   ED visits in past 90 days: 0        Note composed:10:07 AM 09/20/2024

## 2024-09-20 NOTE — TELEPHONE ENCOUNTER
No care due was identified.  [facility] Embedded Care Due Messages. Reference number: [id number].   9/20/2024 12:51:24 AM CDT

## 2024-09-21 DIAGNOSIS — I10 HYPERTENSION, ESSENTIAL: ICD-10-CM

## 2024-09-21 RX ORDER — HYDROCHLOROTHIAZIDE 25 MG/1
25 TABLET ORAL
Qty: 90 TABLET | Refills: 0 | Status: SHIPPED | OUTPATIENT
Start: 2024-09-21 | End: 2024-09-24 | Stop reason: SDUPTHER

## 2024-09-21 RX ORDER — ATORVASTATIN CALCIUM 10 MG/1
10 TABLET, FILM COATED ORAL
Qty: 90 TABLET | Refills: 0 | Status: SHIPPED | OUTPATIENT
Start: 2024-09-21 | End: 2024-09-24 | Stop reason: SDUPTHER

## 2024-09-21 NOTE — TELEPHONE ENCOUNTER
Refill Routing Note   Medication(s) are not appropriate for processing by [facility] for the following reason(s):        Required labs outdated    [facility] action(s):  Defer               Appointments  past 12m or future 3m with PCP    Date Provider   Last Visit   Visit date not found Nick Trejo MD   Next Visit   9/24/2024 Nick Trejo MD   ED visits in past 90 days: 0        Note composed:12:53 PM 09/21/2024

## 2024-09-21 NOTE — TELEPHONE ENCOUNTER
No care due was identified.  [facility] Embedded Care Due Messages. Reference number: [id number].   9/21/2024 12:58:21 AM CDT

## 2024-09-24 ENCOUNTER — OFFICE VISIT (OUTPATIENT)
Dept: INTERNAL MEDICINE | Facility: CLINIC | Age: 64
End: 2024-09-24
Attending: FAMILY MEDICINE
Payer: COMMERCIAL

## 2024-09-24 ENCOUNTER — HOSPITAL ENCOUNTER (OUTPATIENT)
Dept: RADIOLOGY | Facility: HOSPITAL | Age: 64
Discharge: HOME | End: 2024-09-24
Attending: FAMILY MEDICINE
Payer: COMMERCIAL

## 2024-09-24 VITALS
SYSTOLIC BLOOD PRESSURE: 112 MMHG | HEIGHT: 73 IN | OXYGEN SATURATION: 96 % | HEART RATE: 76 BPM | BODY MASS INDEX: 30.74 KG/M2 | DIASTOLIC BLOOD PRESSURE: 74 MMHG | WEIGHT: 231.94 LBS

## 2024-09-24 DIAGNOSIS — J34.89 RHINORRHEA: ICD-10-CM

## 2024-09-24 DIAGNOSIS — I70.0 AORTIC ATHEROSCLEROSIS: ICD-10-CM

## 2024-09-24 DIAGNOSIS — C67.2 MALIGNANT NEOPLASM OF LATERAL WALL OF URINARY BLADDER: ICD-10-CM

## 2024-09-24 DIAGNOSIS — N13.8 BPH WITH URINARY OBSTRUCTION: ICD-10-CM

## 2024-09-24 DIAGNOSIS — Z00.00 ANNUAL PHYSICAL EXAM: Primary | ICD-10-CM

## 2024-09-24 DIAGNOSIS — I10 HYPERTENSION, ESSENTIAL: ICD-10-CM

## 2024-09-24 DIAGNOSIS — I25.10 CORONARY ARTERY CALCIFICATION: ICD-10-CM

## 2024-09-24 DIAGNOSIS — R05.9 COUGH, UNSPECIFIED TYPE: ICD-10-CM

## 2024-09-24 DIAGNOSIS — E78.5 HYPERLIPIDEMIA, UNSPECIFIED HYPERLIPIDEMIA TYPE: ICD-10-CM

## 2024-09-24 DIAGNOSIS — N40.1 BPH WITH URINARY OBSTRUCTION: ICD-10-CM

## 2024-09-24 DIAGNOSIS — I25.84 CORONARY ARTERY CALCIFICATION: ICD-10-CM

## 2024-09-24 PROBLEM — N18.31 STAGE 3A CHRONIC KIDNEY DISEASE: Status: RESOLVED | Noted: 2022-05-17 | Resolved: 2024-09-24

## 2024-09-24 PROCEDURE — 71046 X-RAY EXAM CHEST 2 VIEWS: CPT | Mod: 26,,, | Performed by: RADIOLOGY

## 2024-09-24 PROCEDURE — 71046 X-RAY EXAM CHEST 2 VIEWS: CPT | Mod: TC

## 2024-09-24 PROCEDURE — 1101F PT FALLS ASSESS-DOCD LE1/YR: CPT | Mod: CPTII,S$GLB,, | Performed by: FAMILY MEDICINE

## 2024-09-24 PROCEDURE — 3074F SYST BP LT 130 MM HG: CPT | Mod: CPTII,S$GLB,, | Performed by: FAMILY MEDICINE

## 2024-09-24 PROCEDURE — 1159F MED LIST DOCD IN RCRD: CPT | Mod: CPTII,S$GLB,, | Performed by: FAMILY MEDICINE

## 2024-09-24 PROCEDURE — 3288F FALL RISK ASSESSMENT DOCD: CPT | Mod: CPTII,S$GLB,, | Performed by: FAMILY MEDICINE

## 2024-09-24 PROCEDURE — 1125F AMNT PAIN NOTED PAIN PRSNT: CPT | Mod: CPTII,S$GLB,, | Performed by: FAMILY MEDICINE

## 2024-09-24 PROCEDURE — 3078F DIAST BP <80 MM HG: CPT | Mod: CPTII,S$GLB,, | Performed by: FAMILY MEDICINE

## 2024-09-24 PROCEDURE — 1160F RVW MEDS BY RX/DR IN RCRD: CPT | Mod: CPTII,S$GLB,, | Performed by: FAMILY MEDICINE

## 2024-09-24 PROCEDURE — 99397 PER PM REEVAL EST PAT 65+ YR: CPT | Mod: S$GLB,,, | Performed by: FAMILY MEDICINE

## 2024-09-24 PROCEDURE — 3008F BODY MASS INDEX DOCD: CPT | Mod: CPTII,S$GLB,, | Performed by: FAMILY MEDICINE

## 2024-09-24 PROCEDURE — 99999 PR PBB SHADOW E&M-EST. PATIENT-LVL V: CPT | Mod: PBBFAC,,, | Performed by: FAMILY MEDICINE

## 2024-09-24 RX ORDER — NIFEDIPINE 30 MG/1
30 TABLET, EXTENDED RELEASE ORAL DAILY
Qty: 90 TABLET | Refills: 3 | Status: SHIPPED | OUTPATIENT
Start: 2024-09-24

## 2024-09-24 RX ORDER — ATORVASTATIN CALCIUM 10 MG/1
10 TABLET, FILM COATED ORAL DAILY
Qty: 90 TABLET | Refills: 3 | Status: SHIPPED | OUTPATIENT
Start: 2024-09-24

## 2024-09-24 RX ORDER — HYDROCHLOROTHIAZIDE 25 MG/1
25 TABLET ORAL DAILY
Qty: 90 TABLET | Refills: 3 | Status: SHIPPED | OUTPATIENT
Start: 2024-09-24

## 2024-09-24 NOTE — PROGRESS NOTES
Subjective:       Patient ID: Joseph Jesus is a 64 y.o. male.    Chief Complaint: Annual Exam    Established patient for an annual wellness check/physical exam and also chronic disease management. Specific complaints - see dictation, M*model entries and please see ROS.  Past, Surgical, Family, Social Histories; Medications, Allergies reviewed and reconciled.  Health maintenance file reviewed and addressed items due. Recent applicable lab, imaging and cardiovascular results reviewed.  Problem list items reviewed and modified or added entries (in the overview section) may not be transcribed into this encounter note due to note writer format.      Notes less cognitive ability, but nothing discreet. Some decrease in concentration. ([place] more focused on grandkids than work).            Review of Systems   Constitutional:  Negative for activity change and unexpected weight change.   HENT:  Positive for congestion and rhinorrhea. Negative for hearing loss and trouble swallowing.    Eyes:  Negative for discharge and visual disturbance.   Respiratory:  Positive for cough and wheezing. Negative for chest tightness.    Cardiovascular:  Negative for chest pain and palpitations.   Gastrointestinal:  Negative for blood in stool, constipation, diarrhea and vomiting.   Endocrine: Negative for polydipsia and polyuria.   Genitourinary:  Negative for difficulty urinating, hematuria and urgency.   Musculoskeletal:  Positive for arthralgias and neck pain. Negative for joint swelling.   Neurological:  Negative for weakness and headaches.   Psychiatric/Behavioral:  Negative for confusion and dysphoric mood.        Objective:      Physical Exam  Vitals and nursing note reviewed.   Constitutional:       Appearance: He is well-developed. He is not diaphoretic.   Eyes:      General: No scleral icterus.  Neck:      Thyroid: No thyromegaly.      Vascular: No JVD.      Trachea: No tracheal deviation.   Cardiovascular:      Rate and Rhythm:  Normal rate and regular rhythm.      Heart sounds: Normal heart sounds. No murmur heard.     No friction rub. No gallop.   Pulmonary:      Effort: Pulmonary effort is normal. No respiratory distress.      Breath sounds: Normal breath sounds. No wheezing or rales.   Abdominal:      General: There is no distension.      Palpations: Abdomen is soft. There is no mass.      Tenderness: There is no abdominal tenderness. There is no guarding or rebound.   Musculoskeletal:      Cervical back: Normal range of motion and neck supple.   Lymphadenopathy:      Cervical: No cervical adenopathy.   Skin:     General: Skin is warm and dry.      Findings: No erythema or rash.   Neurological:      Mental Status: He is alert and oriented to person, place, and time.      Cranial Nerves: No cranial nerve deficit.      Motor: No tremor.      Coordination: Coordination normal.      Gait: Gait normal.   Psychiatric:         Behavior: Behavior normal.         Thought Content: Thought content normal.         Judgment: Judgment normal.         Assessment:       1. Annual physical exam    2. Malignant neoplasm of lateral wall of urinary bladder    3. Aortic atherosclerosis    4. BPH with urinary obstruction    5. Coronary artery calcification    6. Hypertension, essential    7. Hyperlipidemia, unspecified hyperlipidemia type    8. Cough, unspecified type    9. Rhinorrhea        Plan:     Medication List with Changes/Refills   Current Medications    CETIRIZINE (ZYRTEC) 10 MG TABLET    Take 10 mg by mouth Daily.    COENZYME Q10 100 MG CAPSULE    Take 200 mg by mouth once daily.    FLUTICASONE PROPIONATE (FLONASE) 50 MCG/ACTUATION NASAL SPRAY    2 sprays (100 mcg total) by Each Nostril route daily as needed for Rhinitis.    LACTOBACILLUS ACIDOPHILUS (PROBIOTIC ORAL)    Take by mouth.    POMEGRANATE XT/POMEGRANAT SEED (POMEGRAN FRUIT XT-POMEGRA SEED ORAL)    Take by mouth.   Changed and/or Refilled Medications    Modified Medication Previous  "Medication    ATORVASTATIN (LIPITOR) 10 MG TABLET atorvastatin (LIPITOR) 10 MG tablet       Take 1 tablet (10 mg total) by mouth once daily.    TAKE 1 TABLET BY MOUTH EVERY DAY    HYDROCHLOROTHIAZIDE (HYDRODIURIL) 25 MG TABLET hydroCHLOROthiazide (HYDRODIURIL) 25 MG tablet       Take 1 tablet (25 mg total) by mouth once daily.    TAKE 1 TABLET BY MOUTH EVERY DAY    NIFEDIPINE (ADALAT CC) 30 MG TBSR NIFEdipine (ADALAT CC) 30 MG TbSR       Take 1 tablet (30 mg total) by mouth once daily.    TAKE 1 TABLET BY MOUTH EVERY DAY   Discontinued Medications    FLUOROURACIL (EFUDEX) 5 % CREAM    Compound fluorouracil 5% + calcipotriene 0.005% cream. Apply a pea-sized amount to entire scalp and left lower cheek BID x 7 - 10 days.    TRAMADOL (ULTRAM) 50 MG TABLET    Take 1 tablet (50 mg total) by mouth every 6 (six) hours as needed for Pain.     1. Annual physical exam    2. 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 TURBT on 6/21/22 showing CIS.  Muscle was present.   -TURBT performed 8/23/22. Dr. Ewing went over pathology demonstrating cancer (CIS is high grade and BCG recommend)  -had 5 treatments. "He is high risk. If he responds, he will need maintenance until 9/2025."  -TUR 3/8/23 (pos bx) and 4/4/2023, 7/14/2023 (neg bx) - BCG ?? Re-Rx    Orders:  -     Ambulatory referral/consult to Urology; Future; Expected date: 10/01/2024    3. Aortic atherosclerosis  Overview:  -noted on CT abd 5/7/2020, on statin      4. BPH with urinary obstruction  -     Ambulatory referral/consult to Urology; Future; Expected date: 10/01/2024    5. Coronary artery calcification  Overview:  -CTA 4/10/2020 - scattered calcific atherosclerosis of the coronary vessels, on statin      6. Hypertension, essential  -     hydroCHLOROthiazide (HYDRODIURIL) 25 MG tablet; Take 1 tablet (25 mg total) by " mouth once daily.  Dispense: 90 tablet; Refill: 3  -     NIFEdipine (ADALAT CC) 30 MG TbSR; Take 1 tablet (30 mg total) by mouth once daily.  Dispense: 90 tablet; Refill: 3    7. Hyperlipidemia, unspecified hyperlipidemia type  Overview:  discussed cohort risk 7.8% - does not want meds at this time    Orders:  -     atorvastatin (LIPITOR) 10 MG tablet; Take 1 tablet (10 mg total) by mouth once daily.  Dispense: 90 tablet; Refill: 3    8. Cough, unspecified type  Comments:  smoked age [age] none since, some degree of WALDO  Orders:  -     Ambulatory referral/consult to Pulmonology; Future; Expected date: 10/01/2024  -     X-Ray Chest PA And Lateral; Future; Expected date: 09/24/2024  -     Ambulatory referral/consult to ENT; Future; Expected date: 10/01/2024    9. Rhinorrhea  -     Ambulatory referral/consult to ENT; Future; Expected date: 10/01/2024      See meds, orders, follow up, routing and instructions sections of encounter and AVS. Discussed with patient and provided on AVS.    Discussed diet and exercise as therapeutic modalities for metabolic and other conditions. Provided patient information, which are included as links on the AVS for detailed information.    Lab Results   Component Value Date     04/13/2023    K 4.2 04/13/2023     04/13/2023    BUN 22 04/13/2023    CREATININE 1.2 04/13/2023    GLU 84 04/13/2023    HGBA1C 5.6 10/07/2022    AST 39 04/13/2023    ALT 43 04/13/2023    ALBUMIN 4.3 04/13/2023    ALBUMIN 4.3 06/21/2018    PROT 7.7 04/13/2023    BILITOT 0.4 04/13/2023    CHOL 172 04/13/2023    HDL 47 04/13/2023    LDLCALC 88.4 04/13/2023    TRIG 183 (H) 04/13/2023    WBC 7.31 03/01/2023    HGB 13.7 (L) 03/01/2023    HCT 42.6 03/01/2023     03/01/2023    PSA 0.35 04/13/2023    PSADIAG 0.37 09/08/2014    TSH 2.433 10/07/2022    BNP 16 03/16/2022

## 2024-09-25 ENCOUNTER — TELEPHONE (OUTPATIENT)
Dept: PHARMACY | Facility: CLINIC | Age: 64
End: 2024-09-25
Payer: COMMERCIAL

## 2024-09-25 NOTE — TELEPHONE ENCOUNTER
[facility]/Population Health Chart Review & Patient Outreach Details For Medication Adherence Project    Reason for Outreach Encounter: 3rd Party payor non-compliance report ([employer], [employer], C, etc)  2.  Patient Outreach Method: Reviewed patient chart   3.   Medication in question:   Hyperlipidemia Medications               atorvastatin (LIPITOR) 10 MG tablet Take 1 tablet (10 mg total) by mouth once daily.                LAST FILLED: 9/21/24 for 90 day supply  4.  Reviewed and or Updates Made To: Patient Chart  5. Outreach Outcomes and/or actions taken: Patient filled medication and is on track to be adherent  Additional Notes:

## 2024-09-28 ENCOUNTER — TELEPHONE (OUTPATIENT)
Dept: INTERNAL MEDICINE | Facility: CLINIC | Age: 64
End: 2024-09-28
Payer: COMMERCIAL

## 2024-09-28 DIAGNOSIS — K76.0 NAFLD (NONALCOHOLIC FATTY LIVER DISEASE): ICD-10-CM

## 2024-09-28 DIAGNOSIS — N18.31 CKD STAGE 3A, GFR 45-59 ML/MIN: Primary | ICD-10-CM

## 2024-09-28 DIAGNOSIS — R79.9 ABNORMAL BLOOD CHEMISTRY: ICD-10-CM

## 2024-09-28 NOTE — TELEPHONE ENCOUNTER
Please call patient and explain that test(s) show levation of potassium and creatinine, kidney function; mild elevation of liver function tests.     Please see orders for repeat and please schedule soon.     I would like to refer to the hepatology and nephrology department for further evaluation and treatment. Please schedule.    Thank you.

## 2024-09-30 ENCOUNTER — PATIENT MESSAGE (OUTPATIENT)
Dept: DERMATOLOGY | Facility: CLINIC | Age: 64
End: 2024-09-30
Payer: COMMERCIAL

## 2024-09-30 ENCOUNTER — OFFICE VISIT (OUTPATIENT)
Dept: UROLOGY | Facility: CLINIC | Age: 64
End: 2024-09-30
Attending: FAMILY MEDICINE
Payer: COMMERCIAL

## 2024-09-30 VITALS
BODY MASS INDEX: 30.76 KG/M2 | HEIGHT: 73 IN | HEART RATE: 67 BPM | SYSTOLIC BLOOD PRESSURE: 153 MMHG | DIASTOLIC BLOOD PRESSURE: 97 MMHG | WEIGHT: 232.13 LBS

## 2024-09-30 DIAGNOSIS — C67.2 MALIGNANT NEOPLASM OF LATERAL WALL OF URINARY BLADDER: ICD-10-CM

## 2024-09-30 DIAGNOSIS — N40.1 BPH WITH URINARY OBSTRUCTION: ICD-10-CM

## 2024-09-30 DIAGNOSIS — N13.8 BPH WITH URINARY OBSTRUCTION: ICD-10-CM

## 2024-09-30 DIAGNOSIS — C67.2 MALIGNANT NEOPLASM OF LATERAL WALL OF URINARY BLADDER: Primary | ICD-10-CM

## 2024-09-30 LAB
BILIRUB UR QL STRIP: NEGATIVE
CLARITY UR REFRACT.AUTO: CLEAR
COLOR UR AUTO: YELLOW
GLUCOSE UR QL STRIP: NEGATIVE
HGB UR QL STRIP: ABNORMAL
KETONES UR QL STRIP: NEGATIVE
LEUKOCYTE ESTERASE UR QL STRIP: NEGATIVE
NITRITE UR QL STRIP: NEGATIVE
PH UR STRIP: 7 [PH] (ref 5–8)
PROT UR QL STRIP: NEGATIVE
SP GR UR STRIP: 1.02 (ref 1–1.03)
URN SPEC COLLECT METH UR: ABNORMAL

## 2024-09-30 PROCEDURE — 1126F AMNT PAIN NOTED NONE PRSNT: CPT | Mod: CPTII,S$GLB,, | Performed by: STUDENT IN AN ORGANIZED HEALTH CARE EDUCATION/TRAINING PROGRAM

## 2024-09-30 PROCEDURE — 99999 PR PBB SHADOW E&M-EST. PATIENT-LVL III: CPT | Mod: PBBFAC,,, | Performed by: STUDENT IN AN ORGANIZED HEALTH CARE EDUCATION/TRAINING PROGRAM

## 2024-09-30 PROCEDURE — 87086 URINE CULTURE/COLONY COUNT: CPT | Performed by: STUDENT IN AN ORGANIZED HEALTH CARE EDUCATION/TRAINING PROGRAM

## 2024-09-30 PROCEDURE — 3008F BODY MASS INDEX DOCD: CPT | Mod: CPTII,S$GLB,, | Performed by: STUDENT IN AN ORGANIZED HEALTH CARE EDUCATION/TRAINING PROGRAM

## 2024-09-30 PROCEDURE — 3077F SYST BP >= 140 MM HG: CPT | Mod: CPTII,S$GLB,, | Performed by: STUDENT IN AN ORGANIZED HEALTH CARE EDUCATION/TRAINING PROGRAM

## 2024-09-30 PROCEDURE — 81003 URINALYSIS AUTO W/O SCOPE: CPT | Performed by: STUDENT IN AN ORGANIZED HEALTH CARE EDUCATION/TRAINING PROGRAM

## 2024-09-30 PROCEDURE — 99214 OFFICE O/P EST MOD 30 MIN: CPT | Mod: S$GLB,,, | Performed by: STUDENT IN AN ORGANIZED HEALTH CARE EDUCATION/TRAINING PROGRAM

## 2024-09-30 PROCEDURE — 1101F PT FALLS ASSESS-DOCD LE1/YR: CPT | Mod: CPTII,S$GLB,, | Performed by: STUDENT IN AN ORGANIZED HEALTH CARE EDUCATION/TRAINING PROGRAM

## 2024-09-30 PROCEDURE — 3080F DIAST BP >= 90 MM HG: CPT | Mod: CPTII,S$GLB,, | Performed by: STUDENT IN AN ORGANIZED HEALTH CARE EDUCATION/TRAINING PROGRAM

## 2024-09-30 PROCEDURE — 1159F MED LIST DOCD IN RCRD: CPT | Mod: CPTII,S$GLB,, | Performed by: STUDENT IN AN ORGANIZED HEALTH CARE EDUCATION/TRAINING PROGRAM

## 2024-09-30 PROCEDURE — 3288F FALL RISK ASSESSMENT DOCD: CPT | Mod: CPTII,S$GLB,, | Performed by: STUDENT IN AN ORGANIZED HEALTH CARE EDUCATION/TRAINING PROGRAM

## 2024-09-30 NOTE — PROGRESS NOTES
[facility]  Urologic Oncology Clinic Note        Date of Service: 9/30/2024        Chief Complaint/Reason for Consultation: Hx of Non-muscle invasive bladder cancer    Requesting Provider:   Nick Trejo MD  [id number] [place] [address]      History of Present Illness:   Patient 64 y.o. 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 He last saw me for 7/14/2023 after repeat induction BCG and negative cysto + bladder bx.     He was then lost to follow up.     Former smoker    Hx of appendectomy when he was 2  Inguinal hernia repair   Family hx of prostate cancer in father and grand father  Family hx of kidney cancer in aunt    Patient here today to re-establish care for his non-muscle invasive bladder cancer. He reports that he has been avoiding coming to clinic because he did not want to undergo anymore in office cystoscopies for surveillance for his bladder cancer. He last completed one round of maintenance BCG after negative cystoscopy following repeat induction BCG for known CIS.       Urologic History:     4/1/2022 -- Office Cytoscopy -- erythema right lateral wall  4/5/2022 -- CTUrogram -- no renal mass, no bladder mass, non-obstructing renal stones  4/19/2022 -- Bladder bx -- CIS  6/21/2022 -- TURBT -- CIS, muscle present and uninvolved  8/23/2022 -- re-TURBT -- CIS, muscle present and uninvolved  12/2022 -- completed induction BCG  3/8/2023 -- TURBT, blue light -- HG Ta, random bladder bx -- CIS  4/4/2023 -- TURBT, blue light -- no evidence of tumor at re-resection on final pathology  5/10/2023 -- 6/7/2023 -- Repeat induction BCG  7/14/2023 -- Cysto, random bladder bx -- Benign   9/13/2023 -- Maintenance BCG x2             Patient Active Problem List    Diagnosis Date Noted    Adjustment disorder with anxious mood 05/17/2022    Numbness and tingling of foot 05/17/2022    CKD stage 3a, GFR 45-59 ml/min 05/17/2022     Malignant neoplasm of lateral wall of urinary bladder 05/11/2022    Edema leg 03/28/2022    Aortic atherosclerosis 01/28/2022    Coronary artery calcification 01/28/2022    Gastroesophageal reflux disease 08/09/2021    Hiatal hernia with gastroesophageal reflux disease without esophagitis 05/26/2021    AK (actinic keratosis) 11/02/2020    Erectile dysfunction due to arterial insufficiency 07/02/2018    Diverticulitis of large intestine without perforation or abscess without bleeding 04/18/2017    Hypertension, essential 08/17/2016    Hyperlipidemia 08/17/2016    NAFLD (nonalcoholic fatty liver disease) 08/17/2016    Lateral epicondylitis of right elbow 08/17/2016    BPH with urinary obstruction 09/08/2014    Colon polyps 09/27/2012          Review of patient's allergies indicates:   Allergen Reactions    Amlodipine      edema    Atenolol      Depression - circa 2000    Catechu herb     Irbesartan Other (See Comments)     Possibly caused cough    Erythromycin Rash    Sumycin [tetracycline] Rash    Tetracyclines Rash        Past Medical History:   Diagnosis Date    Allergy     seasonal    Anal fistula hx    Arthritis     Basal cell carcinoma 04/2013    left superior forehead    Callus     Diverticulosis     Epididymal cyst     GERD (gastroesophageal reflux disease)     Hayfever     Hydrocele, right     Hyperlipidemia     Hypertension     Neck pain     hx of muscular inury from weight lifting---resolved now    PONV (postoperative nausea and vomiting)     Prostatitis     Dec. '12-treated with antibx.    Squamous cell carcinoma 01/07/2019    anterior scalp    Squamous cell carcinoma of skin 03/2020    Left post scalp (SCC insitu-saucerization)    Visual impairment       Past Surgical History:   Procedure Laterality Date    APPENDECTOMY  1962    BIOPSY OF BLADDER  4/19/2022    Procedure: BIOPSY, BLADDER;  Surgeon: Lenny Ewing MD;  Location: [facility] OR [address] [place] [address];  Service: Urology;;    BIOPSY OF BLADDER  3/8/2023     Procedure: BIOPSY, BLADDER;  Surgeon: Lenny Ewing MD;  Location: [facility] OR 1ST FLR;  Service: Urology;;    BIOPSY OF BLADDER  7/14/2023    Procedure: BIOPSY, BLADDER;  Surgeon: Benjamin Haile MD;  Location: [facility] OR 1ST FLR;  Service: Urology;;    BLADDER FULGURATION  4/19/2022    Procedure: FULGURATION, BLADDER;  Surgeon: Lenny Ewing MD;  Location: [facility] OR 1ST FLR;  Service: Urology;;    BLADDER FULGURATION  3/8/2023    Procedure: FULGURATION, BLADDER;  Surgeon: Lenny Ewing MD;  Location: [facility] OR 1ST FLR;  Service: Urology;;    BLADDER FULGURATION  7/14/2023    Procedure: FULGURATION, BLADDER;  Surgeon: Benjamin Haile MD;  Location: [facility] OR 1ST FLR;  Service: Urology;;    CAUDAL EPIDURAL STEROID INJECTION N/A 12/13/2023    Procedure: Injection-steroid-epidural-caudal;  Surgeon: Devante Boone MD;  Location: [facility] OR;  Service: Pain Management;  Laterality: N/A;    COLONOSCOPY  12/2012    due for repeat 12/2015    COLONOSCOPY N/A 5/19/2016    Procedure: COLONOSCOPY;  Surgeon: James Webber MD;  Location: [facility] (4TH FLR);  Service: Endoscopy;  Laterality: N/A; diverticulosis, repeat in 5-10 years for surveillance    COLONOSCOPY N/A 8/9/2021    Procedure: COLONOSCOPY;  Surgeon: Fahad Bautista Jr., MD;  Location: [facility] ENDO;  Service: Endoscopy;  Laterality: N/A;    CYSTOSCOPY  4/19/2022    Procedure: CYSTOSCOPY;  Surgeon: Lenny Ewing MD;  [facility]ST F[facility];  Service: Urology;;    CYSTOSCOPY  6/21/2022    Procedure: CYSTOSCOPY;  Surgeon: Lenny Ewing MD;  Location: [facility] OR 1ST FLR;  Service: Urology;;    CYSTOSCOPY  8/23/2022    Procedure: CYSTOSCOPY;  Surgeon: Lenny Ewing MD;  [facility] 1ST FLR;  Service: Urology;;    CYSTOSCOPY  3/8/2023    Procedure: CYSTOSCOPY-BLUE LIGHT;  Surgeon: Lenny Ewing MD;  Location: [facility] OR [address] [place] [address];  Service: Urology;;    CYSTOSCOPY N/A 4/4/2023    Procedure: CYSTOSCOPY;  Surgeon: Benjamin Haile MD;  Location: [facility] OR [address] [place] [address];   Service: Urology;  Laterality: N/A;  1 hour    CYSTOSCOPY  7/14/2023    Procedure: CYSTOSCOPY;  Surgeon: Benjamin Haile MD;  [facility];  Service: Urology;;  BLUE LIGHT    ESOPHAGOGASTRODUODENOSCOPY N/A 8/9/2021    Procedure: EGD (ESOPHAGOGASTRODUODENOSCOPY);  Surgeon: Fahad Bautista Jr., MD;  Location: [facility];  Service: Endoscopy;  Laterality: N/A;    EUA/Fistulotomy-'08      HERNIA REPAIR      RI with mesh    Hydrocelectomy  2013    MASS EXCISION  2004    BCC removal (twice)    MOLE REMOVAL  02/25/2019    2 moles removed from scalp =- 1 was basal cell and 1 was squamous cell - dermatology - Dr. londono    right Spermatocelectomy  2013    TURBT (TRANSURETHRAL RESECTION OF BLADDER TUMOR) N/A 4/4/2023    Procedure: TURBT (TRANSURETHRAL RESECTION OF BLADDER TUMOR);  Surgeon: Benjamin Haile MD;  Location: [facility] OR [address] [place] [address];  Service: Urology;  Laterality: N/A;  1 hour, blue light    UPPER GASTROINTESTINAL ENDOSCOPY  prior to 2010    hiatal hernia, reflux esophagitis      Family History   Problem Relation Name Age of Onset    Skin cancer Mother      Hypertension Mother      Skin cancer Father      Cancer Father          prostate cancer    Hypertension Father      Hypertension Maternal Grandmother      Hypertension Maternal Grandfather      Hypertension Paternal Grandmother      Anesthesia problems Paternal Grandmother      Cancer Paternal Grandfather          prostate cancer    Hypertension Paternal Grandfather      Heart disease Paternal Grandfather      Diabetes Neg Hx      Colon polyps Neg Hx      Colon cancer Neg Hx      Melanoma Neg Hx      Psoriasis Neg Hx      Lupus Neg Hx      Eczema Neg Hx      Acne Neg Hx      Cirrhosis Neg Hx      Prostate cancer Neg Hx      Kidney disease Neg Hx      Crohn's disease Neg Hx      Ulcerative colitis Neg Hx      Stomach cancer Neg Hx      Esophageal cancer Neg Hx        Social History     Tobacco Use    Smoking status: Former     Current packs/day: 0.00      "Average packs/day: 1 pack/day for 10.0 years (10.0 ttl pk-yrs)     Types: Cigarettes     Start date: 1977     Quit date: 1987     Years since quittin.8    Smokeless tobacco: Never    Tobacco comments:     Decreased lung compassity per patient   Substance Use Topics    Alcohol use: Not Currently     Alcohol/week: 0.0 - 1.0 standard drinks of alcohol     Comment: No longer drinks ETOH        Review of Systems   Constitutional:  Negative for activity change, fatigue and fever.   Respiratory:  Negative for cough.    Cardiovascular:  Negative for chest pain.   Gastrointestinal:  Negative for abdominal pain.   Genitourinary:  Negative for difficulty urinating, dysuria, flank pain and hematuria.             OBJECTIVE:     Vitals:    24 1134   BP: (!) 153/97   BP Location: Left arm   Patient Position: Sitting   Pulse: 67   Weight: 105.3 kg (232 lb 2.3 oz)   Height: 6' 1" (1.854 m)          General Appearance: Alert, cooperative, no distress  Head: Normocephalic  Eyes: Clear conjunctiva  Neck: No obvious LND or JVD  Lungs: Normal chest excursion, no accessory muscle use  Chest: Regular rate rhythm by palpation, no pedal edema  Abdomen: Soft, non-tender, non-distended  Extremities: Atraumatic   Lymph Nodes: No appreciable lymph adenopathy  Neurologic: No gross gait, motor or sensory deficits            LAB:      All laboratory data listed below was independently interpreted and reviewed with patient in clinic today      CMP  Sodium   Date Value Ref Range Status   2024 140 136 - 145 mmol/L Final     Potassium   Date Value Ref Range Status   2024 5.2 (H) 3.5 - 5.1 mmol/L Final     Chloride   Date Value Ref Range Status   2024 104 95 - 110 mmol/L Final     CO2   Date Value Ref Range Status   2024 26 23 - 29 mmol/L Final     Glucose   Date Value Ref Range Status   2024 90 70 - 110 mg/dL Final     BUN   Date Value Ref Range Status   2024 22 8 - 23 mg/dL Final "     Creatinine   Date Value Ref Range Status   09/24/2024 1.6 (H) 0.5 - 1.4 mg/dL Final     Calcium   Date Value Ref Range Status   09/24/2024 10.3 8.7 - 10.5 mg/dL Final     Total Protein   Date Value Ref Range Status   09/24/2024 7.4 6.0 - 8.4 g/dL Final     Albumin   Date Value Ref Range Status   09/24/2024 4.3 3.5 - 5.2 g/dL Final   06/21/2018 4.3 3.6 - 5.1 g/dL Final     Comment:     @ Test Performed By:  [facility]  Alexandru White M.D., Ph.D.,   [id number] [place] [id number]  [facility]  [id number]       Total Bilirubin   Date Value Ref Range Status   09/24/2024 0.4 0.1 - 1.0 mg/dL Final     Comment:     For infants and newborns, interpretation of results should be based  on gestational age, weight and in agreement with clinical  observations.    Premature Infant recommended reference ranges:  Up to 24 hours.............<8.0 mg/dL  Up to 48 hours............<12.0 mg/dL  3-5 days..................<15.0 mg/dL  6-29 days.................<15.0 mg/dL       Alkaline Phosphatase   Date Value Ref Range Status   09/24/2024 60 55 - 135 U/L Final     AST   Date Value Ref Range Status   09/24/2024 59 (H) 10 - 40 U/L Final     ALT   Date Value Ref Range Status   09/24/2024 83 (H) 10 - 44 U/L Final     Anion Gap   Date Value Ref Range Status   09/24/2024 10 8 - 16 mmol/L Final     eGFR   Date Value Ref Range Status   09/24/2024 47.2 (A) >60 mL/min/1.73 m^2 Final     Lab Results   Component Value Date    WBC 7.31 03/01/2023    HGB 13.7 (L) 03/01/2023    HCT 42.6 03/01/2023    MCV 94 03/01/2023     03/01/2023             IMAGING:        No relevant imaging to review      ASSESSMENT/PLAN:     62 yo M hx of BCG refractory CIS and HG Ta here after lost to follow up for one year.    Plan:    Hx of BCG refractory CIS  Today we discussed the incidence, risk factors, and natural history of bladder cancer, including in general the management options such as TURBT and radical  cystectomy, and the use of intravesical therapies to avoid recurrence of cancer and progression.     Specifically, we spoke about non-muscle invasive bladder cancer and that the natural history of this cancer is to recur when low grade and progress when high grade. We also discussed that higher stages portend higher risk of recurrence and progression.     He has been lost to follow up for almost one year now on his own accord. He is aware that lack of surveillance makes it difficult to adequately inform him of his cancer diagnosis, recurrence, progression, or provide curative intent treatment, and prevent metastasis if upgrading of his cancer is noted.     Plan for cysto in operating room on Oct 4.   UA sent today and negative for infection  Culture collected and in process.            Benjamin Haile MD  Urologic Oncology  P: [phone]

## 2024-09-30 NOTE — H&P (VIEW-ONLY)
[facility]  Urologic Oncology Clinic Note        Date of Service: 9/30/2024        Chief Complaint/Reason for Consultation: Hx of Non-muscle invasive bladder cancer    Requesting Provider:   Nick Trejo MD  [id number] [place] [address]      History of Present Illness:   Patient 64 y.o. 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 He last saw me for 7/14/2023 after repeat induction BCG and negative cysto + bladder bx.     He was then lost to follow up.     Former smoker    Hx of appendectomy when he was 2  Inguinal hernia repair   Family hx of prostate cancer in father and grand father  Family hx of kidney cancer in aunt    Patient here today to re-establish care for his non-muscle invasive bladder cancer. He reports that he has been avoiding coming to clinic because he did not want to undergo anymore in office cystoscopies for surveillance for his bladder cancer. He last completed one round of maintenance BCG after negative cystoscopy following repeat induction BCG for known CIS.       Urologic History:     4/1/2022 -- Office Cytoscopy -- erythema right lateral wall  4/5/2022 -- CTUrogram -- no renal mass, no bladder mass, non-obstructing renal stones  4/19/2022 -- Bladder bx -- CIS  6/21/2022 -- TURBT -- CIS, muscle present and uninvolved  8/23/2022 -- re-TURBT -- CIS, muscle present and uninvolved  12/2022 -- completed induction BCG  3/8/2023 -- TURBT, blue light -- HG Ta, random bladder bx -- CIS  4/4/2023 -- TURBT, blue light -- no evidence of tumor at re-resection on final pathology  5/10/2023 -- 6/7/2023 -- Repeat induction BCG  7/14/2023 -- Cysto, random bladder bx -- Benign   9/13/2023 -- Maintenance BCG x2             Patient Active Problem List    Diagnosis Date Noted    Adjustment disorder with anxious mood 05/17/2022    Numbness and tingling of foot 05/17/2022    CKD stage 3a, GFR 45-59 ml/min 05/17/2022     Malignant neoplasm of lateral wall of urinary bladder 05/11/2022    Edema leg 03/28/2022    Aortic atherosclerosis 01/28/2022    Coronary artery calcification 01/28/2022    Gastroesophageal reflux disease 08/09/2021    Hiatal hernia with gastroesophageal reflux disease without esophagitis 05/26/2021    AK (actinic keratosis) 11/02/2020    Erectile dysfunction due to arterial insufficiency 07/02/2018    Diverticulitis of large intestine without perforation or abscess without bleeding 04/18/2017    Hypertension, essential 08/17/2016    Hyperlipidemia 08/17/2016    NAFLD (nonalcoholic fatty liver disease) 08/17/2016    Lateral epicondylitis of right elbow 08/17/2016    BPH with urinary obstruction 09/08/2014    Colon polyps 09/27/2012          Review of patient's allergies indicates:   Allergen Reactions    Amlodipine      edema    Atenolol      Depression - circa 2000    Catechu herb     Irbesartan Other (See Comments)     Possibly caused cough    Erythromycin Rash    Sumycin [tetracycline] Rash    Tetracyclines Rash        Past Medical History:   Diagnosis Date    Allergy     seasonal    Anal fistula hx    Arthritis     Basal cell carcinoma 04/2013    left superior forehead    Callus     Diverticulosis     Epididymal cyst     GERD (gastroesophageal reflux disease)     Hayfever     Hydrocele, right     Hyperlipidemia     Hypertension     Neck pain     hx of muscular inury from weight lifting---resolved now    PONV (postoperative nausea and vomiting)     Prostatitis     Dec. '12-treated with antibx.    Squamous cell carcinoma 01/07/2019    anterior scalp    Squamous cell carcinoma of skin 03/2020    Left post scalp (SCC insitu-saucerization)    Visual impairment       Past Surgical History:   Procedure Laterality Date    APPENDECTOMY  1962    BIOPSY OF BLADDER  4/19/2022    Procedure: BIOPSY, BLADDER;  Surgeon: Lenny Ewing MD;  Location: [facility] OR [address] [place] [address];  Service: Urology;;    BIOPSY OF BLADDER  3/8/2023     Procedure: BIOPSY, BLADDER;  Surgeon: Lenny Ewing MD;  Location: [facility] OR 1ST FLR;  Service: Urology;;    BIOPSY OF BLADDER  7/14/2023    Procedure: BIOPSY, BLADDER;  Surgeon: Benjamin Haile MD;  Location: [facility] OR 1ST FLR;  Service: Urology;;    BLADDER FULGURATION  4/19/2022    Procedure: FULGURATION, BLADDER;  Surgeon: Lenny Ewing MD;  Location: [facility] OR 1ST FLR;  Service: Urology;;    BLADDER FULGURATION  3/8/2023    Procedure: FULGURATION, BLADDER;  Surgeon: Lenny Ewing MD;  Location: [facility] OR 1ST FLR;  Service: Urology;;    BLADDER FULGURATION  7/14/2023    Procedure: FULGURATION, BLADDER;  Surgeon: Benjamin Haile MD;  Location: [facility] OR 1ST FLR;  Service: Urology;;    CAUDAL EPIDURAL STEROID INJECTION N/A 12/13/2023    Procedure: Injection-steroid-epidural-caudal;  Surgeon: Devante Boone MD;  Location: [facility] OR;  Service: Pain Management;  Laterality: N/A;    COLONOSCOPY  12/2012    due for repeat 12/2015    COLONOSCOPY N/A 5/19/2016    Procedure: COLONOSCOPY;  Surgeon: James Webber MD;  Location: [facility] (4TH FLR);  Service: Endoscopy;  Laterality: N/A; diverticulosis, repeat in 5-10 years for surveillance    COLONOSCOPY N/A 8/9/2021    Procedure: COLONOSCOPY;  Surgeon: Fahad Bautista Jr., MD;  Location: [facility] ENDO;  Service: Endoscopy;  Laterality: N/A;    CYSTOSCOPY  4/19/2022    Procedure: CYSTOSCOPY;  Surgeon: Lenny Ewing MD;  [facility]ST FLR;  Service: Urology;;    CYSTOSCOPY  6/21/2022    Procedure: CYSTOSCOPY;  Surgeon: Lenny Ewing MD;  Location: [facility] OR 1ST FLR;  Service: Urology;;    CYSTOSCOPY  8/23/2022    Procedure: CYSTOSCOPY;  Surgeon: Lenny Ewing MD;  [facility] 1ST FLR;  Service: Urology;;    CYSTOSCOPY  3/8/2023    Procedure: CYSTOSCOPY-BLUE LIGHT;  Surgeon: Lenny Ewing MD;  Location: [facility] OR [address] [place] [address];  Service: Urology;;    CYSTOSCOPY N/A 4/4/2023    Procedure: CYSTOSCOPY;  Surgeon: Benjamin Haile MD;  Location: [facility] OR [address] [place] [address];   Service: Urology;  Laterality: N/A;  1 hour    CYSTOSCOPY  7/14/2023    Procedure: CYSTOSCOPY;  Surgeon: Benjamin Haile MD;  [facility];  Service: Urology;;  BLUE LIGHT    ESOPHAGOGASTRODUODENOSCOPY N/A 8/9/2021    Procedure: EGD (ESOPHAGOGASTRODUODENOSCOPY);  Surgeon: Fahad Bautista Jr., MD;  Location: [facility];  Service: Endoscopy;  Laterality: N/A;    EUA/Fistulotomy-'08      HERNIA REPAIR      RI with mesh    Hydrocelectomy  2013    MASS EXCISION  2004    BCC removal (twice)    MOLE REMOVAL  02/25/2019    2 moles removed from scalp =- 1 was basal cell and 1 was squamous cell - dermatology - Dr. londono    right Spermatocelectomy  2013    TURBT (TRANSURETHRAL RESECTION OF BLADDER TUMOR) N/A 4/4/2023    Procedure: TURBT (TRANSURETHRAL RESECTION OF BLADDER TUMOR);  Surgeon: Benjamin Haile MD;  Location: [facility] OR [address] [place] [address];  Service: Urology;  Laterality: N/A;  1 hour, blue light    UPPER GASTROINTESTINAL ENDOSCOPY  prior to 2010    hiatal hernia, reflux esophagitis      Family History   Problem Relation Name Age of Onset    Skin cancer Mother      Hypertension Mother      Skin cancer Father      Cancer Father          prostate cancer    Hypertension Father      Hypertension Maternal Grandmother      Hypertension Maternal Grandfather      Hypertension Paternal Grandmother      Anesthesia problems Paternal Grandmother      Cancer Paternal Grandfather          prostate cancer    Hypertension Paternal Grandfather      Heart disease Paternal Grandfather      Diabetes Neg Hx      Colon polyps Neg Hx      Colon cancer Neg Hx      Melanoma Neg Hx      Psoriasis Neg Hx      Lupus Neg Hx      Eczema Neg Hx      Acne Neg Hx      Cirrhosis Neg Hx      Prostate cancer Neg Hx      Kidney disease Neg Hx      Crohn's disease Neg Hx      Ulcerative colitis Neg Hx      Stomach cancer Neg Hx      Esophageal cancer Neg Hx        Social History     Tobacco Use    Smoking status: Former     Current packs/day: 0.00      "Average packs/day: 1 pack/day for 10.0 years (10.0 ttl pk-yrs)     Types: Cigarettes     Start date: 1977     Quit date: 1987     Years since quittin.8    Smokeless tobacco: Never    Tobacco comments:     Decreased lung compassity per patient   Substance Use Topics    Alcohol use: Not Currently     Alcohol/week: 0.0 - 1.0 standard drinks of alcohol     Comment: No longer drinks ETOH        Review of Systems   Constitutional:  Negative for activity change, fatigue and fever.   Respiratory:  Negative for cough.    Cardiovascular:  Negative for chest pain.   Gastrointestinal:  Negative for abdominal pain.   Genitourinary:  Negative for difficulty urinating, dysuria, flank pain and hematuria.             OBJECTIVE:     Vitals:    24 1134   BP: (!) 153/97   BP Location: Left arm   Patient Position: Sitting   Pulse: 67   Weight: 105.3 kg (232 lb 2.3 oz)   Height: 6' 1" (1.854 m)          General Appearance: Alert, cooperative, no distress  Head: Normocephalic  Eyes: Clear conjunctiva  Neck: No obvious LND or JVD  Lungs: Normal chest excursion, no accessory muscle use  Chest: Regular rate rhythm by palpation, no pedal edema  Abdomen: Soft, non-tender, non-distended  Extremities: Atraumatic   Lymph Nodes: No appreciable lymph adenopathy  Neurologic: No gross gait, motor or sensory deficits            LAB:      All laboratory data listed below was independently interpreted and reviewed with patient in clinic today      CMP  Sodium   Date Value Ref Range Status   2024 140 136 - 145 mmol/L Final     Potassium   Date Value Ref Range Status   2024 5.2 (H) 3.5 - 5.1 mmol/L Final     Chloride   Date Value Ref Range Status   2024 104 95 - 110 mmol/L Final     CO2   Date Value Ref Range Status   2024 26 23 - 29 mmol/L Final     Glucose   Date Value Ref Range Status   2024 90 70 - 110 mg/dL Final     BUN   Date Value Ref Range Status   2024 22 8 - 23 mg/dL Final "     Creatinine   Date Value Ref Range Status   09/24/2024 1.6 (H) 0.5 - 1.4 mg/dL Final     Calcium   Date Value Ref Range Status   09/24/2024 10.3 8.7 - 10.5 mg/dL Final     Total Protein   Date Value Ref Range Status   09/24/2024 7.4 6.0 - 8.4 g/dL Final     Albumin   Date Value Ref Range Status   09/24/2024 4.3 3.5 - 5.2 g/dL Final   06/21/2018 4.3 3.6 - 5.1 g/dL Final     Comment:     @ Test Performed By:  [facility]  Alexandru White M.D., Ph.D.,   [id number] [place] [id number]  [facility]  [id number]       Total Bilirubin   Date Value Ref Range Status   09/24/2024 0.4 0.1 - 1.0 mg/dL Final     Comment:     For infants and newborns, interpretation of results should be based  on gestational age, weight and in agreement with clinical  observations.    Premature Infant recommended reference ranges:  Up to 24 hours.............<8.0 mg/dL  Up to 48 hours............<12.0 mg/dL  3-5 days..................<15.0 mg/dL  6-29 days.................<15.0 mg/dL       Alkaline Phosphatase   Date Value Ref Range Status   09/24/2024 60 55 - 135 U/L Final     AST   Date Value Ref Range Status   09/24/2024 59 (H) 10 - 40 U/L Final     ALT   Date Value Ref Range Status   09/24/2024 83 (H) 10 - 44 U/L Final     Anion Gap   Date Value Ref Range Status   09/24/2024 10 8 - 16 mmol/L Final     eGFR   Date Value Ref Range Status   09/24/2024 47.2 (A) >60 mL/min/1.73 m^2 Final     Lab Results   Component Value Date    WBC 7.31 03/01/2023    HGB 13.7 (L) 03/01/2023    HCT 42.6 03/01/2023    MCV 94 03/01/2023     03/01/2023             IMAGING:        No relevant imaging to review      ASSESSMENT/PLAN:     64 yo M hx of BCG refractory CIS and HG Ta here after lost to follow up for one year.    Plan:    Hx of BCG refractory CIS  Today we discussed the incidence, risk factors, and natural history of bladder cancer, including in general the management options such as TURBT and radical  cystectomy, and the use of intravesical therapies to avoid recurrence of cancer and progression.     Specifically, we spoke about non-muscle invasive bladder cancer and that the natural history of this cancer is to recur when low grade and progress when high grade. We also discussed that higher stages portend higher risk of recurrence and progression.     He has been lost to follow up for almost one year now on his own accord. He is aware that lack of surveillance makes it difficult to adequately inform him of his cancer diagnosis, recurrence, progression, or provide curative intent treatment, and prevent metastasis if upgrading of his cancer is noted.     Plan for cysto in operating room on Oct 4.   UA sent today and negative for infection  Culture collected and in process.            Benjamin Haile MD  Urologic Oncology  P: [phone]

## 2024-10-01 LAB — BACTERIA UR CULT: NO GROWTH

## 2024-10-02 NOTE — PRE-PROCEDURE INSTRUCTIONS
PreOp Instructions given:    -- Medication information (what to hold and what to take)   -- NPO guidelines as follows: (or as per your Surgeon)  Stop ALL solid food, gum, candy 8 hours before arrival time.  Stop all CLOUDY liquids: coffee with creamer, cloudy juices, 8 hours prior to arrival time.  The patient should be ENCOURAGED to drink carbohydrate-rich clear liquids (sports drinks, clear juices) until 2 hours prior to arrival time.  Stop clear liquids 2 hours prior to arrival time.  CLEAR liquids include water, black coffee NO creamer, clear oral rehydration drinks, clear sports drinks and clear fruit juices (no orange juice, no pulpy juices, no apple cider).   IF IN DOUBT, drink water instead.   NOTHING TO DRINK 2 hours before to surgery/procedure  time. If you are told to take medication on the morning of surgery, it may be taken with a sip of water.   -- *Arrival place and directions given*.  Time to be given the day before procedure by the Surgeon's Office   -- Bathe with antibacterial soap (dial or Hibiclens as instructed)  -- Don't wear any jewelry or valuables and not metals on skin or hair AM of surgery   -- No makeup or moisturizer to face   -- No perfume/cologne, powder, lotions, aftershave or deodorant    Pt verbalized understanding.         *If going to , see below:     Directions and Instructions for [facility]   At [facility], we have an outstanding team of physicians, anesthesiologists, CRNAs, Registered Nurses, Surgical Technologists, and other ancillary team members all focused on your surgical and procedural care.   Before Your Procedure:   The physician's office will call you with a specific arrival time and directions a day or two before your scheduled procedure. You may also receive these instructions through your [facility] portal.   Day of Procedure:   Please be sure to arrive at the arrival time given or you may risk your surgery being delayed or  canceled. The arrival time is earlier than your scheduled surgery or procedure time. In the winter months please dress warm and bring blankets for you or your child as the waiting room may be cold. If you have difficulty locating the facility, please give us a call at [phone].   Directions:   The [facility] is located on the 1st floor of the hospital building near the [place] entrance.   Parking:   You will park in the South Parking Garage (note location on map). [facility] opens at 5:00 a.m. and has a drop off area by the entrance.  parking is available starting at 7:00 a.m. Please see below for further  parking instructions.   Directions from the parking garage elevators   Blue [facility] Elevators: From the parking garage, take the blue [employer] [place] elevators (located in the center of the parking garage) to the 1st floor of the garage. You will then take a right once off the elevators then another right to the outside of the parking garage. You will be across from the [facility]. You will walk down the sidewalk, pass the  curve at the [place] entrance and continue to follow the sidewalk. You will pass the radiation oncology entrance on your right. Continue to follow the sidewalk to the [facility] glass door entrance.   Hospital Entrance (Inside Route): If a mostly inside route is preferred: Take the inside elevator bank (located at the far north end of the garage) from the parking garage to the 1st floor. On the 1st floor walk past [employer]. Keep walking down the center of the hallway towards the hospital elevators. Once you reach the red brick yuriy, take a left and go past the hospital elevators. Take another left and follow the blue and white [employer] [place] signs around the hallway to the end. Go outside of the door. You will see the [facility] entrance to your right.   Drop Off:   There is a drop off area at  the doors of the [facility] for your convenience. If utilized for pediatric patients, an adult must accompany the patient into the surgery center while another adult worthy the vehicle.   Julius (at 7:00 a.m.):   Upon check-in, please let the  know that you are utilizing [employer] parking which is free. The . will then call [employer] for your car to be picked up. Your keys and phone number will be collected and given to [employer] services. You will then be given a ticket. Upon discharge, [employer] will be notified to bring your vehicle back when you are ready.   2/6/2024      If going to 2nd floor surgery center, see below:    Directions to the 2nd floor ([facility]) Surgery Center  The hallway to get to the surgery center is on the 2nd fl between the gold elevators in the atrium.  Follow the hallway into the waiting room (has a fish tank) and check in at desk.

## 2024-10-03 ENCOUNTER — TELEPHONE (OUTPATIENT)
Dept: UROLOGY | Facility: CLINIC | Age: 64
End: 2024-10-03
Payer: COMMERCIAL

## 2024-10-03 NOTE — TELEPHONE ENCOUNTER
Spoke with patient who was able to provide acceptable patient identifiers prior to start of conversation.   The following instructions provided  Instructions for Day of Surgery at [facility]      Report To:    The [facility], located across from [place] side of the 1st floor of the hospital    Arrival time: 1345    Please note:     If you are allergic to any medication please let your care team know the day of surgery.  If you have ever had adverse reaction to anesthesia please let your care team know the day of surgery   Please arrange transportation from the hospital, you will not be allowed to drive yourself home from surgery since you have been under sedation or anesthesia   Notify your doctor or care team of any diabetic medications that you take as these may need to be held or adjusted prior to surgery     Before Surgery     You should stop taking any blood thinners for up to 7 days depending on the blood thinner, please let care team know what you are taking so you can be directed when to stop certain medications.    You should hold any aspirin containing products for 7 days    Stop taking any herbal supplements 14 days prior to surgery     Night Before Surgery     Nothing to eat or drink after midnight the night before your surgery  Take medications as instructed the morning of surgery  No alcoholic beverages 24 hours prior to surgery

## 2024-10-04 ENCOUNTER — ANESTHESIA EVENT (OUTPATIENT)
Dept: SURGERY | Facility: HOSPITAL | Age: 64
End: 2024-10-04
Payer: COMMERCIAL

## 2024-10-04 ENCOUNTER — ANESTHESIA (OUTPATIENT)
Dept: SURGERY | Facility: HOSPITAL | Age: 64
End: 2024-10-04
Payer: COMMERCIAL

## 2024-10-04 ENCOUNTER — HOSPITAL ENCOUNTER (OUTPATIENT)
Facility: HOSPITAL | Age: 64
Discharge: HOME | End: 2024-10-04
Attending: STUDENT IN AN ORGANIZED HEALTH CARE EDUCATION/TRAINING PROGRAM | Admitting: STUDENT IN AN ORGANIZED HEALTH CARE EDUCATION/TRAINING PROGRAM
Payer: COMMERCIAL

## 2024-10-04 VITALS
DIASTOLIC BLOOD PRESSURE: 98 MMHG | HEIGHT: 73 IN | SYSTOLIC BLOOD PRESSURE: 156 MMHG | OXYGEN SATURATION: 99 % | BODY MASS INDEX: 30.48 KG/M2 | HEART RATE: 64 BPM | WEIGHT: 230 LBS | TEMPERATURE: 98 F | RESPIRATION RATE: 26 BRPM

## 2024-10-04 DIAGNOSIS — C67.9 BLADDER CANCER: Primary | ICD-10-CM

## 2024-10-04 PROCEDURE — 88307 TISSUE EXAM BY PATHOLOGIST: CPT | Mod: 59 | Performed by: PATHOLOGY

## 2024-10-04 PROCEDURE — 71000015 HC POSTOP RECOV 1ST HR: Performed by: STUDENT IN AN ORGANIZED HEALTH CARE EDUCATION/TRAINING PROGRAM

## 2024-10-04 PROCEDURE — A9589 INSTI HEXAMINOLEVULINATE HCL: HCPCS

## 2024-10-04 PROCEDURE — 63600175 PHARM REV CODE 636 W HCPCS: Performed by: STUDENT IN AN ORGANIZED HEALTH CARE EDUCATION/TRAINING PROGRAM

## 2024-10-04 PROCEDURE — 36000706: Performed by: STUDENT IN AN ORGANIZED HEALTH CARE EDUCATION/TRAINING PROGRAM

## 2024-10-04 PROCEDURE — 36000707: Performed by: STUDENT IN AN ORGANIZED HEALTH CARE EDUCATION/TRAINING PROGRAM

## 2024-10-04 PROCEDURE — 25000003 PHARM REV CODE 250

## 2024-10-04 PROCEDURE — 88307 TISSUE EXAM BY PATHOLOGIST: CPT | Mod: 26,,, | Performed by: PATHOLOGY

## 2024-10-04 PROCEDURE — 37000008 HC ANESTHESIA 1ST 15 MINUTES: Performed by: STUDENT IN AN ORGANIZED HEALTH CARE EDUCATION/TRAINING PROGRAM

## 2024-10-04 PROCEDURE — 25000003 PHARM REV CODE 250: Performed by: ANESTHESIOLOGY

## 2024-10-04 PROCEDURE — 37000009 HC ANESTHESIA EA ADD 15 MINS: Performed by: STUDENT IN AN ORGANIZED HEALTH CARE EDUCATION/TRAINING PROGRAM

## 2024-10-04 PROCEDURE — 63600175 PHARM REV CODE 636 W HCPCS: Performed by: NURSE ANESTHETIST, CERTIFIED REGISTERED

## 2024-10-04 PROCEDURE — 52235 CYSTOSCOPY AND TREATMENT: CPT | Mod: ,,, | Performed by: STUDENT IN AN ORGANIZED HEALTH CARE EDUCATION/TRAINING PROGRAM

## 2024-10-04 PROCEDURE — 25000003 PHARM REV CODE 250: Performed by: NURSE ANESTHETIST, CERTIFIED REGISTERED

## 2024-10-04 PROCEDURE — 27201423 OPTIME MED/SURG SUP & DEVICES STERILE SUPPLY: Performed by: STUDENT IN AN ORGANIZED HEALTH CARE EDUCATION/TRAINING PROGRAM

## 2024-10-04 PROCEDURE — 71000044 HC DOSC ROUTINE RECOVERY FIRST HOUR: Performed by: STUDENT IN AN ORGANIZED HEALTH CARE EDUCATION/TRAINING PROGRAM

## 2024-10-04 PROCEDURE — 25000003 PHARM REV CODE 250: Performed by: STUDENT IN AN ORGANIZED HEALTH CARE EDUCATION/TRAINING PROGRAM

## 2024-10-04 RX ORDER — ONDANSETRON HYDROCHLORIDE 2 MG/ML
4 INJECTION, SOLUTION INTRAVENOUS DAILY PRN
Status: DISCONTINUED | OUTPATIENT
Start: 2024-10-04 | End: 2024-10-04 | Stop reason: HOSPADM

## 2024-10-04 RX ORDER — LIDOCAINE HYDROCHLORIDE 20 MG/ML
INJECTION INTRAVENOUS
Status: DISCONTINUED | OUTPATIENT
Start: 2024-10-04 | End: 2024-10-04

## 2024-10-04 RX ORDER — FENTANYL CITRATE 50 UG/ML
25 INJECTION, SOLUTION INTRAMUSCULAR; INTRAVENOUS EVERY 5 MIN PRN
Status: DISCONTINUED | OUTPATIENT
Start: 2024-10-04 | End: 2024-10-04 | Stop reason: HOSPADM

## 2024-10-04 RX ORDER — FAMOTIDINE 10 MG/ML
INJECTION INTRAVENOUS
Status: DISCONTINUED | OUTPATIENT
Start: 2024-10-04 | End: 2024-10-04

## 2024-10-04 RX ORDER — FENTANYL CITRATE 50 UG/ML
INJECTION, SOLUTION INTRAMUSCULAR; INTRAVENOUS
Status: DISCONTINUED | OUTPATIENT
Start: 2024-10-04 | End: 2024-10-04

## 2024-10-04 RX ORDER — OXYCODONE HYDROCHLORIDE 5 MG/1
5 TABLET ORAL EVERY 4 HOURS PRN
Qty: 10 TABLET | Refills: 0 | Status: SHIPPED | OUTPATIENT
Start: 2024-10-04

## 2024-10-04 RX ORDER — PROPOFOL 10 MG/ML
VIAL (ML) INTRAVENOUS
Status: DISCONTINUED | OUTPATIENT
Start: 2024-10-04 | End: 2024-10-04

## 2024-10-04 RX ORDER — MIDAZOLAM HYDROCHLORIDE 1 MG/ML
INJECTION INTRAMUSCULAR; INTRAVENOUS
Status: DISCONTINUED | OUTPATIENT
Start: 2024-10-04 | End: 2024-10-04

## 2024-10-04 RX ORDER — ONDANSETRON HYDROCHLORIDE 2 MG/ML
INJECTION, SOLUTION INTRAVENOUS
Status: DISCONTINUED | OUTPATIENT
Start: 2024-10-04 | End: 2024-10-04

## 2024-10-04 RX ORDER — SCOLOPAMINE TRANSDERMAL SYSTEM 1 MG/1
1 PATCH, EXTENDED RELEASE TRANSDERMAL
Status: DISCONTINUED | OUTPATIENT
Start: 2024-10-04 | End: 2024-10-04 | Stop reason: HOSPADM

## 2024-10-04 RX ORDER — GLUCAGON 1 MG
1 KIT INJECTION
Status: DISCONTINUED | OUTPATIENT
Start: 2024-10-04 | End: 2024-10-04 | Stop reason: HOSPADM

## 2024-10-04 RX ORDER — DEXAMETHASONE SODIUM PHOSPHATE 4 MG/ML
INJECTION, SOLUTION INTRA-ARTICULAR; INTRALESIONAL; INTRAMUSCULAR; INTRAVENOUS; SOFT TISSUE
Status: DISCONTINUED | OUTPATIENT
Start: 2024-10-04 | End: 2024-10-04

## 2024-10-04 RX ORDER — ROCURONIUM BROMIDE 10 MG/ML
INJECTION, SOLUTION INTRAVENOUS
Status: DISCONTINUED | OUTPATIENT
Start: 2024-10-04 | End: 2024-10-04

## 2024-10-04 RX ORDER — EPHEDRINE SULFATE 50 MG/ML
INJECTION, SOLUTION INTRAVENOUS
Status: DISCONTINUED | OUTPATIENT
Start: 2024-10-04 | End: 2024-10-04

## 2024-10-04 RX ADMIN — EPHEDRINE SULFATE 10 MG: 50 INJECTION INTRAVENOUS at 05:10

## 2024-10-04 RX ADMIN — SODIUM CHLORIDE: 0.9 INJECTION, SOLUTION INTRAVENOUS at 04:10

## 2024-10-04 RX ADMIN — HEXAMINOLEVULINATE HYDROCHLORIDE 100 MG: KIT at 02:10

## 2024-10-04 RX ADMIN — PROPOFOL 200 MG: 10 INJECTION, EMULSION INTRAVENOUS at 04:10

## 2024-10-04 RX ADMIN — CEFAZOLIN 2 G: 2 INJECTION, POWDER, FOR SOLUTION INTRAMUSCULAR; INTRAVENOUS at 05:10

## 2024-10-04 RX ADMIN — SCOPALAMINE 1 PATCH: 1 PATCH, EXTENDED RELEASE TRANSDERMAL at 03:10

## 2024-10-04 RX ADMIN — EPHEDRINE SULFATE 5 MG: 50 INJECTION INTRAVENOUS at 05:10

## 2024-10-04 RX ADMIN — FENTANYL CITRATE 50 MCG: 50 INJECTION, SOLUTION INTRAMUSCULAR; INTRAVENOUS at 04:10

## 2024-10-04 RX ADMIN — LIDOCAINE HYDROCHLORIDE 60 MG: 20 INJECTION INTRAVENOUS at 04:10

## 2024-10-04 RX ADMIN — FAMOTIDINE 20 MG: 10 INJECTION, SOLUTION INTRAVENOUS at 05:10

## 2024-10-04 RX ADMIN — ROCURONIUM BROMIDE 50 MG: 10 INJECTION, SOLUTION INTRAVENOUS at 04:10

## 2024-10-04 RX ADMIN — DEXAMETHASONE SODIUM PHOSPHATE 4 MG: 4 INJECTION INTRA-ARTICULAR; INTRALESIONAL; INTRAMUSCULAR; INTRAVENOUS; SOFT TISSUE at 05:10

## 2024-10-04 RX ADMIN — ONDANSETRON 4 MG: 2 INJECTION INTRAMUSCULAR; INTRAVENOUS at 05:10

## 2024-10-04 RX ADMIN — SUGAMMADEX 200 MG: 100 INJECTION, SOLUTION INTRAVENOUS at 05:10

## 2024-10-04 RX ADMIN — FENTANYL CITRATE 50 MCG: 50 INJECTION, SOLUTION INTRAMUSCULAR; INTRAVENOUS at 05:10

## 2024-10-04 RX ADMIN — MIDAZOLAM HYDROCHLORIDE 2 MG: 1 INJECTION, SOLUTION INTRAMUSCULAR; INTRAVENOUS at 04:10

## 2024-10-04 NOTE — DISCHARGE INSTRUCTIONS
Post Cystoscopy and Transurethral resection of Bladder Tumor Instructions  Do not strain to have a bowel movement  No strenuous exercise x 7 days  No driving while you are on narcotic pain medications or if your mac  catheter is in place    You can expect:  To see blood in your urine.    Go to the ER if:  Your catheter stops draining  You are having severe abdominal pain  Inability to void if you do not have a catheter    Call the doctor if:  Temperature is greater than 101F  Persistent vomiting and inability to keep food down

## 2024-10-04 NOTE — BRIEF OP NOTE
[facility] - [facility] (1st Fl)  Brief Operative Note    Surgery Date: 10/4/2024     Surgeons and Role:     * Benjamin Haile MD - Primary     * James Yuen MD - Resident - Assisting        Pre-op Diagnosis:  Malignant neoplasm of lateral wall of urinary bladder [C67.2]    Post-op Diagnosis:  Post-Op Diagnosis Codes:     * Malignant neoplasm of lateral wall of urinary bladder [C67.2]    Procedure(s):  TURBT,WITH BLUE LIGHT CYSTOSCOPY AND CYSVIEW  CYSTOSCOPY    Anesthesia: General    Operative Findings: TURBT without immediate complication    Estimated Blood Loss: minimal         Specimens:   Specimen (24h ago, onward)       Start     Ordered    10/04/24 1727  Specimen to Pathology, Surgery Urology  Once        Comments: Pre-op Diagnosis: Malignant neoplasm of lateral wall of urinary bladder [C67.2]Procedure(s):TURBT,WITH BLUE LIGHT CYSTOSCOPY AND CYSVIEWCYSTOSCOPYPYELOGRAM, RETROGRADEBIOPSY, BLADDERFULGURATION, BLADDER Number of specimens: 3Name of specimens: 1-LEFT LATERAL WALL SCAR2-RIGHT LATERAL WALL SCAR3-POSTERIOR WALL     References:    Click here for ordering Quick Tip   Question Answer Comment   Procedure Type: Urology    Release to patient Immediate        10/04/24 1742                      Discharge Note    OUTCOME: Patient tolerated treatment/procedure well without complication and is now ready for discharge.    DISPOSITION: Home or Self Care    FINAL DIAGNOSIS:  Bladder cancer    FOLLOWUP: In clinic    DISCHARGE INSTRUCTIONS:    Discharge Procedure Orders   Diet Adult Regular     Lifting restrictions     Notify your health care provider if you experience any of the following:  temperature >100.4     Notify your health care provider if you experience any of the following:  persistent nausea and vomiting or diarrhea     Notify your health care provider if you experience any of the following:  severe uncontrolled pain     Notify your health care provider if you experience any of the following:  redness,  tenderness, or signs of infection (pain, swelling, redness, odor or green/yellow discharge around incision site)     Notify your health care provider if you experience any of the following:  difficulty breathing or increased cough     Notify your health care provider if you experience any of the following:  severe persistent headache     Notify your health care provider if you experience any of the following:  worsening rash     Notify your health care provider if you experience any of the following:  persistent dizziness, light-headedness, or visual disturbances     Notify your health care provider if you experience any of the following:  increased confusion or weakness     Notify your health care provider if you experience any of the following:     Activity as tolerated

## 2024-10-04 NOTE — OP NOTE
[facility]    Operative Note     DATE OF PROCEDURE:   10/4/2024    PRE-OP DIAGNOSES:   1) Urothelial carcinoma     POST-OP DIAGNOSES AND OPERATIVE FINDINGS:   As above    PROCEDURES:  1) Transurethral resection of bladder tumor, blue light    SURGEONS:   Surgeons and Role:     * Benjamin Haile MD - Primary  Tristan Yuen MD    ANESTHESIOLOGY:   Anesthesiologist: Micki Rosales MD  CRNA: Carol Kennedy CRNA    STAFF:   * No surgical staff found *    ANESTHESIA TYPE:  General anesthesia    ESTIMATED BLOOD LOSS:   Minimal    COMPLICATIONS:   None    ANTIBIOTICS:  Cefazolin    INTRAOPERATIVE THROMBOEMBOLISM PROPHYLAXIS:  Pneumatic compression stockings    PROCEDURE SUMMARY  The patient was brought to the operating room and anesthesia obtained.  The patient was then placed in the lithotomy position and prepped and draped using standard sterile technique.  All pressure points were carefully padded.  A surgical time-out occurred, antibiotics were administered, and thromboembolism prophylaxis was given.      A 26 F saline resectoscope was inserted into the urethra. Dialation of the urethral meatus was not needed using urethral sounds to 28F. The urethra and bladder were carefully inspected with the following findings and resection was completed of each tumor with details below:    Urethra: Normal   Prostate: Normal   Bladder: Multifocal tumor   Visualization method: Blue light, after administration of 100 mg of Cysview   Electrical element: Bipolar   Immediate postoperative instillation of chemotherapy Not applied.   Post-TUR bimanual exam: Not indicated (clinically non-invasive tumor)   Ureteral orifices:      -Right Normal     -Left Normal   Other findings: CIS appearing lesions with blue light avidity at posterior wall, bladder dome, and at right and left lateral walls along previous TUR sites         Specimen # 1   Blue Light Avid Yes   Specimen Name: Left lateral wall scar     -Appearance CIS      -Recurrent or Primary Recurrent     -Number of foci 1     -Tumor location(s) Left lateral wall     -Aggregate tumor diameter 2 cm     -Largest individual tumor 2 cm   Resection technique: Standard   Resection completeness: Complete   Bladder perforation: None   Detrusor muscle visualized Yes   Comments Left lateral wall scar blue light avid with circumferential red CIS appearing lesion     Specimen # 2   Blue Light Avid Yes   Specimen Name: Right lateral wall scar     -Appearance CIS     -Recurrent or Primary Recurrent     -Number of foci 1     -Tumor location(s) Right lateral wall     -Aggregate tumor diameter 3 cm     -Largest individual tumor 3 cm   Resection technique: Standard   Resection completeness: Incomplete   Bladder perforation: None   Detrusor muscle visualized Yes   Comments Previous TUR site resected. Blue light avid. Additional CIS appearing blue light lesion superior to this along right lateral wall not resected     Specimen # 3   Blue Light Avid Yes   Specimen Name: Posterior wall     -Appearance CIS     -Recurrent or Primary Recurrent     -Number of foci 1     -Tumor location(s) Posterior wall     -Aggregate tumor diameter 3 cm     -Largest individual tumor 3 cm   Resection technique: Standard   Resection completeness: Incomplete   Bladder perforation: None   Detrusor muscle visualized Yes   Comments Posterior wall CIS appearing lesion with blue light avidity. Additional CIS appearing lesion extending superiorly toward dome not resected         Random bladder biopsies: Not done   Targeted cold cup biopsies: Not done       The bladder was emptied and the case terminated.    DISPOSITION:   PACU hemodynamically stable      SPECIMENS:   Specimens (From admission, onward)      None

## 2024-10-04 NOTE — ANESTHESIA PREPROCEDURE EVALUATION
10/04/2024  Joseph Jesus is a 64 y.o., male.  Pre-operative evaluation for Procedure(s) (LRB):  TURBT,WITH BLUE LIGHT CYSTOSCOPY AND CYSVIEW (N/A)    Joseph Jesus is a 64 y.o. male     Patient Active Problem List   Diagnosis    Colon polyps    BPH with urinary obstruction    Hypertension, essential    Hyperlipidemia    NAFLD (nonalcoholic fatty liver disease)    Lateral epicondylitis of right elbow    Diverticulitis of large intestine without perforation or abscess without bleeding    Erectile dysfunction due to arterial insufficiency    AK (actinic keratosis)    Hiatal hernia with gastroesophageal reflux disease without esophagitis    Gastroesophageal reflux disease    Aortic atherosclerosis    Coronary artery calcification    Edema leg    Malignant neoplasm of lateral wall of urinary bladder    Adjustment disorder with anxious mood    Numbness and tingling of foot    CKD stage 3a, GFR 45-59 ml/min       Review of patient's allergies indicates:   Allergen Reactions    Amlodipine      edema    Atenolol      Depression - circa 2000    Catechu herb     Irbesartan Other (See Comments)     Possibly caused cough    Erythromycin Rash    Sumycin [tetracycline] Rash    Tetracyclines Rash       No current facility-administered medications on file prior to encounter.     Current Outpatient Medications on File Prior to Encounter   Medication Sig Dispense Refill    atorvastatin (LIPITOR) 10 MG tablet Take 1 tablet (10 mg total) by mouth once daily. 90 tablet 3    cetirizine (ZYRTEC) 10 MG tablet Take 10 mg by mouth Daily.      coenzyme Q10 100 mg capsule Take 200 mg by mouth once daily.      hydroCHLOROthiazide (HYDRODIURIL) 25 MG tablet Take 1 tablet (25 mg total) by mouth once daily. 90 tablet 3    Lactobacillus acidophilus (PROBIOTIC ORAL) Take by mouth.      NIFEdipine (ADALAT CC) 30 MG TbSR Take 1 tablet (30 mg  total) by mouth once daily. 90 tablet 3    UNABLE TO FIND Prostate Supplement      fluticasone propionate (FLONASE) 50 mcg/actuation nasal spray 2 sprays (100 mcg total) by Each Nostril route daily as needed for Rhinitis. 16 mL 5       Past Surgical History:   Procedure Laterality Date    APPENDECTOMY  1962    BIOPSY OF BLADDER  4/19/2022    Procedure: BIOPSY, BLADDER;  Surgeon: Lenny Ewing MD;  [facility];  Service: Urology;;    BIOPSY OF BLADDER  3/8/2023    Procedure: BIOPSY, BLADDER;  Surgeon: Lenny Ewing MD;  Location: [facility];  Service: Urology;;    BIOPSY OF BLADDER  7/14/2023    Procedure: BIOPSY, BLADDER;  Surgeon: Benjamin Haile MD;  [facility];  Service: Urology;;    BLADDER FULGURATION  4/19/2022    Procedure: FULGURATION, BLADDER;  Surgeon: Lenny Ewing MD;  [facility];  Service: Urology;;    BLADDER FULGURATION  3/8/2023    Procedure: FULGURATION, BLADDER;  Surgeon: Lenny Ewing MD;  [facility];  Service: Urology;;    BLADDER FULGURATION  7/14/2023    Procedure: FULGURATION, BLADDER;  Surgeon: Benjamin Haile MD;  [facility];  Service: Urology;;    CAUDAL EPIDURAL STEROID INJECTION N/A 12/13/2023    Procedure: Injection-steroid-epidural-caudal;  Surgeon: Devante Boone MD;  Location: [facility] OR;  Service: Pain Management;  Laterality: N/A;    COLONOSCOPY  12/2012    due for repeat 12/2015    COLONOSCOPY N/A 5/19/2016    Procedure: COLONOSCOPY;  Surgeon: James Webber MD;  Location: [facility] (4TH FLR);  Service: Endoscopy;  Laterality: N/A; diverticulosis, repeat in 5-10 years for surveillance    COLONOSCOPY N/A 8/9/2021    Procedure: COLONOSCOPY;  Surgeon: Fahad Bautista Jr., MD;  Location: [facility] ENDO;  Service: Endoscopy;  Laterality: N/A;    CYSTOSCOPY  4/19/2022    Procedure: CYSTOSCOPY;  Surgeon: Lenny Ewing MD;  Location: [facility] OR [address] [place] [address];  Service: Urology;;    CYSTOSCOPY  6/21/2022     Procedure: CYSTOSCOPY;  Surgeon: Lenny Ewing MD;  [facility]: [facility]LR;  Service: Urology;;    CYSTOSCOPY  2022    Procedure: CYSTOSCOPY;  Surgeon: Lenny Ewing MD;  [facility];  Service: Urology;;    CYSTOSCOPY  3/8/2023    Procedure: CYSTOSCOPY-BLUE LIGHT;  Surgeon: Lenny Ewing MD;  [facility];  Service: Urology;;    CYSTOSCOPY N/A 2023    Procedure: CYSTOSCOPY;  Surgeon: Benjamin Haile MD;  [facility];  Service: Urology;  Laterality: N/A;  1 hour    CYSTOSCOPY  2023    Procedure: CYSTOSCOPY;  Surgeon: Benjamin Haile MD;  [facility];  Service: Urology;;  BLUE LIGHT    ESOPHAGOGASTRODUODENOSCOPY N/A 2021    Procedure: EGD (ESOPHAGOGASTRODUODENOSCOPY);  Surgeon: Fahad Bautista Jr., MD;  Location: [facility];  Service: Endoscopy;  Laterality: N/A;    EUA/Fistulotomy-'08      HERNIA REPAIR      RIH with mesh    Hydrocelectomy      MASS EXCISION      BCC removal (twice)    MOLE REMOVAL  2019    2 moles removed from scalp =- 1 was basal cell and 1 was squamous cell - dermatology - Dr. londono    right Spermatocelectomy      TURBT (TRANSURETHRAL RESECTION OF BLADDER TUMOR) N/A 2023    Procedure: TURBT (TRANSURETHRAL RESECTION OF BLADDER TUMOR);  Surgeon: Benjamin Haile MD;  Location: [facility] OR [address] [place] [address];  Service: Urology;  Laterality: N/A;  1 hour, blue light    UPPER GASTROINTESTINAL ENDOSCOPY  prior to     hiatal hernia, reflux esophagitis       Social History     Socioeconomic History    Marital status:      Spouse name: Traci    Number of children: 1   Occupational History    Occupation: Software Eng.     Employer: [employer]     Comment: [employer]   Tobacco Use    Smoking status: Former     Current packs/day: 0.00     Average packs/day: 1 pack/day for 10.0 years (10.0 ttl pk-yrs)     Types: Cigarettes     Start date: 1977     Quit date: 1987     Years since quittin.8     Smokeless tobacco: Never    Tobacco comments:     Decreased lung compassity per patient   Substance and Sexual Activity    Alcohol use: Not Currently     Alcohol/week: 0.0 - 1.0 standard drinks of alcohol     Comment: No longer drinks ETOH    Drug use: No    Sexual activity: Yes     Partners: Female     Social Drivers of Health     Financial Resource Strain: Medium Risk (11/20/2023)    Overall Financial Resource Strain (CARDIA)     Difficulty of Paying Living Expenses: Somewhat hard   Food Insecurity: No Food Insecurity (11/20/2023)    Hunger Vital Sign     Worried About Running Out of Food in the Last Year: Never true     Ran Out of Food in the Last Year: Never true   Transportation Needs: No Transportation Needs (11/20/2023)    PRAPARE - Transportation     Lack of Transportation (Medical): No     Lack of Transportation (Non-Medical): No   Physical Activity: Insufficiently Active (11/20/2023)    Exercise Vital Sign     Days of Exercise per Week: 1 day     Minutes of Exercise per Session: 40 min   Stress: Stress Concern Present (11/20/2023)    [employer] [employer] - Occupational Stress Questionnaire     Feeling of Stress : To some extent   Housing Stability: Low Risk  (11/20/2023)    Housing Stability Vital Sign     Unable to Pay for Housing in the Last Year: No     Number of Places Lived in the Last Year: 1     Unstable Housing in the Last Year: No           Pre-op Assessment    I have reviewed the Patient Summary Reports.     I have reviewed the Nursing Notes.    I have reviewed the Medications.     Review of Systems  Anesthesia Hx:  No problems with previous Anesthesia   History of prior surgery of interest to airway management or planning:          Denies Family Hx of Anesthesia complications.   Personal Hx of Anesthesia complications, Post-Operative Nausea/Vomiting                    Social:  Non-Smoker       Hematology/Oncology:  Hematology Normal   Oncology Normal                                    EENT/Dental:  EENT/Dental Normal           Cardiovascular:     Hypertension   CAD                                        Pulmonary:  Pulmonary Normal                       Renal/:  Chronic Renal Disease                Hepatic/GI:    Hiatal Hernia, GERD             Musculoskeletal:  Musculoskeletal Normal                Neurological:  Neurology Normal                                      Endocrine:  Endocrine Normal            Psych:  Psychiatric Normal                    Physical Exam  General: Well nourished and Cooperative    Airway:  Mallampati: II   Mouth Opening: Normal  TM Distance: Normal  Tongue: Normal  Neck ROM: Normal ROM    Dental:  Intact    Chest/Lungs:  Clear to auscultation, Normal Respiratory Rate    Heart:  Rate: Normal  Rhythm: Regular Rhythm  Sounds: Normal        Anesthesia Plan  Type of Anesthesia, risks & benefits discussed:    Anesthesia Type: Gen ETT  Intra-op Monitoring Plan: Standard ASA Monitors  Post Op Pain Control Plan: multimodal analgesia  Induction:  IV  Airway Plan: , Post-Induction  Informed Consent: Informed consent signed with the Patient and all parties understand the risks and agree with anesthesia plan.  All questions answered.   ASA Score: 2  Day of Surgery Review of History & Physical: H&P Update referred to the surgeon/provider.    Ready For Surgery From Anesthesia Perspective.     .

## 2024-10-04 NOTE — ANESTHESIA PROCEDURE NOTES
Intubation    Date/Time: 10/4/2024 5:00 PM    Performed by: Carol Kennedy CRNA  Authorized by: Micki Rosales MD    Intubation:     Induction:  Intravenous    Intubated:  Postinduction    Mask Ventilation:  Easy mask    Attempts:  1    Attempted By:  CRNA    Method of Intubation:  Video laryngoscopy    Blade:  Mancera 3    Laryngeal View Grade: Grade I - full view of cords      Difficult Airway Encountered?: No      Complications:  None    Airway Device:  Oral endotracheal tube    Airway Device Size:  7.5    Style/Cuff Inflation:  Cuffed (inflated to minimal occlusive pressure)    Tube secured:  25    Secured at:  The lips    Placement Verified By:  Capnometry    Complicating Factors:  Anterior larynx and large/floppy epiglottis    Findings Post-Intubation:  BS equal bilateral and atraumatic/condition of teeth unchanged

## 2024-10-04 NOTE — INTERVAL H&P NOTE
The patient has been examined and the H&P has been reviewed:    I concur with the findings and no changes have occurred since H&P was written.    Surgery risks, benefits and alternative options discussed and understood by patient/family.    Urine culture 9/30 no growth      Patient Active Problem List   Diagnosis    Colon polyps    BPH with urinary obstruction    Hypertension, essential    Hyperlipidemia    NAFLD (nonalcoholic fatty liver disease)    Lateral epicondylitis of right elbow    Diverticulitis of large intestine without perforation or abscess without bleeding    Erectile dysfunction due to arterial insufficiency    AK (actinic keratosis)    Hiatal hernia with gastroesophageal reflux disease without esophagitis    Gastroesophageal reflux disease    Aortic atherosclerosis    Coronary artery calcification    Edema leg    Malignant neoplasm of lateral wall of urinary bladder    Adjustment disorder with anxious mood    Numbness and tingling of foot    CKD stage 3a, GFR 45-59 ml/min

## 2024-10-05 NOTE — TRANSFER OF CARE
"Anesthesia Transfer of Care Note    Patient: Joseph Jesus    Procedure(s) Performed: Procedure(s):  TURBT,WITH BLUE LIGHT CYSTOSCOPY AND CYSVIEW  CYSTOSCOPY    Patient location: [facility]    Anesthesia Type: general    Transport from OR: Transported from OR on 6-10 L/min O2 by face mask with adequate spontaneous ventilation    Post pain: adequate analgesia    Post assessment: no apparent anesthetic complications    Post vital signs: stable    Level of consciousness: responds to stimulation    Nausea/Vomiting: no nausea/vomiting    Complications: none    Transfer of care protocol was followed      Last vitals: Visit Vitals  BP (!) 136/88 (BP Location: Left arm, Patient Position: Lying)   Pulse 64   Temp 36.6 °C (97.8 °F) (Temporal)   Resp 18   Ht 6' 1" (1.854 m)   Wt 104.3 kg (230 lb)   SpO2 99%   BMI 30.34 kg/m²     "

## 2024-10-05 NOTE — PROGRESS NOTES
Pt discharged per orders. AAOx'a 3. VSS. No s/s of acute distress. Resp even and unlabored. Minimal complaints of pain verbalized. IV removed prior to discharge. Patient spouse  medication from pharmacy prior to discharge. .Reviewed discharge instructions, follow up care/appointments with patient and spouse. Patient and spouse verbalized understanding of discharge and follow up care/appointments. Discharged with all personal belongings.Escorted out with staff in wheelchair.Pt transported home via personal transportation.

## 2024-10-06 NOTE — ANESTHESIA POSTPROCEDURE EVALUATION
Anesthesia Post Evaluation    Patient: Joseph Jesus    Procedure(s) Performed: Procedure(s):  TURBT,WITH BLUE LIGHT CYSTOSCOPY AND CYSVIEW  CYSTOSCOPY    Final Anesthesia Type: general      Patient location during evaluation: PACU  Patient participation: Yes- Able to Participate  Level of consciousness: awake and alert  Post-procedure vital signs: reviewed and stable  Pain management: adequate  Airway patency: patent    PONV status at discharge: No PONV  Anesthetic complications: no      Cardiovascular status: blood pressure returned to baseline  Respiratory status: unassisted  Hydration status: euvolemic  Follow-up not needed.          Vitals Value Taken Time   /98 10/04/24 1845   Temp 36.6 °C (97.8 °F) 10/04/24 1800   Pulse 64 10/04/24 1845   Resp 12 10/04/24 1819   SpO2 99 % 10/04/24 1845   Vitals shown include unfiled device data.      No case tracking events are documented in the log.      Pain/Neymar Score: Neymar Score: 9 (10/4/2024  6:30 PM)

## 2024-10-07 ENCOUNTER — TELEPHONE (OUTPATIENT)
Dept: UROLOGY | Facility: CLINIC | Age: 64
End: 2024-10-07
Payer: COMMERCIAL

## 2024-10-07 NOTE — TELEPHONE ENCOUNTER
Spoke with patient who was able to provide acceptable patient identifiers prior to start of conversation.   Rescheduled patient with Dr Haile.

## 2024-10-09 NOTE — PROCEDURES
FibroScan (Vibration Controlled Transient Elastography)  Date/Time: 2/3/2020 1:15 PM  Performed by: Haroldo Presley MD  Authorized by: Haroldo Presley MD     Diagnosis:  NAFLD    Probe:     Universal Protocol: Patient's identity, procedure and site were verified, confirmatory pause was performed. Discussed procedure including risks and potential complications.  Questions answered.  Patient verbalizes understanding and wishes to proceed with VCTE.     Procedure: After providing explanations of the procedure, patient was placed in the supine position with right arm in maximum abduction to allow optimal exposure of right lateral abdomen.  Patient was briefly assessed, Testing was performed in the mid-axillary location, 50Hz Shear Wave pulses were applied and the resulting Shear Wave and Propagation Speed detected with a 3.5 MHz ultrasonic signal, using the FibroScan probe, Skin to liver capsule distance and liver parenchyma were accessed during the entire examination with the FibroScan probe, Patient was instructed to breathe normally and to abstain from sudden movements during the procedure, allowing for random measurements of liver stiffness. At least 10 Shear Waves were produced, Individual measurements of each Shear Wave were calculated.  Patient tolerated the procedure well with no complications.  Meets discharge criteria as was dismissed.  Rates pain 0 out of 10.  Patient will follow up with ordering provider to review results.      Findings  Median liver stiffness score:  3.6  CAP Reading: dB/m:  260    IQR/med %:  6  Interpretation  Fibrosis interpretation is based on medial liver stiffness - Kilopascal (kPa).    Fibrosis Stage:  F 0-1  Steatosis interpretation is based on controlled attenuation parameter - (dB/m).    Steatosis Grade:  S2       [

## 2024-10-10 ENCOUNTER — PATIENT MESSAGE (OUTPATIENT)
Dept: UROLOGY | Facility: CLINIC | Age: 64
End: 2024-10-10
Payer: COMMERCIAL

## 2024-10-16 ENCOUNTER — ON-DEMAND VIRTUAL (OUTPATIENT)
Dept: URGENT CARE | Facility: CLINIC | Age: 64
End: 2024-10-16
Payer: COMMERCIAL

## 2024-10-16 DIAGNOSIS — J06.9 UPPER RESPIRATORY TRACT INFECTION, UNSPECIFIED TYPE: Primary | ICD-10-CM

## 2024-10-16 NOTE — PROGRESS NOTES
Subjective:      Patient ID: Joseph Jesus is a 64 y.o. male.    Vitals:  vitals were not taken for this visit.     Chief Complaint: Cough and Sinus Problem      Visit Type: TELE AUDIOVISUAL    Present with the patient at the time of consultation: TELEMED PRESENT WITH PATIENT: None, at home    Past Medical History:   Diagnosis Date    Allergy     seasonal    Anal fistula hx    Arthritis     Basal cell carcinoma 04/2013    left superior forehead    Callus     Diverticulosis     Epididymal cyst     GERD (gastroesophageal reflux disease)     Hayfever     Hydrocele, right     Hyperlipidemia     Hypertension     Neck pain     hx of muscular inury from weight lifting---resolved now    PONV (postoperative nausea and vomiting)     Prostatitis     Dec. '12-treated with antibx.    Squamous cell carcinoma 01/07/2019    anterior scalp    Squamous cell carcinoma of skin 03/2020    Left post scalp (SCC insitu-saucerization)    Visual impairment      Past Surgical History:   Procedure Laterality Date    APPENDECTOMY  1962    BIOPSY OF BLADDER  4/19/2022    Procedure: BIOPSY, BLADDER;  Surgeon: Lenny Ewing MD;  [facility];  Service: Urology;;    BIOPSY OF BLADDER  3/8/2023    Procedure: BIOPSY, BLADDER;  Surgeon: Lenny Ewing MD;  [facility];  Service: Urology;;    BIOPSY OF BLADDER  7/14/2023    Procedure: BIOPSY, BLADDER;  Surgeon: Benjamin Haile MD;  [facility];  Service: Urology;;    BLADDER FULGURATION  4/19/2022    Procedure: FULGURATION, BLADDER;  Surgeon: Lenny Ewing MD;  [facility];  Service: Urology;;    BLADDER FULGURATION  3/8/2023    Procedure: FULGURATION, BLADDER;  Surgeon: Lenny Ewing MD;  [facility];  Service: Urology;;    BLADDER FULGURATION  7/14/2023    Procedure: FULGURATION, BLADDER;  Surgeon: Benjamin Haile MD;  [facility];  Service: Urology;;    CAUDAL EPIDURAL STEROID INJECTION N/A 12/13/2023     Procedure: Injection-steroid-epidural-caudal;  Surgeon: Devante Boone MD;  Location: [facility] OR;  Service: Pain Management;  Laterality: N/A;    COLONOSCOPY  12/2012    due for repeat 12/2015    COLONOSCOPY N/A 5/19/2016    Procedure: COLONOSCOPY;  Surgeon: James Webber MD;  Location: [facility] ENDO (4TH FLR);  Service: Endoscopy;  Laterality: N/A; diverticulosis, repeat in 5-10 years for surveillance    COLONOSCOPY N/A 8/9/2021    Procedure: COLONOSCOPY;  Surgeon: Fahad Bautista Jr., MD;  Location: [facility] ENDO;  Service: Endoscopy;  Laterality: N/A;    CYSTOSCOPY  4/19/2022    Procedure: CYSTOSCOPY;  Surgeon: Lenny Ewing MD;  Location: [facility] OR 1ST FLR;  Service: Urology;;    CYSTOSCOPY  6/21/2022    Procedure: CYSTOSCOPY;  Surgeon: Lenny Ewing MD;  Location: [facility] OR 1ST FLR;  Service: Urology;;    CYSTOSCOPY  8/23/2022    Procedure: CYSTOSCOPY;  Surgeon: Lenny Ewing MD;  Location: [facility] OR 1ST FLR;  Service: Urology;;    CYSTOSCOPY  3/8/2023    Procedure: CYSTOSCOPY-BLUE LIGHT;  Surgeon: Lenny Ewing MD;  Location: [facility] OR 1ST FLR;  Service: Urology;;    CYSTOSCOPY N/A 4/4/2023    Procedure: CYSTOSCOPY;  Surgeon: Benjamin Haile MD;  Location: [facility] OR 1ST FLR;  Service: Urology;  Laterality: N/A;  1 hour    CYSTOSCOPY  7/14/2023    Procedure: CYSTOSCOPY;  Surgeon: Benjamin Haile MD;  Location: [facility] OR 1ST FLR;  Service: Urology;;  BLUE LIGHT    CYSTOSCOPY  10/4/2024    Procedure: CYSTOSCOPY;  Surgeon: Benjamin Haile MD;  Location: [facility] OR 1ST FLR;  Service: Urology;;    ESOPHAGOGASTRODUODENOSCOPY N/A 8/9/2021    Procedure: EGD (ESOPHAGOGASTRODUODENOSCOPY);  Surgeon: Fahad Bautista Jr., MD;  Location: [facility] ENDO;  Service: Endoscopy;  Laterality: N/A;    EUA/Fistulotomy-'08      HERNIA REPAIR      RIH with mesh    Hydrocelectomy  2013    MASS EXCISION  2004    BCC removal (twice)    MOLE REMOVAL  02/25/2019    2 moles removed from scalp =- 1 was basal cell and 1 was squamous cell - dermatology -  Dr. londono    right Spermatocelectomy  2013    TURBT (TRANSURETHRAL RESECTION OF BLADDER TUMOR) N/A 4/4/2023    Procedure: TURBT (TRANSURETHRAL RESECTION OF BLADDER TUMOR);  Surgeon: Benjamin Haile MD;  Location: [facility] OR [address] [place] [address];  Service: Urology;  Laterality: N/A;  1 hour, blue light    TURBT, WITH BLUE LIGHT CYSTOSCOPY AND CYSVIEW  10/4/2024    Procedure: TURBT,WITH BLUE LIGHT CYSTOSCOPY AND CYSVIEW;  Surgeon: Benjamin Haile MD;  Location: [facility] OR [address] [place] [address];  Service: Urology;;    UPPER GASTROINTESTINAL ENDOSCOPY  prior to 2010    hiatal hernia, reflux esophagitis     Review of patient's allergies indicates:   Allergen Reactions    Amlodipine      edema    Atenolol      Depression - circa 2000    Catechu herb     Irbesartan Other (See Comments)     Possibly caused cough    Erythromycin Rash    Sumycin [tetracycline] Rash    Tetracyclines Rash     Current Outpatient Medications on File Prior to Visit   Medication Sig Dispense Refill    atorvastatin (LIPITOR) 10 MG tablet Take 1 tablet (10 mg total) by mouth once daily. 90 tablet 3    cetirizine (ZYRTEC) 10 MG tablet Take 10 mg by mouth Daily.      coenzyme Q10 100 mg capsule Take 200 mg by mouth once daily.      fluticasone propionate (FLONASE) 50 mcg/actuation nasal spray 2 sprays (100 mcg total) by Each Nostril route daily as needed for Rhinitis. 16 mL 5    hydroCHLOROthiazide (HYDRODIURIL) 25 MG tablet Take 1 tablet (25 mg total) by mouth once daily. 90 tablet 3    Lactobacillus acidophilus (PROBIOTIC ORAL) Take by mouth.      NIFEdipine (ADALAT CC) 30 MG TbSR Take 1 tablet (30 mg total) by mouth once daily. 90 tablet 3    oxyCODONE (ROXICODONE) 5 MG immediate release tablet Take 1 tablet (5 mg total) by mouth every 4 (four) hours as needed for Pain. 10 tablet 0    UNABLE TO FIND Prostate Supplement       No current facility-administered medications on file prior to visit.     Family History   Problem Relation Name Age of Onset    Skin cancer Mother       Hypertension Mother      Skin cancer Father      Cancer Father          prostate cancer    Hypertension Father      Hypertension Maternal Grandmother      Hypertension Maternal Grandfather      Hypertension Paternal Grandmother      Anesthesia problems Paternal Grandmother      Cancer Paternal Grandfather          prostate cancer    Hypertension Paternal Grandfather      Heart disease Paternal Grandfather      Diabetes Neg Hx      Colon polyps Neg Hx      Colon cancer Neg Hx      Melanoma Neg Hx      Psoriasis Neg Hx      Lupus Neg Hx      Eczema Neg Hx      Acne Neg Hx      Cirrhosis Neg Hx      Prostate cancer Neg Hx      Kidney disease Neg Hx      Crohn's disease Neg Hx      Ulcerative colitis Neg Hx      Stomach cancer Neg Hx      Esophageal cancer Neg Hx             Ohs Peq Odvv Intake    10/16/2024  1:24 PM CDT - Filed by Patient   What is your current physical address in the event of a medical emergency? [address] [address], [place] [address]   Are you able to take your vital signs? No   Please attach any relevant images or files    Is your employer contracted with [facility]? No         Cough and congestion for 2 days. + sick contacts at home. Mild relief with OTC meds. Seeking cough syrup for relief.    Cough  Associated symptoms include myalgias, a sore throat (scratchy) and wheezing. Pertinent negatives include no chills, ear pain, fever, headaches or shortness of breath.   Sinus Problem  Associated symptoms include congestion, coughing and a sore throat (scratchy). Pertinent negatives include no chills, ear pain, headaches or shortness of breath.       Constitution: Negative for chills and fever.   HENT:  Positive for congestion and sore throat (scratchy). Negative for ear pain.    Respiratory:  Positive for cough, sputum production and wheezing. Negative for shortness of breath.    Gastrointestinal:  Negative for nausea, vomiting and diarrhea.   Musculoskeletal:  Positive for muscle ache.    Neurological:  Negative for headaches.        Objective:   The physical exam was conducted virtually.  Physical Exam   Constitutional: He is oriented to person, place, and time. He does not appear ill. No distress.   HENT:   Head: Normocephalic and atraumatic.   Nose: Nose normal.   Eyes: Extraocular movement intact   Pulmonary/Chest: Effort normal.   Abdominal: Normal appearance.   Musculoskeletal: Normal range of motion.         General: Normal range of motion.   Neurological: no focal deficit. He is alert and oriented to person, place, and time.   Psychiatric: His behavior is normal. Mood normal.   Vitals reviewed.      Assessment:     1. Upper respiratory tract infection, unspecified type        Plan:   Further testing recommended.    Patient encouraged to monitor symptoms closely and instructed to follow-up for new or worsening symptoms. Further, in-person, evaluation may be necessary for continued treatment. Please follow up with your primary care doctor or specialist as needed. Verbally discussed plan. Patient confirms understanding and is in agreement with treatment and plan.     You must understand that you've received a [facility] evaluation only and that you may be released before all your medical problems are known or treated. You, the patient, will arrange for follow up care as instructed.      Upper respiratory tract infection, unspecified type             Patient Instructions   OVER THE COUNTER RECOMMENDATIONS/SUGGESTIONS (IF NO CONTRAINDICATIONS).     ·         Make sure to stay well hydrated.     ·         Use Nasal Saline to mechanically move any post nasal drip from your eustachian tube or from the back of your throat.     ·         Use warm saltwater gargles to ease your throat pain. Warm saltwater gargles as needed for sore throat-  1/2 tsp salt to 1 cup warm water, gargle as desired. Warm fluids tend to relieve a sore throat.     .         Throat lozenges, Chloraseptic spray or other over  the counter treatments are ok to use as well. Use as directed.     ·         Use an antihistamine such as Claritin, Zyrtec or Allegra to dry you out.     ·         Use pseudoephedrine (behind the counter) to decongest. Pseudoephedrine  30 mg up to 240 mg /day. It can raise your blood pressure and give you palpitations.     ·         Use Mucinex (guaifenesin) to break up mucous up to 2400mg/day to loosen any mucous.     ·         The Mucinex DM pill has a cough suppressant that can be sedating. It can be used at night to stop the tickle at the back of your throat.     ·         You can use Mucinex D (it has guaifenesin and a high dose of pseudoephedrine) in the mornings to help decongest.     ·         Use Afrin (oxymetazoline) in each nare for no longer than 3 days, as it is addictive. It can also dry out your mucous membranes and cause elevated blood pressure. This is especially useful if you are flying.     ·         Use Flonase 1-2 sprays/nostril per day. It is a local acting steroid nasal spray, if you develop a bloody nose, stop using the medication immediately.     ·         Sometimes Nyquil at night is beneficial to help you get some rest, however it is sedating, and it does have an antihistamine, and Tylenol.     ·         Honey is a natural cough suppressant that can be used.     ·         Tylenol up to 4,000 mg a day is safe for short periods and can be used for body aches, pain, and fever. However, in high doses and prolonged use it can cause liver irritation.     ·         Ibuprofen is a non-steroidal anti-inflammatory that can be used for body aches, pain, and fever. However, it can also cause stomach irritation if overused.

## 2024-10-18 LAB
COMMENT: NORMAL
FINAL PATHOLOGIC DIAGNOSIS: NORMAL
GROSS: NORMAL
Lab: NORMAL
MICROSCOPIC EXAM: NORMAL

## 2024-11-07 ENCOUNTER — TELEPHONE (OUTPATIENT)
Dept: NEPHROLOGY | Facility: CLINIC | Age: 64
End: 2024-11-07
Payer: COMMERCIAL

## 2024-11-07 NOTE — TELEPHONE ENCOUNTER
----- Message from Conner sent at 11/6/2024  1:43 PM CST -----  Regarding: Appt  Contact: [phone]  Patient is calling to reschedule missed appointment would like to speak with a nurse. Please contact pt

## 2024-11-14 NOTE — PROGRESS NOTES
Subjective     Patient ID: Joseph Jesus is a 64 y.o. male.    Chief Complaint: No chief complaint on file.    HPI  Patient is new to ENT, referred by Dr. Trejo for consultation for chronic nasal congestion X decades. States he incurred nasal trauma in 5th grade. Has never been able to breathe well through his nose, L>R.     ALLERGY: saw allergist in  for chronic cough; that note has been reviewed. Immunocaps positive to grass pollen. The rest was negative. Advised to take Zyrtec daily and Flonase 2 sprays each nostril daily. Patient takes Zyrtec only after sneezing fits, not daily. Takes Flonase approximately 2-3 times per week. Patient denies itchy, red, watery eyes; no itchy, red, watery nose; no excessive sneezing. Patient reports frequent nasal congestion.   SINUS: No recent sinus infections, antibiotics, or recent sinus imaging. Patient denies s/s of acute bacterial sinusitis:  No mucopus from nose, no facial swelling/pain, no dental pain, no diminished olfaction/taste, no headaches around the eyes, no sore throat or productive cough.   ACID REFLUX: Recent GI notes & EGD report reviewed. Dr. Bautista noted reflux esophagitis and hiatal hernia in . Patient takes nothing for GERD.  Patient reports dysphonia, frequent throat clearing, globus sensation, and frequent throat irritation.  ASTHMA:  Former smoker. Quit in . No h/o asthma/COPD. Saw a pulmonologist in  for chronic cough that began in 2019. PFTs were completely normal without BD response and normal diffusion. Pulmonologist felt that cough was likely related to hiatal hernia with reflux and allergic rhinitis as CT was without parenchymal lung disease. Recent chest imagin2024 was negative.   ACE-INHIBITOR: none    Review of Systems   Constitutional: Negative.  Negative for fever.   HENT:  Positive for nasal congestion and voice change. Negative for dental problem, facial swelling, postnasal drip, rhinorrhea, sinus  pressure/congestion, sneezing, sore throat and trouble swallowing.         Frequent throat clearing   Frequent throat irritation   Globus sensation (sensation of thick or too much mucus in the back of his throat)   Eyes: Negative.  Negative for discharge, redness and itching.   Respiratory:  Positive for cough. Negative for choking.    Cardiovascular: Negative.    Gastrointestinal: Negative.    Musculoskeletal: Negative.    Integumentary:  Negative.   Neurological: Negative.  Negative for headaches.   Hematological: Negative.    Psychiatric/Behavioral: Negative.            Objective     Physical Exam  Vitals and nursing note reviewed.   Constitutional:       General: He is not in acute distress.     Appearance: He is well-developed. He is not ill-appearing.   HENT:      Head: Normocephalic and atraumatic.      Right Ear: Hearing, tympanic membrane, ear canal and external ear normal. No middle ear effusion. There is impacted cerumen. Tympanic membrane is not erythematous.      Left Ear: Hearing, tympanic membrane, ear canal and external ear normal.  No middle ear effusion. There is impacted cerumen. Tympanic membrane is not erythematous.      Nose: Nasal deformity (anterior septal spur, inferior, left), septal deviation (left) and congestion present. No rhinorrhea.      Right Turbinates: Swollen.      Left Turbinates: Swollen.      Right Sinus: No maxillary sinus tenderness or frontal sinus tenderness.      Left Sinus: No maxillary sinus tenderness or frontal sinus tenderness.      Mouth/Throat:      Mouth: Mucous membranes are moist. No oral lesions.      Tongue: No lesions.      Palate: No lesions.      Pharynx: Oropharynx is clear. Uvula midline. No pharyngeal swelling, oropharyngeal exudate, posterior oropharyngeal erythema or uvula swelling.   Eyes:      General: Lids are normal. No scleral icterus.        Right eye: No discharge.         Left eye: No discharge.   Neck:      Trachea: Trachea normal. No tracheal  deviation.   Cardiovascular:      Rate and Rhythm: Normal rate.   Pulmonary:      Effort: Pulmonary effort is normal. No respiratory distress.      Breath sounds: No stridor. No wheezing.   Musculoskeletal:         General: Normal range of motion.      Cervical back: Normal range of motion.   Lymphadenopathy:      Cervical: No cervical adenopathy.   Skin:     General: Skin is warm and dry.   Neurological:      Mental Status: He is alert and oriented to person, place, and time.      Coordination: Coordination is intact.      Gait: Gait normal.   Psychiatric:         Attention and Perception: Attention normal.         Mood and Affect: Mood normal.         Speech: Speech normal.         Behavior: Behavior normal. Behavior is cooperative.     SEPARATE PROCEDURE IN OFFICE:   Procedure: Removal of impacted cerumen, bilateral   Pre Procedure Diagnosis: Cerumen Impaction   Post Procedure Diagnosis: Cerumen Impaction   Verbal informed consent in regards to risk of trauma to ear canal, ear drum or hearing, discomfort during procedure and/or inability to remove cerumen impaction in one session or unforeseen events or complications.   No anesthesia.     Procedure in detail:   Ear canal visualized bilateral with appropriate size ear speculum utilizing Operating Head Binocular Otomicroscope   Utilizing the following:  Ring curet was used AU. The impacted cerumen of the ear canals was removed atraumatically. The TM and EAC were then inspected and found to be clear of wax. See description of TMs/EACs in PE above.   Complications: No   Condition: Improved/Good      Procedure: Flexible laryngoscopy    In order to fully examine the upper aerodigestive tract, including the larynx, in a patient with a hyperactive gag reflex, and suboptimal visualization with indirect mirror exam,  flexible endoscopy is required.   After explaining the procedure and obtaining verbal consent, a timeout was performed with the patient's participation  according to the universal protocol. Both nasal cavities were anesthetized with 4% Xylocaine spray mixed with Ad-Synephrine. The flexible laryngoscope  was inserted into the nasal cavity and advanced to visualize the nasal cavity, nasopharynx, the posterior oropharynx, hypopharynx, and the endolarynx with the  findings noted. The scope was removed and the procedure terminated. The patient tolerated this procedure well without apparent complication.     OVERALL FINDINGS  Nasopharynx - the torus is clear. There are no lesions of the posterior wall.   Oropharynx - no lesions of the tongue base. There is no obvious fullness or asymmetry.  Hypopharynx - there are no lesions of the pyriform sinuses or postcricoid region   Larynx - there are no lesions of the supraglottic or glottic larynx.  Vocal fold mobility is normal.     SPECIFIC FINDINGS  Adenoid tissue - normal   Nasopharynx & eustachian tube orifices - normal   Posterior pharyngeal wall - normal   Base of tongue - normal   Epiglottis - normal   Valleculae - normal   Pyriform sinuses - normal   False vocal cords - normal   True vocal cords - normal  Arytenoids - normal   Interarytenoid space - edema  Right nasal valve    Right middle turbinate    Between right inferior and middle turbinates    Right Choana    Left nasal valve    Left middle turbinate    Between left inferior and middle turbinates    Larynx    Larynx         Assessment and Plan     1. Chronic nasal congestion  -     Ambulatory referral/consult to ENT  -     azelastine (ASTELIN) 137 mcg (0.1 %) nasal spray; 1 spray (137 mcg total) by Nasal route 2 (two) times daily.  Dispense: 30 mL; Refill: 12  -     fluticasone propionate (FLONASE) 50 mcg/actuation nasal spray; 1 spray (50 mcg total) by Each Nostril route 2 (two) times a day.  Dispense: 16 mL; Refill: 12    2. Nasal septal deviation    3. Nasal septal spur    4. Hypertrophy of both inferior nasal turbinates    5. Chronic cough  Comments:  smoked  age [age] none since, some degree of WALDO  Orders:  -     Ambulatory referral/consult to ENT    6. Bilateral impacted cerumen      Flonase & Astelin BID. If not satisfied with nasal breathing after 2-3 months of BID nasal sprays, consider returning to see ENT MD to discuss possible surgical options.   Handouts also given on globus, chronic cough, and LPRD for his consideration.   Patient encouraged to return to clinic if symptoms worsen/persist and as needed for further ENT symptoms or concerns.            No follow-ups on file.

## 2024-11-18 ENCOUNTER — OFFICE VISIT (OUTPATIENT)
Dept: OTOLARYNGOLOGY | Facility: CLINIC | Age: 64
End: 2024-11-18
Payer: COMMERCIAL

## 2024-11-18 ENCOUNTER — PATIENT MESSAGE (OUTPATIENT)
Dept: OTOLARYNGOLOGY | Facility: CLINIC | Age: 64
End: 2024-11-18

## 2024-11-18 VITALS — BODY MASS INDEX: 30.89 KG/M2 | WEIGHT: 234.13 LBS

## 2024-11-18 DIAGNOSIS — J34.3 HYPERTROPHY OF BOTH INFERIOR NASAL TURBINATES: ICD-10-CM

## 2024-11-18 DIAGNOSIS — J34.2 NASAL SEPTAL DEVIATION: ICD-10-CM

## 2024-11-18 DIAGNOSIS — R09.81 CHRONIC NASAL CONGESTION: Primary | ICD-10-CM

## 2024-11-18 DIAGNOSIS — H61.23 BILATERAL IMPACTED CERUMEN: ICD-10-CM

## 2024-11-18 DIAGNOSIS — J34.89 NASAL SEPTAL SPUR: ICD-10-CM

## 2024-11-18 DIAGNOSIS — R05.3 CHRONIC COUGH: ICD-10-CM

## 2024-11-18 PROCEDURE — 3288F FALL RISK ASSESSMENT DOCD: CPT | Mod: CPTII,S$GLB,, | Performed by: NURSE PRACTITIONER

## 2024-11-18 PROCEDURE — 69210 REMOVE IMPACTED EAR WAX UNI: CPT | Mod: 51,50,S$GLB, | Performed by: NURSE PRACTITIONER

## 2024-11-18 PROCEDURE — 99999 PR PBB SHADOW E&M-EST. PATIENT-LVL IV: CPT | Mod: PBBFAC,,, | Performed by: NURSE PRACTITIONER

## 2024-11-18 PROCEDURE — 1159F MED LIST DOCD IN RCRD: CPT | Mod: CPTII,S$GLB,, | Performed by: NURSE PRACTITIONER

## 2024-11-18 PROCEDURE — 31575 DIAGNOSTIC LARYNGOSCOPY: CPT | Mod: S$GLB,,, | Performed by: NURSE PRACTITIONER

## 2024-11-18 PROCEDURE — 99204 OFFICE O/P NEW MOD 45 MIN: CPT | Mod: 25,S$GLB,, | Performed by: NURSE PRACTITIONER

## 2024-11-18 PROCEDURE — 1126F AMNT PAIN NOTED NONE PRSNT: CPT | Mod: CPTII,S$GLB,, | Performed by: NURSE PRACTITIONER

## 2024-11-18 PROCEDURE — 1101F PT FALLS ASSESS-DOCD LE1/YR: CPT | Mod: CPTII,S$GLB,, | Performed by: NURSE PRACTITIONER

## 2024-11-18 PROCEDURE — 3008F BODY MASS INDEX DOCD: CPT | Mod: CPTII,S$GLB,, | Performed by: NURSE PRACTITIONER

## 2024-11-18 RX ORDER — FLUTICASONE PROPIONATE 50 MCG
1 SPRAY, SUSPENSION (ML) NASAL 2 TIMES DAILY
Qty: 16 ML | Refills: 12 | Status: SHIPPED | OUTPATIENT
Start: 2024-11-18 | End: 2025-11-18

## 2024-11-18 RX ORDER — AZELASTINE 1 MG/ML
1 SPRAY, METERED NASAL 2 TIMES DAILY
Qty: 30 ML | Refills: 12 | Status: SHIPPED | OUTPATIENT
Start: 2024-11-18 | End: 2025-11-18

## 2024-11-18 NOTE — PATIENT INSTRUCTIONS
"Mucous Management  A fullness sensation in the back of the throat is called "globus."   Many people attribute this fullness to a sensation of increased mucous, because they can feel a sticky material in the throat that they will occasionally cough up.     Nasal  / Throat Mucous  Our body NORMALLY makes 1 liter or more of saliva (spit) and nasal mucous every day. These body fluids are important for breaking down the food we eat, protecting our teeth from cavities, and clearing out the pollen and irritants that we breathe in our nose. As our body makes these fluids, we swallow them throughout the day, where they are recycled back through the body. This process is happening all our life without us thinking about it. When an adjustment to this process causes the mucous to be increased or thicker, is when we notice it.      Some problems cause an INCREASE in these body fluids, which we perceive as irritating, excess phlegm in the throat.   However, it is not always an increase. Many times there is a change in CONSISTENCY of the mucous to make it thicker. This will cause the mucous to be held up in the throat instead of being swallowed easily.     Several factors can cause these problems:  Dryness of throat and nose which increases the mucous sticking - Causes include inadequate hydration, excess caffeine / soda / sugary drinks, medication side effects.  Acid reflux  Increased mucous production from allergies or chronic sinus drainage.      We will evaluate the cause(s) of increased or thickened mucous and consider different treatment strategies:     MANY COMMON medications cause dryness of the throat, including medications for allergy, depression/anxiety, heart, and urination issues.   There is some evidence that added sugars or processed sugars in the diet (not the kind that occur naturally in honey or ripe fruit) can increase mucus, as well as too much dairy. To avoid these refined carbohydrates, on food labels, watch " out for wheat flour (also called white, refined or enriched flour) on the ingredients list.      Recommendations for Increased Mucous Sensation     Water, water, water - this cannot be understated. Most people are not drinking enough water regularly to keep up with body's demand. Recommend AT LEAST 64 oz of water per day, and more for men (up to 100 oz.) unless a medical issue prevents this.  Reduce intake of caffeine / tea / sugary drinks or soda, which all will cause increased mucous.  If your nose is the source of mucus (if you must repeatedly blow mucus from your nose in order to breathe effectively through your nose daily), then nasal medications as well as nasal saline can be effective to wash away the mucus.  However if your nose is essentially open and clear (no mucus), then prevention of acid reflux is advised by avoiding late-night eating (nothing 3 hours before laying down at night), greasy and spicy foods, alcohol, and acidic foods.   Humidifier in the bedroom if you find dryness increases at night.  Smoking cessation if you use tobacco  Examination your medication list with your doctor to determine medications that may be contributing     There are NUMEROUS over the counter mucous thinning agents. Here are some suggestions that can be purchased online:  Sparks's Breezer's (Sugar Free), [employer]'s black currant pastilles, and Entertainer's Secret Throat Relief can all help dry mouth and thickened mucous.   Biotene spray and mouthwash can help lubricate a dry mouth  Mucinex can be helpful in some cases, but stop taking if you don't notice improvement after a few weeks.         Coughing is a fundamental protective reflux in response to airway irritation. When cough lasts for more than 8 weeks, it is considered chronic. The protective cough reflex is important for clearing the airway of inhaled particles. Chemical irritants or inflammatory mediators can stimulate the vagus nerve resulting in cough.      Some of the Top Considerations for Chronic Cough:     1. Nasal allergies -- Typical constellation of symptoms seen with nasal allergies: itchy, red, watery eyes; itchy, red, watery nose; excessive sneezing; excessive stuffiness. If this one best describes your current state, then discuss with your primary care provider whether you should see an allergist or take daily allergy medications.     2. Sinus Infection -- Typical constellation of symptoms seen with acute bacterial sinus infection are:  Green-gold, foul-smelling, foul-tasting mucus from nose and throat, inability to breathe through nose, inability to smell or taste well, facial pain and swelling, dental pain, headaches around eyes, sore throat and productive cough. If this one best describes your current state, then let's get sinus imaging to rule out infection.     3.  Silent reflux -- Typical constellation of symptoms seen with silent reflux: post-nasal drip sensation with absence of significant runny nose or nasal congestion, sensation of thick or too much mucus in the back of throat, raspy voice, frequent throat clearing, lump in the back of throat, frequent sore throats. If this one best describes your current state, discuss with your primary care provider whether you should see a gastroenterologist or take daily reflux medications. Your GI doctor may want to do an Upper GI or obtain a barium swallow or pH monitoring test.     4. Irritable Larynx Syndrome -- Otherwise known as Laryngeal Sensory Neuropathy. Typical constellation of symptoms:  a dry persistent cough for months or even years, coughing fits last seconds to minutes and sometimes longer, frequent throat clearing, abrupt onset that may follow a viral illness or surgery. Diagnostic tests for asthma, allergy, and reflux are all normal, and patient has not responded to maximal therapy for those conditions. This usually responds to either low-dose Elavil or Gabapentin.     5. Asthma/Pulmonary  "(Lung) issue -- Typical constellation of symptoms: wheezing, shortness of breath. Discuss with your primary care provider whether you should see a pulmonologist (lung specialist) and have Pulmonary Function Testing (PFTs) done.     6. Certain blood pressure medications known as ACE-inhibitors, such as lisinopril, can cause chronic cough. Though estimates vary in literature, 20% or more of patients taking lisinopril (or other ACE-inhibitor for blood pressure) will develop a chronic dry cough.     7. Post-Viral Cough Syndrome -- Occasionally a cough can persist for 4-8 weeks after an acute upper respiratory viral illness, due to hyperactive sensitivity of the airway nerves. A steroid inhaler and night-time cough suppressant can often help.     8. Pertussis -- Pertussis is a under-recognized cause of persistent cough in adults. A blood test can check for Bordetella.             LARYNGOPHARYNGEAL REFLUX  (SILENT OR ATYPICAL REFLUX)  If you have any of the following symptoms you may have laryngopharyngeal reflux (LPR):  frequent sore throat, thick or too much mucus, chronic throat clearing, voice changes, "lump" sensation, chronic cough, especially cough that wake you up from sleep, chronic "postnasal drip" without the need to blow your nose.      LPR is also called extra esophageal reflux. This means that if you have reflux into the tissues above the esophagus (into the voicebox,) you may experience throat issues as above.      Many people with LPR do not have symptoms of heartburn. Compared to the esophagus, the voice box and the back of the throat are significantly more sensitive to the effects of acid and digestive enzymes on surrounding tissue. Acid passing quickly through the esophagus does not have a chance to irritate the area for too long.  However acid that pools in the throat or voice box can cause prolonged irritation resulting in the symptoms of LPR.     The symptoms of LPR can consist of a dry cough and the " "sensation of something being stuck in the throat.  Some people will complain of heartburn while others may have intermittent hoarseness or loss of voice.  Another major symptom of LPR is "postnasal drip."  Patients are often told symptoms are due to abnormal nasal drainage or sinus infection; however this is rarely the cause of chronic throat irritation. For post nasal drip to cause the complaints described, signs and symptoms of an active nasal infection should be present.      Treatments for LPR include: postural changes (sleeping or laying with an incline), weight reduction, diet modification, medication to reduce stomach acid and promote normal motility, and rarely: surgery to prevent reflux. Most patients will begin to notice some relief in her symptoms about 2-4 weeks after starting the medication; however it is generally recommended the medication should be continued for at least 2 months. If the symptoms completely resolve, the medication can then be tapered.  Some people will remain symptom free while others may have relapses which required treatment again.     Things you may be able to do to prevent reflux.  1. Do not smoke.  Smoking will worsen reflux.    2. Avoid eating at least 2-3 hours prior to bedtime.  Try to avoid very large meals at night.    3. Weight loss.  For patient's with recent weight gain, shedding a few pounds is all that is required to improve reflux.    4. Avoid reflux triggers. These can worsen acid in the stomach or even cause the esophagus muscles to relax: caffeine, carbonated water or soft drinks, acidic foods (tomato, fruit), mints, alcoholic beverages, particularly at night, cheese, fried foods, spicy foods, eggs, and chocolate.    5. Sleep with the head of bed elevated at least 6 inches.  6. You may also take over-the-counter acid reducing medications.       Gaviscon can be considered as an added medication for reflux.  Gaviscon is made up of sodium alginate, a natural substance " derived from seaweed.  This substance, when swallowed, forms a barrier or raft on the surface of the stomach contents to prevent reflux of stomach contents into the esophagus or throat.  It can be an effective way to manage heartburn or gastroesophageal reflux (GERD).  The benefit is also that it is minimally absorbed into the rest of the body, so you do not have to worry about side effects outside of the stomach.  This can be taken long-term without any issues as long as tolerated well. There are two types of Gaviscon available:  Gaviscon Original can be purchased through most pharmacies or online  Gaviscon Advance is twice as strong as original (without side effects), but can only be purchased online, most easily accessible through [employer]. I find the Advance works far better than the Original.   How to take:  Read the instructions to confirm dosing, but generally it is 10 ml taken 2-3 times daily, or 1 chewable tablet 2-3 times daily.     See a Gastro doctor (GI) for refractory symptoms and continued management.

## 2024-11-19 ENCOUNTER — OFFICE VISIT (OUTPATIENT)
Dept: DERMATOLOGY | Facility: CLINIC | Age: 64
End: 2024-11-19
Payer: COMMERCIAL

## 2024-11-19 DIAGNOSIS — D22.9 MULTIPLE BENIGN NEVI: ICD-10-CM

## 2024-11-19 DIAGNOSIS — L82.1 SK (SEBORRHEIC KERATOSIS): ICD-10-CM

## 2024-11-19 DIAGNOSIS — Z85.828 HISTORY OF NONMELANOMA SKIN CANCER: ICD-10-CM

## 2024-11-19 DIAGNOSIS — L28.1 PRURIGO NODULARIS: ICD-10-CM

## 2024-11-19 DIAGNOSIS — D23.9 BLUE NEVUS: ICD-10-CM

## 2024-11-19 DIAGNOSIS — L57.0 AK (ACTINIC KERATOSIS): Primary | ICD-10-CM

## 2024-11-19 PROCEDURE — 17000 DESTRUCT PREMALG LESION: CPT | Mod: S$GLB,,, | Performed by: DERMATOLOGY

## 2024-11-19 PROCEDURE — 99999 PR PBB SHADOW E&M-EST. PATIENT-LVL III: CPT | Mod: PBBFAC,,, | Performed by: DERMATOLOGY

## 2024-11-19 PROCEDURE — 17003 DESTRUCT PREMALG LES 2-14: CPT | Mod: S$GLB,,, | Performed by: DERMATOLOGY

## 2024-11-19 PROCEDURE — 1159F MED LIST DOCD IN RCRD: CPT | Mod: CPTII,S$GLB,, | Performed by: DERMATOLOGY

## 2024-11-19 PROCEDURE — 99213 OFFICE O/P EST LOW 20 MIN: CPT | Mod: 25,S$GLB,, | Performed by: DERMATOLOGY

## 2024-11-19 PROCEDURE — 1101F PT FALLS ASSESS-DOCD LE1/YR: CPT | Mod: CPTII,S$GLB,, | Performed by: DERMATOLOGY

## 2024-11-19 PROCEDURE — 1126F AMNT PAIN NOTED NONE PRSNT: CPT | Mod: CPTII,S$GLB,, | Performed by: DERMATOLOGY

## 2024-11-19 PROCEDURE — 1160F RVW MEDS BY RX/DR IN RCRD: CPT | Mod: CPTII,S$GLB,, | Performed by: DERMATOLOGY

## 2024-11-19 PROCEDURE — 11900 INJECT SKIN LESIONS </W 7: CPT | Mod: 59,S$GLB,, | Performed by: DERMATOLOGY

## 2024-11-19 PROCEDURE — 3288F FALL RISK ASSESSMENT DOCD: CPT | Mod: CPTII,S$GLB,, | Performed by: DERMATOLOGY

## 2024-11-19 NOTE — PATIENT INSTRUCTIONS

## 2024-11-19 NOTE — PROGRESS NOTES
Subjective:      Patient ID:  Joseph Jesus is a 64 y.o. male who presents for   Chief Complaint   Patient presents with    Skin Check     UBSE      History of Present Illness: The patient presents for follow up of skin check.    The patient was last seen on: 07/02/2024  for cryosurgery for ak which have resolved     This is a high risk patient here to check for the development of new lesions.   H/o BCC- L superior forehead , SCC     Other skin complaints: none       Review of Systems   Skin:  Positive for itching, rash (rash - 15 years back), activity-related sunscreen use (sometimes) and wears hat (90%). Negative for dry skin, daily sunscreen use and recent sunburn.   Hematologic/Lymphatic: Does not bruise/bleed easily.       Objective:   Physical Exam   Constitutional: He appears well-developed and well-nourished. No distress.   Neurological: He is alert and oriented to person, place, and time. He is not disoriented.   Psychiatric: He has a normal mood and affect.   Skin:   Areas Examined (abnormalities noted in diagram):   Scalp / Hair Palpated and Inspected  Head / Face Inspection Performed  Neck Inspection Performed  Chest / Axilla Inspection Performed  Back Inspection Performed  RUE Inspected  LUE Inspection Performed                 Diagram Legend     Erythematous scaling macule/papule c/w actinic keratosis       Vascular papule c/w angioma      Pigmented verrucoid papule/plaque c/w seborrheic keratosis      Yellow umbilicated papule c/w sebaceous hyperplasia      Irregularly shaped tan macule c/w lentigo     1-2 mm smooth white papules consistent with Milia      Movable subcutaneous cyst with punctum c/w epidermal inclusion cyst      Subcutaneous movable cyst c/w pilar cyst      Firm pink to brown papule c/w dermatofibroma      Pedunculated fleshy papule(s) c/w skin tag(s)      Evenly pigmented macule c/w junctional nevus     Mildly variegated pigmented, slightly irregular-bordered macule c/w mildly  atypical nevus      Flesh colored to evenly pigmented papule c/w intradermal nevus       Pink pearly papule/plaque c/w basal cell carcinoma      Erythematous hyperkeratotic cursted plaque c/w SCC      Surgical scar with no sign of skin cancer recurrence      Open and closed comedones      Inflammatory papules and pustules      Verrucoid papule consistent consistent with wart     Erythematous eczematous patches and plaques     Dystrophic onycholytic nail with subungual debris c/w onychomycosis     Umbilicated papule    Erythematous-base heme-crusted tan verrucoid plaque consistent with inflamed seborrheic keratosis     Erythematous Silvery Scaling Plaque c/w Psoriasis     See annotation      Assessment / Plan:          Prurigo nodularis  -     triamcinolone acetonide injection 10 mg  -     TN [id number] ACETONIDE INJ PER 10MG  -     TN INJECTION INTO SKIN LESIONS, UP TO 7  Intralesional Kenalog 5mg/cc (0.8 cc total) injected into 3 lesions on the bilateral arms today after obtaining verbal consent including risk of surrounding hypopigmentation. Patient tolerated procedure well.    Units: 1  NDC for Kenalog 10mg/cc:  3005-0003-61    Discussed good skin care regimen including using a mild soap and moisturizing cream 1 - 2 times per day. Limit to one bath/shower per day and use lukewarm (not hot) water.     Discussed with patient the importance of not manipulating skin lesions. Trauma often exacerbates condition. Trimming nails is recommended to avoid puncturing skin.     Use ice to relieve intense pruritus.      SK (seborrheic keratosis)  These are benign inherited growths without a malignant potential. Reassurance given to patient. No treatment is necessary.       Multiple benign nevi   - minor problem and chronic.   Reassurance given to patient. No treatment necessary.       Blue nevus - left forearm   - minor problem and chronic.   Reassurance given to patient. No treatment necessary.       History of nonmelanoma  skin cancer  Area(s) of previous NMSC evaluated with no signs of recurrence.    Upper body skin examination performed today including at least 6 points as noted in physical examination. No lesions suspicious for malignancy noted.    Recommend daily sun protection/avoidance and use of at least SPF 30, broad spectrum sunscreen (OTC drug).         AK (actinic keratosis)  Today's Plan:      Cryosurgery Procedure Note    Verbal consent from the patient is obtained including, but not limited to, risk of hypopigmentation/hyperpigmentation, scar, recurrence of lesion. The patient is aware of the precancerous quality and need for treatment of these lesions. Liquid nitrogen cryosurgery is applied to the 3 actinic keratoses, as detailed in the physical exam, to produce a freeze injury. The patient is aware that blisters may form and is instructed on wound care with gentle cleansing and use of vaseline ointment to keep moist until healed. The patient is supplied a handout on cryosurgery and is instructed to call if lesions do not completely resolve.    Wear hat always      No follow-ups on file.

## 2024-11-19 NOTE — ASSESSMENT & PLAN NOTE
Today's Plan:      Cryosurgery Procedure Note    Verbal consent from the patient is obtained including, but not limited to, risk of hypopigmentation/hyperpigmentation, scar, recurrence of lesion. The patient is aware of the precancerous quality and need for treatment of these lesions. Liquid nitrogen cryosurgery is applied to the 3 actinic keratoses, as detailed in the physical exam, to produce a freeze injury. The patient is aware that blisters may form and is instructed on wound care with gentle cleansing and use of vaseline ointment to keep moist until healed. The patient is supplied a handout on cryosurgery and is instructed to call if lesions do not completely resolve.    Wear hat always

## 2024-12-06 ENCOUNTER — TELEPHONE (OUTPATIENT)
Dept: HEPATOLOGY | Facility: CLINIC | Age: 64
End: 2024-12-06

## 2024-12-06 ENCOUNTER — OFFICE VISIT (OUTPATIENT)
Dept: HEPATOLOGY | Facility: CLINIC | Age: 64
End: 2024-12-06
Attending: FAMILY MEDICINE
Payer: COMMERCIAL

## 2024-12-06 VITALS — BODY MASS INDEX: 30.48 KG/M2 | HEIGHT: 73 IN | WEIGHT: 230 LBS

## 2024-12-06 DIAGNOSIS — I10 HYPERTENSION, ESSENTIAL: ICD-10-CM

## 2024-12-06 DIAGNOSIS — E66.09 CLASS 1 OBESITY DUE TO EXCESS CALORIES WITH SERIOUS COMORBIDITY AND BODY MASS INDEX (BMI) OF 30.0 TO 30.9 IN ADULT: ICD-10-CM

## 2024-12-06 DIAGNOSIS — K76.0 METABOLIC DYSFUNCTION-ASSOCIATED STEATOTIC LIVER DISEASE (MASLD): Primary | ICD-10-CM

## 2024-12-06 DIAGNOSIS — E78.49 OTHER HYPERLIPIDEMIA: ICD-10-CM

## 2024-12-06 DIAGNOSIS — E66.811 CLASS 1 OBESITY DUE TO EXCESS CALORIES WITH SERIOUS COMORBIDITY AND BODY MASS INDEX (BMI) OF 30.0 TO 30.9 IN ADULT: ICD-10-CM

## 2024-12-06 DIAGNOSIS — R74.8 ELEVATED LIVER ENZYMES: ICD-10-CM

## 2024-12-06 NOTE — Clinical Note
Please schedule patient for 1. Labs, US & fibroscan in [place], preferably before the end of the year

## 2024-12-06 NOTE — TELEPHONE ENCOUNTER
----- Message from Festus Rao NP sent at 12/6/2024  2:48 PM CST -----  Please schedule patient for 1. Labs, US & fibroscan in [place], preferably before the end of the year

## 2024-12-06 NOTE — PROGRESS NOTES
The patient location is: [place], [place]  The chief complaint leading to consultation is: fatty liver    Visit type: audiovisual    Face to Face time with patient: 10 minutes  30 minutes of total time spent on the encounter, which includes face to face time and non-face to face time preparing to see the patient (eg, review of tests), Obtaining and/or reviewing separately obtained history, Documenting clinical information in the electronic or other health record, Independently interpreting results (not separately reported) and communicating results to the patient/family/caregiver, or Care coordination (not separately reported).         Each patient to whom he or she provides medical services by telemedicine is:  (1) informed of the relationship between the physician and patient and the respective role of any other health care provider with respect to management of the patient; and (2) notified that he or she may decline to receive medical services by telemedicine and may withdraw from such care at any time.    Notes:     [facility] Patient    REFERRING PROVIDER:  Dr. Nick Trejo  PCP: Nick Trejo MD     Chief Complaint:  fatty liver    Last saw by Dr. Presley 4/2022     HISTORY       HPI: This is a 64 y.o. patient with PMH noted below, presenting for follow up of  fatty liver disease     Previous serologic w/u negative for  Wild's, hemochromatosis, autoimmune etiology, and viral hepatitis.    Prior serologic workup:   Lab Results   Component Value Date    SMOOTHMUSCAB Negative 1:40 03/12/2015    IGGSERUM 877 03/12/2015    ANASCREEN Negative <1:160 03/12/2015    FERRITIN 126.5 05/10/2004    FESATURATED 27 05/10/2004    CERULOPLSM 24.6 04/15/2004    HEPBSAG Negative 03/12/2015    HEPCAB Negative 03/12/2015       Interval HPI: Presents today alone. States that he is just here to follow up on his fatty liver. No new complaints. States that he has been eating lots of carbs because in  the past when he was on the atkins diet he was told that was bad for him and gave him fatty liver. States that he is more sedentary than he should be and has a stressful job so he knows this all impacts his health.    Diet: high carbs  Exercise: none  Supplements: none    Risk factors for fatty liver include:  Obesity - current BMI 30.34  HTN - managed with medication  HLD - atorvastatin      LABS & DIAGNOSTIC STUDIES        Labs done 9/2024 show elevation transaminase levels (ALT > AST, elevated since 2004)  Platelets wnl, alk phos wnl  Synthetic liver functioning wnl    Lab Results   Component Value Date    ALT 83 (H) 09/24/2024    AST 59 (H) 09/24/2024    ALKPHOS 60 09/24/2024    BILITOT 0.4 09/24/2024    ALBUMIN 4.3 09/24/2024    INR 1.0 03/30/2023     03/01/2023       Imaging:  Abd U/S done 3/2022 noted  FINDINGS:  Liver: Normal in size, measuring 13.9 cm. Mildly increased parenchymal echogenicity suggesting fatty infiltration.  2 mildly complex septated cysts in the right hepatic lobe measuring 3.4 cm and 1.2 cm respectively.  Additional subcentimeter simple cysts elsewhere in the right hepatic lobe.  Simple cyst in the left hepatic lobe measuring 1.6 cm.     Gallbladder: No calculi, wall thickening, or pericholecystic fluid.  No sonographic Riuz's sign.     Biliary system: The common duct is not dilated, measuring 7.0 mm.  No intrahepatic ductal dilatation.     Pancreas: Visualized portions are unremarkable.     Spleen: Normal in size, measuring 8.8 cm.     Miscellaneous: No upper abdominal ascites.     Impression:     1. Fatty infiltration of the liver.  2. Multiple hepatic cysts.  3. No acute findings.    Liver fibrosis staging:  -- Fibroscan 4/2022 F0-1, <S1 (WMR689, kPa 4.8)  -- Fibroscan 2/2020 F0-1, S2 (, kPa 3.6)     No alcohol consumption, see below  Social History     Substance and Sexual Activity   Alcohol Use Not Currently    Alcohol/week: 0.0 - 1.0 standard drinks of alcohol     "Comment: No longer drinks ETOH       No immunity to Hep A and B - needs TwinRix    Denies family history of liver disease.         Allergy and medication list reviewed and updated     PMHX:  has a past medical history of Allergy, Anal fistula (hx), Arthritis, Basal cell carcinoma (04/2013), Callus, Diverticulosis, Epididymal cyst, GERD (gastroesophageal reflux disease), Hayfever, Hydrocele, right, Hyperlipidemia, Hypertension, Neck pain, PONV (postoperative nausea and vomiting), Prostatitis, Squamous cell carcinoma (01/07/2019), Squamous cell carcinoma of skin (03/2020), and Visual impairment.    PSHX:  has a past surgical history that includes Appendectomy (1962); Mass excision (2004); Hernia repair; EUA/Fistulotomy-'08; Hydrocelectomy (2013); right Spermatocelectomy (2013); Colonoscopy (12/2012); Colonoscopy (N/A, 5/19/2016); Mole removal (02/25/2019); Upper gastrointestinal endoscopy (prior to 2010); Esophagogastroduodenoscopy (N/A, 8/9/2021); Colonoscopy (N/A, 8/9/2021); Biopsy of bladder (4/19/2022); Cystoscopy (4/19/2022); Bladder fulguration (4/19/2022); Cystoscopy (6/21/2022); Cystoscopy (8/23/2022); Cystoscopy (3/8/2023); Biopsy of bladder (3/8/2023); Bladder fulguration (3/8/2023); turbt (transurethral resection of bladder tumor) (N/A, 4/4/2023); Cystoscopy (N/A, 4/4/2023); Cystoscopy (7/14/2023); Biopsy of bladder (7/14/2023); Bladder fulguration (7/14/2023); Caudal epidural steroid injection (N/A, 12/13/2023); TURBT, WITH BLUE LIGHT CYSTOSCOPY AND CYSVIEW (10/4/2024); and Cystoscopy (10/4/2024).    FAMILY HISTORY: Updated and reviewed in EPIC    ROS:   GENERAL: Denies fatigue  CARDIOVASCULAR: Denies edema  GI: Denies abdominal pain  SKIN: Denies rash, itching   NEURO: Denies confusion, memory loss, or mood changes    PHYSICAL EXAM:   In no acute distress; alert and oriented to person, place and time  VITALS: Ht 6' 1" (1.854 m)   Wt 104.3 kg (230 lb)   BMI 30.34 kg/m²   EYES: Sclerae anicteric  GI: " Soft, non-tender, non-distended. No ascites.  EXTREMITIES:  No edema.  SKIN: Warm and dry. No jaundice. No telangectasias noted. No palmar erythema.  NEURO:  No asterixis.  PSYCH:  Thought and speech pattern appropriate. Behavior normal        ASSESSMENT & PLAN        EDUCATION:  See instructions discussed with patient in Instructions section of the After Visit Summary     ASSESSMENT & PLAN:  64 y.o. male with:  1.  Fatty liver, likely related to metabolic risk factors Obesity, HTN, HLD  - see HPI  -- Immunity to Hep A and B : see HPI  -- Fibroscan 2022 noted mild steatosis and no fibrosis  -- will obtain fibroscan to reassess  -- update US soon  -- Recommendations discussed with patient:  Continue no alcohol use  2 Weight loss goal of 20-30 lbs  3. Low carb/sugar, high fiber and protein diet, limit your carb intake to LESS than 30-45 grams of carbs with a meal or LESS than 5-10 grams with any snack   4. Exercise, 5 days per week, 30 minutes per day, as tolerated  5. Recommend good cholesterol, blood pressure, blood sugar levels   6. There is a new medication called Rezdiffra for the treatment of F2-F3 liver scarring due to fatty liver. It is only indicated for those with stage 2 or 3 scar tissue (will discuss after fibroscan)    2. Elevated liver enzymes  -- likely from fatty liver  -- full sero workup negative  -- will repeat HFP soon    3. Obesity, HTN, HLD  -- Body mass index is 30.34 kg/m².   -- increases risk for fatty liver          Follow up for pending workup. with labs, US & fibroscan before  Orders Placed This Encounter   Procedures    FibroScan Transplant Hepatology(Vibration Controlled Transient Elastography)    US Abdomen Complete    Hepatic Function Panel        Thank you for allowing me to participate in the care of ANNETTE Rockwell, FNP-C  Nurse Practitioner  [facility]     I spent a total of 30 minutes on the day of the visit.This  includes face to face time and non-face to face time preparing to see the patient (eg, review of tests), obtaining and/or reviewing separately obtained history, documenting clinical information in the electronic or other health record, independently interpreting results and communicating results to the patient/family/caregiver, and coordinating care.         CC'ed note to:   Nick Trejo MD

## 2024-12-06 NOTE — TELEPHONE ENCOUNTER
Contacted the pt,scheduled lab and u/s fibroscan on the same day on 12/16/2024.Patient voice understanding.Send appt by mail for further reminder and instructions.

## 2024-12-09 NOTE — PROGRESS NOTES
[facility]  Pulmonary Clinic Note      History of Present Illness:  Joseph Jesus is a 64 y.o. male with PMHx bladder cancer, HTN, HLD, NAFLD referred to pulmonary clinic for SOB.    Patient states that for the past year he has felt as though he is unable to get a good deep breath while walking at the gym. Has had to decrease his speed on the treadmill slightly because of this. Denies any chest pain, wheeze, cough, chest tightness.      Also has had very uncomfortable BLE swelling with initiation of CCB for his BP. His PCP added lasix but he states this is not helpful. Has had to increase his shoe size due to swelling.     Also having issues with voice hoarseness - following with ENT.     Pertinent History:  Pulm medications: none  Tobacco/Vape hx: smoked from ages [age]   EtOH/illicit drug use: denies  Occupational hx:   Allergies/Rhinitis: allergic rhinitis  GERD: taking OTC digestive enzymes    Pulmonary/Cardiology Testing:    PFT 6/6/2021  FVC 4.85 (97%)  FEV1 3.68 (97%)  FEV1/FVC 76 (LLN 64)  DLCO 87%  No response to BD    CTA Chest 4/10/2020  Impression:  1. No evidence of pulmonary thromboembolism or other acute intrathoracic abnormality.  2. Mild dependent bibasilar atelectasis and scattered calcified pulmonary granulomata.  3. Small hiatal hernia.  4. Multiple hepatic hypodensities the larger of which likely reflect cysts and appears similar to most recent comparison CT of the abdomen and pelvis.    Past Medical History:   Diagnosis Date    Allergy     seasonal    Anal fistula hx    Arthritis     Basal cell carcinoma 04/2013    left superior forehead    Callus     Diverticulosis     Epididymal cyst     GERD (gastroesophageal reflux disease)     Hayfever     Hydrocele, right     Hyperlipidemia     Hypertension     Neck pain     hx of muscular inury from weight lifting---resolved now    PONV (postoperative nausea and vomiting)     Prostatitis     Dec. '12-treated with  antibx.    Squamous cell carcinoma 01/07/2019    anterior scalp    Squamous cell carcinoma of skin 03/2020    Left post scalp (SCC insitu-saucerization)    Visual impairment      Past Surgical History:   Procedure Laterality Date    APPENDECTOMY  1962    BIOPSY OF BLADDER  4/19/2022    Procedure: BIOPSY, BLADDER;  Surgeon: Lenny Ewing MD;  Location: [facility] OR 1ST FLR;  Service: Urology;;    BIOPSY OF BLADDER  3/8/2023    Procedure: BIOPSY, BLADDER;  Surgeon: Lenny Ewing MD;  Location: [facility] OR 1ST FLR;  Service: Urology;;    BIOPSY OF BLADDER  7/14/2023    Procedure: BIOPSY, BLADDER;  Surgeon: Benjamin Haile MD;  Location: [facility] OR 1ST FLR;  Service: Urology;;    BLADDER FULGURATION  4/19/2022    Procedure: FULGURATION, BLADDER;  Surgeon: Lenny Ewing MD;  Location: [facility] OR 1ST FLR;  Service: Urology;;    BLADDER FULGURATION  3/8/2023    Procedure: FULGURATION, BLADDER;  Surgeon: Lenny Ewing MD;  Location: [facility] OR 1ST FLR;  Service: Urology;;    BLADDER FULGURATION  7/14/2023    Procedure: FULGURATION, BLADDER;  Surgeon: Benjamin Haile MD;  Location: [facility] OR 1ST FLR;  Service: Urology;;    CAUDAL EPIDURAL STEROID INJECTION N/A 12/13/2023    Procedure: Injection-steroid-epidural-caudal;  Surgeon: Devante Boone MD;  Location: [facility] OR;  Service: Pain Management;  Laterality: N/A;    COLONOSCOPY  12/2012    due for repeat 12/2015    COLONOSCOPY N/A 5/19/2016    Procedure: COLONOSCOPY;  Surgeon: James Webber MD;  Location: [facility] ENDO (4TH FLR);  Service: Endoscopy;  Laterality: N/A; diverticulosis, repeat in 5-10 years for surveillance    COLONOSCOPY N/A 8/9/2021    Procedure: COLONOSCOPY;  Surgeon: Fahad Bautista Jr., MD;  Location: [facility] ENDO;  Service: Endoscopy;  Laterality: N/A;    CYSTOSCOPY  4/19/2022    Procedure: CYSTOSCOPY;  Surgeon: Lenny Ewing MD;  Location: [facility] OR 1ST FLR;  Service: Urology;;    CYSTOSCOPY  6/21/2022    Procedure: CYSTOSCOPY;  Surgeon: Lenny Ewing MD;   Location: [facility] OR [facility] FLR;  Service: Urology;;    CYSTOSCOPY  8/23/2022    Procedure: CYSTOSCOPY;  Surgeon: Lenny Ewing MD;  [facility];  Service: Urology;;    CYSTOSCOPY  3/8/2023    Procedure: CYSTOSCOPY-BLUE LIGHT;  Surgeon: Lenny Ewing MD;  [facility];  Service: Urology;;    CYSTOSCOPY N/A 4/4/2023    Procedure: CYSTOSCOPY;  Surgeon: Benjamin Haile MD;  [facility];  Service: Urology;  Laterality: N/A;  1 hour    CYSTOSCOPY  7/14/2023    Procedure: CYSTOSCOPY;  Surgeon: Benjamin Haile MD;  [facility];  Service: Urology;;  BLUE LIGHT    CYSTOSCOPY  10/4/2024    Procedure: CYSTOSCOPY;  Surgeon: Benjamin Haile MD;  Location: [facility] OR [address] [place] [address];  Service: Urology;;    ESOPHAGOGASTRODUODENOSCOPY N/A 8/9/2021    Procedure: EGD (ESOPHAGOGASTRODUODENOSCOPY);  Surgeon: Fahad Bautista Jr., MD;  Location: [facility];  Service: Endoscopy;  Laterality: N/A;    EUA/Fistulotomy-'08      HERNIA REPAIR      RIH with mesh    Hydrocelectomy  2013    MASS EXCISION  2004    BCC removal (twice)    MOLE REMOVAL  02/25/2019    2 moles removed from scalp =- 1 was basal cell and 1 was squamous cell - dermatology - Dr. londono    right Spermatocelectomy  2013    TURBT (TRANSURETHRAL RESECTION OF BLADDER TUMOR) N/A 4/4/2023    Procedure: TURBT (TRANSURETHRAL RESECTION OF BLADDER TUMOR);  Surgeon: Benjamin Haile MD;  Location: [address];  Service: Urology;  Laterality: N/A;  1 hour, blue light    TURBT, WITH BLUE LIGHT CYSTOSCOPY AND CYSVIEW  10/4/2024    Procedure: TURBT,WITH BLUE LIGHT CYSTOSCOPY AND CYSVIEW;  Surgeon: Benjamin Haile MD;  Location: [address];  Service: Urology;;    UPPER GASTROINTESTINAL ENDOSCOPY  prior to 2010    hiatal hernia, reflux esophagitis     Family History   Problem Relation Name Age of Onset    Skin cancer Mother      Hypertension Mother      Skin cancer Father      Cancer Father          prostate cancer    Hypertension Father       Hypertension Maternal Grandmother      Hypertension Maternal Grandfather      Hypertension Paternal Grandmother      Anesthesia problems Paternal Grandmother      Cancer Paternal Grandfather          prostate cancer    Hypertension Paternal Grandfather      Heart disease Paternal Grandfather      Diabetes Neg Hx      Colon polyps Neg Hx      Colon cancer Neg Hx      Melanoma Neg Hx      Psoriasis Neg Hx      Lupus Neg Hx      Eczema Neg Hx      Acne Neg Hx      Cirrhosis Neg Hx      Prostate cancer Neg Hx      Kidney disease Neg Hx      Crohn's disease Neg Hx      Ulcerative colitis Neg Hx      Stomach cancer Neg Hx      Esophageal cancer Neg Hx       Social History     Socioeconomic History    Marital status:      Spouse name: Traci    Number of children: 1   Occupational History    Occupation: Software Eng.     Employer: [employer]     Comment: [employer]   Tobacco Use    Smoking status: Former     Current packs/day: 0.00     Average packs/day: 1 pack/day for 10.0 years (10.0 ttl pk-yrs)     Types: Cigarettes     Start date: 1977     Quit date: 1987     Years since quittin.0    Smokeless tobacco: Never    Tobacco comments:     Decreased lung compassity per patient   Substance and Sexual Activity    Alcohol use: Not Currently     Alcohol/week: 0.0 - 1.0 standard drinks of alcohol     Comment: No longer drinks ETOH    Drug use: No    Sexual activity: Yes     Partners: Female     Social Drivers of Health     Financial Resource Strain: Medium Risk (2023)    Overall Financial Resource Strain (CARDIA)     Difficulty of Paying Living Expenses: Somewhat hard   Food Insecurity: No Food Insecurity (2023)    Hunger Vital Sign     Worried About Running Out of Food in the Last Year: Never true     Ran Out of Food in the Last Year: Never true   Transportation Needs: No Transportation Needs (2023)    PRAPARE - Transportation     Lack of Transportation (Medical): No     Lack of Transportation  "(Non-Medical): No   Physical Activity: Insufficiently Active (11/20/2023)    Exercise Vital Sign     Days of Exercise per Week: 1 day     Minutes of Exercise per Session: 40 min   Stress: Stress Concern Present (11/20/2023)    [place] [employer] - Occupational Stress Questionnaire     Feeling of Stress : To some extent   Housing Stability: Low Risk  (11/20/2023)    Housing Stability Vital Sign     Unable to Pay for Housing in the Last Year: No     Number of Places Lived in the Last Year: 1     Unstable Housing in the Last Year: No     Review of patient's allergies indicates:   Allergen Reactions    Amlodipine      edema    Atenolol      Depression - circa 2000    Catechu herb     Irbesartan Other (See Comments)     Possibly caused cough    Erythromycin Rash    Sumycin [tetracycline] Rash    Tetracyclines Rash       Review of Systems:  Review of Systems   Constitutional:  Positive for malaise/fatigue. Negative for chills, fever and weight loss.   HENT:  Positive for congestion. Negative for sore throat.    Respiratory:  Positive for shortness of breath. Negative for cough and wheezing.    Cardiovascular:  Positive for leg swelling. Negative for chest pain.   Gastrointestinal:  Negative for abdominal pain, nausea and vomiting.   Skin:  Negative for rash.   Neurological:  Negative for seizures and headaches.   All other systems reviewed and are negative.         OBJECTIVE:     Vital Signs  Vitals:    12/10/24 1349   BP: (!) 146/92   BP Location: Left arm   Patient Position: Sitting   Pulse: 70   SpO2: 98%   Weight: 104.8 kg (231 lb)   Height: 6' 1" (1.854 m)     Body mass index is 30.48 kg/m².    Physical Exam:  Physical Exam  Vitals reviewed.   Constitutional:       General: He is not in acute distress.     Appearance: He is not toxic-appearing.   HENT:      Head: Normocephalic and atraumatic.      Mouth/Throat:      Mouth: Mucous membranes are moist.      Pharynx: Oropharynx is clear.   Eyes:    "   Extraocular Movements: Extraocular movements intact.      Conjunctiva/sclera: Conjunctivae normal.   Cardiovascular:      Rate and Rhythm: Normal rate and regular rhythm.      Pulses: Normal pulses.      Heart sounds: No murmur heard.  Pulmonary:      Effort: Pulmonary effort is normal. No respiratory distress.      Breath sounds: Normal breath sounds. No wheezing or rhonchi.   Musculoskeletal:      Right lower leg: Edema present.      Left lower leg: Edema present.   Skin:     General: Skin is warm and dry.      Capillary Refill: Capillary refill takes less than 2 seconds.   Neurological:      Mental Status: He is alert and oriented to person, place, and time.          Laboratory:  CBC  Lab Results   Component Value Date    WBC 7.31 03/01/2023    HGB 13.7 (L) 03/01/2023    HCT 42.6 03/01/2023     03/01/2023    MCV 94 03/01/2023    RDW 13.7 03/01/2023     BMP  Lab Results   Component Value Date     09/24/2024    K 5.2 (H) 09/24/2024     09/24/2024    CO2 26 09/24/2024    BUN 22 09/24/2024    CREATININE 1.6 (H) 09/24/2024    GLU 90 09/24/2024    CALCIUM 10.3 09/24/2024     LFTs  Lab Results   Component Value Date    PROT 7.4 09/24/2024    ALBUMIN 4.3 09/24/2024    BILITOT 0.4 09/24/2024    AST 59 (H) 09/24/2024    ALKPHOS 60 09/24/2024    ALT 83 (H) 09/24/2024    GGT 18 03/12/2015         ASSESSMENT/PLAN:       Problem List Items Addressed This Visit       Hypertension, essential    Current Assessment & Plan     - on CCB which he states is not managing his BP well and causes very uncomfortable BLE swelling  - requesting referral to cardiology for HTN management, have ordered         Shortness of breath - Primary    Current Assessment & Plan     - walks regularly at gym, feels like he can't take a deep breath  - normal PFTs performed 2021, all chest imaging in past unremarkable  - remote smoking history, < 10 pack years  - will repeat PFTs at this time, if normal may look into TTE or CPET at next  visit         Relevant Orders    Complete PFT with bronchodilator    Hoarseness of voice    Current Assessment & Plan     - following with ENT  - using azelastine and flonase          Other Visit Diagnoses       Cough, unspecified type        smoked age [age] none since, some degree of WALDO    Hypertension, unspecified type        Relevant Orders    Ambulatory referral/consult to Cardiology           RTC in 3 months     Alethea Reyes MD  Pulmonary/Critical Care  [facility]

## 2024-12-10 ENCOUNTER — OFFICE VISIT (OUTPATIENT)
Dept: PULMONOLOGY | Facility: CLINIC | Age: 64
End: 2024-12-10
Payer: COMMERCIAL

## 2024-12-10 VITALS
HEART RATE: 70 BPM | DIASTOLIC BLOOD PRESSURE: 92 MMHG | BODY MASS INDEX: 30.62 KG/M2 | HEIGHT: 73 IN | SYSTOLIC BLOOD PRESSURE: 146 MMHG | WEIGHT: 231 LBS | OXYGEN SATURATION: 98 %

## 2024-12-10 DIAGNOSIS — R05.9 COUGH, UNSPECIFIED TYPE: ICD-10-CM

## 2024-12-10 DIAGNOSIS — I10 HYPERTENSION, UNSPECIFIED TYPE: ICD-10-CM

## 2024-12-10 DIAGNOSIS — I10 HYPERTENSION, ESSENTIAL: ICD-10-CM

## 2024-12-10 DIAGNOSIS — R49.0 HOARSENESS OF VOICE: ICD-10-CM

## 2024-12-10 DIAGNOSIS — R06.02 SHORTNESS OF BREATH: Primary | ICD-10-CM

## 2024-12-10 PROCEDURE — 3077F SYST BP >= 140 MM HG: CPT | Mod: CPTII,S$GLB,, | Performed by: STUDENT IN AN ORGANIZED HEALTH CARE EDUCATION/TRAINING PROGRAM

## 2024-12-10 PROCEDURE — 3008F BODY MASS INDEX DOCD: CPT | Mod: CPTII,S$GLB,, | Performed by: STUDENT IN AN ORGANIZED HEALTH CARE EDUCATION/TRAINING PROGRAM

## 2024-12-10 PROCEDURE — 3080F DIAST BP >= 90 MM HG: CPT | Mod: CPTII,S$GLB,, | Performed by: STUDENT IN AN ORGANIZED HEALTH CARE EDUCATION/TRAINING PROGRAM

## 2024-12-10 PROCEDURE — 3288F FALL RISK ASSESSMENT DOCD: CPT | Mod: CPTII,S$GLB,, | Performed by: STUDENT IN AN ORGANIZED HEALTH CARE EDUCATION/TRAINING PROGRAM

## 2024-12-10 PROCEDURE — 1101F PT FALLS ASSESS-DOCD LE1/YR: CPT | Mod: CPTII,S$GLB,, | Performed by: STUDENT IN AN ORGANIZED HEALTH CARE EDUCATION/TRAINING PROGRAM

## 2024-12-10 PROCEDURE — 99999 PR PBB SHADOW E&M-EST. PATIENT-LVL IV: CPT | Mod: PBBFAC,,, | Performed by: STUDENT IN AN ORGANIZED HEALTH CARE EDUCATION/TRAINING PROGRAM

## 2024-12-10 PROCEDURE — 99203 OFFICE O/P NEW LOW 30 MIN: CPT | Mod: S$GLB,,, | Performed by: STUDENT IN AN ORGANIZED HEALTH CARE EDUCATION/TRAINING PROGRAM

## 2024-12-10 PROCEDURE — 1159F MED LIST DOCD IN RCRD: CPT | Mod: CPTII,S$GLB,, | Performed by: STUDENT IN AN ORGANIZED HEALTH CARE EDUCATION/TRAINING PROGRAM

## 2024-12-10 NOTE — ASSESSMENT & PLAN NOTE
- on CCB which he states is not managing his BP well and causes very uncomfortable BLE swelling  - requesting referral to cardiology for HTN management, have ordered

## 2024-12-10 NOTE — ASSESSMENT & PLAN NOTE
- walks regularly at gym, feels like he can't take a deep breath  - normal PFTs performed 2021, all chest imaging in past unremarkable  - remote smoking history, < 10 pack years  - will repeat PFTs at this time, if normal may look into TTE or CPET at next visit

## 2024-12-16 ENCOUNTER — HOSPITAL ENCOUNTER (OUTPATIENT)
Dept: RADIOLOGY | Facility: HOSPITAL | Age: 64
Discharge: HOME | End: 2024-12-16
Payer: COMMERCIAL

## 2024-12-16 ENCOUNTER — PROCEDURE VISIT (OUTPATIENT)
Facility: CLINIC | Age: 64
End: 2024-12-16
Payer: COMMERCIAL

## 2024-12-16 DIAGNOSIS — R74.8 ELEVATED LIVER ENZYMES: ICD-10-CM

## 2024-12-16 DIAGNOSIS — K76.0 METABOLIC DYSFUNCTION-ASSOCIATED STEATOTIC LIVER DISEASE (MASLD): ICD-10-CM

## 2024-12-16 PROCEDURE — 91200 LIVER ELASTOGRAPHY: CPT | Mod: S$GLB,,,

## 2024-12-16 PROCEDURE — 76700 US EXAM ABDOM COMPLETE: CPT | Mod: TC,PO

## 2024-12-16 PROCEDURE — 76700 US EXAM ABDOM COMPLETE: CPT | Mod: 26,,, | Performed by: STUDENT IN AN ORGANIZED HEALTH CARE EDUCATION/TRAINING PROGRAM

## 2024-12-16 NOTE — PROCEDURES
FibroScan Transplant Hepatology(Vibration Controlled Transient Elastography)    Date/Time: 12/16/2024 8:00 AM    Performed by: Festus Rao NP  Authorized by: Festus Rao NP    Diagnosis:  NAFLD    Probe:  XL    Universal Protocol: Patient's identity, procedure and site were verified, confirmatory pause was performed.  Discussed procedure including risks and potential complications.  Questions answered.  Patient verbalizes understanding and wishes to proceed with VCTE.     Procedure: After providing explanations of the procedure, patient was placed in the supine position with right arm in maximum abduction to allow optimal exposure of right lateral abdomen.  Patient was briefly assessed, Testing was performed in the mid-axillary location, 50Hz Shear Wave pulses were applied and the resulting Shear Wave and Propagation Speed detected with a 3.5 MHz ultrasonic signal, using the FibroScan probe, Skin to liver capsule distance and liver parenchyma were accessed during the entire examination with the FibroScan probe, Patient was instructed to breathe normally and to abstain from sudden movements during the procedure, allowing for random measurements of liver stiffness. At least 10 Shear Waves were produced, Individual measurements of each Shear Wave were calculated.  Patient tolerated the procedure well with no complications.  Meets discharge criteria as was dismissed.  Rates pain 0 out of 10.  Patient will follow up with ordering provider to review results.    Findings  Median liver stiffness score:  3.3  CAP Reading: dB/m:  242    IQR/med %:  12  Interpretation  Fibrosis interpretation is based on medial liver stiffness - Kilopascal (kPa).    Fibrosis Stage:  F 0-1  Steatosis interpretation is based on controlled attenuation parameter - (dB/m).    Steatosis Grade:  S1

## 2024-12-17 ENCOUNTER — TELEPHONE (OUTPATIENT)
Dept: HEPATOLOGY | Facility: CLINIC | Age: 64
End: 2024-12-17
Payer: COMMERCIAL

## 2024-12-17 DIAGNOSIS — K76.0 METABOLIC DYSFUNCTION-ASSOCIATED STEATOTIC LIVER DISEASE (MASLD): Primary | ICD-10-CM

## 2024-12-17 NOTE — TELEPHONE ENCOUNTER
----- Message from Festus Rao NP sent at 12/17/2024  8:49 AM CST -----  Please schedule patient for follow up in 1 year with fibroscan same day and labs 1 week prior

## 2024-12-20 ENCOUNTER — HOSPITAL ENCOUNTER (OUTPATIENT)
Dept: PULMONOLOGY | Facility: HOSPITAL | Age: 64
Discharge: HOME | End: 2024-12-20
Attending: STUDENT IN AN ORGANIZED HEALTH CARE EDUCATION/TRAINING PROGRAM
Payer: COMMERCIAL

## 2024-12-20 ENCOUNTER — OFFICE VISIT (OUTPATIENT)
Dept: CARDIOLOGY | Facility: CLINIC | Age: 64
End: 2024-12-20
Payer: COMMERCIAL

## 2024-12-20 VITALS
HEIGHT: 73 IN | DIASTOLIC BLOOD PRESSURE: 87 MMHG | SYSTOLIC BLOOD PRESSURE: 144 MMHG | OXYGEN SATURATION: 97 % | WEIGHT: 231.69 LBS | BODY MASS INDEX: 30.71 KG/M2 | HEART RATE: 59 BPM

## 2024-12-20 DIAGNOSIS — R06.02 SHORTNESS OF BREATH: ICD-10-CM

## 2024-12-20 DIAGNOSIS — I10 HYPERTENSION, UNSPECIFIED TYPE: ICD-10-CM

## 2024-12-20 DIAGNOSIS — R79.89 ELEVATED LFTS: ICD-10-CM

## 2024-12-20 DIAGNOSIS — N17.9 AKI (ACUTE KIDNEY INJURY): Primary | ICD-10-CM

## 2024-12-20 DIAGNOSIS — N20.0 NEPHROLITHIASIS: ICD-10-CM

## 2024-12-20 LAB
DLCO SINGLE BREATH LLN: 23.57
DLCO SINGLE BREATH PRE REF: 85 %
DLCO SINGLE BREATH REF: 30.5
DLCOC SBVA LLN: 2.87
DLCOC SBVA REF: 3.94
DLCOC SINGLE BREATH LLN: 23.57
DLCOC SINGLE BREATH REF: 30.5
DLCOVA LLN: 2.87
DLCOVA PRE REF: 86.9 %
DLCOVA PRE: 3.43 ML/(MIN*MMHG*L) (ref 2.87–5.02)
DLCOVA REF: 3.94
ERV LLN: -16448.81
ERV PRE REF: 201.4 %
ERV REF: 1.19
FEF 25 75 LLN: 1.75
FEF 25 75 PRE REF: 75.4 %
FEF 25 75 REF: 3.46
FEV1 FVC LLN: 63
FEV1 FVC PRE REF: 94.7 %
FEV1 FVC REF: 76
FEV1 LLN: 2.68
FEV1 PRE REF: 102.9 %
FEV1 REF: 3.66
FRCPLETH LLN: 2.86
FRCPLETH PREREF: 97.5 %
FRCPLETH REF: 3.84
FVC LLN: 3.63
FVC PRE REF: 108.1 %
FVC REF: 4.84
IVC PRE: 5.48 L (ref 3.63–6.07)
IVC SINGLE BREATH LLN: 3.63
IVC SINGLE BREATH PRE REF: 113.1 %
IVC SINGLE BREATH REF: 4.84
PEF LLN: 6.91
PEF PRE REF: 94 %
PEF REF: 9.43
PRE DLCO: 25.93 ML/(MIN*MMHG) (ref 23.57–37.43)
PRE ERV: 2.4 L (ref -16448.81–16451.19)
PRE FEF 25 75: 2.61 L/S (ref 1.75–5.17)
PRE FET 100: 9.98 SEC
PRE FEV1 FVC: 71.93 % (ref 63.1–87.24)
PRE FEV1: 3.76 L (ref 2.68–4.58)
PRE FRC PL: 3.75 L (ref 2.86–4.83)
PRE FVC: 5.23 L (ref 3.63–6.07)
PRE PEF: 8.86 L/S (ref 6.91–11.95)
PRE RV: 1.34 L (ref 1.98–3.33)
PRE TLC: 6.82 L (ref 6.58–8.89)
RAW LLN: 3.06
RAW PRE REF: 189.3 %
RAW PRE: 5.79 CMH2O*S/L (ref 3.06–3.06)
RAW REF: 3.06
RV LLN: 1.98
RV PRE REF: 50.7 %
RV REF: 2.65
RVTLC LLN: 31
RVTLC PRE REF: 49.7 %
RVTLC PRE: 19.72 % (ref 30.72–48.68)
RVTLC REF: 40
TLC LLN: 6.58
TLC PRE REF: 88.2 %
TLC REF: 7.74
VA PRE: 7.57 L (ref 7.59–7.59)
VA SINGLE BREATH LLN: 7.59
VA SINGLE BREATH PRE REF: 99.8 %
VA SINGLE BREATH REF: 7.59
VC LLN: 3.63
VC PRE REF: 113.1 %
VC PRE: 5.48 L (ref 3.63–6.07)
VC REF: 4.84

## 2024-12-20 PROCEDURE — 93000 ELECTROCARDIOGRAM COMPLETE: CPT | Mod: S$GLB,,, | Performed by: INTERNAL MEDICINE

## 2024-12-20 PROCEDURE — 94726 PLETHYSMOGRAPHY LUNG VOLUMES: CPT

## 2024-12-20 PROCEDURE — 99204 OFFICE O/P NEW MOD 45 MIN: CPT | Mod: S$GLB,,, | Performed by: INTERNAL MEDICINE

## 2024-12-20 PROCEDURE — 3008F BODY MASS INDEX DOCD: CPT | Mod: CPTII,S$GLB,, | Performed by: INTERNAL MEDICINE

## 2024-12-20 PROCEDURE — 94668 MNPJ CHEST WALL SBSQ: CPT

## 2024-12-20 PROCEDURE — 94010 BREATHING CAPACITY TEST: CPT

## 2024-12-20 PROCEDURE — 1126F AMNT PAIN NOTED NONE PRSNT: CPT | Mod: CPTII,S$GLB,, | Performed by: INTERNAL MEDICINE

## 2024-12-20 PROCEDURE — 3288F FALL RISK ASSESSMENT DOCD: CPT | Mod: CPTII,S$GLB,, | Performed by: INTERNAL MEDICINE

## 2024-12-20 PROCEDURE — 3079F DIAST BP 80-89 MM HG: CPT | Mod: CPTII,S$GLB,, | Performed by: INTERNAL MEDICINE

## 2024-12-20 PROCEDURE — 4010F ACE/ARB THERAPY RXD/TAKEN: CPT | Mod: CPTII,S$GLB,, | Performed by: INTERNAL MEDICINE

## 2024-12-20 PROCEDURE — 99999 PR PBB SHADOW E&M-EST. PATIENT-LVL IV: CPT | Mod: PBBFAC,,, | Performed by: INTERNAL MEDICINE

## 2024-12-20 PROCEDURE — 1159F MED LIST DOCD IN RCRD: CPT | Mod: CPTII,S$GLB,, | Performed by: INTERNAL MEDICINE

## 2024-12-20 PROCEDURE — 3077F SYST BP >= 140 MM HG: CPT | Mod: CPTII,S$GLB,, | Performed by: INTERNAL MEDICINE

## 2024-12-20 PROCEDURE — 1101F PT FALLS ASSESS-DOCD LE1/YR: CPT | Mod: CPTII,S$GLB,, | Performed by: INTERNAL MEDICINE

## 2024-12-20 RX ORDER — ISOSORBIDE MONONITRATE 60 MG/1
60 TABLET, EXTENDED RELEASE ORAL DAILY
Qty: 30 TABLET | Refills: 11 | Status: SHIPPED | OUTPATIENT
Start: 2024-12-20 | End: 2025-12-20

## 2024-12-20 RX ORDER — ALBUTEROL SULFATE 90 UG/1
2 INHALANT RESPIRATORY (INHALATION) EVERY 6 HOURS PRN
COMMUNITY
Start: 2024-10-16

## 2024-12-23 ENCOUNTER — PATIENT MESSAGE (OUTPATIENT)
Dept: CARDIOLOGY | Facility: CLINIC | Age: 64
End: 2024-12-23
Payer: COMMERCIAL

## 2024-12-23 DIAGNOSIS — N17.9 AKI (ACUTE KIDNEY INJURY): Primary | ICD-10-CM

## 2024-12-23 DIAGNOSIS — I10 HYPERTENSION, UNSPECIFIED TYPE: ICD-10-CM

## 2024-12-23 LAB
OHS QRS DURATION: 94 MS
OHS QTC CALCULATION: 402 MS

## 2024-12-24 LAB
BRPFT: ABNORMAL
DLCO SINGLE BREATH LLN: 23.57
DLCO SINGLE BREATH PRE REF: 85 %
DLCO SINGLE BREATH REF: 30.5
DLCOC SBVA LLN: 2.87
DLCOC SBVA REF: 3.94
DLCOC SINGLE BREATH LLN: 23.57
DLCOC SINGLE BREATH REF: 30.5
DLCOVA LLN: 2.87
DLCOVA PRE REF: 86.9 %
DLCOVA PRE: 3.43 ML/(MIN*MMHG*L) (ref 2.87–5.02)
DLCOVA REF: 3.94
ERV LLN: -16448.81
ERV PRE REF: 201.4 %
ERV REF: 1.19
FEF 25 75 LLN: 1.75
FEF 25 75 PRE REF: 75.4 %
FEF 25 75 REF: 3.46
FEV1 FVC LLN: 63
FEV1 FVC PRE REF: 94.7 %
FEV1 FVC REF: 76
FEV1 LLN: 2.68
FEV1 PRE REF: 102.9 %
FEV1 REF: 3.66
FRCPLETH LLN: 2.86
FRCPLETH PREREF: 97.5 %
FRCPLETH REF: 3.84
FVC LLN: 3.63
FVC PRE REF: 108.1 %
FVC REF: 4.84
IVC PRE: 5.48 L (ref 3.63–6.07)
IVC SINGLE BREATH LLN: 3.63
IVC SINGLE BREATH PRE REF: 113.1 %
IVC SINGLE BREATH REF: 4.84
PEF LLN: 6.91
PEF PRE REF: 94 %
PEF REF: 9.43
PRE DLCO: 25.93 ML/(MIN*MMHG) (ref 23.57–37.43)
PRE ERV: 2.4 L (ref -16448.81–16451.19)
PRE FEF 25 75: 2.61 L/S (ref 1.75–5.17)
PRE FET 100: 9.98 SEC
PRE FEV1 FVC: 71.93 % (ref 63.1–87.24)
PRE FEV1: 3.76 L (ref 2.68–4.58)
PRE FRC PL: 3.75 L (ref 2.86–4.83)
PRE FVC: 5.23 L (ref 3.63–6.07)
PRE PEF: 8.86 L/S (ref 6.91–11.95)
PRE RV: 1.34 L (ref 1.98–3.33)
PRE TLC: 6.82 L (ref 6.58–8.89)
RAW LLN: 3.06
RAW PRE REF: 189.3 %
RAW PRE: 5.79 CMH2O*S/L (ref 3.06–3.06)
RAW REF: 3.06
RV LLN: 1.98
RV PRE REF: 50.7 %
RV REF: 2.65
RVTLC LLN: 31
RVTLC PRE REF: 49.7 %
RVTLC PRE: 19.72 % (ref 30.72–48.68)
RVTLC REF: 40
TLC LLN: 6.58
TLC PRE REF: 88.2 %
TLC REF: 7.74
VA PRE: 7.57 L (ref 7.59–7.59)
VA SINGLE BREATH LLN: 7.59
VA SINGLE BREATH PRE REF: 99.8 %
VA SINGLE BREATH REF: 7.59
VC LLN: 3.63
VC PRE REF: 113.1 %
VC PRE: 5.48 L (ref 3.63–6.07)
VC REF: 4.84

## 2024-12-24 PROCEDURE — 94010 BREATHING CAPACITY TEST: CPT | Mod: 26,,, | Performed by: INTERNAL MEDICINE

## 2024-12-24 PROCEDURE — 94729 DIFFUSING CAPACITY: CPT | Mod: 26,,, | Performed by: INTERNAL MEDICINE

## 2024-12-24 PROCEDURE — 94726 PLETHYSMOGRAPHY LUNG VOLUMES: CPT | Mod: 26,,, | Performed by: INTERNAL MEDICINE

## 2024-12-27 ENCOUNTER — LAB VISIT (OUTPATIENT)
Dept: LAB | Facility: HOSPITAL | Age: 64
End: 2024-12-27
Attending: INTERNAL MEDICINE
Payer: COMMERCIAL

## 2024-12-27 DIAGNOSIS — N17.9 AKI (ACUTE KIDNEY INJURY): ICD-10-CM

## 2024-12-27 LAB
ALBUMIN SERPL BCP-MCNC: 3.7 G/DL (ref 3.5–5.2)
ALP SERPL-CCNC: 63 U/L (ref 40–150)
ALT SERPL W/O P-5'-P-CCNC: 30 U/L (ref 10–44)
ANION GAP SERPL CALC-SCNC: 9 MMOL/L (ref 8–16)
AST SERPL-CCNC: 29 U/L (ref 10–40)
BILIRUB SERPL-MCNC: 0.4 MG/DL (ref 0.1–1)
BUN SERPL-MCNC: 17 MG/DL (ref 8–23)
CALCIUM SERPL-MCNC: 9.7 MG/DL (ref 8.7–10.5)
CHLORIDE SERPL-SCNC: 104 MMOL/L (ref 95–110)
CO2 SERPL-SCNC: 26 MMOL/L (ref 23–29)
CREAT SERPL-MCNC: 1.2 MG/DL (ref 0.5–1.4)
EST. GFR  (NO RACE VARIABLE): >60 ML/MIN/1.73 M^2
GLUCOSE SERPL-MCNC: 99 MG/DL (ref 70–110)
POTASSIUM SERPL-SCNC: 4.5 MMOL/L (ref 3.5–5.1)
PROT SERPL-MCNC: 6.9 G/DL (ref 6–8.4)
SODIUM SERPL-SCNC: 139 MMOL/L (ref 136–145)

## 2024-12-27 PROCEDURE — 80053 COMPREHEN METABOLIC PANEL: CPT | Performed by: INTERNAL MEDICINE

## 2024-12-27 PROCEDURE — 36415 COLL VENOUS BLD VENIPUNCTURE: CPT | Mod: PO | Performed by: INTERNAL MEDICINE

## 2024-12-28 RX ORDER — LOSARTAN POTASSIUM 50 MG/1
50 TABLET ORAL DAILY
Qty: 90 TABLET | Refills: 3 | Status: SHIPPED | OUTPATIENT
Start: 2024-12-28 | End: 2025-12-28

## 2024-12-28 NOTE — TELEPHONE ENCOUNTER
Rechecking CMP tomorrow.      If renal function back to baseline and potassium normal recommend restarting losartan.        Pilar, can you please remind me tomorrow about medication adjustment for the patient.  
Lab workup shows improvement in renal function.  Cough is likely coming from his sinus problems rather than irbesartan.  Starting losartan at 50 mg daily.  Repeat BMP in 2 weeks.  Please make a virtual appointment for him in 2 weeks after blood test  
Previously Negative (within the last year)

## 2024-12-31 NOTE — PROGRESS NOTES
Subjective:   @Patient ID:  Joseph Jesus is a 64 y.o. male who presents for evaluation of No chief complaint on file.      HPI:       64-year-old male past medical history of   -hyperlipidemia on Lipitor 10 mg, LDL 88  -no abnormalities on DOREEN in 2022  -nephrolithiasis  -elevated LFTs  -mild TERESA  -bilateral lower extremity swelling  -hypertension on nifedipine and HCTZ.      Here in the cardiology clinic for management of hypertension.  Previous EKG showing possible left anterior fascicular block with inferolateral ST segment changes.  Physically very active.  No symptoms concerning for angina.  Does have bilateral lower extremity swelling.  Has previously been on irbesartan.  Has chronic sinus congestion with possible chronic cough.  Previously had significant side effects with atenolol/beta blocker therapy.    Results for orders placed in visit on 03/28/22    Echo    Interpretation Summary  · The left ventricle is normal in size with normal systolic function.  · Mild left atrial enlargement.  · The estimated ejection fraction is 60%.  · Normal left ventricular diastolic function.  · Normal right ventricular size with normal right ventricular systolic function.  · Aortic Valve Aortic valve sclerosis is mild. There is normal leaflet mobility. There is aortic valve sclerosis without significant stenosis.  · The mitral valve appears structurally normal. There is normal leaflet mobility.  · Tricuspid Valve The tricuspid valve appears structurally normal. There is normal leaflet mobility. There is trace regurgitation.  · Normal central venous pressure (3 mmHg).  · The estimated PA systolic pressure is 26 mmHg.      No results found for this or any previous visit.      No results found for this or any previous visit.      Please document below the medical necessity for continuous telemetry monitoring or discontinue the current order if appropriate.    Current rhythm from flowsheet:               Patient Active Problem  "List    Diagnosis Date Noted    Shortness of breath 12/10/2024    Hoarseness of voice 12/10/2024    Class 1 obesity due to excess calories with serious comorbidity and body mass index (BMI) of 30.0 to 30.9 in adult 12/06/2024    Elevated liver enzymes 12/06/2024    Adjustment disorder with anxious mood 05/17/2022    Numbness and tingling of foot 05/17/2022    CKD stage 3a, GFR 45-59 ml/min 05/17/2022    Malignant neoplasm of lateral wall of urinary bladder 05/11/2022     -followed by urology  -microhematuria w/u found erythematous lesion with a positive cytology on 4/19/22 bladder biopsy showing CIS. TURBT on 6/21/22 showing CIS.  Muscle was present.   -TURBT performed 8/23/22. Dr. Ewing went over pathology demonstrating cancer (CIS is high grade and BCG recommend)  -had 5 treatments. "He is high risk. If he responds, he will need maintenance until 9/2025."  -TUR 3/8/23 (pos bx) and 4/4/2023, 7/14/2023 (neg bx) - BCG ?? Re-Rx      Edema leg 03/28/2022    Aortic atherosclerosis 01/28/2022     -noted on CT abd 5/7/2020, on statin      Coronary artery calcification 01/28/2022     -CTA 4/10/2020 - scattered calcific atherosclerosis of the coronary vessels, on statin      Gastroesophageal reflux disease 08/09/2021     EGD 8/9/2021      Hiatal hernia with gastroesophageal reflux disease without esophagitis 05/26/2021     Likely etiology of upper airway symptoms.       AK (actinic keratosis) 11/02/2020     10/2020 efudex/dovonex bid x 1 week scalp  11/2023 efudex/dovonex bid x 9 days scalp  3/2024 efudex/dovonex bid x 2 weeks scalp and left lower cheek      Erectile dysfunction due to arterial insufficiency 07/02/2018    Diverticulitis of large intestine without perforation or abscess without bleeding 04/18/2017    Hypertension, essential 08/17/2016    Hyperlipidemia 08/17/2016     discussed cohort risk 7.8% - does not want meds at this time      NAFLD (nonalcoholic fatty liver disease) 08/17/2016    Lateral " epicondylitis of right elbow 08/17/2016    BPH with urinary obstruction 09/08/2014    Colon polyps 09/27/2012                    LAST HbA1c  Lab Results   Component Value Date    HGBA1C 5.6 10/07/2022       Lipid panel  Lab Results   Component Value Date    CHOL 158 09/24/2024    CHOL 172 04/13/2023    CHOL 147 10/12/2021     Lab Results   Component Value Date    HDL 52 09/24/2024    HDL 47 04/13/2023    HDL 60 10/12/2021     Lab Results   Component Value Date    LDLCALC 88.6 09/24/2024    LDLCALC 88.4 04/13/2023    LDLCALC 72.4 10/12/2021     Lab Results   Component Value Date    TRIG 87 09/24/2024    TRIG 183 (H) 04/13/2023    TRIG 73 10/12/2021     Lab Results   Component Value Date    CHOLHDL 32.9 09/24/2024    CHOLHDL 27.3 04/13/2023    CHOLHDL 40.8 10/12/2021            Review of Systems   Constitutional: Negative for chills and fever.   HENT:  Negative for hearing loss and nosebleeds.    Eyes:  Negative for blurred vision.   Cardiovascular:  Positive for leg swelling. Negative for chest pain and palpitations.   Respiratory:  Negative for hemoptysis and shortness of breath.    Hematologic/Lymphatic: Negative for bleeding problem.   Skin:  Negative for itching.   Musculoskeletal:  Negative for falls.   Gastrointestinal:  Negative for abdominal pain and hematochezia.   Genitourinary:  Negative for hematuria.   Neurological:  Negative for dizziness and loss of balance.   Psychiatric/Behavioral:  Negative for altered mental status and depression.        Objective:   Physical Exam  Constitutional:       Appearance: He is well-developed.   HENT:      Head: Normocephalic and atraumatic.   Eyes:      Conjunctiva/sclera: Conjunctivae normal.   Neck:      Vascular: No carotid bruit or JVD.   Cardiovascular:      Rate and Rhythm: Normal rate and regular rhythm.      Pulses:           Carotid pulses are 2+ on the right side and 2+ on the left side.       Radial pulses are 2+ on the right side and 2+ on the left side.       Heart sounds: Murmur heard.      No friction rub. No gallop.   Pulmonary:      Effort: Pulmonary effort is normal. No respiratory distress.      Breath sounds: Normal breath sounds. No stridor. No wheezing.   Musculoskeletal:      Cervical back: Neck supple.   Skin:     General: Skin is warm and dry.   Neurological:      Mental Status: He is alert and oriented to person, place, and time.   Psychiatric:         Behavior: Behavior normal.         Assessment:     1. TERESA (acute kidney injury)    2. Hypertension, unspecified type    3. Elevated LFTs    4. Nephrolithiasis        Plan:     -patient's main complaint is that of bilateral swelling of the feet.  I think this is likely attributed to nifedipine.  In the presence of renal stones I recommend against HCTZ as well.  Recommend stopping nifedipine and HCTZ.  Do a trial of Imdur 60 mg daily for blood pressure control.  If significant side effects with Imdur, recommend repeating CMP and starting on losartan 50 mg daily.  - likely his chronic cough is secondary to chronic sinusitis rather than irbesartan.  Avoiding beta blockers because of side effects in the past.  - repeat LFTs within 2 weeks.  If still elevated recommend GI follow up.  EF previously preserved on echocardiogram with EF of 60% RVSP 26  - follow up within 3-6 months.     Pertinent cardiac images and EKG reviewed independently.    Continue with current medical plan and lifestyle changes.  Return sooner for concerns or questions. If symptoms persist go to the ED  I have reviewed all pertinent data including patient's medical history in detail and updated the computerized patient record.     Orders Placed This Encounter   Procedures    Comprehensive Metabolic Panel     Standing Status:   Future     Standing Expiration Date:   3/20/2026     Order Specific Question:   Send normal result to authorizing provider's In Basket if patient is active on MyChart:     Answer:   Yes    IN OFFICE EKG 12-LEAD (to Muse)        Follow up as scheduled.     He expressed verbal understanding and agreed with the plan    Patient's Medications   New Prescriptions    ISOSORBIDE MONONITRATE (IMDUR) 60 MG 24 HR TABLET    Take 1 tablet (60 mg total) by mouth once daily.    LOSARTAN (COZAAR) 50 MG TABLET    Take 1 tablet (50 mg total) by mouth once daily.   Previous Medications    ALBUTEROL (PROVENTIL/VENTOLIN HFA) 90 MCG/ACTUATION INHALER    Inhale 2 puffs into the lungs every 6 (six) hours as needed.    ATORVASTATIN (LIPITOR) 10 MG TABLET    Take 1 tablet (10 mg total) by mouth once daily.    AZELASTINE (ASTELIN) 137 MCG (0.1 %) NASAL SPRAY    1 spray (137 mcg total) by Nasal route 2 (two) times daily.    CETIRIZINE (ZYRTEC) 10 MG TABLET    Take 10 mg by mouth Daily.    COENZYME Q10 100 MG CAPSULE    Take 200 mg by mouth once daily.    FLUTICASONE PROPIONATE (FLONASE) 50 MCG/ACTUATION NASAL SPRAY    1 spray (50 mcg total) by Each Nostril route 2 (two) times a day.    LACTOBACILLUS ACIDOPHILUS (PROBIOTIC ORAL)    Take by mouth.    UNABLE TO FIND    Prostate Supplement   Modified Medications    No medications on file   Discontinued Medications    HYDROCHLOROTHIAZIDE (HYDRODIURIL) 25 MG TABLET    Take 1 tablet (25 mg total) by mouth once daily.    NIFEDIPINE (ADALAT CC) 30 MG TBSR    Take 1 tablet (30 mg total) by mouth once daily.        Radu Miller M.D

## 2025-01-05 ENCOUNTER — PATIENT MESSAGE (OUTPATIENT)
Dept: INTERNAL MEDICINE | Facility: CLINIC | Age: 65
End: 2025-01-05
Payer: COMMERCIAL

## 2025-01-06 ENCOUNTER — PATIENT MESSAGE (OUTPATIENT)
Dept: PULMONOLOGY | Facility: CLINIC | Age: 65
End: 2025-01-06
Payer: COMMERCIAL

## 2025-01-07 ENCOUNTER — OFFICE VISIT (OUTPATIENT)
Dept: FAMILY MEDICINE | Facility: CLINIC | Age: 65
End: 2025-01-07
Payer: COMMERCIAL

## 2025-01-07 VITALS
WEIGHT: 227.81 LBS | HEART RATE: 74 BPM | DIASTOLIC BLOOD PRESSURE: 92 MMHG | SYSTOLIC BLOOD PRESSURE: 154 MMHG | RESPIRATION RATE: 16 BRPM | OXYGEN SATURATION: 99 % | HEIGHT: 73 IN | BODY MASS INDEX: 30.19 KG/M2

## 2025-01-07 DIAGNOSIS — J02.9 SORE THROAT: ICD-10-CM

## 2025-01-07 DIAGNOSIS — R05.9 COUGH, UNSPECIFIED TYPE: ICD-10-CM

## 2025-01-07 DIAGNOSIS — J98.8 BACTERIAL RESPIRATORY INFECTION: Primary | ICD-10-CM

## 2025-01-07 DIAGNOSIS — B96.89 BACTERIAL RESPIRATORY INFECTION: Primary | ICD-10-CM

## 2025-01-07 LAB
CTP QC/QA: YES
POC MOLECULAR INFLUENZA A AGN: NEGATIVE
POC MOLECULAR INFLUENZA B AGN: NEGATIVE
S PYO RRNA THROAT QL PROBE: NEGATIVE
SARS-COV-2 RDRP RESP QL NAA+PROBE: NEGATIVE

## 2025-01-07 PROCEDURE — 3288F FALL RISK ASSESSMENT DOCD: CPT | Mod: CPTII,S$GLB,, | Performed by: PHYSICIAN ASSISTANT

## 2025-01-07 PROCEDURE — 87880 STREP A ASSAY W/OPTIC: CPT | Mod: QW,S$GLB,, | Performed by: PHYSICIAN ASSISTANT

## 2025-01-07 PROCEDURE — 3008F BODY MASS INDEX DOCD: CPT | Mod: CPTII,S$GLB,, | Performed by: PHYSICIAN ASSISTANT

## 2025-01-07 PROCEDURE — 99999 PR PBB SHADOW E&M-EST. PATIENT-LVL V: CPT | Mod: PBBFAC,,, | Performed by: PHYSICIAN ASSISTANT

## 2025-01-07 PROCEDURE — 1125F AMNT PAIN NOTED PAIN PRSNT: CPT | Mod: CPTII,S$GLB,, | Performed by: PHYSICIAN ASSISTANT

## 2025-01-07 PROCEDURE — 3080F DIAST BP >= 90 MM HG: CPT | Mod: CPTII,S$GLB,, | Performed by: PHYSICIAN ASSISTANT

## 2025-01-07 PROCEDURE — 1159F MED LIST DOCD IN RCRD: CPT | Mod: CPTII,S$GLB,, | Performed by: PHYSICIAN ASSISTANT

## 2025-01-07 PROCEDURE — 87635 SARS-COV-2 COVID-19 AMP PRB: CPT | Mod: QW,S$GLB,, | Performed by: PHYSICIAN ASSISTANT

## 2025-01-07 PROCEDURE — 99213 OFFICE O/P EST LOW 20 MIN: CPT | Mod: S$GLB,,, | Performed by: PHYSICIAN ASSISTANT

## 2025-01-07 PROCEDURE — 3077F SYST BP >= 140 MM HG: CPT | Mod: CPTII,S$GLB,, | Performed by: PHYSICIAN ASSISTANT

## 2025-01-07 PROCEDURE — 87502 INFLUENZA DNA AMP PROBE: CPT | Mod: QW,S$GLB,, | Performed by: PHYSICIAN ASSISTANT

## 2025-01-07 PROCEDURE — 1160F RVW MEDS BY RX/DR IN RCRD: CPT | Mod: CPTII,S$GLB,, | Performed by: PHYSICIAN ASSISTANT

## 2025-01-07 PROCEDURE — 1101F PT FALLS ASSESS-DOCD LE1/YR: CPT | Mod: CPTII,S$GLB,, | Performed by: PHYSICIAN ASSISTANT

## 2025-01-07 RX ORDER — PROMETHAZINE HYDROCHLORIDE AND DEXTROMETHORPHAN HYDROBROMIDE 6.25; 15 MG/5ML; MG/5ML
5 SYRUP ORAL EVERY 6 HOURS PRN
Qty: 118 ML | Refills: 0 | Status: SHIPPED | OUTPATIENT
Start: 2025-01-07 | End: 2025-01-17

## 2025-01-07 RX ORDER — AMOXICILLIN AND CLAVULANATE POTASSIUM 875; 125 MG/1; MG/1
1 TABLET, FILM COATED ORAL EVERY 12 HOURS
Qty: 20 TABLET | Refills: 0 | Status: SHIPPED | OUTPATIENT
Start: 2025-01-07 | End: 2025-01-17

## 2025-01-07 NOTE — PROGRESS NOTES
Assessment/Plan:    Problem List Items Addressed This Visit    None  Visit Diagnoses       Bacterial respiratory infection    -  Primary    Relevant Medications    amoxicillin-clavulanate 875-125mg (AUGMENTIN) 875-125 mg per tablet    Cough, unspecified type        Relevant Medications    promethazine-dextromethorphan (PROMETHAZINE-DM) 6.25-15 mg/5 mL Syrp    Other Relevant Orders    POCT COVID-19 Rapid Screening (Completed)    POCT Influenza A/B Molecular (Completed)    Sore throat        Relevant Orders    POCT rapid strep A (Completed)        -start antibiotics as prescribed  -recommend Flonase and Mucinex  -supportive care- rest, increase hydration with water, OTC Tylenol/Ibuprofen for pain/fever  -follow up if no improvement in symptoms   -ER precautions for severe or worsening of symptoms     Follow up if symptoms worsen or fail to improve.    Mariel Cook PA-C  _____________________________________________________________________________________________________________________________________________________    CC: cough    HPI: Patient is in clinic today as an established patient here for cough.     HISTORY OF PRESENT ILLNESS:  He reports experiencing a hacking cough and congestion over the past 5 days. Symptoms include nighttime coughing, sore throat, and mucus production, which he notes are worse than his baseline nasal drip. He denies fever or shortness of breath. His daughter and grandchildren have been sick recently. He currently remains on Zyrtec, Astelin and Flonase daily for allergies. For current symptoms, he has tried Guaifenesin and Delsym with minimal relief. He reports Promethazine DM has been effective for cough in the past. He has a history of persistent nasal drip and deviated septum causing chronic drip and blockage.     No other complaints today.     Past Medical History:   Diagnosis Date    Allergy     seasonal    Anal fistula hx    Arthritis     Basal cell carcinoma 04/2013    left  superior forehead    Callus     Diverticulosis     Epididymal cyst     GERD (gastroesophageal reflux disease)     Hayfever     Hydrocele, right     Hyperlipidemia     Hypertension     Neck pain     hx of muscular inury from weight lifting---resolved now    PONV (postoperative nausea and vomiting)     Prostatitis     Dec. '12-treated with antibx.    Squamous cell carcinoma 01/07/2019    anterior scalp    Squamous cell carcinoma of skin 03/2020    Left post scalp (SCC insitu-saucerization)    Visual impairment      Past Surgical History:   Procedure Laterality Date    APPENDECTOMY  1962    BIOPSY OF BLADDER  4/19/2022    Procedure: BIOPSY, BLADDER;  Surgeon: Lenny Ewing MD;  [facility];  Service: Urology;;    BIOPSY OF BLADDER  3/8/2023    Procedure: BIOPSY, BLADDER;  Surgeon: Lenny Ewing MD;  [address]  Service: Urology;;    BIOPSY OF BLADDER  7/14/2023    Procedure: BIOPSY, BLADDER;  Surgeon: Benjamin Haile MD;  [address];  Service: Urology;;    BLADDER FULGURATION  4/19/2022    Procedure: FULGURATION, BLADDER;  Surgeon: Lenny Ewing MD;  [facility];  Service: Urology;;    BLADDER FULGURATION  3/8/2023    Procedure: FULGURATION, BLADDER;  Surgeon: Lenny Ewing MD;  [facility];  Service: Urology;;    BLADDER FULGURATION  7/14/2023    Procedure: FULGURATION, BLADDER;  Surgeon: Benjamin Haile MD;  [facility];  Service: Urology;;    CAUDAL EPIDURAL STEROID INJECTION N/A 12/13/2023    Procedure: Injection-steroid-epidural-caudal;  Surgeon: Devante Boone MD;  Location: [facility] OR;  Service: Pain Management;  Laterality: N/A;    COLONOSCOPY  12/2012    due for repeat 12/2015    COLONOSCOPY N/A 5/19/2016    Procedure: COLONOSCOPY;  Surgeon: James Webber MD;  Location: [facility] (4TH FLR);  Service: Endoscopy;  Laterality: N/A; diverticulosis, repeat in 5-10 years for surveillance    COLONOSCOPY N/A 8/9/2021     Procedure: COLONOSCOPY;  Surgeon: Fahad Bautista Jr., MD;  Location: [facility] ENDO;  Service: Endoscopy;  Laterality: N/A;    CYSTOSCOPY  4/19/2022    Procedure: CYSTOSCOPY;  Surgeon: Lenny Ewing MD;  Location: [facility] OR 1ST FLR;  Service: Urology;;    CYSTOSCOPY  6/21/2022    Procedure: CYSTOSCOPY;  Surgeon: Lenny Ewing MD;  Location: [facility] OR 1ST FLR;  Service: Urology;;    CYSTOSCOPY  8/23/2022    Procedure: CYSTOSCOPY;  Surgeon: Lenny Ewing MD;  Location: [facility] OR 1ST FLR;  Service: Urology;;    CYSTOSCOPY  3/8/2023    Procedure: CYSTOSCOPY-BLUE LIGHT;  Surgeon: Lenny Ewing MD;  Location: [facility] OR 1ST FLR;  Service: Urology;;    CYSTOSCOPY N/A 4/4/2023    Procedure: CYSTOSCOPY;  Surgeon: Benjamin Haile MD;  Location: [facility] OR 1ST FLR;  Service: Urology;  Laterality: N/A;  1 hour    CYSTOSCOPY  7/14/2023    Procedure: CYSTOSCOPY;  Surgeon: Benjamin Haile MD;  Location: [facility] OR 1ST FLR;  Service: Urology;;  BLUE LIGHT    CYSTOSCOPY  10/4/2024    Procedure: CYSTOSCOPY;  Surgeon: Benjamin Haile MD;  Location: [facility] OR 1ST FLR;  Service: Urology;;    ESOPHAGOGASTRODUODENOSCOPY N/A 8/9/2021    Procedure: EGD (ESOPHAGOGASTRODUODENOSCOPY);  Surgeon: Fahad Bautista Jr., MD;  Location: [facility];  Service: Endoscopy;  Laterality: N/A;    EUA/Fistulotomy-'08      HERNIA REPAIR      RI with mesh    Hydrocelectomy  2013    MASS EXCISION  2004    BCC removal (twice)    MOLE REMOVAL  02/25/2019    2 moles removed from scalp =- 1 was basal cell and 1 was squamous cell - dermatology - Dr. londono    right Spermatocelectomy  2013    TURBT (TRANSURETHRAL RESECTION OF BLADDER TUMOR) N/A 4/4/2023    Procedure: TURBT (TRANSURETHRAL RESECTION OF BLADDER TUMOR);  Surgeon: Benjamin Haile MD;  Location: [facility] OR 1ST FLR;  Service: Urology;  Laterality: N/A;  1 hour, blue light    TURBT, WITH BLUE LIGHT CYSTOSCOPY AND CYSVIEW  10/4/2024    Procedure: TURBT,WITH BLUE LIGHT CYSTOSCOPY AND CYSVIEW;  Surgeon: Benjamin Haile MD;   [facility];  Service: Urology;;    UPPER GASTROINTESTINAL ENDOSCOPY  prior to     hiatal hernia, reflux esophagitis     Review of patient's allergies indicates:   Allergen Reactions    Amlodipine      edema    Atenolol      Depression - circa     Catechu herb     Irbesartan Other (See Comments)     Possibly caused cough    Erythromycin Rash    Sumycin [tetracycline] Rash    Tetracyclines Rash     Social History     Tobacco Use    Smoking status: Former     Current packs/day: 0.00     Average packs/day: 1 pack/day for 10.0 years (10.0 ttl pk-yrs)     Types: Cigarettes     Start date: 1977     Quit date: 1987     Years since quittin.0    Smokeless tobacco: Never    Tobacco comments:     quit   Substance Use Topics    Alcohol use: Not Currently     Alcohol/week: 0.0 - 1.0 standard drinks of alcohol     Comment: No longer drinks ETOH    Drug use: No     Family History   Problem Relation Name Age of Onset    Skin cancer Mother Kimberly Jesus     Hypertension Mother Kimberly Jesus     Skin cancer Father Lorna Jesus     Cancer Father Lorna Jesus         prostate    Hypertension Father Lorna Jesus     Hypertension Maternal Grandmother Kimberly Bennetteckconner     Hypertension Maternal Grandfather Thaddeus Bennettecker     Hypertension Paternal Grandmother Kristie Guo Jesus     Anesthesia problems Paternal Grandmother Kristie Guo Jesus     Cancer Paternal Grandfather OJulio A Jesus         Prostate    Hypertension Paternal Grandfather OJulio A Jesus     Heart disease Paternal Grandfather OJulio A Jesus     Diabetes Neg Hx      Colon polyps Neg Hx      Colon cancer Neg Hx      Melanoma Neg Hx      Psoriasis Neg Hx      Lupus Neg Hx      Eczema Neg Hx      Acne Neg Hx      Cirrhosis Neg Hx      Prostate cancer Neg Hx      Kidney disease Neg Hx      Crohn's disease Neg Hx      Ulcerative colitis Neg Hx      Stomach cancer Neg Hx      Esophageal cancer Neg Hx       Current Outpatient  Medications on File Prior to Visit   Medication Sig Dispense Refill    albuterol (PROVENTIL/VENTOLIN HFA) 90 mcg/actuation inhaler Inhale 2 puffs into the lungs every 6 (six) hours as needed.      atorvastatin (LIPITOR) 10 MG tablet Take 1 tablet (10 mg total) by mouth once daily. 90 tablet 3    azelastine (ASTELIN) 137 mcg (0.1 %) nasal spray 1 spray (137 mcg total) by Nasal route 2 (two) times daily. 30 mL 12    cetirizine (ZYRTEC) 10 MG tablet Take 10 mg by mouth Daily.      coenzyme Q10 100 mg capsule Take 200 mg by mouth once daily.      fluticasone propionate (FLONASE) 50 mcg/actuation nasal spray 1 spray (50 mcg total) by Each Nostril route 2 (two) times a day. 16 mL 12    Lactobacillus acidophilus (PROBIOTIC ORAL) Take by mouth.      losartan (COZAAR) 50 MG tablet Take 1 tablet (50 mg total) by mouth once daily. 90 tablet 3    UNABLE TO FIND Prostate Supplement      [DISCONTINUED] isosorbide mononitrate (IMDUR) 60 MG 24 hr tablet Take 1 tablet (60 mg total) by mouth once daily. 30 tablet 11     Current Facility-Administered Medications on File Prior to Visit   Medication Dose Route Frequency Provider Last Rate Last Admin    triamcinolone acetonide injection 10 mg  10 mg Intradermal Once            Review of Systems   Constitutional:  Negative for chills, diaphoresis, fatigue and fever.   HENT:  Positive for congestion, postnasal drip and sore throat. Negative for ear pain and sinus pain.    Eyes:  Negative for pain and redness.   Respiratory:  Positive for cough. Negative for chest tightness and shortness of breath.    Cardiovascular:  Negative for chest pain and leg swelling.   Gastrointestinal:  Negative for abdominal pain, constipation, diarrhea, nausea and vomiting.   Genitourinary:  Negative for dysuria and hematuria.   Musculoskeletal:  Negative for arthralgias and joint swelling.   Skin:  Negative for rash.   Neurological:  Negative for dizziness, syncope and headaches.   Psychiatric/Behavioral:   "Negative for dysphoric mood. The patient is not nervous/anxious.        Vitals:    01/07/25 1347 01/07/25 1417   BP: (!) 158/108 (!) 154/92   BP Location:  Left arm   Patient Position:  Sitting   Pulse: 74    Resp: 16    SpO2: 99%    Weight: 103.3 kg (227 lb 12.8 oz)    Height: 6' 1" (1.854 m)        Wt Readings from Last 3 Encounters:   01/07/25 103.3 kg (227 lb 12.8 oz)   12/20/24 105.1 kg (231 lb 11.3 oz)   12/10/24 104.8 kg (231 lb)       Physical Exam  Constitutional:       General: He is not in acute distress.     Appearance: Normal appearance. He is well-developed.   HENT:      Head: Normocephalic and atraumatic.      Right Ear: Tympanic membrane normal.      Left Ear: Tympanic membrane normal.      Nose: Congestion present.      Mouth/Throat:      Mouth: Mucous membranes are moist.      Pharynx: Posterior oropharyngeal erythema present.   Eyes:      Conjunctiva/sclera: Conjunctivae normal.   Cardiovascular:      Rate and Rhythm: Normal rate and regular rhythm.      Pulses: Normal pulses.      Heart sounds: Normal heart sounds. No murmur heard.  Pulmonary:      Effort: Pulmonary effort is normal. No respiratory distress.      Breath sounds: Wheezing present.   Abdominal:      General: Bowel sounds are normal. There is no distension.      Palpations: Abdomen is soft.      Tenderness: There is no abdominal tenderness.   Musculoskeletal:         General: Normal range of motion.      Cervical back: Normal range of motion and neck supple.   Lymphadenopathy:      Cervical: No cervical adenopathy.   Skin:     General: Skin is warm and dry.      Findings: No rash.   Neurological:      General: No focal deficit present.      Mental Status: He is alert and oriented to person, place, and time.   Psychiatric:         Mood and Affect: Mood normal.         Behavior: Behavior normal.         Health Maintenance   Topic Date Due    Annual UACr  Never done    Aspirin/Antiplatelet Therapy  Never done    COVID-19 Vaccine (3 - " Pfizer risk series) 04/22/2021    Hemoglobin A1c (Diabetic Prevention Screening)  10/07/2025    Pneumococcal Vaccines (Age 50+) (3 of 3 - PPSV23, PCV20 or PCV21) 12/10/2025    High Dose Statin  12/20/2025    TETANUS VACCINE  02/05/2028    Colorectal Cancer Screening  08/09/2028    Lipid Panel  09/24/2029    Hepatitis C Screening  Completed    Shingles Vaccine  Completed    Influenza Vaccine  Completed    RSV Vaccine (Age 60+ and Pregnant patients)  Completed    Abdominal Aortic Aneurysm Screening  Completed     DISCLAIMER: This note was compiled by using a speech recognition dictation system and therefore please be aware that typographical / speech recognition errors can and do occur.  Please contact me if you see any errors specifically.  Consent was obtained for [employer] recording system prior to the visit.

## 2025-01-07 NOTE — PATIENT INSTRUCTIONS
Abdon Ruiz,     If you are due for any health screening(s) below please notify me so we can arrange them to be ordered and scheduled. Most healthy patients at your age complete them, but you are free to accept or refuse.     If you can't do it, I'll definitely understand. If you can, I'd certainly appreciate it!    Tests to Keep You Healthy    Colon Cancer Screening: Met on 8/9/2021  Last Blood Pressure <= 139/89 (12/20/2024): NO

## 2025-01-14 ENCOUNTER — OFFICE VISIT (OUTPATIENT)
Dept: OTOLARYNGOLOGY | Facility: CLINIC | Age: 65
End: 2025-01-14
Payer: COMMERCIAL

## 2025-01-14 VITALS — BODY MASS INDEX: 30.8 KG/M2 | HEIGHT: 73 IN | WEIGHT: 232.38 LBS

## 2025-01-14 DIAGNOSIS — R09.82 PND (POST-NASAL DRIP): ICD-10-CM

## 2025-01-14 DIAGNOSIS — R05.3 CHRONIC COUGH: ICD-10-CM

## 2025-01-14 DIAGNOSIS — K21.9 LARYNGOPHARYNGEAL REFLUX (LPR): ICD-10-CM

## 2025-01-14 DIAGNOSIS — R09.81 CHRONIC NASAL CONGESTION: ICD-10-CM

## 2025-01-14 DIAGNOSIS — R09.89 CHEST CONGESTION: ICD-10-CM

## 2025-01-14 DIAGNOSIS — J34.2 NASAL SEPTAL DEVIATION: Primary | ICD-10-CM

## 2025-01-14 PROCEDURE — 3008F BODY MASS INDEX DOCD: CPT | Mod: CPTII,S$GLB,, | Performed by: STUDENT IN AN ORGANIZED HEALTH CARE EDUCATION/TRAINING PROGRAM

## 2025-01-14 PROCEDURE — 99214 OFFICE O/P EST MOD 30 MIN: CPT | Mod: S$GLB,,, | Performed by: STUDENT IN AN ORGANIZED HEALTH CARE EDUCATION/TRAINING PROGRAM

## 2025-01-14 PROCEDURE — 99999 PR PBB SHADOW E&M-EST. PATIENT-LVL III: CPT | Mod: PBBFAC,,, | Performed by: STUDENT IN AN ORGANIZED HEALTH CARE EDUCATION/TRAINING PROGRAM

## 2025-01-14 PROCEDURE — 3288F FALL RISK ASSESSMENT DOCD: CPT | Mod: CPTII,S$GLB,, | Performed by: STUDENT IN AN ORGANIZED HEALTH CARE EDUCATION/TRAINING PROGRAM

## 2025-01-14 PROCEDURE — 1159F MED LIST DOCD IN RCRD: CPT | Mod: CPTII,S$GLB,, | Performed by: STUDENT IN AN ORGANIZED HEALTH CARE EDUCATION/TRAINING PROGRAM

## 2025-01-14 PROCEDURE — 1101F PT FALLS ASSESS-DOCD LE1/YR: CPT | Mod: CPTII,S$GLB,, | Performed by: STUDENT IN AN ORGANIZED HEALTH CARE EDUCATION/TRAINING PROGRAM

## 2025-01-14 PROCEDURE — 1126F AMNT PAIN NOTED NONE PRSNT: CPT | Mod: CPTII,S$GLB,, | Performed by: STUDENT IN AN ORGANIZED HEALTH CARE EDUCATION/TRAINING PROGRAM

## 2025-01-14 RX ORDER — FAMOTIDINE 20 MG/1
20 TABLET, FILM COATED ORAL 2 TIMES DAILY
Qty: 60 TABLET | Refills: 11 | Status: SHIPPED | OUTPATIENT
Start: 2025-01-14 | End: 2026-01-14

## 2025-01-14 NOTE — PATIENT INSTRUCTIONS
Add saline before flonase and astelin. Spray or can is fine.   Continue oral AH.     Continue reflux diet.   Increase water intake.   Start pepcid twice a day.  Buy gaviscon advance or similar with SODIUM ALGINATE in it and take either 2-3 times daily after meals or just before bedtime to form raft on top of stomach to prevent reflux.

## 2025-01-14 NOTE — PROGRESS NOTES
"Otolaryngology Clinic Note    Subjective:       Patient ID: Joseph Jesus is a 64 y.o. male.    Chief Complaint: Sinus Problem      History of Present Illness: Joseph Jesus is a 64 y.o. male presenting with nasal congestion. Astelin seems to help. Uses flonase as well. Takes zyrtec or xyzal daily. Temporary relief only. Does not fix throat, upper chest irritation. He does have runny nose, congestion, PND. Sense of smell is ok. No real sinus infections. No asthma dx. Some hoarseness. No swallowing issues.   Chest/throat Always irritated. Last couple of years, 24/7. Feels like mucous in his chest- both lungs. He is hacking/throat clearing. Nasal sprays have helped a bit with this. Less effective than it was at first. He is not feeling reflux often. Usually with overeating. Happening every 2 weeks. He is not taking anything for this. Tried reflux medicine. Threw up, got nauseous. Tried prilosec OTC and was just as bad. Avoid eating lately. Props up to eat. He drinks 1 large coffee in morning, monster occasionally in PM. Some greasy foods, but not much. Not big on spice. Does work on water intake.doing digestive enzymes and helps.  Tried sinus rinses but did not like. Smoked 40 years ago.     11/18/24: Destiny: "Patient is new to ENT, referred by Dr. Trejo for consultation for chronic nasal congestion X decades. States he incurred nasal trauma in 5th grade. Has never been able to breathe well through his nose, L>R.     ALLERGY: saw allergist in 2022 for chronic cough; that note has been reviewed. Immunocaps positive to grass pollen. The rest was negative. Advised to take Zyrtec daily and Flonase 2 sprays each nostril daily. Patient takes Zyrtec only after sneezing fits, not daily. Takes Flonase approximately 2-3 times per week. Patient denies itchy, red, watery eyes; no itchy, red, watery nose; no excessive sneezing. Patient reports frequent nasal congestion.   SINUS: No recent sinus infections, antibiotics, or recent " "sinus imaging. Patient denies s/s of acute bacterial sinusitis:  No mucopus from nose, no facial swelling/pain, no dental pain, no diminished olfaction/taste, no headaches around the eyes, no sore throat or productive cough.   ACID REFLUX: Recent GI notes & EGD report reviewed. Dr. Bautista noted reflux esophagitis and hiatal hernia in . Patient takes nothing for GERD.  Patient reports dysphonia, frequent throat clearing, globus sensation, and frequent throat irritation.  ASTHMA:  Former smoker. Quit in . No h/o asthma/COPD. Saw a pulmonologist in  for chronic cough that began in 2019. PFTs were completely normal without BD response and normal diffusion. Pulmonologist felt that cough was likely related to hiatal hernia with reflux and allergic rhinitis as CT was without parenchymal lung disease. Recent chest imagin2024 was negative.   ACE-INHIBITOR: none"  Findings; BL deviation septum, spur on floor on left, bows to narrow middle meatus on right.   Rec astelin/flonase and GI handout given for LPR.       Past Surgical History:   Procedure Laterality Date    APPENDECTOMY      BIOPSY OF BLADDER  2022    Procedure: BIOPSY, BLADDER;  Surgeon: Lenny Ewing MD;  Location: [address];  Service: Urology;;    BIOPSY OF BLADDER  3/8/2023    Procedure: BIOPSY, BLADDER;  Surgeon: Lenny Ewing MD;  Location: [address];  Service: Urology;;    BIOPSY OF BLADDER  2023    Procedure: BIOPSY, BLADDER;  Surgeon: Benjamin Haile MD;  Location: [address];  Service: Urology;;    BLADDER FULGURATION  2022    Procedure: FULGURATION, BLADDER;  Surgeon: Lenny Ewing MD;  Location: [address];  Service: Urology;;    BLADDER FULGURATION  3/8/2023    Procedure: FULGURATION, BLADDER;  Surgeon: Lenny Ewing MD;  Location: [address];  Service: Urology;;    BLADDER FULGURATION  2023    Procedure: FULGURATION, BLADDER;  Surgeon: Benjamin Haile MD;  Location: [facility] OR " 1ST FLR;  Service: Urology;;    CAUDAL EPIDURAL STEROID INJECTION N/A 12/13/2023    Procedure: Injection-steroid-epidural-caudal;  Surgeon: Devante Boone MD;  Location: [facility] OR;  Service: Pain Management;  Laterality: N/A;    COLONOSCOPY  12/2012    due for repeat 12/2015    COLONOSCOPY N/A 5/19/2016    Procedure: COLONOSCOPY;  Surgeon: James Webber MD;  Location: [facility] ENDO (4TH FLR);  Service: Endoscopy;  Laterality: N/A; diverticulosis, repeat in 5-10 years for surveillance    COLONOSCOPY N/A 8/9/2021    Procedure: COLONOSCOPY;  Surgeon: Fahad Bautista Jr., MD;  Location: [facility] ENDO;  Service: Endoscopy;  Laterality: N/A;    CYSTOSCOPY  4/19/2022    Procedure: CYSTOSCOPY;  Surgeon: Lenny Ewing MD;  Location: [facility] OR 1ST FLR;  Service: Urology;;    CYSTOSCOPY  6/21/2022    Procedure: CYSTOSCOPY;  Surgeon: Lenny Ewing MD;  Location: [facility] OR 1ST FLR;  Service: Urology;;    CYSTOSCOPY  8/23/2022    Procedure: CYSTOSCOPY;  Surgeon: Lenny Ewing MD;  [facility] 1ST FLR;  Service: Urology;;    CYSTOSCOPY  3/8/2023    Procedure: CYSTOSCOPY-BLUE LIGHT;  Surgeon: Lenny Ewing MD;  Location: [facility] OR 1ST FLR;  Service: Urology;;    CYSTOSCOPY N/A 4/4/2023    Procedure: CYSTOSCOPY;  Surgeon: Benjamin Haile MD;  Location: [facility] OR 1ST FLR;  Service: Urology;  Laterality: N/A;  1 hour    CYSTOSCOPY  7/14/2023    Procedure: CYSTOSCOPY;  Surgeon: Benjamin Haile MD;  Location: [facility] OR 1ST FLR;  Service: Urology;;  BLUE LIGHT    CYSTOSCOPY  10/4/2024    Procedure: CYSTOSCOPY;  Surgeon: Benjamin Haile MD;  Location: [facility] OR 1ST FLR;  Service: Urology;;    ESOPHAGOGASTRODUODENOSCOPY N/A 8/9/2021    Procedure: EGD (ESOPHAGOGASTRODUODENOSCOPY);  Surgeon: Fahad Bautista Jr., MD;  Location: [facility] ENDO;  Service: Endoscopy;  Laterality: N/A;    EUA/Fistulotomy-'08      HERNIA REPAIR      RI with mesh    Hydrocelectomy  2013    MASS EXCISION  2004    BCC removal (twice)    MOLE REMOVAL  02/25/2019    2  moles removed from scalp =- 1 was basal cell and 1 was squamous cell - dermatology - Dr. londono    right Spermatocelectomy  2013    TURBT (TRANSURETHRAL RESECTION OF BLADDER TUMOR) N/A 4/4/2023    Procedure: TURBT (TRANSURETHRAL RESECTION OF BLADDER TUMOR);  Surgeon: Benjamin Haile MD;  Location: [facility] OR [address] [place] [address];  Service: Urology;  Laterality: N/A;  1 hour, blue light    TURBT, WITH BLUE LIGHT CYSTOSCOPY AND CYSVIEW  10/4/2024    Procedure: TURBT,WITH BLUE LIGHT CYSTOSCOPY AND CYSVIEW;  Surgeon: Benjamin Haile MD;  Location: [facility] OR [address] [place] [address];  Service: Urology;;    UPPER GASTROINTESTINAL ENDOSCOPY  prior to 2010    hiatal hernia, reflux esophagitis     Past Medical History:   Diagnosis Date    Allergy     seasonal    Anal fistula hx    Arthritis     Basal cell carcinoma 04/2013    left superior forehead    Callus     Diverticulosis     Epididymal cyst     GERD (gastroesophageal reflux disease)     Hayfever     Hydrocele, right     Hyperlipidemia     Hypertension     Neck pain     hx of muscular inury from weight lifting---resolved now    PONV (postoperative nausea and vomiting)     Prostatitis     Dec. '12-treated with antibx.    Squamous cell carcinoma 01/07/2019    anterior scalp    Squamous cell carcinoma of skin 03/2020    Left post scalp (SCC insitu-saucerization)    Visual impairment      Social Drivers of Health     Tobacco Use: Medium Risk (1/7/2025)    Patient History     Smoking Tobacco Use: Former     Smokeless Tobacco Use: Never     Passive Exposure: Not on file   Alcohol Use: Not At Risk (12/15/2024)    AUDIT-C     Frequency of Alcohol Consumption: Never     Average Number of Drinks: Patient does not drink     Frequency of Binge Drinking: Never   Financial Resource Strain: Medium Risk (12/15/2024)    Overall Financial Resource Strain (CARDIA)     Difficulty of Paying Living Expenses: Somewhat hard   Food Insecurity: No Food Insecurity (12/15/2024)    Hunger Vital Sign     Worried About Running Out of  Food in the Last Year: Never true     Ran Out of Food in the Last Year: Never true   Transportation Needs: No Transportation Needs (11/20/2023)    PRAPARE - Transportation     Lack of Transportation (Medical): No     Lack of Transportation (Non-Medical): No   Physical Activity: Insufficiently Active (12/15/2024)    Exercise Vital Sign     Days of Exercise per Week: 1 day     Minutes of Exercise per Session: 30 min   Stress: Stress Concern Present (12/15/2024)    [employer] [place] [employer] - Occupational Stress Questionnaire     Feeling of Stress : To some extent   Housing Stability: Low Risk  (11/20/2023)    Housing Stability Vital Sign     Unable to Pay for Housing in the Last Year: No     Number of Places Lived in the Last Year: 1     Unstable Housing in the Last Year: No   Depression: Low Risk  (1/7/2025)    Depression     Last PHQ-4: Flowsheet Data: 0   Utilities: Not At Risk (12/15/2024)    [facility] Utilities     Threatened with loss of utilities: No   Health Literacy: Adequate Health Literacy (12/15/2024)     Health Literacy     Frequency of need for help with medical instructions: Never   Social Isolation: Not on file     Review of patient's allergies indicates:   Allergen Reactions    Amlodipine      edema    Atenolol      Depression - circa 2000    Catechu herb     Irbesartan Other (See Comments)     Possibly caused cough    Erythromycin Rash    Sumycin [tetracycline] Rash    Tetracyclines Rash     Current Outpatient Medications   Medication Instructions    albuterol (PROVENTIL/VENTOLIN HFA) 90 mcg/actuation inhaler 2 puffs, Every 6 hours PRN    amoxicillin-clavulanate 875-125mg (AUGMENTIN) 875-125 mg per tablet 1 tablet, Oral, Every 12 hours    atorvastatin (LIPITOR) 10 mg, Oral, Daily    azelastine (ASTELIN) 137 mcg, Nasal, 2 times daily    cetirizine (ZYRTEC) 10 mg, Daily    coenzyme Q10 200 mg, Daily    fluticasone propionate (FLONASE) 50 mcg, Each Nostril, 2 times daily    Lactobacillus  acidophilus (PROBIOTIC ORAL) Take by mouth.    losartan (COZAAR) 50 mg, Oral, Daily    promethazine-dextromethorphan (PROMETHAZINE-DM) 6.25-15 mg/5 mL Syrp 5 mLs, Oral, Every 6 hours PRN    UNABLE TO FIND Prostate Supplement         ENT ROS negative except as stated above.     Patient answers are not available for this visit.            Objective:      There were no vitals filed for this visit.    General: NAD, well appearing  Eyes: Normal conjunctiva and lids  Face: symmetric, nerve intact  Nose: The nose is without any evidence of any deformity. The nasal mucosa is inflamed with thick clear rhinorrhea. The septum is deviated left with large spur on floor, bows right superior. There is no evidence of septal hematoma. The turbinates are mildly inflamed.   Ears: The ears are with normal-appearing pinna. Examination of the canals is normal appearing bilaterally. There is no drainage or erythema noted. The tympanic membranes are normal appearing with pearly color, normal-appearing landmarks and normal light reflex. Hearing is grossly intact.  Mouth: No obvious abnormalities to the lips. The teeth are unremarkable. The gingivae are without any obvious evidence of infection or lesion. The oral mucosa is moist and pink. There are no obvious masses to the hard or soft palate.   Oropharynx: The uvula is midline.  The tongue is midline. The posterior pharynx has mild erythema. The tonsils are normal appearing 0+.  Salivary glands: The salivary glands are symmetric and not enlarged, no masses  Neck: No lymphadenopathy, trachea midline, thryoid not enlarged.  Psych: Normal mood and affect.   Neuro: Grossly intact  Speech: fluent    Test Review: I personally reviewed xray  EXAMINATION:  XR CHEST PA AND LATERAL     CLINICAL HISTORY:  Cough, unspecified     TECHNIQUE:  PA and lateral views of the chest were performed.     COMPARISON:  04/10/2020     FINDINGS:  Cardiac size is normal.  Lungs are clear and well aerated      Impression:     See above        Electronically signed by:Varun Delgadillo MD  Date:                                            09/24/2024     Assessment and Plan:       1. Nasal septal deviation    2. Chronic nasal congestion    3. PND (post-nasal drip)    4. Chronic cough    5. Chest congestion    6. Laryngopharyngeal reflux (LPR)        Suspect combination of PND and LPR. Had + reflux and hernia on EGD 2021.   Continue oral AH and flonase/astelin.   Did not tolerate PPI. Will try pepcid 20 mg BID and alginates at night or BID.     Will reassess to see if reflux management helps. Do not know that chest feeling is from his nose and want to rule out silent reflux first. He understands. If not better, plan to scope and CT sinuses.     RTC: 8 weeks.     Plan of care was discussed in detail with the patient, who agreed with the plan as above. All questions were answered in detail.     Rosalva Cook MD  Otolaryngology

## 2025-01-16 ENCOUNTER — PATIENT MESSAGE (OUTPATIENT)
Dept: CARDIOLOGY | Facility: CLINIC | Age: 65
End: 2025-01-16
Payer: COMMERCIAL

## 2025-01-17 ENCOUNTER — LAB VISIT (OUTPATIENT)
Dept: LAB | Facility: HOSPITAL | Age: 65
End: 2025-01-17
Attending: INTERNAL MEDICINE
Payer: COMMERCIAL

## 2025-01-17 DIAGNOSIS — I10 HYPERTENSION, UNSPECIFIED TYPE: ICD-10-CM

## 2025-01-17 LAB
ALBUMIN SERPL BCP-MCNC: 4.2 G/DL (ref 3.5–5.2)
ALP SERPL-CCNC: 64 U/L (ref 40–150)
ALT SERPL W/O P-5'-P-CCNC: 69 U/L (ref 10–44)
ANION GAP SERPL CALC-SCNC: 5 MMOL/L (ref 8–16)
AST SERPL-CCNC: 54 U/L (ref 10–40)
BILIRUB SERPL-MCNC: 0.4 MG/DL (ref 0.1–1)
BUN SERPL-MCNC: 20 MG/DL (ref 8–23)
CALCIUM SERPL-MCNC: 9.6 MG/DL (ref 8.7–10.5)
CHLORIDE SERPL-SCNC: 108 MMOL/L (ref 95–110)
CO2 SERPL-SCNC: 26 MMOL/L (ref 23–29)
CREAT SERPL-MCNC: 1.3 MG/DL (ref 0.5–1.4)
EST. GFR  (NO RACE VARIABLE): >60 ML/MIN/1.73 M^2
GLUCOSE SERPL-MCNC: 81 MG/DL (ref 70–110)
POTASSIUM SERPL-SCNC: 4.6 MMOL/L (ref 3.5–5.1)
PROT SERPL-MCNC: 7.5 G/DL (ref 6–8.4)
SODIUM SERPL-SCNC: 139 MMOL/L (ref 136–145)

## 2025-01-17 PROCEDURE — 80053 COMPREHEN METABOLIC PANEL: CPT | Performed by: INTERNAL MEDICINE

## 2025-01-17 PROCEDURE — 36415 COLL VENOUS BLD VENIPUNCTURE: CPT | Mod: PO | Performed by: INTERNAL MEDICINE

## 2025-01-21 ENCOUNTER — PATIENT MESSAGE (OUTPATIENT)
Dept: CARDIOLOGY | Facility: CLINIC | Age: 65
End: 2025-01-21
Payer: COMMERCIAL

## 2025-01-22 ENCOUNTER — OFFICE VISIT (OUTPATIENT)
Dept: CARDIOLOGY | Facility: CLINIC | Age: 65
End: 2025-01-22
Payer: COMMERCIAL

## 2025-01-22 DIAGNOSIS — I10 HYPERTENSION, UNSPECIFIED TYPE: Primary | ICD-10-CM

## 2025-01-22 DIAGNOSIS — R09.81 SINUS CONGESTION: ICD-10-CM

## 2025-01-22 DIAGNOSIS — N20.0 NEPHROLITHIASIS: ICD-10-CM

## 2025-01-22 DIAGNOSIS — R79.89 ELEVATED LFTS: ICD-10-CM

## 2025-01-22 RX ORDER — IRBESARTAN 150 MG/1
150 TABLET ORAL NIGHTLY
Qty: 90 TABLET | Refills: 3 | Status: SHIPPED | OUTPATIENT
Start: 2025-01-22 | End: 2026-01-22

## 2025-01-22 RX ORDER — CLONIDINE HYDROCHLORIDE 0.1 MG/1
0.1 TABLET ORAL DAILY PRN
Qty: 90 TABLET | Refills: 2 | Status: SHIPPED | OUTPATIENT
Start: 2025-01-22 | End: 2026-01-22

## 2025-01-22 NOTE — PROGRESS NOTES
Subjective:   @Patient ID:  Joseph Jesus is a 64 y.o. male who presents for follow-up of No chief complaint on file.      HPI:     64-year-old male past medical history of   -hyperlipidemia on Lipitor 10 mg, LDL 88  -no abnormalities on DOREEN in 2022  -nephrolithiasis  -elevated LFTs, now trending down  -mild TERESA, resoloved, recent cr of 1.3  -hypertension on losartan. Has had side effcts with multi antihypertensive medications.  No significant improvement in blood pressure with the taking losartan 50 mg.  Side effects with amlodipine, beta blockers/atenolol, history of renal stones hence avoiding HCTZ.  - recently saw ENT specialist for possible deviated nasal septum, with laryngopharyngeal reflux versus silent reflux disease.  Plan is to proceed with CT sinuses and possible endoscopy.        No active issues other than headaches.  Baseline blood pressure on average stays at 170 systolic.  I am concerned about his high blood pressure.  Currently no major issues but we need to get the blood pressure down.  I asked him to get back to irbesartan.  Has had chronic cough that did not change before or after taking irbesartan.      Results for orders placed in visit on 03/28/22    Echo    Interpretation Summary  · The left ventricle is normal in size with normal systolic function.  · Mild left atrial enlargement.  · The estimated ejection fraction is 60%.  · Normal left ventricular diastolic function.  · Normal right ventricular size with normal right ventricular systolic function.  · Aortic Valve Aortic valve sclerosis is mild. There is normal leaflet mobility. There is aortic valve sclerosis without significant stenosis.  · The mitral valve appears structurally normal. There is normal leaflet mobility.  · Tricuspid Valve The tricuspid valve appears structurally normal. There is normal leaflet mobility. There is trace regurgitation.  · Normal central venous pressure (3 mmHg).  · The estimated PA systolic pressure is 26  "mmHg.      No results found for this or any previous visit.      No results found for this or any previous visit.      Please document below the medical necessity for continuous telemetry monitoring or discontinue the current order if appropriate.    Current rhythm from flowsheet:               Patient Active Problem List    Diagnosis Date Noted    Shortness of breath 12/10/2024    Hoarseness of voice 12/10/2024    Class 1 obesity due to excess calories with serious comorbidity and body mass index (BMI) of 30.0 to 30.9 in adult 12/06/2024    Elevated liver enzymes 12/06/2024    Adjustment disorder with anxious mood 05/17/2022    Numbness and tingling of foot 05/17/2022    CKD stage 3a, GFR 45-59 ml/min 05/17/2022    Malignant neoplasm of lateral wall of urinary bladder 05/11/2022     -followed by urology  -microhematuria w/u found erythematous lesion with a positive cytology on 4/19/22 bladder biopsy showing CIS. TURBT on 6/21/22 showing CIS.  Muscle was present.   -TURBT performed 8/23/22. Dr. Ewing went over pathology demonstrating cancer (CIS is high grade and BCG recommend)  -had 5 treatments. "He is high risk. If he responds, he will need maintenance until 9/2025."  -TUR 3/8/23 (pos bx) and 4/4/2023, 7/14/2023 (neg bx) - BCG ?? Re-Rx      Edema leg 03/28/2022    Aortic atherosclerosis 01/28/2022     -noted on CT abd 5/7/2020, on statin      Coronary artery calcification 01/28/2022     -CTA 4/10/2020 - scattered calcific atherosclerosis of the coronary vessels, on statin      Gastroesophageal reflux disease 08/09/2021     EGD 8/9/2021      Hiatal hernia with gastroesophageal reflux disease without esophagitis 05/26/2021     Likely etiology of upper airway symptoms.       AK (actinic keratosis) 11/02/2020     10/2020 efudex/dovonex bid x 1 week scalp  11/2023 efudex/dovonex bid x 9 days scalp  3/2024 efudex/dovonex bid x 2 weeks scalp and left lower cheek      Erectile dysfunction due to arterial insufficiency " 07/02/2018    Diverticulitis of large intestine without perforation or abscess without bleeding 04/18/2017    Hypertension, essential 08/17/2016    Hyperlipidemia 08/17/2016     discussed cohort risk 7.8% - does not want meds at this time      NAFLD (nonalcoholic fatty liver disease) 08/17/2016    Lateral epicondylitis of right elbow 08/17/2016    BPH with urinary obstruction 09/08/2014    Colon polyps 09/27/2012                    LAST HbA1c  Lab Results   Component Value Date    HGBA1C 5.6 10/07/2022       Lipid panel  Lab Results   Component Value Date    CHOL 158 09/24/2024    CHOL 172 04/13/2023    CHOL 147 10/12/2021     Lab Results   Component Value Date    HDL 52 09/24/2024    HDL 47 04/13/2023    HDL 60 10/12/2021     Lab Results   Component Value Date    LDLCALC 88.6 09/24/2024    LDLCALC 88.4 04/13/2023    LDLCALC 72.4 10/12/2021     Lab Results   Component Value Date    TRIG 87 09/24/2024    TRIG 183 (H) 04/13/2023    TRIG 73 10/12/2021     Lab Results   Component Value Date    CHOLHDL 32.9 09/24/2024    CHOLHDL 27.3 04/13/2023    CHOLHDL 40.8 10/12/2021            ROS    Objective:   Physical Exam    Assessment:     1. Hypertension, unspecified type    2. Elevated LFTs    3. Nephrolithiasis    4. Sinus congestion        Plan:     -I do not think he had allergic reaction or side effects with the irbesartan.  Blood pressure was very well controlled on irbesartan.  Recommend stopping losartan and starting irbesartan.  - agree with ENT plan of proceeding with sinus CT and a trial of Pepcid.  LFTs has been trending down.  Cyst noted in the liver and kidney in being followed up by primary care physician  - if blood pressure still not down in 1 week recommend increasing irbesartan to 300 mg daily  - follow up within 6 months or sooner if blood pressure still not under control.  Second-line therapy would be spironolactone.  Creatinine back at around baseline at 1.3.  No symptoms concerning for angina or  congestive heart failure.  Clonidine given to be taken only as needed for blood pressure higher than 180 systolic or 100 diastolic    Pertinent cardiac images and EKG reviewed independently.    Continue with current medical plan and lifestyle changes.  Return sooner for concerns or questions. If symptoms persist go to the ED  I have reviewed all pertinent data including patient's medical history in detail and updated the computerized patient record.     No orders of the defined types were placed in this encounter.      Follow up as scheduled.     He expressed verbal understanding and agreed with the plan    Patient's Medications   New Prescriptions    CLONIDINE (CATAPRES) 0.1 MG TABLET    Take 1 tablet (0.1 mg total) by mouth daily as needed (higher than 180 systolic or 100 diastolic).    IRBESARTAN (AVAPRO) 150 MG TABLET    Take 1 tablet (150 mg total) by mouth every evening.   Previous Medications    ALBUTEROL (PROVENTIL/VENTOLIN HFA) 90 MCG/ACTUATION INHALER    Inhale 2 puffs into the lungs every 6 (six) hours as needed.    ATORVASTATIN (LIPITOR) 10 MG TABLET    Take 1 tablet (10 mg total) by mouth once daily.    AZELASTINE (ASTELIN) 137 MCG (0.1 %) NASAL SPRAY    1 spray (137 mcg total) by Nasal route 2 (two) times daily.    CETIRIZINE (ZYRTEC) 10 MG TABLET    Take 10 mg by mouth Daily.    COENZYME Q10 100 MG CAPSULE    Take 200 mg by mouth once daily.    FAMOTIDINE (PEPCID) 20 MG TABLET    Take 1 tablet (20 mg total) by mouth 2 (two) times daily.    FLUTICASONE PROPIONATE (FLONASE) 50 MCG/ACTUATION NASAL SPRAY    1 spray (50 mcg total) by Each Nostril route 2 (two) times a day.    LACTOBACILLUS ACIDOPHILUS (PROBIOTIC ORAL)    Take by mouth.    UNABLE TO FIND    Prostate Supplement   Modified Medications    No medications on file   Discontinued Medications    LOSARTAN (COZAAR) 50 MG TABLET    Take 1 tablet (50 mg total) by mouth once daily.        Radu Miller M.D

## 2025-01-28 ENCOUNTER — OFFICE VISIT (OUTPATIENT)
Dept: NEPHROLOGY | Facility: CLINIC | Age: 65
End: 2025-01-28
Payer: COMMERCIAL

## 2025-01-28 VITALS — SYSTOLIC BLOOD PRESSURE: 172 MMHG | DIASTOLIC BLOOD PRESSURE: 108 MMHG

## 2025-01-28 DIAGNOSIS — N18.31 CKD STAGE 3A, GFR 45-59 ML/MIN: Primary | ICD-10-CM

## 2025-01-28 DIAGNOSIS — I25.10 CORONARY ARTERY CALCIFICATION: ICD-10-CM

## 2025-01-28 DIAGNOSIS — R73.9 HYPERGLYCEMIA: ICD-10-CM

## 2025-01-28 DIAGNOSIS — I10 HYPERTENSION, ESSENTIAL: ICD-10-CM

## 2025-01-28 DIAGNOSIS — K76.0 NAFLD (NONALCOHOLIC FATTY LIVER DISEASE): ICD-10-CM

## 2025-01-28 DIAGNOSIS — E78.5 HYPERLIPIDEMIA, UNSPECIFIED HYPERLIPIDEMIA TYPE: ICD-10-CM

## 2025-01-28 DIAGNOSIS — C67.2 MALIGNANT NEOPLASM OF LATERAL WALL OF URINARY BLADDER: ICD-10-CM

## 2025-01-28 DIAGNOSIS — E55.9 VITAMIN D DEFICIENCY: ICD-10-CM

## 2025-01-28 PROCEDURE — 3080F DIAST BP >= 90 MM HG: CPT | Mod: CPTII,95,, | Performed by: INTERNAL MEDICINE

## 2025-01-28 PROCEDURE — 3077F SYST BP >= 140 MM HG: CPT | Mod: CPTII,95,, | Performed by: INTERNAL MEDICINE

## 2025-01-28 PROCEDURE — 98003 SYNCH AUDIO-VIDEO NEW HI 60: CPT | Mod: 95,,, | Performed by: INTERNAL MEDICINE

## 2025-01-28 PROCEDURE — 3066F NEPHROPATHY DOC TX: CPT | Mod: CPTII,95,, | Performed by: INTERNAL MEDICINE

## 2025-01-28 PROCEDURE — 4010F ACE/ARB THERAPY RXD/TAKEN: CPT | Mod: CPTII,95,, | Performed by: INTERNAL MEDICINE

## 2025-01-28 PROCEDURE — 1160F RVW MEDS BY RX/DR IN RCRD: CPT | Mod: CPTII,95,, | Performed by: INTERNAL MEDICINE

## 2025-01-28 PROCEDURE — 1101F PT FALLS ASSESS-DOCD LE1/YR: CPT | Mod: CPTII,95,, | Performed by: INTERNAL MEDICINE

## 2025-01-28 PROCEDURE — 3288F FALL RISK ASSESSMENT DOCD: CPT | Mod: CPTII,95,, | Performed by: INTERNAL MEDICINE

## 2025-01-28 PROCEDURE — 1126F AMNT PAIN NOTED NONE PRSNT: CPT | Mod: CPTII,95,, | Performed by: INTERNAL MEDICINE

## 2025-01-28 PROCEDURE — 1159F MED LIST DOCD IN RCRD: CPT | Mod: CPTII,95,, | Performed by: INTERNAL MEDICINE

## 2025-01-28 NOTE — PROGRESS NOTES
Subjective:      Patient ID: Joseph Jesus is a 64 y.o. White male who presents for new evaluation of No chief complaint on file.    HPI    Jan 2025:  The patient location is: [place]  The chief complaint leading to consultation is: decreased GFR/CKD  Visit type: audiovisual  60 minutes of total time spent on the encounter, which includes face to face time and non-face to face time preparing to see the patient (eg, review of tests), Obtaining and/or reviewing separately obtained history, Documenting clinical information in the electronic or other health record, Independently interpreting results (not separately reported) and communicating results to the patient/family/caregiver, or Care coordination (not separately reported).   Each patient to whom he or she provides medical services by telemedicine is:  (1) informed of the relationship between the physician and patient and the respective role of any other health care provider with respect to management of the patient; and (2) notified that he or she may decline to receive medical services by telemedicine and may withdraw from such care at any time.  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   Allergic/Immunologic: Positive for immunocompromised state.      Objective:     Physical Exam  Constitutional:       General: He is not in acute distress.     Appearance: He is not ill-appearing.   Pulmonary:      Effort: Pulmonary effort is normal. No respiratory distress.    Neurological:      Mental Status: He is alert and oriented to person, place, and time.   Psychiatric:         Mood and Affect: Mood normal.       Assessment:     1. CKD stage 3a, GFR 45-59 ml/min    2. Hypertension, essential    3. Hyperlipidemia, unspecified hyperlipidemia type    4. Coronary artery calcification    5. Malignant neoplasm of lateral wall of urinary bladder    6. NAFLD (nonalcoholic fatty liver disease)       Plan:     CKD with several risk factors   - discussed stages of CKD  - discussed kidney health tips, emphasis on diet   - on an ARB  - no strong indication to add SGLT2i but does have hx of preDm and will check Hba1c   - recent abdominal u/s with slightly enlarged kidneys     RTC 6mo

## 2025-02-05 ENCOUNTER — PATIENT MESSAGE (OUTPATIENT)
Dept: INTERNAL MEDICINE | Facility: CLINIC | Age: 65
End: 2025-02-05
Payer: COMMERCIAL

## 2025-02-08 ENCOUNTER — PATIENT MESSAGE (OUTPATIENT)
Dept: ADMINISTRATIVE | Facility: OTHER | Age: 65
End: 2025-02-08
Payer: COMMERCIAL

## 2025-02-11 ENCOUNTER — PATIENT MESSAGE (OUTPATIENT)
Dept: CARDIOLOGY | Facility: CLINIC | Age: 65
End: 2025-02-11
Payer: COMMERCIAL

## 2025-02-28 ENCOUNTER — PATIENT MESSAGE (OUTPATIENT)
Dept: CARDIOLOGY | Facility: CLINIC | Age: 65
End: 2025-02-28
Payer: COMMERCIAL

## 2025-03-10 ENCOUNTER — PATIENT MESSAGE (OUTPATIENT)
Dept: CARDIOLOGY | Facility: CLINIC | Age: 65
End: 2025-03-10
Payer: COMMERCIAL

## 2025-03-10 RX ORDER — IRBESARTAN 300 MG/1
300 TABLET ORAL NIGHTLY
COMMUNITY

## 2025-03-10 NOTE — TELEPHONE ENCOUNTER
I have updated the medication list     It now stays you are taking irbesartan 300 mg daily and Aldactone 50 mg daily.      Please let me know if I need to do anything else.

## 2025-03-10 NOTE — TELEPHONE ENCOUNTER
Patient states he was told to double both Irbesartan and Aldactone if blood pressure was still elevated to 2 pills daily.  It is noted in the chart to increase Irbesartan to 300 mg but I do not see where the Aldactone was increased to 100 mg.  He will need a new Rx for both medications if they have been increased.  Please advise.

## 2025-03-13 ENCOUNTER — OFFICE VISIT (OUTPATIENT)
Dept: OTOLARYNGOLOGY | Facility: CLINIC | Age: 65
End: 2025-03-13
Payer: COMMERCIAL

## 2025-03-13 VITALS — HEIGHT: 73 IN | WEIGHT: 232.13 LBS | BODY MASS INDEX: 30.76 KG/M2

## 2025-03-13 DIAGNOSIS — R09.82 PND (POST-NASAL DRIP): ICD-10-CM

## 2025-03-13 DIAGNOSIS — R09.81 CHRONIC NASAL CONGESTION: ICD-10-CM

## 2025-03-13 DIAGNOSIS — J34.2 NASAL SEPTAL DEVIATION: ICD-10-CM

## 2025-03-13 DIAGNOSIS — K21.9 LARYNGOPHARYNGEAL REFLUX (LPR): Primary | ICD-10-CM

## 2025-03-13 PROCEDURE — 1159F MED LIST DOCD IN RCRD: CPT | Mod: CPTII,S$GLB,, | Performed by: STUDENT IN AN ORGANIZED HEALTH CARE EDUCATION/TRAINING PROGRAM

## 2025-03-13 PROCEDURE — 3008F BODY MASS INDEX DOCD: CPT | Mod: CPTII,S$GLB,, | Performed by: STUDENT IN AN ORGANIZED HEALTH CARE EDUCATION/TRAINING PROGRAM

## 2025-03-13 PROCEDURE — 1101F PT FALLS ASSESS-DOCD LE1/YR: CPT | Mod: CPTII,S$GLB,, | Performed by: STUDENT IN AN ORGANIZED HEALTH CARE EDUCATION/TRAINING PROGRAM

## 2025-03-13 PROCEDURE — 3288F FALL RISK ASSESSMENT DOCD: CPT | Mod: CPTII,S$GLB,, | Performed by: STUDENT IN AN ORGANIZED HEALTH CARE EDUCATION/TRAINING PROGRAM

## 2025-03-13 PROCEDURE — 99999 PR PBB SHADOW E&M-EST. PATIENT-LVL III: CPT | Mod: PBBFAC,,, | Performed by: STUDENT IN AN ORGANIZED HEALTH CARE EDUCATION/TRAINING PROGRAM

## 2025-03-13 PROCEDURE — 1126F AMNT PAIN NOTED NONE PRSNT: CPT | Mod: CPTII,S$GLB,, | Performed by: STUDENT IN AN ORGANIZED HEALTH CARE EDUCATION/TRAINING PROGRAM

## 2025-03-13 PROCEDURE — 99214 OFFICE O/P EST MOD 30 MIN: CPT | Mod: S$GLB,,, | Performed by: STUDENT IN AN ORGANIZED HEALTH CARE EDUCATION/TRAINING PROGRAM

## 2025-03-13 PROCEDURE — 3066F NEPHROPATHY DOC TX: CPT | Mod: CPTII,S$GLB,, | Performed by: STUDENT IN AN ORGANIZED HEALTH CARE EDUCATION/TRAINING PROGRAM

## 2025-03-13 PROCEDURE — 4010F ACE/ARB THERAPY RXD/TAKEN: CPT | Mod: CPTII,S$GLB,, | Performed by: STUDENT IN AN ORGANIZED HEALTH CARE EDUCATION/TRAINING PROGRAM

## 2025-03-13 NOTE — PROGRESS NOTES
"Otolaryngology Clinic Note    Subjective:       Patient ID: Joseph Jesus is a 65 y.o. male.    Chief Complaint: Follow-up    3/13/25: nose is 79% better. Doing flonase/astelin BID. Pepcid did not work- caused nausea, did 2 gavascon nightly and seems to help. Not waking up mucous a lot at night. Not waking up with phlegm like he was before. Took 3-4 weeks to get here. Eyes and nose running right now.     1/14/25: History of Present Illness: Joseph Jesus is a 65 y.o. male presenting with nasal congestion. Astelin seems to help. Uses flonase as well. Takes zyrtec or xyzal daily. Temporary relief only. Does not fix throat, upper chest irritation. He does have runny nose, congestion, PND. Sense of smell is ok. No real sinus infections. No asthma dx. Some hoarseness. No swallowing issues.   Chest/throat Always irritated. Last couple of years, 24/7. Feels like mucous in his chest- both lungs. He is hacking/throat clearing. Nasal sprays have helped a bit with this. Less effective than it was at first. He is not feeling reflux often. Usually with overeating. Happening every 2 weeks. He is not taking anything for this. Tried reflux medicine. Threw up, got nauseous. Tried prilosec OTC and was just as bad. Avoid eating lately. Props up to eat. He drinks 1 large coffee in morning, monster occasionally in PM. Some greasy foods, but not much. Not big on spice. Does work on water intake.doing digestive enzymes and helps.  Tried sinus rinses but did not like. Smoked 40 years ago.     11/18/24: Destiny: "Patient is new to ENT, referred by Dr. Trejo for consultation for chronic nasal congestion X decades. States he incurred nasal trauma in 5th grade. Has never been able to breathe well through his nose, L>R.     ALLERGY: saw allergist in 2022 for chronic cough; that note has been reviewed. Immunocaps positive to grass pollen. The rest was negative. Advised to take Zyrtec daily and Flonase 2 sprays each nostril daily. Patient takes " "Zyrtec only after sneezing fits, not daily. Takes Flonase approximately 2-3 times per week. Patient denies itchy, red, watery eyes; no itchy, red, watery nose; no excessive sneezing. Patient reports frequent nasal congestion.   SINUS: No recent sinus infections, antibiotics, or recent sinus imaging. Patient denies s/s of acute bacterial sinusitis:  No mucopus from nose, no facial swelling/pain, no dental pain, no diminished olfaction/taste, no headaches around the eyes, no sore throat or productive cough.   ACID REFLUX: Recent GI notes & EGD report reviewed. Dr. Bautista noted reflux esophagitis and hiatal hernia in . Patient takes nothing for GERD.  Patient reports dysphonia, frequent throat clearing, globus sensation, and frequent throat irritation.  ASTHMA:  Former smoker. Quit in . No h/o asthma/COPD. Saw a pulmonologist in  for chronic cough that began in 2019. PFTs were completely normal without BD response and normal diffusion. Pulmonologist felt that cough was likely related to hiatal hernia with reflux and allergic rhinitis as CT was without parenchymal lung disease. Recent chest imagin2024 was negative.   ACE-INHIBITOR: none"  Findings; BL deviation septum, spur on floor on left, bows to narrow middle meatus on right.   Rec astelin/flonase and GI handout given for LPR.       Past Surgical History:   Procedure Laterality Date    APPENDECTOMY      BIOPSY OF BLADDER  2022    Procedure: BIOPSY, BLADDER;  Surgeon: Lenny Ewing MD;  Location: [address];  Service: Urology;;    BIOPSY OF BLADDER  3/8/2023    Procedure: BIOPSY, BLADDER;  Surgeon: Lenny Ewing MD;  Location: [address];  Service: Urology;;    BIOPSY OF BLADDER  2023    Procedure: BIOPSY, BLADDER;  Surgeon: Benjamin Haile MD;  Location: [address];  Service: Urology;;    BLADDER FULGURATION  2022    Procedure: FULGURATION, BLADDER;  Surgeon: Lenny Ewing MD;  Location: [address]" FLR;  Service: Urology;;    BLADDER FULGURATION  3/8/2023    Procedure: FULGURATION, BLADDER;  Surgeon: Lenny Ewing MD;  Location: [facility] OR 1ST FLR;  Service: Urology;;    BLADDER FULGURATION  7/14/2023    Procedure: FULGURATION, BLADDER;  Surgeon: Benjamin Haile MD;  Location: [facility] OR 1ST FLR;  Service: Urology;;    CAUDAL EPIDURAL STEROID INJECTION N/A 12/13/2023    Procedure: Injection-steroid-epidural-caudal;  Surgeon: Devante Boone MD;  Location: [facility] OR;  Service: Pain Management;  Laterality: N/A;    COLONOSCOPY  12/2012    due for repeat 12/2015    COLONOSCOPY N/A 5/19/2016    Procedure: COLONOSCOPY;  Surgeon: James Webber MD;  Location: [facility] ENDO (4TH FLR);  Service: Endoscopy;  Laterality: N/A; diverticulosis, repeat in 5-10 years for surveillance    COLONOSCOPY N/A 8/9/2021    Procedure: COLONOSCOPY;  Surgeon: Fahad Bautista Jr., MD;  Location: [facility] ENDO;  Service: Endoscopy;  Laterality: N/A;    CYSTOSCOPY  4/19/2022    Procedure: CYSTOSCOPY;  Surgeon: Lenny Ewing MD;  Location: [facility] OR 1ST FLR;  Service: Urology;;    CYSTOSCOPY  6/21/2022    Procedure: CYSTOSCOPY;  Surgeon: Lenny Ewing MD;  Location: [facility] OR 1ST FLR;  Service: Urology;;    CYSTOSCOPY  8/23/2022    Procedure: CYSTOSCOPY;  Surgeon: Lenny Ewing MD;  Location: [facility] OR 1ST FLR;  Service: Urology;;    CYSTOSCOPY  3/8/2023    Procedure: CYSTOSCOPY-BLUE LIGHT;  Surgeon: Lenny Ewing MD;  Location: [facility] OR 1ST FLR;  Service: Urology;;    CYSTOSCOPY N/A 4/4/2023    Procedure: CYSTOSCOPY;  Surgeon: Benjamin Haile MD;  Location: [facility] OR 1ST FLR;  Service: Urology;  Laterality: N/A;  1 hour    CYSTOSCOPY  7/14/2023    Procedure: CYSTOSCOPY;  Surgeon: Benjamin Haile MD;  Location: [facility] OR 1ST FLR;  Service: Urology;;  BLUE LIGHT    CYSTOSCOPY  10/4/2024    Procedure: CYSTOSCOPY;  Surgeon: Benjamin Haile MD;  Location: [facility] OR [address] [place] [address];  Service: Urology;;    ESOPHAGOGASTRODUODENOSCOPY N/A 8/9/2021    Procedure: EGD  (ESOPHAGOGASTRODUODENOSCOPY);  Surgeon: Fahad Bautista Jr., MD;  Location: [facility];  Service: Endoscopy;  Laterality: N/A;    EUA/Fistulotomy-'08      HERNIA REPAIR      [facility] with mesh    Hydrocelectomy  2013    MASS EXCISION  2004    BCC removal (twice)    MOLE REMOVAL  02/25/2019    2 moles removed from scalp =- 1 was basal cell and 1 was squamous cell - dermatology - Dr. londono    right Spermatocelectomy  2013    TURBT (TRANSURETHRAL RESECTION OF BLADDER TUMOR) N/A 4/4/2023    Procedure: TURBT (TRANSURETHRAL RESECTION OF BLADDER TUMOR);  Surgeon: Benjamin Haile MD;  [facility];  Service: Urology;  Laterality: N/A;  1 hour, blue light    TURBT, WITH BLUE LIGHT CYSTOSCOPY AND CYSVIEW  10/4/2024    Procedure: TURBT,WITH BLUE LIGHT CYSTOSCOPY AND CYSVIEW;  Surgeon: Benjamin Haile MD;  [facility];  Service: Urology;;    UPPER GASTROINTESTINAL ENDOSCOPY  prior to 2010    hiatal hernia, reflux esophagitis     Past Medical History:   Diagnosis Date    Allergy     seasonal    Anal fistula hx    Arthritis     Basal cell carcinoma 04/2013    left superior forehead    Callus     CKD (chronic kidney disease)     Diverticulosis     Epididymal cyst     GERD (gastroesophageal reflux disease)     Hayfever     Hydrocele, right     Hyperlipidemia     Hypertension     Neck pain     hx of muscular inury from weight lifting---resolved now    PONV (postoperative nausea and vomiting)     Prostatitis     Dec. '12-treated with antibx.    Squamous cell carcinoma 01/07/2019    anterior scalp    Squamous cell carcinoma of skin 03/2020    Left post scalp (SCC insitu-saucerization)    Visual impairment      Social Drivers of Health     Tobacco Use: Medium Risk (1/28/2025)    Patient History     Smoking Tobacco Use: Former     Smokeless Tobacco Use: Never     Passive Exposure: Not on file   Alcohol Use: Not At Risk (12/15/2024)    AUDIT-C     Frequency of Alcohol Consumption: Never     Average Number of  Drinks: Patient does not drink     Frequency of Binge Drinking: Never   Financial Resource Strain: Medium Risk (12/15/2024)    Overall Financial Resource Strain (CARDIA)     Difficulty of Paying Living Expenses: Somewhat hard   Food Insecurity: No Food Insecurity (12/15/2024)    Hunger Vital Sign     Worried About Running Out of Food in the Last Year: Never true     Ran Out of Food in the Last Year: Never true   Transportation Needs: No Transportation Needs (11/20/2023)    PRAPARE - Transportation     Lack of Transportation (Medical): No     Lack of Transportation (Non-Medical): No   Physical Activity: Insufficiently Active (12/15/2024)    Exercise Vital Sign     Days of Exercise per Week: 1 day     Minutes of Exercise per Session: 30 min   Stress: Stress Concern Present (12/15/2024)    [place] [employer] - Occupational Stress Questionnaire     Feeling of Stress : To some extent   Housing Stability: Low Risk  (11/20/2023)    Housing Stability Vital Sign     Unable to Pay for Housing in the Last Year: No     Number of Places Lived in the Last Year: 1     Unstable Housing in the Last Year: No   Depression: Low Risk  (1/7/2025)    Depression     Last PHQ-4: Flowsheet Data: 0   Utilities: Not At Risk (12/15/2024)    [facility] Utilities     Threatened with loss of utilities: No   Health Literacy: Adequate Health Literacy (12/15/2024)     Health Literacy     Frequency of need for help with medical instructions: Never   Social Isolation: Not on file     Review of patient's allergies indicates:   Allergen Reactions    Amlodipine      edema    Atenolol      Depression - circa 2000    Catechu herb     Famotidine Diarrhea    Erythromycin Rash    Sumycin [tetracycline] Rash    Tetracyclines Rash     Current Outpatient Medications   Medication Instructions    albuterol (PROVENTIL/VENTOLIN HFA) 90 mcg/actuation inhaler 2 puffs, Every 6 hours PRN    atorvastatin (LIPITOR) 10 mg, Oral, Daily    azelastine  (ASTELIN) 137 mcg, Nasal, 2 times daily    cetirizine (ZYRTEC) 10 mg, Daily    cloNIDine (CATAPRES) 0.1 mg, Oral, Daily PRN    coenzyme Q10 200 mg, Daily    famotidine (PEPCID) 20 mg, Oral, 2 times daily    fluticasone propionate (FLONASE) 50 mcg, Each Nostril, 2 times daily    irbesartan (AVAPRO) 300 mg, Nightly    Lactobacillus acidophilus (PROBIOTIC ORAL) Take by mouth.    spironolactone (ALDACTONE) 50 mg, Oral, Daily    UNABLE TO FIND Prostate Supplement         ENT ROS negative except as stated above.     Patient answers are not available for this visit.            Objective:      There were no vitals filed for this visit.    General: NAD, well appearing  Eyes: Normal conjunctiva and lids  Face: symmetric, nerve intact  Nose: The nose is without any evidence of any deformity. The nasal mucosa is inflamed with clear rhinorrhea. The septum is deviated left with large spur on floor, bows right superior. There is no evidence of septal hematoma. The turbinates are mildly inflamed.   Ears: The ears are with normal-appearing pinna. Examination of the canals is normal appearing bilaterally. There is no drainage or erythema noted. The tympanic membranes are normal appearing with pearly color, normal-appearing landmarks and normal light reflex. Hearing is grossly intact.  Mouth: No obvious abnormalities to the lips. The teeth are unremarkable. The gingivae are without any obvious evidence of infection or lesion. The oral mucosa is moist and pink. There are no obvious masses to the hard or soft palate.   Oropharynx: The uvula is midline.  The tongue is midline. The posterior pharynx has mild erythema- appears less than prior. The tonsils are normal appearing 0+.  Salivary glands: The salivary glands are symmetric and not enlarged, no masses  Neck: No lymphadenopathy, trachea midline, thryoid not enlarged.  Psych: Normal mood and affect.   Neuro: Grossly intact  Speech: fluent    Test Review: I personally reviewed  xray  EXAMINATION:  XR CHEST PA AND LATERAL     CLINICAL HISTORY:  Cough, unspecified     TECHNIQUE:  PA and lateral views of the chest were performed.     COMPARISON:  04/10/2020     FINDINGS:  Cardiac size is normal.  Lungs are clear and well aerated     Impression:     See above        Electronically signed by:Varun Delgadillo MD  Date:                                            09/24/2024     Assessment and Plan:       1. Laryngopharyngeal reflux (LPR)    2. PND (post-nasal drip)    3. Chronic nasal congestion    4. Nasal septal deviation          Suspect combination of PND and LPR. Had + reflux and hernia on EGD 2021.   Continue oral AH and flonase/astelin. Helping nose.    Did not tolerate PPI. tried pepcid 20 mg BID and alginates at night or BID. He did not do well with pepcid, but has with alginates. He has been on gavascon for a month so far and helping. We will continue this for at least another 2 months and see how he is doing.     If not better, plan to scope and CT sinuses.     RTC: 2 more months recheck.     Plan of care was discussed in detail with the patient, who agreed with the plan as above. All questions were answered in detail.     Rosalva Cook MD  Otolaryngology

## 2025-03-19 DIAGNOSIS — R79.89 ELEVATED LFTS: ICD-10-CM

## 2025-03-19 DIAGNOSIS — I10 HYPERTENSION, UNSPECIFIED TYPE: Primary | ICD-10-CM

## 2025-03-19 DIAGNOSIS — N17.9 AKI (ACUTE KIDNEY INJURY): ICD-10-CM

## 2025-03-19 NOTE — TELEPHONE ENCOUNTER
----- Message from Sasha sent at 3/19/2025  9:51 AM CDT -----  Type:  RX Refill RequestWho Called: Pt  ------- (2)RefillRX Name and Strength:1)spironolactone (ALDACTONE) 50 MG tablet 30 tablet 11 2/4/2025 2/4/2026--Sig - Route: Take 1 tablet (50 mg total) by mouth once daily. - OralSent to pharmacy as: spironolactone (ALDACTONE) 50 MG tablet2)irbesartan (AVAPRO) 300 MG tablet - -  -Sig: Take 300 mg by mouth every evening.[address]----------Best Call Back Number:[phone])Sig or dosage change needed, pt was increased to taking 2 a day, he states 100mg Rx needed or 2-50mg sig needed for pharmacy to fill. He has run out early due to quantity change. Dr. Miller had verbally told pt to increase dosage. 2)Same issue with the Ibersartan, pt was at 150mg and he is taking 2. New Rx needed for 300mg or 2-150mg per day.

## 2025-03-20 ENCOUNTER — PATIENT MESSAGE (OUTPATIENT)
Dept: CARDIOLOGY | Facility: CLINIC | Age: 65
End: 2025-03-20
Payer: COMMERCIAL

## 2025-03-20 RX ORDER — SPIRONOLACTONE 50 MG/1
50 TABLET, FILM COATED ORAL DAILY
Qty: 90 TABLET | Refills: 3 | Status: SHIPPED | OUTPATIENT
Start: 2025-03-20 | End: 2025-03-20

## 2025-03-20 RX ORDER — IRBESARTAN 300 MG/1
300 TABLET ORAL NIGHTLY
Qty: 90 TABLET | Refills: 3 | Status: SHIPPED | OUTPATIENT
Start: 2025-03-20

## 2025-03-20 RX ORDER — SPIRONOLACTONE 50 MG/1
50 TABLET, FILM COATED ORAL DAILY
Qty: 30 TABLET | Refills: 11 | Status: SHIPPED | OUTPATIENT
Start: 2025-03-20 | End: 2026-03-20

## 2025-03-27 ENCOUNTER — TELEPHONE (OUTPATIENT)
Dept: PHARMACY | Facility: CLINIC | Age: 65
End: 2025-03-27
Payer: COMMERCIAL

## 2025-03-28 NOTE — TELEPHONE ENCOUNTER
[facility]/Population Health Chart Review & Patient Outreach Details For Medication Adherence Project    Reason for Outreach Encounter: 3rd Party payor non-compliance report ([employer], [employer], [employer], etc)  2.  Patient Outreach Method: Reviewed patient chart   3.   Medication in question:    Hyperlipidemia Medications              atorvastatin (LIPITOR) 10 MG tablet Take 1 tablet (10 mg total) by mouth once daily.               LF 90 ds 3/19/25    4.  Reviewed and or Updates Made To: Patient Chart  5. Outreach Outcomes and/or actions taken: Patient filled medication and is on track to be adherent  Additional Notes:

## 2025-04-21 ENCOUNTER — OFFICE VISIT (OUTPATIENT)
Dept: UROLOGY | Facility: CLINIC | Age: 65
End: 2025-04-21
Payer: COMMERCIAL

## 2025-04-21 DIAGNOSIS — C67.2 MALIGNANT NEOPLASM OF LATERAL WALL OF URINARY BLADDER: Primary | ICD-10-CM

## 2025-04-21 PROCEDURE — 98006 SYNCH AUDIO-VIDEO EST MOD 30: CPT | Mod: 95,,, | Performed by: STUDENT IN AN ORGANIZED HEALTH CARE EDUCATION/TRAINING PROGRAM

## 2025-04-21 PROCEDURE — 3066F NEPHROPATHY DOC TX: CPT | Mod: CPTII,95,, | Performed by: STUDENT IN AN ORGANIZED HEALTH CARE EDUCATION/TRAINING PROGRAM

## 2025-04-21 PROCEDURE — 4010F ACE/ARB THERAPY RXD/TAKEN: CPT | Mod: CPTII,95,, | Performed by: STUDENT IN AN ORGANIZED HEALTH CARE EDUCATION/TRAINING PROGRAM

## 2025-04-21 NOTE — PROGRESS NOTES
[facility]  Urologic Oncology Clinic Note        Telehealth visit  Visit type: audio + visual   Patient location : Home      Patient was identified by name and birth date. The patient agreed to proceed with a telehealth visit. The visit was performed by audio and video communication.       Date of Service: 4/21/2025        Chief Complaint/Reason for Consultation: Hx of Non-muscle invasive bladder cancer    Requesting Provider:   No referring provider defined for this encounter.      History of Present Illness:   Patient 65 y.o. 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 He last saw me for 7/14/2023 after repeat induction BCG and negative cysto + bladder bx.     He was then lost to follow up.     Former smoker    Hx of appendectomy when he was 2  Inguinal hernia repair   Family hx of prostate cancer in father and grand father  Family hx of kidney cancer in aunt    Patient here today in follow up. Very resistant to undergo continued cystoscopic surveillance. Discussed NCCN and AUA guidelines.     No hematuria. No issues. No new changes in health.      Urologic History:     4/1/2022 -- Office Cytoscopy -- erythema right lateral wall  4/5/2022 -- CTUrogram -- no renal mass, no bladder mass, non-obstructing renal stones  4/19/2022 -- Bladder bx -- CIS  6/21/2022 -- TURBT -- CIS, muscle present and uninvolved  8/23/2022 -- re-TURBT -- CIS, muscle present and uninvolved  12/2022 -- completed induction BCG  3/8/2023 -- TURBT, blue light -- HG Ta, random bladder bx -- CIS  4/4/2023 -- TURBT, blue light -- no evidence of tumor at re-resection on final pathology  5/10/2023 -- 6/7/2023 -- Repeat induction BCG  7/14/2023 -- Cysto, random bladder bx -- Benign   9/13/2023 -- Maintenance BCG x2   10/4/2024 -- TURBT, blue light -- negative             Patient Active Problem List    Diagnosis Date Noted    Shortness of breath 12/10/2024    Hoarseness of voice  12/10/2024    Class 1 obesity due to excess calories with serious comorbidity and body mass index (BMI) of 30.0 to 30.9 in adult 12/06/2024    Elevated liver enzymes 12/06/2024    Adjustment disorder with anxious mood 05/17/2022    Numbness and tingling of foot 05/17/2022    CKD stage 3a, GFR 45-59 ml/min 05/17/2022    Malignant neoplasm of lateral wall of urinary bladder 05/11/2022    Edema leg 03/28/2022    Aortic atherosclerosis 01/28/2022    Coronary artery calcification 01/28/2022    Gastroesophageal reflux disease 08/09/2021    Hiatal hernia with gastroesophageal reflux disease without esophagitis 05/26/2021    AK (actinic keratosis) 11/02/2020    Erectile dysfunction due to arterial insufficiency 07/02/2018    Diverticulitis of large intestine without perforation or abscess without bleeding 04/18/2017    Hypertension, essential 08/17/2016    Hyperlipidemia 08/17/2016    NAFLD (nonalcoholic fatty liver disease) 08/17/2016    Lateral epicondylitis of right elbow 08/17/2016    BPH with urinary obstruction 09/08/2014    Colon polyps 09/27/2012          Review of patient's allergies indicates:   Allergen Reactions    Amlodipine      edema    Atenolol      Depression - circa 2000    Catechu herb     Famotidine Diarrhea    Erythromycin Rash    Sumycin [tetracycline] Rash    Tetracyclines Rash        Past Medical History:   Diagnosis Date    Allergy     seasonal    Anal fistula hx    Arthritis     Basal cell carcinoma 04/2013    left superior forehead    Callus     CKD (chronic kidney disease)     Diverticulosis     Epididymal cyst     GERD (gastroesophageal reflux disease)     Hayfever     Hydrocele, right     Hyperlipidemia     Hypertension     Neck pain     hx of muscular inury from weight lifting---resolved now    PONV (postoperative nausea and vomiting)     Prostatitis     Dec. '12-treated with antibx.    Squamous cell carcinoma 01/07/2019    anterior scalp    Squamous cell carcinoma of skin 03/2020    Left post  scalp (SCC insitu-saucerization)    Visual impairment       Past Surgical History:   Procedure Laterality Date    APPENDECTOMY  1962    BIOPSY OF BLADDER  4/19/2022    Procedure: BIOPSY, BLADDER;  Surgeon: Lenny Ewing MD;  Location: [facility] OR 1ST FLR;  Service: Urology;;    BIOPSY OF BLADDER  3/8/2023    Procedure: BIOPSY, BLADDER;  Surgeon: Lenny Ewing MD;  Location: [facility] OR 1ST FLR;  Service: Urology;;    BIOPSY OF BLADDER  7/14/2023    Procedure: BIOPSY, BLADDER;  Surgeon: Benjamin Haile MD;  Location: [facility] OR 1ST FLR;  Service: Urology;;    BLADDER FULGURATION  4/19/2022    Procedure: FULGURATION, BLADDER;  Surgeon: Lenny Ewing MD;  Location: [facility] OR 1ST FLR;  Service: Urology;;    BLADDER FULGURATION  3/8/2023    Procedure: FULGURATION, BLADDER;  Surgeon: Lenny Ewing MD;  Location: [facility] OR 1ST FLR;  Service: Urology;;    BLADDER FULGURATION  7/14/2023    Procedure: FULGURATION, BLADDER;  Surgeon: Benjamin Haile MD;  Location: [facility] OR 1ST FLR;  Service: Urology;;    CAUDAL EPIDURAL STEROID INJECTION N/A 12/13/2023    Procedure: Injection-steroid-epidural-caudal;  Surgeon: Devante Boone MD;  Location: [facility];  Service: Pain Management;  Laterality: N/A;    COLONOSCOPY  12/2012    due for repeat 12/2015    COLONOSCOPY N/A 5/19/2016    Procedure: COLONOSCOPY;  Surgeon: James Webber MD;  Location: [facility] (4TH FLR);  Service: Endoscopy;  Laterality: N/A; diverticulosis, repeat in 5-10 years for surveillance    COLONOSCOPY N/A 8/9/2021    Procedure: COLONOSCOPY;  Surgeon: Fahad Bautista Jr., MD;  Location: [facility];  Service: Endoscopy;  Laterality: N/A;    CYSTOSCOPY  4/19/2022    Procedure: CYSTOSCOPY;  Surgeon: Lenny Ewing MD;  Location: [facility] OR 1ST FLR;  Service: Urology;;    CYSTOSCOPY  6/21/2022    Procedure: CYSTOSCOPY;  Surgeon: Lenny Ewing MD;  Location: [facility] [address] [place] [address];  Service: Urology;;    CYSTOSCOPY  8/23/2022    Procedure: CYSTOSCOPY;  Surgeon: Lenny Ewing,  MD;  [facility];  Service: Urology;;    CYSTOSCOPY  3/8/2023    Procedure: CYSTOSCOPY-BLUE LIGHT;  Surgeon: Lenny Ewing MD;  [facility];  Service: Urology;;    CYSTOSCOPY N/A 4/4/2023    Procedure: CYSTOSCOPY;  Surgeon: Benjamin Haile MD;  [facility];  Service: Urology;  Laterality: N/A;  1 hour    CYSTOSCOPY  7/14/2023    Procedure: CYSTOSCOPY;  Surgeon: Benjamin Haile MD;  [facility];  Service: Urology;;  BLUE LIGHT    CYSTOSCOPY  10/4/2024    Procedure: CYSTOSCOPY;  Surgeon: Benjamin Haile MD;  Location: [facility] OR [address] [place] [address];  Service: Urology;;    ESOPHAGOGASTRODUODENOSCOPY N/A 8/9/2021    Procedure: EGD (ESOPHAGOGASTRODUODENOSCOPY);  Surgeon: Fahad Bautista Jr., MD;  Location: [facility];  Service: Endoscopy;  Laterality: N/A;    EUA/Fistulotomy-'08      HERNIA REPAIR      RI with mesh    Hydrocelectomy  2013    MASS EXCISION  2004    BCC removal (twice)    MOLE REMOVAL  02/25/2019    2 moles removed from scalp =- 1 was basal cell and 1 was squamous cell - dermatology - Dr. londono    right Spermatocelectomy  2013    TURBT (TRANSURETHRAL RESECTION OF BLADDER TUMOR) N/A 4/4/2023    Procedure: TURBT (TRANSURETHRAL RESECTION OF BLADDER TUMOR);  Surgeon: Benjamin Haile MD;  Location: [facility] OR [address] [place] [address];  Service: Urology;  Laterality: N/A;  1 hour, blue light    TURBT, WITH BLUE LIGHT CYSTOSCOPY AND CYSVIEW  10/4/2024    Procedure: TURBT,WITH BLUE LIGHT CYSTOSCOPY AND CYSVIEW;  Surgeon: Benjamin Haile MD;  Location: [address];  Service: Urology;;    UPPER GASTROINTESTINAL ENDOSCOPY  prior to 2010    hiatal hernia, reflux esophagitis      Family History   Problem Relation Name Age of Onset    Skin cancer Mother Kimberly ZHOU Jesus     Hypertension Mother Kimberly ZHOU Jesus     Skin cancer Father Lorna FERNANDEZ Jesus     Cancer Father Lorna Jesus         prostate    Hypertension Father Lorna FERNANDEZ Jesus     Hypertension Maternal Grandmother Kimberly Lozano Krysconner      Hypertension Maternal Grandfather Thaddeus Molina     Hypertension Paternal Grandmother Kristie Jesus     Anesthesia problems Paternal Grandmother Kristie Jesus     Cancer Paternal Grandfather OJulio A Jesus         Prostate    Hypertension Paternal Grandfather OJulio A Jessu     Heart disease Paternal Grandfather OJulio A Jesus     Diabetes Neg Hx      Colon polyps Neg Hx      Colon cancer Neg Hx      Melanoma Neg Hx      Psoriasis Neg Hx      Lupus Neg Hx      Eczema Neg Hx      Acne Neg Hx      Cirrhosis Neg Hx      Prostate cancer Neg Hx      Kidney disease Neg Hx      Crohn's disease Neg Hx      Ulcerative colitis Neg Hx      Stomach cancer Neg Hx      Esophageal cancer Neg Hx        Social History     Tobacco Use    Smoking status: Former     Current packs/day: 0.00     Average packs/day: 1 pack/day for 10.0 years (10.0 ttl pk-yrs)     Types: Cigarettes     Start date: 1977     Quit date: 1987     Years since quittin.3    Smokeless tobacco: Never    Tobacco comments:     quit   Substance Use Topics    Alcohol use: Not Currently     Alcohol/week: 0.0 - 1.0 standard drinks of alcohol     Comment: No longer drinks ETOH        Review of Systems   Constitutional:  Negative for activity change, fatigue and fever.   Respiratory:  Negative for cough.    Cardiovascular:  Negative for chest pain.   Gastrointestinal:  Negative for abdominal pain.   Genitourinary:  Negative for difficulty urinating, dysuria, flank pain and hematuria.             OBJECTIVE:     Telehealth visit as such no exam performed      LAB:      All laboratory data listed below was independently interpreted and reviewed with patient in clinic today      CMP  Sodium   Date Value Ref Range Status   2025 139 136 - 145 mmol/L Final     Potassium   Date Value Ref Range Status   2025 4.6 3.5 - 5.1 mmol/L Final     Chloride   Date Value Ref Range Status   2025 108 95 - 110 mmol/L Final     CO2   Date Value Ref  Range Status   01/17/2025 26 23 - 29 mmol/L Final     Glucose   Date Value Ref Range Status   01/17/2025 81 70 - 110 mg/dL Final     BUN   Date Value Ref Range Status   01/17/2025 20 8 - 23 mg/dL Final     Creatinine   Date Value Ref Range Status   01/17/2025 1.3 0.5 - 1.4 mg/dL Final     Calcium   Date Value Ref Range Status   01/17/2025 9.6 8.7 - 10.5 mg/dL Final     Total Protein   Date Value Ref Range Status   01/17/2025 7.5 6.0 - 8.4 g/dL Final     Albumin   Date Value Ref Range Status   01/17/2025 4.2 3.5 - 5.2 g/dL Final   06/21/2018 4.3 3.6 - 5.1 g/dL Final     Comment:     @ Test Performed By:  [facility]  Alexandru White M.D., Ph.D.,   [address] [place] [id number]  [facility]  [id number]       Total Bilirubin   Date Value Ref Range Status   01/17/2025 0.4 0.1 - 1.0 mg/dL Final     Comment:     For infants and newborns, interpretation of results should be based  on gestational age, weight and in agreement with clinical  observations.    Premature Infant recommended reference ranges:  Up to 24 hours.............<8.0 mg/dL  Up to 48 hours............<12.0 mg/dL  3-5 days..................<15.0 mg/dL  6-29 days.................<15.0 mg/dL       Alkaline Phosphatase   Date Value Ref Range Status   01/17/2025 64 40 - 150 U/L Final     AST   Date Value Ref Range Status   01/17/2025 54 (H) 10 - 40 U/L Final     ALT   Date Value Ref Range Status   01/17/2025 69 (H) 10 - 44 U/L Final     Anion Gap   Date Value Ref Range Status   01/17/2025 5 (L) 8 - 16 mmol/L Final     eGFR   Date Value Ref Range Status   01/17/2025 >60.0 >60 mL/min/1.73 m^2 Final     Lab Results   Component Value Date    WBC 7.31 03/01/2023    HGB 13.7 (L) 03/01/2023    HCT 42.6 03/01/2023    MCV 94 03/01/2023     03/01/2023             IMAGING:        No relevant imaging to review      ASSESSMENT/PLAN:     66 yo M hx of BCG refractory CIS and HG Ta here after lost to follow up for one  year.    Plan:    Hx of BCG refractory CIS  Today we discussed the incidence, risk factors, and natural history of bladder cancer, including in general the management options such as TURBT and radical cystectomy, and the use of intravesical therapies to avoid recurrence of cancer and progression.     Specifically, we spoke about non-muscle invasive bladder cancer and that the natural history of this cancer is to recur when low grade and progress when high grade. We also discussed that higher stages portend higher risk of recurrence and progression.     He has been lost to follow up in the past. However, he did undergo cystoscopy 10/2024 w/ blue light and TURBT w/ random bladder biopsies that was negative.     Today we discussed NCCN and AUA surveillance for his bladder cancer and recommend surveillance cysto every 3-6 months for 2 years, every 6 months in years 3 and 4, and then annually thereafter. Discussed that without this we he is at greater risk for recurrence and progression of his disease and potentially could lead to incurable state.     Patient would like to see me 5/29/2025 to discuss cystoscopy in the operating room.           Benjamin Haile MD  Urologic Oncology  P: [phone]

## 2025-05-21 ENCOUNTER — OFFICE VISIT (OUTPATIENT)
Dept: OTOLARYNGOLOGY | Facility: CLINIC | Age: 65
End: 2025-05-21
Payer: COMMERCIAL

## 2025-05-21 VITALS — HEIGHT: 73 IN | BODY MASS INDEX: 30.85 KG/M2 | WEIGHT: 232.81 LBS

## 2025-05-21 DIAGNOSIS — R09.81 CHRONIC NASAL CONGESTION: ICD-10-CM

## 2025-05-21 DIAGNOSIS — K21.9 LARYNGOPHARYNGEAL REFLUX (LPR): ICD-10-CM

## 2025-05-21 DIAGNOSIS — J34.3 HYPERTROPHY OF BOTH INFERIOR NASAL TURBINATES: ICD-10-CM

## 2025-05-21 DIAGNOSIS — J34.2 NASAL SEPTAL DEVIATION: Primary | ICD-10-CM

## 2025-05-21 DIAGNOSIS — R06.83 SNORING: ICD-10-CM

## 2025-05-21 PROCEDURE — 92511 NASOPHARYNGOSCOPY: CPT | Mod: S$GLB,,, | Performed by: STUDENT IN AN ORGANIZED HEALTH CARE EDUCATION/TRAINING PROGRAM

## 2025-05-21 PROCEDURE — 3288F FALL RISK ASSESSMENT DOCD: CPT | Mod: CPTII,S$GLB,, | Performed by: STUDENT IN AN ORGANIZED HEALTH CARE EDUCATION/TRAINING PROGRAM

## 2025-05-21 PROCEDURE — 1126F AMNT PAIN NOTED NONE PRSNT: CPT | Mod: CPTII,S$GLB,, | Performed by: STUDENT IN AN ORGANIZED HEALTH CARE EDUCATION/TRAINING PROGRAM

## 2025-05-21 PROCEDURE — 3008F BODY MASS INDEX DOCD: CPT | Mod: CPTII,S$GLB,, | Performed by: STUDENT IN AN ORGANIZED HEALTH CARE EDUCATION/TRAINING PROGRAM

## 2025-05-21 PROCEDURE — 3066F NEPHROPATHY DOC TX: CPT | Mod: CPTII,S$GLB,, | Performed by: STUDENT IN AN ORGANIZED HEALTH CARE EDUCATION/TRAINING PROGRAM

## 2025-05-21 PROCEDURE — 4010F ACE/ARB THERAPY RXD/TAKEN: CPT | Mod: CPTII,S$GLB,, | Performed by: STUDENT IN AN ORGANIZED HEALTH CARE EDUCATION/TRAINING PROGRAM

## 2025-05-21 PROCEDURE — 99999 PR PBB SHADOW E&M-EST. PATIENT-LVL III: CPT | Mod: PBBFAC,,, | Performed by: STUDENT IN AN ORGANIZED HEALTH CARE EDUCATION/TRAINING PROGRAM

## 2025-05-21 PROCEDURE — 1101F PT FALLS ASSESS-DOCD LE1/YR: CPT | Mod: CPTII,S$GLB,, | Performed by: STUDENT IN AN ORGANIZED HEALTH CARE EDUCATION/TRAINING PROGRAM

## 2025-05-21 PROCEDURE — 1159F MED LIST DOCD IN RCRD: CPT | Mod: CPTII,S$GLB,, | Performed by: STUDENT IN AN ORGANIZED HEALTH CARE EDUCATION/TRAINING PROGRAM

## 2025-05-21 PROCEDURE — 99214 OFFICE O/P EST MOD 30 MIN: CPT | Mod: 25,S$GLB,, | Performed by: STUDENT IN AN ORGANIZED HEALTH CARE EDUCATION/TRAINING PROGRAM

## 2025-05-21 NOTE — PROGRESS NOTES
Otolaryngology Clinic Note    Subjective:       Patient ID: Joseph Jesus is a 65 y.o. male.    Chief Complaint: Follow-up    5/21/25: he is fine, happy with where he is now. Bad days, he takes extra gavascon. Sleeps in chair due to reflux. Doing nasal sprays, continue to help. Sometimes wakes up clear and waits. Will need at night. Left side of nose is tighter than right. Mouth breathes at night. Is bothersome. He has been told he needs CPAP but has not had sleep study. Reports he would rather die than wear CPAP. He is tired during the day. Sedentary lifestyle.     3/13/25: nose is 79% better. Doing flonase/astelin BID. Pepcid did not work- caused nausea, did 2 gavascon nightly and seems to help. Not waking up mucous a lot at night. Not waking up with phlegm like he was before. Took 3-4 weeks to get here. Eyes and nose running right now.     1/14/25: History of Present Illness: Joseph Jesus is a 65 y.o. male presenting with nasal congestion. Astelin seems to help. Uses flonase as well. Takes zyrtec or xyzal daily. Temporary relief only. Does not fix throat, upper chest irritation. He does have runny nose, congestion, PND. Sense of smell is ok. No real sinus infections. No asthma dx. Some hoarseness. No swallowing issues.   Chest/throat Always irritated. Last couple of years, 24/7. Feels like mucous in his chest- both lungs. He is hacking/throat clearing. Nasal sprays have helped a bit with this. Less effective than it was at first. He is not feeling reflux often. Usually with overeating. Happening every 2 weeks. He is not taking anything for this. Tried reflux medicine. Threw up, got nauseous. Tried prilosec OTC and was just as bad. Avoid eating lately. Props up to eat. He drinks 1 large coffee in morning, monster occasionally in PM. Some greasy foods, but not much. Not big on spice. Does work on water intake.doing digestive enzymes and helps.  Tried sinus rinses but did not like. Smoked 40 years ago.     11/18/24:  "Destiny: "Patient is new to ENT, referred by Dr. Trejo for consultation for chronic nasal congestion X decades. States he incurred nasal trauma in 5th grade. Has never been able to breathe well through his nose, L>R.     ALLERGY: saw allergist in  for chronic cough; that note has been reviewed. Immunocaps positive to grass pollen. The rest was negative. Advised to take Zyrtec daily and Flonase 2 sprays each nostril daily. Patient takes Zyrtec only after sneezing fits, not daily. Takes Flonase approximately 2-3 times per week. Patient denies itchy, red, watery eyes; no itchy, red, watery nose; no excessive sneezing. Patient reports frequent nasal congestion.   SINUS: No recent sinus infections, antibiotics, or recent sinus imaging. Patient denies s/s of acute bacterial sinusitis:  No mucopus from nose, no facial swelling/pain, no dental pain, no diminished olfaction/taste, no headaches around the eyes, no sore throat or productive cough.   ACID REFLUX: Recent GI notes & EGD report reviewed. Dr. Bautista noted reflux esophagitis and hiatal hernia in . Patient takes nothing for GERD.  Patient reports dysphonia, frequent throat clearing, globus sensation, and frequent throat irritation.  ASTHMA:  Former smoker. Quit in . No h/o asthma/COPD. Saw a pulmonologist in  for chronic cough that began in 2019. PFTs were completely normal without BD response and normal diffusion. Pulmonologist felt that cough was likely related to hiatal hernia with reflux and allergic rhinitis as CT was without parenchymal lung disease. Recent chest imagin2024 was negative.   ACE-INHIBITOR: none"  Findings; BL deviation septum, spur on floor on left, bows to narrow middle meatus on right.   Rec astelin/flonase and GI handout given for LPR.       Past Surgical History:   Procedure Laterality Date    APPENDECTOMY      BIOPSY OF BLADDER  2022    Procedure: BIOPSY, BLADDER;  Surgeon: Lenny Ewing MD;  Location: " [facility] OR 1ST FLR;  Service: Urology;;    BIOPSY OF BLADDER  3/8/2023    Procedure: BIOPSY, BLADDER;  Surgeon: Lenny Ewing MD;  Location: [facility] OR 1ST FLR;  Service: Urology;;    BIOPSY OF BLADDER  7/14/2023    Procedure: BIOPSY, BLADDER;  Surgeon: Benjamin Haile MD;  Location: [facility] OR 1ST FLR;  Service: Urology;;    BLADDER FULGURATION  4/19/2022    Procedure: FULGURATION, BLADDER;  Surgeon: Lenny Ewing MD;  Location: [facility] OR 1ST FLR;  Service: Urology;;    BLADDER FULGURATION  3/8/2023    Procedure: FULGURATION, BLADDER;  Surgeon: Lenny Ewing MD;  Location: [facility] OR 1ST FLR;  Service: Urology;;    BLADDER FULGURATION  7/14/2023    Procedure: FULGURATION, BLADDER;  Surgeon: Benjamin Haile MD;  Location: [facility] OR 1ST FLR;  Service: Urology;;    CAUDAL EPIDURAL STEROID INJECTION N/A 12/13/2023    Procedure: Injection-steroid-epidural-caudal;  Surgeon: Devante Boone MD;  Location: [facility];  Service: Pain Management;  Laterality: N/A;    COLONOSCOPY  12/2012    due for repeat 12/2015    COLONOSCOPY N/A 5/19/2016    Procedure: COLONOSCOPY;  Surgeon: James Webber MD;  Location: [facility] (4TH FLR);  Service: Endoscopy;  Laterality: N/A; diverticulosis, repeat in 5-10 years for surveillance    COLONOSCOPY N/A 8/9/2021    Procedure: COLONOSCOPY;  Surgeon: Fahad Bautista Jr., MD;  Location: [facility] ENDO;  Service: Endoscopy;  Laterality: N/A;    CYSTOSCOPY  4/19/2022    Procedure: CYSTOSCOPY;  Surgeon: Lenny Ewing MD;  Location: [facility] OR 1ST FLR;  Service: Urology;;    CYSTOSCOPY  6/21/2022    Procedure: CYSTOSCOPY;  Surgeon: Lenny Ewing MD;  Location: [facility] OR 1ST FLR;  Service: Urology;;    CYSTOSCOPY  8/23/2022    Procedure: CYSTOSCOPY;  Surgeon: Lenny Ewing MD;  Location: [facility] OR 1ST FLR;  Service: Urology;;    CYSTOSCOPY  3/8/2023    Procedure: CYSTOSCOPY-BLUE LIGHT;  Surgeon: Lenny Ewing MD;  Location: [facility] OR [address] [place] [address];  Service: Urology;;    CYSTOSCOPY N/A 4/4/2023    Procedure:  CYSTOSCOPY;  Surgeon: Benjamin Haile MD;  Location: [facility] OR [address] [place] [address];  Service: Urology;  Laterality: N/A;  1 hour    CYSTOSCOPY  7/14/2023    Procedure: CYSTOSCOPY;  Surgeon: Benjamin Haile MD;  [facility];  Service: Urology;;  BLUE LIGHT    CYSTOSCOPY  10/4/2024    Procedure: CYSTOSCOPY;  Surgeon: Benjamin Haile MD;  [facility];  Service: Urology;;    ESOPHAGOGASTRODUODENOSCOPY N/A 8/9/2021    Procedure: EGD (ESOPHAGOGASTRODUODENOSCOPY);  Surgeon: Fahad Bautista Jr., MD;  Location: [facility];  Service: Endoscopy;  Laterality: N/A;    EUA/Fistulotomy-'08      HERNIA REPAIR      RI with mesh    Hydrocelectomy  2013    MASS EXCISION  2004    BCC removal (twice)    MOLE REMOVAL  02/25/2019    2 moles removed from scalp =- 1 was basal cell and 1 was squamous cell - dermatology - Dr. londono    right Spermatocelectomy  2013    TURBT (TRANSURETHRAL RESECTION OF BLADDER TUMOR) N/A 4/4/2023    Procedure: TURBT (TRANSURETHRAL RESECTION OF BLADDER TUMOR);  Surgeon: Benjamin Haile MD;  Location: [facility] OR [address] [place] [address];  Service: Urology;  Laterality: N/A;  1 hour, blue light    TURBT, WITH BLUE LIGHT CYSTOSCOPY AND CYSVIEW  10/4/2024    Procedure: TURBT,WITH BLUE LIGHT CYSTOSCOPY AND CYSVIEW;  Surgeon: Benjamin Haile MD;  Location: [facility] OR [address] [place] [address];  Service: Urology;;    UPPER GASTROINTESTINAL ENDOSCOPY  prior to 2010    hiatal hernia, reflux esophagitis     Past Medical History:   Diagnosis Date    Allergy     seasonal    Anal fistula hx    Arthritis     Basal cell carcinoma 04/2013    left superior forehead    Callus     CKD (chronic kidney disease)     Diverticulosis     Epididymal cyst     GERD (gastroesophageal reflux disease)     Hayfever     Hydrocele, right     Hyperlipidemia     Hypertension     Neck pain     hx of muscular inury from weight lifting---resolved now    PONV (postoperative nausea and vomiting)     Prostatitis     Dec. '12-treated with antibx.    Squamous cell carcinoma 01/07/2019     anterior scalp    Squamous cell carcinoma of skin 03/2020    Left post scalp (SCC insitu-saucerization)    Visual impairment      Social Drivers of Health     Tobacco Use: Medium Risk (1/28/2025)    Patient History     Smoking Tobacco Use: Former     Smokeless Tobacco Use: Never     Passive Exposure: Not on file   Alcohol Use: Not At Risk (12/15/2024)    AUDIT-C     Frequency of Alcohol Consumption: Never     Average Number of Drinks: Patient does not drink     Frequency of Binge Drinking: Never   Financial Resource Strain: Medium Risk (12/15/2024)    Overall Financial Resource Strain (CARDIA)     Difficulty of Paying Living Expenses: Somewhat hard   Food Insecurity: No Food Insecurity (12/15/2024)    Hunger Vital Sign     Worried About Running Out of Food in the Last Year: Never true     Ran Out of Food in the Last Year: Never true   Transportation Needs: No Transportation Needs (11/20/2023)    PRAPARE - Transportation     Lack of Transportation (Medical): No     Lack of Transportation (Non-Medical): No   Physical Activity: Insufficiently Active (12/15/2024)    Exercise Vital Sign     Days of Exercise per Week: 1 day     Minutes of Exercise per Session: 30 min   Stress: Stress Concern Present (12/15/2024)    [employer] - Occupational Stress Questionnaire     Feeling of Stress : To some extent   Housing Stability: Low Risk  (11/20/2023)    Housing Stability Vital Sign     Unable to Pay for Housing in the Last Year: No     Number of Places Lived in the Last Year: 1     Unstable Housing in the Last Year: No   Depression: Low Risk  (1/7/2025)    Depression     Last PHQ-4: Flowsheet Data: 0   Utilities: Not At Risk (12/15/2024)    [facility] Utilities     Threatened with loss of utilities: No   Health Literacy: Adequate Health Literacy (12/15/2024)     Health Literacy     Frequency of need for help with medical instructions: Never   Social Isolation: Not on file     Review of patient's  allergies indicates:   Allergen Reactions    Amlodipine      edema    Atenolol      Depression - circa 2000    Catechu herb (black catechu, senegalia catechu)     Famotidine Diarrhea    Erythromycin Rash    Sumycin [tetracycline] Rash    Tetracyclines Rash     Current Outpatient Medications   Medication Instructions    albuterol (PROVENTIL/VENTOLIN HFA) 90 mcg/actuation inhaler 2 puffs, Every 6 hours PRN    atorvastatin (LIPITOR) 10 mg, Oral, Daily    azelastine (ASTELIN) 137 mcg, Nasal, 2 times daily    cetirizine (ZYRTEC) 10 mg, Daily    cloNIDine (CATAPRES) 0.1 mg, Oral, Daily PRN    coenzyme Q10 200 mg, Daily    famotidine (PEPCID) 20 mg, Oral, 2 times daily    fluticasone propionate (FLONASE) 50 mcg, Each Nostril, 2 times daily    irbesartan (AVAPRO) 300 mg, Oral, Nightly    Lactobacillus acidophilus (PROBIOTIC ORAL) Take by mouth.    spironolactone (ALDACTONE) 50 mg, Oral, Daily    UNABLE TO FIND Prostate Supplement         ENT ROS negative except as stated above.     Patient answers are not available for this visit.            Objective:      There were no vitals filed for this visit.    General: NAD, well appearing  Eyes: Normal conjunctiva and lids  Face: symmetric, nerve intact  Nose: The nose is without any evidence of any deformity. The nasal mucosa is inflamed with clear rhinorrhea. The septum is deviated left with large spur on floor, bows right superior. There is no evidence of septal hematoma. The turbinates are mildly inflamed and large.   Ears: The ears are with normal-appearing pinna. Examination of the canals is normal appearing bilaterally. There is no drainage or erythema noted. The tympanic membranes are normal appearing with pearly color, normal-appearing landmarks and normal light reflex. Hearing is grossly intact.  Mouth: No obvious abnormalities to the lips. The teeth are unremarkable. The gingivae are without any obvious evidence of infection or lesion. The oral mucosa is moist and pink.  There are no obvious masses to the hard or soft palate.   Oropharynx: The uvula is midline.  The tongue is midline. The posterior pharynx has mild erythema- appears less than prior. The tonsils are normal appearing 0+.  Salivary glands: The salivary glands are symmetric and not enlarged, no masses  Neck: No lymphadenopathy, trachea midline, thryoid not enlarged.  Psych: Normal mood and affect.   Neuro: Grossly intact  Speech: fluent    Procedure: Flexible nasopharyngoscopy  Indications: HUSSAIN, nasal obstruction  Verbal consent obtained  Anesthesia: lidocaine and phenylephrine nasal spray  Procedure: Scope was passed into right and left nare, to nasopharynx and down to visualize the glottis. Turbinates, middle and superior meatus, superior, SER assessed. Findings below. Patient tolerated procedure well.     - Nose with septal deviation left anterior and right,turbinates large, meatuses congested but no polyps or purulence  - Nasopharynx- WNL, no masses  - Oropharynx- BOT symmetric, no masses  - Hypopharynx and piriform sinuses- no masses on trumpet maneuver, mild PCE  - Supraglottis- Arytenoids intact, no masses, not edematous or erythematous  - Glottis- Bilateral vocal folds intact with full motion, no masses  - Glottic closure- complete      Test Review: I personally reviewed xray  EXAMINATION:  XR CHEST PA AND LATERAL     CLINICAL HISTORY:  Cough, unspecified     TECHNIQUE:  PA and lateral views of the chest were performed.     COMPARISON:  04/10/2020     FINDINGS:  Cardiac size is normal.  Lungs are clear and well aerated     Impression:     See above        Electronically signed by:Varun Delgadillo MD  Date:                                            09/24/2024     Assessment and Plan:       1. Nasal septal deviation    2. Chronic nasal congestion    3. Laryngopharyngeal reflux (LPR)    4. Hypertrophy of both inferior nasal turbinates    5. Snoring            Suspect combination of PND and LPR. Had + reflux and  hernia on EGD 2021.   Continue oral AH and flonase/astelin. Helping nose.    Did not tolerate PPI. tried pepcid 20 mg BID and alginates at night or BID. He did not do well with pepcid, but has with alginates. He has been on gavascon for 3 months so far and definitely helping.   Recommend trying reflux wedge as sleeping in chair at night.     Offered HSAT to assess his sleep apnea concerns. He will try reflux pillow at home and have his wife assess his sleep first. He wants to book HSAT for 3 months from now.       RTC: he will continue current regimen and consider DISE with septo/turb.       Plan of care was discussed in detail with the patient, who agreed with the plan as above. All questions were answered in detail.     Rosalva Cook MD  Otolaryngology

## 2025-05-26 NOTE — PROGRESS NOTES
[facility]  Urologic Oncology Clinic Note    Date of Service: 05/26/2025     The patient location is: [place]  The chief complaint leading to consultation is: Bladder cancer    Visit type: audiovisual    Face to Face time with patient: 15  30 minutes of total time spent on the encounter, which includes face to face time and non-face to face time preparing to see the patient (eg, review of tests), Obtaining and/or reviewing separately obtained history, Documenting clinical information in the electronic or other health record, Independently interpreting results (not separately reported) and communicating results to the patient/family/caregiver, or Care coordination (not separately reported).     Each patient to whom he or she provides medical services by telemedicine is:  (1) informed of the relationship between the physician and patient and the respective role of any other health care provider with respect to management of the patient; and (2) notified that he or she may decline to receive medical services by telemedicine and may withdraw from such care at any time.      Chief Complaint/Reason for Consultation: history of non-muscle invasive bladder cancer    Requesting Provider:   No referring provider defined for this encounter.      History of Present Illness:   Patient 65 y.o. 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 He last saw me for 7/14/2023 after repeat induction BCG and negative cysto + bladder bx.      He was then lost to follow up.      Former smoker     Hx of appendectomy when he was 2  Inguinal hernia repair   Family hx of prostate cancer in father and grand father  Family hx of kidney cancer in aunt     Patient here today in follow up. Very resistant to undergo continued cystoscopic surveillance. Discussed NCCN and AUA guidelines.      No hematuria. No issues. No new changes in health.     Here today to discuss cysto ,  surveillance.     Urologic History:      4/1/2022 -- Office Cytoscopy -- erythema right lateral wall  4/5/2022 -- CTUrogram -- no renal mass, no bladder mass, non-obstructing renal stones  4/19/2022 -- Bladder bx -- CIS  6/21/2022 -- TURBT -- CIS, muscle present and uninvolved  8/23/2022 -- re-TURBT -- CIS, muscle present and uninvolved  12/2022 -- completed induction BCG  3/8/2023 -- TURBT, blue light -- HG Ta, random bladder bx -- CIS  4/4/2023 -- TURBT, blue light -- no evidence of tumor at re-resection on final pathology  5/10/2023 -- 6/7/2023 -- Repeat induction BCG  7/14/2023 -- Cysto, random bladder bx -- Benign   9/13/2023 -- Maintenance BCG x2   10/4/2024 -- TURBT, blue light -- negative       Patient Active Problem List    Diagnosis Date Noted    Shortness of breath 12/10/2024    Hoarseness of voice 12/10/2024    Class 1 obesity due to excess calories with serious comorbidity and body mass index (BMI) of 30.0 to 30.9 in adult 12/06/2024    Elevated liver enzymes 12/06/2024    Adjustment disorder with anxious mood 05/17/2022    Numbness and tingling of foot 05/17/2022    CKD stage 3a, GFR 45-59 ml/min 05/17/2022    Malignant neoplasm of lateral wall of urinary bladder 05/11/2022    Edema leg 03/28/2022    Aortic atherosclerosis 01/28/2022    Coronary artery calcification 01/28/2022    Gastroesophageal reflux disease 08/09/2021    Hiatal hernia with gastroesophageal reflux disease without esophagitis 05/26/2021    AK (actinic keratosis) 11/02/2020    Erectile dysfunction due to arterial insufficiency 07/02/2018    Diverticulitis of large intestine without perforation or abscess without bleeding 04/18/2017    Hypertension, essential 08/17/2016    Hyperlipidemia 08/17/2016    NAFLD (nonalcoholic fatty liver disease) 08/17/2016    Lateral epicondylitis of right elbow 08/17/2016    BPH with urinary obstruction 09/08/2014    Colon polyps 09/27/2012        Prescriptions Prior to Admission[1]    Review of patient's  allergies indicates:   Allergen Reactions    Amlodipine      edema    Atenolol      Depression - circa 2000    Catechu herb (black catechu, senegalia catechu)     Famotidine Diarrhea    Erythromycin Rash    Sumycin [tetracycline] Rash    Tetracyclines Rash        Past Medical History:   Diagnosis Date    Allergy     seasonal    Anal fistula hx    Arthritis     Basal cell carcinoma 04/2013    left superior forehead    Callus     CKD (chronic kidney disease)     Diverticulosis     Epididymal cyst     GERD (gastroesophageal reflux disease)     Hayfever     Hydrocele, right     Hyperlipidemia     Hypertension     Neck pain     hx of muscular inury from weight lifting---resolved now    PONV (postoperative nausea and vomiting)     Prostatitis     Dec. '12-treated with antibx.    Squamous cell carcinoma 01/07/2019    anterior scalp    Squamous cell carcinoma of skin 03/2020    Left post scalp (SCC insitu-saucerization)    Visual impairment       Past Surgical History:   Procedure Laterality Date    APPENDECTOMY  1962    BIOPSY OF BLADDER  4/19/2022    Procedure: BIOPSY, BLADDER;  Surgeon: Lenny Ewing MD;  Location: [facility] OR [address] [place] [address];  Service: Urology;;    BIOPSY OF BLADDER  3/8/2023    Procedure: BIOPSY, BLADDER;  Surgeon: Lenny Ewing MD;  Location: [facility] OR [address] [place] [address];  Service: Urology;;    BIOPSY OF BLADDER  7/14/2023    Procedure: BIOPSY, BLADDER;  Surgeon: Benjamin Haile MD;  [facility];  Service: Urology;;    BLADDER FULGURATION  4/19/2022    Procedure: FULGURATION, BLADDER;  Surgeon: Lenny Ewing MD;  Location: [facility] OR [address] [place] [address];  Service: Urology;;    BLADDER FULGURATION  3/8/2023    Procedure: FULGURATION, BLADDER;  Surgeon: Lenny Ewing MD;  [facility];  Service: Urology;;    BLADDER FULGURATION  7/14/2023    Procedure: FULGURATION, BLADDER;  Surgeon: Benjamin Haile MD;  Location: [facility] OR [address] [place] [address];  Service: Urology;;    CAUDAL EPIDURAL STEROID INJECTION N/A 12/13/2023     Procedure: Injection-steroid-epidural-caudal;  Surgeon: Devante Boone MD;  Location: [facility] OR;  Service: Pain Management;  Laterality: N/A;    COLONOSCOPY  12/2012    due for repeat 12/2015    COLONOSCOPY N/A 5/19/2016    Procedure: COLONOSCOPY;  Surgeon: James Webber MD;  Location: [facility] ENDO (4TH FLR);  Service: Endoscopy;  Laterality: N/A; diverticulosis, repeat in 5-10 years for surveillance    COLONOSCOPY N/A 8/9/2021    Procedure: COLONOSCOPY;  Surgeon: Fahad Bautista Jr., MD;  Location: [facility] ENDO;  Service: Endoscopy;  Laterality: N/A;    CYSTOSCOPY  4/19/2022    Procedure: CYSTOSCOPY;  Surgeon: Lenny Ewing MD;  Location: [facility] OR 1ST FLR;  Service: Urology;;    CYSTOSCOPY  6/21/2022    Procedure: CYSTOSCOPY;  Surgeon: Lenny Ewing MD;  Location: [facility] OR 1ST FLR;  Service: Urology;;    CYSTOSCOPY  8/23/2022    Procedure: CYSTOSCOPY;  Surgeon: Lenny Ewing MD;  Location: [facility] OR 1ST FLR;  Service: Urology;;    CYSTOSCOPY  3/8/2023    Procedure: CYSTOSCOPY-BLUE LIGHT;  Surgeon: Lenny Ewing MD;  Location: [facility] OR 1ST FLR;  Service: Urology;;    CYSTOSCOPY N/A 4/4/2023    Procedure: CYSTOSCOPY;  Surgeon: Benjamin Haile MD;  Location: [facility] OR 1ST FLR;  Service: Urology;  Laterality: N/A;  1 hour    CYSTOSCOPY  7/14/2023    Procedure: CYSTOSCOPY;  Surgeon: Benjamin Haile MD;  Location: [facility] OR 1ST FLR;  Service: Urology;;  BLUE LIGHT    CYSTOSCOPY  10/4/2024    Procedure: CYSTOSCOPY;  Surgeon: Benjamin Haile MD;  Location: [facility] OR 1ST FLR;  Service: Urology;;    ESOPHAGOGASTRODUODENOSCOPY N/A 8/9/2021    Procedure: EGD (ESOPHAGOGASTRODUODENOSCOPY);  Surgeon: Fahad Bautista Jr., MD;  Location: [facility] ENDO;  Service: Endoscopy;  Laterality: N/A;    EUA/Fistulotomy-'08      HERNIA REPAIR      RIH with mesh    Hydrocelectomy  2013    MASS EXCISION  2004    BCC removal (twice)    MOLE REMOVAL  02/25/2019    2 moles removed from scalp =- 1 was basal cell and 1 was squamous cell - dermatology -  Dr. londono    right Spermatocelectomy      TURBT (TRANSURETHRAL RESECTION OF BLADDER TUMOR) N/A 2023    Procedure: TURBT (TRANSURETHRAL RESECTION OF BLADDER TUMOR);  Surgeon: Benjamin Haile MD;  Location: [facility] OR 1ST FLR;  Service: Urology;  Laterality: N/A;  1 hour, blue light    TURBT, WITH BLUE LIGHT CYSTOSCOPY AND CYSVIEW  10/4/2024    Procedure: TURBT,WITH BLUE LIGHT CYSTOSCOPY AND CYSVIEW;  Surgeon: Benjamin Haile MD;  Location: [facility] OR 1ST FLR;  Service: Urology;;    UPPER GASTROINTESTINAL ENDOSCOPY  prior to     hiatal hernia, reflux esophagitis      Family History   Problem Relation Name Age of Onset    Skin cancer Mother Kimberly Jesus     Hypertension Mother Kimberly Jesus     Skin cancer Father Lorna Jesus     Cancer Father Lorna Jesus         prostate    Hypertension Father Lorna Jesus     Hypertension Maternal Grandmother Kimberly Lozano Leydecker     Hypertension Maternal Grandfather Thaddeus Bennettecker     Hypertension Paternal Grandmother Kristiepepe Guo Jesus     Anesthesia problems Paternal Grandmother Kristie Guo Jesus     Cancer Paternal Grandfather OJulio A Jesus         Prostate    Hypertension Paternal Grandfather OJulio A Jesus     Heart disease Paternal Grandfather OJulio A Jesus     Diabetes Neg Hx      Colon polyps Neg Hx      Colon cancer Neg Hx      Melanoma Neg Hx      Psoriasis Neg Hx      Lupus Neg Hx      Eczema Neg Hx      Acne Neg Hx      Cirrhosis Neg Hx      Prostate cancer Neg Hx      Kidney disease Neg Hx      Crohn's disease Neg Hx      Ulcerative colitis Neg Hx      Stomach cancer Neg Hx      Esophageal cancer Neg Hx        Social History     Tobacco Use    Smoking status: Former     Current packs/day: 0.00     Average packs/day: 1 pack/day for 10.0 years (10.0 ttl pk-yrs)     Types: Cigarettes     Start date: 1977     Quit date: 1987     Years since quittin.4    Smokeless tobacco: Never    Tobacco comments:     quit   Substance Use Topics    Alcohol  use: Not Currently     Alcohol/week: 0.0 - 1.0 standard drinks of alcohol     Comment: No longer drinks ETOH        Review of Systems   Constitutional:  Negative for activity change, chills, fatigue and unexpected weight change.   Respiratory:  Negative for chest tightness and shortness of breath.    Cardiovascular:  Negative for chest pain.   Gastrointestinal:  Negative for nausea and vomiting.   Genitourinary:  Negative for dysuria, frequency, hematuria and urgency.   Neurological:  Negative for dizziness and weakness.       OBJECTIVE:     No exam performed as this was virtual visit        LAB:      All laboratory values listed below was/were independently reviewed with patient at this clinic visit.  Lab Results   Component Value Date     01/17/2025     12/27/2024     09/24/2024    K 4.6 01/17/2025    K 4.5 12/27/2024    K 5.2 (H) 09/24/2024     01/17/2025     12/27/2024     09/24/2024    CO2 26 01/17/2025    CO2 26 12/27/2024    CO2 26 09/24/2024    BUN 20 01/17/2025    BUN 17 12/27/2024    BUN 22 09/24/2024    CREATININE 1.3 01/17/2025    CREATININE 1.2 12/27/2024    CREATININE 1.6 (H) 09/24/2024    CALCIUM 9.6 01/17/2025    CALCIUM 9.7 12/27/2024    CALCIUM 10.3 09/24/2024    ANIONGAP 5 (L) 01/17/2025    ANIONGAP 9 12/27/2024    ANIONGAP 10 09/24/2024    EGFRNORACEVR >60.0 01/17/2025    EGFRNORACEVR >60.0 12/27/2024    EGFRNORACEVR 47.2 (A) 09/24/2024       Lab Results   Component Value Date    WBC 7.31 03/01/2023    WBC 7.35 08/19/2022    WBC 7.55 04/27/2022    HGB 13.7 (L) 03/01/2023    HGB 13.3 (L) 08/19/2022    HGB 13.6 (L) 04/27/2022    HCT 42.6 03/01/2023    HCT 40.1 08/19/2022    HCT 41.1 04/27/2022    MCV 94 03/01/2023    MCV 94 08/19/2022    MCV 91 04/27/2022     03/01/2023     08/19/2022     04/27/2022        IMAGING:      No relevant imaging to review.    ASSESSMENT/PLAN:       64 yo M hx of BCG refractory CIS and HG Ta here after lost to  follow up for one year.       Plan:    Bladder cancer, non-muscle invasive  Plan to schedule cysto, poss TURBT for 6/13/2025   Plan for urine culture prior           Benjamin Haile MD  Urologic Oncology  P: [phone]                                [1] (Not in a hospital admission)

## 2025-05-29 ENCOUNTER — OFFICE VISIT (OUTPATIENT)
Dept: UROLOGY | Facility: CLINIC | Age: 65
End: 2025-05-29
Payer: COMMERCIAL

## 2025-05-29 DIAGNOSIS — C67.2 MALIGNANT NEOPLASM OF LATERAL WALL OF URINARY BLADDER: ICD-10-CM

## 2025-05-29 DIAGNOSIS — R30.0 DYSURIA: Primary | ICD-10-CM

## 2025-06-02 ENCOUNTER — LAB VISIT (OUTPATIENT)
Dept: LAB | Facility: HOSPITAL | Age: 65
End: 2025-06-02
Attending: STUDENT IN AN ORGANIZED HEALTH CARE EDUCATION/TRAINING PROGRAM
Payer: COMMERCIAL

## 2025-06-02 DIAGNOSIS — R30.0 DYSURIA: ICD-10-CM

## 2025-06-02 PROCEDURE — 87086 URINE CULTURE/COLONY COUNT: CPT

## 2025-06-04 LAB — BACTERIA UR CULT: NO GROWTH

## 2025-06-06 ENCOUNTER — PATIENT MESSAGE (OUTPATIENT)
Dept: UROLOGY | Facility: CLINIC | Age: 65
End: 2025-06-06
Payer: COMMERCIAL

## 2025-06-10 ENCOUNTER — PATIENT OUTREACH (OUTPATIENT)
Dept: ADMINISTRATIVE | Facility: HOSPITAL | Age: 65
End: 2025-06-10
Payer: COMMERCIAL

## 2025-06-10 DIAGNOSIS — N18.31 CKD STAGE 3A, GFR 45-59 ML/MIN: Primary | ICD-10-CM

## 2025-06-18 ENCOUNTER — APPOINTMENT (OUTPATIENT)
Dept: LAB | Facility: HOSPITAL | Age: 65
End: 2025-06-18
Attending: INTERNAL MEDICINE
Payer: COMMERCIAL

## 2025-06-24 ENCOUNTER — OFFICE VISIT (OUTPATIENT)
Dept: NEPHROLOGY | Facility: CLINIC | Age: 65
End: 2025-06-24
Payer: COMMERCIAL

## 2025-06-24 VITALS
HEIGHT: 73 IN | OXYGEN SATURATION: 98 % | WEIGHT: 234.38 LBS | DIASTOLIC BLOOD PRESSURE: 74 MMHG | SYSTOLIC BLOOD PRESSURE: 124 MMHG | BODY MASS INDEX: 31.06 KG/M2 | HEART RATE: 78 BPM

## 2025-06-24 DIAGNOSIS — Z87.891 FORMER SMOKER: ICD-10-CM

## 2025-06-24 DIAGNOSIS — R73.9 HYPERGLYCEMIA: ICD-10-CM

## 2025-06-24 DIAGNOSIS — I10 HYPERTENSION, ESSENTIAL: ICD-10-CM

## 2025-06-24 DIAGNOSIS — E55.9 VITAMIN D DEFICIENCY: ICD-10-CM

## 2025-06-24 DIAGNOSIS — R80.8 OTHER PROTEINURIA: ICD-10-CM

## 2025-06-24 DIAGNOSIS — N18.31 CKD STAGE 3A, GFR 45-59 ML/MIN: Primary | ICD-10-CM

## 2025-06-24 PROCEDURE — 3008F BODY MASS INDEX DOCD: CPT | Mod: CPTII,S$GLB,, | Performed by: INTERNAL MEDICINE

## 2025-06-24 PROCEDURE — 3288F FALL RISK ASSESSMENT DOCD: CPT | Mod: CPTII,S$GLB,, | Performed by: INTERNAL MEDICINE

## 2025-06-24 PROCEDURE — 1101F PT FALLS ASSESS-DOCD LE1/YR: CPT | Mod: CPTII,S$GLB,, | Performed by: INTERNAL MEDICINE

## 2025-06-24 PROCEDURE — 3044F HG A1C LEVEL LT 7.0%: CPT | Mod: CPTII,S$GLB,, | Performed by: INTERNAL MEDICINE

## 2025-06-24 PROCEDURE — 3078F DIAST BP <80 MM HG: CPT | Mod: CPTII,S$GLB,, | Performed by: INTERNAL MEDICINE

## 2025-06-24 PROCEDURE — 99999 PR PBB SHADOW E&M-EST. PATIENT-LVL IV: CPT | Mod: PBBFAC,,, | Performed by: INTERNAL MEDICINE

## 2025-06-24 PROCEDURE — 1126F AMNT PAIN NOTED NONE PRSNT: CPT | Mod: CPTII,S$GLB,, | Performed by: INTERNAL MEDICINE

## 2025-06-24 PROCEDURE — 3066F NEPHROPATHY DOC TX: CPT | Mod: CPTII,S$GLB,, | Performed by: INTERNAL MEDICINE

## 2025-06-24 PROCEDURE — 3074F SYST BP LT 130 MM HG: CPT | Mod: CPTII,S$GLB,, | Performed by: INTERNAL MEDICINE

## 2025-06-24 PROCEDURE — 1160F RVW MEDS BY RX/DR IN RCRD: CPT | Mod: CPTII,S$GLB,, | Performed by: INTERNAL MEDICINE

## 2025-06-24 PROCEDURE — 4010F ACE/ARB THERAPY RXD/TAKEN: CPT | Mod: CPTII,S$GLB,, | Performed by: INTERNAL MEDICINE

## 2025-06-24 PROCEDURE — G2211 COMPLEX E/M VISIT ADD ON: HCPCS | Mod: S$GLB,,, | Performed by: INTERNAL MEDICINE

## 2025-06-24 PROCEDURE — 1159F MED LIST DOCD IN RCRD: CPT | Mod: CPTII,S$GLB,, | Performed by: INTERNAL MEDICINE

## 2025-06-24 PROCEDURE — 99215 OFFICE O/P EST HI 40 MIN: CPT | Mod: S$GLB,,, | Performed by: INTERNAL MEDICINE

## 2025-06-24 NOTE — PROGRESS NOTES
Subjective:      Patient ID: Joseph Jesus is a 65 y.o. White male who presents for new evaluation of No chief complaint on file.    HPI    Jan 2025:  The patient location is: [place]  The chief complaint leading to consultation is: decreased GFR/CKD  Visit type: audiovisual  60 minutes of total time spent on the encounter, which includes face to face time and non-face to face time preparing to see the patient (eg, review of tests), Obtaining and/or reviewing separately obtained history, Documenting clinical information in the electronic or other health record, Independently interpreting results (not separately reported) and communicating results to the patient/family/caregiver, or Care coordination (not separately reported).   Each patient to whom he or she provides medical services by telemedicine is:  (1) informed of the relationship between the physician and patient and the respective role of any other health care provider with respect to management of the patient; and (2) notified that he or she may decline to receive medical services by telemedicine and may withdraw from such care at any time.  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 He was subsequently switched to amlodipine, a calcium channel blocker, which caused significant adverse effects. He was then returned to irbesartan with the addition of a diuretic. He notes significant improvement in  blood pressure control, which he attributes partially to his recent skilled nursing. He experienced some previous side effects including mild toe numbness and medication-related coughing, which have since resolved with medication adjustmen   He reports ongoing foot problems characterized by persistent swelling and discoloration of toes and ankles, turning black in those areas. He has undergone comprehensive testing including vein, artery, and nerve evaluations, but no definitive diagnosis has been established. He currently wears compression socks to manage symptoms and attempts to improve circulation by wiggling toes throughout the day. He expresses frustration with lack of clear explanation for his foot condition despite multiple medical investigations.   He has a history of GERD with associated hoarseness and nocturnal coughing. He currently manages symptoms with Gaviscon advance 1 tablet at night, which effectively prevents nighttime coughing. A specialist identified GERD as the cause of his hoarseness. He reports significant symptom improvement since starting treatme     Review of Systems   Constitutional:  Negative for activity change and appetite change.   Allergic/Immunologic: Positive for immunocompromised state.      Objective:     Physical Exam  Constitutional:       General: He is not in acute distress.     Appearance: He is not ill-appearing.   Pulmonary:      Effort: Pulmonary effort is normal. No respiratory distress.   Neurological:      Mental Status: He is alert and oriented to person, place, and time.   Psychiatric:         Mood and Affect: Mood normal.       Assessment:     1. CKD stage 3a, GFR 45-59 ml/min    2. Hypertension, essential    3. Vitamin D deficiency    4. Proteinuria--60mg    5. Hyperglycemia    6. Former smoker         Plan:     CKD Stage 3a (2-3) with several risk factors   - reviewed stages of CKD  - discussed kidney health tips, emphasis on diet   - on an ARB, consider SGLT2i   -  recent abdominal u/s with slightly enlarged kidneys   - Renal function fluctuates between Stage II and 3 CKD, currently at GFR 55. - Potassium slightly elevated in September, likely due to irbesartan and spironolactone; will monitor. - , the stages of CKD and current status. -   -- Follow up in 6 months for repeat renal function tests, vitamin D level, electrolytes, and urine protein.     PROTEINURIA: - Urine protein-to-creatinine ratio normal at 0.06 g/g. -   - Explained the purpose of urine protein testing in assessing kidney health.     Mineral and Bone Disease  - Phosphorus levels normal, no treatment needed at this time. -   Explained the role of kidneys in vitamin D activation and its importance for bone and cardiovascular health  -PTH at goal,   -- Vitamin D level slightly low at 28 ng/mL, Started Vitamin D3 1000 IU daily, OTC.     HYPERTENSION, ESSENTIAL:   - Current BP medications (irbesartan and spironolactone) beneficial for kidney and heart health.     Hyperglycemia  - Explained the relationship between prediabetes and renal function. - Considered SGLT2 inhibitor for kidney protection and prediabetes management, but advised to discuss with PCP     LE   - advised to discuss with PCP      Portions of this note were generated by Radha     RTC 6mo   40 minutes of total time spent on the encounter, which includes face to face time and non-face to face time preparing to see the patient (eg, review of tests), Obtaining and/or reviewing separately obtained history, Documenting clinical information in the electronic or other health record, Independently interpreting results (not separately reported) and communicating results to the patient/family/caregiver, or Care coordination (not separately reported).  Visit today included increased complexity associated with the care of the episodic problem addressed and/or managing the longitudinal care of the patient due to the serious and/or complex managed  problem(s) as per above diagnosis list.

## 2025-06-24 NOTE — PATIENT INSTRUCTIONS
Jardiance or Farxiga to help treat pre-Diabetes and chronic kidney disease. Possible treatment for you.    Please mention also your feet swelling and skin color. Ultrasounds?     STARTING NOW  OTC--vitamin D3 1000 units per day, any brand

## 2025-07-09 ENCOUNTER — TELEPHONE (OUTPATIENT)
Dept: DERMATOLOGY | Facility: CLINIC | Age: 65
End: 2025-07-09
Payer: COMMERCIAL

## 2025-07-09 NOTE — TELEPHONE ENCOUNTER
Copied from CRM #[id number]. Topic: Appointments - Appointment Scheduling  >> Jul 8, 2025 10:23 AM Aaron Askew wrote:  PATIENT CALL    Pt called regarding open recall. States that he is late scheduling his annual check up. Noted long hx of Aks, skin cancers and clavus of the R foot. Unsure if he needs an extended appt?  Please call back at [phone] to assist further  >> Jul 8, 2025  4:56 PM Nurse Ritu wrote:    ----- Message -----  From: Aaron Askew  Sent: 7/8/2025  10:27 AM CDT  To: Talia Contreras Staff

## 2025-07-17 ENCOUNTER — TELEPHONE (OUTPATIENT)
Dept: PHARMACY | Facility: CLINIC | Age: 65
End: 2025-07-17
Payer: COMMERCIAL

## 2025-07-17 NOTE — TELEPHONE ENCOUNTER
[facility]/Population Health Chart Review & Patient Outreach Details For Medication Adherence Project    Reason for Outreach Encounter: 3rd Party payor non-compliance report ([employer], [employer], C, etc)  2.  Patient Outreach Method: Reviewed Patient Chart  3.   Medication in question: atorvastatin    LAST FILLED: 6/19/25 for 90 day supply  Hyperlipidemia Medications              atorvastatin (LIPITOR) 10 MG tablet Take 1 tablet (10 mg total) by mouth once daily.               4.  Reviewed and or Updates Made To: Patient Chart  5. Outreach Outcomes and/or actions taken: Patient filled medication and is on track to be adherent

## 2025-08-03 ENCOUNTER — TELEPHONE (OUTPATIENT)
Dept: PHARMACY | Facility: CLINIC | Age: 65
End: 2025-08-03
Payer: COMMERCIAL

## 2025-08-03 NOTE — TELEPHONE ENCOUNTER
[facility]/Population Health Chart Review & Patient Outreach Details For Medication Adherence Project    Reason for Outreach Encounter: 3rd Party payor non-compliance report ([employer], [employer], [employer], etc)  2.  Patient Outreach Method: Reviewed patient chart   3.   Medication in question:    Hyperlipidemia Medications              atorvastatin (LIPITOR) 10 MG tablet Take 1 tablet (10 mg total) by mouth once daily.                  LF 90 ds 6/19/25    4.  Reviewed and or Updates Made To: Patient Chart  5. Outreach Outcomes and/or actions taken: Patient filled medication and is on track to be adherent  Additional Notes:

## 2025-08-26 ENCOUNTER — OFFICE VISIT (OUTPATIENT)
Dept: OPHTHALMOLOGY | Facility: CLINIC | Age: 65
End: 2025-08-26
Payer: COMMERCIAL

## 2025-08-26 DIAGNOSIS — H25.13 NUCLEAR SCLEROTIC CATARACT, BILATERAL: ICD-10-CM

## 2025-08-26 DIAGNOSIS — H35.373 EPIRETINAL MEMBRANE (ERM), BILATERAL: Primary | ICD-10-CM

## 2025-08-26 DIAGNOSIS — H35.89 BULL'S EYE MACULOPATHY: ICD-10-CM

## 2025-08-26 PROCEDURE — 92134 CPTRZ OPH DX IMG PST SGM RTA: CPT | Mod: S$GLB,,, | Performed by: OPHTHALMOLOGY

## 2025-08-26 PROCEDURE — 3066F NEPHROPATHY DOC TX: CPT | Mod: CPTII,S$GLB,, | Performed by: OPHTHALMOLOGY

## 2025-08-26 PROCEDURE — 4010F ACE/ARB THERAPY RXD/TAKEN: CPT | Mod: CPTII,S$GLB,, | Performed by: OPHTHALMOLOGY

## 2025-08-26 PROCEDURE — 3044F HG A1C LEVEL LT 7.0%: CPT | Mod: CPTII,S$GLB,, | Performed by: OPHTHALMOLOGY

## 2025-08-26 PROCEDURE — 99999 PR PBB SHADOW E&M-EST. PATIENT-LVL III: CPT | Mod: PBBFAC,,, | Performed by: OPHTHALMOLOGY

## 2025-08-26 PROCEDURE — 1159F MED LIST DOCD IN RCRD: CPT | Mod: CPTII,S$GLB,, | Performed by: OPHTHALMOLOGY

## 2025-08-26 PROCEDURE — 1160F RVW MEDS BY RX/DR IN RCRD: CPT | Mod: CPTII,S$GLB,, | Performed by: OPHTHALMOLOGY

## 2025-08-26 PROCEDURE — 99214 OFFICE O/P EST MOD 30 MIN: CPT | Mod: S$GLB,,, | Performed by: OPHTHALMOLOGY

## (undated) DEVICE — UNDERGLOVES BIOGEL PI SIZE 7.5

## (undated) DEVICE — APPLICATOR CHLORAPREP CLR 10.5

## (undated) DEVICE — GLOVE SURG BIOGEL LATEX SZ 7.5

## (undated) DEVICE — SOL SOD CHLORIDE 0.9% 10ML

## (undated) DEVICE — KIT MAJOR SINGLE BASIN

## (undated) DEVICE — SOL NACL IRR 3000ML

## (undated) DEVICE — PACK CYSTO

## (undated) DEVICE — SET CYSTO IRRIGATION UNIV SPIK

## (undated) DEVICE — SOL IRR WATER STRL 3000 ML

## (undated) DEVICE — SOL WATER STRL IRR 1000ML

## (undated) DEVICE — LOOP CUTTING 24F

## (undated) DEVICE — LOOP CUTTING BPLR 24/26F .30MM

## (undated) DEVICE — ADAPTER HOSE 10FT 8MM

## (undated) DEVICE — TRAY NERVE BLOCK

## (undated) DEVICE — GLOVE 7.5 PROTEXIS PI MICRO

## (undated) DEVICE — TRAY CYSTO BASIN

## (undated) DEVICE — TUBING SUCTION SET FOR ENDOMAT

## (undated) DEVICE — SYR 50ML CATH TIP

## (undated) DEVICE — NDL SPINAL SPINOCAN 22GX3.5

## (undated) DEVICE — SYR 10CC LUER LOCK

## (undated) DEVICE — LOOP CUT BIPOLAR 24/26FR YEL

## (undated) DEVICE — UNDERGLOVES BIOGEL PI SZ 7 LF

## (undated) DEVICE — PACK CYSTOSCOPY III SIRUS

## (undated) DEVICE — TRAY CYSTO BASIN OMC

## (undated) DEVICE — GOWN SMARTGOWN LVL4 X-LONG XL

## (undated) DEVICE — GOWN X-LG STERILE BACK

## (undated) DEVICE — SYR 50CC LL

## (undated) DEVICE — SOL IRR NACL .9% 3000ML

## (undated) DEVICE — SOL NACL 0.9% IV INJ 1000ML

## (undated) DEVICE — SET IRR URLGY 2LINE UNIV SPIKE

## (undated) DEVICE — PAD GROUNDING CONMED ADULT

## (undated) DEVICE — TOWEL OR DISP STRL BLUE 4/PK

## (undated) DEVICE — SET SINGLE BASIN